# Patient Record
Sex: FEMALE | Race: WHITE | Employment: UNEMPLOYED | URBAN - NONMETROPOLITAN AREA
[De-identification: names, ages, dates, MRNs, and addresses within clinical notes are randomized per-mention and may not be internally consistent; named-entity substitution may affect disease eponyms.]

---

## 2017-11-22 ENCOUNTER — HOSPITAL ENCOUNTER (OUTPATIENT)
Dept: MRI IMAGING | Age: 47
End: 2017-11-22
Payer: COMMERCIAL

## 2017-11-22 ENCOUNTER — HOSPITAL ENCOUNTER (OUTPATIENT)
Dept: MRI IMAGING | Age: 47
Discharge: HOME | End: 2017-11-22
Payer: COMMERCIAL

## 2017-11-22 DIAGNOSIS — G35 MULTIPLE SCLEROSIS (HCC): ICD-10-CM

## 2017-11-22 PROCEDURE — 72156 MRI NECK SPINE W/O & W/DYE: CPT

## 2017-11-22 PROCEDURE — 6360000004 HC RX CONTRAST MEDICATION: Performed by: PSYCHIATRY & NEUROLOGY

## 2017-11-22 PROCEDURE — 70553 MRI BRAIN STEM W/O & W/DYE: CPT

## 2017-11-22 PROCEDURE — A9579 GAD-BASE MR CONTRAST NOS,1ML: HCPCS | Performed by: PSYCHIATRY & NEUROLOGY

## 2017-11-22 RX ADMIN — GADOTERIDOL 10 ML: 279.3 INJECTION, SOLUTION INTRAVENOUS at 19:20

## 2017-12-01 ENCOUNTER — HOSPITAL ENCOUNTER (EMERGENCY)
Age: 47
Discharge: HOME | End: 2017-12-01
Payer: COMMERCIAL

## 2017-12-01 VITALS
DIASTOLIC BLOOD PRESSURE: 92 MMHG | BODY MASS INDEX: 18.37 KG/M2 | TEMPERATURE: 98.3 F | RESPIRATION RATE: 16 BRPM | WEIGHT: 107 LBS | OXYGEN SATURATION: 98 % | SYSTOLIC BLOOD PRESSURE: 125 MMHG | HEART RATE: 79 BPM

## 2017-12-01 DIAGNOSIS — F10.920 ACUTE ALCOHOLIC INTOXICATION WITHOUT COMPLICATION (HCC): Primary | ICD-10-CM

## 2017-12-01 LAB
ACETAMINOPHEN LEVEL: < 5 UG/ML (ref 0–20)
ALBUMIN SERPL-MCNC: 4.6 G/DL (ref 3.5–5.1)
ALP BLD-CCNC: 64 U/L (ref 38–126)
ALT SERPL-CCNC: 46 U/L (ref 11–66)
AMPHETAMINE+METHAMPHETAMINE URINE SCREEN: NEGATIVE
ANION GAP SERPL CALCULATED.3IONS-SCNC: 18 MEQ/L (ref 8–16)
ANISOCYTOSIS: ABNORMAL
AST SERPL-CCNC: 74 U/L (ref 5–40)
BARBITURATE QUANTITATIVE URINE: NEGATIVE
BASOPHILS # BLD: 1 %
BASOPHILS ABSOLUTE: 0.1 THOU/MM3 (ref 0–0.1)
BENZODIAZEPINE QUANTITATIVE URINE: NEGATIVE
BILIRUB SERPL-MCNC: 0.2 MG/DL (ref 0.3–1.2)
BILIRUBIN DIRECT: < 0.2 MG/DL (ref 0–0.3)
BUN BLDV-MCNC: 8 MG/DL (ref 7–22)
CALCIUM SERPL-MCNC: 8.5 MG/DL (ref 8.5–10.5)
CANNABINOID QUANTITATIVE URINE: POSITIVE
CHLORIDE BLD-SCNC: 93 MEQ/L (ref 98–111)
CO2: 23 MEQ/L (ref 23–33)
COCAINE METABOLITE QUANTITATIVE URINE: NEGATIVE
CREAT SERPL-MCNC: 0.3 MG/DL (ref 0.4–1.2)
EKG ATRIAL RATE: 84 BPM
EKG P AXIS: 71 DEGREES
EKG P-R INTERVAL: 144 MS
EKG Q-T INTERVAL: 384 MS
EKG QRS DURATION: 78 MS
EKG QTC CALCULATION (BAZETT): 453 MS
EKG R AXIS: 79 DEGREES
EKG T AXIS: 74 DEGREES
EKG VENTRICULAR RATE: 84 BPM
EOSINOPHIL # BLD: 0.4 %
EOSINOPHILS ABSOLUTE: 0 THOU/MM3 (ref 0–0.4)
ETHYL ALCOHOL, SERUM: 0.47 %
GFR SERPL CREATININE-BSD FRML MDRD: > 90 ML/MIN/1.73M2
GLUCOSE BLD-MCNC: 63 MG/DL (ref 70–108)
HCT VFR BLD CALC: 38.1 % (ref 37–47)
HEMOGLOBIN: 12.8 GM/DL (ref 12–16)
LIPASE: 57.1 U/L (ref 5.6–51.3)
LYMPHOCYTES # BLD: 32.6 %
LYMPHOCYTES ABSOLUTE: 2.2 THOU/MM3 (ref 1–4.8)
MACROCYTES: ABNORMAL
MAGNESIUM: 2 MG/DL (ref 1.6–2.4)
MCH RBC QN AUTO: 34.3 PG (ref 27–31)
MCHC RBC AUTO-ENTMCNC: 33.7 GM/DL (ref 33–37)
MCV RBC AUTO: 101.8 FL (ref 81–99)
MONOCYTES # BLD: 7.4 %
MONOCYTES ABSOLUTE: 0.5 THOU/MM3 (ref 0.4–1.3)
NUCLEATED RED BLOOD CELLS: 0 /100 WBC
OPIATES, URINE: NEGATIVE
OSMOLALITY CALCULATION: 264.6 MOSMOL/KG (ref 275–300)
OXYCODONE: NEGATIVE
PDW BLD-RTO: 15.3 % (ref 11.5–14.5)
PHENCYCLIDINE QUANTITATIVE URINE: NEGATIVE
PLATELET # BLD: 205 THOU/MM3 (ref 130–400)
PMV BLD AUTO: 7.1 MCM (ref 7.4–10.4)
POTASSIUM SERPL-SCNC: 4.2 MEQ/L (ref 3.5–5.2)
RBC # BLD: 3.74 MILL/MM3 (ref 4.2–5.4)
SALICYLATE, SERUM: < 0.3 MG/DL (ref 2–10)
SEG NEUTROPHILS: 58.6 %
SEGMENTED NEUTROPHILS ABSOLUTE COUNT: 4 THOU/MM3 (ref 1.8–7.7)
SODIUM BLD-SCNC: 134 MEQ/L (ref 135–145)
TOTAL PROTEIN: 7.4 G/DL (ref 6.1–8)
WBC # BLD: 6.9 THOU/MM3 (ref 4.8–10.8)

## 2017-12-01 PROCEDURE — 96366 THER/PROPH/DIAG IV INF ADDON: CPT

## 2017-12-01 PROCEDURE — G0480 DRUG TEST DEF 1-7 CLASSES: HCPCS

## 2017-12-01 PROCEDURE — 99284 EMERGENCY DEPT VISIT MOD MDM: CPT

## 2017-12-01 PROCEDURE — 80307 DRUG TEST PRSMV CHEM ANLYZR: CPT

## 2017-12-01 PROCEDURE — 85025 COMPLETE CBC W/AUTO DIFF WBC: CPT

## 2017-12-01 PROCEDURE — 2500000003 HC RX 250 WO HCPCS: Performed by: EMERGENCY MEDICINE

## 2017-12-01 PROCEDURE — 80053 COMPREHEN METABOLIC PANEL: CPT

## 2017-12-01 PROCEDURE — 96365 THER/PROPH/DIAG IV INF INIT: CPT

## 2017-12-01 PROCEDURE — 82248 BILIRUBIN DIRECT: CPT

## 2017-12-01 PROCEDURE — 93005 ELECTROCARDIOGRAM TRACING: CPT

## 2017-12-01 PROCEDURE — 6370000000 HC RX 637 (ALT 250 FOR IP): Performed by: EMERGENCY MEDICINE

## 2017-12-01 PROCEDURE — 83690 ASSAY OF LIPASE: CPT

## 2017-12-01 PROCEDURE — 36415 COLL VENOUS BLD VENIPUNCTURE: CPT

## 2017-12-01 PROCEDURE — 2580000003 HC RX 258: Performed by: EMERGENCY MEDICINE

## 2017-12-01 PROCEDURE — 83735 ASSAY OF MAGNESIUM: CPT

## 2017-12-01 RX ORDER — NICOTINE 21 MG/24HR
1 PATCH, TRANSDERMAL 24 HOURS TRANSDERMAL ONCE
Status: DISCONTINUED | OUTPATIENT
Start: 2017-12-01 | End: 2017-12-01 | Stop reason: HOSPADM

## 2017-12-01 RX ORDER — GABAPENTIN 600 MG/1
600 TABLET ORAL 3 TIMES DAILY
COMMUNITY
End: 2019-09-25

## 2017-12-01 RX ADMIN — ASCORBIC ACID, VITAMIN A PALMITATE, CHOLECALCIFEROL, THIAMINE HYDROCHLORIDE, RIBOFLAVIN-5 PHOSPHATE SODIUM, PYRIDOXINE HYDROCHLORIDE, NIACINAMIDE, DEXPANTHENOL, ALPHA-TOCOPHEROL ACETATE, VITAMIN K1, FOLIC ACID, BIOTIN, CYANOCOBALAMIN: 200; 3300; 200; 6; 3.6; 6; 40; 15; 10; 150; 600; 60; 5 INJECTION, SOLUTION INTRAVENOUS at 14:30

## 2017-12-01 NOTE — ED NOTES
Pt ambulated to [facility] w/slightly unsteady gait. Updated on POC- nicotine patch placed. Door closed to promote rest- curtain open [facility] to visualize pt. SR up x 2.  Will monitor     Sweta Reese RN  12/01/17 2861

## 2017-12-01 NOTE — ED NOTES
Pt discharged to her parents. Pt given resources for OP detox. Pt ambulatory out.       Shama Ortiz, RN  12/01/17 5703

## 2017-12-01 NOTE — ED PROVIDER NOTES
day. She has never used smokeless tobacco. She reports that she drinks alcohol. She reports that she does not use drugs. PHYSICAL EXAM     INITIAL VITALS:  weight is 107 lb (48.5 kg). Her oral temperature is 98.3 °F (36.8 °C). Her blood pressure is 125/92 (abnormal) and her pulse is 79. Her respiration is 16 and oxygen saturation is 98%. Physical Exam   Constitutional: She is oriented to person, place, and time. She appears well-developed and well-nourished. She is cooperative. Non-toxic appearance. She does not have a sickly appearance. She does not appear ill. No distress. Patient cries on examination when requesting a detox. HENT:   Head: Normocephalic and atraumatic. Right Ear: External ear normal.   Left Ear: External ear normal.   Eyes: Conjunctivae are normal. Right eye exhibits no discharge. Left eye exhibits no discharge. No scleral icterus. Neck: Normal range of motion. Neck supple. No JVD present. Cardiovascular: Normal rate, regular rhythm and normal heart sounds. Exam reveals no gallop and no friction rub. No murmur heard. Pulmonary/Chest: Effort normal and breath sounds normal. No respiratory distress. She has no decreased breath sounds. She has no wheezes. She has no rhonchi. She has no rales. Abdominal: Soft. She exhibits no distension. There is no tenderness. There is no rebound and no guarding. Musculoskeletal: Normal range of motion. She exhibits no edema. Neurological: She is alert and oriented to person, place, and time. She exhibits normal muscle tone. She displays no seizure activity. GCS eye subscore is 4. GCS verbal subscore is 5. GCS motor subscore is 6. Skin: Skin is warm and dry. No rash noted. She is not diaphoretic. Psychiatric: She has a normal mood and affect. Her behavior is normal. Thought content normal.   Nursing note and vitals reviewed.       DIFFERENTIAL DIAGNOSIS:   Including but not limited to: alcohol intoxication, Substance abuse,    DIAGNOSTIC RESULTS     EKG: All EKG's are interpreted by the Emergency Department Physician who either signs or Co-signs this chart in the absence of a cardiologist.  Normal sinus rhythm rate of 84. IA interval 144, QRS duration 78, . There is no ectopy. There is no ST elevation or depression significant for ischemia or infarction. RADIOLOGY: non-plain film images(s) such as CT, Ultrasound and MRI are read by the radiologist.  No orders to display     No orders to display         LABS:     Labs Reviewed   CBC WITH AUTO DIFFERENTIAL - Abnormal; Notable for the following:        Result Value    RBC 3.74 (*)     .8 (*)     MCH 34.3 (*)     RDW 15.3 (*)     MPV 7.1 (*)     All other components within normal limits   BASIC METABOLIC PANEL - Abnormal; Notable for the following:     Sodium 134 (*)     Chloride 93 (*)     Glucose 63 (*)     CREATININE 0.3 (*)     All other components within normal limits   HEPATIC FUNCTION PANEL - Abnormal; Notable for the following: Total Bilirubin 0.2 (*)     AST 74 (*)     All other components within normal limits   LIPASE - Abnormal; Notable for the following:     Lipase 57.1 (*)     All other components within normal limits   SALICYLATE LEVEL - Abnormal; Notable for the following:     Salicylate, Serum < 0.3 (*)     All other components within normal limits   OSMOLALITY - Abnormal; Notable for the following:     Osmolality Calc 264.6 (*)     All other components within normal limits   ANION GAP - Abnormal; Notable for the following: Anion Gap 18.0 (*)     All other components within normal limits   MAGNESIUM   ETHANOL   URINE DRUG SCREEN   ACETAMINOPHEN LEVEL   GLOMERULAR FILTRATION RATE, ESTIMATED       EMERGENCY DEPARTMENT COURSE:   Vitals:    Vitals:    12/01/17 1331   BP: (!) 125/92   Pulse: 79   Resp: 16   Temp: 98.3 °F (36.8 °C)   TempSrc: Oral   SpO2: 98%   Weight: 107 lb (48.5 kg)       1:31 PM: The patient was seen and evaluated.     1:45 encouraged to follow-up with [facility] or the emergency department for any new problems    CRITICAL CARE:   None. CONSULTS:  Dr. Zuleima Mccauley said she was sent her for safety and well-being given the patient's high alcohol level. PROCEDURES:  None. FINAL IMPRESSION      1. Acute alcoholic intoxication without complication [facility])          DISPOSITION/PLAN   Discharge    PATIENT REFERRED TO:  Hamida Plascencia MD  [facility], [address] [address] [facility]    Schedule an appointment as soon as possible for a visit       [facility]  [address] [place] [id number] [phone]  Schedule an appointment as soon as possible for a visit   Call or go to [facility] for evaluation of alcohol detoxification. DISCHARGE MEDICATIONS:  New Prescriptions    No medications on file       (Please note that portions of this note were completed with a voice recognition program.  Efforts were made to edit the dictations but occasionally words are mis-transcribed.)    The patient was given an opportunity to see the Emergency Attending. The patient voiced understanding that I was a Mid-Level Provider and was in agreement with being seen independently by myself. Scribe:  Tracy Mann 12/1/17 1:31 PM Scribing for and in the presence of CHERELLE Raymond. Signed by: Parris Garcia, 12/01/17 5:21 PM    Provider:  I personally performed the services described in the documentation, reviewed and edited the documentation which was dictated to the scribe in my presence, and it accurately records my words and actions.     CHERELLE Raymond 12/1/17 5:21 PM        MK Raymond  12/01/17 3249

## 2017-12-01 NOTE — ED NOTES
Pt got herself out of bed- pulled out her IV and was walking the halls. States that she didn't pull the IV out- it just fell out all on its own. Pt directed back to room- then to bathroom. Pt to call friend to come pick her up. NELDA to see pt and give her resources regarding detox options.       Dejon Toscano RN  12/01/17 8962

## 2017-12-01 NOTE — ED NOTES
Pt to rm 22 from intake- states she was sent in per her neurologists office but she wasn't sure why. Pt states she drinks 'a lot', and is currently intoxicated, admits drinking a bottle of rum today. Pt says she wants to get help with her drinking and wants detox. Denies other needs or concerns at this time- states she just wants us all to get out so she can go to sleep. IV access obtained- assessment complete.       Liliana Ribeiro RN  12/01/17 4001

## 2017-12-07 ENCOUNTER — HOSPITAL ENCOUNTER (OUTPATIENT)
Dept: MRI IMAGING | Age: 47
Discharge: HOME | End: 2017-12-07
Payer: COMMERCIAL

## 2017-12-07 DIAGNOSIS — R93.89 ABNORMAL MRI: ICD-10-CM

## 2017-12-07 DIAGNOSIS — M54.50 ACUTE MIDLINE LOW BACK PAIN WITHOUT SCIATICA: ICD-10-CM

## 2017-12-07 PROCEDURE — 72148 MRI LUMBAR SPINE W/O DYE: CPT

## 2017-12-11 ENCOUNTER — OFFICE VISIT (OUTPATIENT)
Dept: NEUROSURGERY | Age: 47
End: 2017-12-11
Payer: COMMERCIAL

## 2017-12-11 VITALS
HEART RATE: 84 BPM | SYSTOLIC BLOOD PRESSURE: 120 MMHG | BODY MASS INDEX: 20.52 KG/M2 | WEIGHT: 120.2 LBS | HEIGHT: 64 IN | DIASTOLIC BLOOD PRESSURE: 85 MMHG

## 2017-12-11 DIAGNOSIS — M54.2 NECK PAIN: Primary | ICD-10-CM

## 2017-12-11 PROCEDURE — G8484 FLU IMMUNIZE NO ADMIN: HCPCS | Performed by: NEUROLOGICAL SURGERY

## 2017-12-11 PROCEDURE — 4004F PT TOBACCO SCREEN RCVD TLK: CPT | Performed by: NEUROLOGICAL SURGERY

## 2017-12-11 PROCEDURE — 99201 PR OFFICE OUTPATIENT NEW 10 MINUTES: CPT | Performed by: NEUROLOGICAL SURGERY

## 2017-12-11 PROCEDURE — G8427 DOCREV CUR MEDS BY ELIG CLIN: HCPCS | Performed by: NEUROLOGICAL SURGERY

## 2017-12-11 PROCEDURE — G8420 CALC BMI NORM PARAMETERS: HCPCS | Performed by: NEUROLOGICAL SURGERY

## 2017-12-11 RX ORDER — CHOLECALCIFEROL (VITAMIN D3) 1250 MCG
CAPSULE ORAL WEEKLY
COMMUNITY
End: 2019-09-25

## 2017-12-11 NOTE — LETTER
[place]  [address] [facility] [address] [facility]  Phone: [phone]  Fax: [phone]    Elise Dalton MD        December 11, 2017    Patient: Palma Chacon   MR Number: [id number]   YOB: 1970   Date of Visit: 12/11/2017   Dear Dr. Elidia Lang ,  Thank you for the request for consultation for Palma Chacon to me for the evaluation of abnormal Cervical spine MRI. As you know Chad Shaver is a 55year old female referred to me because of abnormal MRI Cervical spine done on 11-  There are mild multilevel degenerative changes within the cervical spine which are discussed by level in the findings. These are most significant at C5-C6 with a disc bulge and tiny annular fissure. Mild neural foraminal narrowing is present on the    right without significant spinal canal or left-sided neural foraminal narrowing. There is also a small disc osteophyte complex at C6-C7 with a right paracentral disc protrusion indenting the ventral thecal sac. No significant spinal canal narrowing is    present at this level. There is mild neural foraminal narrowing on the right without significant neural foraminal narrowing on the left. She has a known history of MS. NE is negative for any cervical radiculopathy, sign or symptoms. Although the MRI of the C spine is mildly abnormal she does not appear to have any symptom/sign related to it. No surgical indication at this time. Discussed with patient that I would be glad to see her again should she start to develop arm pain/weakness. Thank you for referring this nice patient to me. If you have questions, please do not hesitate to call. I look forward to following Chad Shaver along with you.   Sincerely,      Elise Dalton MD, [facility], Department of Neurosurgery  Surgical Director, [address] [facility]

## 2017-12-11 NOTE — PROGRESS NOTES
55year old female referred to me because of abnormal MRI Cervical spine done on 11-  There are mild multilevel degenerative changes within the cervical spine which are discussed by level in the findings. These are most significant at C5-C6 with a disc bulge and tiny annular fissure. Mild neural foraminal narrowing is present on the    right without significant spinal canal or left-sided neural foraminal narrowing. There is also a small disc osteophyte complex at C6-C7 with a right paracentral disc protrusion indenting the ventral thecal sac. No significant spinal canal narrowing is    present at this level. There is mild neural foraminal narrowing on the right without significant neural foraminal narrowing on the left. She has a known history of MS. NE is negative for any cervical radiculopathy, sign or symptoms. Although the MRI of the C spine is mildly abnormal she does not appear to have any symptom/sign related to it. No surgical indication at this time. Discussed with patient that I would be glad to see her again should she start to develop arm pain/weakness.

## 2017-12-14 ENCOUNTER — HOSPITAL ENCOUNTER (OUTPATIENT)
Age: 47
Discharge: HOME | End: 2017-12-14
Payer: COMMERCIAL

## 2017-12-14 LAB
ALBUMIN SERPL-MCNC: 4.4 G/DL (ref 3.5–5.1)
ALP BLD-CCNC: 64 U/L (ref 38–126)
ALT SERPL-CCNC: 20 U/L (ref 11–66)
ANION GAP SERPL CALCULATED.3IONS-SCNC: 11 MEQ/L (ref 8–16)
AST SERPL-CCNC: 23 U/L (ref 5–40)
BASOPHILS # BLD: 3.5 %
BASOPHILS ABSOLUTE: 0.3 THOU/MM3 (ref 0–0.1)
BILIRUB SERPL-MCNC: < 0.2 MG/DL (ref 0.3–1.2)
BUN BLDV-MCNC: 9 MG/DL (ref 7–22)
CALCIUM SERPL-MCNC: 9.8 MG/DL (ref 8.5–10.5)
CHLORIDE BLD-SCNC: 101 MEQ/L (ref 98–111)
CO2: 27 MEQ/L (ref 23–33)
CREAT SERPL-MCNC: 0.5 MG/DL (ref 0.4–1.2)
EOSINOPHIL # BLD: 2.2 %
EOSINOPHILS ABSOLUTE: 0.2 THOU/MM3 (ref 0–0.4)
GFR SERPL CREATININE-BSD FRML MDRD: > 90 ML/MIN/1.73M2
GLUCOSE BLD-MCNC: 77 MG/DL (ref 70–108)
HCT VFR BLD CALC: 34.5 % (ref 37–47)
HEMOGLOBIN: 11.7 GM/DL (ref 12–16)
LYMPHOCYTES # BLD: 35 %
LYMPHOCYTES ABSOLUTE: 3 THOU/MM3 (ref 1–4.8)
MACROCYTES: ABNORMAL
MCH RBC QN AUTO: 35.4 PG (ref 27–31)
MCHC RBC AUTO-ENTMCNC: 33.9 GM/DL (ref 33–37)
MCV RBC AUTO: 104.5 FL (ref 81–99)
MONOCYTES # BLD: 10 %
MONOCYTES ABSOLUTE: 0.9 THOU/MM3 (ref 0.4–1.3)
NUCLEATED RED BLOOD CELLS: 0 /100 WBC
PDW BLD-RTO: 13.7 % (ref 11.5–14.5)
PLATELET # BLD: 451 THOU/MM3 (ref 130–400)
PMV BLD AUTO: 7.5 MCM (ref 7.4–10.4)
POTASSIUM SERPL-SCNC: 3.6 MEQ/L (ref 3.5–5.2)
RBC # BLD: 3.3 MILL/MM3 (ref 4.2–5.4)
SEG NEUTROPHILS: 49.3 %
SEGMENTED NEUTROPHILS ABSOLUTE COUNT: 4.2 THOU/MM3 (ref 1.8–7.7)
SODIUM BLD-SCNC: 139 MEQ/L (ref 135–145)
TOTAL PROTEIN: 7.2 G/DL (ref 6.1–8)
WBC # BLD: 8.5 THOU/MM3 (ref 4.8–10.8)

## 2017-12-14 PROCEDURE — 85245 CLOT FACTOR VIII VW RISTOCTN: CPT

## 2017-12-14 PROCEDURE — 85240 CLOT FACTOR VIII AHG 1 STAGE: CPT

## 2017-12-14 PROCEDURE — 85025 COMPLETE CBC W/AUTO DIFF WBC: CPT

## 2017-12-14 PROCEDURE — 80053 COMPREHEN METABOLIC PANEL: CPT

## 2017-12-14 PROCEDURE — 85246 CLOT FACTOR VIII VW ANTIGEN: CPT

## 2017-12-14 PROCEDURE — 36415 COLL VENOUS BLD VENIPUNCTURE: CPT

## 2017-12-14 PROCEDURE — 85260 CLOT FACTOR X STUART-POWER: CPT

## 2017-12-14 PROCEDURE — 85250 CLOT FACTOR IX PTC/CHRSTMAS: CPT

## 2017-12-14 PROCEDURE — 85270 CLOT FACTOR XI PTA: CPT

## 2017-12-18 LAB
FACTOR IX ASSAY: NORMAL
FACTOR X ASSAY: NORMAL
FACTOR XI ASSAY: NORMAL
VON WILLEBRAND PANEL: NORMAL

## 2018-01-25 ENCOUNTER — HOSPITAL ENCOUNTER (OUTPATIENT)
Age: 48
Discharge: HOME | End: 2018-01-25
Payer: COMMERCIAL

## 2018-01-25 LAB
APTT: 27.4 SECONDS (ref 22–38)
INR BLD: 0.85 (ref 0.85–1.13)

## 2018-01-25 PROCEDURE — 85610 PROTHROMBIN TIME: CPT

## 2018-01-25 PROCEDURE — 85730 THROMBOPLASTIN TIME PARTIAL: CPT

## 2018-01-25 PROCEDURE — 36415 COLL VENOUS BLD VENIPUNCTURE: CPT

## 2018-05-07 ENCOUNTER — TELEPHONE (OUTPATIENT)
Dept: NEUROSURGERY | Age: 48
End: 2018-05-07

## 2018-06-26 ENCOUNTER — HOSPITAL ENCOUNTER (OUTPATIENT)
Dept: MRI IMAGING | Age: 48
Discharge: HOME | End: 2018-06-26
Payer: COMMERCIAL

## 2018-06-26 DIAGNOSIS — M50.20 PROTRUDED CERVICAL DISC: ICD-10-CM

## 2018-06-26 PROCEDURE — 72141 MRI NECK SPINE W/O DYE: CPT

## 2018-12-31 ENCOUNTER — HOSPITAL ENCOUNTER (OUTPATIENT)
Dept: MRI IMAGING | Age: 48
Discharge: HOME | End: 2018-12-31
Payer: COMMERCIAL

## 2018-12-31 DIAGNOSIS — M50.20 CERVICAL DISC DISPLACEMENT: ICD-10-CM

## 2018-12-31 PROCEDURE — 70553 MRI BRAIN STEM W/O & W/DYE: CPT

## 2018-12-31 PROCEDURE — A9579 GAD-BASE MR CONTRAST NOS,1ML: HCPCS | Performed by: PSYCHIATRY & NEUROLOGY

## 2018-12-31 PROCEDURE — 6360000004 HC RX CONTRAST MEDICATION: Performed by: PSYCHIATRY & NEUROLOGY

## 2018-12-31 RX ADMIN — GADOTERIDOL 10 ML: 279.3 INJECTION, SOLUTION INTRAVENOUS at 07:59

## 2019-09-25 ENCOUNTER — APPOINTMENT (OUTPATIENT)
Dept: CT IMAGING | Age: 49
End: 2019-09-25
Payer: COMMERCIAL

## 2019-09-25 ENCOUNTER — HOSPITAL ENCOUNTER (EMERGENCY)
Age: 49
Discharge: HOME | End: 2019-09-25
Attending: EMERGENCY MEDICINE
Payer: COMMERCIAL

## 2019-09-25 VITALS
TEMPERATURE: 98.1 F | WEIGHT: 118 LBS | DIASTOLIC BLOOD PRESSURE: 78 MMHG | HEIGHT: 64 IN | BODY MASS INDEX: 20.14 KG/M2 | SYSTOLIC BLOOD PRESSURE: 132 MMHG | HEART RATE: 95 BPM | OXYGEN SATURATION: 98 % | RESPIRATION RATE: 20 BRPM

## 2019-09-25 DIAGNOSIS — E87.1 HYPONATREMIA WITH DECREASED SERUM OSMOLALITY: ICD-10-CM

## 2019-09-25 DIAGNOSIS — S01.01XA LACERATION OF SCALP, INITIAL ENCOUNTER: ICD-10-CM

## 2019-09-25 DIAGNOSIS — S09.90XA INJURY OF HEAD, INITIAL ENCOUNTER: Primary | ICD-10-CM

## 2019-09-25 LAB
AMPHETAMINE+METHAMPHETAMINE URINE SCREEN: NEGATIVE
ANION GAP SERPL CALCULATED.3IONS-SCNC: 16 MEQ/L (ref 8–16)
APTT: 32.4 SECONDS (ref 22–38)
BARBITURATE QUANTITATIVE URINE: NEGATIVE
BASOPHILS # BLD: 1.2 %
BASOPHILS ABSOLUTE: 0.1 THOU/MM3 (ref 0–0.1)
BENZODIAZEPINE QUANTITATIVE URINE: NEGATIVE
BILIRUBIN URINE: NEGATIVE
BLOOD, URINE: NEGATIVE
BUN BLDV-MCNC: 5 MG/DL (ref 7–22)
CALCIUM SERPL-MCNC: 9.1 MG/DL (ref 8.5–10.5)
CANNABINOID QUANTITATIVE URINE: POSITIVE
CHARACTER, URINE: CLEAR
CHLORIDE BLD-SCNC: 89 MEQ/L (ref 98–111)
CO2: 23 MEQ/L (ref 23–33)
COCAINE METABOLITE QUANTITATIVE URINE: NEGATIVE
COLOR: YELLOW
CREAT SERPL-MCNC: 0.4 MG/DL (ref 0.4–1.2)
EKG ATRIAL RATE: 86 BPM
EKG P AXIS: 54 DEGREES
EKG P-R INTERVAL: 144 MS
EKG Q-T INTERVAL: 362 MS
EKG QRS DURATION: 68 MS
EKG QTC CALCULATION (BAZETT): 433 MS
EKG R AXIS: 80 DEGREES
EKG T AXIS: 70 DEGREES
EKG VENTRICULAR RATE: 86 BPM
EOSINOPHIL # BLD: 1.3 %
EOSINOPHILS ABSOLUTE: 0.1 THOU/MM3 (ref 0–0.4)
ERYTHROCYTE [DISTWIDTH] IN BLOOD BY AUTOMATED COUNT: 12.7 % (ref 11.5–14.5)
ERYTHROCYTE [DISTWIDTH] IN BLOOD BY AUTOMATED COUNT: 45.1 FL (ref 35–45)
ETHYL ALCOHOL, SERUM: 0.38 %
GFR SERPL CREATININE-BSD FRML MDRD: > 90 ML/MIN/1.73M2
GLUCOSE BLD-MCNC: 115 MG/DL (ref 70–108)
GLUCOSE URINE: NEGATIVE MG/DL
HCT VFR BLD CALC: 34.3 % (ref 37–47)
HEMOGLOBIN: 11.6 GM/DL (ref 12–16)
IMMATURE GRANS (ABS): 0.04 THOU/MM3 (ref 0–0.07)
IMMATURE GRANULOCYTES: 0.4 %
INR BLD: 0.89 (ref 0.85–1.13)
KETONES, URINE: NEGATIVE
LEUKOCYTE ESTERASE, URINE: NEGATIVE
LYMPHOCYTES # BLD: 49.6 %
LYMPHOCYTES ABSOLUTE: 5.5 THOU/MM3 (ref 1–4.8)
MAGNESIUM: 2.1 MG/DL (ref 1.6–2.4)
MCH RBC QN AUTO: 33 PG (ref 26–33)
MCHC RBC AUTO-ENTMCNC: 33.8 GM/DL (ref 32.2–35.5)
MCV RBC AUTO: 97.4 FL (ref 81–99)
MONOCYTES # BLD: 9.3 %
MONOCYTES ABSOLUTE: 1 THOU/MM3 (ref 0.4–1.3)
NITRITE, URINE: NEGATIVE
NUCLEATED RED BLOOD CELLS: 0 /100 WBC
OPIATES, URINE: NEGATIVE
OSMOLALITY CALCULATION: 255.3 MOSMOL/KG (ref 275–300)
OXYCODONE: NEGATIVE
PH UA: 6 (ref 5–9)
PHENCYCLIDINE QUANTITATIVE URINE: NEGATIVE
PLATELET # BLD: 296 THOU/MM3 (ref 130–400)
PLATELET ESTIMATE: ADEQUATE
PMV BLD AUTO: 8.8 FL (ref 9.4–12.4)
POTASSIUM REFLEX MAGNESIUM: 3.5 MEQ/L (ref 3.5–5.2)
PROTEIN UA: NEGATIVE
RBC # BLD: 3.52 MILL/MM3 (ref 4.2–5.4)
SCAN OF BLOOD SMEAR: NORMAL
SEG NEUTROPHILS: 38.2 %
SEGMENTED NEUTROPHILS ABSOLUTE COUNT: 4.2 THOU/MM3 (ref 1.8–7.7)
SODIUM BLD-SCNC: 128 MEQ/L (ref 135–145)
SPECIFIC GRAVITY, URINE: 1.01 (ref 1–1.03)
UROBILINOGEN, URINE: 0.2 EU/DL (ref 0–1)
WBC # BLD: 11.1 THOU/MM3 (ref 4.8–10.8)

## 2019-09-25 PROCEDURE — 2580000003 HC RX 258: Performed by: EMERGENCY MEDICINE

## 2019-09-25 PROCEDURE — 72125 CT NECK SPINE W/O DYE: CPT

## 2019-09-25 PROCEDURE — 36415 COLL VENOUS BLD VENIPUNCTURE: CPT

## 2019-09-25 PROCEDURE — 80048 BASIC METABOLIC PNL TOTAL CA: CPT

## 2019-09-25 PROCEDURE — 80307 DRUG TEST PRSMV CHEM ANLYZR: CPT

## 2019-09-25 PROCEDURE — G0480 DRUG TEST DEF 1-7 CLASSES: HCPCS

## 2019-09-25 PROCEDURE — 85610 PROTHROMBIN TIME: CPT

## 2019-09-25 PROCEDURE — 2500000003 HC RX 250 WO HCPCS

## 2019-09-25 PROCEDURE — 85730 THROMBOPLASTIN TIME PARTIAL: CPT

## 2019-09-25 PROCEDURE — 85025 COMPLETE CBC W/AUTO DIFF WBC: CPT

## 2019-09-25 PROCEDURE — 93005 ELECTROCARDIOGRAM TRACING: CPT | Performed by: EMERGENCY MEDICINE

## 2019-09-25 PROCEDURE — 12002 RPR S/N/AX/GEN/TRNK2.6-7.5CM: CPT

## 2019-09-25 PROCEDURE — 83735 ASSAY OF MAGNESIUM: CPT

## 2019-09-25 PROCEDURE — 99283 EMERGENCY DEPT VISIT LOW MDM: CPT

## 2019-09-25 PROCEDURE — 70450 CT HEAD/BRAIN W/O DYE: CPT

## 2019-09-25 PROCEDURE — 81003 URINALYSIS AUTO W/O SCOPE: CPT

## 2019-09-25 RX ORDER — 0.9 % SODIUM CHLORIDE 0.9 %
1000 INTRAVENOUS SOLUTION INTRAVENOUS ONCE
Status: COMPLETED | OUTPATIENT
Start: 2019-09-25 | End: 2019-09-25

## 2019-09-25 RX ORDER — ACETAMINOPHEN 500 MG
500 TABLET ORAL EVERY 6 HOURS PRN
Qty: 28 TABLET | Refills: 0 | Status: SHIPPED | OUTPATIENT
Start: 2019-09-25 | End: 2019-10-03 | Stop reason: ALTCHOICE

## 2019-09-25 RX ORDER — LIDOCAINE HYDROCHLORIDE AND EPINEPHRINE 10; 10 MG/ML; UG/ML
INJECTION, SOLUTION INFILTRATION; PERINEURAL
Status: COMPLETED
Start: 2019-09-25 | End: 2019-09-25

## 2019-09-25 RX ADMIN — LIDOCAINE HYDROCHLORIDE,EPINEPHRINE BITARTRATE 10 ML: 10; .01 INJECTION, SOLUTION INFILTRATION; PERINEURAL at 17:42

## 2019-09-25 RX ADMIN — SODIUM CHLORIDE 1000 ML: 9 INJECTION, SOLUTION INTRAVENOUS at 19:11

## 2019-09-25 ASSESSMENT — ENCOUNTER SYMPTOMS
NAUSEA: 0
SHORTNESS OF BREATH: 0
DIARRHEA: 0
COUGH: 0
WHEEZING: 0
ABDOMINAL PAIN: 0
VOMITING: 0
EYE DISCHARGE: 0
RHINORRHEA: 0
EYE PAIN: 0
BACK PAIN: 0
SORE THROAT: 0

## 2019-09-25 NOTE — ED NOTES
Pt resting on cot. Assisted pt to clean blood off of her head. Will continue to monitor.       Elvin Santoro RN  09/25/19 4242

## 2019-09-25 NOTE — ED PROVIDER NOTES
MEDICATIONS:  New Prescriptions    ACETAMINOPHEN (TYLENOL) 500 MG TABLET    Take 1 tablet by mouth every 6 hours as needed for Pain       (Please note that portions of this note were completed with a voice recognition program.  Efforts were made to edit the dictations but occasionally words are mis-transcribed.)    Scribe:  Aris Nicholas 9/25/19 5:41 PM Scribing for and in the presence of Sherryle Pinta, DO. Signed by: Parris Davis, 09/25/19 9:00 PM    Provider:  I personally performed the services described in the documentation, reviewed and edited the documentation which was dictated to the scribe in my presence, and it accurately records my words and actions.     Sherryle Pinta, DO 9/25/19 9:00 PM      Sherryle Pinta, DO  09/26/19 7008

## 2019-09-26 ENCOUNTER — TELEPHONE (OUTPATIENT)
Dept: FAMILY MEDICINE CLINIC | Age: 49
End: 2019-09-26

## 2019-09-26 PROCEDURE — 93010 ELECTROCARDIOGRAM REPORT: CPT | Performed by: INTERNAL MEDICINE

## 2019-09-26 NOTE — ED NOTES
Pt pulled out her IV line, Pt stable A&O x 3 given discharge and follow up info. Pt voiced no concerns and discharged from ER to self to home. Pt ambulated out of ER with no complications .        Rosa Maria Boateng RN  09/25/19 8148

## 2019-09-26 NOTE — LETTER
[facility], [address]  Phone: [phone]  Fax: [phone]    September 26, 2019    [address]  [address] [address] 83      Dear José,    This letter is regarding your Emergency Department (ED) visit at [facility] on 9/25/19. Dr. Mello Abraham wanted to make sure that you understand your discharge instructions and that you were able to fill any prescriptions that may have been ordered for you. Please contact the office at the above phone number if the ED advised you to follow up with Dr. Mello Abraham, or if you have any further questions or needs. Also did you know -   *Visiting the ED for a non-emergency could result in higher co-pays than you would normally be subject to paying? [facility]) practices can often offer you an appointment on the same day that you call. *We have some [facility] offices that offer Walk-in appointments; check our website for availability in your community, [email or web address].      *Evisits are now available for patients for $36 through [facility] for certain conditions:  * Sinus, cold and or cough       * Diarrhea            * Headache  * Heartburn                                * Poison Helen          * Back pain     * Urinary problems                         If you do not have [facility] and are interested, please contact the office and a staff member may assist you or go to [email or web address].     Sincerely,     Mello Abraham MD and your [facility]

## 2019-10-03 ENCOUNTER — OFFICE VISIT (OUTPATIENT)
Dept: FAMILY MEDICINE CLINIC | Age: 49
End: 2019-10-03
Payer: COMMERCIAL

## 2019-10-03 VITALS
OXYGEN SATURATION: 98 % | BODY MASS INDEX: 17.4 KG/M2 | WEIGHT: 104.4 LBS | SYSTOLIC BLOOD PRESSURE: 120 MMHG | HEART RATE: 104 BPM | TEMPERATURE: 98.1 F | DIASTOLIC BLOOD PRESSURE: 64 MMHG | HEIGHT: 65 IN

## 2019-10-03 DIAGNOSIS — D64.9 ANEMIA, UNSPECIFIED TYPE: ICD-10-CM

## 2019-10-03 DIAGNOSIS — Z11.4 SCREENING FOR HIV (HUMAN IMMUNODEFICIENCY VIRUS): ICD-10-CM

## 2019-10-03 DIAGNOSIS — H46.9 OPTIC NEURITIS: ICD-10-CM

## 2019-10-03 DIAGNOSIS — S01.01XD LACERATION OF SCALP, SUBSEQUENT ENCOUNTER: Primary | ICD-10-CM

## 2019-10-03 DIAGNOSIS — R73.9 HYPERGLYCEMIA: ICD-10-CM

## 2019-10-03 DIAGNOSIS — E87.1 HYPONATREMIA: ICD-10-CM

## 2019-10-03 LAB — HBA1C MFR BLD: 4.8 % (ref 4.3–5.7)

## 2019-10-03 PROCEDURE — G8484 FLU IMMUNIZE NO ADMIN: HCPCS | Performed by: FAMILY MEDICINE

## 2019-10-03 PROCEDURE — 99203 OFFICE O/P NEW LOW 30 MIN: CPT | Performed by: FAMILY MEDICINE

## 2019-10-03 PROCEDURE — G8427 DOCREV CUR MEDS BY ELIG CLIN: HCPCS | Performed by: FAMILY MEDICINE

## 2019-10-03 PROCEDURE — 90715 TDAP VACCINE 7 YRS/> IM: CPT | Performed by: FAMILY MEDICINE

## 2019-10-03 PROCEDURE — 4004F PT TOBACCO SCREEN RCVD TLK: CPT | Performed by: FAMILY MEDICINE

## 2019-10-03 PROCEDURE — G8419 CALC BMI OUT NRM PARAM NOF/U: HCPCS | Performed by: FAMILY MEDICINE

## 2019-10-03 PROCEDURE — 90471 IMMUNIZATION ADMIN: CPT | Performed by: FAMILY MEDICINE

## 2019-10-03 ASSESSMENT — ENCOUNTER SYMPTOMS
SHORTNESS OF BREATH: 0
NAUSEA: 0
VOMITING: 0
DIARRHEA: 0
COUGH: 0
RHINORRHEA: 0
BACK PAIN: 0

## 2019-10-03 ASSESSMENT — PATIENT HEALTH QUESTIONNAIRE - PHQ9
1. LITTLE INTEREST OR PLEASURE IN DOING THINGS: 0
SUM OF ALL RESPONSES TO PHQ QUESTIONS 1-9: 0
SUM OF ALL RESPONSES TO PHQ QUESTIONS 1-9: 0
2. FEELING DOWN, DEPRESSED OR HOPELESS: 0
SUM OF ALL RESPONSES TO PHQ9 QUESTIONS 1 & 2: 0

## 2019-10-09 ENCOUNTER — NURSE ONLY (OUTPATIENT)
Dept: LAB | Age: 49
End: 2019-10-09

## 2019-10-09 DIAGNOSIS — D64.9 ANEMIA, UNSPECIFIED TYPE: ICD-10-CM

## 2019-10-09 DIAGNOSIS — Z11.4 SCREENING FOR HIV (HUMAN IMMUNODEFICIENCY VIRUS): ICD-10-CM

## 2019-10-09 DIAGNOSIS — E87.1 HYPONATREMIA: ICD-10-CM

## 2019-10-09 DIAGNOSIS — R73.9 HYPERGLYCEMIA: ICD-10-CM

## 2019-10-09 LAB
ALBUMIN SERPL-MCNC: 4.3 G/DL (ref 3.5–5.1)
ALP BLD-CCNC: 87 U/L (ref 38–126)
ALT SERPL-CCNC: 17 U/L (ref 11–66)
ANION GAP SERPL CALCULATED.3IONS-SCNC: 14 MEQ/L (ref 8–16)
AST SERPL-CCNC: 38 U/L (ref 5–40)
BASOPHILS # BLD: 1.5 %
BASOPHILS ABSOLUTE: 0.1 THOU/MM3 (ref 0–0.1)
BILIRUB SERPL-MCNC: 0.3 MG/DL (ref 0.3–1.2)
BILIRUBIN DIRECT: < 0.2 MG/DL (ref 0–0.3)
BUN BLDV-MCNC: 6 MG/DL (ref 7–22)
CALCIUM SERPL-MCNC: 9.6 MG/DL (ref 8.5–10.5)
CHLORIDE BLD-SCNC: 96 MEQ/L (ref 98–111)
CHOLESTEROL, TOTAL: 162 MG/DL (ref 100–199)
CO2: 25 MEQ/L (ref 23–33)
CREAT SERPL-MCNC: 0.5 MG/DL (ref 0.4–1.2)
EOSINOPHIL # BLD: 3.8 %
EOSINOPHILS ABSOLUTE: 0.2 THOU/MM3 (ref 0–0.4)
ERYTHROCYTE [DISTWIDTH] IN BLOOD BY AUTOMATED COUNT: 13.2 % (ref 11.5–14.5)
ERYTHROCYTE [DISTWIDTH] IN BLOOD BY AUTOMATED COUNT: 48.2 FL (ref 35–45)
GFR SERPL CREATININE-BSD FRML MDRD: > 90 ML/MIN/1.73M2
GLUCOSE BLD-MCNC: 88 MG/DL (ref 70–108)
HCT VFR BLD CALC: 33.1 % (ref 37–47)
HDLC SERPL-MCNC: 56 MG/DL
HEMOGLOBIN: 10.9 GM/DL (ref 12–16)
IMMATURE GRANS (ABS): 0.03 THOU/MM3 (ref 0–0.07)
IMMATURE GRANULOCYTES: 0.5 %
LDL CHOLESTEROL CALCULATED: 67 MG/DL
LYMPHOCYTES # BLD: 45.8 %
LYMPHOCYTES ABSOLUTE: 2.8 THOU/MM3 (ref 1–4.8)
MAGNESIUM: 2 MG/DL (ref 1.6–2.4)
MCH RBC QN AUTO: 32.8 PG (ref 26–33)
MCHC RBC AUTO-ENTMCNC: 32.9 GM/DL (ref 32.2–35.5)
MCV RBC AUTO: 99.7 FL (ref 81–99)
MONOCYTES # BLD: 13 %
MONOCYTES ABSOLUTE: 0.8 THOU/MM3 (ref 0.4–1.3)
NUCLEATED RED BLOOD CELLS: 0 /100 WBC
PLATELET # BLD: 316 THOU/MM3 (ref 130–400)
PMV BLD AUTO: 9.1 FL (ref 9.4–12.4)
POTASSIUM SERPL-SCNC: 3.9 MEQ/L (ref 3.5–5.2)
RBC # BLD: 3.32 MILL/MM3 (ref 4.2–5.4)
SEG NEUTROPHILS: 35.4 %
SEGMENTED NEUTROPHILS ABSOLUTE COUNT: 2.2 THOU/MM3 (ref 1.8–7.7)
SODIUM BLD-SCNC: 135 MEQ/L (ref 135–145)
TOTAL PROTEIN: 7.3 G/DL (ref 6.1–8)
TRIGL SERPL-MCNC: 196 MG/DL (ref 0–199)
TSH SERPL DL<=0.05 MIU/L-ACNC: 2.44 UIU/ML (ref 0.4–4.2)
VITAMIN D 25-HYDROXY: 21 NG/ML (ref 30–100)
WBC # BLD: 6.1 THOU/MM3 (ref 4.8–10.8)

## 2019-10-11 LAB — HIV-2 AB: NEGATIVE

## 2019-10-21 ENCOUNTER — OFFICE VISIT (OUTPATIENT)
Dept: FAMILY MEDICINE CLINIC | Age: 49
End: 2019-10-21
Payer: COMMERCIAL

## 2019-10-21 VITALS
HEIGHT: 65 IN | BODY MASS INDEX: 18.16 KG/M2 | HEART RATE: 85 BPM | WEIGHT: 109 LBS | DIASTOLIC BLOOD PRESSURE: 100 MMHG | TEMPERATURE: 98.8 F | SYSTOLIC BLOOD PRESSURE: 138 MMHG | RESPIRATION RATE: 12 BRPM | OXYGEN SATURATION: 100 %

## 2019-10-21 DIAGNOSIS — R03.0 ELEVATED BP WITHOUT DIAGNOSIS OF HYPERTENSION: ICD-10-CM

## 2019-10-21 DIAGNOSIS — Z00.00 ANNUAL PHYSICAL EXAM: Primary | ICD-10-CM

## 2019-10-21 PROCEDURE — 99396 PREV VISIT EST AGE 40-64: CPT | Performed by: FAMILY MEDICINE

## 2019-10-21 PROCEDURE — G8484 FLU IMMUNIZE NO ADMIN: HCPCS | Performed by: FAMILY MEDICINE

## 2019-11-18 ENCOUNTER — PROCEDURE VISIT (OUTPATIENT)
Dept: FAMILY MEDICINE CLINIC | Age: 49
End: 2019-11-18
Payer: COMMERCIAL

## 2019-11-18 VITALS
SYSTOLIC BLOOD PRESSURE: 124 MMHG | OXYGEN SATURATION: 98 % | DIASTOLIC BLOOD PRESSURE: 72 MMHG | WEIGHT: 112 LBS | HEIGHT: 65 IN | TEMPERATURE: 98.1 F | HEART RATE: 98 BPM | BODY MASS INDEX: 18.66 KG/M2

## 2019-11-18 DIAGNOSIS — Z01.419 ENCOUNTER FOR GYNECOLOGICAL EXAMINATION WITHOUT ABNORMAL FINDING: Primary | ICD-10-CM

## 2019-11-18 PROCEDURE — G8420 CALC BMI NORM PARAMETERS: HCPCS | Performed by: FAMILY MEDICINE

## 2019-11-18 PROCEDURE — 4004F PT TOBACCO SCREEN RCVD TLK: CPT | Performed by: FAMILY MEDICINE

## 2019-11-18 PROCEDURE — G8427 DOCREV CUR MEDS BY ELIG CLIN: HCPCS | Performed by: FAMILY MEDICINE

## 2019-11-18 PROCEDURE — 99396 PREV VISIT EST AGE 40-64: CPT | Performed by: FAMILY MEDICINE

## 2019-11-18 PROCEDURE — G8484 FLU IMMUNIZE NO ADMIN: HCPCS | Performed by: FAMILY MEDICINE

## 2019-11-20 LAB — CYTOLOGY THIN PREP PAP: NORMAL

## 2020-07-16 ENCOUNTER — OFFICE VISIT (OUTPATIENT)
Dept: FAMILY MEDICINE CLINIC | Age: 50
End: 2020-07-16
Payer: COMMERCIAL

## 2020-07-16 VITALS
RESPIRATION RATE: 14 BRPM | HEART RATE: 88 BPM | OXYGEN SATURATION: 97 % | DIASTOLIC BLOOD PRESSURE: 82 MMHG | BODY MASS INDEX: 17.34 KG/M2 | SYSTOLIC BLOOD PRESSURE: 114 MMHG | WEIGHT: 102.8 LBS | TEMPERATURE: 98.1 F

## 2020-07-16 PROCEDURE — G8419 CALC BMI OUT NRM PARAM NOF/U: HCPCS | Performed by: FAMILY MEDICINE

## 2020-07-16 PROCEDURE — 4004F PT TOBACCO SCREEN RCVD TLK: CPT | Performed by: FAMILY MEDICINE

## 2020-07-16 PROCEDURE — 99212 OFFICE O/P EST SF 10 MIN: CPT | Performed by: FAMILY MEDICINE

## 2020-07-16 PROCEDURE — G8427 DOCREV CUR MEDS BY ELIG CLIN: HCPCS | Performed by: FAMILY MEDICINE

## 2020-07-16 SDOH — ECONOMIC STABILITY: FOOD INSECURITY: WITHIN THE PAST 12 MONTHS, YOU WORRIED THAT YOUR FOOD WOULD RUN OUT BEFORE YOU GOT MONEY TO BUY MORE.: NEVER TRUE

## 2020-07-16 SDOH — ECONOMIC STABILITY: TRANSPORTATION INSECURITY
IN THE PAST 12 MONTHS, HAS LACK OF TRANSPORTATION KEPT YOU FROM MEETINGS, WORK, OR FROM GETTING THINGS NEEDED FOR DAILY LIVING?: NO

## 2020-07-16 SDOH — ECONOMIC STABILITY: TRANSPORTATION INSECURITY
IN THE PAST 12 MONTHS, HAS THE LACK OF TRANSPORTATION KEPT YOU FROM MEDICAL APPOINTMENTS OR FROM GETTING MEDICATIONS?: NO

## 2020-07-16 SDOH — ECONOMIC STABILITY: FOOD INSECURITY: WITHIN THE PAST 12 MONTHS, THE FOOD YOU BOUGHT JUST DIDN'T LAST AND YOU DIDN'T HAVE MONEY TO GET MORE.: NEVER TRUE

## 2020-07-16 SDOH — ECONOMIC STABILITY: INCOME INSECURITY: HOW HARD IS IT FOR YOU TO PAY FOR THE VERY BASICS LIKE FOOD, HOUSING, MEDICAL CARE, AND HEATING?: NOT HARD AT ALL

## 2020-07-16 ASSESSMENT — PATIENT HEALTH QUESTIONNAIRE - PHQ9
2. FEELING DOWN, DEPRESSED OR HOPELESS: 0
1. LITTLE INTEREST OR PLEASURE IN DOING THINGS: 0
SUM OF ALL RESPONSES TO PHQ QUESTIONS 1-9: 0
SUM OF ALL RESPONSES TO PHQ QUESTIONS 1-9: 0
SUM OF ALL RESPONSES TO PHQ9 QUESTIONS 1 & 2: 0

## 2020-07-16 NOTE — PROGRESS NOTES
Family Medicine Clinic Visit    Lisseth De La Torre presents for   Chief Complaint   Patient presents with    Follow-up    Other     needs note to go back to work        HPI: Patient reports that she injured her right knee in a fall a couple weeks ago. No significant residual issues including clicking popping or locking or instability. ROS:  denies CP, SOB    Past Medical History:   Diagnosis Date    Multiple sclerosis ([facility] 2017    Personality disorder ([facility] unknown     No Known Allergies    No current outpatient medications on file. No current facility-administered medications for this visit. Physical exam:  Vitals:    07/16/20 0936   BP: 114/82   Pulse: 88   Resp: 14   Temp: 98.1 °F (36.7 °C)   SpO2: 97%     Right knee exam reveals no significant effusion. All ligaments are intact. No tenderness to palpation. Rajesh Patterson was seen today for follow-up and other. Diagnoses and all orders for this visit:    Injury of right knee, initial encounter    Improving. Note to return to work provided. There are no preventive care reminders to display for this patient. There are no Patient Instructions on file for this visit.   Return in about 1 year (around 7/16/2021) for yearly wellness exam.      Jae Sharpe

## 2020-07-16 NOTE — LETTER
[address] [facility]. [address] [place] [address]  Phone: [phone]  Fax: [phone]    Theron Dubin, DO        July 16, 2020     Patient: Sheron Kern   YOB: 1970   Date of Visit: 7/16/2020       To Whom it May Concern:    Apryl Carranza was seen in my clinic on 7/16/2020. She may return to work on 7/17/2020. If you have any questions or concerns, please don't hesitate to call.     Sincerely,         Theron Dubin, DO

## 2020-11-09 ENCOUNTER — TELEPHONE (OUTPATIENT)
Dept: FAMILY MEDICINE CLINIC | Age: 50
End: 2020-11-09

## 2020-11-09 NOTE — TELEPHONE ENCOUNTER
Patient calling in requesting a COVID test order. States her coworker tested positive Saturday and her work is requiring a test be done in order for her to return. Patient does have a cough.

## 2020-11-10 ENCOUNTER — HOSPITAL ENCOUNTER (OUTPATIENT)
Age: 50
Setting detail: SPECIMEN
Discharge: HOME | End: 2020-11-10
Payer: COMMERCIAL

## 2020-11-10 PROCEDURE — U0003 INFECTIOUS AGENT DETECTION BY NUCLEIC ACID (DNA OR RNA); SEVERE ACUTE RESPIRATORY SYNDROME CORONAVIRUS 2 (SARS-COV-2) (CORONAVIRUS DISEASE [COVID-19]), AMPLIFIED PROBE TECHNIQUE, MAKING USE OF HIGH THROUGHPUT TECHNOLOGIES AS DESCRIBED BY CMS-2020-01-R: HCPCS

## 2020-11-11 LAB — SARS-COV-2: NOT DETECTED

## 2020-11-16 ENCOUNTER — TELEPHONE (OUTPATIENT)
Dept: INTERNAL MEDICINE CLINIC | Age: 50
End: 2020-11-16

## 2021-06-15 ENCOUNTER — OFFICE VISIT (OUTPATIENT)
Dept: FAMILY MEDICINE CLINIC | Age: 51
End: 2021-06-15
Payer: COMMERCIAL

## 2021-06-15 VITALS
WEIGHT: 105.6 LBS | HEIGHT: 65 IN | HEART RATE: 86 BPM | DIASTOLIC BLOOD PRESSURE: 78 MMHG | OXYGEN SATURATION: 100 % | SYSTOLIC BLOOD PRESSURE: 138 MMHG | TEMPERATURE: 98.2 F | BODY MASS INDEX: 17.59 KG/M2 | RESPIRATION RATE: 16 BRPM

## 2021-06-15 DIAGNOSIS — D64.9 ANEMIA, UNSPECIFIED TYPE: ICD-10-CM

## 2021-06-15 DIAGNOSIS — Z12.11 COLON CANCER SCREENING: ICD-10-CM

## 2021-06-15 DIAGNOSIS — M67.40 GANGLION CYST: Primary | ICD-10-CM

## 2021-06-15 DIAGNOSIS — N32.81 OVERACTIVE BLADDER: ICD-10-CM

## 2021-06-15 DIAGNOSIS — B35.1 ONYCHOMYCOSIS: ICD-10-CM

## 2021-06-15 DIAGNOSIS — Z12.31 ENCOUNTER FOR SCREENING MAMMOGRAM FOR MALIGNANT NEOPLASM OF BREAST: ICD-10-CM

## 2021-06-15 DIAGNOSIS — D64.9 NORMOCYTIC ANEMIA: ICD-10-CM

## 2021-06-15 DIAGNOSIS — F60.9 PERSONALITY DISORDER (HCC): ICD-10-CM

## 2021-06-15 PROCEDURE — 4004F PT TOBACCO SCREEN RCVD TLK: CPT | Performed by: STUDENT IN AN ORGANIZED HEALTH CARE EDUCATION/TRAINING PROGRAM

## 2021-06-15 PROCEDURE — 3017F COLORECTAL CA SCREEN DOC REV: CPT | Performed by: STUDENT IN AN ORGANIZED HEALTH CARE EDUCATION/TRAINING PROGRAM

## 2021-06-15 PROCEDURE — G8427 DOCREV CUR MEDS BY ELIG CLIN: HCPCS | Performed by: STUDENT IN AN ORGANIZED HEALTH CARE EDUCATION/TRAINING PROGRAM

## 2021-06-15 PROCEDURE — G8419 CALC BMI OUT NRM PARAM NOF/U: HCPCS | Performed by: STUDENT IN AN ORGANIZED HEALTH CARE EDUCATION/TRAINING PROGRAM

## 2021-06-15 PROCEDURE — 99213 OFFICE O/P EST LOW 20 MIN: CPT | Performed by: STUDENT IN AN ORGANIZED HEALTH CARE EDUCATION/TRAINING PROGRAM

## 2021-06-15 RX ORDER — ACETAMINOPHEN, ASPIRIN AND CAFFEINE 250; 250; 65 MG/1; MG/1; MG/1
1 TABLET, FILM COATED ORAL EVERY 6 HOURS PRN
COMMUNITY
End: 2022-04-11 | Stop reason: ALTCHOICE

## 2021-06-15 ASSESSMENT — ENCOUNTER SYMPTOMS
DIARRHEA: 0
TROUBLE SWALLOWING: 0
CONSTIPATION: 0
NAUSEA: 0
BLOOD IN STOOL: 0
VOMITING: 0
EYE PAIN: 0
COUGH: 0
ABDOMINAL PAIN: 0
SHORTNESS OF BREATH: 0

## 2021-06-15 ASSESSMENT — PATIENT HEALTH QUESTIONNAIRE - PHQ9
SUM OF ALL RESPONSES TO PHQ QUESTIONS 1-9: 0
SUM OF ALL RESPONSES TO PHQ QUESTIONS 1-9: 0
1. LITTLE INTEREST OR PLEASURE IN DOING THINGS: 0
SUM OF ALL RESPONSES TO PHQ9 QUESTIONS 1 & 2: 0
SUM OF ALL RESPONSES TO PHQ QUESTIONS 1-9: 0
2. FEELING DOWN, DEPRESSED OR HOPELESS: 0

## 2021-06-15 NOTE — PROGRESS NOTES
Health Maintenance Due   Topic Date Due    Hepatitis C screen  Never done    COVID-19 Vaccine (1) Never done Declined    Breast cancer screen  Never done Pended    Shingles Vaccine (1 of 2) Never done    Colon cancer screen colonoscopy  Never done

## 2021-06-15 NOTE — PROGRESS NOTES
Problems Mother     No Known Problems Father     No Known Problems Sister      Social History     Tobacco Use    Smoking status: Current Every Day Smoker     Packs/day: 0.50     Years: 30.00     Pack years: 15.00     Start date: 10/3/1989    Smokeless tobacco: Never Used   Substance Use Topics    Alcohol use: Yes     Alcohol/week: 3.0 standard drinks     Types: 3 Cans of beer per week     Comment: daily- \"lots\"      Current Outpatient Medications   Medication Sig Dispense Refill    aspirin-acetaminophen-caffeine (EXCEDRIN MIGRAINE) 250-250-65 MG per tablet Take 1 tablet by mouth every 6 hours as needed for Headaches      diphenhydrAMINE HCl, Sleep, (SLEEP AID, DIPHENHYDRAMINE, PO) Take by mouth nightly as needed       No current facility-administered medications for this visit. Allergies   Allergen Reactions    Mushroom Extract Complex      Swelling       Health Maintenance   Topic Date Due    Hepatitis C screen  Never done    COVID-19 Vaccine (1) Never done    Breast cancer screen  Never done    Shingles Vaccine (1 of 2) Never done    Colon cancer screen colonoscopy  Never done    Pneumococcal 0-64 years Vaccine (1 of 2 - PPSV23) 07/16/2021 (Originally 12/16/1976)    Flu vaccine (Season Ended) 09/01/2021    Cervical cancer screen  11/18/2022    Lipid screen  10/09/2024    DTaP/Tdap/Td vaccine (2 - Td or Tdap) 10/03/2029    HIV screen  Completed    Hepatitis A vaccine  Aged Out    Hepatitis B vaccine  Aged Out    Hib vaccine  Aged Out    Meningococcal (ACWY) vaccine  Aged Out       Subjective:      Review of Systems   Constitutional: Negative for chills, fatigue and fever. HENT: Negative for ear pain, postnasal drip and trouble swallowing. Eyes: Negative for pain and visual disturbance. Respiratory: Negative for cough and shortness of breath. Cardiovascular: Negative for chest pain and palpitations.    Gastrointestinal: Negative for abdominal pain, blood in stool, constipation, diarrhea, nausea and vomiting. Genitourinary: Negative for dysuria and urgency. Skin: Negative for rash and wound. Neurological: Negative for dizziness and headaches. Psychiatric/Behavioral: Negative for dysphoric mood. The patient is not nervous/anxious. Objective:     Vitals:    06/15/21 1023 06/15/21 1055 06/15/21 1122   BP: (!) 142/90 (!) 152/96 138/78   Site: Right Upper Arm Right Upper Arm    Position: Sitting Sitting    Cuff Size: Medium Adult Medium Adult    Pulse: 86     Resp: 16     Temp: 98.2 °F (36.8 °C)     TempSrc: Oral     SpO2: 100%     Weight: 105 lb 9.6 oz (47.9 kg)     Height: 5' 4.57\" (1.64 m)         Body mass index is 17.81 kg/m². Wt Readings from Last 3 Encounters:   06/15/21 105 lb 9.6 oz (47.9 kg)   07/16/20 102 lb 12.8 oz (46.6 kg)   11/18/19 112 lb (50.8 kg)     BP Readings from Last 3 Encounters:   06/15/21 138/78   07/16/20 114/82   11/18/19 124/72       Physical Exam  Vitals and nursing note reviewed. Constitutional:       General: She is not in acute distress. Appearance: She is well-developed. She is not diaphoretic. HENT:      Head: Normocephalic and atraumatic. Right Ear: External ear normal.      Left Ear: External ear normal.      Nose: Nose normal.   Eyes:      General: No scleral icterus. Right eye: No discharge. Left eye: No discharge. Conjunctiva/sclera: Conjunctivae normal.   Cardiovascular:      Rate and Rhythm: Normal rate and regular rhythm. Heart sounds: Normal heart sounds. No murmur heard. Pulmonary:      Effort: Pulmonary effort is normal.      Breath sounds: Normal breath sounds. Musculoskeletal:      Cervical back: Normal range of motion. Comments: Small 1 to 2 mm ganglion cyst along the base of the left thumb. Cyst moving with flexion and extension of the left thumb. Skin:     General: Skin is warm and dry. Findings: No erythema or rash.    Neurological:      Mental Status: She is alert and oriented to person, place, and time. Cranial Nerves: No cranial nerve deficit. Psychiatric:         Behavior: Behavior normal.         Thought Content: Thought content normal.         Judgment: Judgment normal.         Lab Results   Component Value Date    WBC 6.1 10/09/2019    HGB 10.9 (L) 10/09/2019    HCT 33.1 (L) 10/09/2019     10/09/2019    CHOL 162 10/09/2019    TRIG 196 10/09/2019    HDL 56 10/09/2019    LDLCALC 67 10/09/2019    AST 38 10/09/2019     10/09/2019    K 3.9 10/09/2019    CL 96 (L) 10/09/2019    CREATININE 0.5 10/09/2019    BUN 6 (L) 10/09/2019    CO2 25 10/09/2019    TSH 2.440 10/09/2019    INR 0.89 09/25/2019    LABA1C 4.8 10/03/2019    LABGLOM >90 10/09/2019    MG 2.0 10/09/2019    CALCIUM 9.6 10/09/2019    VITD25 21 (L) 10/09/2019       Imaging Results:    No results found. Assessment/Plan:     Yoli Schulz was seen today for mass and nail problem. Diagnoses and all orders for this visit:    Ganglion cyst  -     AFL - Bk Valerio DO, Orthopedic Surgery, [facility] [employer] [place]    Encounter for screening mammogram for malignant neoplasm of breast  -     CONSTANTIN DIGITAL SCREEN W OR WO CAD BILATERAL; Future    Personality disorder ([facility]    Colon cancer screening  -     Fecal Blood Immunochemical Test; Future    Onychomycosis  -     AFL - Andres Galvez DPM, Podiatry, [facility] [employer] [place]    Anemia, unspecified type  -     CBC With Auto Differential; Future  -     Basic Metabolic Panel; Future  -     TSH; Future  -     T4, Free; Future  -     Lipid, Fasting; Future    Overactive bladder  -     Urinalysis; Future    Referral orthopedics for ganglion cyst with possible removal.  Mammogram order placed, FIT testing ordered today. Reorder CBC, BMP, TSH, T4, lipid panel  Urinalysis for evaluation of overactive bladder consider oxybutynin in the future if negative. Referred to podiatry for further evaluation of onychomycosis. Return in about 3 months (around 9/15/2021) for follow up lab work and OAB. Medications Prescribed:  No orders of the defined types were placed in this encounter. Future Appointments   Date Time Provider Marilynn Samson   9/15/2021  1:00 PM Bia Muro  [address]       Patient given educational materials - see patient instructions. Discussed use, benefit, and sideeffects of prescribed medications. All patient questions answered. Pt voiced understanding. Reviewed health maintenance. Instructed to continue current medications, diet and exercise. Patient agreed with treatment plan. Follow up as directed.      Electronically signed by Bia Muro DO on 6/15/2021 at 12:20 PM

## 2021-06-15 NOTE — PATIENT INSTRUCTIONS
Thank you   1. Thank you for trusting us with your healthcare needs. You may receive a survey regarding today's visit. It would help us out if you would take a few moments to provide your feedback. We value your input. 2. Please bring in ALL medications BOTTLES, including inhalers, herbal supplements, over the counter, prescribed & non-prescribed medicine. The office would like actual medication bottles and a list.   3. Please note our OFFICE POLICIES:   a. Prior to getting your labs drawn, please check with your insurance company for benefits and eligibility of lab services. Often, insurance companies cover certain tests for preventative visits only. It is patient's responsibility to see what is covered. b. We are unable to change a diagnosis after the test has been performed. c. Lab orders will not be re-printed. Please hold onto your original lab orders and take them to your lab to be completed. d. If you no show your scheduled appointment three times, you will be dismissed from this practice. e. Angela Newberryins must be completed 24 hours prior to your schedule appointment. 4. If the list below has been completed, PLEASE FAX RECORDS TO OUR OFFICE @ [phone]. Once the records have been received we will update your records at our office:  Health Maintenance Due   Topic Date Due    Hepatitis C screen  Never done    COVID-19 Vaccine (1) Never done    Breast cancer screen  Never done    Shingles Vaccine (1 of 2) Never done    Colon cancer screen colonoscopy  Never done           Patient Education        Ganglions: Care Instructions  Your Care Instructions     A ganglion is a small sac, or cyst, filled with a clear fluid that is like jelly. A ganglion may look like a bump on the hand or wrist. It also can appear on your feet, ankles, knees, or shoulders. It is not cancer. A ganglion can grow out of the protective area, or capsule, around a joint.  It also can grow on a tendon sheath, which covers the ropelike tendons that connect muscle to bone. A ganglion may hurt or cause numbness if it presses on a nerve. Many ganglions do not need treatment, and they often go away on their own. But if a ganglion hurts, becomes larger, causes numbness, or limits your activity, your doctor may want to drain it with a needle and syringe or remove it with minor surgery. Follow-up care is a key part of your treatment and safety. Be sure to make and go to all appointments, and call your doctor if you are having problems. It's also a good idea to know your test results and keep a list of the medicines you take. How can you care for yourself at home? · Wear a wrist or finger splint as directed by your doctor. It will keep your wrist or hand from moving and help reduce the fluid in the cyst. This may be all you need for the ganglion to shrink and go away. · Do not smash a ganglion with a book or other heavy object. You may break a bone or otherwise injure your wrist by trying this folk remedy, and the ganglion may return anyway. · Do not try to drain the fluid by poking the ganglion with a pin or any other sharp object. You could cause an infection. When should you call for help? Call your doctor now or seek immediate medical care if:    · You have signs of infection, such as:  ? Increased pain, swelling, warmth, or redness. ? Red streaks leading from the cyst.  ? Pus draining from the cyst.  ? A fever. Watch closely for changes in your health, and be sure to contact your doctor if:    · You have increasing pain.     · Your ganglion is getting larger.     · You still have pain or numbness from a ganglion. Where can you learn more? Go to https://[employer]. org and sign in to your "[employer] account. Enter O473 in the Spindle box to learn more about \"Ganglions: Care Instructions. \"     If you do not have an account, please click on the \"Sign Up Now\" link.   Current as of: November 16,

## 2021-06-15 NOTE — PROGRESS NOTES
Nette Gill is a 48 y. o.female    Chief Complaint   Patient presents with    Mass     increasing in size on Right wrist first noticed 6 weeks ago    Nail Problem     discolored brittle toenails first noticed years ago       Chief complaint, [place], and all pertinent details of the case reviewed with the resident. Please see resident's note for specific details discussed at today's visit. Patient Active Problem List   Diagnosis    Alcohol withdrawal ([address]    Personality disorder, NOS    Optic neuritis       Current Outpatient Medications   Medication Sig Dispense Refill    aspirin-acetaminophen-caffeine (EXCEDRIN MIGRAINE) 250-250-65 MG per tablet Take 1 tablet by mouth every 6 hours as needed for Headaches      diphenhydrAMINE HCl, Sleep, (SLEEP AID, DIPHENHYDRAMINE, PO) Take by mouth nightly as needed       No current facility-administered medications for this visit. Review of Systems per Dr. Adwoa Tomlin    OBJECTIVE     /78   Pulse 86   Temp 98.2 °F (36.8 °C) (Oral)   Resp 16   Ht 5' 4.57\" (1.64 m)   Wt 105 lb 9.6 oz (47.9 kg)   LMP 08/14/2012   SpO2 100%   BMI 17.81 kg/m²   BP Readings from Last 3 Encounters:   06/15/21 138/78   07/16/20 114/82   11/18/19 124/72       Wt Readings from Last 3 Encounters:   06/15/21 105 lb 9.6 oz (47.9 kg)   07/16/20 102 lb 12.8 oz (46.6 kg)   11/18/19 112 lb (50.8 kg)     Body mass index is 17.81 kg/m². Physical Exam per Dr. Adwoa Tomlin    No results found for this visit on 06/15/21.     Lab Results   Component Value Date    LABA1C 4.8 10/03/2019       Lab Results   Component Value Date    CHOL 162 10/09/2019    TRIG 196 10/09/2019    HDL 56 10/09/2019    LDLCALC 67 10/09/2019       The 10-year ASCVD risk score (Roxann Tillman et al., 2013) is: 3%    Values used to calculate the score:      Age: 48 years      Sex: Female      Is Non- : No      Diabetic: No      Tobacco smoker: Yes      Systolic Blood Pressure: 138 mmHg      Is BP treated: No      HDL Cholesterol: 56 mg/dL      Total Cholesterol: 162 mg/dL    Lab Results   Component Value Date     10/09/2019    K 3.9 10/09/2019    CL 96 (L) 10/09/2019    CO2 25 10/09/2019    BUN 6 (L) 10/09/2019    CREATININE 0.5 10/09/2019    GLUCOSE 88 10/09/2019    CALCIUM 9.6 10/09/2019    PROT 7.3 10/09/2019    LABALBU 4.3 10/09/2019    BILITOT 0.3 10/09/2019    ALKPHOS 87 10/09/2019    AST 38 10/09/2019    ALT 17 10/09/2019    LABGLOM >90 10/09/2019       Lab Results   Component Value Date    TSH 2.440 10/09/2019       Lab Results   Component Value Date    WBC 6.1 10/09/2019    HGB 10.9 (L) 10/09/2019    HCT 33.1 (L) 10/09/2019    MCV 99.7 (H) 10/09/2019     10/09/2019         Immunization History   Administered Date(s) Administered    Tdap (Boostrix, Adacel) 10/03/2019       Health Maintenance   Topic Date Due    Hepatitis C screen  Never done    COVID-19 Vaccine (1) Never done    Breast cancer screen  Never done    Shingles Vaccine (1 of 2) Never done    Colon cancer screen colonoscopy  Never done    Pneumococcal 0-64 years Vaccine (1 of 2 - PPSV23) 07/16/2021 (Originally 12/16/1976)    Flu vaccine (Season Ended) 09/01/2021    Cervical cancer screen  11/18/2022    Lipid screen  10/09/2024    DTaP/Tdap/Td vaccine (2 - Td or Tdap) 10/03/2029    HIV screen  Completed    Hepatitis A vaccine  Aged Out    Hepatitis B vaccine  Aged Out    Hib vaccine  Aged Out    Meningococcal (ACWY) vaccine  Aged Out          AAA ultrasound (Male, 65-75, smoked ever) indicated at this time? N/A  CT Lung Screen (50-80, 20 pk-yrs, smoking or quit <15 years) indicated at this time? No, 15-pack-year history only        Future Appointments   Date Time Provider Marilynn Samson   9/15/2021  1:00 PM [address], DO [facility] [place]       ASSESSMENT       Diagnosis Orders   1. Ganglion cyst  [facility] - Chino Bell DO, [facility] [place]   2.

## 2021-06-16 ENCOUNTER — NURSE ONLY (OUTPATIENT)
Dept: LAB | Age: 51
End: 2021-06-16

## 2021-06-16 DIAGNOSIS — Z12.31 ENCOUNTER FOR SCREENING MAMMOGRAM FOR MALIGNANT NEOPLASM OF BREAST: ICD-10-CM

## 2021-06-16 DIAGNOSIS — D64.9 ANEMIA, UNSPECIFIED TYPE: ICD-10-CM

## 2021-06-16 DIAGNOSIS — N32.81 OVERACTIVE BLADDER: ICD-10-CM

## 2021-06-16 LAB
ANION GAP SERPL CALCULATED.3IONS-SCNC: 11 MEQ/L (ref 8–16)
ANISOCYTOSIS: PRESENT
BACTERIA: ABNORMAL
BASOPHILS # BLD: 2.3 %
BASOPHILS ABSOLUTE: 0.1 THOU/MM3 (ref 0–0.1)
BILIRUBIN URINE: NEGATIVE
BLOOD, URINE: NEGATIVE
BUN BLDV-MCNC: 8 MG/DL (ref 7–22)
CALCIUM SERPL-MCNC: 9.8 MG/DL (ref 8.5–10.5)
CASTS: ABNORMAL /LPF
CASTS: ABNORMAL /LPF
CHARACTER, URINE: CLEAR
CHLORIDE BLD-SCNC: 97 MEQ/L (ref 98–111)
CHOLESTEROL, FASTING: 212 MG/DL (ref 100–199)
CO2: 24 MEQ/L (ref 23–33)
COLOR: YELLOW
CREAT SERPL-MCNC: 0.5 MG/DL (ref 0.4–1.2)
CRYSTALS: ABNORMAL
EOSINOPHIL # BLD: 2.1 %
EOSINOPHILS ABSOLUTE: 0.1 THOU/MM3 (ref 0–0.4)
EPITHELIAL CELLS, UA: ABNORMAL /HPF
ERYTHROCYTE [DISTWIDTH] IN BLOOD BY AUTOMATED COUNT: 19 % (ref 11.5–14.5)
ERYTHROCYTE [DISTWIDTH] IN BLOOD BY AUTOMATED COUNT: 67.6 FL (ref 35–45)
GFR SERPL CREATININE-BSD FRML MDRD: > 90 ML/MIN/1.73M2
GLUCOSE BLD-MCNC: 93 MG/DL (ref 70–108)
GLUCOSE, URINE: NEGATIVE MG/DL
HCT VFR BLD CALC: 28.8 % (ref 37–47)
HDLC SERPL-MCNC: 106 MG/DL
HEMOGLOBIN: 8.5 GM/DL (ref 12–16)
IMMATURE GRANS (ABS): 0.03 THOU/MM3 (ref 0–0.07)
IMMATURE GRANULOCYTES: 0.5 %
KETONES, URINE: NEGATIVE
LDL CHOLESTEROL CALCULATED: 97 MG/DL
LEUKOCYTE EST, POC: ABNORMAL
LYMPHOCYTES # BLD: 36.9 %
LYMPHOCYTES ABSOLUTE: 2.4 THOU/MM3 (ref 1–4.8)
MCH RBC QN AUTO: 28.9 PG (ref 26–33)
MCHC RBC AUTO-ENTMCNC: 29.5 GM/DL (ref 32.2–35.5)
MCV RBC AUTO: 98 FL (ref 81–99)
MISCELLANEOUS LAB TEST RESULT: ABNORMAL
MONOCYTES # BLD: 9.2 %
MONOCYTES ABSOLUTE: 0.6 THOU/MM3 (ref 0.4–1.3)
NITRITE, URINE: NEGATIVE
NUCLEATED RED BLOOD CELLS: 0 /100 WBC
PH UA: 6.5 (ref 5–9)
PLATELET # BLD: 408 THOU/MM3 (ref 130–400)
PMV BLD AUTO: 9.1 FL (ref 9.4–12.4)
POTASSIUM SERPL-SCNC: 4.5 MEQ/L (ref 3.5–5.2)
PROTEIN UA: NEGATIVE MG/DL
RBC # BLD: 2.94 MILL/MM3 (ref 4.2–5.4)
RBC URINE: ABNORMAL /HPF
RENAL EPITHELIAL, UA: ABNORMAL
SEG NEUTROPHILS: 49 %
SEGMENTED NEUTROPHILS ABSOLUTE COUNT: 3.2 THOU/MM3 (ref 1.8–7.7)
SODIUM BLD-SCNC: 132 MEQ/L (ref 135–145)
SPECIFIC GRAVITY UA: 1.02 (ref 1–1.03)
T4 FREE: 0.74 NG/DL (ref 0.93–1.76)
TRIGLYCERIDE, FASTING: 44 MG/DL (ref 0–199)
TSH SERPL DL<=0.05 MIU/L-ACNC: 2.64 UIU/ML (ref 0.4–4.2)
UROBILINOGEN, URINE: 0.2 EU/DL (ref 0–1)
WBC # BLD: 6.5 THOU/MM3 (ref 4.8–10.8)
WBC UA: ABNORMAL /HPF
YEAST: ABNORMAL

## 2021-06-28 ENCOUNTER — NURSE ONLY (OUTPATIENT)
Dept: LAB | Age: 51
End: 2021-06-28

## 2021-06-28 DIAGNOSIS — D64.9 NORMOCYTIC ANEMIA: ICD-10-CM

## 2021-06-28 LAB
FERRITIN: 11 NG/ML (ref 10–291)
FOLATE: 7.8 NG/ML (ref 4.8–24.2)
IRON SATURATION: 6 % (ref 20–50)
IRON: 27 UG/DL (ref 50–170)
TOTAL IRON BINDING CAPACITY: 489 UG/DL (ref 171–450)
VITAMIN B-12: 780 PG/ML (ref 211–911)

## 2021-07-20 ENCOUNTER — OFFICE VISIT (OUTPATIENT)
Dept: FAMILY MEDICINE CLINIC | Age: 51
End: 2021-07-20
Payer: COMMERCIAL

## 2021-07-20 VITALS
DIASTOLIC BLOOD PRESSURE: 86 MMHG | OXYGEN SATURATION: 98 % | BODY MASS INDEX: 18.2 KG/M2 | TEMPERATURE: 98.7 F | HEART RATE: 86 BPM | RESPIRATION RATE: 12 BRPM | HEIGHT: 64 IN | SYSTOLIC BLOOD PRESSURE: 130 MMHG | WEIGHT: 106.6 LBS

## 2021-07-20 DIAGNOSIS — D64.9 ANEMIA, UNSPECIFIED TYPE: ICD-10-CM

## 2021-07-20 DIAGNOSIS — Z12.11 COLON CANCER SCREENING: ICD-10-CM

## 2021-07-20 DIAGNOSIS — R19.5 LOOSE STOOLS: ICD-10-CM

## 2021-07-20 DIAGNOSIS — M67.40 GANGLION CYST: Primary | ICD-10-CM

## 2021-07-20 DIAGNOSIS — Z01.818 VISIT FOR PRE-OPERATIVE EXAMINATION: ICD-10-CM

## 2021-07-20 PROCEDURE — 4004F PT TOBACCO SCREEN RCVD TLK: CPT | Performed by: NURSE PRACTITIONER

## 2021-07-20 PROCEDURE — G8419 CALC BMI OUT NRM PARAM NOF/U: HCPCS | Performed by: NURSE PRACTITIONER

## 2021-07-20 PROCEDURE — 99213 OFFICE O/P EST LOW 20 MIN: CPT | Performed by: NURSE PRACTITIONER

## 2021-07-20 PROCEDURE — 3017F COLORECTAL CA SCREEN DOC REV: CPT | Performed by: NURSE PRACTITIONER

## 2021-07-20 PROCEDURE — G8427 DOCREV CUR MEDS BY ELIG CLIN: HCPCS | Performed by: NURSE PRACTITIONER

## 2021-07-20 SDOH — ECONOMIC STABILITY: FOOD INSECURITY: WITHIN THE PAST 12 MONTHS, THE FOOD YOU BOUGHT JUST DIDN'T LAST AND YOU DIDN'T HAVE MONEY TO GET MORE.: NEVER TRUE

## 2021-07-20 SDOH — ECONOMIC STABILITY: FOOD INSECURITY: WITHIN THE PAST 12 MONTHS, YOU WORRIED THAT YOUR FOOD WOULD RUN OUT BEFORE YOU GOT MONEY TO BUY MORE.: NEVER TRUE

## 2021-07-20 ASSESSMENT — ENCOUNTER SYMPTOMS
ABDOMINAL DISTENTION: 0
CONSTIPATION: 0
ANAL BLEEDING: 0
RHINORRHEA: 0
SHORTNESS OF BREATH: 0
COLOR CHANGE: 0
EYE REDNESS: 0
BLOOD IN STOOL: 0
DIARRHEA: 0
SORE THROAT: 0
EYE DISCHARGE: 0
ABDOMINAL PAIN: 0
NAUSEA: 0
COUGH: 0

## 2021-07-20 ASSESSMENT — SOCIAL DETERMINANTS OF HEALTH (SDOH): HOW HARD IS IT FOR YOU TO PAY FOR THE VERY BASICS LIKE FOOD, HOUSING, MEDICAL CARE, AND HEATING?: NOT HARD AT ALL

## 2021-07-20 NOTE — PROGRESS NOTES
[facility]  [phone] (phone)  [phone] (fax)    Visit Date: 7/20/2021    Uriel Bustamante is a 48 y.o. female who presents today for:  Chief Complaint   Patient presents with    Pre-op Exam     Right wrist surgery for Ganglion cyst     HPI:     Ms. Ray Yun was referred to me by  Dr. Kerry Oppenheim. AlondraMoriah for pre-op evaluation prior to Excision Right volar wrist ganglion cyst which is scheduled for 07/23/2021 (patient thinks this is the date) at [facility]. Reactions to anesthesia: None    History of excessive bleeding: None    History of blood clots: None    History of blood transfusions: None      Reactions to blood transfusion: N/A    HPI  Health Maintenance   Topic Date Due    Hepatitis C screen  Never done    Pneumococcal 0-64 years Vaccine (1 of 2 - PPSV23) Never done    COVID-19 Vaccine (1) Never done    Colon cancer screen colonoscopy  Never done    Breast cancer screen  Never done    Shingles Vaccine (1 of 2) Never done    Flu vaccine (1) 09/01/2021    Cervical cancer screen  11/18/2022    Lipid screen  06/16/2026    DTaP/Tdap/Td vaccine (2 - Td or Tdap) 10/03/2029    HIV screen  Completed    Hepatitis A vaccine  Aged Out    Hepatitis B vaccine  Aged Out    Hib vaccine  Aged Out    Meningococcal (ACWY) vaccine  Aged Out     Past Medical History:   Diagnosis Date    Multiple sclerosis ([facility] [address] 2017    Personality disorder ([facility] unknown      Past Surgical History:   Procedure Laterality Date    CYST REMOVAL  2004    right thigh     TONSILLECTOMY  1977     Family History   Problem Relation Age of Onset    No Known Problems Mother     No Known Problems Father     No Known Problems Sister      Social History     Tobacco Use    Smoking status: Current Every Day Smoker     Packs/day: 0.50     Years: 30.00     Pack years: 15.00     Start date: 10/3/1989    Smokeless tobacco: Never Used   Substance Use Topics    Alcohol use:  Yes     Alcohol/week: 3.0 standard drinks not ill-appearing or diaphoretic. HENT:      Head: Normocephalic and atraumatic. Right Ear: Hearing and external ear normal. No decreased hearing noted. Left Ear: Hearing and external ear normal. No decreased hearing noted. Nose: Nose normal. No nasal deformity. Eyes:      General:         Right eye: No discharge. Left eye: No discharge. Conjunctiva/sclera: Conjunctivae normal.   Pulmonary:      Effort: Pulmonary effort is normal. No respiratory distress. Abdominal:      General: There is no distension. Tenderness: There is no guarding. Musculoskeletal:         General: No deformity. Normal range of motion. Right wrist: Tenderness present. Arms:       Cervical back: Normal range of motion and neck supple. Skin:     Coloration: Skin is not pale. Findings: No erythema or rash (On exposed areas). Neurological:      Mental Status: She is alert. Gait: Gait normal.   Psychiatric:         Speech: Speech normal.         Behavior: Behavior normal.         Thought Content: Thought content normal.         Judgment: Judgment normal.         Lab Results   Component Value Date    WBC 6.5 06/16/2021    HGB 8.5 (L) 06/16/2021    HCT 28.8 (L) 06/16/2021     (H) 06/16/2021    CHOL 162 10/09/2019    TRIG 196 10/09/2019     06/16/2021    LDLCALC 97 06/16/2021    AST 38 10/09/2019     (L) 06/16/2021    K 4.5 06/16/2021    CL 97 (L) 06/16/2021    CREATININE 0.5 06/16/2021    BUN 8 06/16/2021    CO2 24 06/16/2021    TSH 2.640 06/16/2021    INR 0.89 09/25/2019    LABA1C 4.8 10/03/2019    LABGLOM >90 06/16/2021    MG 2.0 10/09/2019    CALCIUM 9.8 06/16/2021    VITD25 21 (L) 10/09/2019     Assessment:       Diagnosis Orders   1. Ganglion cyst     2. Loose stools     3. Colon cancer screening     4. Anemia, unspecified type     5. Visit for pre-operative examination         Plan:        There are no contraindications to proceed with surgery.  Patient is at an  risk from a cardiopulmonary/medical standpoint. Will notify referring surgeon. Recommend routine DVT/PE prophylaxis as per surgery. Ms. Violeta Peng has been advised to stop all over the counter medication/vitamins/supplements/herbs 10 dayWill refer for for colonoscopy  Will refer to hematologist    Repeat labs in 4-6 weeks    Back in 9/15/2021s before the procedure. No follow-ups on file. Orders Placed:  No orders of the defined types were placed in this encounter. Medications Prescribed:  No orders of the defined types were placed in this encounter. Future Appointments   Date Time Provider Marilynn Samson   9/15/2021  1:00 PM Aure Kyle  [address]      Patient given educational materials - see patient instructions. Discussed use, benefit, and side effects of prescribedmedications. All patient questions answered. Pt voiced understanding. Reviewed health maintenance. Instructed to continue current medications, diet and exercise. Patient agreed with treatment plan. Follow up as directed.     Electronically signed by MK Espinoza CNP on 7/20/2021 at 12:53 PM

## 2021-07-20 NOTE — PATIENT INSTRUCTIONS
Thank you   1. Thank you for trusting us with your healthcare needs. You may receive a survey regarding today's visit. It would help us out if you would take a few moments to provide your feedback. We value your input. 2. Please bring in ALL medications BOTTLES, including inhalers, herbal supplements, over the counter, prescribed & non-prescribed medicine. The office would like actual medication bottles and a list.   3. Please note our OFFICE POLICIES:   a. Prior to getting your labs drawn, please check with your insurance company for benefits and eligibility of lab services. Often, insurance companies cover certain tests for preventative visits only. It is patient's responsibility to see what is covered. b. We are unable to change a diagnosis after the test has been performed. c. Lab orders will not be re-printed. Please hold onto your original lab orders and take them to your lab to be completed. d. If you no show your scheduled appointment three times, you will be dismissed from this practice. e. Zeferino Ends must be completed 24 hours prior to your schedule appointment. 4. If the list below has been completed, PLEASE FAX RECORDS TO OUR OFFICE @ [phone]. Once the records have been received we will update your records at our office:  Health Maintenance Due   Topic Date Due    Hepatitis C screen  Never done    Pneumococcal 0-64 years Vaccine (1 of 2 - PPSV23) Never done    COVID-19 Vaccine (1) Never done    Colon cancer screen colonoscopy  Never done    Breast cancer screen  Never done    Shingles Vaccine (1 of 2) Never done       Will refer for for colonoscopy  Will refer to hematologist    Repeat labs in 4-6 weeks    Back in 9/15/2021            Patient Education        Anemia: Care Instructions  Your Care Instructions     Anemia is a low level of red blood cells, which carry oxygen throughout your body. Many things can cause anemia. Lack of iron is one of the most common causes.  Your body needs iron to make hemoglobin, a substance in red blood cells that carries oxygen from the lungs to your body's cells. Without enough iron, the body produces fewer and smaller red blood cells. As a result, your body's cells do not get enough oxygen, and you feel tired and weak. And you may have trouble concentrating. Bleeding is the most common cause of a lack of iron. You may have heavy menstrual bleeding or bleeding caused by conditions such as ulcers, hemorrhoids, or cancer. Regular use of aspirin or other anti-inflammatory medicines (such as ibuprofen) also can cause bleeding in some people. A lack of iron in your diet also can cause anemia, especially at times when the body needs more iron, such as during pregnancy, infancy, and the teen years. Your doctor may have prescribed iron pills. It may take several months of treatment for your iron levels to return to normal. Your doctor also may suggest that you eat foods that are rich in iron, such as meat and beans. There are many other causes of anemia. It is not always due to a lack of iron. Finding the specific cause of your anemia will help your doctor find the right treatment for you. Follow-up care is a key part of your treatment and safety. Be sure to make and go to all appointments, and call your doctor if you are having problems. It's also a good idea to know your test results and keep a list of the medicines you take. How can you care for yourself at home? · Take your medicines exactly as prescribed. Call your doctor if you think you are having a problem with your medicine. · If your doctor recommends iron pills, take them as directed:  ? Try to take the pills on an empty stomach about 1 hour before or 2 hours after meals. But you may need to take iron with food to avoid an upset stomach. ? Do not take antacids or drink milk or caffeine drinks (such as coffee, tea, or cola) at the same time or within 2 hours of the time that you take your iron. They can make it hard for your body to absorb the iron. ? Vitamin C (from food or supplements) helps your body absorb iron. Try taking iron pills with a glass of orange juice or some other food that is high in vitamin C, such as citrus fruits. ? Iron pills may cause stomach problems, such as heartburn, nausea, diarrhea, constipation, and cramps. Be sure to drink plenty of fluids, and include fruits, vegetables, and fiber in your diet each day. Iron pills often make your bowel movements dark or green. ? If you forget to take an iron pill, do not take a double dose of iron the next time you take a pill. ? Keep iron pills out of the reach of small children. An overdose of iron can be very dangerous. · Follow your doctor's advice about eating iron-rich foods. These include red meat, shellfish, poultry, eggs, beans, raisins, whole-grain bread, and leafy green vegetables. · Steam vegetables to help them keep their iron content. When should you call for help? Call 911 anytime you think you may need emergency care. For example, call if:    · You have symptoms of a heart attack. These may include:  ? Chest pain or pressure, or a strange feeling in the chest.  ? Sweating. ? Shortness of breath. ? Nausea or vomiting. ? Pain, pressure, or a strange feeling in the back, neck, jaw, or upper belly or in one or both shoulders or arms. ? Lightheadedness or sudden weakness. ? A fast or irregular heartbeat. After you call 911, the  may tell you to chew 1 adult-strength or 2 to 4 low-dose aspirin. Wait for an ambulance. Do not try to drive yourself.     · You passed out (lost consciousness). Call your doctor now or seek immediate medical care if:    · You have new or increased shortness of breath.     · You are dizzy or lightheaded, or you feel like you may faint.     · Your fatigue and weakness continue or get worse.     · You have any abnormal bleeding, such as:  ? Nosebleeds.   ? Vaginal bleeding that is different (heavier, more frequent, at a different time of the month) than what you are used to.  ? Bloody or black stools, or rectal bleeding. ? Bloody or pink urine. Watch closely for changes in your health, and be sure to contact your doctor if:    · You do not get better as expected. Where can you learn more? Go to https://chpepicewmichelle.PayEase. org and sign in to your Hop Skip Connect account. Enter R301 in the [employer] box to learn more about \"Anemia: Care Instructions. \"     If you do not have an account, please click on the \"Sign Up Now\" link. Current as of: September 23, 2020               Content Version: 12.9  © 2006-2021 [employer]. Care instructions adapted under license by [facility] ([facility]). If you have questions about a medical condition or this instruction, always ask your healthcare professional. [place] [address] any warranty or liability for your use of this information. Patient Education        Colon Cancer Screening: Care Instructions  Overview     Colorectal cancer occurs in the colon or rectum. That's the lower part of your digestive system. It often starts in small growths called polyps in the colon or rectum. Polyps are usually found with screening tests. Depending on the type of test, any polyps found may be removed during the tests. Colorectal cancer usually does not cause symptoms at first. But regular tests can help find it early, before it spreads and becomes harder to treat. Your risk for colorectal cancer gets higher as you get older. Some experts say that adults should start regular screening at age 48 and stop at age 76. Others say to start before age 48 or continue after age 76. Talk with your doctor about your risk and when to start and stop screening. You may have one of several tests. Follow-up care is a key part of your treatment and safety.  Be sure to make and go to all appointments, and call your doctor if you are having problems. It's also a good idea to know your test results and keep a list of the medicines you take. What are the main screening tests for colon cancer? The screening tests are:  Stool tests. These include the guaiac fecal occult blood test (gFOBT), the fecal immunochemical test (FIT), and the combined fecal immunochemical test and stool DNA test (FIT-DNA). These tests check stool samples for signs of cancer. If your test is positive, you will need to have a colonoscopy. Sigmoidoscopy. This test lets your doctor look at the lining of your rectum and the lowest part of your colon. Your doctor uses a lighted tube called a sigmoidoscope. This test can't find cancers or polyps in the upper part of your colon. In some cases, polyps that are found can be removed. But if your doctor finds polyps, you will need to have a colonoscopy to check the upper part of your colon. Colonoscopy. This test lets your doctor look at the lining of your rectum and your entire colon. The doctor uses a thin, flexible tool called a colonoscope. It can also be used to remove polyps or get a tissue sample (biopsy). A less common test is CT colonography (CTC). It's also called virtual colonoscopy. Who should be screened for colorectal cancer? Your risk for colorectal cancer gets higher as you get older. Some experts say that adults should start regular screening at age 48 and stop at age 76. Others say to start before age 48 or continue after age 76. Talk with your doctor about your risk and when to start and stop screening. How often you need screening depends on the type of test you get:  Stool tests. Every 1 or 2 years for FIT or gFOBT. Every 3 years for sDNA, also called FIT-DNA. Tests that look inside the colon. Every 5 or 10 years for sigmoidoscopy. Every 5 years for CT colonography (virtual colonoscopy). Every 10 years for colonoscopy.   Experts agree that people at higher risk may need to be tested sooner and more often. This includes people who have a strong family history of colon cancer. Talk to your doctor about which test is best for you and when to be tested. When should you call for help? Watch closely for changes in your health, and be sure to contact your doctor if:    · You have any changes in your bowel habits.     · You have any problems. Where can you learn more? Go to https://eTruckBiz.compepicewCreative Market.[employer]. org and sign in to your Rue89 account. Enter 547 08 025 in the [facility] box to learn more about \"Colon Cancer Screening: Care Instructions. \"     If you do not have an account, please click on the \"Sign Up Now\" link. Current as of: December 17, 2020               Content Version: 12.9  © 2006-2021 [employer]. Care instructions adapted under license by [facility] ([facility]). If you have questions about a medical condition or this instruction, always ask your healthcare professional. [place] [address] any warranty or liability for your use of this information. Patient Education        Ganglion Cyst Removal: What to Expect at Home  Your Recovery     Ganglion cyst removal is surgery to remove a ganglion that has caused pain or numbness or made it hard to do your activities. A ganglion is a small sac, or cyst, filled with a clear fluid that is thick like jelly. The cyst may look like a bump on your hand or wrist. Less often, a ganglion can appear on the feet, ankles, knees, or shoulders. The doctor made a cut (incision) in the skin over the ganglion. He or she removed the ganglion and the connecting tissue that allowed fluid to collect there. Then the incision was closed with stitches. You may have a splint over the area to limit movement until the area heals. Your doctor will tell you when it's okay to move the area. He or she may give you instructions on when you can do your normal activities again. Ganglions sometimes come back.  New ganglions also may form in the area. This care sheet gives you a general idea about how long it will take for you to recover. But each person recovers at a different pace. Follow the steps below to get better as quickly as possible. How can you care for yourself at home? Activity    · For 1 to 2 weeks after surgery on your hand or wrist, avoid activities that involve repeated arm or hand movements. These may include typing, using a computer mouse, vacuuming, or carrying things in the affected hand. Do not use power tools. And avoid other activities that make your hand vibrate.     · If the procedure involved your foot or ankle, ask your doctor if you need to do less walking or driving for a while.     · You may be able to go back to work 1 or 2 days after surgery. It depends on the type of work you do and how you feel.     · You may shower, but do not get the area wet until your doctor says it's okay. Keep the bandage dry by covering it with plastic. Do not take a bath, swim, use a hot tub, or soak your hand until your doctor says it's okay. Diet    · You can eat your normal diet when you feel well. If your stomach is upset, try bland, low-fat foods like plain rice, broiled chicken, toast, and yogurt. Medicines    · Your doctor will tell you if and when you can restart your medicines. He or she will also give you instructions about taking any new medicines.     · If you take aspirin or some other blood thinner, be sure to talk to your doctor. He or she will tell you if and when to start taking this medicine again. Make sure that you understand exactly what your doctor wants you to do.     · Be safe with medicines. Read and follow all instructions on the label. ? If the doctor gave you a prescription medicine for pain, take it as prescribed. ? If you are not taking a prescription pain medicine, ask your doctor if you can take an over-the-counter medicine.    Incision care    · Leave the bandage on your hand until the doctor says it is okay to remove it. This is usually 2 or 3 days after surgery.     · After your doctor says you can take off your bandage, wash the area daily with clean water, and pat it dry. Don't use hydrogen peroxide or alcohol. They can slow healing. You may cover the area with a gauze bandage if it oozes fluid or rubs against clothing. Change the bandage every day.     · Keep the area clean and dry.     · If you have a splint, do not take it off unless your doctor tells you to. Follow the splint care instructions your doctor gives you. Be careful not to put the splint on too tight. Exercise    · Follow your doctor's directions on when and how to move the area to keep it flexible and help reduce swelling. Your doctor may have you wear a splint or brace for a short time after the surgery.     · If the ganglion is on your wrist or hand, you may need therapy after you heal. This can help you regain movement, strength, and  in your wrist and hand. To get the best results, you need to do the exercises correctly and as often and as long as your doctor or your physical or occupational therapist tells you to. Ice and elevation    · If you have swelling, put ice or a cold pack on the area for 10 to 20 minutes at a time. Try to do this every 1 to 2 hours for the next 3 days (when you are awake) or until the swelling goes down. Put a thin cloth between the ice and your skin or splint.     · Prop up the area on a pillow anytime you sit or lie down for the first 2 or 3 days. Try to keep it above the level of your heart. This will help reduce swelling. Follow-up care is a key part of your treatment and safety. Be sure to make and go to all appointments, and call your doctor if you are having problems. It's also a good idea to know your test results and keep a list of the medicines you take. When should you call for help? Call 911  anytime you think you may need emergency care.  For example, call if:    · You passed out (lost consciousness).     · You have chest pain, are short of breath, or cough up blood. Call your doctor now or seek immediate medical care if:    · You have pain that does not get better after you take pain medicine.     · You have loose stitches, or your incision comes open.     · Bright red blood has soaked through the bandage over your incision.     · You have symptoms of infection, such as:  ? Increased pain, swelling, warmth, or redness. ? Red streaks leading from the area. ? Pus draining from the area. ? A fever.     · The area is cool or pale or changes color.     · The area is tingly, weak, or numb.     · You can't move the area.     · Your splint feels too tight. Watch closely for any changes in your health, and be sure to contact your doctor if:    · You do not get better as expected. Where can you learn more? Go to https://Munch a Bunch.TellWise. org and sign in to your SportyBird account. Enter G125 in the [employer] box to learn more about \"Ganglion Cyst Removal: What to Expect at Home. \"     If you do not have an account, please click on the \"Sign Up Now\" link. Current as of: November 16, 2020               Content Version: 12.9  © 2006-2021 Healthwise, [employer]. Care instructions adapted under license by [facility] ([facility]). If you have questions about a medical condition or this instruction, always ask your healthcare professional. [place] [address] any warranty or liability for your use of this information. Patient Education        Ganglions: Care Instructions  Your Care Instructions     A ganglion is a small sac, or cyst, filled with a clear fluid that is like jelly. A ganglion may look like a bump on the hand or wrist. It also can appear on your feet, ankles, knees, or shoulders. It is not cancer. A ganglion can grow out of the protective area, or capsule, around a joint.  It also can grow on a tendon sheath, which covers the ropelike tendons that connect muscle to bone. A ganglion may hurt or cause numbness if it presses on a nerve. Many ganglions do not need treatment, and they often go away on their own. But if a ganglion hurts, becomes larger, causes numbness, or limits your activity, your doctor may want to drain it with a needle and syringe or remove it with minor surgery. Follow-up care is a key part of your treatment and safety. Be sure to make and go to all appointments, and call your doctor if you are having problems. It's also a good idea to know your test results and keep a list of the medicines you take. How can you care for yourself at home? · Wear a wrist or finger splint as directed by your doctor. It will keep your wrist or hand from moving and help reduce the fluid in the cyst. This may be all you need for the ganglion to shrink and go away. · Do not smash a ganglion with a book or other heavy object. You may break a bone or otherwise injure your wrist by trying this folk remedy, and the ganglion may return anyway. · Do not try to drain the fluid by poking the ganglion with a pin or any other sharp object. You could cause an infection. When should you call for help? Call your doctor now or seek immediate medical care if:    · You have signs of infection, such as:  ? Increased pain, swelling, warmth, or redness. ? Red streaks leading from the cyst.  ? Pus draining from the cyst.  ? A fever. Watch closely for changes in your health, and be sure to contact your doctor if:    · You have increasing pain.     · Your ganglion is getting larger.     · You still have pain or numbness from a ganglion. Where can you learn more? Go to https://MEDSEEKsumanthMailgun.MaxPoint Interactive. org and sign in to your Mobile Fuel account. Enter G025 in the Popcuts box to learn more about \"Ganglions: Care Instructions. \"     If you do not have an account, please click on the \"Sign Up Now\" link.   Current as of: November 16, 2020               Content Version: 12.9  © 5391-5555 Healthwise, Incorporated. Care instructions adapted under license by [facility] ([facility]). If you have questions about a medical condition or this instruction, always ask your healthcare professional. [address] any warranty or liability for your use of this information.

## 2021-07-20 NOTE — PROGRESS NOTES
Health Maintenance Due   Topic Date Due    Hepatitis C screen  Never done    Pneumococcal 0-64 years Vaccine (1 of 2 - PPSV23) Never done    COVID-19 Vaccine (1) Never done Declined    Colon cancer screen colonoscopy  Never done    Breast cancer screen  Never done    Shingles Vaccine (1 of 2) Never done

## 2021-07-22 ENCOUNTER — NURSE ONLY (OUTPATIENT)
Dept: LAB | Age: 51
End: 2021-07-22

## 2021-07-22 LAB
ALT SERPL-CCNC: 30 U/L (ref 11–66)
AST SERPL-CCNC: 60 U/L (ref 5–40)

## 2021-09-15 ENCOUNTER — OFFICE VISIT (OUTPATIENT)
Dept: FAMILY MEDICINE CLINIC | Age: 51
End: 2021-09-15
Payer: COMMERCIAL

## 2021-09-15 VITALS
WEIGHT: 104 LBS | BODY MASS INDEX: 17.75 KG/M2 | HEIGHT: 64 IN | TEMPERATURE: 97.1 F | OXYGEN SATURATION: 98 % | SYSTOLIC BLOOD PRESSURE: 134 MMHG | DIASTOLIC BLOOD PRESSURE: 82 MMHG | HEART RATE: 88 BPM | RESPIRATION RATE: 12 BRPM

## 2021-09-15 DIAGNOSIS — R79.89 ELEVATED LFTS: Primary | ICD-10-CM

## 2021-09-15 DIAGNOSIS — F10.10 ETOH ABUSE: ICD-10-CM

## 2021-09-15 DIAGNOSIS — Z12.11 COLON CANCER SCREENING: ICD-10-CM

## 2021-09-15 PROCEDURE — G8419 CALC BMI OUT NRM PARAM NOF/U: HCPCS | Performed by: STUDENT IN AN ORGANIZED HEALTH CARE EDUCATION/TRAINING PROGRAM

## 2021-09-15 PROCEDURE — 3017F COLORECTAL CA SCREEN DOC REV: CPT | Performed by: STUDENT IN AN ORGANIZED HEALTH CARE EDUCATION/TRAINING PROGRAM

## 2021-09-15 PROCEDURE — 99213 OFFICE O/P EST LOW 20 MIN: CPT | Performed by: STUDENT IN AN ORGANIZED HEALTH CARE EDUCATION/TRAINING PROGRAM

## 2021-09-15 PROCEDURE — G8427 DOCREV CUR MEDS BY ELIG CLIN: HCPCS | Performed by: STUDENT IN AN ORGANIZED HEALTH CARE EDUCATION/TRAINING PROGRAM

## 2021-09-15 PROCEDURE — 4004F PT TOBACCO SCREEN RCVD TLK: CPT | Performed by: STUDENT IN AN ORGANIZED HEALTH CARE EDUCATION/TRAINING PROGRAM

## 2021-09-15 ASSESSMENT — ENCOUNTER SYMPTOMS
ABDOMINAL PAIN: 0
SHORTNESS OF BREATH: 0
BLOOD IN STOOL: 0
DIARRHEA: 0
TROUBLE SWALLOWING: 0
CONSTIPATION: 0
NAUSEA: 0
EYE PAIN: 0
VOMITING: 0
COUGH: 0

## 2021-09-15 NOTE — PROGRESS NOTES
Health Maintenance Due   Topic Date Due    Hepatitis C screen  Never done    Pneumococcal 0-64 years Vaccine (1 of 2 - PPSV23) Never done    COVID-19 Vaccine (1) Never done    Colon cancer screen colonoscopy  Never done    Cervical cancer screen  11/19/2019    Breast cancer screen  Never done    Shingles Vaccine (1 of 2) Never done    Flu vaccine (1) Never done

## 2021-09-15 NOTE — PROGRESS NOTES
S: 48 y.o. female with   Chief Complaint   Patient presents with    3 Month Follow-Up     review labs completed 07/23/2021       HPI: please see resident note for HPI and ROS. BP Readings from Last 3 Encounters:   09/15/21 134/82   07/20/21 130/86   06/15/21 138/78     Wt Readings from Last 3 Encounters:   09/15/21 104 lb (47.2 kg)   07/20/21 106 lb 9.6 oz (48.4 kg)   06/15/21 105 lb 9.6 oz (47.9 kg)       O: VS:  height is 5' 4\" (1.626 m) and weight is 104 lb (47.2 kg). Her temporal temperature is 97.1 °F (36.2 °C). Her blood pressure is 134/82 and her pulse is 88. Her respiration is 12 and oxygen saturation is 98%. Diagnosis Orders   1. Elevated LFTs     2. ETOH abuse         Plan:  Repeat lfts. Encouraged to stop drinking. Encouraged to get mamogram.  Fit test ordered and provided. Health Maintenance Due   Topic Date Due    Hepatitis C screen  Never done    Pneumococcal 0-64 years Vaccine (1 of 2 - PPSV23) Never done    COVID-19 Vaccine (1) Never done    Colon cancer screen colonoscopy  Never done    Cervical cancer screen  11/19/2019    Breast cancer screen  Never done    Shingles Vaccine (1 of 2) Never done    Flu vaccine (1) Never done       Attending Physician Statement  I have discussed the case, including pertinent history and exam findings with the resident. I agree with the documented assessment and plan as documented by the resident.         Brianna Wesley DO 9/15/2021 1:38 PM

## 2021-09-15 NOTE — PROGRESS NOTES
[id number] [facility] Katie Leo [facility] [address]  Dept: [phone]  Dept Fax: [phone]  Loc: [phone]      Karely Grubbs is a 48 y.o. female who presents today for:  Chief Complaint   Patient presents with    3 Month Follow-Up     review labs completed 07/23/2021       Goals      Exercise 3x per week (30 min per time)           HPI:     HPI  Follow up post ganglion cyst removal, last month. Doing well post procedure. Denies any concerns today. Elevated AST. Does drink alcohol 3 times a week. About 6 beers and 2 shots 3x a week. Was seen by podiatry and labs were ordered to start lamisil. States that she is no longer interested in starting lamisil. Overactive bladder. Improved with decreased caffeine intake. Denies intervention today. Has not completed FIT test or Mammogram. Never received FIT test.     Past Medical History:   Diagnosis Date    Multiple sclerosis ([facility] 2017    Personality disorder ([facility] [address] unknown      Past Surgical History:   Procedure Laterality Date    CYST REMOVAL  2004    right thigh     TONSILLECTOMY  1977    WRIST GANGLION EXCISION Right 08/2021     Family History   Problem Relation Age of Onset    No Known Problems Mother     No Known Problems Father     No Known Problems Sister      Social History     Tobacco Use    Smoking status: Current Every Day Smoker     Packs/day: 0.50     Years: 30.00     Pack years: 15.00     Start date: 10/3/1989    Smokeless tobacco: Never Used   Substance Use Topics    Alcohol use:  Yes     Alcohol/week: 3.0 standard drinks     Types: 3 Cans of beer per week     Comment: daily- \"lots\"      Current Outpatient Medications   Medication Sig Dispense Refill    Doxylamine Succinate, Sleep, (EQ SLEEP AID PO) Take by mouth nightly as needed      aspirin-acetaminophen-caffeine (EXCEDRIN MIGRAINE) 250-250-65 MG per tablet Take 1 tablet by mouth every 6 hours as needed for Headaches       No current facility-administered medications for this visit. Allergies   Allergen Reactions    Mushroom Extract Complex      Swelling       Health Maintenance   Topic Date Due    Hepatitis C screen  Never done    Pneumococcal 0-64 years Vaccine (1 of 2 - PPSV23) Never done    COVID-19 Vaccine (1) Never done    Colon cancer screen colonoscopy  Never done    Cervical cancer screen  11/19/2019    Breast cancer screen  Never done    Shingles Vaccine (1 of 2) Never done    Flu vaccine (1) Never done    Lipid screen  06/16/2026    DTaP/Tdap/Td vaccine (2 - Td or Tdap) 10/03/2029    HIV screen  Completed    Hepatitis A vaccine  Aged Out    Hepatitis B vaccine  Aged Out    Hib vaccine  Aged Out    Meningococcal (ACWY) vaccine  Aged Out       Subjective:      Review of Systems   Constitutional: Negative for chills, fatigue and fever. HENT: Negative for ear pain, postnasal drip and trouble swallowing. Eyes: Negative for pain and visual disturbance. Respiratory: Negative for cough and shortness of breath. Cardiovascular: Negative for chest pain and palpitations. Gastrointestinal: Negative for abdominal pain, blood in stool, constipation, diarrhea, nausea and vomiting. Genitourinary: Negative for dysuria and urgency. Skin: Negative for rash and wound. Neurological: Negative for dizziness and headaches. Psychiatric/Behavioral: Negative for dysphoric mood. The patient is not nervous/anxious. Objective:     Vitals:    09/15/21 1310   BP: 134/82   Site: Left Upper Arm   Position: Sitting   Cuff Size: Medium Adult   Pulse: 88   Resp: 12   Temp: 97.1 °F (36.2 °C)   TempSrc: Temporal   SpO2: 98%   Weight: 104 lb (47.2 kg)   Height: 5' 4\" (1.626 m)       Body mass index is 17.85 kg/m².     Wt Readings from Last 3 Encounters:   09/15/21 104 lb (47.2 kg)   07/20/21 106 lb 9.6 oz (48.4 kg)   06/15/21 105 lb 9.6 oz (47.9 kg)     BP Readings from Last 3 Encounters: 09/15/21 134/82   07/20/21 130/86   06/15/21 138/78       Physical Exam  Vitals and nursing note reviewed. Constitutional:       General: She is not in acute distress. Appearance: She is well-developed. She is not diaphoretic. HENT:      Head: Normocephalic and atraumatic. Right Ear: External ear normal.      Left Ear: External ear normal.      Nose: Nose normal.   Eyes:      General: No scleral icterus. Right eye: No discharge. Left eye: No discharge. Conjunctiva/sclera: Conjunctivae normal.   Cardiovascular:      Rate and Rhythm: Normal rate and regular rhythm. Heart sounds: Normal heart sounds. No murmur heard. Pulmonary:      Effort: Pulmonary effort is normal.      Breath sounds: Normal breath sounds. Musculoskeletal:      Cervical back: Normal range of motion. Skin:     General: Skin is warm and dry. Findings: No erythema or rash. Neurological:      Mental Status: She is alert and oriented to person, place, and time. Cranial Nerves: No cranial nerve deficit. Psychiatric:         Behavior: Behavior normal.         Thought Content: Thought content normal.         Judgment: Judgment normal.         Lab Results   Component Value Date    WBC 8.7 07/23/2021    HGB 11.2 (L) 07/23/2021    HCT 33.8 (L) 07/23/2021     (H) 07/23/2021    CHOL 162 10/09/2019    TRIG 196 10/09/2019     06/16/2021    LDLCALC 97 06/16/2021    AST 60 (H) 07/22/2021     (L) 07/23/2021    K 4.6 07/23/2021    CL 97 (L) 07/23/2021    CREATININE 0.59 (L) 07/23/2021    BUN 5 (L) 07/23/2021    CO2 28 07/23/2021    TSH 2.640 06/16/2021    INR 0.89 09/25/2019    GLUF 101 07/23/2021    LABA1C 4.8 10/03/2019    LABGLOM >90 06/16/2021    MG 2.0 10/09/2019    CALCIUM 9.60 07/23/2021    VITD25 21 (L) 10/09/2019       Imaging Results:    No results found. Assessment/Plan:     Isabella Churchill was seen today for 3 month follow-up.     Diagnoses and all orders for this visit:    Elevated LFTs  -     Hepatic Function Panel; Future    ETOH abuse  -     Hepatic Function Panel; Future    Colon cancer screening  -     Fecal Blood Immunochemical Test; Future      Rpt LFTs in 1 month. Likely secondary to ETOH abuse. Encouraged alcohol cessation. No other complaints today. Refusing vaccines  FIT test reordered. Encouraged completion of mammogram.   Follow up in 6 months. No follow-ups on file. Medications Prescribed:  No orders of the defined types were placed in this encounter. No future appointments. Patient given educational materials - see patient instructions. Discussed use, benefit, and sideeffects of prescribed medications. All patient questions answered. Pt voiced understanding. Reviewed health maintenance. Instructed to continue current medications, diet and exercise. Patient agreed with treatment plan. Follow up as directed.      Electronically signed by Maria Luisa Reese DO on 9/15/2021 at 1:46 PM

## 2021-09-15 NOTE — PATIENT INSTRUCTIONS
Thank you   1. Thank you for trusting us with your healthcare needs. You may receive a survey regarding today's visit. It would help us out if you would take a few moments to provide your feedback. We value your input. 2. Please bring in ALL medications BOTTLES, including inhalers, herbal supplements, over the counter, prescribed & non-prescribed medicine. The office would like actual medication bottles and a list.   3. Please note our OFFICE POLICIES:   a. Prior to getting your labs drawn, please check with your insurance company for benefits and eligibility of lab services. Often, insurance companies cover certain tests for preventative visits only. It is patient's responsibility to see what is covered. b. We are unable to change a diagnosis after the test has been performed. c. Lab orders will not be re-printed. Please hold onto your original lab orders and take them to your lab to be completed. d. If you no show your scheduled appointment three times, you will be dismissed from this practice. e. Laura Freeze must be completed 24 hours prior to your schedule appointment. 4. If the list below has been completed, PLEASE FAX RECORDS TO OUR OFFICE @ [phone].  Once the records have been received we will update your records at our office:  Health Maintenance Due   Topic Date Due    Hepatitis C screen  Never done    Pneumococcal 0-64 years Vaccine (1 of 2 - PPSV23) Never done    COVID-19 Vaccine (1) Never done    Colon cancer screen colonoscopy  Never done    Cervical cancer screen  11/19/2019    Breast cancer screen  Never done    Shingles Vaccine (1 of 2) Never done    Flu vaccine (1) Never done

## 2021-12-12 ENCOUNTER — HOSPITAL ENCOUNTER (EMERGENCY)
Age: 51
Discharge: HOME | End: 2021-12-12
Payer: COMMERCIAL

## 2021-12-12 ENCOUNTER — APPOINTMENT (OUTPATIENT)
Dept: GENERAL RADIOLOGY | Age: 51
End: 2021-12-12
Payer: COMMERCIAL

## 2021-12-12 VITALS
SYSTOLIC BLOOD PRESSURE: 139 MMHG | OXYGEN SATURATION: 100 % | RESPIRATION RATE: 18 BRPM | TEMPERATURE: 98 F | HEART RATE: 91 BPM | DIASTOLIC BLOOD PRESSURE: 92 MMHG | WEIGHT: 105 LBS | BODY MASS INDEX: 17.93 KG/M2 | HEIGHT: 64 IN

## 2021-12-12 DIAGNOSIS — S83.91XA SPRAIN OF RIGHT KNEE, UNSPECIFIED LIGAMENT, INITIAL ENCOUNTER: Primary | ICD-10-CM

## 2021-12-12 PROCEDURE — 73564 X-RAY EXAM KNEE 4 OR MORE: CPT

## 2021-12-12 PROCEDURE — 99283 EMERGENCY DEPT VISIT LOW MDM: CPT

## 2021-12-12 PROCEDURE — 6370000000 HC RX 637 (ALT 250 FOR IP): Performed by: NURSE PRACTITIONER

## 2021-12-12 RX ORDER — LIDOCAINE 4 G/G
PATCH TOPICAL
Status: DISCONTINUED
Start: 2021-12-12 | End: 2021-12-12 | Stop reason: HOSPADM

## 2021-12-12 RX ORDER — NAPROXEN 250 MG/1
250 TABLET ORAL ONCE
Status: COMPLETED | OUTPATIENT
Start: 2021-12-12 | End: 2021-12-12

## 2021-12-12 RX ORDER — NAPROXEN 500 MG/1
500 TABLET ORAL 2 TIMES DAILY WITH MEALS
Qty: 30 TABLET | Refills: 0 | Status: SHIPPED | OUTPATIENT
Start: 2021-12-12 | End: 2022-04-11 | Stop reason: ALTCHOICE

## 2021-12-12 RX ORDER — LIDOCAINE 4 G/G
1 PATCH TOPICAL DAILY
Status: DISCONTINUED | OUTPATIENT
Start: 2021-12-12 | End: 2021-12-12 | Stop reason: HOSPADM

## 2021-12-12 RX ADMIN — NAPROXEN 250 MG: 250 TABLET ORAL at 04:30

## 2021-12-12 ASSESSMENT — ENCOUNTER SYMPTOMS: COLOR CHANGE: 0

## 2021-12-12 ASSESSMENT — PAIN SCALES - GENERAL: PAINLEVEL_OUTOF10: 10

## 2021-12-12 ASSESSMENT — PAIN DESCRIPTION - PAIN TYPE: TYPE: ACUTE PAIN

## 2021-12-12 ASSESSMENT — PAIN DESCRIPTION - ORIENTATION: ORIENTATION: RIGHT

## 2021-12-12 ASSESSMENT — PAIN DESCRIPTION - LOCATION: LOCATION: KNEE

## 2021-12-12 NOTE — ED PROVIDER NOTES
[facility] Emergency Department    CHIEF COMPLAINT       Chief Complaint   Patient presents with    Knee Pain     right       Nurses Notes reviewed and I agree except as noted in the HPI. HISTORY OF PRESENT ILLNESS    Lindsay Carlos is a 48 y.o. female who presents to the ED for evaluation of right knee pain. Patient notes she sleepwalks, so she is unsure if she injured her knee in that way. She notes she got up to go to the bathroom and her right knee gave out on her. She is noted knee pain ever since. She notes some edema to the knee. She denies any history of any knee injury in the past.  She has a history of multiple sclerosis. She is not tried taking medicine for the knee pain at this time. HPI was provided by the patient. REVIEW OF SYSTEMS     Review of Systems   Constitutional: Negative for activity change, chills, fatigue and fever. Cardiovascular: Negative for leg swelling. Musculoskeletal: Positive for arthralgias and joint swelling. Negative for myalgias, neck pain and neck stiffness. Skin: Negative for color change, rash and wound. Allergic/Immunologic: Negative for immunocompromised state. Neurological: Negative for dizziness, weakness and headaches. Hematological: Does not bruise/bleed easily. Psychiatric/Behavioral: Negative for agitation, behavioral problems and confusion. PAST MEDICAL HISTORY     Past Medical History:   Diagnosis Date    Multiple sclerosis ([facility] [address] 2017    Personality disorder ([facility] [address] unknown       SURGICALHISTORY      has a past surgical history that includes cyst removal (2004); Tonsillectomy (1977); and Wrist ganglion excision (Right, 08/2021).     CURRENT MEDICATIONS       Discharge Medication List as of 12/12/2021  4:53 AM      CONTINUE these medications which have NOT CHANGED    Details   Doxylamine Succinate, Sleep, (EQ SLEEP AID PO) Take by mouth nightly as neededHistorical Med      aspirin-acetaminophen-caffeine (EXCEDRIN MIGRAINE) 250-250-65 MG per tablet Take 1 tablet by mouth every 6 hours as needed for HeadachesHistorical Med             ALLERGIES     is allergic to mushroom extract complex. FAMILY HISTORY     She indicated that her mother is alive. She indicated that her father is alive. She indicated that her sister is alive. family history includes No Known Problems in her father, mother, and sister. SOCIAL HISTORY       Social History     Socioeconomic History    Marital status: Legally      Spouse name: Not on file    Number of children: 0    Years of education: Not on file    Highest education level: Associate degree: occupational, technical, or vocational program   Occupational History    Not on file   Tobacco Use    Smoking status: Current Every Day Smoker     Packs/day: 0.50     Years: 30.00     Pack years: 15.00     Start date: 10/3/1989    Smokeless tobacco: Never Used   Substance and Sexual Activity    Alcohol use: Yes     Alcohol/week: 3.0 standard drinks     Types: 3 Cans of beer per week     Comment: daily- \"lots\"    Drug use: Yes     Types: Marijuana Syed Hiram)     Comment: occasional    Sexual activity: Not on file   Other Topics Concern    Not on file   Social History Narrative    Not on file     Social Determinants of Health     Financial Resource Strain: Low Risk     Difficulty of Paying Living Expenses: Not hard at all   Food Insecurity: No Food Insecurity    Worried About Running Out of Food in the Last Year: Never true    [address] in the Last Year: Never true   Transportation Needs:     Lack of Transportation (Medical): Not on file    Lack of Transportation (Non-Medical):  Not on file   Physical Activity:     Days of Exercise per Week: Not on file    Minutes of Exercise per Session: Not on file   Stress:     Feeling of Stress : Not on file   Social Connections:     Frequency of Communication with Friends and Family: Not on file    Frequency of Social Gatherings with Friends and Family: Not on file    Attends [employer] Services: Not on file    Active Member of Clubs or Organizations: Not on file    Attends Club or Organization Meetings: Not on file    Marital Status: Not on file   Intimate Partner Violence:     Fear of Current or Ex-Partner: Not on file    Emotionally Abused: Not on file    Physically Abused: Not on file    Sexually Abused: Not on file   Housing Stability:     Unable to Pay for Housing in the Last Year: Not on file    Number of [place] in the Last Year: Not on file    Unstable Housing in the Last Year: Not on file       PHYSICAL EXAM     INITIAL VITALS:  height is 5' 4\" (1.626 m) and weight is 105 lb (47.6 kg). Her oral temperature is 98 °F (36.7 °C). Her blood pressure is 139/92 (abnormal) and her pulse is 91. Her respiration is 18 and oxygen saturation is 100%. Physical Exam  Vitals and nursing note reviewed. Constitutional:       Appearance: Normal appearance. She is well-developed. HENT:      Head: Normocephalic. Mouth/Throat:      Pharynx: Uvula midline. Eyes:      Conjunctiva/sclera: Conjunctivae normal.   Cardiovascular:      Rate and Rhythm: Normal rate and regular rhythm. Heart sounds: Normal heart sounds, S1 normal and S2 normal.   Pulmonary:      Effort: Pulmonary effort is normal. No respiratory distress. Breath sounds: Normal breath sounds. Chest:      Chest wall: No tenderness. Abdominal:      General: Bowel sounds are normal. There is no distension. Palpations: Abdomen is soft. Tenderness: There is no abdominal tenderness. Musculoskeletal:         General: Normal range of motion. Cervical back: Normal range of motion and neck supple. Right knee: Swelling present. No effusion or bony tenderness. Normal range of motion. Tenderness present. No medial joint line or lateral joint line tenderness. No LCL laxity, MCL laxity, ACL laxity or PCL laxity.       Instability Tests: Anterior drawer test negative. Posterior drawer test negative. Anterior Lachman test negative. Lymphadenopathy:      Cervical: No cervical adenopathy. Skin:     General: Skin is warm and dry. Neurological:      Mental Status: She is alert and oriented to person, place, and time. Psychiatric:         Speech: Speech normal.         Behavior: Behavior normal.         Thought Content: Thought content normal.         DIFFERENTIAL DIAGNOSIS:   Knee sprain strain fracture    DIAGNOSTIC RESULTS     RADIOLOGY: non-plainfilm images(s) such as CT, Ultrasound and MRI are read by the radiologist.  Plain radiographic images are visualized and preliminarily interpreted by the emergency physician unless otherwise stated below. XR KNEE RIGHT (MIN 4 VIEWS)   Final Result   Impression:      No acute processes. This document has been electronically signed by: Paulino Hernandez MD on    12/12/2021 04:47 AM            LABS:   Labs Reviewed - No data to display    EMERGENCY DEPARTMENT COURSE:   Vitals:    Vitals:    12/12/21 0326 12/12/21 0329   BP: (!) 139/92    Pulse: 91    Resp: 18    Temp:  98 °F (36.7 °C)   TempSrc:  Oral   SpO2: 100%    Weight: 105 lb (47.6 kg)    Height: 5' 4\" (1.626 m)        MDM    Patient was seen and evaluated in the emergency department, patient appears to be in no acute distress, vital signs reviewed, no significant findings noted. Physical exam was completed no significant decrease in motion noted, negative anterior posterior drawer, negative valgus varus stress test, mild edema. X-rays were obtained and negative for acute fracture or edema. Patient is placed in a knee immobilizer, she is given that Naprosyn and lidocaine patch. She is [facility]. She verbalized understanding plan of care.   Medications   lidocaine 4 % external patch 1 patch (has no administration in time range)   naproxen (NAPROSYN) tablet 250 mg (250 mg Oral Given 12/12/21 0430)       Patient was seenindependently by myself. The patient's final impression and disposition and plan was determined by myself. CRITICAL CARE:   None    CONSULTS:  None    PROCEDURES:  None    FINAL IMPRESSION     1. Sprain of right knee, unspecified ligament, initial encounter          DISPOSITION/PLAN   Patient discharged in stable condition    PATIENT REFERREDTO:  Zane Lau [address]  [address] [facility] [phone]  Call   For follow up and evaluation      DISCHARGE MEDICATIONS:  Discharge Medication List as of 12/12/2021  4:53 AM      START taking these medications    Details   naproxen (NAPROSYN) 500 MG tablet Take 1 tablet by mouth 2 times daily (with meals) for 30 doses, Disp-30 tablet, R-0Normal             (Please note that portions of this note were completed with a voice recognition program.  Efforts were made to edit the dictations but occasionally words are mis-transcribed.)      Provider:  I personally performed the services described in the documentation,reviewed and edited the documentation which was dictated to the scribe in my presence, and it accurately records my words and actions.     Rohith Norris CNP 12/12/21 6:34 AM    Jonel Norris, APRN - CNP        Branding [employer], APRLARRY - CNP  12/12/21 9219

## 2021-12-12 NOTE — ED NOTES
RN assisted pt to restroom. Pt unable to walk but was able to stand and pivot.       David Fermin, BRITTANY  12/12/21 9709

## 2021-12-12 NOTE — Clinical Note
Era Kelley was seen and treated in our emergency department on 12/12/2021. She may return to work on 12/15/2021. If you have any questions or concerns, please don't hesitate to call.       Rohith Norris, APRN - CNP

## 2021-12-12 NOTE — ED NOTES
Pt resting in bed. Pt medicated per MAR. RN assisted pt to restroom via wheelchair.       Phillip Juarez RN  12/12/21 9944

## 2022-04-11 ENCOUNTER — OFFICE VISIT (OUTPATIENT)
Dept: FAMILY MEDICINE CLINIC | Age: 52
End: 2022-04-11
Payer: COMMERCIAL

## 2022-04-11 ENCOUNTER — HOSPITAL ENCOUNTER (OUTPATIENT)
Age: 52
Discharge: HOME | End: 2022-04-11
Payer: COMMERCIAL

## 2022-04-11 ENCOUNTER — HOSPITAL ENCOUNTER (EMERGENCY)
Age: 52
Discharge: HOME | End: 2022-04-11
Attending: STUDENT IN AN ORGANIZED HEALTH CARE EDUCATION/TRAINING PROGRAM
Payer: COMMERCIAL

## 2022-04-11 ENCOUNTER — TELEPHONE (OUTPATIENT)
Dept: FAMILY MEDICINE CLINIC | Age: 52
End: 2022-04-11

## 2022-04-11 VITALS
HEART RATE: 117 BPM | HEIGHT: 64 IN | BODY MASS INDEX: 18.23 KG/M2 | RESPIRATION RATE: 16 BRPM | TEMPERATURE: 98.4 F | SYSTOLIC BLOOD PRESSURE: 130 MMHG | OXYGEN SATURATION: 98 % | DIASTOLIC BLOOD PRESSURE: 76 MMHG | WEIGHT: 106.8 LBS

## 2022-04-11 VITALS
RESPIRATION RATE: 18 BRPM | HEART RATE: 68 BPM | BODY MASS INDEX: 18.19 KG/M2 | SYSTOLIC BLOOD PRESSURE: 166 MMHG | DIASTOLIC BLOOD PRESSURE: 92 MMHG | WEIGHT: 106 LBS | OXYGEN SATURATION: 97 % | TEMPERATURE: 98.2 F

## 2022-04-11 DIAGNOSIS — Z78.9 ALCOHOL USE: ICD-10-CM

## 2022-04-11 DIAGNOSIS — L29.9 PRURITUS: ICD-10-CM

## 2022-04-11 DIAGNOSIS — D64.9 LOW HEMOGLOBIN: Primary | ICD-10-CM

## 2022-04-11 DIAGNOSIS — L29.9 PRURITUS: Primary | ICD-10-CM

## 2022-04-11 PROBLEM — F10.90 ALCOHOL USE: Status: ACTIVE | Noted: 2022-04-11

## 2022-04-11 LAB
ALBUMIN SERPL-MCNC: 4.3 G/DL (ref 3.5–5.1)
ALBUMIN SERPL-MCNC: 4.3 G/DL (ref 3.5–5.1)
ALP BLD-CCNC: 82 U/L (ref 38–126)
ALP BLD-CCNC: 89 U/L (ref 38–126)
ALT SERPL-CCNC: 32 U/L (ref 11–66)
ALT SERPL-CCNC: 34 U/L (ref 11–66)
ANION GAP SERPL CALCULATED.3IONS-SCNC: 12 MEQ/L (ref 8–16)
ANION GAP SERPL CALCULATED.3IONS-SCNC: 16 MEQ/L (ref 8–16)
ANISOCYTOSIS: PRESENT
AST SERPL-CCNC: 91 U/L (ref 5–40)
AST SERPL-CCNC: 98 U/L (ref 5–40)
ATYPICAL LYMPHOCYTES: ABNORMAL %
BASOPHILIA: ABNORMAL
BASOPHILS # BLD: 1.4 %
BASOPHILS ABSOLUTE: 0.1 THOU/MM3 (ref 0–0.1)
BILIRUB SERPL-MCNC: 0.3 MG/DL (ref 0.3–1.2)
BILIRUB SERPL-MCNC: 0.3 MG/DL (ref 0.3–1.2)
BUN BLDV-MCNC: 4 MG/DL (ref 7–22)
BUN BLDV-MCNC: 4 MG/DL (ref 7–22)
CALCIUM SERPL-MCNC: 9 MG/DL (ref 8.5–10.5)
CALCIUM SERPL-MCNC: 9.4 MG/DL (ref 8.5–10.5)
CHLORIDE BLD-SCNC: 92 MEQ/L (ref 98–111)
CHLORIDE BLD-SCNC: 93 MEQ/L (ref 98–111)
CO2: 22 MEQ/L (ref 23–33)
CO2: 26 MEQ/L (ref 23–33)
CREAT SERPL-MCNC: 0.4 MG/DL (ref 0.4–1.2)
CREAT SERPL-MCNC: 0.4 MG/DL (ref 0.4–1.2)
DIFFERENTIAL TYPE: ABNORMAL
EOSINOPHIL # BLD: 1.8 %
EOSINOPHILS ABSOLUTE: 0.1 THOU/MM3 (ref 0–0.4)
ERYTHROCYTE [DISTWIDTH] IN BLOOD BY AUTOMATED COUNT: 21.4 % (ref 11.5–14.5)
ERYTHROCYTE [DISTWIDTH] IN BLOOD BY AUTOMATED COUNT: 57.6 FL (ref 35–45)
GFR SERPL CREATININE-BSD FRML MDRD: > 90 ML/MIN/1.73M2
GFR SERPL CREATININE-BSD FRML MDRD: > 90 ML/MIN/1.73M2
GLUCOSE BLD-MCNC: 74 MG/DL (ref 70–108)
GLUCOSE BLD-MCNC: 86 MG/DL (ref 70–108)
HCT VFR BLD CALC: 23.4 % (ref 37–47)
HCT VFR BLD CALC: 24.6 % (ref 37–47)
HEMOGLOBIN: 6.5 GM/DL (ref 12–16)
HEMOGLOBIN: 6.7 GM/DL (ref 12–16)
HYPOCHROMIA: PRESENT
IMMATURE GRANS (ABS): 0.03 THOU/MM3 (ref 0–0.07)
IMMATURE GRANULOCYTES: 0.4 %
LYMPHOCYTES # BLD: 33.9 %
LYMPHOCYTES ABSOLUTE: 2.4 THOU/MM3 (ref 1–4.8)
MAGNESIUM: 2 MG/DL (ref 1.6–2.4)
MCH RBC QN AUTO: 20.7 PG (ref 26–33)
MCHC RBC AUTO-ENTMCNC: 27.2 GM/DL (ref 32.2–35.5)
MCV RBC AUTO: 76.2 FL (ref 81–99)
MONOCYTES # BLD: 13.4 %
MONOCYTES ABSOLUTE: 0.9 THOU/MM3 (ref 0.4–1.3)
NUCLEATED RED BLOOD CELLS: 0 /100 WBC
OSMOLALITY CALCULATION: 258.2 MOSMOL/KG (ref 275–300)
PATHOLOGIST REVIEW: ABNORMAL
PLATELET # BLD: 199 THOU/MM3 (ref 130–400)
PMV BLD AUTO: 8.6 FL (ref 9.4–12.4)
POTASSIUM REFLEX MAGNESIUM: 3.4 MEQ/L (ref 3.5–5.2)
POTASSIUM SERPL-SCNC: 3.6 MEQ/L (ref 3.5–5.2)
RBC # BLD: 3.23 MILL/MM3 (ref 4.2–5.4)
ROULEAUX: SLIGHT
SCAN OF BLOOD SMEAR: NORMAL
SEG NEUTROPHILS: 49.1 %
SEGMENTED NEUTROPHILS ABSOLUTE COUNT: 3.4 THOU/MM3 (ref 1.8–7.7)
SODIUM BLD-SCNC: 130 MEQ/L (ref 135–145)
SODIUM BLD-SCNC: 131 MEQ/L (ref 135–145)
T4 FREE: 0.96 NG/DL (ref 0.93–1.76)
TOTAL PROTEIN: 7.5 G/DL (ref 6.1–8)
TOTAL PROTEIN: 7.8 G/DL (ref 6.1–8)
TROPONIN T: < 0.01 NG/ML
TSH SERPL DL<=0.05 MIU/L-ACNC: 1.61 UIU/ML (ref 0.4–4.2)
WBC # BLD: 7 THOU/MM3 (ref 4.8–10.8)

## 2022-04-11 PROCEDURE — 99213 OFFICE O/P EST LOW 20 MIN: CPT | Performed by: STUDENT IN AN ORGANIZED HEALTH CARE EDUCATION/TRAINING PROGRAM

## 2022-04-11 PROCEDURE — 80053 COMPREHEN METABOLIC PANEL: CPT

## 2022-04-11 PROCEDURE — 85025 COMPLETE CBC W/AUTO DIFF WBC: CPT

## 2022-04-11 PROCEDURE — 36415 COLL VENOUS BLD VENIPUNCTURE: CPT

## 2022-04-11 PROCEDURE — G8427 DOCREV CUR MEDS BY ELIG CLIN: HCPCS | Performed by: STUDENT IN AN ORGANIZED HEALTH CARE EDUCATION/TRAINING PROGRAM

## 2022-04-11 PROCEDURE — 84443 ASSAY THYROID STIM HORMONE: CPT

## 2022-04-11 PROCEDURE — 83735 ASSAY OF MAGNESIUM: CPT

## 2022-04-11 PROCEDURE — 99283 EMERGENCY DEPT VISIT LOW MDM: CPT

## 2022-04-11 PROCEDURE — 4004F PT TOBACCO SCREEN RCVD TLK: CPT | Performed by: STUDENT IN AN ORGANIZED HEALTH CARE EDUCATION/TRAINING PROGRAM

## 2022-04-11 PROCEDURE — 84439 ASSAY OF FREE THYROXINE: CPT

## 2022-04-11 PROCEDURE — 85014 HEMATOCRIT: CPT

## 2022-04-11 PROCEDURE — 84484 ASSAY OF TROPONIN QUANT: CPT

## 2022-04-11 PROCEDURE — G8419 CALC BMI OUT NRM PARAM NOF/U: HCPCS | Performed by: STUDENT IN AN ORGANIZED HEALTH CARE EDUCATION/TRAINING PROGRAM

## 2022-04-11 PROCEDURE — 85018 HEMOGLOBIN: CPT

## 2022-04-11 PROCEDURE — 3017F COLORECTAL CA SCREEN DOC REV: CPT | Performed by: STUDENT IN AN ORGANIZED HEALTH CARE EDUCATION/TRAINING PROGRAM

## 2022-04-11 RX ORDER — SODIUM CHLORIDE 9 MG/ML
INJECTION, SOLUTION INTRAVENOUS PRN
Status: DISCONTINUED | OUTPATIENT
Start: 2022-04-11 | End: 2022-04-11

## 2022-04-11 RX ORDER — HYDROXYZINE HYDROCHLORIDE 10 MG/1
10 TABLET, FILM COATED ORAL EVERY 6 HOURS PRN
Qty: 60 TABLET | Refills: 1 | Status: SHIPPED | OUTPATIENT
Start: 2022-04-11 | End: 2022-05-11

## 2022-04-11 RX ORDER — GABAPENTIN 300 MG/1
300 CAPSULE ORAL NIGHTLY
COMMUNITY
End: 2022-04-11 | Stop reason: SDUPTHER

## 2022-04-11 RX ORDER — GABAPENTIN 300 MG/1
300 CAPSULE ORAL 3 TIMES DAILY
Qty: 90 CAPSULE | Refills: 0 | Status: SHIPPED | OUTPATIENT
Start: 2022-04-11 | End: 2022-10-25 | Stop reason: SDUPTHER

## 2022-04-11 ASSESSMENT — ENCOUNTER SYMPTOMS
BLOOD IN STOOL: 0
SORE THROAT: 0
NAUSEA: 0
TROUBLE SWALLOWING: 0
ABDOMINAL PAIN: 0
VOMITING: 0
BACK PAIN: 0
COUGH: 0
SHORTNESS OF BREATH: 0
DIARRHEA: 0

## 2022-04-11 NOTE — ED PROVIDER NOTES
[address] [facility]          Pt Name: Mark Anthony Mistry  MRN: [id number]  [place] 1970  Date of evaluation: 4/11/2022  Treating Resident Physician: Asya Carranza MD  Supervising Physician: Grabiel Jaime MD    CHIEF COMPLAINT       Chief Complaint   Patient presents with    Abnormal Lab     low Hgb     History obtained from the patient. HISTORY OF PRESENT ILLNESS    HPI  Mark Anthony Mistry is a 46 y.o. female who presents to the emergency department for evaluation of a low hemoglobin. Patient had labs drawn by her PCP and was found to have a hemoglobin of 6.8. Patient was sent to the emergency department for further evaluation. Patient denies any chest pain, syncope, fatigue, weakness, shortness of breath. Denies any guerda bleeding. Patient states she has never had a colonoscopy  The patient has no other acute complaints at this time. REVIEW OF SYSTEMS   Review of Systems   Constitutional: Negative for chills, diaphoresis, fatigue and fever. HENT: Negative for sore throat and trouble swallowing. Eyes: Negative for visual disturbance. Respiratory: Negative for cough and shortness of breath. Cardiovascular: Negative for chest pain, palpitations and leg swelling. Gastrointestinal: Negative for abdominal pain, blood in stool, diarrhea, nausea and vomiting. Genitourinary: Negative for dysuria, hematuria and urgency. Musculoskeletal: Negative for arthralgias, back pain, myalgias and neck pain. Skin: Negative for rash. Neurological: Negative for seizures, syncope, weakness, numbness and headaches.          PAST MEDICAL AND SURGICAL HISTORY     Past Medical History:   Diagnosis Date    Multiple sclerosis ([facility] [address] 2017    Personality disorder [facility]) unknown     Past Surgical History:   Procedure Laterality Date    CYST REMOVAL  2004    right thigh     TONSILLECTOMY  1977    WRIST GANGLION EXCISION Right 08/2021         MEDICATIONS No current facility-administered medications for this encounter. Current Outpatient Medications:     gabapentin (NEURONTIN) 300 MG capsule, Take 1 capsule by mouth 3 times daily for 30 days. , Disp: 90 capsule, Rfl: 0    hydrOXYzine (ATARAX) 10 MG tablet, Take 1 tablet by mouth every 6 hours as needed for Itching, Disp: 60 tablet, Rfl: 1      SOCIAL HISTORY     Social History     Social History Narrative    Not on file     Social History     Tobacco Use    Smoking status: Current Every Day Smoker     Packs/day: 0.50     Years: 30.00     Pack years: 15.00     Start date: 10/3/1989    Smokeless tobacco: Never Used   Substance Use Topics    Alcohol use: Yes     Alcohol/week: 3.0 standard drinks     Types: 3 Cans of beer per week     Comment: daily- \"lots\"    Drug use: Yes     Types: Marijuana [facility])     Comment: occasional         ALLERGIES     Allergies   Allergen Reactions    Mushroom Extract Complex      Swelling         FAMILY HISTORY     Family History   Problem Relation Age of Onset    No Known Problems Mother     No Known Problems Father     No Known Problems Sister          PREVIOUS RECORDS   Previous records reviewed: I reviewed the patient's past medical records including relevant labs, imaging and procedures. PHYSICAL EXAM     ED Triage Vitals [04/11/22 1141]   BP Temp Temp Source Pulse Resp SpO2 Height Weight   (!) 167/100 98.2 °F (36.8 °C) Oral 97 12 99 % -- 106 lb (48.1 kg)     Initial vital signs and nursing assessment reviewed and normal. Body mass index is 18.19 kg/m². Pulsoximetry is normal per my interpretation. Additional Vital Signs:  Vitals:    04/11/22 1244   BP: (!) 166/92   Pulse: 68   Resp: 18   Temp:    SpO2: 97%       Physical Exam  Vitals and nursing note reviewed. Constitutional:       Appearance: Normal appearance. HENT:      Head: Normocephalic and atraumatic.       Right Ear: Tympanic membrane normal.      Left Ear: Tympanic membrane normal.      Nose: Nose normal.      Mouth/Throat:      Mouth: Mucous membranes are moist.      Pharynx: Oropharynx is clear. No oropharyngeal exudate. Eyes:      General: No scleral icterus. Extraocular Movements: Extraocular movements intact. Pupils: Pupils are equal, round, and reactive to light. Comments: Conjunctival pallor   Cardiovascular:      Rate and Rhythm: Normal rate and regular rhythm. Pulses: Normal pulses. Heart sounds: Normal heart sounds. No murmur heard. No friction rub. No gallop. Pulmonary:      Effort: Pulmonary effort is normal. No respiratory distress. Breath sounds: Normal breath sounds. Abdominal:      Palpations: Abdomen is soft. Tenderness: There is no abdominal tenderness. There is no right CVA tenderness, left CVA tenderness, guarding or rebound. Musculoskeletal:         General: No swelling or tenderness. Normal range of motion. Cervical back: Normal range of motion and neck supple. Right lower leg: No edema. Left lower leg: No edema. Skin:     General: Skin is warm and dry. Capillary Refill: Capillary refill takes less than 2 seconds. Neurological:      General: No focal deficit present. Mental Status: She is alert and oriented to person, place, and time. Cranial Nerves: No cranial nerve deficit. Motor: No weakness. MEDICAL DECISION MAKING   Initial Assessment:   17-year-old female presenting with a low hemoglobin. Differential diagnosis includes but is not limited to:  Iron deficiency anemia, malignancy, anemia of chronic disease    Although some of these diagnoses are unlikely they were considered in my medical decision making. Plan:    Repeat H&H      MDM:   The patient's repeat hemoglobin was found to be 6.5 down from 11.2 eight months prior. The need for blood transfusion was discussed with the patient. At this time the patient is refusing. She states that she will follow up with her PCP.   Risks and benefits were discussed and through shared decision-making, the patient has decided to follow-up with her PCP. The patient has decision-making capacity. I instructed the patient to look out for signs of anemia and to return the emergency department she develops any weakness, fatigue, syncope, chest pain, shortness of breath. ED RESULTS   Laboratory results:  Labs Reviewed   HEMOGLOBIN AND HEMATOCRIT - Abnormal; Notable for the following components:       Result Value    Hemoglobin 6.5 (*)     Hematocrit 23.4 (*)     All other components within normal limits   COMPREHENSIVE METABOLIC PANEL W/ REFLEX TO MG FOR LOW K   TROPONIN       Radiologic studies results:  No orders to display       ED Medications administered this visit: Medications - No data to display      ED COURSE     ED Course as of 04/11/22 1335   Mon Apr 11, 2022   1253 Hemoglobin Quant(!!): 6.5 [TM]   5446 Patient [TM]   2329 Patient is refusing blood transfusion. States that she will follow up with her PCP [TM]    Patient is refusing further testing at this time [TM]   [id number] I called patient's PCP Telma Hassan and left a message informing him that patient's Hgb is low and that she is leaving AMA [TM]      ED Course User Index  [TM] Cachorro Johnson MD           Strict return precautions and follow up instructions were discussed with the patient prior to discharge, with which the patient agrees. MEDICATION CHANGES     New Prescriptions    No medications on file         FINAL DISPOSITION     Final diagnoses:   Low hemoglobin     Condition: condition: serious  Dispo: Other Disposition: Left AMA      This transcription was electronically signed. Parts of this transcriptions may have been dictated by use of voice recognition software and electronically transcribed, and parts may have been transcribed with the assistance of an ED scribe. The transcription may contain errors not detected in proofreading.   Please refer to my supervising physician's documentation if my documentation differs.     Electronically Signed: Bethel Castleman, MD, 04/11/22, 1:35 PM         Murphy Chopra MD  Resident  04/11/22 5581

## 2022-04-11 NOTE — ED NOTES
Pt up in bed watching TV. Pt denies any additional needs at this time. VSS and call light in reach.      Shellia Smart  04/11/22 0502

## 2022-04-11 NOTE — PROGRESS NOTES
Dorota Seymour is a 46 y.o. female who presents today for:  Chief Complaint   Patient presents with    Pruritis     bilateral arms causing bleeding started 2 months ago       HPI:   Itchy  Started about 2 months ago, never had prior   Started at night time when trying to go to bed  Scratching and drawing blood  Forearms bilaterally, back  Denies any rashes   Now the itchiness is all day long   Has tried OTC anti itch which made it worse  Has also tired hydrocortisone and Vaseline     Thinks she had an illness about 2 months ago when this started    Last 5 nights, taking some past gabapentin Rx which has been helpful at night    Denies hx of allergies       Food Insecurity: No Food Insecurity    Worried About Running Out of Food in the Last Year: Never true    Ran Out of Food in the Last Year: Never true     Health Maintenance reviewed -   Health Maintenance   Topic Date Due    Hepatitis C screen  Never done    COVID-19 Vaccine (1) Never done    Pneumococcal 0-64 years Vaccine (1 of 2 - PPSV23) Never done    Colorectal Cancer Screen  Never done    Breast cancer screen  Never done    Shingles Vaccine (1 of 2) Never done    Depression Monitoring  06/15/2022    Flu vaccine (Season Ended) 09/01/2022    Cervical cancer screen  11/18/2022    Lipid screen  06/16/2026    DTaP/Tdap/Td vaccine (2 - Td or Tdap) 10/03/2029    HIV screen  Completed    Hepatitis A vaccine  Aged Out    Hepatitis B vaccine  Aged Out    Hib vaccine  Aged Out    Meningococcal (ACWY) vaccine  Aged Out     Current Outpatient Medications   Medication Sig Dispense Refill    gabapentin (NEURONTIN) 300 MG capsule Take 1 capsule by mouth 3 times daily for 30 days. 90 capsule 0    hydrOXYzine (ATARAX) 10 MG tablet Take 1 tablet by mouth every 6 hours as needed for Itching 60 tablet 1     No current facility-administered medications for this visit.      Social History     Tobacco Use    Smoking status: Current Every Day Smoker Packs/day: 0.50     Years: 30.00     Pack years: 15.00     Start date: 10/3/1989    Smokeless tobacco: Never Used   Substance Use Topics    Alcohol use: Yes     Alcohol/week: 3.0 standard drinks     Types: 3 Cans of beer per week     Comment: daily- \"lots\"      Subjective:   Review of Systems   Psychiatric/Behavioral: Positive for sleep disturbance. + for pruritis     Objective:     Vitals:    04/11/22 0810   BP: 130/76   Site: Right Upper Arm   Position: Sitting   Cuff Size: Medium Adult   Pulse: 117   Resp: 16   Temp: 98.4 °F (36.9 °C)   TempSrc: Temporal   SpO2: 98%   Weight: 106 lb 12.8 oz (48.4 kg)   Height: 5' 4\" (1.626 m)     Body mass index is 18.33 kg/m². Wt Readings from Last 3 Encounters:   04/11/22 106 lb 12.8 oz (48.4 kg)   12/12/21 105 lb (47.6 kg)   09/15/21 104 lb (47.2 kg)     BP Readings from Last 3 Encounters:   04/11/22 130/76   12/12/21 (!) 139/92   09/15/21 134/82     Physical Exam  Vitals and nursing note reviewed. Constitutional:       General: She is not in acute distress. Appearance: She is well-developed. She is not diaphoretic. Cardiovascular:      Rate and Rhythm: Normal rate and regular rhythm. Heart sounds: Normal heart sounds. No murmur heard. Pulmonary:      Effort: Pulmonary effort is normal.      Breath sounds: Normal breath sounds. No wheezing. Skin:     Comments: Bilateral forearms show some faint excoriations, several small areas where she has broken the skin, no erythema or drainage. No plaques or scales. Neurological:      Mental Status: She is alert. Psychiatric:         Thought Content: Thought content normal.         Judgment: Judgment normal.         Assessment / Plan:   Tameka Jeong was seen today for pruritis. Diagnoses and all orders for this visit:    Pruritus  -     gabapentin (NEURONTIN) 300 MG capsule; Take 1 capsule by mouth 3 times daily for 30 days. -     hydrOXYzine (ATARAX) 10 MG tablet;  Take 1 tablet by mouth every 6 hours as needed for Itching  -     Comprehensive Metabolic Panel; Future  -     CBC with Auto Differential; Future  -     TSH; Future  -     T4, Free; Future    Alcohol use  -     gabapentin (NEURONTIN) 300 MG capsule; Take 1 capsule by mouth 3 times daily for 30 days. -     hydrOXYzine (ATARAX) 10 MG tablet; Take 1 tablet by mouth every 6 hours as needed for Itching  -     Comprehensive Metabolic Panel; Future  -     CBC with Auto Differential; Future  -     TSH; Future  -     T4, Free; Future      Patient presents to discuss her pruritus   Pruritus-new, uncontrolled. Patient presents with a 2-month history of pruritus of her forearms bilaterally. No clear inciting factor. Patient endorsed good relief with gabapentin 300, will prescribe 3 times daily. Patient states it does make her drowsy and she is hesitant about taking it during the day, encouraged her to try it. We will also prescribe hydroxyzine 10 mg as needed. Discussed this may also make her drowsy. Encourage patient to trial the medications. We will also check some basic labs as above to assess for any organic cause. If no improvement and no abnormal labs, can consider dermatology referral   Alcohol use-chronic. Patient states this is not significantly changed. We will check some basic labs as above    Patient to follow-up in 4 weeks, earlier as needed     Return in about 4 weeks (around 5/9/2022). Medications Prescribed:  Orders Placed This Encounter   Medications    gabapentin (NEURONTIN) 300 MG capsule     Sig: Take 1 capsule by mouth 3 times daily for 30 days. Dispense:  90 capsule     Refill:  0    hydrOXYzine (ATARAX) 10 MG tablet     Sig: Take 1 tablet by mouth every 6 hours as needed for Itching     Dispense:  60 tablet     Refill:  1       No future appointments. Patient given educational materials - see patient instructions. Discussed use, benefit, and side effects of prescribed medications. All patient questions answered.   Pt voiced understanding. Instructed to continue current medications, diet and exercise. Patient agreed with treatment plan. Follow up as directed. Part of this visit was documented via [id number] technology, please excuse any errors.      Electronically signed by Kristine Chavira MD on 4/11/2022 at 8:44 AM

## 2022-04-11 NOTE — TELEPHONE ENCOUNTER
----- Message from Julia Bailey MD sent at 4/11/2022 11:16 AM EDT -----  Critically low hemoglobin, patient was previously called to go to the ED  Low sodium and chloride, maybe related to her low hemoglobin  High AST (liver enzyme), likely from alcohol use

## 2022-04-11 NOTE — PROGRESS NOTES
Health Maintenance Due   Topic Date Due    Hepatitis C screen  Never done    COVID-19 Vaccine (1) Never done    Pneumococcal 0-64 years Vaccine (1 of 2 - PPSV23) Never done    Colorectal Cancer Screen  Never done    Breast cancer screen  Never done    Shingles Vaccine (1 of 2) Never done

## 2022-04-11 NOTE — PROGRESS NOTES
S: 46 y.o. female with   Chief Complaint   Patient presents with    Pruritis     bilateral arms causing bleeding started 2 months ago     Itching -- 2 months. Never had before. First at night, then daytime. Slight on back, but mostly arms and hands. No actual rashes. Tried OTC -- hydrocortisone 1%, vaseline. May have been sick when it started. No changes in products used. No changes in diet. No allergy history. Had been taking neurontin at night -- this helps and allows her to rest. But has nothing to try during day. H/o personality disorder. etoh heavily 3 days of the week. Smoker. BP Readings from Last 3 Encounters:   04/11/22 130/76   12/12/21 (!) 139/92   09/15/21 134/82     Wt Readings from Last 3 Encounters:   04/11/22 106 lb 12.8 oz (48.4 kg)   12/12/21 105 lb (47.6 kg)   09/15/21 104 lb (47.2 kg)       O: VS:   Vitals:    04/11/22 0810   BP: 130/76   Site: Right Upper Arm   Position: Sitting   Cuff Size: Medium Adult   Pulse: 117   Resp: 16   Temp: 98.4 °F (36.9 °C)   TempSrc: Temporal   SpO2: 98%   Weight: 106 lb 12.8 oz (48.4 kg)   Height: 5' 4\" (1.626 m)     Body mass index is 18.33 kg/m². Per resident, no rashes noted. No signs of dermatographism. Lab Results   Component Value Date    WBC 8.7 07/23/2021    HGB 11.2 (L) 07/23/2021    HCT 33.8 (L) 07/23/2021     (H) 07/23/2021    CHOL 162 10/09/2019    TRIG 196 10/09/2019     06/16/2021    LDLCALC 97 06/16/2021    AST 60 (H) 07/22/2021     (L) 07/23/2021    K 4.6 07/23/2021    CL 97 (L) 07/23/2021    CREATININE 0.59 (L) 07/23/2021    BUN 5 (L) 07/23/2021    CO2 28 07/23/2021    TSH 2.640 06/16/2021    INR 0.89 09/25/2019    GLUF 101 07/23/2021    LABA1C 4.8 10/03/2019    LABGLOM >90 06/16/2021    MG 2.0 10/09/2019    CALCIUM 9.60 07/23/2021    VITD25 21 (L) 10/09/2019       No results found. Diagnosis Orders   1.  Pruritus  gabapentin (NEURONTIN) 300 MG capsule    hydrOXYzine (ATARAX) 10 MG tablet Comprehensive Metabolic Panel    CBC with Auto Differential    TSH    T4, Free   2. Alcohol use  gabapentin (NEURONTIN) 300 MG capsule    hydrOXYzine (ATARAX) 10 MG tablet    Comprehensive Metabolic Panel    CBC with Auto Differential    TSH    T4, Free       Plan    Etiology not clear, but interesting timing with viral illness and in presence of substance abuse. Consider anxiety. Repeat labs as above. Patient has h/o elevated liver enzymes and anemia. Okay to try neurontin as has been helpful. Such may help mood disorder. Atarax as needed as well. Gentle skin treatment -- patting or gentle rubbing and minimize scratching. Consider gentler skin products and soft clothing during this time to reduce itching sensation. Encouraged 50% reduction of alcohol intake. Smoking cessation as well is recommended. Close follow up. Return in about 4 weeks (around 5/9/2022). Orders Placed:  Orders Placed This Encounter   Procedures    Comprehensive Metabolic Panel    CBC with Auto Differential    TSH    T4, Free     Medications Prescribed:  Orders Placed This Encounter   Medications    gabapentin (NEURONTIN) 300 MG capsule     Sig: Take 1 capsule by mouth 3 times daily for 30 days. Dispense:  90 capsule     Refill:  0    hydrOXYzine (ATARAX) 10 MG tablet     Sig: Take 1 tablet by mouth every 6 hours as needed for Itching     Dispense:  60 tablet     Refill:  1       No future appointments. Health Maintenance Due   Topic Date Due    Hepatitis C screen  Never done    COVID-19 Vaccine (1) Never done    Pneumococcal 0-64 years Vaccine (1 of 2 - PPSV23) Never done    Colorectal Cancer Screen  Never done    Breast cancer screen  Never done    Shingles Vaccine (1 of 2) Never done         Attending Physician Statement  I have discussed the case, including pertinent history and exam findings with the resident. I agree with the documented assessment and plan as documented by the resident.   GE modifier added to this encounter      Tuan Beaulieu MD 4/11/2022 8:45 AM

## 2022-04-11 NOTE — ED NOTES
In for EKG of patient. Dr. Savannah Lopez at bedside. Pt is refusing a blood transfusion at this time. Pt fully informed by this RN and Dr. Savannah Lopez about risks. Pt states she understands and is still refusing the blood transfusion. The patient is agreeable to sign an AMA form.       Cynthia Benitez RN  04/11/22 1423

## 2022-04-11 NOTE — ED NOTES
Patient to ED for evaluation of abnormal lab. Patient states that she had routine labs completed today which revealed a hemoglobin value of \"6\". Patient states that she has history of anemia for many years but has never required a blood transfusion or iron supplements. She denies any obvious bleeding, black stools, hematuria or any recent trauma. Patient is resting in bed with easy and unlabored respirations. Call light in reach. Side rails up x2. Patient denies further complaints or concerns. Will monitor.         Nan Avilez RN  04/11/22 0395

## 2022-04-11 NOTE — PATIENT INSTRUCTIONS
Thank you   1. Thank you for trusting us with your healthcare needs. You may receive a survey regarding today's visit. It would help us out if you would take a few moments to provide your feedback. We value your input. 2. Please bring in ALL medications BOTTLES, including inhalers, herbal supplements, over the counter, prescribed & non-prescribed medicine. The office would like actual medication bottles and a list.   3. Please note our OFFICE POLICIES:   a. Prior to getting your labs drawn, please check with your insurance company for benefits and eligibility of lab services. Often, insurance companies cover certain tests for preventative visits only. It is patient's responsibility to see what is covered. b. We are unable to change a diagnosis after the test has been performed. c. Lab orders will not be re-printed. Please hold onto your original lab orders and take them to your lab to be completed. d. If you no show your scheduled appointment three times, you will be dismissed from this practice. e. Lesli Mike must be completed 24 hours prior to your schedule appointment. 4. If the list below has been completed, PLEASE FAX RECORDS TO OUR OFFICE @ [phone].  Once the records have been received we will update your records at our office:  Health Maintenance Due   Topic Date Due    Hepatitis C screen  Never done    COVID-19 Vaccine (1) Never done    Pneumococcal 0-64 years Vaccine (1 of 2 - PPSV23) Never done    Colorectal Cancer Screen  Never done    Breast cancer screen  Never done    Shingles Vaccine (1 of 2) Never done           Tobacco Cessation Programs     Telephonic behavior modification   1-800-QUIT-NOW (914-1975)   Counseling service for those who are ready to quit using tobacco.     Available for uninsured [place] residents, [place] recipients, pregnant women, or patients whose health plans or employers are members of the [address] behavior

## 2022-04-12 ENCOUNTER — TELEPHONE (OUTPATIENT)
Dept: FAMILY MEDICINE CLINIC | Age: 52
End: 2022-04-12

## 2022-04-12 NOTE — TELEPHONE ENCOUNTER
Patient has severe anemia  Went to ED and declined transfusion  Left AMA    Can we get her an appointment to discuss this please?     We need to reemphasize the need for transfusion and need to work this up  We have to try and convince her to get a colonoscopy at least   Thanks

## 2022-04-12 NOTE — TELEPHONE ENCOUNTER
Spoke to pt and she said 3 doctors were arguing about when she has blood work last and things were confusing and made her skeptical.She said she would get bloodwork done next month as originally planned, and if the levels were still low she would get the transfusion. I asked if she could come in this week to discuss, but the earliest she would agree to coming in was on Monday 4/18. I informed her to let us know or go to the ED if anything changes and I told her that this is not ideal to wait until Monday d/t the severity of the situation. Pt verbalized understanding.

## 2022-04-18 ENCOUNTER — TELEPHONE (OUTPATIENT)
Dept: FAMILY MEDICINE CLINIC | Age: 52
End: 2022-04-18

## 2022-04-18 ENCOUNTER — OFFICE VISIT (OUTPATIENT)
Dept: FAMILY MEDICINE CLINIC | Age: 52
End: 2022-04-18
Payer: COMMERCIAL

## 2022-04-18 VITALS
WEIGHT: 106.8 LBS | DIASTOLIC BLOOD PRESSURE: 60 MMHG | BODY MASS INDEX: 18.23 KG/M2 | TEMPERATURE: 98.1 F | HEART RATE: 79 BPM | RESPIRATION RATE: 24 BRPM | HEIGHT: 64 IN | OXYGEN SATURATION: 99 % | SYSTOLIC BLOOD PRESSURE: 120 MMHG

## 2022-04-18 DIAGNOSIS — D64.9 ANEMIA, UNSPECIFIED TYPE: Primary | ICD-10-CM

## 2022-04-18 DIAGNOSIS — F10.10 ALCOHOL ABUSE: ICD-10-CM

## 2022-04-18 LAB — HGB, POC: 5.7

## 2022-04-18 PROCEDURE — G8419 CALC BMI OUT NRM PARAM NOF/U: HCPCS | Performed by: STUDENT IN AN ORGANIZED HEALTH CARE EDUCATION/TRAINING PROGRAM

## 2022-04-18 PROCEDURE — 3017F COLORECTAL CA SCREEN DOC REV: CPT | Performed by: STUDENT IN AN ORGANIZED HEALTH CARE EDUCATION/TRAINING PROGRAM

## 2022-04-18 PROCEDURE — 4004F PT TOBACCO SCREEN RCVD TLK: CPT | Performed by: STUDENT IN AN ORGANIZED HEALTH CARE EDUCATION/TRAINING PROGRAM

## 2022-04-18 PROCEDURE — 99213 OFFICE O/P EST LOW 20 MIN: CPT | Performed by: STUDENT IN AN ORGANIZED HEALTH CARE EDUCATION/TRAINING PROGRAM

## 2022-04-18 PROCEDURE — G8427 DOCREV CUR MEDS BY ELIG CLIN: HCPCS | Performed by: STUDENT IN AN ORGANIZED HEALTH CARE EDUCATION/TRAINING PROGRAM

## 2022-04-18 PROCEDURE — 85018 HEMOGLOBIN: CPT | Performed by: STUDENT IN AN ORGANIZED HEALTH CARE EDUCATION/TRAINING PROGRAM

## 2022-04-18 NOTE — TELEPHONE ENCOUNTER
Please call patient and strongly encourage her to the present to the ED for evaluation of her anemia or to follow back up with us in the office for further discussion  Thanks

## 2022-04-18 NOTE — PROGRESS NOTES
Christine Sorensen is a 46 y.o. female who presents today for:  Chief Complaint   Patient presents with    Anemia       HPI:   Patient here to follow up her anemia  6.2 hemoglobin in the office today   Repeat was 5.7 hemoglobin   Patient states she still feels like she has energy    Patient admits to drinking alcohol last night       Food Insecurity: No Food Insecurity    Worried About Running Out of Food in the Last Year: Never true    Ran Out of Food in the Last Year: Never true     Health Maintenance reviewed -   Health Maintenance   Topic Date Due    Hepatitis C screen  Never done    COVID-19 Vaccine (1) Never done    Pneumococcal 0-64 years Vaccine (1 - PCV) Never done    Colorectal Cancer Screen  Never done    Breast cancer screen  Never done    Shingles Vaccine (1 of 2) Never done    Depression Monitoring  06/15/2022    Flu vaccine (Season Ended) 09/01/2022    Cervical cancer screen  11/18/2022    Lipid screen  06/16/2026    DTaP/Tdap/Td vaccine (2 - Td or Tdap) 10/03/2029    HIV screen  Completed    Hepatitis A vaccine  Aged Out    Hepatitis B vaccine  Aged Out    Hib vaccine  Aged Out    Meningococcal (ACWY) vaccine  Aged Out     Current Outpatient Medications   Medication Sig Dispense Refill    gabapentin (NEURONTIN) 300 MG capsule Take 1 capsule by mouth 3 times daily for 30 days. 90 capsule 0    hydrOXYzine (ATARAX) 10 MG tablet Take 1 tablet by mouth every 6 hours as needed for Itching 60 tablet 1     No current facility-administered medications for this visit. Social History     Tobacco Use    Smoking status: Current Every Day Smoker     Packs/day: 0.50     Years: 30.00     Pack years: 15.00     Start date: 10/3/1989    Smokeless tobacco: Never Used   Substance Use Topics    Alcohol use:  Yes     Alcohol/week: 3.0 standard drinks     Types: 3 Cans of beer per week     Comment: daily- \"lots\"      Subjective:   Review of Systems    Objective:     Vitals:    04/18/22 1032   BP: 120/60   Site: Right Upper Arm   Position: Sitting   Cuff Size: Medium Adult   Pulse: 79   Resp: 24   Temp: 98.1 °F (36.7 °C)   TempSrc: Oral   SpO2: 99%   Weight: 106 lb 12.8 oz (48.4 kg)   Height: 5' 4\" (1.626 m)     Body mass index is 18.33 kg/m². Wt Readings from Last 3 Encounters:   04/18/22 106 lb 12.8 oz (48.4 kg)   04/11/22 106 lb (48.1 kg)   04/11/22 106 lb 12.8 oz (48.4 kg)     BP Readings from Last 3 Encounters:   04/18/22 120/60   04/11/22 (!) 166/92   04/11/22 130/76     Physical Exam  Constitutional:       General: She is not in acute distress. Appearance: She is not ill-appearing. Pulmonary:      Effort: Pulmonary effort is normal. No respiratory distress. Neurological:      Mental Status: She is alert. Psychiatric:      Comments: Disorganized thoughts and slight slurring of her words         Assessment / Plan:   Addy Fraser was seen today for anemia. Diagnoses and all orders for this visit:    Anemia, unspecified type  -     POCT hemoglobin    Alcohol abuse      Patient presents to follow up her anemia. Prior to seeing the patient, my office staff member who roomed her felt very strongly that the patient was intoxicated. Discussed with patient that based on her lab work from last week her hemoglobin was very low and that she would be best served by getting a blood transfusion. Discussed why she left the ED AMA, and she states it is because they were arguing with her about her previous hemoglobin levels. Patient told me that she did not feel symptomatic at all and that she still had good energy. I advised patient that she still should receive a blood transfusion due to how low her hemoglobin and based on her point-of-care tests today in the office, which read a hemoglobin of 6.2 followed by hemoglobin of 5.7.   Patient argued with me that even our test here did not show the same number both times, to which I tried to discuss with her that there will be some variability but overall use, benefit, and side effects of prescribed medications. All patient questions answered. Pt voiced understanding. Instructed to continue current medications, diet and exercise. Patient agreed with treatment plan. Follow up as directed. Part of this visit was documented via [id number] technology, please excuse any errors.      Electronically signed by Millicent Mayers MD on 4/18/2022 at 11:26 AM

## 2022-04-18 NOTE — PROGRESS NOTES
S: 46 y.o. female with   Chief Complaint   Patient presents with    Anemia       HPI: please see resident note for HPI and ROS. BP Readings from Last 3 Encounters:   04/18/22 120/60   04/11/22 (!) 166/92   04/11/22 130/76     Wt Readings from Last 3 Encounters:   04/18/22 106 lb 12.8 oz (48.4 kg)   04/11/22 106 lb (48.1 kg)   04/11/22 106 lb 12.8 oz (48.4 kg)       O: VS:  height is 5' 4\" (1.626 m) and weight is 106 lb 12.8 oz (48.4 kg). Her oral temperature is 98.1 °F (36.7 °C). Her blood pressure is 120/60 and her pulse is 79. Her respiration is 24 and oxygen saturation is 99%. Diagnosis Orders   1. Anemia, unspecified type         Plan:  Hemoglobin very low 5.7,  Advised to go the ED for transfusion. May be inebriated. Left AMA despite best efforts of staff.  Will attempt to contact    Health Maintenance Due   Topic Date Due    Hepatitis C screen  Never done    COVID-19 Vaccine (1) Never done    Pneumococcal 0-64 years Vaccine (1 - PCV) Never done    Colorectal Cancer Screen  Never done    Breast cancer screen  Never done    Shingles Vaccine (1 of 2) Never done         Nga Latham MD 4/18/2022 11:05 AM

## 2022-04-18 NOTE — TELEPHONE ENCOUNTER
Spoke to pt. Slurring her speech, very difficult to understand at times, and would laugh in a drunken manner. I started the conversation by asking how she was doing . She responded with \" I am doing just peachy since I am no longer there. \" I told her that I was calling to see if she planned on going to the ED or if she wanted to come back in for further discussion here in the office. She told me that \"nobody can gets the numbers correct, and they can't even read her chart correctly and figure out when she actually had labs done. \" She said she is \"skeptical about getting a transfusion\". I informed her that we were concerned for her well-being and severity of the situation. She responded with \" I know it is very serious and blah blah blah\". She said she \"knows her body and she will get it done on her on terms\". I told her that she is obviously allowed to make her own decisions regarding this, but we are concerned and want to do what we can to make this process easier and genuinely care about her well-being. She said that she was appalled by what Dr. Cheryl Soto said in the room to her. She stated that he said  \"do you want to die today\" which she found very rude especially since it was done in front of an intern (a med student was with Dr. Cheryl Soto, but she referred to him as an intern). I told her I was obviously not present in the room and could not comment on what was said since I wasn't there. She mumbled a few things that was hard to make out. She essentially said that she will do things on her own terms, she thinks it is unnecessary to get admitted for this and she doesn't see a reason it shouldn't be done outpatient, and is skeptical about the transfusion.  I again explained that it is a serious time sensitive mater and that being admitted to the hospital and getting to the bottom of things is what would be in her best interest. She verbalized understanding and I told her to have a good day, she responded with the same, and the call ended.

## 2022-04-18 NOTE — PROGRESS NOTES
S: 46 y.o. female with   Chief Complaint   Patient presents with    Anemia       HPI: please see resident note for HPI and ROS. BP Readings from Last 3 Encounters:   04/18/22 120/60   04/11/22 (!) 166/92   04/11/22 130/76     Wt Readings from Last 3 Encounters:   04/18/22 106 lb 12.8 oz (48.4 kg)   04/11/22 106 lb (48.1 kg)   04/11/22 106 lb 12.8 oz (48.4 kg)       O: VS:  height is 5' 4\" (1.626 m) and weight is 106 lb 12.8 oz (48.4 kg). Her oral temperature is 98.1 °F (36.7 °C). Her blood pressure is 120/60 and her pulse is 79. Her respiration is 24 and oxygen saturation is 99%. Diagnosis Orders   1. Anemia, unspecified type         Plan:  Pt left AMA from the office before I could see her. Pt was reportedly inebriated. Refusing to go to the ER or be direct admitted. As resident was precepting with me, pt walked out of the office despite best efforts of the staff to keep her in the office. Pt was told that she could die from her current condition. Health Maintenance Due   Topic Date Due    Hepatitis C screen  Never done    COVID-19 Vaccine (1) Never done    Pneumococcal 0-64 years Vaccine (1 - PCV) Never done    Colorectal Cancer Screen  Never done    Breast cancer screen  Never done    Shingles Vaccine (1 of 2) Never done       Attending Physician Statement  I have discussed the case, including pertinent history and exam findings with the resident. I agree with the documented assessment and plan as documented by the resident.         Herman Vega DO 4/18/2022 10:53 AM

## 2022-04-19 ENCOUNTER — APPOINTMENT (OUTPATIENT)
Dept: CT IMAGING | Age: 52
End: 2022-04-19
Payer: COMMERCIAL

## 2022-04-19 ENCOUNTER — HOSPITAL ENCOUNTER (EMERGENCY)
Age: 52
Discharge: LEFT BEFORE DISCHARGE | End: 2022-04-19
Payer: COMMERCIAL

## 2022-04-19 VITALS
RESPIRATION RATE: 16 BRPM | DIASTOLIC BLOOD PRESSURE: 84 MMHG | TEMPERATURE: 98.1 F | OXYGEN SATURATION: 100 % | HEART RATE: 100 BPM | BODY MASS INDEX: 17.75 KG/M2 | SYSTOLIC BLOOD PRESSURE: 126 MMHG | HEIGHT: 64 IN | WEIGHT: 104 LBS

## 2022-04-19 DIAGNOSIS — R10.84 GENERALIZED ABDOMINAL PAIN: ICD-10-CM

## 2022-04-19 DIAGNOSIS — D64.9 ANEMIA, UNSPECIFIED TYPE: Primary | ICD-10-CM

## 2022-04-19 LAB
ABO: NORMAL
ALBUMIN SERPL-MCNC: 4.1 G/DL (ref 3.5–5.1)
ALP BLD-CCNC: 89 U/L (ref 38–126)
ALT SERPL-CCNC: 29 U/L (ref 11–66)
ANION GAP SERPL CALCULATED.3IONS-SCNC: 15 MEQ/L (ref 8–16)
ANTIBODY SCREEN: NORMAL
AST SERPL-CCNC: 77 U/L (ref 5–40)
BASOPHILS # BLD: 2.4 %
BASOPHILS ABSOLUTE: 0.2 THOU/MM3 (ref 0–0.1)
BILIRUB SERPL-MCNC: 0.2 MG/DL (ref 0.3–1.2)
BILIRUBIN DIRECT: < 0.2 MG/DL (ref 0–0.3)
BUN BLDV-MCNC: 6 MG/DL (ref 7–22)
CALCIUM SERPL-MCNC: 8.6 MG/DL (ref 8.5–10.5)
CHLORIDE BLD-SCNC: 103 MEQ/L (ref 98–111)
CO2: 24 MEQ/L (ref 23–33)
CREAT SERPL-MCNC: 0.6 MG/DL (ref 0.4–1.2)
EOSINOPHIL # BLD: 3.1 %
EOSINOPHILS ABSOLUTE: 0.2 THOU/MM3 (ref 0–0.4)
ERYTHROCYTE [DISTWIDTH] IN BLOOD BY AUTOMATED COUNT: 21.6 % (ref 11.5–14.5)
ERYTHROCYTE [DISTWIDTH] IN BLOOD BY AUTOMATED COUNT: 58 FL (ref 35–45)
ETHYL ALCOHOL, SERUM: 0.36 %
GFR SERPL CREATININE-BSD FRML MDRD: > 90 ML/MIN/1.73M2
GLUCOSE BLD-MCNC: 90 MG/DL (ref 70–108)
HCT VFR BLD CALC: 26.1 % (ref 37–47)
HEMOCCULT STL QL: NEGATIVE
HEMOGLOBIN: 7.2 GM/DL (ref 12–16)
HYPOCHROMIA: PRESENT
IMMATURE GRANS (ABS): 0.04 THOU/MM3 (ref 0–0.07)
IMMATURE GRANULOCYTES: 0.5 %
LIPASE: 61.7 U/L (ref 5.6–51.3)
LYMPHOCYTES # BLD: 48.6 %
LYMPHOCYTES ABSOLUTE: 3.6 THOU/MM3 (ref 1–4.8)
MCH RBC QN AUTO: 20.7 PG (ref 26–33)
MCHC RBC AUTO-ENTMCNC: 27.6 GM/DL (ref 32.2–35.5)
MCV RBC AUTO: 75 FL (ref 81–99)
MONOCYTES # BLD: 13.6 %
MONOCYTES ABSOLUTE: 1 THOU/MM3 (ref 0.4–1.3)
NUCLEATED RED BLOOD CELLS: 0 /100 WBC
OSMOLALITY CALCULATION: 280.3 MOSMOL/KG (ref 275–300)
PLATELET # BLD: 391 THOU/MM3 (ref 130–400)
PMV BLD AUTO: 8.6 FL (ref 9.4–12.4)
POTASSIUM SERPL-SCNC: 3.8 MEQ/L (ref 3.5–5.2)
RBC # BLD: 3.48 MILL/MM3 (ref 4.2–5.4)
RH FACTOR: NORMAL
SEG NEUTROPHILS: 31.8 %
SEGMENTED NEUTROPHILS ABSOLUTE COUNT: 2.4 THOU/MM3 (ref 1.8–7.7)
SODIUM BLD-SCNC: 142 MEQ/L (ref 135–145)
TOTAL PROTEIN: 7.1 G/DL (ref 6.1–8)
WBC # BLD: 7.5 THOU/MM3 (ref 4.8–10.8)

## 2022-04-19 PROCEDURE — 74177 CT ABD & PELVIS W/CONTRAST: CPT

## 2022-04-19 PROCEDURE — 82272 OCCULT BLD FECES 1-3 TESTS: CPT

## 2022-04-19 PROCEDURE — 85025 COMPLETE CBC W/AUTO DIFF WBC: CPT

## 2022-04-19 PROCEDURE — 93005 ELECTROCARDIOGRAM TRACING: CPT | Performed by: PHYSICIAN ASSISTANT

## 2022-04-19 PROCEDURE — 80053 COMPREHEN METABOLIC PANEL: CPT

## 2022-04-19 PROCEDURE — 36415 COLL VENOUS BLD VENIPUNCTURE: CPT

## 2022-04-19 PROCEDURE — 86850 RBC ANTIBODY SCREEN: CPT

## 2022-04-19 PROCEDURE — 86900 BLOOD TYPING SEROLOGIC ABO: CPT

## 2022-04-19 PROCEDURE — 83690 ASSAY OF LIPASE: CPT

## 2022-04-19 PROCEDURE — 86901 BLOOD TYPING SEROLOGIC RH(D): CPT

## 2022-04-19 PROCEDURE — 82077 ASSAY SPEC XCP UR&BREATH IA: CPT

## 2022-04-19 PROCEDURE — 6360000004 HC RX CONTRAST MEDICATION: Performed by: PHYSICIAN ASSISTANT

## 2022-04-19 PROCEDURE — 99285 EMERGENCY DEPT VISIT HI MDM: CPT

## 2022-04-19 PROCEDURE — 82248 BILIRUBIN DIRECT: CPT

## 2022-04-19 RX ADMIN — IOPAMIDOL 80 ML: 755 INJECTION, SOLUTION INTRAVENOUS at 14:05

## 2022-04-19 ASSESSMENT — PAIN DESCRIPTION - LOCATION: LOCATION: ABDOMEN

## 2022-04-19 ASSESSMENT — PAIN DESCRIPTION - ORIENTATION: ORIENTATION: LEFT

## 2022-04-19 ASSESSMENT — PAIN SCALES - GENERAL: PAINLEVEL_OUTOF10: 3

## 2022-04-19 ASSESSMENT — PAIN DESCRIPTION - PAIN TYPE: TYPE: ACUTE PAIN

## 2022-04-19 NOTE — ED NOTES
Patient states she wants to leave and sign out AMA. Miesha Joyner notified and patient signed AMA form.       Noel Mcclain RN  04/19/22 6522

## 2022-04-19 NOTE — TELEPHONE ENCOUNTER
Called pt. She again sounded intoxicated and was slurring words. I asked her how she was doing and told her I was calling to check in on her. She said she was doing good and didn't feel any type of thing and feels even better today. I told her that we are just concerned about her and wanted to see how she was doing. She said that she knew she needed to get it done and that she was going to go sometime this week. She said she has always had a history of anemia, so this is why she isn't that concerned. I asked if it had ever been this low and if she has ever had to have a transfusion before and she denied it. I explained to her that it is common for some people (especially women) to sometimes be a little under the reference range for this and it is treated appropriately, but when it starts to trend down and goes down this low there is a concern for internal bleeding. Pt sounded shocked at this and said oh I didn't know that. She told me she didn't want to get admitted overnight because her parents are in their [de-identified] and she may need to help them. I told her that she needs to think about herself and realize that if she doesn't get help then there is a high possibility she won't be able to help her parents. She told me that she knows but it is hard for her to think like that. I told her that She needs to remember that and get the help she needs and to get whatever is going on figured out. She told me she knows and again discussed her complaint about the ED mixing up her stuff. I told her that she needs to tell them to just worry about getting her a transfusion and having a specialist see her so she can get the issue figured out. She verbalized understanding and again told me she doesn't really know how it is an issue when she feels so good. I told her if she feels this good then imagine how much better she will feel when her iron is back up.   She verbalized understanding and I again reiterated that she needs to get help for herself and remember that she can't help her parents if she isn't around to do it. She said that she would be going to get the transfusion tomorrow.

## 2022-04-19 NOTE — ED PROVIDER NOTES
[address] [facility] [address] [address] EMERGENCY DEPT  EMERGENCY DEPARTMENT ENCOUNTER      Pt Name: Homar Gonzalez  MRN: [id number]  [place] 1970  Date of evaluation: 4/19/2022  Provider: Destiny Canela PA-C    CHIEF COMPLAINT     Chief Complaint   Patient presents with    Abnormal Lab       HISTORY OF PRESENT ILLNESS    Homar Gonzalez is a 46 y.o. female who presents to the emergency department with complaints of low hemoglobin. Patient states that she went to her PCP last week and her hemoglobin was 6.8. She states that her doctor told her she needs a blood transfusion. Patient states she left the doctor's office because she did not have time to do it. She said that she went back today because he is feeling very tired and fatigued. They did recheck her hemoglobin and patient states was 5.2. Patient states that she was sent here to get a blood transfusion. Patient also complains of left-sided abdominal pain. Pain is intermittent. Started yesterday. Patient denies nausea vomiting. Denies black or bloody stools. Denies any vomiting blood. Denies hematuria or bleeding from any other site. Patient denies any fevers or chills. Triage notes and Nursing notes were reviewed by myself. Any discrepancies are addressed above. PAST MEDICAL HISTORY     Past Medical History:   Diagnosis Date    Multiple sclerosis ([facility] [address] 2017    Personality disorder [facility]) unknown       SURGICAL HISTORY       Past Surgical History:   Procedure Laterality Date    CYST REMOVAL  2004    right thigh     TONSILLECTOMY  1977    WRIST GANGLION EXCISION Right 08/2021       CURRENT MEDICATIONS       Discharge Medication List as of 4/19/2022  2:54 PM      CONTINUE these medications which have NOT CHANGED    Details   gabapentin (NEURONTIN) 300 MG capsule Take 1 capsule by mouth 3 times daily for 30 days. , Disp-90 capsule, R-0Normal      hydrOXYzine (ATARAX) 10 MG tablet Take 1 tablet by mouth every 6 hours as needed for Itching, Disp-60 tablet, R-1Normal             ALLERGIES     Mushroom extract complex    FAMILY HISTORY       Family History   Problem Relation Age of Onset    No Known Problems Mother     No Known Problems Father     No Known Problems Sister         SOCIAL HISTORY     Social History     Socioeconomic History    Marital status: Legally      Spouse name: None    Number of children: 0    Years of education: None    Highest education level: Associate degree: occupational, technical, or vocational program   Occupational History    None   Tobacco Use    Smoking status: Current Every Day Smoker     Packs/day: 0.50     Years: 30.00     Pack years: 15.00     Start date: 10/3/1989    Smokeless tobacco: Never Used   Substance and Sexual Activity    Alcohol use: Yes     Alcohol/week: 3.0 standard drinks     Types: 3 Cans of beer per week     Comment: daily- \"lots\"    Drug use: Yes     Types: Marijuana Russ Relic)     Comment: occasional    Sexual activity: None   Other Topics Concern    None   Social History Narrative    None     Social Determinants of Health     Financial Resource Strain: Low Risk     Difficulty of Paying Living Expenses: Not hard at all   Food Insecurity: No Food Insecurity    Worried About Running Out of Food in the Last Year: Never true    Jesus of Food in the Last Year: Never true   Transportation Needs:     Lack of Transportation (Medical): Not on file    Lack of Transportation (Non-Medical):  Not on file   Physical Activity:     Days of Exercise per Week: Not on file    Minutes of Exercise per Session: Not on file   Stress:     Feeling of Stress : Not on file   Social Connections:     Frequency of Communication with Friends and Family: Not on file    Frequency of Social Gatherings with Friends and Family: Not on file    Attends Denominational Services: Not on file    Active Member of Clubs or Organizations: Not on file    Attends Club or Organization Meetings: Not on file    Marital Status: Not on file   Intimate Partner Violence:     Fear of Current or Ex-Partner: Not on file    Emotionally Abused: Not on file    Physically Abused: Not on file    Sexually Abused: Not on file   Housing Stability:     Unable to Pay for Housing in the Last Year: Not on file    Number of [place] in the Last Year: Not on file    Unstable Housing in the Last Year: Not on file       REVIEW OF SYSTEMS       A 10 point review of systems discussed the patient and the pertinent positives and names are listed in the HPI    Except as noted above the remainder of the review of systems was reviewed and is negative. PHYSICAL EXAM    (up to 7 for level 4, 8 or more for level 5)     ED Triage Vitals [04/19/22 1322]   BP Temp Temp Source Pulse Resp SpO2 Height Weight   126/84 98.1 °F (36.7 °C) Oral 100 16 100 % 5' 4\" (1.626 m) 104 lb (47.2 kg)       General: nontoxic appearing. HEENT: Normocephalic/atraumatic. Extraocular muscles are intact. Neck: Full range of motion. No meningeal signs noted. Lungs: Clear to auscultation in all lung fields. No retractions. No respiratory distress. Heart: Regular rate and rhythm. Abdomen: Soft, mild diffuse tenderness to the left abdomen. However, no localized or focal tenderness. .  No guarding or rebound tenderness. Bowel sounds are noted. Extremities: Range of motion is full. Radial pulses 2 out of 4 bilaterally. Back: Normal range of motion  Neurologic: Alert and oriented. Normal motor and sensory function. Skin: Warm, dry, free of rashes  Rectal: Rectal exam was performed with the nurse Sally Patino and the room the entire time examination. No masses palpated. Stool light tan. Hemoccult pending.   DIAGNOSTIC RESULTS     EKG: (none if blank)  All EKG's areinterpreted by the Emergency Department Physician who either signs or Co-signs this chart in the absence of a cardiologist.        RADIOLOGY: (none if blank)   Interpretationper the Radiologist below, if available at the time of this note:    CT ABDOMEN PELVIS W IV CONTRAST Additional Contrast? None   Final Result   1. No acute bowel obstruction or acute inflammatory bowel process      2. Cholelithiasis. 3. A 1.6 cm indeterminate hypodensity in the right hepatic lobe can represent an area of focal fatty sparing. MRI of the abdomen without and with contrast is recommended for further evaluation. 4. Fatty infiltration of the liver. **This report has been created using voice recognition software. It may contain minor errors which are inherent in voice recognition technology. **      Final report electronically signed by Dr Ashwini Gayle on 4/19/2022 2:33 PM          LABS:  Labs Reviewed   CBC WITH AUTO DIFFERENTIAL - Abnormal; Notable for the following components:       Result Value    RBC 3.48 (*)     Hemoglobin 7.2 (*)     Hematocrit 26.1 (*)     MCV 75.0 (*)     MCH 20.7 (*)     MCHC 27.6 (*)     RDW-CV 21.6 (*)     RDW-SD 58.0 (*)     MPV 8.6 (*)     Basophils Absolute 0.2 (*)     All other components within normal limits   BASIC METABOLIC PANEL - Abnormal; Notable for the following components:    BUN 6 (*)     All other components within normal limits   HEPATIC FUNCTION PANEL - Abnormal; Notable for the following components: Total Bilirubin 0.2 (*)     AST 77 (*)     All other components within normal limits   LIPASE - Abnormal; Notable for the following components:    Lipase 61.7 (*)     All other components within normal limits   BLOOD OCCULT STOOL SCREEN #1   ETHANOL   ANION GAP   OSMOLALITY   GLOMERULAR FILTRATION RATE, ESTIMATED   URINALYSIS WITH MICROSCOPIC   URINE DRUG SCREEN   TYPE AND SCREEN       All other labs were within normal range or not returned as of this dictation.     EMERGENCY DEPARTMENT COURSE and Medical Decision Making:     MDM/  ED Course as of 04/19/22 1513   Tue Apr 19, 2022   1434 ETHYL ALCOHOL, SERUM: 0.36 [JM]      ED Course User Index  [JM] Petros Stringer PA-C     The nurse informed that the patient wanted to leave immediately. According to the nurse patient states that she wants to IV out when she wants to leave. This time I do not have her CAT scan report or her urinalysis resulted. This time the patient does not meet criteria for blood transfusion as her hemoglobin is 7.2 and there is no signs of active bleeding with a negative Hemoccult. Her hemoglobin is actually increased from her previous visit here which was 6.8. However again, I do not have the CT results or the urinalysis the patient was given the risks of signing out [address] as well as the benefit of being receiving treatment. Patient's alcohol level was 0.36 but this is not considered intoxicated. Also, patient was clinically appropriate and able to answer all questions. Therefore she is signed out [address] Strict return precautions and follow up instructions were discussed with the patient with which the patient agrees    CONSULTS: (None if blank)  None    Procedures: (None if blank)       CLINICAL IMPRESSION      1. Anemia, unspecified type    2. Generalized abdominal pain          DISPOSITION/PLAN   DISPOSITION        PATIENT REFERRED TO:  No follow-up provider specified.     DISCHARGE MEDICATIONS:  Discharge Medication List as of 4/19/2022  2:54 PM                 (Please note that portions of this note were completed with a voice recognition program.  Efforts weremade to edit the dictations but occasionally words are mis-transcribed.)      Cherylene Downs May, PA-C(electronically signed)              Cherylene Downs May, PA-C  04/19/22 7031 [facility] MORGAN Stringer  04/19/22 1338

## 2022-04-19 NOTE — ED TRIAGE NOTES
Presents to ED with c/o low hemoglobin. States she was suppose to get a blood transfusion last week and left AMA. Patient had blood work done yesterday and was called to come back today. Patient asymptomatic. Alert and oriented. Respirations easy and unlabored.

## 2022-04-20 LAB
EKG ATRIAL RATE: 91 BPM
EKG P AXIS: 70 DEGREES
EKG P-R INTERVAL: 146 MS
EKG Q-T INTERVAL: 354 MS
EKG QRS DURATION: 74 MS
EKG QTC CALCULATION (BAZETT): 435 MS
EKG R AXIS: 75 DEGREES
EKG T AXIS: 83 DEGREES
EKG VENTRICULAR RATE: 91 BPM

## 2022-06-02 ENCOUNTER — HOSPITAL ENCOUNTER (INPATIENT)
Age: 52
LOS: 5 days | Discharge: HOME HEALTH | DRG: 426 | End: 2022-06-08
Attending: EMERGENCY MEDICINE | Admitting: INTERNAL MEDICINE
Payer: COMMERCIAL

## 2022-06-02 DIAGNOSIS — E44.0 MODERATE MALNUTRITION (HCC): ICD-10-CM

## 2022-06-02 DIAGNOSIS — E87.1 HYPONATREMIA: ICD-10-CM

## 2022-06-02 DIAGNOSIS — E87.20 LACTIC ACIDOSIS: ICD-10-CM

## 2022-06-02 DIAGNOSIS — G35 HISTORY OF MULTIPLE SCLEROSIS (HCC): ICD-10-CM

## 2022-06-02 DIAGNOSIS — R53.81 PHYSICAL DECONDITIONING: ICD-10-CM

## 2022-06-02 DIAGNOSIS — R55 SYNCOPE AND COLLAPSE: Primary | ICD-10-CM

## 2022-06-02 DIAGNOSIS — F10.930 ALCOHOL WITHDRAWAL SYNDROME WITHOUT COMPLICATION (HCC): ICD-10-CM

## 2022-06-02 PROCEDURE — 83690 ASSAY OF LIPASE: CPT

## 2022-06-02 PROCEDURE — 83605 ASSAY OF LACTIC ACID: CPT

## 2022-06-02 PROCEDURE — 87086 URINE CULTURE/COLONY COUNT: CPT

## 2022-06-02 PROCEDURE — 82077 ASSAY SPEC XCP UR&BREATH IA: CPT

## 2022-06-02 PROCEDURE — 85025 COMPLETE CBC W/AUTO DIFF WBC: CPT

## 2022-06-02 PROCEDURE — 80076 HEPATIC FUNCTION PANEL: CPT

## 2022-06-02 PROCEDURE — 99285 EMERGENCY DEPT VISIT HI MDM: CPT

## 2022-06-02 PROCEDURE — 80048 BASIC METABOLIC PNL TOTAL CA: CPT

## 2022-06-02 PROCEDURE — 87186 SC STD MICRODIL/AGAR DIL: CPT

## 2022-06-02 PROCEDURE — 87077 CULTURE AEROBIC IDENTIFY: CPT

## 2022-06-02 PROCEDURE — 84484 ASSAY OF TROPONIN QUANT: CPT

## 2022-06-02 PROCEDURE — 80307 DRUG TEST PRSMV CHEM ANLYZR: CPT

## 2022-06-02 PROCEDURE — 82010 KETONE BODYS QUAN: CPT

## 2022-06-02 PROCEDURE — 83880 ASSAY OF NATRIURETIC PEPTIDE: CPT

## 2022-06-02 PROCEDURE — 82140 ASSAY OF AMMONIA: CPT

## 2022-06-02 PROCEDURE — 81001 URINALYSIS AUTO W/SCOPE: CPT

## 2022-06-02 PROCEDURE — 93005 ELECTROCARDIOGRAM TRACING: CPT | Performed by: STUDENT IN AN ORGANIZED HEALTH CARE EDUCATION/TRAINING PROGRAM

## 2022-06-02 ASSESSMENT — PAIN - FUNCTIONAL ASSESSMENT: PAIN_FUNCTIONAL_ASSESSMENT: NONE - DENIES PAIN

## 2022-06-03 ENCOUNTER — APPOINTMENT (OUTPATIENT)
Dept: CT IMAGING | Age: 52
DRG: 426 | End: 2022-06-03
Payer: COMMERCIAL

## 2022-06-03 ENCOUNTER — APPOINTMENT (OUTPATIENT)
Dept: GENERAL RADIOLOGY | Age: 52
DRG: 426 | End: 2022-06-03
Payer: COMMERCIAL

## 2022-06-03 PROBLEM — E44.0 MODERATE MALNUTRITION (HCC): Status: ACTIVE | Noted: 2022-06-03

## 2022-06-03 PROBLEM — E87.1 HYPONATREMIA: Status: ACTIVE | Noted: 2022-06-03

## 2022-06-03 LAB
ABSOLUTE RETIC #: 67 THOU/MM3 (ref 20–115)
ACETAMINOPHEN LEVEL: < 5 UG/ML (ref 0–20)
ALBUMIN SERPL-MCNC: 3.8 G/DL (ref 3.5–5.1)
ALBUMIN SERPL-MCNC: 4 G/DL (ref 3.5–5.1)
ALP BLD-CCNC: 129 U/L (ref 38–126)
ALP BLD-CCNC: 136 U/L (ref 38–126)
ALT SERPL-CCNC: 84 U/L (ref 11–66)
ALT SERPL-CCNC: 85 U/L (ref 11–66)
AMMONIA: 30 UMOL/L (ref 11–60)
AMPHETAMINE+METHAMPHETAMINE URINE SCREEN: NEGATIVE
ANION GAP SERPL CALCULATED.3IONS-SCNC: 16 MEQ/L (ref 8–16)
ANION GAP SERPL CALCULATED.3IONS-SCNC: 24 MEQ/L (ref 8–16)
ANION GAP SERPL CALCULATED.3IONS-SCNC: 30 MEQ/L (ref 8–16)
ANISOCYTOSIS: PRESENT
APTT: 26.6 SECONDS (ref 22–38)
AST SERPL-CCNC: 205 U/L (ref 5–40)
AST SERPL-CCNC: 226 U/L (ref 5–40)
BACTERIA: ABNORMAL /HPF
BARBITURATE QUANTITATIVE URINE: NEGATIVE
BASE EXCESS MIXED: ABNORMAL MMOL/L (ref -2–3)
BASOPHILS # BLD: 0.8 %
BASOPHILS ABSOLUTE: 0.1 THOU/MM3 (ref 0–0.1)
BENZODIAZEPINE QUANTITATIVE URINE: NEGATIVE
BETA-HYDROXYBUTYRATE: 8.27 MG/DL (ref 0.2–2.81)
BILIRUB SERPL-MCNC: 1.4 MG/DL (ref 0.3–1.2)
BILIRUB SERPL-MCNC: 2 MG/DL (ref 0.3–1.2)
BILIRUBIN DIRECT: 0.7 MG/DL (ref 0–0.3)
BILIRUBIN DIRECT: 1.1 MG/DL (ref 0–0.3)
BILIRUBIN URINE: NEGATIVE
BLOOD, URINE: ABNORMAL
BUN BLDV-MCNC: 3 MG/DL (ref 7–22)
CALCIUM SERPL-MCNC: 8.4 MG/DL (ref 8.5–10.5)
CALCIUM SERPL-MCNC: 8.7 MG/DL (ref 8.5–10.5)
CALCIUM SERPL-MCNC: 8.7 MG/DL (ref 8.5–10.5)
CANNABINOID QUANTITATIVE URINE: POSITIVE
CASTS 2: ABNORMAL /LPF
CASTS UA: ABNORMAL /LPF
CHARACTER, URINE: ABNORMAL
CHLORIDE BLD-SCNC: 71 MEQ/L (ref 98–111)
CHLORIDE BLD-SCNC: 74 MEQ/L (ref 98–111)
CHLORIDE BLD-SCNC: 82 MEQ/L (ref 98–111)
CO2: 14 MEQ/L (ref 23–33)
CO2: 20 MEQ/L (ref 23–33)
CO2: 24 MEQ/L (ref 23–33)
COCAINE METABOLITE QUANTITATIVE URINE: NEGATIVE
COLLECTED BY:: ABNORMAL
COLOR: YELLOW
CREAT SERPL-MCNC: 0.4 MG/DL (ref 0.4–1.2)
CREAT SERPL-MCNC: 0.5 MG/DL (ref 0.4–1.2)
CREAT SERPL-MCNC: 0.7 MG/DL (ref 0.4–1.2)
CRYSTALS, UA: ABNORMAL
DEVICE: ABNORMAL
EOSINOPHIL # BLD: 0.9 %
EOSINOPHILS ABSOLUTE: 0.1 THOU/MM3 (ref 0–0.4)
EPITHELIAL CELLS, UA: ABNORMAL /HPF
ERYTHROCYTE [DISTWIDTH] IN BLOOD BY AUTOMATED COUNT: 24.3 % (ref 11.5–14.5)
ERYTHROCYTE [DISTWIDTH] IN BLOOD BY AUTOMATED COUNT: 24.9 % (ref 11.5–14.5)
ERYTHROCYTE [DISTWIDTH] IN BLOOD BY AUTOMATED COUNT: 67.1 FL (ref 35–45)
ERYTHROCYTE [DISTWIDTH] IN BLOOD BY AUTOMATED COUNT: 67.7 FL (ref 35–45)
ETHYL ALCOHOL, SERUM: < 0.01 %
FERRITIN: 70 NG/ML (ref 10–291)
FOLATE: 5.3 NG/ML (ref 4.8–24.2)
GFR SERPL CREATININE-BSD FRML MDRD: 88 ML/MIN/1.73M2
GFR SERPL CREATININE-BSD FRML MDRD: > 90 ML/MIN/1.73M2
GFR SERPL CREATININE-BSD FRML MDRD: > 90 ML/MIN/1.73M2
GLUCOSE BLD-MCNC: 138 MG/DL (ref 70–108)
GLUCOSE BLD-MCNC: 75 MG/DL (ref 70–108)
GLUCOSE BLD-MCNC: 91 MG/DL (ref 70–108)
GLUCOSE URINE: NEGATIVE MG/DL
HCO3, MIXED: ABNORMAL MMOL/L (ref 23–28)
HCT VFR BLD CALC: 26.5 % (ref 37–47)
HCT VFR BLD CALC: 27.1 % (ref 37–47)
HEMOGLOBIN: 7.8 GM/DL (ref 12–16)
HEMOGLOBIN: 8 GM/DL (ref 12–16)
HYPOCHROMIA: PRESENT
IMMATURE GRANS (ABS): 0.1 THOU/MM3 (ref 0–0.07)
IMMATURE GRANULOCYTES: 0.7 %
IMMATURE RETIC FRACT: 32.7 % (ref 3–15.9)
INR BLD: 1.07 (ref 0.85–1.13)
IRON SATURATION: 34 % (ref 20–50)
IRON: 91 UG/DL (ref 50–170)
KETONES, URINE: ABNORMAL
LACTIC ACID, SEPSIS: 10.5 MMOL/L (ref 0.5–1.9)
LACTIC ACID, SEPSIS: 15.1 MMOL/L (ref 0.5–1.9)
LACTIC ACID, SEPSIS: 2.7 MMOL/L (ref 0.5–1.9)
LEUKOCYTE ESTERASE, URINE: ABNORMAL
LIPASE: 20.7 U/L (ref 5.6–51.3)
LYMPHOCYTES # BLD: 22.8 %
LYMPHOCYTES ABSOLUTE: 3.1 THOU/MM3 (ref 1–4.8)
MAGNESIUM: 1.4 MG/DL (ref 1.6–2.4)
MCH RBC QN AUTO: 22.7 PG (ref 26–33)
MCH RBC QN AUTO: 22.9 PG (ref 26–33)
MCHC RBC AUTO-ENTMCNC: 29.4 GM/DL (ref 32.2–35.5)
MCHC RBC AUTO-ENTMCNC: 29.5 GM/DL (ref 32.2–35.5)
MCV RBC AUTO: 77.3 FL (ref 81–99)
MCV RBC AUTO: 77.4 FL (ref 81–99)
MISCELLANEOUS 2: ABNORMAL
MONOCYTES # BLD: 9.2 %
MONOCYTES ABSOLUTE: 1.3 THOU/MM3 (ref 0.4–1.3)
MRSA SCREEN RT-PCR: NEGATIVE
NITRITE, URINE: NEGATIVE
NUCLEATED RED BLOOD CELLS: 0 /100 WBC
O2 SAT, MIXED: ABNORMAL %
OPIATES, URINE: NEGATIVE
OSMOLALITY CALCULATION: 231.6 MOSMOL/KG (ref 275–300)
OSMOLALITY URINE: 256 MOSMOL/KG (ref 250–750)
OSMOLALITY: 252 MOSMOL/KG (ref 275–295)
OSMOLALITY: 255 MOSMOL/KG (ref 275–295)
OXYCODONE: NEGATIVE
PCO2, MIXED VENOUS: < 11 MMHG (ref 41–51)
PH UA: 6.5 (ref 5–9)
PH, MIXED: 7.56 (ref 7.31–7.41)
PHENCYCLIDINE QUANTITATIVE URINE: NEGATIVE
PHOSPHORUS: 2.7 MG/DL (ref 2.4–4.7)
PLATELET # BLD: 222 THOU/MM3 (ref 130–400)
PLATELET # BLD: 278 THOU/MM3 (ref 130–400)
PMV BLD AUTO: 9.7 FL (ref 9.4–12.4)
PMV BLD AUTO: 9.9 FL (ref 9.4–12.4)
PO2 MIXED: 187 MMHG (ref 25–40)
POIKILOCYTES: ABNORMAL
POTASSIUM REFLEX MAGNESIUM: 3.7 MEQ/L (ref 3.5–5.2)
POTASSIUM SERPL-SCNC: 2.8 MEQ/L (ref 3.5–5.2)
POTASSIUM SERPL-SCNC: 3.3 MEQ/L (ref 3.5–5.2)
POTASSIUM SERPL-SCNC: 4.5 MEQ/L (ref 3.5–5.2)
PRO-BNP: 798.3 PG/ML (ref 0–900)
PROCALCITONIN: 0.24 NG/ML (ref 0.01–0.09)
PROTEIN UA: ABNORMAL
RBC # BLD: 3.43 MILL/MM3 (ref 4.2–5.4)
RBC # BLD: 3.5 MILL/MM3 (ref 4.2–5.4)
RBC URINE: ABNORMAL /HPF
RENAL EPITHELIAL, UA: ABNORMAL
RETIC HEMOGLOBIN: 30.3 PG (ref 28.2–35.7)
RETICULOCYTE ABSOLUTE COUNT: 2.1 % (ref 0.5–2)
SALICYLATE, SERUM: < 0.3 MG/DL (ref 2–10)
SCAN OF BLOOD SMEAR: NORMAL
SEG NEUTROPHILS: 65.6 %
SEGMENTED NEUTROPHILS ABSOLUTE COUNT: 9 THOU/MM3 (ref 1.8–7.7)
SODIUM BLD-SCNC: 115 MEQ/L (ref 135–145)
SODIUM BLD-SCNC: 118 MEQ/L (ref 135–145)
SODIUM BLD-SCNC: 122 MEQ/L (ref 135–145)
SODIUM BLD-SCNC: 123 MEQ/L (ref 135–145)
SODIUM BLD-SCNC: 124 MEQ/L (ref 135–145)
SODIUM BLD-SCNC: 124 MEQ/L (ref 135–145)
SODIUM URINE: 20 MEQ/L
SPECIFIC GRAVITY, URINE: 1.01 (ref 1–1.03)
TOTAL CK: 226 U/L (ref 30–135)
TOTAL CK: 437 U/L (ref 30–135)
TOTAL IRON BINDING CAPACITY: 271 UG/DL (ref 171–450)
TOTAL PROTEIN: 6.7 G/DL (ref 6.1–8)
TOTAL PROTEIN: 6.8 G/DL (ref 6.1–8)
TROPONIN T: < 0.01 NG/ML
TSH SERPL DL<=0.05 MIU/L-ACNC: 3.92 UIU/ML (ref 0.4–4.2)
UROBILINOGEN, URINE: 1 EU/DL (ref 0–1)
VANCOMYCIN RESISTANT ENTEROCOCCUS: POSITIVE
VITAMIN B-12: 1724 PG/ML (ref 211–911)
WBC # BLD: 11.9 THOU/MM3 (ref 4.8–10.8)
WBC # BLD: 13.7 THOU/MM3 (ref 4.8–10.8)
WBC UA: ABNORMAL /HPF
YEAST: ABNORMAL

## 2022-06-03 PROCEDURE — 6360000002 HC RX W HCPCS: Performed by: STUDENT IN AN ORGANIZED HEALTH CARE EDUCATION/TRAINING PROGRAM

## 2022-06-03 PROCEDURE — 70450 CT HEAD/BRAIN W/O DYE: CPT

## 2022-06-03 PROCEDURE — 99291 CRITICAL CARE FIRST HOUR: CPT | Performed by: INTERNAL MEDICINE

## 2022-06-03 PROCEDURE — 82803 BLOOD GASES ANY COMBINATION: CPT

## 2022-06-03 PROCEDURE — 2580000003 HC RX 258: Performed by: STUDENT IN AN ORGANIZED HEALTH CARE EDUCATION/TRAINING PROGRAM

## 2022-06-03 PROCEDURE — C9113 INJ PANTOPRAZOLE SODIUM, VIA: HCPCS | Performed by: NURSE PRACTITIONER

## 2022-06-03 PROCEDURE — 83550 IRON BINDING TEST: CPT

## 2022-06-03 PROCEDURE — 87040 BLOOD CULTURE FOR BACTERIA: CPT

## 2022-06-03 PROCEDURE — 93005 ELECTROCARDIOGRAM TRACING: CPT | Performed by: NURSE PRACTITIONER

## 2022-06-03 PROCEDURE — 84145 PROCALCITONIN (PCT): CPT

## 2022-06-03 PROCEDURE — 2000000000 HC ICU R&B

## 2022-06-03 PROCEDURE — 85610 PROTHROMBIN TIME: CPT

## 2022-06-03 PROCEDURE — 80143 DRUG ASSAY ACETAMINOPHEN: CPT

## 2022-06-03 PROCEDURE — 84295 ASSAY OF SERUM SODIUM: CPT

## 2022-06-03 PROCEDURE — 84300 ASSAY OF URINE SODIUM: CPT

## 2022-06-03 PROCEDURE — 82728 ASSAY OF FERRITIN: CPT

## 2022-06-03 PROCEDURE — 74177 CT ABD & PELVIS W/CONTRAST: CPT

## 2022-06-03 PROCEDURE — 72125 CT NECK SPINE W/O DYE: CPT

## 2022-06-03 PROCEDURE — 85730 THROMBOPLASTIN TIME PARTIAL: CPT

## 2022-06-03 PROCEDURE — 6360000002 HC RX W HCPCS: Performed by: EMERGENCY MEDICINE

## 2022-06-03 PROCEDURE — 82746 ASSAY OF FOLIC ACID SERUM: CPT

## 2022-06-03 PROCEDURE — 96374 THER/PROPH/DIAG INJ IV PUSH: CPT

## 2022-06-03 PROCEDURE — 84443 ASSAY THYROID STIM HORMONE: CPT

## 2022-06-03 PROCEDURE — 2500000003 HC RX 250 WO HCPCS: Performed by: INTERNAL MEDICINE

## 2022-06-03 PROCEDURE — 2580000003 HC RX 258: Performed by: INTERNAL MEDICINE

## 2022-06-03 PROCEDURE — 84100 ASSAY OF PHOSPHORUS: CPT

## 2022-06-03 PROCEDURE — 6360000002 HC RX W HCPCS: Performed by: INTERNAL MEDICINE

## 2022-06-03 PROCEDURE — 85046 RETICYTE/HGB CONCENTRATE: CPT

## 2022-06-03 PROCEDURE — 83605 ASSAY OF LACTIC ACID: CPT

## 2022-06-03 PROCEDURE — 83735 ASSAY OF MAGNESIUM: CPT

## 2022-06-03 PROCEDURE — 2500000003 HC RX 250 WO HCPCS: Performed by: NURSE PRACTITIONER

## 2022-06-03 PROCEDURE — 82550 ASSAY OF CK (CPK): CPT

## 2022-06-03 PROCEDURE — 85027 COMPLETE CBC AUTOMATED: CPT

## 2022-06-03 PROCEDURE — 6360000004 HC RX CONTRAST MEDICATION: Performed by: STUDENT IN AN ORGANIZED HEALTH CARE EDUCATION/TRAINING PROGRAM

## 2022-06-03 PROCEDURE — 82607 VITAMIN B-12: CPT

## 2022-06-03 PROCEDURE — 87500 VANOMYCIN DNA AMP PROBE: CPT

## 2022-06-03 PROCEDURE — 83935 ASSAY OF URINE OSMOLALITY: CPT

## 2022-06-03 PROCEDURE — 83930 ASSAY OF BLOOD OSMOLALITY: CPT

## 2022-06-03 PROCEDURE — 6360000002 HC RX W HCPCS: Performed by: NURSE PRACTITIONER

## 2022-06-03 PROCEDURE — 2580000003 HC RX 258: Performed by: NURSE PRACTITIONER

## 2022-06-03 PROCEDURE — 82248 BILIRUBIN DIRECT: CPT

## 2022-06-03 PROCEDURE — 71045 X-RAY EXAM CHEST 1 VIEW: CPT

## 2022-06-03 PROCEDURE — 80179 DRUG ASSAY SALICYLATE: CPT

## 2022-06-03 PROCEDURE — 87641 MR-STAPH DNA AMP PROBE: CPT

## 2022-06-03 PROCEDURE — 51702 INSERT TEMP BLADDER CATH: CPT

## 2022-06-03 PROCEDURE — 80053 COMPREHEN METABOLIC PANEL: CPT

## 2022-06-03 PROCEDURE — 83540 ASSAY OF IRON: CPT

## 2022-06-03 PROCEDURE — 36415 COLL VENOUS BLD VENIPUNCTURE: CPT

## 2022-06-03 PROCEDURE — 84132 ASSAY OF SERUM POTASSIUM: CPT

## 2022-06-03 RX ORDER — MULTIVITAMIN WITH IRON
1 TABLET ORAL DAILY
Status: DISCONTINUED | OUTPATIENT
Start: 2022-06-03 | End: 2022-06-08 | Stop reason: HOSPADM

## 2022-06-03 RX ORDER — PHENOBARBITAL SODIUM 65 MG/ML
130 INJECTION INTRAMUSCULAR
Status: DISCONTINUED | OUTPATIENT
Start: 2022-06-03 | End: 2022-06-08 | Stop reason: HOSPADM

## 2022-06-03 RX ORDER — FOLIC ACID 5 MG/ML
1 INJECTION, SOLUTION INTRAMUSCULAR; INTRAVENOUS; SUBCUTANEOUS DAILY
Status: DISCONTINUED | OUTPATIENT
Start: 2022-06-03 | End: 2022-06-03

## 2022-06-03 RX ORDER — SODIUM CHLORIDE, SODIUM LACTATE, POTASSIUM CHLORIDE, CALCIUM CHLORIDE 600; 310; 30; 20 MG/100ML; MG/100ML; MG/100ML; MG/100ML
INJECTION, SOLUTION INTRAVENOUS CONTINUOUS
Status: DISCONTINUED | OUTPATIENT
Start: 2022-06-03 | End: 2022-06-03

## 2022-06-03 RX ORDER — DESMOPRESSIN ACETATE 4 UG/ML
1 INJECTION, SOLUTION INTRAVENOUS; SUBCUTANEOUS 2 TIMES DAILY
Status: DISPENSED | OUTPATIENT
Start: 2022-06-03 | End: 2022-06-05

## 2022-06-03 RX ORDER — DEXTROSE MONOHYDRATE 50 MG/ML
100 INJECTION, SOLUTION INTRAVENOUS PRN
Status: DISCONTINUED | OUTPATIENT
Start: 2022-06-03 | End: 2022-06-08 | Stop reason: HOSPADM

## 2022-06-03 RX ORDER — POTASSIUM CHLORIDE 29.8 MG/ML
20 INJECTION INTRAVENOUS PRN
Status: DISCONTINUED | OUTPATIENT
Start: 2022-06-03 | End: 2022-06-08 | Stop reason: HOSPADM

## 2022-06-03 RX ORDER — SODIUM CHLORIDE 9 MG/ML
1000 INJECTION, SOLUTION INTRAVENOUS CONTINUOUS
Status: DISCONTINUED | OUTPATIENT
Start: 2022-06-03 | End: 2022-06-03

## 2022-06-03 RX ORDER — PANTOPRAZOLE SODIUM 40 MG/10ML
40 INJECTION, POWDER, LYOPHILIZED, FOR SOLUTION INTRAVENOUS DAILY
Status: DISCONTINUED | OUTPATIENT
Start: 2022-06-03 | End: 2022-06-05

## 2022-06-03 RX ORDER — THIAMINE HYDROCHLORIDE 100 MG/ML
100 INJECTION, SOLUTION INTRAMUSCULAR; INTRAVENOUS ONCE
Status: COMPLETED | OUTPATIENT
Start: 2022-06-03 | End: 2022-06-03

## 2022-06-03 RX ORDER — PHENOBARBITAL SODIUM 65 MG/ML
260 INJECTION INTRAMUSCULAR
Status: DISCONTINUED | OUTPATIENT
Start: 2022-06-03 | End: 2022-06-08 | Stop reason: HOSPADM

## 2022-06-03 RX ORDER — 0.9 % SODIUM CHLORIDE 0.9 %
30 INTRAVENOUS SOLUTION INTRAVENOUS ONCE
Status: DISCONTINUED | OUTPATIENT
Start: 2022-06-03 | End: 2022-06-03

## 2022-06-03 RX ORDER — LORAZEPAM 2 MG/ML
2 INJECTION INTRAMUSCULAR ONCE
Status: COMPLETED | OUTPATIENT
Start: 2022-06-03 | End: 2022-06-03

## 2022-06-03 RX ORDER — MAGNESIUM SULFATE IN WATER 40 MG/ML
2000 INJECTION, SOLUTION INTRAVENOUS PRN
Status: DISCONTINUED | OUTPATIENT
Start: 2022-06-03 | End: 2022-06-08 | Stop reason: HOSPADM

## 2022-06-03 RX ORDER — FAMOTIDINE 10 MG/ML
20 INJECTION, SOLUTION INTRAVENOUS 2 TIMES DAILY
Status: DISCONTINUED | OUTPATIENT
Start: 2022-06-03 | End: 2022-06-03

## 2022-06-03 RX ORDER — PHENOBARBITAL 32.4 MG/1
32.4 TABLET ORAL 2 TIMES DAILY
Status: COMPLETED | OUTPATIENT
Start: 2022-06-05 | End: 2022-06-06

## 2022-06-03 RX ORDER — POTASSIUM CHLORIDE 7.45 MG/ML
10 INJECTION INTRAVENOUS PRN
Status: DISCONTINUED | OUTPATIENT
Start: 2022-06-03 | End: 2022-06-08 | Stop reason: HOSPADM

## 2022-06-03 RX ORDER — PHENOBARBITAL 32.4 MG/1
64.8 TABLET ORAL 2 TIMES DAILY
Status: COMPLETED | OUTPATIENT
Start: 2022-06-04 | End: 2022-06-04

## 2022-06-03 RX ORDER — SODIUM CHLORIDE, SODIUM LACTATE, POTASSIUM CHLORIDE, AND CALCIUM CHLORIDE .6; .31; .03; .02 G/100ML; G/100ML; G/100ML; G/100ML
30 INJECTION, SOLUTION INTRAVENOUS ONCE
Status: DISCONTINUED | OUTPATIENT
Start: 2022-06-03 | End: 2022-06-03

## 2022-06-03 RX ORDER — THIAMINE HYDROCHLORIDE 100 MG/ML
100 INJECTION, SOLUTION INTRAMUSCULAR; INTRAVENOUS DAILY
Status: DISCONTINUED | OUTPATIENT
Start: 2022-06-04 | End: 2022-06-05

## 2022-06-03 RX ORDER — SODIUM CHLORIDE 9 MG/ML
INJECTION, SOLUTION INTRAVENOUS CONTINUOUS
Status: DISCONTINUED | OUTPATIENT
Start: 2022-06-03 | End: 2022-06-08 | Stop reason: HOSPADM

## 2022-06-03 RX ADMIN — MAGNESIUM SULFATE HEPTAHYDRATE 2000 MG: 40 INJECTION, SOLUTION INTRAVENOUS at 04:18

## 2022-06-03 RX ADMIN — POTASSIUM CHLORIDE 10 MEQ: 7.46 INJECTION, SOLUTION INTRAVENOUS at 05:05

## 2022-06-03 RX ADMIN — DESMOPRESSIN ACETATE 1 MCG: 4 SOLUTION INTRAVENOUS at 06:25

## 2022-06-03 RX ADMIN — LORAZEPAM 2 MG: 2 INJECTION INTRAMUSCULAR; INTRAVENOUS at 01:10

## 2022-06-03 RX ADMIN — POTASSIUM CHLORIDE 10 MEQ: 7.46 INJECTION, SOLUTION INTRAVENOUS at 07:20

## 2022-06-03 RX ADMIN — SODIUM CHLORIDE: 9 INJECTION, SOLUTION INTRAVENOUS at 14:00

## 2022-06-03 RX ADMIN — IOPAMIDOL 80 ML: 755 INJECTION, SOLUTION INTRAVENOUS at 02:17

## 2022-06-03 RX ADMIN — POTASSIUM CHLORIDE 10 MEQ: 7.46 INJECTION, SOLUTION INTRAVENOUS at 06:11

## 2022-06-03 RX ADMIN — SODIUM CHLORIDE: 9 INJECTION, SOLUTION INTRAVENOUS at 06:19

## 2022-06-03 RX ADMIN — SODIUM CHLORIDE, POTASSIUM CHLORIDE, SODIUM LACTATE AND CALCIUM CHLORIDE: 600; 310; 30; 20 INJECTION, SOLUTION INTRAVENOUS at 04:08

## 2022-06-03 RX ADMIN — PANTOPRAZOLE SODIUM 40 MG: 40 INJECTION, POWDER, FOR SOLUTION INTRAVENOUS at 09:46

## 2022-06-03 RX ADMIN — POTASSIUM CHLORIDE 10 MEQ: 7.46 INJECTION, SOLUTION INTRAVENOUS at 08:43

## 2022-06-03 RX ADMIN — DESMOPRESSIN ACETATE 1 MCG: 4 SOLUTION INTRAVENOUS at 21:00

## 2022-06-03 RX ADMIN — POTASSIUM CHLORIDE 10 MEQ: 7.46 INJECTION, SOLUTION INTRAVENOUS at 09:44

## 2022-06-03 RX ADMIN — PHENOBARBITAL SODIUM 219.05 MG: 65 INJECTION INTRAMUSCULAR; INTRAVENOUS at 19:48

## 2022-06-03 RX ADMIN — THIAMINE HYDROCHLORIDE 100 MG: 100 INJECTION, SOLUTION INTRAMUSCULAR; INTRAVENOUS at 00:56

## 2022-06-03 RX ADMIN — LORAZEPAM 2 MG: 2 INJECTION INTRAMUSCULAR; INTRAVENOUS at 01:45

## 2022-06-03 RX ADMIN — LORAZEPAM 2 MG: 2 INJECTION INTRAMUSCULAR; INTRAVENOUS at 00:26

## 2022-06-03 RX ADMIN — SODIUM PHOSPHATE, MONOBASIC, MONOHYDRATE AND SODIUM PHOSPHATE, DIBASIC, ANHYDROUS 10 MMOL: 276; 142 INJECTION, SOLUTION INTRAVENOUS at 05:03

## 2022-06-03 RX ADMIN — POTASSIUM CHLORIDE 10 MEQ: 7.46 INJECTION, SOLUTION INTRAVENOUS at 04:12

## 2022-06-03 RX ADMIN — THIAMINE HYDROCHLORIDE: 100 INJECTION, SOLUTION INTRAMUSCULAR; INTRAVENOUS at 07:42

## 2022-06-03 RX ADMIN — PHENOBARBITAL SODIUM 219.05 MG: 65 INJECTION INTRAMUSCULAR; INTRAVENOUS at 23:00

## 2022-06-03 RX ADMIN — SODIUM CHLORIDE 1000 ML: 9 INJECTION, SOLUTION INTRAVENOUS at 00:57

## 2022-06-03 RX ADMIN — PHENOBARBITAL SODIUM 219.05 MG: 65 INJECTION INTRAMUSCULAR; INTRAVENOUS at 14:26

## 2022-06-03 ASSESSMENT — ENCOUNTER SYMPTOMS
SHORTNESS OF BREATH: 0
ABDOMINAL PAIN: 0
TROUBLE SWALLOWING: 0
NAUSEA: 0
DIARRHEA: 0
SORE THROAT: 0
VOMITING: 0
BLOOD IN STOOL: 0
COUGH: 0
BACK PAIN: 0

## 2022-06-03 NOTE — ED PROVIDER NOTES
[address] [address]          Pt Name: Abebe Pineda  MRN: [id number]  [place] 1970  Date of evaluation: 6/2/2022  Treating Resident Physician: Keny Kaminski MD  Supervising Physician: Angie Grewal MD    [address] [place] [address]       Chief Complaint   Patient presents with    Loss of Consciousness     History obtained from the patient. HISTORY OF PRESENT ILLNESS    HPI  Abebe Pineda is a 46 y.o. female with PMHx of alcohol abuse disorder, multiple sclerosis and anemia who presents to the emergency department for evaluation of syncope. Today, patient reports that she was sitting down watching television when she experienced a syncopal episode. Denies fevers, chest pain. Patient has been tremulous all day. States that she has been experiencing vomiting and diarrhea for the past few days. Denies abdominal pain. I saw this patient in the emergency department 2 months ago when she presented for fatigue. At the time she had a low hemoglobin of 6.8. Patient refused blood transfusion and work-up and left AMA. The patient has no other acute complaints at this time. REVIEW OF SYSTEMS   Review of Systems   Constitutional: Negative for chills, diaphoresis, fatigue and fever. HENT: Negative for sore throat and trouble swallowing. Eyes: Negative for visual disturbance. Respiratory: Negative for cough and shortness of breath. Cardiovascular: Negative for chest pain, palpitations and leg swelling. Gastrointestinal: Negative for abdominal pain, blood in stool, diarrhea, nausea and vomiting. Genitourinary: Negative for dysuria, hematuria and urgency. Musculoskeletal: Negative for arthralgias, back pain, myalgias and neck pain. Skin: Negative for rash. Neurological: Negative for seizures, syncope, weakness, numbness and headaches.          PAST MEDICAL AND SURGICAL HISTORY     Past Medical History:   Diagnosis Date    Multiple sclerosis ([facility] 2017    Personality disorder ([facility] unknown     Past Surgical History:   Procedure Laterality Date    CYST REMOVAL  2004    right thigh     TONSILLECTOMY  1977    WRIST GANGLION EXCISION Right 08/2021         MEDICATIONS     Current Facility-Administered Medications:     0.9 % sodium chloride infusion, 1,000 mL, IntraVENous, Continuous, Andrés Agrawal MD, Last Rate: 50 mL/hr at 06/03/22 0057, 1,000 mL at 06/03/22 0057    Current Outpatient Medications:     gabapentin (NEURONTIN) 300 MG capsule, Take 1 capsule by mouth 3 times daily for 30 days. , Disp: 90 capsule, Rfl: 0      SOCIAL HISTORY     Social History     Social History Narrative    Not on file     Social History     Tobacco Use    Smoking status: Current Every Day Smoker     Packs/day: 0.50     Years: 30.00     Pack years: 15.00     Start date: 10/3/1989    Smokeless tobacco: Never Used   Substance Use Topics    Alcohol use: Yes     Alcohol/week: 3.0 standard drinks     Types: 3 Cans of beer per week     Comment: daily- \"lots\"    Drug use: Yes     Types: Marijuana David George)     Comment: occasional         ALLERGIES     Allergies   Allergen Reactions    Mushroom Extract Complex      Swelling         FAMILY HISTORY     Family History   Problem Relation Age of Onset    No Known Problems Mother     No Known Problems Father     No Known Problems Sister          PREVIOUS RECORDS   Previous records reviewed: I reviewed the patient's past medical records including relevant labs, imaging and procedures. PHYSICAL EXAM     ED Triage Vitals   BP Temp Temp Source Heart Rate Resp SpO2 Height Weight   06/02/22 2331 06/02/22 2324 06/02/22 2324 06/02/22 2324 06/02/22 2324 06/02/22 2324 06/02/22 2324 06/02/22 2324   (!) 143/124 98.2 °F (36.8 °C) Oral (!) 143 22 99 % 5' 4\" (1.626 m) 104 lb (47.2 kg)     Initial vital signs and nursing assessment reviewed and normal. Body mass index is 17.85 kg/m².  Pulsoximetry is normal per my interpretation. Additional Vital Signs:  Vitals:    06/03/22 0059   BP: (!) 153/78   Pulse: (!) 166   Resp: 28   Temp:    SpO2: 99%       Physical Exam  Vitals and nursing note reviewed. Constitutional:       General: She is in acute distress. Appearance: Normal appearance. She is ill-appearing and toxic-appearing. HENT:      Head: Normocephalic and atraumatic. Right Ear: Tympanic membrane normal.      Left Ear: Tympanic membrane normal.      Nose: Nose normal.      Mouth/Throat:      Mouth: Mucous membranes are moist.      Pharynx: Oropharynx is clear. No oropharyngeal exudate. Eyes:      General: No scleral icterus. Extraocular Movements: Extraocular movements intact. Conjunctiva/sclera: Conjunctivae normal.      Pupils: Pupils are equal, round, and reactive to light. Cardiovascular:      Rate and Rhythm: Normal rate and regular rhythm. Pulses: Normal pulses. Heart sounds: Normal heart sounds. No murmur heard. No friction rub. No gallop. Pulmonary:      Effort: Pulmonary effort is normal. No respiratory distress. Breath sounds: Normal breath sounds. Abdominal:      Palpations: Abdomen is soft. Tenderness: There is no abdominal tenderness. There is no right CVA tenderness, left CVA tenderness, guarding or rebound. Musculoskeletal:         General: No swelling or tenderness. Normal range of motion. Cervical back: Normal range of motion and neck supple. Right lower leg: No edema. Left lower leg: No edema. Skin:     General: Skin is warm and dry. Capillary Refill: Capillary refill takes less than 2 seconds. Neurological:      General: No focal deficit present. Mental Status: She is alert and oriented to person, place, and time. Cranial Nerves: No cranial nerve deficit. Motor: No weakness.       Comments: Tremulous             MEDICAL DECISION MAKING   Initial Assessment:   19-year-old tremulous female presenting with tachycardia in the setting of alcohol use disorder      Differential diagnosis includes but is not limited to:  Alcohol withdrawal, hyperammonemia, anemia, MS flare, electrolyte abnormality, ACS, arrhythmia, sepsis, alcohlic hepatitits    Although some of these diagnoses are unlikely they were considered in my medical decision making. Plan:    Sepsis work-up  AMS work-up        ED RESULTS   Laboratory results:  Labs Reviewed   CBC WITH AUTO DIFFERENTIAL - Abnormal; Notable for the following components:       Result Value    WBC 13.7 (*)     RBC 3.50 (*)     Hemoglobin 8.0 (*)     Hematocrit 27.1 (*)     MCV 77.4 (*)     MCH 22.9 (*)     MCHC 29.5 (*)     RDW-CV 24.9 (*)     RDW-SD 67.7 (*)     All other components within normal limits   BASIC METABOLIC PANEL W/ REFLEX TO MG FOR LOW K - Abnormal; Notable for the following components:    Sodium 115 (*)     Chloride 71 (*)     CO2 14 (*)     Glucose 138 (*)     BUN 3 (*)     All other components within normal limits   HEPATIC FUNCTION PANEL - Abnormal; Notable for the following components:     Total Bilirubin 1.4 (*)     Bilirubin, Direct 0.7 (*)     Alkaline Phosphatase 136 (*)      (*)     ALT 85 (*)     All other components within normal limits   LACTATE, SEPSIS - Abnormal; Notable for the following components:    Lactic Acid, Sepsis 15.1 (*)     All other components within normal limits   URINE WITH REFLEXED MICRO - Abnormal; Notable for the following components:    Ketones, Urine TRACE (*)     Blood, Urine TRACE (*)     Protein, UA TRACE (*)     Leukocyte Esterase, Urine TRACE (*)     All other components within normal limits   ANION GAP - Abnormal; Notable for the following components:    Anion Gap 30.0 (*)     All other components within normal limits   OSMOLALITY - Abnormal; Notable for the following components:    Osmolality Calc 231.6 (*)     All other components within normal limits   CULTURE, BLOOD 1   CULTURE, BLOOD 2   LIPASE   TROPONIN   ETHANOL URINE DRUG SCREEN   AMMONIA   BRAIN NATRIURETIC PEPTIDE   GLOMERULAR FILTRATION RATE, ESTIMATED   LACTATE, SEPSIS   CK   BLOOD GAS, VENOUS   BETA-HYDROXYBUTYRATE   SODIUM   SODIUM   SODIUM   SODIUM   SODIUM       Radiologic studies results:  XR CHEST PORTABLE   Final Result   Impression:      No acute processes. This document has been electronically signed by: Juanita Garcia MD on    06/03/2022 12:56 AM      CT Cervical Spine WO Contrast    (Results Pending)   CT Head WO Contrast    (Results Pending)   CT ABDOMEN PELVIS W IV CONTRAST Additional Contrast? None    (Results Pending)       ED Medications administered this visit:   Medications   0.9 % sodium chloride infusion (1,000 mLs IntraVENous New Bag 6/3/22 0057)   LORazepam (ATIVAN) injection 2 mg (2 mg IntraVENous Given 6/3/22 0026)   thiamine (B-1) injection 100 mg (100 mg IntraVENous Given 6/3/22 0056)         ED COURSE     ED Course as of 06/03/22 0100   Fri Jun 03, 2022   0021 Hemoglobin Quant(!): 8.0 [TM]   0021 WBC(!): 13.7 [TM]   0023 CIWA 21 [TM]   0026 Lactic Acid, Sepsis(!): 15.1 [TM]   0038 AMMONIA, PLASMA, 713871: 30 [TM]   0040 Sodium(!!): 115 [TM]   0040 ETHYL ALCOHOL, SERUM: < 0.01 [TM]   0052 Bilirubin(!): 1.4 [TM]   0052 AST(!): 226 [TM]   0058 Discussed the case with Dr. Reji Gomez from ICU. They will take admission [TM]      ED Course User Index  [TM] Chris Biswas MD         MDM:   Patient presents with signs and symptoms of alcohol withdrawal.  Ativan started in the ED and IV thiamine given. I discussed the case with ICU who accepted admission. Plan was to start gentle fluid rehydration as patient was also found to be hyponatremic. We did not want to overcorrect for the sodium. Suspect the lactic acidosis is secondary to liver disease as well as ketoacidosis.         MEDICATION CHANGES     New Prescriptions    No medications on file         FINAL DISPOSITION     Final diagnoses:   Syncope and collapse   Alcohol withdrawal syndrome without complication (HCC)   Hyponatremia   Lactic acidosis     Condition: condition: serious  Dispo: Admit to CCU/ICU      This transcription was electronically signed. Parts of this transcriptions may have been dictated by use of voice recognition software and electronically transcribed, and parts may have been transcribed with the assistance of an ED scribe. The transcription may contain errors not detected in proofreading. Please refer to my supervising physician's documentation if my documentation differs. Electronically Signed: Byron Barroso MD, 06/03/22, 1:00 AM         Renetta Patrick MD  Resident  06/03/22 0100    Attending Supervising Physician's Attestation Statement  I performed a history and physical examination on the patient and discussed the management with the resident physician. I reviewed and agree with the findings and plan as documented in his note unless described otherwise below. Pt presents to the ER with vomiting, passing out and feeling very shaky. Admits to only drinking 2 beers this am but normally drinks signficantly more. Pt hypertensive, tachycardic, with severe tremors diffusely, but normal mental status, no delirium. Treating etoh withdrawal with ativan. Pt does have +SIRS with severe lactic acidosis, no clear source of infection, however we did plan for bs abx, however admitting service requests no abx and will monitor. Severe hypoNa, normal mental status, no seizure, ICU team requests to manage fluids, admit to ICU. Critical Care  There was a high probability of life-threatening clinical deterioration in the patient's condition requiring my urgent intervention. Total critical care time with the patient was 35 minutes excluding separately reportable procedures.   Critical care required due to patients hyponatremia, lactic acidosis, alcohol withdrawal, necessitating a comprehensive workup and frequent reassessments and monitoring    Electronically signed by [facility] DAVON BEE MD on 6/3/22 at 5:52 AM EDT       Magaly Serna MD  06/03/22 4658

## 2022-06-03 NOTE — ED TRIAGE NOTES
Patient presents to the ED via EMS with complaints of syncopal episode. Patient reports she was sitting down watching television when she had loss consciousness. Patient reports she is unsure if she fell or hit her head but she denies pain anywhere. Patient has a history of MS and tremors but patient reports her tremors have worsened. Patient states she lives at home with her parents. Patient reports some nausea, vomiting, and diarrhea the last few days.

## 2022-06-03 NOTE — CARE COORDINATION
6/3/22, 9:27 AM EDT  DISCHARGE PLANNING EVALUATION:    Johnson Motley       Admitted: 6/2/2022/ 2321   Hospital day: 0   Location: -16/016-A Reason for admit: Syncope and collapse [R55]  Lactic acidosis [E87.2]  Hyponatremia [E87.1]  Alcohol withdrawal syndrome without complication (HCC) [Y77.012]   PMH:  has a past medical history of Multiple sclerosis ([facility] and Personality disorder ([facility] Procedure:   6/3 CXR: No acute findings  6/3 CT Head: No acute process  6/3 CT Cervical Spine: No acute findings  6/3 CT Abd/pelvis:   New esophageal wall thickening and edema, question esophagitis.       New duodenal and proximal jejunal wall thickening and dilation, question    duodenitis and/or enteritis. Barriers to Discharge: Presented to ED after loss of consciousness. She was at home alone watching TV, had loss of consciousness. In ED was lethargic but answering questions appropriately. Reports recent n/v/d for a few days and tremors that started on day of presentation. Reports significant chronic ETOH use. Reports having 4 beers that morning, but states none since then. On arrival was tachycardic 160's, Na+ 115, LA 15.1. Urine tox +cannabinoids. Given ativan and thiamine for ETOH withdrawal. Admitted to ICU. DDAVP started for high UO. Tmax 100. NSR-'s. On room air. Ox3, not situation. Follows commands. SCDs. +VRE rectum. Received banana bag x1. SQ DDAVP bid, multivitamin, IV protonix, prn phenobarbital per ETOH withdrawal scale, IM thiamine daily, Electrolyte replacement protocols. Na+ 115 - now 123, K+ 3.3, CO2 14 -now 24, AG 30 - now 16, Mg 1.4, LA 15.1 - now 2.7, procal 0.24,  - now 437, trop neg, alk phos 136 - then 129, alt 85 - then 84, ast 226 - then 205, bili 1.4 - then 2, direct bili 0.7 - then 1.1, beta-hydroxybutyrate 8.27, wbc 13.7 - then 11.9, hgb 8 - then 7.8, vit B12 1724. ETOH was negative. Urine w/trace leukocytes - sent for culture. Blood cultures sent.      PCP: Herman Vega, DO  Readmission Risk Score: 12.8 ( )%  Patient's Healthcare Decision Maker: Patient Declined (Legal Next of Kin Remains as Decision Maker)    Patient Goals/Plan/Treatment Preferences: Spoke with Ha Hoffman; states she lives at home with her parents. She did not use any DME or have any HH services PTA. She drives, cares for herself independently, and has a PCP. Ha Hoffman states she plans to return home at discharge, denies needs, and declines HH. Transportation/Food Security/Housekeeping Addressed:  No issues identified.

## 2022-06-03 NOTE — PLAN OF CARE
Problem: Discharge Planning  Goal: Discharge to home or other facility with appropriate resources  Outcome: Progressing  Flowsheets (Taken 6/3/2022 0912)  Discharge to home or other facility with appropriate resources: Identify barriers to discharge with patient and caregiver  Note: Communication with treatment team and patient about discharge plan and appropriate resources. Problem: Skin/Tissue Integrity  Goal: Absence of new skin breakdown  Description: 1. Monitor for areas of redness and/or skin breakdown  2. Assess vascular access sites hourly  3. Every 4-6 hours minimum:  Change oxygen saturation probe site  4. Every 4-6 hours:  If on nasal continuous positive airway pressure, respiratory therapy assess nares and determine need for appliance change or resting period. Outcome: Progressing  Note: No new signs of injury or skin breakdown noted. Hourly rounding performed. Skin integrity, elimination, circulation assessed assessed hourly. Will continue to monitor. Problem: Safety - Adult  Goal: Free from fall injury  Outcome: Progressing  Flowsheets (Taken 6/3/2022 0912)  Free From Fall Injury: Instruct family/caregiver on patient safety  Note: The patient has been assessed for falls and the room room has been assessed for hazards to safety. Hand hygeine has been performed upon additional hazards to their safety. Will continue to monitor and assess throughout my hourly rounding.

## 2022-06-03 NOTE — FLOWSHEET NOTE
Patient arrived to unit from ED via bed. Patient transferred to ICU bed and placed on continuous ICU bedside monitor. Patient admitted for Syncope and collapse [R55]  Lactic acidosis [E87.2]  Hyponatremia [E87.1]  Alcohol withdrawal syndrome without complication ([facility] [T35.915]. Vitals obtained. See flowsheets. Patient's IV access includes 20 g right AC. Current infusions and rates of infusion include NS @ 50 ml/hr. Assessment completed by NINI Kenney RN. Two nurse skin assessment completed by NINI Kenney RN and GRAY Davis RN. See flowsheets for assessment details. Policies and procedures of ICU unable to be explained to patient at this time. Family member(s)/representative(s) present at time of admission include n/a. Patient rights explained to family member(s)/representatives and patient, as able. Patient/patient's family member(s)/representative(s) N/A to have physician notified of their admission. All questions posed by patient's family member(s)/representative(s) and patient answered at this time.

## 2022-06-03 NOTE — FLOWSHEET NOTE
06/03/22 1454   Encounter Summary   Service Provided For: Patient   Referral/Consult From: Darinel   Last Encounter  06/03/22   Complexity of Encounter Low   Begin Time 1400   End Time  1410   Total Time Calculated 10 min   Encounter    Type Initial Screen/Assessment   Spiritual/Emotional needs   Type Spiritual Support   pt is a 46year old female who is in bed and did not want prayer. She may be confused. No family is present at this time. Care Plan:  Continue spiritual and emotional care for patient and family.

## 2022-06-03 NOTE — PROGRESS NOTES
Comprehensive Nutrition Assessment    Type and Reason for Visit:  Initial (verbal request to see from RN - alcoholic)    Nutrition Recommendations/Plan:   1. Recommend diet when medically appropriate. 2. Will send Magic Cups TID. 3. Recommend continue MVI, Thiamine. Suggest Folvite. 4. Will monitor need for additional nutrition interventions. Malnutrition Assessment:  Malnutrition Status: Moderate malnutrition (06/03/22 1355)    Context:  Acute Illness     Findings of the 6 clinical characteristics of malnutrition:  Energy Intake:  50% or less of estimated energy requirements for 5 or more days  Weight Loss:   (-5% in 1.5 months)     Body Fat Loss:  Mild body fat loss Orbital   Muscle Mass Loss:  No significant muscle mass loss    Fluid Accumulation:  Unable to assess     Strength:  Not Performed    Nutrition Assessment:      Pt. moderately malnourished AEB criteria as listed above. At risk for further nutrition compromise r/t admit w/ severe hyponatremia, lethargy, N/V/D pta and underlying medical condition (hx chronic alcohol abuse, MS, personality disorder). Nutrition Related Findings:      Wound Type: None     Pt. Report/Treatments/Miscellaneous: pt. Seen - awakes to name; reports u/a to keep anything down for 4 days pta; states typically consumes 1-2 meals/day pta; last drink reported 6/2 am  GI Status: no BM noted  Pertinent Labs: 6/2: NH3 30; 6/3: Glucose 91, BUN 3, Cr 0.4, Sodium 122 (MD notes goal to increase by 4-6 mEq/24 hours)  Pertinent Meds: PPI, MVI, Thiamine - pharmacy to start [facility]      Current Nutrition Intake & Therapies:    Average Meal Intake: NPO     Diet NPO    Anthropometric Measures:  Height: 5' 4\" (162.6 cm)  Ideal Body Weight (IBW): 120 lbs (55 kg)    Admission Body Weight: 101 lb 6.4 oz (46 kg) (6/3 no edema)  Current Body Weight: 101 lb 6.6 oz (46 kg) (6/3 no edema),   IBW.     Current BMI (kg/m2): 17.4  Usual Body Weight:  (105# per pt; per EMR: 10/3/19: 104# 6 oz,

## 2022-06-03 NOTE — H&P
7. Other electrolyte disturbances: replacements ordered for potassium and magnesium levels. 8. Chronic microcytic anemia: Hgb 8.0, consistent with her baseline. No active bleeding appreciated. Send iron studies. Will also send vit B12 and folate given alcohol abuse. 9. Esophagitis: secondary to alcohol abuse, likely chronic reflux. Initiate protonix IV. Consider carafate once OK to take meds PO.   10. Tobacco abuse:  Educate on smoking cessation when appropriate. 11. Other chronic medical problems: MS, unspecified personality disorder. Stable in nature at this time. Complete home medication reconciliation in the AM. Patient unable to participate at present. CC: Loss of consciousness  HPI: Ashely Lombardi is a 45 y/o female active-smoker with PMH of alcohol abuse, multiple sclerosis, unspecified personality disorder, and chronic anemia. Patient presented to [facility] ED on 6/2/2022 for evaluation of loss of consciousness. Patient reported she was at home alone, sitting down watching TV on the couch, when she experienced a episode of loss of consciousness. In the ED, she was lethargic but answering questions appropriately. She denied fever or chest pain. She did endorse tremors, that started today. She did report recent nausea and vomiting and diarrhea for the past few days. She denied abdominal pain. She endorsed significant alcohol use chronically, but could not accurately quantify typical intake. She did endorse drinking 4 beers this morning, but none since then. Patient was significantly tachycardic on arrival, with heart rate as high as 160s. This was confirmed sinus tachycardia on EKG. She was afebrile. She was hypertensive. Admission labs were significant for sodium 115, chloride 71, CO2 14, anion gap 30.0, lactic acid 15.1, troponin <0.010, serum EtOH <0.01, urine drug screen positive for cannabinoids, WBC 13.7, hemoglobin 8.0.   CT head was done in the ED, showing no acute intracranial process. CT cervical spine was also negative. CT abdomen/pelvis showed new esophageal wall thickening and edema, concerning for esophagitis. Patient was administered ativan and thiamine for alcohol withdrawal. She was started on 0.9% NS at 50 mL/h. She was admitted to ICU for further evaluation and care. For further details, please see assessment and plan. ROS: Unable to provide history at this time due to mental status. PMH:  Per HPI  SHX:  Active-smoker. 15 pack-year history. Endorses significant daily alcohol intake but unable to quantify. Endorses marijuana use. FHX: No known. Allergies: mushroom extract complex. Medications:     lactated ringers 20 mL/hr at 06/03/22 0408    dextrose        famotidine (PEPCID) injection  20 mg IntraVENous BID    [START ON 6/4/2022] thiamine  100 mg IntraMUSCular Daily    folic acid  1 mg IntraMUSCular Daily    multivitamin  1 tablet Oral Daily       Vital Signs:   T: 99.5: P: 113 RR: 18 B/P: 101/69: FiO2: RA: O2 Sat:96%: I/O: no data GCS: 11  Body mass index is 17.41 kg/m². [facility]:   Acute on chronically ill adult female. Appears stated age. HEENT:  normocephalic and atraumatic. No scleral icterus. PERR  Neck: supple. No Thyromegaly. Lungs: clear to auscultation. No retractions  Cardiac: RRR. No JVD. Abdomen: soft. Nontender. Extremities:  No clubbing, cyanosis, or edema x 4. Vasculature: capillary refill < 3 seconds. Palpable dorsalis pedis pulses. Skin:  warm and dry. Psych:  Alert and oriented x3. Affect appropriate  Lymph:  No supraclavicular adenopathy. Neurologic:  No focal deficit. No seizures. Data: (All radiographs, tracings, PFTs, and imaging are personally viewed and interpreted unless otherwise noted).  Telemetry: sinus tachycardia.  12-lead ECG 6/2/22: Sinus tachycardia. Prolonged QTc (510).     Na 115 K 3.7 Cl 71 CO2 14 BUN 3 Cr 0.5 Anion gap 30.0 Gluc 138 Ca 8.7    Lactic acid 15.1    Total    Pro-   Troponin <0.010   Albumin 4.0 Alk phos 136 ALT 85 Ammonia 30  Bili 1.4   Beta-hydroxybutyrate 8.27    ETOH <0.01   Urine drug screen positive for cannabis.  WBC 13.7 Hgb 8.0 Hct 27.1 Plt 278   CT head wo contrast 6/3/22 report: No acute intracranial process   CT cervical spine wo contrast 6/3/22 report: No acute processes on this limited study with motion.  CT abd/pelvis 6/3/22 report: New esophageal wall thickening and edema, question esophagitis. New duodenal and proximal jejunal wall thickening and dilation, question duodenitis and/or enteritis. Case discussed with Dr. Nimco Rios. Electronically signed by YONATAN Nichols                                     Patient seen by me including key components of medical care. Case discussed with NP. Added DDAVP to decrease the rise in serum sodium. Reassess sodium replacement needs after potassium levels normalized. Severe protein calorie malnutrion due to alcoholism. PAWS 4. Add Phenobarbital.  Italicized font, if present,  represents changes to the note made by me. CC time 35 minutes. Time was discontiguous. Time does not include procedure. Time does include my direct assessment of the patient and coordination of care. Time represents more than 50% of the time involved with patient care by the [address] [place] [id number] team.  Electronically signed by Pete Zheng.  Nimco Rios MD.

## 2022-06-04 LAB
ALBUMIN SERPL-MCNC: 3.1 G/DL (ref 3.5–5.1)
ALP BLD-CCNC: 103 U/L (ref 38–126)
ALT SERPL-CCNC: 60 U/L (ref 11–66)
ANION GAP SERPL CALCULATED.3IONS-SCNC: 9 MEQ/L (ref 8–16)
AST SERPL-CCNC: 147 U/L (ref 5–40)
BILIRUB SERPL-MCNC: 0.8 MG/DL (ref 0.3–1.2)
BILIRUBIN DIRECT: 0.5 MG/DL (ref 0–0.3)
BUN BLDV-MCNC: < 2 MG/DL (ref 7–22)
CALCIUM SERPL-MCNC: 7.7 MG/DL (ref 8.5–10.5)
CHLORIDE BLD-SCNC: 91 MEQ/L (ref 98–111)
CO2: 24 MEQ/L (ref 23–33)
CREAT SERPL-MCNC: 0.2 MG/DL (ref 0.4–1.2)
EKG ATRIAL RATE: 155 BPM
EKG ATRIAL RATE: 97 BPM
EKG P AXIS: 74 DEGREES
EKG P AXIS: 83 DEGREES
EKG P-R INTERVAL: 100 MS
EKG P-R INTERVAL: 140 MS
EKG Q-T INTERVAL: 318 MS
EKG Q-T INTERVAL: 372 MS
EKG QRS DURATION: 68 MS
EKG QRS DURATION: 76 MS
EKG QTC CALCULATION (BAZETT): 472 MS
EKG QTC CALCULATION (BAZETT): 510 MS
EKG R AXIS: 67 DEGREES
EKG R AXIS: 81 DEGREES
EKG T AXIS: 73 DEGREES
EKG T AXIS: 78 DEGREES
EKG VENTRICULAR RATE: 155 BPM
EKG VENTRICULAR RATE: 97 BPM
ERYTHROCYTE [DISTWIDTH] IN BLOOD BY AUTOMATED COUNT: 25.2 % (ref 11.5–14.5)
ERYTHROCYTE [DISTWIDTH] IN BLOOD BY AUTOMATED COUNT: 73.3 FL (ref 35–45)
GFR SERPL CREATININE-BSD FRML MDRD: > 90 ML/MIN/1.73M2
GLUCOSE BLD-MCNC: 71 MG/DL (ref 70–108)
HCT VFR BLD CALC: 23.1 % (ref 37–47)
HCT VFR BLD CALC: 25.4 % (ref 37–47)
HEMOGLOBIN: 6.5 GM/DL (ref 12–16)
HEMOGLOBIN: 7 GM/DL (ref 12–16)
MAGNESIUM: 1.8 MG/DL (ref 1.6–2.4)
MCH RBC QN AUTO: 23 PG (ref 26–33)
MCHC RBC AUTO-ENTMCNC: 28.1 GM/DL (ref 32.2–35.5)
MCV RBC AUTO: 81.9 FL (ref 81–99)
PATHOLOGIST REVIEW: ABNORMAL
PLATELET # BLD: 205 THOU/MM3 (ref 130–400)
PMV BLD AUTO: 9.7 FL (ref 9.4–12.4)
POTASSIUM SERPL-SCNC: 3.8 MEQ/L (ref 3.5–5.2)
RBC # BLD: 2.82 MILL/MM3 (ref 4.2–5.4)
SCAN OF BLOOD SMEAR: NORMAL
SODIUM BLD-SCNC: 121 MEQ/L (ref 135–145)
SODIUM BLD-SCNC: 121 MEQ/L (ref 135–145)
SODIUM BLD-SCNC: 122 MEQ/L (ref 135–145)
SODIUM BLD-SCNC: 122 MEQ/L (ref 135–145)
SODIUM BLD-SCNC: 124 MEQ/L (ref 135–145)
SODIUM BLD-SCNC: 124 MEQ/L (ref 135–145)
TOTAL PROTEIN: 5.2 G/DL (ref 6.1–8)
WBC # BLD: 7 THOU/MM3 (ref 4.8–10.8)

## 2022-06-04 PROCEDURE — 36415 COLL VENOUS BLD VENIPUNCTURE: CPT

## 2022-06-04 PROCEDURE — 6370000000 HC RX 637 (ALT 250 FOR IP): Performed by: NURSE PRACTITIONER

## 2022-06-04 PROCEDURE — 2580000003 HC RX 258: Performed by: STUDENT IN AN ORGANIZED HEALTH CARE EDUCATION/TRAINING PROGRAM

## 2022-06-04 PROCEDURE — 2500000003 HC RX 250 WO HCPCS: Performed by: STUDENT IN AN ORGANIZED HEALTH CARE EDUCATION/TRAINING PROGRAM

## 2022-06-04 PROCEDURE — 2580000003 HC RX 258: Performed by: INTERNAL MEDICINE

## 2022-06-04 PROCEDURE — 93010 ELECTROCARDIOGRAM REPORT: CPT | Performed by: INTERNAL MEDICINE

## 2022-06-04 PROCEDURE — 6360000002 HC RX W HCPCS: Performed by: NURSE PRACTITIONER

## 2022-06-04 PROCEDURE — 2060000000 HC ICU INTERMEDIATE R&B

## 2022-06-04 PROCEDURE — 6360000002 HC RX W HCPCS: Performed by: HOSPITALIST

## 2022-06-04 PROCEDURE — 85027 COMPLETE CBC AUTOMATED: CPT

## 2022-06-04 PROCEDURE — 85018 HEMOGLOBIN: CPT

## 2022-06-04 PROCEDURE — C9113 INJ PANTOPRAZOLE SODIUM, VIA: HCPCS | Performed by: NURSE PRACTITIONER

## 2022-06-04 PROCEDURE — 83735 ASSAY OF MAGNESIUM: CPT

## 2022-06-04 PROCEDURE — 99233 SBSQ HOSP IP/OBS HIGH 50: CPT | Performed by: INTERNAL MEDICINE

## 2022-06-04 PROCEDURE — 6370000000 HC RX 637 (ALT 250 FOR IP): Performed by: STUDENT IN AN ORGANIZED HEALTH CARE EDUCATION/TRAINING PROGRAM

## 2022-06-04 PROCEDURE — 2580000003 HC RX 258: Performed by: NURSE PRACTITIONER

## 2022-06-04 PROCEDURE — 85014 HEMATOCRIT: CPT

## 2022-06-04 PROCEDURE — 80053 COMPREHEN METABOLIC PANEL: CPT

## 2022-06-04 PROCEDURE — 84295 ASSAY OF SERUM SODIUM: CPT

## 2022-06-04 PROCEDURE — 82248 BILIRUBIN DIRECT: CPT

## 2022-06-04 PROCEDURE — 2580000003 HC RX 258: Performed by: HOSPITALIST

## 2022-06-04 RX ADMIN — THIAMINE HYDROCHLORIDE 100 MG: 100 INJECTION, SOLUTION INTRAMUSCULAR; INTRAVENOUS at 08:10

## 2022-06-04 RX ADMIN — PHENOBARBITAL SODIUM 260 MG: 65 INJECTION INTRAMUSCULAR; INTRAVENOUS at 17:11

## 2022-06-04 RX ADMIN — SODIUM CHLORIDE: 9 INJECTION, SOLUTION INTRAVENOUS at 09:47

## 2022-06-04 RX ADMIN — Medication 1 TABLET: at 08:10

## 2022-06-04 RX ADMIN — PHENOBARBITAL SODIUM 130 MG: 65 INJECTION INTRAMUSCULAR; INTRAVENOUS at 03:41

## 2022-06-04 RX ADMIN — FOLIC ACID 1 MG: 5 INJECTION, SOLUTION INTRAMUSCULAR; INTRAVENOUS; SUBCUTANEOUS at 07:50

## 2022-06-04 RX ADMIN — IRON SUCROSE 200 MG: 20 INJECTION, SOLUTION INTRAVENOUS at 15:41

## 2022-06-04 RX ADMIN — PANTOPRAZOLE SODIUM 40 MG: 40 INJECTION, POWDER, FOR SOLUTION INTRAVENOUS at 08:11

## 2022-06-04 RX ADMIN — CEFTRIAXONE SODIUM 1000 MG: 1 INJECTION, POWDER, FOR SOLUTION INTRAMUSCULAR; INTRAVENOUS at 15:45

## 2022-06-04 RX ADMIN — PHENOBARBITAL SODIUM 460.2 MG: 65 INJECTION INTRAMUSCULAR; INTRAVENOUS at 12:42

## 2022-06-04 RX ADMIN — PHENOBARBITAL 64.8 MG: 32.4 TABLET ORAL at 21:18

## 2022-06-04 RX ADMIN — PHENOBARBITAL 64.8 MG: 32.4 TABLET ORAL at 08:10

## 2022-06-04 NOTE — PROGRESS NOTES
Plan of care note:    Patient is being transferred out of the ICU, hospitalist to assume care. 20-year-old female here with alcohol abuse, altered mental status and hyponatremia. Patient with admitting sodium of 115 and lethargy upon arrival.,  Sodium slowly improving up to 124. Patient is more awake today but very weak requiring assistance to use bedside commode. She did try to leave AMA today however family will not pick her up and patient is too weak to ambulate on her own. Continue phenobarbital for alcohol withdrawal  Gram-negative UTI, will start patient on IV Rocephin  Acute on chronic anemia: No signs of active bleed, patient refusing blood transfusion for hemoglobin 6.5. Will give 1 dose of IV iron although her iron profile was not terrible. We will continue to monitor hemoglobin.     Liz Boyce MD

## 2022-06-04 NOTE — PROGRESS NOTES
6/3/22 @2330 pt found on knees at bedside. Pt states she was trying to use the commode. Able to stand with one person under the arm assist. Unsteady gait noted. Denies any injury. A&Ox4. Denies hitting head or pain/distress. VSS. Notified Damaris KIRKPATRICK & dr Ada Durán. telesitter at bedside. No new orders. Call light was reachable, bed alarm was on & alarming, frequently used items within reach, lighting appropriate & room free of clutter. Electronically signed by Urban Jameson RN on 6/4/2022 at 12:31 AM      Post-Fall Assessment   Date of Fall:   6/3/22  Time of Fall:   2330   Yes No N/A Comment   Was Patient on [facility]? [] [x] []    Was the Fall Witnessed? [] [x] []    Were Clothes a Factor? [] [x] []    Was Patient Wearing Corrective Footwear? [] [x] []    Other Environmental Factors Involved? [] [x] []      Description of Fall  Who found the patient: HYACINTH Heatno RN  Where was the patient at the time of the fall:  Room    Brief description of fall: see above  Patient comments regarding fall:   See above  Medications potentially contributing to fall risk (such as Sedatives, Hypnotics, Antihypertensives, Narcotics, Psychotropics, Anticonvulsants):   Phenobarbital    Patient Assessment of Injury    (Please document Vital Signs in Doc Flowsheet)     Yes No   Patient hit his/her head [] [x]   Patient is taking an anticoagulant [] [x]   CT of Head requested [] [x]     Neurological Assessment Protocol: If the patient has hit his/her head during this fall,   a Neurological Assessment must be completed every 2 hours for 12 hours;   every 3 hours for 24 hours;   then every 4 hours for 24 hours. Document in Doc Flowsheets. Neurological Assessment Protocol initiated   NO    If the patient did not hit his/her head during this fall, monitor vital signs every 8 hours. Notify the physician within 24 hours and document.   YES      Physician Notification  Please document under Provider Notification group within the Assessment (Complex Assessment) template of Doc Flowsheets.      Physician notified yes     Pharmacy notified yes  Time Notified: 6/4/22 0038 Luis M [id number] Supervisor/Clinical Manager Notified:   Sekou Yeh RN  Date:   6/3/22 Time:   2330  Family Member Notified:   PT REFUSED  Date:   6/4/22 Time:   0030

## 2022-06-04 NOTE — FLOWSHEET NOTE
06/04/22 1626   Safe Environment   Safety Measures Bed/Chair alarm on;Bed in low position;Bed/Chair-Wheels locked;Call light within reach; Fall prevention (comment);Gripper socks; Side rails X 3;Standard Safety Measures; Other (comment)  (tele sitter)   Video monitor in use Yes   Fall Risk Interventions   Toilet Every 2 Hours-In Advance of Need Yes   Hourly Visual Checks In bed;Quiet; Awake   Fall Visual Posted Fall sign posted;Socks; Armband   Room Door Open Yes   Alarm On Bed   Patient very high fall risk and attempting to get out of bed without calling. Education completed and call light within reach. Tele sitter in use and helping, bed alarm on, patient re-educated on calling out for any needs ahead of time. Door to room remains open and patient close to nursing station. Will continue to monitor closely.

## 2022-06-04 NOTE — PROGRESS NOTES
CRITICAL CARE  PROGRESS NOTE       Patient:  Uriel Bustamante    Unit/Bed:4D-16/016-A  YOB: 1970  MRN: [id number]   PCP: Yeni Arnold DO  Date of Admission: 6/2/2022  Chief Complaint:- AMS    Assessment and Plan:    1. Moderate to severe hyponatremia-improving: Lethargic on arrival to ED, Na was 115 on arrival and improving after initial interventions currently 124. Continue IV fluids normal saline 75 mill per hour. Goal of correcting sodium level is 8/day. Considering transfer to stepdown unit later today  2. AMS- resolving: Secondary to metabolic due to dehydration/hyponatremia/alcohol abuse  3. Alcohol abuse: Significant history of alcohol abuse, admits to drink night before coming to ED. seen tremor/hypertension/tachycardia on admission day. We will continue prophylactic phenobarbital  4. Moderate to severe microcytic anemia: Most likely iron deficiency/alcohol abuse . Hypoproliferative. history of chronic anemia. Hemoglobin on admission 7.8 and currently 6.5. Hemodilution is contributory since patient is on IV fluids. No signs of external bleeding no black stools no hematuria noted. Iron studies unremarkable however reticulocyte count/absolute reticulocyte index shows hypoproliferative pattern. patient is aware, explicitly denying blood products. Check H&H. We will continue folic acid and thiamine supplementation and considering iron supplementation. 5. Noncompliance. History of multiple AMA, leaving clinic early, she admits being noncompliant with medications. 6. Dehydration: Resolved  7. HAG MA/lactic acidosis: Resolved  8. Sinus tachycardia: Resolved  9. History of esophagitis: Resolved  10. Polysubstance abuse . alcohol abuse/cannabis: Patient is educated and informed about quitting from drink and cannabis use. UDS is positive for cannabis. Patient will benefit from addiction service consult/follow-up/outpatient referrals  11.  Tobacco abuse, active smoker: sHe is educated and informed about smoking cessation. 12. Bacteruria. UA positive for bacteria. Patient denies symptoms. Afebrile with a stable vital signs. Monitoring for now    INITIAL H AND P AND ICU COURSE:  Patient presented to [facility] ED on 6/2/2022 for evaluation of loss of consciousness. Patient reported she was at home alone, sitting down watching TV on the couch, when she experienced a episode of loss of consciousness. In the ED, she was lethargic but answering questions appropriately. She denied fever or chest pain. She did endorse tremors, that started today. She did report recent nausea and vomiting and diarrhea for the past few days. She denied abdominal pain. She endorsed significant alcohol use chronically, but could not accurately quantify typical intake. She did endorse drinking 4 beers this morning, but none since then. Patient was significantly tachycardic on arrival, with heart rate as high as 160s. This was confirmed sinus tachycardia on EKG. She was afebrile. She was hypertensive. Admission labs were significant for sodium 115, chloride 71, CO2 14, anion gap 30.0, lactic acid 15.1, troponin <0.010, serum EtOH <0.01, urine drug screen positive for cannabinoids, WBC 13.7, hemoglobin 8.0. CT head was done in the ED, showing no acute intracranial process. CT cervical spine was also negative. CT abdomen/pelvis showed new esophageal wall thickening and edema, concerning for esophagitis. Patient was administered ativan and thiamine for alcohol withdrawal. She was started on 0.9% NS at 50 mL/h. She was admitted to ICU for further evaluation and care. Past 24 Hr Progress  Patient seen at bedside and evaluated this morning. sHe is alert and oriented X4. No complaints. Lab updates show sodium of 124 which is improved significantly since admission. Also hemoglobin dropped to 6.5.   Per EMR, patient was found recently moderate to severely anemic, but refused to get a help.  We will recheck H&H, if hemoglobin remains low, considering transfusion of RBC but patient explicitly refused. Will evaluate sodium and consider transfer to stepdown unit. Past Medical History: Active tobacco smoking,. Family History: Denies. Social History: Active tobacco smoker half a pack a day for now and 25+ years of tobacco smoking. ROS   Neuro-denies headache dizziness, recent vision or hearing changes  Cardio-denies chest pain heart palpitations dizziness  Respiratory-denies cough, sputum production, hemoptysis. GI- denies nausea vomiting black stools diarrhea constipation    Scheduled Meds:   thiamine  100 mg IntraMUSCular Daily    multivitamin  1 tablet Oral Daily    desmopressin  1 mcg SubCUTAneous BID    pantoprazole  40 mg IntraVENous Daily    PHENobarbital  64.8 mg Oral BID    Followed by   Roseann Diaz ON 6/5/2022] PHENobarbital  32.4 mg Oral BID    folic acid IVPB  1 mg IntraVENous Daily     Continuous Infusions:   dextrose      sodium chloride 75 mL/hr at 06/03/22 1400       PHYSICAL EXAMINATION:  T: 99 1. P: 89. RR: 16. B/P: 111/76. FiO2: Room air. O2 Sat: 98%. I/O: 1100/-1050  Body mass index is 17.41 kg/m². Room air  General:   Chronically ill looking, pale, thin-appearance. HEENT:  normocephalic and atraumatic. No scleral icterus. PERR. Pale mucous membranes. No teeth  Neck: supple. No Thyromegaly. Lungs: clear to auscultation. No retractions  Cardiac: RRR. No JVD. Abdomen: soft. Nontender. Extremities:  No clubbing, cyanosis, or edema x 4. Vasculature: capillary refill < 3 seconds. Palpable dorsalis pedis pulses. Skin:  warm and dry. Tattoos  Psych:  Alert and oriented x3. Affect appropriate. Noncooperative  Lymph:  No supraclavicular adenopathy. Neurologic:  No focal deficit. No seizures. Data: (All radiographs, tracings, PFTs, and imaging are personally viewed and interpreted unless otherwise noted).     Sodium-124, potassium 3.8, chloride 91, CO2 24, BUN 2, creatinine 0.2, anion gap 94/90 magnesium 1.8,   WBC 7.0, RBC-2.8, hemoglobin 6.5, hematocrit 23, MCV 82, platelets 467   Telemetry shows -normal sinus rhythm        Seen with multidisciplinary ICU team   Meets Continued ICU Level Care Criteria:    [] Yes   [x] No - Transfer Planned to listed location: Medical floor.     [x] HOSPITALIST CONTACTED- Mansi Gomez  Case and plan discussed with Dr. Kevin Weiner        Electronically signed by Renetta Serrano DO, PGY-1

## 2022-06-04 NOTE — PROGRESS NOTES
Pt wants to leave AMA. States mother will pick her up. No number listed for mother, number given by patient is incorrect, rings busy signal. Physician notified. Pt very unsteady on her feet. Even with assist to pivot to bedside commode pts legs give out and RN supports patients entire weight.

## 2022-06-04 NOTE — PROGRESS NOTES
Spoke with patient regarding the possibility of needing blood pt refused and stated that she didn't want someone else's blood and was uncomfortable with receiving a transfusion.

## 2022-06-04 NOTE — PLAN OF CARE
Problem: Discharge Planning  Goal: Discharge to home or other facility with appropriate resources  Outcome: Progressing     Problem: Skin/Tissue Integrity  Goal: Absence of new skin breakdown  Description: 1. Monitor for areas of redness and/or skin breakdown  2. Assess vascular access sites hourly  3. Every 4-6 hours minimum:  Change oxygen saturation probe site  4. Every 4-6 hours:  If on nasal continuous positive airway pressure, respiratory therapy assess nares and determine need for appliance change or resting period.   Outcome: Progressing     Problem: Safety - Adult  Goal: Free from fall injury  Outcome: Progressing     Problem: Nutrition Deficit:  Goal: Optimize nutritional status  Outcome: Progressing       Continue POC Electronically signed by Kody Rodas RN on 6/4/2022 at 6:38 AM

## 2022-06-05 LAB
ALBUMIN SERPL-MCNC: 3 G/DL (ref 3.5–5.1)
ALP BLD-CCNC: 94 U/L (ref 38–126)
ALT SERPL-CCNC: 55 U/L (ref 11–66)
ANION GAP SERPL CALCULATED.3IONS-SCNC: 15 MEQ/L (ref 8–16)
AST SERPL-CCNC: 112 U/L (ref 5–40)
BILIRUB SERPL-MCNC: 0.5 MG/DL (ref 0.3–1.2)
BILIRUBIN DIRECT: 0.3 MG/DL (ref 0–0.3)
BUN BLDV-MCNC: < 2 MG/DL (ref 7–22)
CALCIUM SERPL-MCNC: 7.5 MG/DL (ref 8.5–10.5)
CHLORIDE BLD-SCNC: 89 MEQ/L (ref 98–111)
CO2: 19 MEQ/L (ref 23–33)
CREAT SERPL-MCNC: 0.3 MG/DL (ref 0.4–1.2)
ERYTHROCYTE [DISTWIDTH] IN BLOOD BY AUTOMATED COUNT: 24.8 % (ref 11.5–14.5)
ERYTHROCYTE [DISTWIDTH] IN BLOOD BY AUTOMATED COUNT: 74.4 FL (ref 35–45)
GFR SERPL CREATININE-BSD FRML MDRD: > 90 ML/MIN/1.73M2
GLUCOSE BLD-MCNC: 70 MG/DL (ref 70–108)
HCT VFR BLD CALC: 29.2 % (ref 37–47)
HEMOGLOBIN: 8.2 GM/DL (ref 12–16)
MCH RBC QN AUTO: 23.4 PG (ref 26–33)
MCHC RBC AUTO-ENTMCNC: 28.1 GM/DL (ref 32.2–35.5)
MCV RBC AUTO: 83.4 FL (ref 81–99)
ORGANISM: ABNORMAL
ORGANISM: ABNORMAL
PLATELET # BLD: 244 THOU/MM3 (ref 130–400)
PMV BLD AUTO: 10.1 FL (ref 9.4–12.4)
POTASSIUM SERPL-SCNC: 3.3 MEQ/L (ref 3.5–5.2)
POTASSIUM SERPL-SCNC: 4 MEQ/L (ref 3.5–5.2)
RBC # BLD: 3.5 MILL/MM3 (ref 4.2–5.4)
SODIUM BLD-SCNC: 123 MEQ/L (ref 135–145)
SODIUM BLD-SCNC: 124 MEQ/L (ref 135–145)
SODIUM BLD-SCNC: 124 MEQ/L (ref 135–145)
SODIUM BLD-SCNC: 125 MEQ/L (ref 135–145)
SODIUM BLD-SCNC: 125 MEQ/L (ref 135–145)
SODIUM BLD-SCNC: 129 MEQ/L (ref 135–145)
TOTAL PROTEIN: 5.1 G/DL (ref 6.1–8)
URINE CULTURE, ROUTINE: ABNORMAL
WBC # BLD: 7.6 THOU/MM3 (ref 4.8–10.8)

## 2022-06-05 PROCEDURE — 99232 SBSQ HOSP IP/OBS MODERATE 35: CPT | Performed by: HOSPITALIST

## 2022-06-05 PROCEDURE — 6360000002 HC RX W HCPCS: Performed by: HOSPITALIST

## 2022-06-05 PROCEDURE — 2580000003 HC RX 258: Performed by: HOSPITALIST

## 2022-06-05 PROCEDURE — 6370000000 HC RX 637 (ALT 250 FOR IP): Performed by: NURSE PRACTITIONER

## 2022-06-05 PROCEDURE — 6360000002 HC RX W HCPCS: Performed by: NURSE PRACTITIONER

## 2022-06-05 PROCEDURE — 84295 ASSAY OF SERUM SODIUM: CPT

## 2022-06-05 PROCEDURE — 2580000003 HC RX 258: Performed by: STUDENT IN AN ORGANIZED HEALTH CARE EDUCATION/TRAINING PROGRAM

## 2022-06-05 PROCEDURE — 2060000000 HC ICU INTERMEDIATE R&B

## 2022-06-05 PROCEDURE — 85027 COMPLETE CBC AUTOMATED: CPT

## 2022-06-05 PROCEDURE — 84132 ASSAY OF SERUM POTASSIUM: CPT

## 2022-06-05 PROCEDURE — C9113 INJ PANTOPRAZOLE SODIUM, VIA: HCPCS | Performed by: NURSE PRACTITIONER

## 2022-06-05 PROCEDURE — 80053 COMPREHEN METABOLIC PANEL: CPT

## 2022-06-05 PROCEDURE — 36415 COLL VENOUS BLD VENIPUNCTURE: CPT

## 2022-06-05 PROCEDURE — 2500000003 HC RX 250 WO HCPCS: Performed by: STUDENT IN AN ORGANIZED HEALTH CARE EDUCATION/TRAINING PROGRAM

## 2022-06-05 PROCEDURE — 6370000000 HC RX 637 (ALT 250 FOR IP): Performed by: STUDENT IN AN ORGANIZED HEALTH CARE EDUCATION/TRAINING PROGRAM

## 2022-06-05 PROCEDURE — 82248 BILIRUBIN DIRECT: CPT

## 2022-06-05 PROCEDURE — 94760 N-INVAS EAR/PLS OXIMETRY 1: CPT

## 2022-06-05 RX ORDER — FOLIC ACID 1 MG/1
1 TABLET ORAL DAILY
Status: DISCONTINUED | OUTPATIENT
Start: 2022-06-06 | End: 2022-06-08 | Stop reason: HOSPADM

## 2022-06-05 RX ORDER — PANTOPRAZOLE SODIUM 40 MG/1
40 TABLET, DELAYED RELEASE ORAL
Status: DISCONTINUED | OUTPATIENT
Start: 2022-06-06 | End: 2022-06-08 | Stop reason: HOSPADM

## 2022-06-05 RX ORDER — LANOLIN ALCOHOL/MO/W.PET/CERES
100 CREAM (GRAM) TOPICAL DAILY
Status: DISCONTINUED | OUTPATIENT
Start: 2022-06-06 | End: 2022-06-08 | Stop reason: HOSPADM

## 2022-06-05 RX ADMIN — PANTOPRAZOLE SODIUM 40 MG: 40 INJECTION, POWDER, FOR SOLUTION INTRAVENOUS at 08:36

## 2022-06-05 RX ADMIN — POTASSIUM CHLORIDE 10 MEQ: 7.46 INJECTION, SOLUTION INTRAVENOUS at 06:03

## 2022-06-05 RX ADMIN — THIAMINE HYDROCHLORIDE 100 MG: 100 INJECTION, SOLUTION INTRAMUSCULAR; INTRAVENOUS at 08:38

## 2022-06-05 RX ADMIN — POTASSIUM CHLORIDE 10 MEQ: 7.46 INJECTION, SOLUTION INTRAVENOUS at 07:13

## 2022-06-05 RX ADMIN — FOLIC ACID 1 MG: 5 INJECTION, SOLUTION INTRAMUSCULAR; INTRAVENOUS; SUBCUTANEOUS at 08:47

## 2022-06-05 RX ADMIN — PHENOBARBITAL 32.4 MG: 32.4 TABLET ORAL at 20:51

## 2022-06-05 RX ADMIN — PHENOBARBITAL SODIUM 260 MG: 65 INJECTION INTRAMUSCULAR; INTRAVENOUS at 14:16

## 2022-06-05 RX ADMIN — POTASSIUM CHLORIDE 10 MEQ: 7.46 INJECTION, SOLUTION INTRAVENOUS at 09:15

## 2022-06-05 RX ADMIN — PHENOBARBITAL 32.4 MG: 32.4 TABLET ORAL at 08:36

## 2022-06-05 RX ADMIN — Medication 1 TABLET: at 08:37

## 2022-06-05 RX ADMIN — POTASSIUM CHLORIDE 10 MEQ: 7.46 INJECTION, SOLUTION INTRAVENOUS at 08:15

## 2022-06-05 RX ADMIN — CEFTRIAXONE SODIUM 1000 MG: 1 INJECTION, POWDER, FOR SOLUTION INTRAMUSCULAR; INTRAVENOUS at 17:10

## 2022-06-05 ASSESSMENT — PAIN SCALES - GENERAL
PAINLEVEL_OUTOF10: 0
PAINLEVEL_OUTOF10: 0

## 2022-06-05 NOTE — PLAN OF CARE
Problem: Discharge Planning  Goal: Discharge to home or other facility with appropriate resources  Outcome: Progressing  Flowsheets (Taken 6/4/2022 1937)  Discharge to home or other facility with appropriate resources:   Identify barriers to discharge with patient and caregiver   Arrange for needed discharge resources and transportation as appropriate   Identify discharge learning needs (meds, wound care, etc)   Arrange for interpreters to assist at discharge as needed   Refer to discharge planning if patient needs post-hospital services based on physician order or complex needs related to functional status, cognitive ability or social support system     Problem: Skin/Tissue Integrity  Goal: Absence of new skin breakdown  Description: 1. Monitor for areas of redness and/or skin breakdown  2. Assess vascular access sites hourly  3. Every 4-6 hours minimum:  Change oxygen saturation probe site  4. Every 4-6 hours:  If on nasal continuous positive airway pressure, respiratory therapy assess nares and determine need for appliance change or resting period.   Outcome: Progressing     Problem: Safety - Adult  Goal: Free from fall injury  Outcome: Progressing  Flowsheets (Taken 6/5/2022 0043)  Free From Fall Injury:   Instruct family/caregiver on patient safety   Based on caregiver fall risk screen, instruct family/caregiver to ask for assistance with transferring infant if caregiver noted to have fall risk factors     Problem: Nutrition Deficit:  Goal: Optimize nutritional status  Outcome: Progressing     Problem: ABCDS Injury Assessment  Goal: Absence of physical injury  Outcome: Progressing

## 2022-06-05 NOTE — PLAN OF CARE
Problem: Discharge Planning  Goal: Discharge to home or other facility with appropriate resources  6/5/2022 1032 by Charlie Rodriguez RN  Outcome: Progressing  Flowsheets (Taken 6/5/2022 1032)  Discharge to home or other facility with appropriate resources: Identify discharge learning needs (meds, wound care, etc)  Note: Communication with treatment team and patient about discharge plan and appropriate resources. Problem: Skin/Tissue Integrity  Goal: Absence of new skin breakdown  Description: 1. Monitor for areas of redness and/or skin breakdown  2. Assess vascular access sites hourly  3. Every 4-6 hours minimum:  Change oxygen saturation probe site  4. Every 4-6 hours:  If on nasal continuous positive airway pressure, respiratory therapy assess nares and determine need for appliance change or resting period. 6/5/2022 1032 by Charlie Rodriguez RN  Outcome: Progressing  Note: No new signs of injury or skin breakdown noted. Hourly rounding performed. Skin integrity, elimination, circulation assessed assessed hourly. Will continue to monitor. 6/5/2022 0044 by Aida Vincent RN  Outcome: Progressing     Problem: Safety - Adult  Goal: Free from fall injury  6/5/2022 1032 by Charlie Rodriguez RN  Outcome: Progressing  Note: The patient has been assessed for falls and the room room has been assessed for hazards to safety. Hand hygeine has been performed upon additional hazards to their safety. Will continue to monitor and assess throughout my hourly rounding.

## 2022-06-05 NOTE — PROGRESS NOTES
Hospitalist Progress Note    Patient:  Pamela Carmichael      Unit/Bed:4K-03/003-A    YOB: 1970    MRN: [id number]       Acct: [de-identified]     PCP: Alfreda Connolly DO    Date of Admission: 6/2/2022    Assessment/Plan:    1. Severe Hyponatremia: Due to chronic alcohol abuse and poor PO intake. Na 115 on admit. Correction Na goal 6-8 mEq/24hrs. Continue NS @ 75cc/hr. Start diet today with 2g Na and 1.5L fluid restrictions. Fall and seizure precautions. PT/OT. 2. Metabolic Encephalopathy, improving: Due to dehydration/hyponatremia/alcohol abuse. Plan as above. 3. Acute on Chronic Anemia: Iron, B12, and Folate are all normal on 6/3/22. Hgb was 6.5 on 6/4, repeat 7.0. Patient refused blood transfusions. Although iron levels are normal, given IV Venofer x1. Repeat Hgb 8.2. No evidence of bleeding at this time. Monitor CBC. 4. Bacteruria: Noted on U/A on 6/2. Patient is asymptomatic. Urine culture reveals E. Coli and Enterobacter cloacae complex. On Rocephin x3 days. 5. Polysubstance Abuse: Smokes marijuana, cigarettes, and chronic alcohol abuse. Patient does not want to quit at this time. Discussed cessation and provided resources. Continue Phenobarbital, Folate, Thiamine, and Multivitamin. 6. Malnutrition: BMI 17.40. Appears thin and malnourished with poor  7. Esophagitis: Noted on CT. Continue Protonix.    8. HAGMA, resolved    Expected discharge date:  TBD    Disposition:    [] Home       [] TCU       [] Rehab       [] Psych       [] SNF       [] [facility]       [] Other-    Chief Complaint: [address] Course:   Per prior HPI, \"Patient presented to [facility] ED on 6/2/2022 for evaluation of loss of consciousness. Rita Andre reported she was at home alone, sitting down watching TV on the couch, when she experienced a episode of loss of consciousness.  In the ED, she was lethargic but answering questions appropriately.  She denied fever or chest pain.  She did endorse tremors, that started today.  She did report recent nausea and vomiting and diarrhea for the past few days. Pete Tsai denied abdominal pain.  She endorsed significant alcohol use chronically, but could not accurately quantify typical intake.  She did endorse drinking 4 beers this morning, but none since then.  Patient was significantly tachycardic on arrival, with heart rate as high as 160s.  This was confirmed sinus tachycardia on EKG.  She was afebrile. Pete Tsai was hypertensive.  Admission labs were significant for sodium 115, chloride 71, CO2 14, anion gap 30.0, lactic acid 15.1, troponin <0.010, serum EtOH <0.01, urine drug screen positive for cannabinoids, WBC 13.7, hemoglobin 8.0.  CT head was done in the ED, showing no acute intracranial process.  CT cervical spine was also negative.  CT abdomen/pelvis showed new esophageal wall thickening and edema, concerning for esophagitis. Patient was administered ativan and thiamine for alcohol withdrawal. She was started on 0.9% NS at 50 mL/h. She was admitted to ICU for further evaluation and care. \"     She was transferred to stepdown on 6/4/22. She did try to leave AMA, however, she was too weak to ambulate on her own and family will not pick her up. Her Hgb was noted to be 6.5 on 6/4, however, patient refused blood transfusions. She was given IV Venofer x1. Hgb has improved to 8.2. Subjective (past 24 hours): No significant events overnight per RN. Patient has sitter at bedside. Patient is awake, alert. She has been NPO due to lethargy and somnolence on prior days. She states she is hungry and is ready to eat. Patient has been eating gelatin and water without difficulty per RN. She has no complaints at this time. ROS (12 point review of systems completed. Pertinent positives noted. Otherwise ROS is negative).     Medications:  Reviewed    Infusion Medications    dextrose      sodium chloride 75 mL/hr at 06/04/22 1757     Scheduled Medications    cefTRIAXone (ROCEPHIN) IV  1,000 mg IntraVENous Q24H    thiamine  100 mg IntraMUSCular Daily    multivitamin  1 tablet Oral Daily    pantoprazole  40 mg IntraVENous Daily    PHENobarbital  32.4 mg Oral BID    folic acid IVPB  1 mg IntraVENous Daily     PRN Meds: PHENobarbital **OR** PHENobarbital **OR** PHENobarbital IVPB, potassium chloride **OR** potassium chloride, magnesium sulfate, sodium phosphate IVPB **OR** sodium phosphate IVPB **OR** sodium phosphate IVPB, glucose, dextrose bolus **OR** dextrose bolus, glucagon (rDNA), dextrose      Intake/Output Summary (Last 24 hours) at 6/5/2022 1505  Last data filed at 6/5/2022 1339  Gross per 24 hour   Intake 2040.8 ml   Output 0 ml   Net 2040.8 ml     Diet:  ADULT DIET; Regular; Low Sodium (2 gm); 1500 ml    Exam:  /68   Pulse 90   Temp 98.3 °F (36.8 °C) (Oral)   Resp 16   Ht 5' 4\" (1.626 m)   Wt 101 lb 6.1 oz (46 kg)   LMP 08/14/2012   SpO2 100%   BMI 17.40 kg/m²     General appearance: Thin, malnourished appearing female. HEENT: Pupils equal, round, and reactive to light. Conjunctivae/corneas clear. Neck: Supple, with full range of motion. No jugular venous distention. Trachea midline. Respiratory:  Normal respiratory effort. Clear to auscultation, bilaterally without Rales/Wheezes/Rhonchi. Cardiovascular: Regular rate and rhythm with normal S1/S2 without murmurs, rubs or gallops. Abdomen: Soft, non-tender, non-distended with normal bowel sounds. Musculoskeletal: passive and active ROM x 4 extremities. Skin: Skin color, texture, turgor normal.  No rashes or lesions. Neurologic:  Neurovascularly intact without any focal sensory/motor deficits.  Cranial nerves: II-XII intact, grossly non-focal.  Psychiatric: Alert and oriented, thought content appropriate, normal insight  Capillary Refill: Brisk,< 3 seconds   Peripheral Pulses: +2 palpable, equal bilaterally     Labs:   Recent Labs     06/03/22  0250 06/03/22  0250 06/04/22  0734 06/04/22  0849 06/05/22  0149   WBC 11.9*  --  7.0  --  7.6   HGB 7.8*   < > 6.5* 7.0* 8.2*   HCT 26.5*   < > 23.1* 25.4* 29.2*     --  205  --  244    < > = values in this interval not displayed. Recent Labs     06/03/22  0250 06/03/22  0250 06/03/22  0450 06/03/22  0727 06/04/22  0734 06/04/22  1204 06/04/22  2117 06/05/22  0149 06/05/22  1121   *   < > 122*   < > 124*   < > 122* 123*  124* 124*   K 2.8*   < > 3.3*   < > 3.8  --   --  3.3* 4.0   CL 74*   < > 82*  --  91*  --   --  89*  --    CO2 20*   < > 24  --  24  --   --  19*  --    BUN 3*   < > 3*  --  <2*  --   --  <2*  --    CREATININE 0.7   < > 0.4  --  0.2*  --   --  0.3*  --    CALCIUM 8.7   < > 8.4*  --  7.7*  --   --  7.5*  --    PHOS 2.7  --   --   --   --   --   --   --   --     < > = values in this interval not displayed. Recent Labs     06/03/22  0250 06/04/22  0734 06/05/22 0149   * 147* 112*   ALT 84* 60 55   BILIDIR 1.1* 0.5* 0.3   BILITOT 2.0* 0.8 0.5   ALKPHOS 129* 103 94     Recent Labs     06/03/22  0315   INR 1.07     Recent Labs     06/03/22  0047 06/03/22  0835   CKTOTAL 226* 437*     Microbiology:      Urinalysis:      Lab Results   Component Value Date    NITRU NEGATIVE 06/02/2022    WBCUA 0-2 06/02/2022    WBCUA None 05/14/2022    BACTERIA MANY 06/02/2022    RBCUA 3-5 06/02/2022    BLOODU TRACE 06/02/2022    SPECGRAV 1.017 06/16/2021    GLUCOSEU NEGATIVE 06/02/2022     Radiology:  CT Cervical Spine WO Contrast   Final Result   Impression:      No acute processes on this limited study with motion. Consider repeat study if there is persistent concern. This document has been electronically signed by: Zeke Finch MD on    06/03/2022 02:55 AM      All CTs at this facility use dose modulation techniques and iterative    reconstructions, and/or weight-based dosing   when appropriate to reduce radiation to a low as reasonably achievable.       CT Head WO Contrast   Final Result   Impression:      No acute intracranial process      This document has been electronically signed by: Krysta Abad MD on    06/03/2022 03:02 AM      All CTs at this facility use dose modulation techniques and iterative    reconstructions, and/or weight-based dosing   when appropriate to reduce radiation to a low as reasonably achievable. CT ABDOMEN PELVIS W IV CONTRAST Additional Contrast? None   Final Result   Impression:      New esophageal wall thickening and edema, question esophagitis. New duodenal and proximal jejunal wall thickening and dilation, question    duodenitis and/or enteritis. Additional findings discussed above      This document has been electronically signed by: Krysta Abad MD on    06/03/2022 03:16 AM      All CTs at this facility use dose modulation techniques and iterative    reconstructions, and/or weight-based dosing   when appropriate to reduce radiation to a low as reasonably achievable. XR CHEST PORTABLE   Final Result   Impression:      No acute processes.       This document has been electronically signed by: Krysta Abad MD on    06/03/2022 12:56 AM        DVT prophylaxis: [] Lovenox                                 [x] SCDs                                 [] SQ Heparin                                 [] Encourage ambulation           [] Already on Anticoagulation     Code Status: Full Code    PT/OT Eval Status: Yes    Tele:   [x] yes             [] no    Electronically signed by Patrica Herbert DO on 6/5/2022 at 3:05 PM

## 2022-06-06 ENCOUNTER — APPOINTMENT (OUTPATIENT)
Dept: CT IMAGING | Age: 52
DRG: 426 | End: 2022-06-06
Payer: COMMERCIAL

## 2022-06-06 LAB
ALBUMIN SERPL-MCNC: 2.9 G/DL (ref 3.5–5.1)
ALP BLD-CCNC: 95 U/L (ref 38–126)
ALT SERPL-CCNC: 48 U/L (ref 11–66)
ANION GAP SERPL CALCULATED.3IONS-SCNC: 8 MEQ/L (ref 8–16)
AST SERPL-CCNC: 76 U/L (ref 5–40)
BILIRUB SERPL-MCNC: 0.3 MG/DL (ref 0.3–1.2)
BILIRUBIN DIRECT: < 0.2 MG/DL (ref 0–0.3)
BUN BLDV-MCNC: < 2 MG/DL (ref 7–22)
CALCIUM SERPL-MCNC: 7.7 MG/DL (ref 8.5–10.5)
CHLORIDE BLD-SCNC: 99 MEQ/L (ref 98–111)
CO2: 23 MEQ/L (ref 23–33)
CREAT SERPL-MCNC: 0.3 MG/DL (ref 0.4–1.2)
ERYTHROCYTE [DISTWIDTH] IN BLOOD BY AUTOMATED COUNT: 25.7 % (ref 11.5–14.5)
ERYTHROCYTE [DISTWIDTH] IN BLOOD BY AUTOMATED COUNT: 77.5 FL (ref 35–45)
GFR SERPL CREATININE-BSD FRML MDRD: > 90 ML/MIN/1.73M2
GLUCOSE BLD-MCNC: 82 MG/DL (ref 70–108)
HCT VFR BLD CALC: 25.1 % (ref 37–47)
HCT VFR BLD CALC: 25.9 % (ref 37–47)
HEMOGLOBIN: 7 GM/DL (ref 12–16)
HEMOGLOBIN: 7.9 GM/DL (ref 12–16)
MAGNESIUM: 1.5 MG/DL (ref 1.6–2.4)
MAGNESIUM: 1.7 MG/DL (ref 1.6–2.4)
MCH RBC QN AUTO: 23.5 PG (ref 26–33)
MCHC RBC AUTO-ENTMCNC: 27.9 GM/DL (ref 32.2–35.5)
MCV RBC AUTO: 84.2 FL (ref 81–99)
PLATELET # BLD: 245 THOU/MM3 (ref 130–400)
PMV BLD AUTO: 9.9 FL (ref 9.4–12.4)
POTASSIUM SERPL-SCNC: 3.1 MEQ/L (ref 3.5–5.2)
POTASSIUM SERPL-SCNC: 4.1 MEQ/L (ref 3.5–5.2)
RBC # BLD: 2.98 MILL/MM3 (ref 4.2–5.4)
SODIUM BLD-SCNC: 127 MEQ/L (ref 135–145)
SODIUM BLD-SCNC: 130 MEQ/L (ref 135–145)
SODIUM BLD-SCNC: 130 MEQ/L (ref 135–145)
TOTAL PROTEIN: 5 G/DL (ref 6.1–8)
WBC # BLD: 6 THOU/MM3 (ref 4.8–10.8)

## 2022-06-06 PROCEDURE — 84132 ASSAY OF SERUM POTASSIUM: CPT

## 2022-06-06 PROCEDURE — 83735 ASSAY OF MAGNESIUM: CPT

## 2022-06-06 PROCEDURE — 85014 HEMATOCRIT: CPT

## 2022-06-06 PROCEDURE — 70450 CT HEAD/BRAIN W/O DYE: CPT

## 2022-06-06 PROCEDURE — 6360000002 HC RX W HCPCS: Performed by: NURSE PRACTITIONER

## 2022-06-06 PROCEDURE — 36415 COLL VENOUS BLD VENIPUNCTURE: CPT

## 2022-06-06 PROCEDURE — 82248 BILIRUBIN DIRECT: CPT

## 2022-06-06 PROCEDURE — 97162 PT EVAL MOD COMPLEX 30 MIN: CPT

## 2022-06-06 PROCEDURE — 6370000000 HC RX 637 (ALT 250 FOR IP): Performed by: STUDENT IN AN ORGANIZED HEALTH CARE EDUCATION/TRAINING PROGRAM

## 2022-06-06 PROCEDURE — 6370000000 HC RX 637 (ALT 250 FOR IP): Performed by: NURSE PRACTITIONER

## 2022-06-06 PROCEDURE — 97535 SELF CARE MNGMENT TRAINING: CPT

## 2022-06-06 PROCEDURE — 97166 OT EVAL MOD COMPLEX 45 MIN: CPT

## 2022-06-06 PROCEDURE — 85018 HEMOGLOBIN: CPT

## 2022-06-06 PROCEDURE — 97116 GAIT TRAINING THERAPY: CPT

## 2022-06-06 PROCEDURE — 99232 SBSQ HOSP IP/OBS MODERATE 35: CPT | Performed by: HOSPITALIST

## 2022-06-06 PROCEDURE — 2580000003 HC RX 258: Performed by: HOSPITALIST

## 2022-06-06 PROCEDURE — 6360000002 HC RX W HCPCS: Performed by: HOSPITALIST

## 2022-06-06 PROCEDURE — 85027 COMPLETE CBC AUTOMATED: CPT

## 2022-06-06 PROCEDURE — 1200000003 HC TELEMETRY R&B

## 2022-06-06 PROCEDURE — 80053 COMPREHEN METABOLIC PANEL: CPT

## 2022-06-06 PROCEDURE — 84295 ASSAY OF SERUM SODIUM: CPT

## 2022-06-06 RX ADMIN — POTASSIUM CHLORIDE 10 MEQ: 7.46 INJECTION, SOLUTION INTRAVENOUS at 10:49

## 2022-06-06 RX ADMIN — Medication 1 TABLET: at 08:11

## 2022-06-06 RX ADMIN — POTASSIUM CHLORIDE 10 MEQ: 7.46 INJECTION, SOLUTION INTRAVENOUS at 08:17

## 2022-06-06 RX ADMIN — POTASSIUM CHLORIDE 10 MEQ: 7.46 INJECTION, SOLUTION INTRAVENOUS at 07:08

## 2022-06-06 RX ADMIN — POTASSIUM CHLORIDE 10 MEQ: 7.46 INJECTION, SOLUTION INTRAVENOUS at 09:26

## 2022-06-06 RX ADMIN — CEFTRIAXONE SODIUM 1000 MG: 1 INJECTION, POWDER, FOR SOLUTION INTRAMUSCULAR; INTRAVENOUS at 16:09

## 2022-06-06 RX ADMIN — FOLIC ACID 1 MG: 1 TABLET ORAL at 08:11

## 2022-06-06 RX ADMIN — Medication 100 MG: at 08:11

## 2022-06-06 RX ADMIN — PHENOBARBITAL 32.4 MG: 32.4 TABLET ORAL at 08:11

## 2022-06-06 RX ADMIN — MAGNESIUM SULFATE HEPTAHYDRATE 2000 MG: 40 INJECTION, SOLUTION INTRAVENOUS at 13:15

## 2022-06-06 RX ADMIN — POTASSIUM BICARBONATE 40 MEQ: 782 TABLET, EFFERVESCENT ORAL at 08:10

## 2022-06-06 ASSESSMENT — LIFESTYLE VARIABLES
HOW OFTEN DO YOU HAVE A DRINK CONTAINING ALCOHOL: 2-3 TIMES A WEEK
HOW MANY STANDARD DRINKS CONTAINING ALCOHOL DO YOU HAVE ON A TYPICAL DAY: 3 OR 4

## 2022-06-06 ASSESSMENT — PAIN - FUNCTIONAL ASSESSMENT: PAIN_FUNCTIONAL_ASSESSMENT: ACTIVITIES ARE NOT PREVENTED

## 2022-06-06 ASSESSMENT — PAIN DESCRIPTION - ONSET: ONSET: ON-GOING

## 2022-06-06 ASSESSMENT — PAIN DESCRIPTION - ORIENTATION: ORIENTATION: LEFT;LOWER

## 2022-06-06 ASSESSMENT — PAIN SCALES - GENERAL
PAINLEVEL_OUTOF10: 8
PAINLEVEL_OUTOF10: 0

## 2022-06-06 ASSESSMENT — PAIN DESCRIPTION - FREQUENCY: FREQUENCY: INTERMITTENT

## 2022-06-06 ASSESSMENT — PAIN DESCRIPTION - PAIN TYPE: TYPE: ACUTE PAIN

## 2022-06-06 ASSESSMENT — PAIN DESCRIPTION - DESCRIPTORS: DESCRIPTORS: ACHING

## 2022-06-06 ASSESSMENT — PAIN DESCRIPTION - LOCATION: LOCATION: ARM

## 2022-06-06 NOTE — PROGRESS NOTES
Physician Progress Note      Kari Ibarra  CSN #:                  [id number]  :                       1970  ADMIT DATE:       2022 11:21 PM  DISCH DATE:  RESPONDING  PROVIDER #:        RONAL BARAHONA          QUERY TEXT:    Pt admitted with Hyponatremia,. Noted documentation of Malnutrition Status: Moderate malnutrition (22 0122)  by dietician consultant. If possible,   please document in progress notes and discharge summary:    The medical record reflects the following:  Risk Factors: Per dietitian, pt meets ASPEN criteria for  Moderate   malnutrition. Clinical Indicators: Pt. moderately malnourished AEB criteria as listed above. At risk for further nutrition compromise r/t admit w/ severe hyponatremia,   lethargy, N/V/D pta and underlying medical condition (hx chronic alcohol   abuse, MS, personality disorder). Treatment: Food and/or Nutrient Delivery: Start Oral Nutrition Supplement   (Will send [employer] TID once [place] diet or greater)    Thank you. Please call if you have any questions. P) [phone]. Signed   by Melinda Ortiz RN Clinical , CRCR  Options provided:  -- Moderate Protein Calorie Malnutrition confirmed POA  -- Moderate Protein Calorie Malnutrition confirmed not POA  -- Moderate Protein Calorie Malnutrition ruled out  -- Other - I will add my own diagnosis  -- Disagree - Not applicable / Not valid  -- Disagree - Clinically unable to determine / Unknown  -- Refer to Clinical Documentation Reviewer    PROVIDER RESPONSE TEXT:    The diagnosis of Moderate Protein Calorie Malnutrition was confirmed as POA.     Query created by: Gay Fu on 2022 8:02 AM      Electronically signed by:  RONAL BARAHONA 2022 8:55 AM

## 2022-06-06 NOTE — PROGRESS NOTES
RN called report to Rob Alan. RN gave report with no further questions at this time. Patient transported with all belongings, medications and chart in stable condition.

## 2022-06-06 NOTE — PROGRESS NOTES
Hospitalist Progress Note    Patient:  Harvey Saucedo      Unit/Bed:4K-03/003-A    YOB: 1970    MRN: [id number]       Acct: [de-identified]     PCP: Monet Mesa DO    Date of Admission: 6/2/2022    Assessment/Plan:    1. Severe Hyponatremia: Due to chronic alcohol abuse and poor PO intake. Na 115 on admit. Correction Na goal 6-8 mEq/24hrs. Tolerating diet and good PO intake. Stop IVF today. Continue to monitor Na q4hrs, diet today with 2g Na and 1.5L fluid restrictions. Fall and seizure precautions. PT/OT. 2. Metabolic Encephalopathy, improving: Due to dehydration/hyponatremia/alcohol abuse. Plan as above. 3. Acute on Chronic Anemia: Iron, B12, and Folate are all normal on 6/3/22. Hgb was 6.5 on 6/4, repeat 7.0. Patient refused blood transfusions. Although iron levels are normal, given IV Venofer x1. Repeat Hgb 8.2. No evidence of bleeding at this time. Hgb 7 on 6/6 morning, will repeat H/H at 8am. Monitor CBC. 4. Bacteruria: Noted on U/A on 6/2. Patient is asymptomatic. Urine culture reveals E. Coli and Enterobacter cloacae complex. On Rocephin x3 days. 5. Polysubstance Abuse: Smokes marijuana, cigarettes, and chronic alcohol abuse. Patient does not want to quit at this time. Discussed cessation and provided resources. Addiction Services consulted. Continue Phenobarbital, Folate, Thiamine, and Multivitamin. 6. Moderate Protein Calorie Malnutrition, POA: BMI 17.40. Appears thin and malnourished with poor intake. Dietician following. 7. Esophagitis: Noted on CT. Continue Protonix.    8. HAGMA, resolved    Expected discharge date:  TBD    Disposition:    [] Home       [] TCU       [] Rehab       [] Psych       [] SNF       [] [facility]       [] Other-    Chief Complaint: [address] Course:   Per prior HPI, \"Patient presented to [facility] ED on 6/2/2022 for evaluation of loss of consciousness. Margarita Campos reported she was at home alone, sitting down watching TV on the couch, when she experienced a episode of loss of consciousness.  In the ED, she was lethargic but answering questions appropriately.  She denied fever or chest pain.  She did endorse tremors, that started today. Erica Bourgeois did report recent nausea and vomiting and diarrhea for the past few days. Erica Bourgeois denied abdominal pain.  She endorsed significant alcohol use chronically, but could not accurately quantify typical intake.  She did endorse drinking 4 beers this morning, but none since then.  Patient was significantly tachycardic on arrival, with heart rate as high as 160s.  This was confirmed sinus tachycardia on EKG.  She was afebrile. Erica Bourgeois was hypertensive.  Admission labs were significant for sodium 115, chloride 71, CO2 14, anion gap 30.0, lactic acid 15.1, troponin <0.010, serum EtOH <0.01, urine drug screen positive for cannabinoids, WBC 13.7, hemoglobin 8.0.  CT head was done in the ED, showing no acute intracranial process.  CT cervical spine was also negative.  CT abdomen/pelvis showed new esophageal wall thickening and edema, concerning for esophagitis. Patient was administered ativan and thiamine for alcohol withdrawal. She was started on 0.9% NS at 50 mL/h. She was admitted to ICU for further evaluation and care. \"     She was transferred to stepdown on 6/4/22. She did try to leave AMA, however, she was too weak to ambulate on her own and family will not pick her up. Her Hgb was noted to be 6.5 on 6/4, however, patient refused blood transfusions. She was given IV Venofer x1. Hgb has improved to 8.2. Subjective (past 24 hours): No significant events overnight. Hgb this AM 7.0, no evidence of bleeding. She has had good PO intake her RN. Patient states she has no questions or concerns at this time, wants to go back to sleep. Encouraged PT/OT. ROS (12 point review of systems completed. Pertinent positives noted. Otherwise ROS is negative).     Medications:  Reviewed    Infusion Medications    dextrose      [Held by provider] sodium chloride 75 mL/hr at 06/05/22 1925     Scheduled Medications    potassium bicarb-citric acid  40 mEq Oral Once    pantoprazole  40 mg Oral QAM AC    folic acid  1 mg Oral Daily    thiamine  100 mg Oral Daily    cefTRIAXone (ROCEPHIN) IV  1,000 mg IntraVENous Q24H    multivitamin  1 tablet Oral Daily    PHENobarbital  32.4 mg Oral BID     PRN Meds: PHENobarbital **OR** PHENobarbital **OR** PHENobarbital IVPB, potassium chloride **OR** potassium chloride, magnesium sulfate, sodium phosphate IVPB **OR** sodium phosphate IVPB **OR** sodium phosphate IVPB, glucose, dextrose bolus **OR** dextrose bolus, glucagon (rDNA), dextrose      Intake/Output Summary (Last 24 hours) at 6/6/2022 0723  Last data filed at 6/6/2022 0448  Gross per 24 hour   Intake 2827.31 ml   Output 875 ml   Net 1952.31 ml     Diet:  ADULT DIET; Regular; Low Sodium (2 gm); 1500 ml    Exam:  /67   Pulse 89   Temp 98.1 °F (36.7 °C) (Oral)   Resp 18   Ht 5' 4\" (1.626 m)   Wt 104 lb 3 oz (47.3 kg)   LMP 08/14/2012   SpO2 100%   BMI 17.88 kg/m²     General appearance: Thin, malnourished appearing female. HEENT: Pupils equal, round, and reactive to light. Conjunctivae/corneas clear. Neck: Supple, with full range of motion. No jugular venous distention. Trachea midline. Respiratory:  Normal respiratory effort. Clear to auscultation, bilaterally without Rales/Wheezes/Rhonchi. Cardiovascular: Regular rate and rhythm with normal S1/S2 without murmurs, rubs or gallops. Abdomen: Soft, non-tender, non-distended with normal bowel sounds. Musculoskeletal: passive and active ROM x 4 extremities, decreased strength in BLE. Skin: Skin color, texture, turgor normal.  No rashes or lesions. Neurologic:  Neurovascularly intact without any focal sensory/motor deficits.  Cranial nerves: II-XII intact, grossly non-focal.  Psychiatric: Alert and oriented, thought content appropriate, poor insight  Capillary Refill: Brisk,< 3 seconds   Peripheral Pulses: +2 palpable, equal bilaterally     Labs:   Recent Labs     06/04/22  0734 06/04/22  0734 06/04/22  0849 06/05/22  0149 06/06/22  0434   WBC 7.0  --   --  7.6 6.0   HGB 6.5*   < > 7.0* 8.2* 7.0*   HCT 23.1*   < > 25.4* 29.2* 25.1*     --   --  244 245    < > = values in this interval not displayed. Recent Labs     06/04/22  0734 06/04/22  1204 06/05/22  0149 06/05/22  0149 06/05/22  1121 06/05/22  1636 06/05/22  1917 06/05/22  2328 06/06/22 0434   *   < > 123*  124*   < > 124*   < > 125* 129* 130*   K 3.8  --  3.3*  --  4.0  --   --   --  3.1*   CL 91*  --  89*  --   --   --   --   --  99   CO2 24  --  19*  --   --   --   --   --  23   BUN <2*  --  <2*  --   --   --   --   --  <2*   CREATININE 0.2*  --  0.3*  --   --   --   --   --  0.3*   CALCIUM 7.7*  --  7.5*  --   --   --   --   --  7.7*    < > = values in this interval not displayed. Recent Labs     06/04/22  0734 06/05/22 0149 06/06/22 0434   * 112* 76*   ALT 60 55 48   BILIDIR 0.5* 0.3 <0.2   BILITOT 0.8 0.5 0.3   ALKPHOS 103 94 95     No results for input(s): INR in the last 72 hours. Recent Labs     06/03/22  0835   OCVJLOD 212*     Microbiology:      Urinalysis:      Lab Results   Component Value Date    NITRU NEGATIVE 06/02/2022    WBCUA 0-2 06/02/2022    WBCUA None 05/14/2022    BACTERIA MANY 06/02/2022    RBCUA 3-5 06/02/2022    BLOODU TRACE 06/02/2022    SPECGRAV 1.017 06/16/2021    GLUCOSEU NEGATIVE 06/02/2022     Radiology:  CT Cervical Spine WO Contrast   Final Result   Impression:      No acute processes on this limited study with motion. Consider repeat study if there is persistent concern.       This document has been electronically signed by: Zeke Finch MD on    06/03/2022 02:55 AM      All CTs at this facility use dose modulation techniques and iterative    reconstructions, and/or weight-based dosing   when appropriate to reduce radiation to a low as reasonably achievable. CT Head WO Contrast   Final Result   Impression:      No acute intracranial process      This document has been electronically signed by: Jewel Robles MD on    06/03/2022 03:02 AM      All CTs at this facility use dose modulation techniques and iterative    reconstructions, and/or weight-based dosing   when appropriate to reduce radiation to a low as reasonably achievable. CT ABDOMEN PELVIS W IV CONTRAST Additional Contrast? None   Final Result   Impression:      New esophageal wall thickening and edema, question esophagitis. New duodenal and proximal jejunal wall thickening and dilation, question    duodenitis and/or enteritis. Additional findings discussed above      This document has been electronically signed by: Jewel Robles MD on    06/03/2022 03:16 AM      All CTs at this facility use dose modulation techniques and iterative    reconstructions, and/or weight-based dosing   when appropriate to reduce radiation to a low as reasonably achievable. XR CHEST PORTABLE   Final Result   Impression:      No acute processes.       This document has been electronically signed by: Jewel Robles MD on    06/03/2022 12:56 AM        DVT prophylaxis: [] Lovenox                                 [x] SCDs                                 [] SQ Heparin                                 [] Encourage ambulation           [] Already on Anticoagulation     Code Status: Full Code    PT/OT Eval Status: Yes    Tele:   [x] yes             [] no    Electronically signed by Desiree Hadley DO on 6/6/2022 at 7:23 AM

## 2022-06-06 NOTE — PROGRESS NOTES
Patient's father, Vic Harris, notified nursing staff that patient lives at home with him and his wife but they do not want her to be discharged to their home. He is requesting to talk to a  from [employer], preferably by phone at [phone]. SW consult placed.

## 2022-06-06 NOTE — PROGRESS NOTES
[address] [facility] PHYSICAL THERAPY  EVALUATION  [facility] ICU STEPDOWN TELEMETRY 4K - 3W-12/664-Y    Time In: 8554  Time Out: 1001  Timed Code Treatment Minutes: 12 Minutes  Minutes: 22          Date: 2022  Patient Name: Christine Sorensen,  Gender:  female        MRN: [id number]  : 1970  (46 y.o.)      Referring Practitioner: Cristina Turcios DO  Diagnosis: syncope and collapse  Additional Pertinent Hx: Patient presented to [facility] ED on 2022 for evaluation of loss of consciousness. Patient reported she was at home alone, sitting down watching TV on the couch, when she experienced a episode of loss of consciousness. In the ED, she was lethargic but answering questions appropriately. She denied fever or chest pain. She did endorse tremors, that started today. She did report recent nausea and vomiting and diarrhea for the past few days. She denied abdominal pain. She endorsed significant alcohol use chronically, but could not accurately quantify typical intake. She did endorse drinking 4 beers this morning, but none since then. Patient was significantly tachycardic on arrival, with heart rate as high as 160s. This was confirmed sinus tachycardia on EKG. She was afebrile. She was hypertensive. Admission labs were significant for sodium 115, chloride 71, CO2 14, anion gap 30.0, lactic acid 15.1, troponin <0.010, serum EtOH <0.01, urine drug screen positive for cannabinoids, WBC 13.7, hemoglobin 8.0. CT head was done in the ED, showing no acute intracranial process. CT cervical spine was also negative. CT abdomen/pelvis showed new esophageal wall thickening and edema, concerning for esophagitis. Patient was administered ativan and thiamine for alcohol withdrawal. She was transferred to [facility] on 22. She did try to leave AMA, however, she was too weak to ambulate on her own and family will not pick her up.  Her Hgb was noted to be 6.5 on , however, patient refused blood transfusions. She was given IV Venofer x1. Hgb has improved to 8.2. Restrictions/Precautions:  Restrictions/Precautions: General Precautions,Fall Risk  Position Activity Restriction  Other position/activity restrictions: h/o MS    Subjective:  Chart Reviewed: Yes  Patient assessed for rehabilitation services?: Yes  Subjective: RN approved session. Pt in bed pleasant and agreeable to therapy. Pt reported discomfort in her wrist from the IV, however denied pain elsewhere. Pt asked during walk \"did my seizure cause my walking to do this. \" Unsure of baseline. Reported mild dizziness towards the end of ambulation. General:  Overall Orientation Status: Within Functional Limits  Vision: Impaired  Vision Exceptions: Wears glasses at all times  Hearing: Within functional limits       Pain: 0/10: denied pain    Vitals: Vitals not assessed per clinical judgement, see nursing flowsheet    Social/Functional History:    Lives With: Parent  Type of Home: House  Home Layout: One level  Home Access: Level entry  Home Equipment: Cane     Bathroom Shower/Tub: Tub/Shower unit  Bathroom Toilet: Standard       ADL Assistance: Independent  Homemaking Assistance: Independent  Ambulation Assistance: Independent  Transfer Assistance: Independent    Active : No     Additional Comments: 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     OBJECTIVE:  Range of Motion:  Bilateral Lower Extremity: WFL    Strength:  Bilateral Lower Extremity: Impaired - grossly deconditioned    Balance:  Static Sitting Balance:  Independent  Static Standing Balance: Contact Guard Assistance   *sitting balance - EOB  *standing balance - RW    Bed Mobility:  Supine to Sit: Independent, with head of bed flat, with rail    Transfers:  Sit to Stand: [employer], very quick  Stand to [employer], cues for hand placement   *sit to stand - from .    Patient Education  Education Given To: Patient  Education Provided: Role of Therapy,Plan of Care,Transfer Training  Education Method: Verbal  Barriers to Learning: None  Education Outcome: Verbalized understanding,Demonstrated understanding       Equipment Recommendations:  Equipment Needed: Yes  Mobility Devices: Elena Bowieh: Rolling  Other: continue to assess, possible RW at this time. Plan:  Current Treatment Recommendations: Strengthening,Balance training,Functional mobility training,Transfer training,Gait training  Plan:  (5x GM)    Goals:  Patient goals : walk better  Short Term Goals  Time Frame for Short term goals: by discharge  Short term goal 1: pt will demonstrate independent bed mobility w/o use of rails to show improved functional mobility. Short term goal 2: pt will demonstrate SBA sit to stand/stand to sit w/o cues to show increased safety when transferring. Short term goal 3: pt will ambulate 150' w/ SBA and LRD and limited path deviations to show improved safety when ambulating  Long Term Goals  Time Frame for Long term goals : N/A due to short ELOS    Following session, patient left in safe position with all fall risk precautions in place.

## 2022-06-06 NOTE — CONSULTS
Brief Intervention and Referral to Treatment Summary    Patient was provided PHQ-9, AUDIT-C and DAST Screening:      PHQ-9 Score:   AUDIT-C Score:    DAST Score:      Patients substance use is considered       Harmful    Patients depression is considered:     Minimal      Brief Education Was Provided    Patient was receptive        Brief Intervention Is Provided (Only for AUDIT-C or DAST)     Patient reports readiness to decrease and/or stop use and a plan was discussed         Recommendations/Referrals for Brief and/or Specialized Treatment Provided to Patient   Pt given outpatient recourses, reviewed [address].

## 2022-06-06 NOTE — PROGRESS NOTES
1204: RN notified Dr. Anabell Shine in regards that patient veers off to the left when ambulating with therapy.

## 2022-06-06 NOTE — PLAN OF CARE
Problem: Discharge Planning  Goal: Discharge to home or other facility with appropriate resources  6/6/2022 1447 by ZACK Polanco  Outcome: Progressing  SW consult received and completed. See SW note.

## 2022-06-06 NOTE — CARE COORDINATION
DISCHARGE/PLANNING EVALUATION  6/6/22, 2:44 PM EDT    Reason for Referral: Discharge planning  Mental Status: Alert and Oriented  Decision Making: Self  Family/Social/Home Environment: Patient lives with her parents. She reported that she cares for her parents. Current Services including food security, transportation and housekeeping: Patient reported that she does not drive but is independent prior to admission. She reported that she care for her parents. Denies any current services in the home. Current Equipment: None  Payment Source: Joint venture [facility]  Concerns or Barriers to Discharge: None  Post acute provider list with quality measures, geographic area and applicable managed care information provided. Questions regarding selection process answered: Offered    Teach Back Method used with patient regarding care plan and needs. Patient verbalize understanding of the plan of care and contribute to goal setting. Patient goals, treatment preferences and discharge plan: Patient plans to discharge home at discharge. SW reviewed PT and OT recommendations of HH or ECF. Patient reported that she is open to [facility] but will not ever go to an ECF. SW called and made referral to [facility].       6/6/22, 3:25 PM EDT  DISCHARGE PLANNING EVALUATIO    DELLA spoke with patient's father, Triny Navarro. He reported that patient does not care her him or his wife. He reported that patient has been living with them for 5-6 years and she cannot come back home. He reported that they will not \"do this anymore\". SW explained that patient will be discharged when medically stable. SW explained that patient is refusing ECF. DELLA explained that SW can assist with providing shelter resources and outpatient/inpatient treatment programs but cannot force patient to agree. Triny Navarro reported that he was going to go tell her now that she cannot come home and he does not think patient will be agreeable to going to any treatment program or ECF.  Analisa Rowe re-iterated that patient will be discharged when medically stable.      Electronically signed by ZACK Luna on 6/6/2022 at 2:44 PM

## 2022-06-06 NOTE — PROGRESS NOTES
Utilize [facility] alcohol withdrawal scale (Based on [place] Modified Alcohol Withdrawal Scale). Tabulate score based on classifications including Tremor, Sweating, Hallucination, Orientation, and Agitation. Tremor: 0  Sweatin  Hallucinations: 0  Orientation: 0  Agitation: 0  Total Score: 0    Action perform as described below     Tremor:  No tremor is 0 points. Tremor on movement is 1 point. Tremor at rest is 2 points. Sweating: No Sweat 0 points. Moist is 1 point. Drenching sweats is 2 points. Hallucinations: No present 0 points. Dissuadable is 1 point. Not dissuadable is 2 points. Orientation: Oriented 0 points. Vague/detached 1 point. Disoriented/no contact 2 points. Agitation: Calm 0 points. Anxious 1 point. Panicky 2 points. Check scale every 2 hours. Discontinue scoring with 4 consecutive scorings of 0. Scale 0: No phenobarbital given. Re-assess every 60 minutes as needed. Scale 1-3: Phenobarbital 130 mg IV over 3 minutes. Re-assess every 60 minutes as needed. May administer every 60 minutes to a maximum dose of phenobarbital 1040 mg in 24 hours! Scale 4-8: Phenobarbital 260 mg IV over 5 minutes. Re-assess every 60 minutes as needed. May administer every 60 minutes to a maximum dose of phenobarbital 1040mg in 24 hours! Scale 9-10: Transfer to ICU (if not already in ICU). Administer 10mg/kg phenobarbital IV over 60 minutes. Maximum dose phenobarbital is 1040mg in 24 hours!

## 2022-06-06 NOTE — CARE COORDINATION
Collaborative Discharge Planning    Lavina Halsted  :  1970  MRN:  [id number]    ADMIT DATE:  2022      Discharge Planning Discharge Planning  Meds-to-Beds: Does the patient want to have any new prescriptions delivered to bedside prior to discharge?: Yes  Patient expects to be discharged to[de-identified] [facility] Board Notes /Social Work Whiteboard Notes  /Social Work Whiteboard: 6/3 CM: Home w/parents. Denies needs, declines HH. Discharge Plan SNF  Lives w parents, has SO Jerl Peers; Addiction Services following (history alcohol, cannabis use); therapy recommends SNF (veers to the left w ambulation); sitter in room; client refused SNF, prefers [facility] ([facility], therapy) and  [facility] after choices offered for tub transfer bench and 3:1 BSC (over toilet seat w arms); left message for Anell Peaks, [address] Liaison; messaged Attending for orders  Discharge Milestones and Delays: Clinical status    From ICU (DDAVP given there)    AMS/Hyponatremia    Na+ 130, Mg+ 1.5, H 7, E.COLI/E. [place] UTI; monitor    IV AB, SR Alcohol Withdrawal Scale continued    Client assigned to 6K; hand-off given to [facility] [address] Jerald Bradford 20    SIGNED:  Chris Garcia RN   2022, 11:41 AM      Lavina Halsted requires a bedside commode due to being confined to one level of the home, and is physically incapable of utilizing regular toilet facilities. Current body weight is Weight: 104 lb 3 oz (47.3 kg).

## 2022-06-06 NOTE — PROGRESS NOTES
Utilize [facility] alcohol withdrawal scale (Based on [place] Modified Alcohol Withdrawal Scale). Tabulate score based on classifications including Tremor, Sweating, Hallucination, Orientation, and Agitation. Tremor: 0  Sweatin  Hallucinations: 0  Orientation: 0  Agitation: 0  Total Score: 0    Action perform as described below     Tremor:  No tremor is 0 points. Tremor on movement is 1 point. Tremor at rest is 2 points. Sweating: No Sweat 0 points. Moist is 1 point. Drenching sweats is 2 points. Hallucinations: No present 0 points. Dissuadable is 1 point. Not dissuadable is 2 points. Orientation: Oriented 0 points. Vague/detached 1 point. Disoriented/no contact 2 points. Agitation: Calm 0 points. Anxious 1 point. Panicky 2 points. Check scale every 2 hours. Discontinue scoring with 4 consecutive scorings of 0. Scale 0: No phenobarbital given. Re-assess every 60 minutes as needed. Scale 1-3: Phenobarbital 130 mg IV over 3 minutes. Re-assess every 60 minutes as needed. May administer every 60 minutes to a maximum dose of phenobarbital 1040 mg in 24 hours! Scale 4-8: Phenobarbital 260 mg IV over 5 minutes. Re-assess every 60 minutes as needed. May administer every 60 minutes to a maximum dose of phenobarbital 1040mg in 24 hours! Scale 9-10: Transfer to ICU (if not already in ICU). Administer 10mg/kg phenobarbital IV over 60 minutes. Maximum dose phenobarbital is 1040mg in 24 hours!

## 2022-06-06 NOTE — PROGRESS NOTES
Utilize [facility] alcohol withdrawal scale (Based on [place] Modified Alcohol Withdrawal Scale). Tabulate score based on classifications including Tremor, Sweating, Hallucination, Orientation, and Agitation. Tremor: 0  Sweatin  Hallucinations: 0  Orientation: 0  Agitation: 0  Total Score: 0  Action perform as described below     Tremor:  No tremor is 0 points. Tremor on movement is 1 point. Tremor at rest is 2 points. Sweating: No Sweat 0 points. Moist is 1 point. Drenching sweats is 2 points. Hallucinations: No present 0 points. Dissuadable is 1 point. Not dissuadable is 2 points. Orientation: Oriented 0 points. Vague/detached 1 point. Disoriented/no contact 2 points. Agitation: Calm 0 points. Anxious 1 point. Panicky 2 points. Check scale every 2 hours. Discontinue scoring with 4 consecutive scorings of 0. Scale 0: No phenobarbital given. Re-assess every 60 minutes as needed. Scale 1-3: Phenobarbital 130 mg IV over 3 minutes. Re-assess every 60 minutes as needed. May administer every 60 minutes to a maximum dose of phenobarbital 1040 mg in 24 hours! Scale 4-8: Phenobarbital 260 mg IV over 5 minutes. Re-assess every 60 minutes as needed. May administer every 60 minutes to a maximum dose of phenobarbital 1040mg in 24 hours! Scale 9-10: Transfer to ICU (if not already in ICU). Administer 10mg/kg phenobarbital IV over 60 minutes. Maximum dose phenobarbital is 1040mg in 24 hours!

## 2022-06-06 NOTE — PROGRESS NOTES
[facility] ICU STEPDOWN TELEMETRY 4K  EVALUATION    Time:   Time In: 9076  Time Out: 1210  Timed Code Treatment Minutes: 25 Minutes  Minutes: 25          Date: 2022  Patient Name: Homar Gonzalez,   Gender: female      MRN: [id number]  : 1970  (46 y.o.)  Referring Practitioner: Blayne Bains DO  Diagnosis: Syncope and collapse  Additional Pertinent Hx: Jaylene Harman is a 45 y/o female active-smoker with PMH of alcohol abuse, multiple sclerosis, unspecified personality disorder, and chronic anemia. Patient presented to [facility] ED on 2022 for evaluation of loss of consciousness. Patient reported she was at home alone, sitting down watching TV on the couch, when she experienced a episode of loss of consciousness. In the ED, she was lethargic but answering questions appropriately. She denied fever or chest pain. She did endorse tremors, that started today. She did report recent nausea and vomiting and diarrhea for the past few days. She denied abdominal pain. She endorsed significant alcohol use chronically, but could not accurately quantify typical intake. She did endorse drinking 4 beers this morning, but none since then. Patient was significantly tachycardic on arrival, with heart rate as high as 160s. This was confirmed sinus tachycardia on EKG. She was afebrile. She was hypertensive. Admission labs were significant for sodium 115, chloride 71, CO2 14, anion gap 30.0, lactic acid 15.1, troponin <0.010, serum EtOH <0.01, urine drug screen positive for cannabinoids, WBC 13.7, hemoglobin 8.0. CT head was done in the ED, showing no acute intracranial process. CT cervical spine was also negative. CT abdomen/pelvis showed new esophageal wall thickening and edema, concerning for esophagitis. Patient was administered ativan and thiamine for alcohol withdrawal. She was started on 0.9% NS at 50 mL/h.  She was admitted to ICU for further evaluation and care. Restrictions/Precautions:  Restrictions/Precautions: General Precautions,Fall Risk  Position Activity Restriction  Other position/activity restrictions: h/o MS    Subjective  Chart Reviewed: Yes,Orders,Progress Notes  Patient assessed for rehabilitation services?: Yes  Response to previous treatment: Patient with no complaints from previous session  Family / Caregiver Present: No    Subjective: RN approved of OT session. pt sitting in recliner on OT arrival and agreeable to therapy. Pain: pain at IV insertion     Vitals: Vitals not assessed per clinical judgement, see nursing flowsheet    Social/Functional History:  Lives With: Parent  Type of Home: House  Home Layout: One level  Home Access: Level entry  Home Equipment: Cane   Bathroom Shower/Tub: Tub/Shower unit  Bathroom Toilet: Standard       ADL Assistance: Independent  Homemaking Assistance: Independent  Ambulation Assistance: Independent  Transfer Assistance: Independent    Active : No  Patient's  Info: pt's mother provides most transportation, friends provide some as well. 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 VISION:reports poor eye sight in right eye     HEARING:  WFL    COGNITION: Slow Processing, Decreased Insight, Decreased Problem Solving, Decreased Safety Awareness, Impaired Attention and Impulsive    RANGE OF MOTION:  Bilateral Upper Extremity:  WFL    STRENGTH:  Bilateral Upper Extremity:  WFL    SENSATION:   WFL    ADL:   Grooming: Stand By Assistance.  hand hygiene at [employer]: stand by assistance pericare while seated  Tub Transfer: Contact Guard Assistance. To/from standard toilet . BALANCE:  Sitting Balance:  Stand By Assistance. Standing Balance: Contact Guard Assistance, Minimal Assistance. 2 LOB while standing at sink requiring min A for correction.  pt stood at sink fo x3 minutes     BED MOBILITY:  Not Tested    TRANSFERS:  Sit to Stand:  Contact Guard Assistance. impulsive   Stand to Sit: Contact Guard Assistance. impulsive     FUNCTIONAL MOBILITY:  Assistive Device: Rolling Walker  Assist Level:  Minimal Assistance. Distance: To and from bathroom and short distance in hallway   x3 LOB noted, difficulty navigating with running into surrounding objects with RW multiple times requiring max cuing for safety. Very unsteady. Activity Tolerance:  Patient tolerance of  treatment: fair. Assessment:  Assessment: Pt admitted to the hospital d/t syncope and collapse. Pt demo'ed performance deficits with requiring A for ADL tasks  and requires min A for functional mobility and transfers which effect ability to perform ADLs and IADLs. Pt displayed  decreased endurance, balance, safety awareness and ability to complete  ADL tasks and mobility at [facility]. Pt requires further skilled OT Services to improve performance in functional tasks and educate on safety awareness for more indep with ADL tasks and mobility to return  home. Performance deficits / Impairments: Decreased functional mobility ,Decreased ADL status,Decreased strength,Decreased safe awareness,Decreased cognition,Decreased coordination,Decreased endurance,Decreased balance,Decreased high-level IADLs,Decreased posture  REQUIRES OT FOLLOW-UP: Yes  Decision Making: Medium Complexity    Treatment Initiated: Treatment and education initiated within context of evaluation. Evaluation time included review of current medical information, gathering information related to past medical, social and functional history, completion of standardized testing, formal and informal observation of tasks, assessment of data and development of plan of care and goals.   Treatment time included skilled education and facilitation of tasks to increase safety and independence with ADL's for improved functional independence and quality of life.    Discharge Recommendations:  Subacute/Skilled Nursing Facility     Patient Education:     Patient Education  Education Given To: Patient  Education Provided: Role of Therapy,Plan of Care,ADL Adaptive Strategies,Transfer Training    Equipment Recommendations:  Equipment Needed:  (RW, BSC & tub transfer bench)    Plan:  Times per Week: 5x  Times per Day: Daily  Current Treatment Recommendations: Strengthening,Balance training,Functional mobility training,Endurance training,Equipment evaluation, education, & procurement,Patient/Caregiver education & training,Safety education & training,Self-Care / ADL,Home management training. See long-term goal time frame for expected duration of plan of care. If no long-term goals established, a short length of stay is anticipated. Goals:  Patient goals : return home  Short Term Goals  Time Frame for Short term goals: by discharge  Short Term Goal 1: pt will complete functional mobility with SBA and no more than 2 vc's for navigating around objects to bathroom to  increase access to ADLs  Short Term Goal 2: pt will complete BADL routine and no more than 1 vc for safety awareness with SBA to increase indep with ADLs  Short Term Goal 3: pt will complete dynamic standing x4 minutes with no LOB and unilateral release to increase ease with grooming tasks  Long Term Goals  Time Frame for Long term goals : no LTG est d/t short ELOS         Following session, patient left in safe position with all fall risk precautions in place.

## 2022-06-06 NOTE — PLAN OF CARE
Problem: Discharge Planning  Goal: Discharge to home or other facility with appropriate resources  Outcome: Progressing  Flowsheets (Taken 6/5/2022 1944)  Discharge to home or other facility with appropriate resources:   Identify barriers to discharge with patient and caregiver   Arrange for needed discharge resources and transportation as appropriate   Identify discharge learning needs (meds, wound care, etc)   Arrange for interpreters to assist at discharge as needed   Refer to discharge planning if patient needs post-hospital services based on physician order or complex needs related to functional status, cognitive ability or social support system     Problem: Skin/Tissue Integrity  Goal: Absence of new skin breakdown  Description: 1. Monitor for areas of redness and/or skin breakdown  2. Assess vascular access sites hourly  3. Every 4-6 hours minimum:  Change oxygen saturation probe site  4. Every 4-6 hours:  If on nasal continuous positive airway pressure, respiratory therapy assess nares and determine need for appliance change or resting period.   Outcome: Progressing     Problem: Safety - Adult  Goal: Free from fall injury  Outcome: Progressing  Flowsheets (Taken 6/6/2022 0244)  Free From Fall Injury:   Instruct family/caregiver on patient safety   Based on caregiver fall risk screen, instruct family/caregiver to ask for assistance with transferring infant if caregiver noted to have fall risk factors     Problem: Nutrition Deficit:  Goal: Optimize nutritional status  Outcome: Progressing     Problem: ABCDS Injury Assessment  Goal: Absence of physical injury  Outcome: Progressing  Flowsheets (Taken 6/6/2022 0244)  Absence of Physical Injury: Implement safety measures based on patient assessment

## 2022-06-07 LAB
ALBUMIN SERPL-MCNC: 3.3 G/DL (ref 3.5–5.1)
ALP BLD-CCNC: 110 U/L (ref 38–126)
ALT SERPL-CCNC: 50 U/L (ref 11–66)
ANION GAP SERPL CALCULATED.3IONS-SCNC: 10 MEQ/L (ref 8–16)
AST SERPL-CCNC: 83 U/L (ref 5–40)
BILIRUB SERPL-MCNC: 0.3 MG/DL (ref 0.3–1.2)
BILIRUBIN DIRECT: < 0.2 MG/DL (ref 0–0.3)
BUN BLDV-MCNC: < 2 MG/DL (ref 7–22)
CALCIUM SERPL-MCNC: 8.4 MG/DL (ref 8.5–10.5)
CHLORIDE BLD-SCNC: 97 MEQ/L (ref 98–111)
CO2: 25 MEQ/L (ref 23–33)
CREAT SERPL-MCNC: 0.3 MG/DL (ref 0.4–1.2)
ERYTHROCYTE [DISTWIDTH] IN BLOOD BY AUTOMATED COUNT: 26.9 % (ref 11.5–14.5)
ERYTHROCYTE [DISTWIDTH] IN BLOOD BY AUTOMATED COUNT: 82.3 FL (ref 35–45)
GFR SERPL CREATININE-BSD FRML MDRD: > 90 ML/MIN/1.73M2
GLUCOSE BLD-MCNC: 99 MG/DL (ref 70–108)
HCT VFR BLD CALC: 26.1 % (ref 37–47)
HEMOGLOBIN: 7.3 GM/DL (ref 12–16)
MCH RBC QN AUTO: 23.7 PG (ref 26–33)
MCHC RBC AUTO-ENTMCNC: 28 GM/DL (ref 32.2–35.5)
MCV RBC AUTO: 84.7 FL (ref 81–99)
PLATELET # BLD: 277 THOU/MM3 (ref 130–400)
PMV BLD AUTO: 9.7 FL (ref 9.4–12.4)
POTASSIUM SERPL-SCNC: 3.9 MEQ/L (ref 3.5–5.2)
RBC # BLD: 3.08 MILL/MM3 (ref 4.2–5.4)
SODIUM BLD-SCNC: 132 MEQ/L (ref 135–145)
TOTAL PROTEIN: 5.5 G/DL (ref 6.1–8)
WBC # BLD: 4.5 THOU/MM3 (ref 4.8–10.8)

## 2022-06-07 PROCEDURE — 6370000000 HC RX 637 (ALT 250 FOR IP)

## 2022-06-07 PROCEDURE — 82248 BILIRUBIN DIRECT: CPT

## 2022-06-07 PROCEDURE — 85027 COMPLETE CBC AUTOMATED: CPT

## 2022-06-07 PROCEDURE — 97535 SELF CARE MNGMENT TRAINING: CPT

## 2022-06-07 PROCEDURE — 6370000000 HC RX 637 (ALT 250 FOR IP): Performed by: NURSE PRACTITIONER

## 2022-06-07 PROCEDURE — 80053 COMPREHEN METABOLIC PANEL: CPT

## 2022-06-07 PROCEDURE — 6370000000 HC RX 637 (ALT 250 FOR IP): Performed by: STUDENT IN AN ORGANIZED HEALTH CARE EDUCATION/TRAINING PROGRAM

## 2022-06-07 PROCEDURE — 99232 SBSQ HOSP IP/OBS MODERATE 35: CPT | Performed by: HOSPITALIST

## 2022-06-07 PROCEDURE — 1200000003 HC TELEMETRY R&B

## 2022-06-07 PROCEDURE — 97530 THERAPEUTIC ACTIVITIES: CPT

## 2022-06-07 PROCEDURE — 36415 COLL VENOUS BLD VENIPUNCTURE: CPT

## 2022-06-07 RX ORDER — HYDROXYZINE HYDROCHLORIDE 10 MG/1
10 TABLET, FILM COATED ORAL 3 TIMES DAILY PRN
Status: DISCONTINUED | OUTPATIENT
Start: 2022-06-07 | End: 2022-06-08 | Stop reason: HOSPADM

## 2022-06-07 RX ORDER — HYDROXYZINE HYDROCHLORIDE 25 MG/1
25 TABLET, FILM COATED ORAL ONCE
Status: COMPLETED | OUTPATIENT
Start: 2022-06-07 | End: 2022-06-07

## 2022-06-07 RX ADMIN — Medication 1 TABLET: at 09:58

## 2022-06-07 RX ADMIN — Medication 100 MG: at 09:58

## 2022-06-07 RX ADMIN — FOLIC ACID 1 MG: 1 TABLET ORAL at 09:58

## 2022-06-07 RX ADMIN — HYDROXYZINE HYDROCHLORIDE 10 MG: 10 TABLET ORAL at 23:12

## 2022-06-07 RX ADMIN — PANTOPRAZOLE SODIUM 40 MG: 40 TABLET, DELAYED RELEASE ORAL at 07:27

## 2022-06-07 RX ADMIN — HYDROXYZINE HYDROCHLORIDE 25 MG: 25 TABLET, FILM COATED ORAL at 00:43

## 2022-06-07 NOTE — PROGRESS NOTES
Comprehensive Nutrition Assessment    Type and Reason for Visit:  Reassess (po, supplement start)    Nutrition Recommendations/Plan:   1. Continue MVI, folic acid, and thiamin supplementation. 2. ONS initiated: Magic Cup TID. 3. Continue current diet. Malnutrition Assessment:  Malnutrition Status: Moderate malnutrition (06/03/22 1355)    Context:  Acute Illness     Findings of the 6 clinical characteristics of malnutrition:  Energy Intake:  50% or less of estimated energy requirements for 5 or more days  Weight Loss:   (-5% in 1.5 months)     Body Fat Loss:  Mild body fat loss Orbital   Muscle Mass Loss:  No significant muscle mass loss    Fluid Accumulation:  Unable to assess     Strength:  Not Performed    Nutrition Assessment:      Pt improving from a nutritional standpoint AEB reports of tolerating current diet and improved appetite/intake. Remains at risk for further nutritional compromise r/t moderate malnutrition, admit with syncope and collapse, lactic acidosis, severe hyponatremia, lethargy, nausea/vomiting/diarrhea prior to admit, and underlying medical condition (hx chronic alcohol abuse, MS, personality disorder). Nutrition Related Findings:    Pt. Report/Treatments/Miscellaneous: Patient seen, live sitter present. Patient reports improved appetite/intake. Agreeable to ONS. GI Status: denied issues. BM 6/5/22  Pertinent Labs: 6/7/22: Sodium 132, Potassium 3.9, BUN less than 2, Creatinine 0.3, Glucose 99. Pertinent Meds: folic acid, MVI, protonix, thiamin, phenobarbital    Wound Type: None       Current Nutrition Intake & Therapies:    Average Meal Intake: 26-50%,51-75%,%     ADULT DIET; Regular;  Low Sodium (2 gm); 1500 ml  ADULT ORAL NUTRITION SUPPLEMENT; Breakfast, Lunch, Dinner; Frozen Oral Supplement    Anthropometric Measures:  Height: 5' 4\" (162.6 cm)  Ideal Body Weight (IBW): 120 lbs (55 kg)    Admission Body Weight: 101 lb 6.4 oz (46 kg) (6/3 no edema)  Current Body Weight: 104 lb 3 oz (47.3 kg) (6/6/22 no edema),       Current BMI (kg/m2): 17.9  Usual Body Weight:  (105# per pt; per EMR: 10/3/19: 104# 6 oz, 4/18/22: 106# 13 oz)                       BMI Categories: Underweight (BMI less than 18.5)    Estimated Daily Nutrient Needs:  Energy Requirements Based On: Kcal/kg  Weight Used for Energy Requirements: Other (Comment) (46 kgm)  Energy (kcal/day): 2154-8425 kcals (30-35)  Weight Used for Protein Requirements: Current (46 kgm)  Protein (g/day): 69+ grams (1.5+)     Fluid (ml/day):  1500 ml fluid restriction per MD    Nutrition Diagnosis:   · Moderate malnutrition related to altered GI function as evidenced by Criteria as identified in malnutrition assessment      Nutrition Interventions:   Food and/or Nutrient Delivery: Continue Current Diet,Start Oral Nutrition Supplement  Nutrition Education/Counseling: Education initiated (Encouraged oral intake and ONS use.)  Coordination of Nutrition Care: Continue to monitor while inpatient       Goals:  Previous Goal Met: Progressing toward Goal(s)  Goals: PO intake 75% or greater,by next RD assessment       Nutrition Monitoring and Evaluation:      Food/Nutrient Intake Outcomes: Food and Nutrient Intake,Supplement Intake  Physical Signs/Symptoms Outcomes: Biochemical Data,Chewing or Swallowing,GI Status,Fluid Status or Edema,Nutrition Focused Physical Findings,Skin,Weight    Discharge Planning:     Too soon to determine     Mary Alice Martinez RD, LD  Contact: [phone]

## 2022-06-07 NOTE — PROGRESS NOTES
[address] RENAL TELEMETRY 6K  Occupational Therapy  Daily Note  Time:   Time In: 1034  Time Out: 7025  Timed Code Treatment Minutes: 24 Minutes  Minutes: 24          Date: 2022  Patient Name: Radha Griggs,   Gender: female      Room: [facility]27/027-A  MRN: [id number]  : 1970  (46 y.o.)  Referring Practitioner: David Davis DO  Diagnosis: Syncope and collapse  Additional Pertinent Hx: Betina Beauchamp is a 47 y/o female active-smoker with PMH of alcohol abuse, multiple sclerosis, unspecified personality disorder, and chronic anemia. Patient presented to [facility] ED on 2022 for evaluation of loss of consciousness. Patient reported she was at home alone, sitting down watching TV on the couch, when she experienced a episode of loss of consciousness. In the ED, she was lethargic but answering questions appropriately. She denied fever or chest pain. She did endorse tremors, that started today. She did report recent nausea and vomiting and diarrhea for the past few days. She denied abdominal pain. She endorsed significant alcohol use chronically, but could not accurately quantify typical intake. She did endorse drinking 4 beers this morning, but none since then. Patient was significantly tachycardic on arrival, with heart rate as high as 160s. This was confirmed sinus tachycardia on EKG. She was afebrile. She was hypertensive. Admission labs were significant for sodium 115, chloride 71, CO2 14, anion gap 30.0, lactic acid 15.1, troponin <0.010, serum EtOH <0.01, urine drug screen positive for cannabinoids, WBC 13.7, hemoglobin 8.0. CT head was done in the ED, showing no acute intracranial process. CT cervical spine was also negative. CT abdomen/pelvis showed new esophageal wall thickening and edema, concerning for esophagitis. Patient was administered ativan and thiamine for alcohol withdrawal. She was started on 0.9% NS at 50 mL/h.  She was admitted to ICU for further evaluation and care. Restrictions/Precautions:  Restrictions/Precautions: General Precautions,Fall Risk  Position Activity Restriction  Other position/activity restrictions: h/o MS     SUBJECTIVE: RN approved of OT session. Pt supine in bed and agreeable to therapy. PAIN: no c/o pain    Vitals: Vitals not assessed per clinical judgement, see nursing flowsheet    COGNITION: Slow Processing, Decreased Insight, Decreased Problem Solving and Decreased Safety Awareness    ADL:   Grooming: Stand By Assistance.  hand hygiene at sink  Lower Extremity Dressing: [employer]. Adjust bilateral sock sitting EOB - CGA for safety d/t extreme flexion at trunk while seated EOB  Toileting: Stand By Assistance. For pericare and CGA for clothing management  Toilet Transfer: [employer]. To/from standard toilet - impulsive. BALANCE:  Sitting Balance:  Supervision. Standing Balance: Contact Guard Assistance. unsteady. Standing at sink x1 minute - no LOB noted     BED MOBILITY:  Supine to Sit: Stand By Assistance      TRANSFERS:  Sit to Stand:  Contact Guard Assistance. Stand to Sit: Contact Guard Assistance. *completed impulsively     FUNCTIONAL MOBILITY:  Assistive Device: Rolling Walker  Assist Level:  Contact Guard Assistance. Distance: To and from bathroom and around unit  2 LOB noted, pt frequently lifted walker off ground and carried walker with pt stating \"I want to use it as least as possible\" . OT provided education on safety and importance of using the walker d/t being unsteady and having LOB. ADDITIONAL ACTIVITIES:  OT discussed pt receiving therapy once discharge from hospital, with recommending that the pt goes to a SNF d/t unsteadiness. Pt stated that she was agreeable to the SNF  To receive therapy. Pt also stated that if a SNF doesn't occur, she is open to home health. ASSESSMENT:     Activity Tolerance:  Patient tolerance of  treatment: good. Discharge Recommendations: Subacute/skilled nursing facility  Equipment Recommendations: Equipment Needed:  (RW, BSC & tub transfer bench)  Plan: Times per Week: 5x  Times per Day: Daily  Current Treatment Recommendations: Strengthening,Balance training,Functional mobility training,Endurance training,Equipment evaluation, education, & procurement,Patient/Caregiver education & training,Safety education & training,Self-Care / ADL,Home management training    Patient Education  Patient Education: Role of OT, Plan of Care, ADL's, Assistive Device Safety and safety     Goals  Short Term Goals  Time Frame for Short term goals: by discharge  Short Term Goal 1: pt will complete functional mobility with SBA and no more than 2 vc's for navigating around objects to bathroom to  increase access to ADLs  Short Term Goal 2: pt will complete BADL routine and no more than 1 vc for safety awareness with SBA to increase indep with ADLs  Short Term Goal 3: pt will complete dynamic standing x4 minutes with no LOB and unilateral release to increase ease with grooming tasks  Long Term Goals  Time Frame for Long term goals : no LTG est d/t short ELOS    Following session, patient left in safe position with all fall risk precautions in place.

## 2022-06-07 NOTE — CARE COORDINATION
6/7/22, 9:13 AM EDT    DISCHARGE ON GOING EVALUATION    Melissa Celaya day: 4  Location: -27/027-A Reason for admit: Syncope and collapse [R55]  Lactic acidosis [E87.2]  Hyponatremia [E87.1]  Alcohol withdrawal syndrome without complication ([facility] [address] [C33.544]   Procedure:   6/3 CXR: No acute findings  6/3 CT Head: No acute process  6/3 CT Cervical Spine: No acute findings  6/3 CT Abd/pelvis:   New esophageal wall thickening and edema, question esophagitis.     New duodenal and proximal jejunal wall thickening and dilation, question   duodenitis and/or enteritis  6/6 CT Head WO Contrast 1. Mild global volume loss. 2. No acute findings. Barriers to Discharge: PT/OT, Protonix, Phenobarbital protocol for ETOH withdrawal, electrolyte replacement protocols. PCP: Chadd Lopes DO  Readmission Risk Score: 13 ( )%  Patient Goals/Plan/Treatment Preferences: Plans home with new [address] [facility].

## 2022-06-07 NOTE — PROGRESS NOTES
Utilize [facility] alcohol withdrawal scale (Based on [place] Modified Alcohol Withdrawal Scale). Tabulate score based on classifications including Tremor, Sweating, Hallucination, Orientation, and Agitation. Tremor: 0  Sweatin  Hallucinations: 0  Orientation: 0  Agitation: 0  Total Score: 0  Action perform as described below     Tremor:  No tremor is 0 points. Tremor on movement is 1 point. Tremor at rest is 2 points. Sweating: No Sweat 0 points. Moist is 1 point. Drenching sweats is 2 points. Hallucinations: No present 0 points. Dissuadable is 1 point. Not dissuadable is 2 points. Orientation: Oriented 0 points. Vague/detached 1 point. Disoriented/no contact 2 points. Agitation: Calm 0 points. Anxious 1 point. Panicky 2 points. Check scale every 2 hours. Discontinue scoring with 4 consecutive scorings of 0. Scale 0: No phenobarbital given. Re-assess every 60 minutes as needed. Scale 1-3: Phenobarbital 130 mg IV over 3 minutes. Re-assess every 60 minutes as needed. May administer every 60 minutes to a maximum dose of phenobarbital 1040 mg in 24 hours! Scale 4-8: Phenobarbital 260 mg IV over 5 minutes. Re-assess every 60 minutes as needed. May administer every 60 minutes to a maximum dose of phenobarbital 1040mg in 24 hours! Scale 9-10: Transfer to ICU (if not already in ICU). Administer 10mg/kg phenobarbital IV over 60 minutes. Maximum dose phenobarbital is 1040mg in 24 hours!

## 2022-06-07 NOTE — PLAN OF CARE
Problem: Discharge Planning  Goal: Discharge to home or other facility with appropriate resources  6/6/2022 2216 by Stephie HOUGH  Outcome: Progressing  Flowsheets (Taken 6/6/2022 2216)  Discharge to home or other facility with appropriate resources:   Identify barriers to discharge with patient and caregiver   Arrange for needed discharge resources and transportation as appropriate   Identify discharge learning needs (meds, wound care, etc)   Refer to discharge planning if patient needs post-hospital services based on physician order or complex needs related to functional status, cognitive ability or social support system  Note: Patient may have to arrange for new living arrangements upon discharge. 6/6/2022 1447 by ZACK Rodriguez  Outcome: Progressing  Flowsheets (Taken 6/6/2022 0804 by Haleigh Wray RN)  Discharge to home or other facility with appropriate resources:   Identify barriers to discharge with patient and caregiver   Arrange for needed discharge resources and transportation as appropriate   Arrange for interpreters to assist at discharge as needed     Problem: Skin/Tissue Integrity  Goal: Absence of new skin breakdown  Description: 1. Monitor for areas of redness and/or skin breakdown  2. Assess vascular access sites hourly  3. Every 4-6 hours minimum:  Change oxygen saturation probe site  4. Every 4-6 hours:  If on nasal continuous positive airway pressure, respiratory therapy assess nares and determine need for appliance change or resting period.   Outcome: Progressing  Note: No new signs or symptoms of skin breakdown noted this shift, encouraging patient to turn and reposition self in bed q2h       Problem: Safety - Adult  Goal: Free from fall injury  Outcome: Progressing  Flowsheets (Taken 6/6/2022 2216)  Free From Fall Injury:   Instruct family/caregiver on patient safety   Based on caregiver fall risk screen, instruct family/caregiver to ask for assistance with transferring infant if caregiver noted to have fall risk factors     Problem: Nutrition Deficit:  Goal: Optimize nutritional status  Outcome: Progressing  Flowsheets (Taken 6/6/2022 2216)  Nutrient intake appropriate for improving, restoring, or maintaining nutritional needs:   Assess nutritional status and recommend course of action   Monitor oral intake, labs, and treatment plans   Recommend appropriate diets, oral nutritional supplements, and vitamin/mineral supplements     Problem: ABCDS Injury Assessment  Goal: Absence of physical injury  Outcome: Progressing  Flowsheets (Taken 6/6/2022 2216)  Absence of Physical Injury: Implement safety measures based on patient assessment     Problem: Pain  Goal: Verbalizes/displays adequate comfort level or baseline comfort level  Outcome: Progressing  Flowsheets (Taken 6/6/2022 2216)  Verbalizes/displays adequate comfort level or baseline comfort level:   Encourage patient to monitor pain and request assistance   Assess pain using appropriate pain scale   Care plan reviewed with patient. Patient verbalize understanding of the plan of care and contribute to goal setting.

## 2022-06-07 NOTE — CARE COORDINATION
6/7/22, 1:41 PM EDT    DISCHARGE PLANNING EVALUATION    SW spoke with Patient regarding recommendation for ECF for rehab. Patient refused ECF and stated that she would go to outpatient therapy or have therapy at home but would not stay somewhere and do therapy.

## 2022-06-08 VITALS
DIASTOLIC BLOOD PRESSURE: 71 MMHG | HEIGHT: 64 IN | OXYGEN SATURATION: 100 % | TEMPERATURE: 98.8 F | SYSTOLIC BLOOD PRESSURE: 118 MMHG | RESPIRATION RATE: 16 BRPM | HEART RATE: 79 BPM | BODY MASS INDEX: 17.54 KG/M2 | WEIGHT: 102.73 LBS

## 2022-06-08 LAB
ALBUMIN SERPL-MCNC: 3.1 G/DL (ref 3.5–5.1)
ALP BLD-CCNC: 108 U/L (ref 38–126)
ALT SERPL-CCNC: 47 U/L (ref 11–66)
ANION GAP SERPL CALCULATED.3IONS-SCNC: 8 MEQ/L (ref 8–16)
AST SERPL-CCNC: 73 U/L (ref 5–40)
BILIRUB SERPL-MCNC: 0.2 MG/DL (ref 0.3–1.2)
BILIRUBIN DIRECT: < 0.2 MG/DL (ref 0–0.3)
BLOOD CULTURE, ROUTINE: NORMAL
BLOOD CULTURE, ROUTINE: NORMAL
BUN BLDV-MCNC: < 2 MG/DL (ref 7–22)
CALCIUM SERPL-MCNC: 8.1 MG/DL (ref 8.5–10.5)
CHLORIDE BLD-SCNC: 100 MEQ/L (ref 98–111)
CO2: 24 MEQ/L (ref 23–33)
CREAT SERPL-MCNC: 0.3 MG/DL (ref 0.4–1.2)
ERYTHROCYTE [DISTWIDTH] IN BLOOD BY AUTOMATED COUNT: 27 % (ref 11.5–14.5)
ERYTHROCYTE [DISTWIDTH] IN BLOOD BY AUTOMATED COUNT: 83.3 FL (ref 35–45)
GFR SERPL CREATININE-BSD FRML MDRD: > 90 ML/MIN/1.73M2
GLUCOSE BLD-MCNC: 86 MG/DL (ref 70–108)
HCT VFR BLD CALC: 25.1 % (ref 37–47)
HCT VFR BLD CALC: 29.6 % (ref 37–47)
HEMOGLOBIN: 7 GM/DL (ref 12–16)
HEMOGLOBIN: 8.4 GM/DL (ref 12–16)
MCH RBC QN AUTO: 24 PG (ref 26–33)
MCHC RBC AUTO-ENTMCNC: 27.9 GM/DL (ref 32.2–35.5)
MCV RBC AUTO: 86 FL (ref 81–99)
PLATELET # BLD: 265 THOU/MM3 (ref 130–400)
PMV BLD AUTO: 10 FL (ref 9.4–12.4)
POTASSIUM SERPL-SCNC: 3.7 MEQ/L (ref 3.5–5.2)
RBC # BLD: 2.92 MILL/MM3 (ref 4.2–5.4)
SODIUM BLD-SCNC: 132 MEQ/L (ref 135–145)
TOTAL PROTEIN: 5.2 G/DL (ref 6.1–8)
WBC # BLD: 5.5 THOU/MM3 (ref 4.8–10.8)

## 2022-06-08 PROCEDURE — 80053 COMPREHEN METABOLIC PANEL: CPT

## 2022-06-08 PROCEDURE — 6370000000 HC RX 637 (ALT 250 FOR IP): Performed by: NURSE PRACTITIONER

## 2022-06-08 PROCEDURE — 36415 COLL VENOUS BLD VENIPUNCTURE: CPT

## 2022-06-08 PROCEDURE — 82248 BILIRUBIN DIRECT: CPT

## 2022-06-08 PROCEDURE — 99239 HOSP IP/OBS DSCHRG MGMT >30: CPT | Performed by: HOSPITALIST

## 2022-06-08 PROCEDURE — 85014 HEMATOCRIT: CPT

## 2022-06-08 PROCEDURE — 85027 COMPLETE CBC AUTOMATED: CPT

## 2022-06-08 PROCEDURE — 85018 HEMOGLOBIN: CPT

## 2022-06-08 PROCEDURE — 6370000000 HC RX 637 (ALT 250 FOR IP): Performed by: STUDENT IN AN ORGANIZED HEALTH CARE EDUCATION/TRAINING PROGRAM

## 2022-06-08 RX ORDER — LANOLIN ALCOHOL/MO/W.PET/CERES
100 CREAM (GRAM) TOPICAL DAILY
Qty: 30 TABLET | Refills: 3 | Status: SHIPPED | OUTPATIENT
Start: 2022-06-08

## 2022-06-08 RX ORDER — FOLIC ACID 1 MG/1
1 TABLET ORAL DAILY
Qty: 30 TABLET | Refills: 3 | Status: SHIPPED | OUTPATIENT
Start: 2022-06-08

## 2022-06-08 RX ORDER — PANTOPRAZOLE SODIUM 40 MG/1
40 TABLET, DELAYED RELEASE ORAL
Qty: 30 TABLET | Refills: 3 | Status: SHIPPED | OUTPATIENT
Start: 2022-06-08

## 2022-06-08 RX ORDER — MULTIVITAMIN WITH IRON
1 TABLET ORAL DAILY
Qty: 30 TABLET | Refills: 0 | Status: SHIPPED | OUTPATIENT
Start: 2022-06-08 | End: 2022-07-13 | Stop reason: SDUPTHER

## 2022-06-08 RX ADMIN — PANTOPRAZOLE SODIUM 40 MG: 40 TABLET, DELAYED RELEASE ORAL at 06:49

## 2022-06-08 RX ADMIN — FOLIC ACID 1 MG: 1 TABLET ORAL at 07:42

## 2022-06-08 RX ADMIN — Medication 1 TABLET: at 07:42

## 2022-06-08 RX ADMIN — Medication 100 MG: at 07:42

## 2022-06-08 ASSESSMENT — PAIN SCALES - GENERAL: PAINLEVEL_OUTOF10: 0

## 2022-06-08 NOTE — PLAN OF CARE
Problem: Discharge Planning  Goal: Discharge to home or other facility with appropriate resources  Outcome: Progressing  Flowsheets (Taken 6/7/2022 2309)  Discharge to home or other facility with appropriate resources: Identify barriers to discharge with patient and caregiver  Note: Patient medically stable to discharge today to home with parents but decided today to go to rehab. Awaiting placement. Problem: Skin/Tissue Integrity  Goal: Absence of new skin breakdown  Description: 1. Monitor for areas of redness and/or skin breakdown  2. Assess vascular access sites hourly  3. Every 4-6 hours minimum:  Change oxygen saturation probe site  4. Every 4-6 hours:  If on nasal continuous positive airway pressure, respiratory therapy assess nares and determine need for appliance change or resting period. Outcome: Progressing  Note: No new signs or symptoms of skin breakdown noted this shift, encouraging patient to turn and reposition self in bed q2h       Problem: Safety - Adult  Goal: Free from fall injury  Outcome: Progressing  Flowsheets (Taken 6/7/2022 2311)  Free From Fall Injury:   Instruct family/caregiver on patient safety   Based on caregiver fall risk screen, instruct family/caregiver to ask for assistance with transferring infant if caregiver noted to have fall risk factors  Note: No falls noted this shift. Continue falling star program. Bed alarm on, bed in low position. Call light and personal belongings in reach. Patient uses call light appropriately. Human sitter at bedside.         Problem: Nutrition Deficit:  Goal: Optimize nutritional status  Outcome: Progressing  Flowsheets (Taken 6/7/2022 2311)  Nutrient intake appropriate for improving, restoring, or maintaining nutritional needs:   Assess nutritional status and recommend course of action   Monitor oral intake, labs, and treatment plans   Recommend appropriate diets, oral nutritional supplements, and vitamin/mineral supplements     Problem: ABCDS Injury Assessment  Goal: Absence of physical injury  Outcome: Progressing  Flowsheets (Taken 6/7/2022 2311)  Absence of Physical Injury: Implement safety measures based on patient assessment  Note: Patient is absent of physical injury at this time. Will continue to monitor with human sitter at bedside. Problem: Pain  Goal: Verbalizes/displays adequate comfort level or baseline comfort level  Outcome: Progressing  Flowsheets (Taken 6/7/2022 2311)  Verbalizes/displays adequate comfort level or baseline comfort level:   Encourage patient to monitor pain and request assistance   Assess pain using appropriate pain scale  Note: Patient free from pain this shift. Pain rated on 0-10 pain rating scale. Will continue to reassess. Care plan reviewed with patient. Patient verbalize understanding of the plan of care and contribute to goal setting.

## 2022-06-08 NOTE — CARE COORDINATION
6/8/22, 8:21 AM EDT    DISCHARGE ON GOING EVALUATION    Rigoberto Gomez day: 5  Location: -27/027-A Reason for admit: Syncope and collapse [R55]  Lactic acidosis [E87.2]  Hyponatremia [E87.1]  Alcohol withdrawal syndrome without complication ([facility] [address] [M14.698]   Procedure:   6/3 CXR: No acute findings  6/3 CT Head: No acute process  6/3 CT Cervical Spine: No acute findings  6/3 CT Abd/pelvis:   New esophageal wall thickening and edema, question esophagitis.     New duodenal and proximal jejunal wall thickening and dilation, question   duodenitis and/or enteritis  6/6 CT Head WO Contrast 1. Mild global volume loss. 2. No acute findings  Barriers to Discharge: PT/OT, Dietician consult for nutrition supplementation. Hgb 8.4, Na 132. Protonix, Phenobarbital protocol for ETOH withdrawal, electrolyte replacement protocols. PCP: Keiry Matters, DO  Readmission Risk Score: 13.8 ( )%  Patient Goals/Plan/Treatment Preferences:  Plans home with [facility]. SW following.

## 2022-06-08 NOTE — PROGRESS NOTES
Discharge teaching and instructions for diagnosis/procedure of Hyponatremia completed with patient using teachback method. AVS reviewed. Prescriptions given to patient. Patient voiced understanding regarding prescriptions, follow up appointments, and care of self at home.  Discharged in a wheelchair to  home with support per self

## 2022-06-08 NOTE — CARE COORDINATION
6/8/22, 11:27 AM EDT    Patient goals/plan/ treatment preferences discussed by  and . Patient goals/plan/ treatment preferences reviewed with patient/ family. Patient/ family verbalize understanding of discharge plan and are in agreement with goal/plan/treatment preferences. Understanding was demonstrated using the teach back method. AVS provided by RN at time of discharge, which includes all necessary medical information pertaining to the patients current course of illness, treatment, post-discharge goals of care, and treatment preferences. Services At/After Discharge: [facility] discharged home today with parents. Pt had refused ECF, see SW note dated 6/7/22. Pt requested home with [facility]. Referral completed with [place] (RN/PT/OT/Aide).

## 2022-06-08 NOTE — DISCHARGE SUMMARY
Hospital Medicine Discharge Summary    Patient Identification:   Dorota Seymour   : 1970  MRN: [id number]   Account: [de-identified]      Patient's PCP: Allegra Serrano DO    Admit Date: 2022     Discharge Date: 22    Admitting Physician: Celio Rojo MD     Discharge Physician: Rosey Arndt DO     Discharge Diagnoses:    1. Severe Hyponatremia: Due to chronic alcohol abuse and poor PO intake. Na 115 on admit. Improved with oral intake and IVF hydration. Follow-up with PCP in one week. BMP, CBC in one week. 2. Acute on Chronic Anemia: Iron, B12, and Folate are all normal on 6/3/22. Hgb was 6.5 on , repeat 7.0. Patient refused blood transfusions. Although iron levels are normal, given IV Venofer x1. Repeat Hgb 8.2. No evidence of bleeding at this time. Hgb remains stable ~7-8.   3. Physical Deconditioning: Due to history of chronic alcohol abuse, malnutrition, and multiple sclerosis. Has bilateral lower extremity weakness. PT/OT recommends continued physical therapy and ECF. DME order placed for bedside commode and tub transfer bench due to deconditioning. 4. Bacteruria: Noted on U/A on . Patient is asymptomatic. Urine culture reveals E. Coli and Enterobacter cloacae complex. On Rocephin x3 days. 5. Polysubstance Abuse: Smokes marijuana, cigarettes, and chronic alcohol abuse. Patient does not want to quit at this time. Discussed cessation and provided resources. Addiction Services consulted. Continue Phenobarbital, Folate, Thiamine, and Multivitamin. 6. Moderate Protein Calorie Malnutrition, POA: BMI 17.40. Appears thin and malnourished with poor intake. Dietician following. 7. Esophagitis: Noted on CT. Continue Protonix. 8. HAGMA, resolved  9. Metabolic Encephalopathy, resolved    The patient was seen and examined on day of discharge and this discharge summary is in conjunction with any daily progress note from day of discharge.     Hospital Course:   Per prior HPI, \"Patient presented to [facility] ED on 6/2/2022 for evaluation of loss of consciousness. Suzi Soliz reported she was at home alone, sitting down watching TV on the couch, when she experienced a episode of loss of consciousness.  In the ED, she was lethargic but answering questions appropriately.  She denied fever or chest pain.  She did endorse tremors, that started today. Angela Berrios did report recent nausea and vomiting and diarrhea for the past few days. Angela Berrios denied abdominal pain.  She endorsed significant alcohol use chronically, but could not accurately quantify typical intake.  She did endorse drinking 4 beers this morning, but none since then.  Patient was significantly tachycardic on arrival, with heart rate as high as 160s.  This was confirmed sinus tachycardia on EKG.  She was afebrile.  She was hypertensive.  Admission labs were significant for sodium 115, chloride 71, CO2 14, anion gap 30.0, lactic acid 15.1, troponin <0.010, serum EtOH <0.01, urine drug screen positive for cannabinoids, WBC 13.7, hemoglobin 8.0.  CT head was done in the ED, showing no acute intracranial process.  CT cervical spine was also negative.  CT abdomen/pelvis showed new esophageal wall thickening and edema, concerning for esophagitis. Patient was administered ativan and thiamine for alcohol withdrawal. She was started on 0.9% NS at 50 mL/h. She was admitted to ICU for further evaluation and care. \"      She was transferred to stepdown on 6/4/22. She did try to leave AMA, however, she was too weak to ambulate on her own and family will not pick her up. Her Hgb was noted to be 6.5 on 6/4, however, patient refused blood transfusions. She was given IV Venofer x1. Hgb has improved to 8.2. Her Hgb has been stable around ~7-8. Her sodium improved after improving her oral intake.  She received PT/OT who recommended continued physical therapy, thus home health was set up for her with DME orders placed for bedside commode and tub transfer bench due to her physical deconditioning. Advised her to continue abstaining from alcohol use and improve her nutrition. Follow-up with her PCP in a week. She is clinically stable for discharge. All questions and concerns addressed. Exam:     Vitals:  Vitals:    06/07/22 2101 06/07/22 2258 06/08/22 0330 06/08/22 0730   BP: 122/68 (!) 141/76 139/76 118/71   Pulse: 92 91 89 79   Resp: 16 16 16 16   Temp: 98.3 °F (36.8 °C) 98.4 °F (36.9 °C) 98.2 °F (36.8 °C) 98.8 °F (37.1 °C)   TempSrc: Oral Oral Oral Oral   SpO2: 100% 100% 98% 100%   Weight:   102 lb 11.8 oz (46.6 kg)    Height:         Weight: Weight: 102 lb 11.8 oz (46.6 kg)     24 hour intake/output:    Intake/Output Summary (Last 24 hours) at 6/8/2022 1523  Last data filed at 6/8/2022 3024  Gross per 24 hour   Intake 480 ml   Output 0 ml   Net 480 ml     General appearance:  No apparent distress, appears stated age and cooperative. HEENT:  Normal cephalic, atraumatic without obvious deformity. Pupils equal, round, and reactive to light. Extra ocular muscles intact. Conjunctivae/corneas clear. Neck: Supple, with full range of motion. No jugular venous distention. Trachea midline. Respiratory:  Normal respiratory effort. Clear to auscultation, bilaterally without Rales/Wheezes/Rhonchi. Cardiovascular:  Regular rate and rhythm with normal S1/S2 without murmurs, rubs or gallops. Abdomen: Soft, non-tender, non-distended with normal bowel sounds. Musculoskeletal:  No clubbing, cyanosis or edema bilaterally. Full range of motion without deformity. Skin: Skin color, texture, turgor normal.  No rashes or lesions. Neurologic:  Neurovascularly intact without any focal sensory/motor deficits. Cranial nerves: II-XII intact, grossly non-focal.  Psychiatric:  Alert and oriented, thought content appropriate, normal insight  Capillary Refill: Brisk,< 3 seconds   Peripheral Pulses: +2 palpable, equal bilaterally     Labs:  For convenience and continuity at follow-up the following most recent labs are provided:    CBC:    Lab Results   Component Value Date    WBC 5.5 06/08/2022    HGB 8.4 06/08/2022    HCT 29.6 06/08/2022     06/08/2022     Renal:    Lab Results   Component Value Date     06/08/2022    K 3.7 06/08/2022    K 3.7 06/02/2022     06/08/2022    CO2 24 06/08/2022    BUN <2 06/08/2022    CREATININE 0.3 06/08/2022    CALCIUM 8.1 06/08/2022    PHOS 2.7 06/03/2022     Significant Diagnostic Studies    Radiology:   CT HEAD WO CONTRAST   Final Result       1. Mild global volume loss. 2. No acute findings. **This report has been created using voice recognition software. It may contain minor errors which are inherent in voice recognition technology. **      Final report electronically signed by Dr. Radha Thrasher on 6/6/2022 2:20 PM      CT Cervical Spine WO Contrast   Final Result   Impression:      No acute processes on this limited study with motion. Consider repeat study if there is persistent concern. This document has been electronically signed by: Liudmila Ashraf MD on    06/03/2022 02:55 AM      All CTs at this facility use dose modulation techniques and iterative    reconstructions, and/or weight-based dosing   when appropriate to reduce radiation to a low as reasonably achievable. CT Head WO Contrast   Final Result   Impression:      No acute intracranial process      This document has been electronically signed by: Liudmila Ashraf MD on    06/03/2022 03:02 AM      All CTs at this facility use dose modulation techniques and iterative    reconstructions, and/or weight-based dosing   when appropriate to reduce radiation to a low as reasonably achievable. CT ABDOMEN PELVIS W IV CONTRAST Additional Contrast? None   Final Result   Impression:      New esophageal wall thickening and edema, question esophagitis. New duodenal and proximal jejunal wall thickening and dilation, question    duodenitis and/or enteritis. Additional findings discussed above      This document has been electronically signed by: Ni Hawkins MD on    06/03/2022 03:16 AM      All CTs at this facility use dose modulation techniques and iterative    reconstructions, and/or weight-based dosing   when appropriate to reduce radiation to a low as reasonably achievable. XR CHEST PORTABLE   Final Result   Impression:      No acute processes. This document has been electronically signed by: Ni Hawkins MD on    06/03/2022 12:56 AM        Consults:     [address]  IP CONSULT TO SOCIAL WORK  IP CONSULT TO HOME CARE NEEDS  IP CONSULT TO SOCIAL WORK    Disposition: Home  Condition at Discharge: Stable    Code Status:  Prior     Patient Instructions:    Discharge lab work: CBC, BMP x1 week  Activity: activity as tolerated  Diet: No diet orders on file      Follow-up visits:    [facility]  [address] [address]  [phone]    On 6/13/2022  Your Appt is at 1:20 PM, Please bring insurance card, Please arrive 15 minutes before     Discharge Medications:        Medication List      START taking these medications    folic acid 1 MG tablet  Commonly known as: FOLVITE  Take 1 tablet by mouth daily     multivitamin Tabs tablet  Take 1 tablet by mouth daily     pantoprazole 40 MG tablet  Commonly known as: PROTONIX  Take 1 tablet by mouth every morning (before breakfast)     thiamine 100 mg tablet  Take 1 tablet by mouth daily        CONTINUE taking these medications    gabapentin 300 MG capsule  Commonly known as: NEURONTIN  Take 1 capsule by mouth 3 times daily for 30 days.            Where to Get Your Medications      These medications were sent to [address], [address] [facility]  [address], [address] [facility] [address]    Phone: [phone]   · folic acid 1 MG tablet  · multivitamin Tabs tablet  · pantoprazole 40 MG tablet  · thiamine 100 mg tablet       Time Spent on discharge is more than 35 minutes in the examination, evaluation, counseling and review of medications and discharge plan. Signed: Thank you Ame Hopper DO for the opportunity to be involved in this patient's care.     Electronically signed by Keira Leach DO on 6/8/2022 at 3:23 PM

## 2022-06-09 ENCOUNTER — TELEPHONE (OUTPATIENT)
Dept: FAMILY MEDICINE CLINIC | Age: 52
End: 2022-06-09

## 2022-06-09 NOTE — TELEPHONE ENCOUNTER
[facility] Transitions Initial Follow Up Call    Outreach made within 2 business days of discharge: Yes    Patient: Agustín Ramirez Patient : 1970   MRN: [id number]  Reason for Admission: There are no discharge diagnoses documented for the most recent discharge.   Discharge Date: 22       Spoke with: JOHN HIPAA COMPLIANT    Discharge department/facility: [facility]        Scheduled appointment with PCP within 7-14 days    Follow Up  Future Appointments   Date Time Provider Marilynn Samson   2022  1:20 PM Merry Lopez MD [facility]   2022 10:00 AM DO BREANNA Diehl [facility] [place], [address]

## 2022-06-10 NOTE — TELEPHONE ENCOUNTER
[facility] Transitions Initial Follow Up Call    Outreach made within 2 business days of discharge: Yes    Patient: Sushila Dorado Patient : 1970   MRN: [id number]  Reason for Admission: There are no discharge diagnoses documented for the most recent discharge.   Discharge Date: 22       Spoke with: JOHN HIPAA COMPLIANT x2    Discharge department/facility: [facility]        Scheduled appointment with PCP within 7-14 days    Follow Up  Future Appointments   Date Time Provider Marilynn Samson   2022  1:20 PM Millicent Mayers MD [facility]   2022 10:00 AM DO BREANNA Mills [facility] [place], [address] [place] [address]

## 2022-06-13 ENCOUNTER — HOSPITAL ENCOUNTER (OUTPATIENT)
Dept: GENERAL RADIOLOGY | Age: 52
Discharge: HOME | End: 2022-06-13
Payer: COMMERCIAL

## 2022-06-13 ENCOUNTER — NURSE ONLY (OUTPATIENT)
Dept: LAB | Age: 52
End: 2022-06-13

## 2022-06-13 ENCOUNTER — OFFICE VISIT (OUTPATIENT)
Dept: FAMILY MEDICINE CLINIC | Age: 52
End: 2022-06-13
Payer: COMMERCIAL

## 2022-06-13 ENCOUNTER — HOSPITAL ENCOUNTER (OUTPATIENT)
Age: 52
Discharge: HOME | End: 2022-06-13
Payer: COMMERCIAL

## 2022-06-13 VITALS
BODY MASS INDEX: 17.75 KG/M2 | SYSTOLIC BLOOD PRESSURE: 122 MMHG | DIASTOLIC BLOOD PRESSURE: 76 MMHG | HEART RATE: 87 BPM | TEMPERATURE: 97.9 F | HEIGHT: 64 IN | WEIGHT: 104 LBS

## 2022-06-13 DIAGNOSIS — E87.1 HYPONATREMIA: ICD-10-CM

## 2022-06-13 DIAGNOSIS — Z09 HOSPITAL DISCHARGE FOLLOW-UP: ICD-10-CM

## 2022-06-13 DIAGNOSIS — M79.672 LEFT FOOT PAIN: ICD-10-CM

## 2022-06-13 DIAGNOSIS — D64.9 ANEMIA, UNSPECIFIED TYPE: ICD-10-CM

## 2022-06-13 DIAGNOSIS — F19.10 POLYSUBSTANCE ABUSE (HCC): ICD-10-CM

## 2022-06-13 DIAGNOSIS — E87.1 HYPONATREMIA: Primary | ICD-10-CM

## 2022-06-13 LAB
ALBUMIN SERPL-MCNC: 4 G/DL (ref 3.5–5.1)
ALP BLD-CCNC: 100 U/L (ref 38–126)
ALT SERPL-CCNC: 32 U/L (ref 11–66)
ANION GAP SERPL CALCULATED.3IONS-SCNC: 10 MEQ/L (ref 8–16)
ANISOCYTOSIS: PRESENT
AST SERPL-CCNC: 50 U/L (ref 5–40)
BASOPHILS # BLD: 2.4 %
BASOPHILS ABSOLUTE: 0.2 THOU/MM3 (ref 0–0.1)
BILIRUB SERPL-MCNC: 0.2 MG/DL (ref 0.3–1.2)
BUN BLDV-MCNC: 9 MG/DL (ref 7–22)
CALCIUM SERPL-MCNC: 9.3 MG/DL (ref 8.5–10.5)
CHLORIDE BLD-SCNC: 101 MEQ/L (ref 98–111)
CO2: 26 MEQ/L (ref 23–33)
CREAT SERPL-MCNC: 0.6 MG/DL (ref 0.4–1.2)
EOSINOPHIL # BLD: 2.4 %
EOSINOPHILS ABSOLUTE: 0.2 THOU/MM3 (ref 0–0.4)
ERYTHROCYTE [DISTWIDTH] IN BLOOD BY AUTOMATED COUNT: 27.7 % (ref 11.5–14.5)
ERYTHROCYTE [DISTWIDTH] IN BLOOD BY AUTOMATED COUNT: 89 FL (ref 35–45)
GFR SERPL CREATININE-BSD FRML MDRD: > 90 ML/MIN/1.73M2
GLUCOSE BLD-MCNC: 93 MG/DL (ref 70–108)
HCT VFR BLD CALC: 28.8 % (ref 37–47)
HEMOGLOBIN: 7.8 GM/DL (ref 12–16)
HYPOCHROMIA: PRESENT
IMMATURE GRANS (ABS): 0.03 THOU/MM3 (ref 0–0.07)
IMMATURE GRANULOCYTES: 0.4 %
LYMPHOCYTES # BLD: 29.8 %
LYMPHOCYTES ABSOLUTE: 2.4 THOU/MM3 (ref 1–4.8)
MCH RBC QN AUTO: 24.8 PG (ref 26–33)
MCHC RBC AUTO-ENTMCNC: 27.1 GM/DL (ref 32.2–35.5)
MCV RBC AUTO: 91.4 FL (ref 81–99)
MONOCYTES # BLD: 15.1 %
MONOCYTES ABSOLUTE: 1.2 THOU/MM3 (ref 0.4–1.3)
NUCLEATED RED BLOOD CELLS: 0 /100 WBC
PLATELET # BLD: 457 THOU/MM3 (ref 130–400)
PLATELET ESTIMATE: ABNORMAL
PMV BLD AUTO: 9.9 FL (ref 9.4–12.4)
POIKILOCYTES: ABNORMAL
POTASSIUM SERPL-SCNC: 4.5 MEQ/L (ref 3.5–5.2)
RBC # BLD: 3.15 MILL/MM3 (ref 4.2–5.4)
SCAN OF BLOOD SMEAR: NORMAL
SEG NEUTROPHILS: 49.9 %
SEGMENTED NEUTROPHILS ABSOLUTE COUNT: 4 THOU/MM3 (ref 1.8–7.7)
SODIUM BLD-SCNC: 137 MEQ/L (ref 135–145)
TOTAL PROTEIN: 6.8 G/DL (ref 6.1–8)
WBC # BLD: 8.1 THOU/MM3 (ref 4.8–10.8)

## 2022-06-13 PROCEDURE — 1111F DSCHRG MED/CURRENT MED MERGE: CPT | Performed by: STUDENT IN AN ORGANIZED HEALTH CARE EDUCATION/TRAINING PROGRAM

## 2022-06-13 PROCEDURE — 73630 X-RAY EXAM OF FOOT: CPT

## 2022-06-13 PROCEDURE — 99214 OFFICE O/P EST MOD 30 MIN: CPT | Performed by: STUDENT IN AN ORGANIZED HEALTH CARE EDUCATION/TRAINING PROGRAM

## 2022-06-13 ASSESSMENT — PATIENT HEALTH QUESTIONNAIRE - PHQ9
3. TROUBLE FALLING OR STAYING ASLEEP: 0
4. FEELING TIRED OR HAVING LITTLE ENERGY: 0
8. MOVING OR SPEAKING SO SLOWLY THAT OTHER PEOPLE COULD HAVE NOTICED. OR THE OPPOSITE, BEING SO FIGETY OR RESTLESS THAT YOU HAVE BEEN MOVING AROUND A LOT MORE THAN USUAL: 0
SUM OF ALL RESPONSES TO PHQ QUESTIONS 1-9: 0
2. FEELING DOWN, DEPRESSED OR HOPELESS: 0
7. TROUBLE CONCENTRATING ON THINGS, SUCH AS READING THE NEWSPAPER OR WATCHING TELEVISION: 0
SUM OF ALL RESPONSES TO PHQ QUESTIONS 1-9: 0
10. IF YOU CHECKED OFF ANY PROBLEMS, HOW DIFFICULT HAVE THESE PROBLEMS MADE IT FOR YOU TO DO YOUR WORK, TAKE CARE OF THINGS AT HOME, OR GET ALONG WITH OTHER PEOPLE: 0
SUM OF ALL RESPONSES TO PHQ QUESTIONS 1-9: 0
SUM OF ALL RESPONSES TO PHQ QUESTIONS 1-9: 0
1. LITTLE INTEREST OR PLEASURE IN DOING THINGS: 0
9. THOUGHTS THAT YOU WOULD BE BETTER OFF DEAD, OR OF HURTING YOURSELF: 0
SUM OF ALL RESPONSES TO PHQ9 QUESTIONS 1 & 2: 0
6. FEELING BAD ABOUT YOURSELF - OR THAT YOU ARE A FAILURE OR HAVE LET YOURSELF OR YOUR FAMILY DOWN: 0
5. POOR APPETITE OR OVEREATING: 0

## 2022-06-13 NOTE — PROGRESS NOTES
S: 46 y.o. female with   Chief Complaint   Patient presents with   [address] from Hospital     Patient was discharged from [address] [place] [address] on 6/8/2022 for low sodium.  Edema     Patient has been experiencing left foot/ankle swelling since 6/12/2022. HPI: please see resident note for HPI and ROS. BP Readings from Last 3 Encounters:   06/13/22 122/76   06/08/22 118/71   04/19/22 126/84     Wt Readings from Last 3 Encounters:   06/13/22 104 lb (47.2 kg)   06/08/22 102 lb 11.8 oz (46.6 kg)   04/19/22 104 lb (47.2 kg)       O: VS:  height is 5' 4\" (1.626 m) and weight is 104 lb (47.2 kg). Her temporal temperature is 97.9 °F (36.6 °C). Her blood pressure is 122/76 and her pulse is 87. AAO/NAD, appropriate affect for mood  CV:  RRR, no murmur  Resp: CTAB  Left great toe with ecchymosis over interphalangeal joint and MTP. Pain with manipulation. No osseous deformity noted     Diagnosis Orders   1. Hyponatremia  Comprehensive Metabolic Panel    CO DISCHARGE MEDS RECONCILED W/ CURRENT OUTPATIENT MED LIST   2. Anemia, unspecified type  CBC with Auto Differential    CO DISCHARGE MEDS RECONCILED W/ CURRENT OUTPATIENT MED LIST   3. Polysubstance abuse ([address]  CO DISCHARGE MEDS RECONCILED W/ CURRENT OUTPATIENT MED LIST   4. Left foot pain  XR FOOT LEFT (MIN 3 VIEWS)    CO DISCHARGE MEDS RECONCILED W/ CURRENT OUTPATIENT MED LIST   5. Hospital discharge follow-up  CO DISCHARGE MEDS RECONCILED W/ CURRENT OUTPATIENT MED LIST       Plan:  Please refer to resident note for full plan. 59-year-old female presents to the office for hospital follow-up. Patient was hospitalized from 6/2/2022 through 6/8/2022 for hyponatremia secondary to alcohol and polysubstance abuse with anemia. Patient was found to have severe dehydration secondary to not eating or drinking anything for days before hospital admission. Had a sodium of 117 initially. This was thought to be due to excess alcohol intake.   Patient states since being in the

## 2022-06-13 NOTE — PROGRESS NOTES
Post-Discharge Transitional Care  Follow Up      Evelyn Ferreira   YOB: 1970    Date of Office Visit:  6/13/2022  Date of Hospital Admission: 6/2/22  Date of Hospital Discharge: 6/8/22  Risk of hospital readmission (high >=14%. Medium >=10%) :Readmission Risk Score: 13.2 ( )      Care management risk score Rising risk (score 2-5) and Complex Care (Scores >=6): 5     Non face to face  following discharge, date last encounter closed (first attempt may have been earlier): 6/10/2022 10:17 AM    Call initiated 2 business days of discharge: Yes    ASSESSMENT/PLAN:   Hyponatremia  -     Comprehensive Metabolic Panel; Future  -     WI DISCHARGE MEDS RECONCILED W/ CURRENT OUTPATIENT MED LIST  Anemia, unspecified type  -     CBC with Auto Differential; Future  -     WI DISCHARGE MEDS RECONCILED W/ CURRENT OUTPATIENT MED LIST  Polysubstance abuse (HCC)  -     WI DISCHARGE MEDS RECONCILED W/ CURRENT OUTPATIENT MED LIST  Left foot pain  -     XR FOOT LEFT (MIN 3 VIEWS); Future  -     [address] [place] [address] discharge follow-up  -     WI DISCHARGE MEDS RECONCILED W/ CURRENT OUTPATIENT MED LIST    · Hyponatremia- improved during hospital stay, likely 2/2 alcohol use and poor po intake. Eating well currently. Will recheck labs  · Anemia- chronic. Unclear etiology. Will recheck. · Polysubstance abuse- patient states she has stopped drinking alcohol. States she told all her friends to help hold her accountable. Still smoking. · L foot pain- new problem. States she accidentally kicked concrete. Will check xray. Concerns for toe fractures vs bruise. Encouraged ice, elevation, OTC pain control. Medical Decision Making: moderate complexity  Return in about 4 weeks (around 7/11/2022) for Follow-up chronic diseases.     On this date 6/13/2022 I have spent 25 minutes reviewing previous notes, test results and face to face with the patient discussing the diagnosis and importance of compliance with the treatment plan as well as documenting on the day of the visit. Subjective:   HPI:  Follow up of Hospital problems/diagnosis(es):   1. Severe Hyponatremia: Due to chronic alcohol abuse and poor PO intake. Na 115 on admit. Improved with oral intake and IVF hydration. Follow-up with PCP in one week. BMP, CBC in one week. 2. Acute on Chronic Anemia: Iron, B12, and Folate are all normal on 6/3/22. Hgb was 6.5 on 6/4, repeat 7.0. Patient refused blood transfusions. Although iron levels are normal, given IV Venofer x1. Repeat Hgb 8.2. No evidence of bleeding at this time. Hgb remains stable ~7-8.   3. Physical Deconditioning: Due to history of chronic alcohol abuse, malnutrition, and multiple sclerosis. Has bilateral lower extremity weakness. PT/OT recommends continued physical therapy and ECF. DME order placed for bedside commode and tub transfer bench due to deconditioning. 4. Bacteruria: Noted on U/A on 6/2. Patient is asymptomatic. Urine culture reveals E. Coli and Enterobacter cloacae complex. On Rocephin x3 days. 5. Polysubstance Abuse: Smokes marijuana, cigarettes, and chronic alcohol abuse. Patient does not want to quit at this time. Discussed cessation and provided resources. Addiction Services consulted. Continue Phenobarbital, Folate, Thiamine, and Multivitamin. 6. Moderate Protein Calorie Malnutrition, POA: BMI 17.40. Appears thin and malnourished with poor intake. Dietician following. 7. Esophagitis: Noted on CT. Continue Protonix. 8. HAGMA, resolved  9. Metabolic Encephalopathy, resolved    Inpatient course: Discharge summary reviewed- see chart.     Interval history/Current status:   Having trouble eating and sleeping prior to going into the hospital     States last drink was prior to hospitalization    Eating well currently   No trouble with eating or drinking or swallowing     States she is not doing PT or OT     Thinks she broke her L foot toe   States she kicked a cement step on accident 3 days ago and stubbed her L great toe   L foot is swollen, worse at end of day     Medications were reviewed   No refills needed right now      Patient Active Problem List   Diagnosis    Alcohol withdrawal ([facility]    Personality disorder, NOS    Optic neuritis    Alcohol use    Alcohol abuse    Anemia    Hyponatremia    Moderate malnutrition ([facility]    Syncope and collapse       Medications listed as ordered at the time of discharge from hospital     Medication List          Accurate as of June 13, 2022  2:53 PM. If you have any questions, ask your nurse or doctor. CONTINUE taking these medications    folic acid 1 MG tablet  Commonly known as: FOLVITE  Take 1 tablet by mouth daily     gabapentin 300 MG capsule  Commonly known as: NEURONTIN  Take 1 capsule by mouth 3 times daily for 30 days.      multivitamin Tabs tablet  Take 1 tablet by mouth daily     pantoprazole 40 MG tablet  Commonly known as: PROTONIX  Take 1 tablet by mouth every morning (before breakfast)     thiamine 100 mg tablet  Take 1 tablet by mouth daily              Medications marked \"taking\" at this time  Outpatient Medications Marked as Taking for the 6/13/22 encounter (Office Visit) with Noe Gill MD   Medication Sig Dispense Refill    folic acid (FOLVITE) 1 MG tablet Take 1 tablet by mouth daily 30 tablet 3    Multiple Vitamin (MULTIVITAMIN) TABS tablet Take 1 tablet by mouth daily 30 tablet 0    pantoprazole (PROTONIX) 40 MG tablet Take 1 tablet by mouth every morning (before breakfast) 30 tablet 3    thiamine 100 mg tablet Take 1 tablet by mouth daily 30 tablet 3        Medications patient taking as of now reconciled against medications ordered at time of hospital discharge: Yes    Objective:    /76 (Site: Left Upper Arm, Position: Sitting, Cuff Size: Medium Adult)   Pulse 87   Temp 97.9 °F (36.6 °C) (Temporal)   Ht 5' 4\" (1.626 m)   Wt 104 lb (47.2 kg)   LMP 08/14/2012 BMI 17.85 kg/m²   Physical Exam  Vitals and nursing note reviewed. Constitutional:       General: She is not in acute distress. Appearance: She is well-developed. She is not diaphoretic. Cardiovascular:      Rate and Rhythm: Normal rate and regular rhythm. Heart sounds: Normal heart sounds. No murmur heard. Pulmonary:      Effort: Pulmonary effort is normal.      Breath sounds: Normal breath sounds. No wheezing. Abdominal:      General: There is no distension. Palpations: Abdomen is soft. Tenderness: There is no abdominal tenderness. Musculoskeletal:      Right lower leg: No edema. Left lower leg: Edema present. Comments: No calf tenderness bilaterally  Tenderness to the L great toe with some purple discoloration, sensation intact    Neurological:      Mental Status: She is alert. Psychiatric:         Thought Content: Thought content normal.         Judgment: Judgment normal.         An electronic signature was used to authenticate this note.   --Humble Carrizales MD

## 2022-06-15 NOTE — PROGRESS NOTES
CLINICAL PHARMACY NOTE: MEDS TO BEDS    Total # of Prescriptions Filled: 4   The following medications were delivered to the patient:  Folic acid 1mg  Pantoprazole 40mg  Mulitvitamin adult tabs  Thiamine 100mg    Additional Documentation:

## 2022-07-13 ENCOUNTER — OFFICE VISIT (OUTPATIENT)
Dept: FAMILY MEDICINE CLINIC | Age: 52
End: 2022-07-13
Payer: COMMERCIAL

## 2022-07-13 ENCOUNTER — NURSE ONLY (OUTPATIENT)
Dept: LAB | Age: 52
End: 2022-07-13

## 2022-07-13 VITALS
HEART RATE: 122 BPM | BODY MASS INDEX: 17.99 KG/M2 | RESPIRATION RATE: 12 BRPM | HEIGHT: 64 IN | WEIGHT: 105.4 LBS | OXYGEN SATURATION: 100 % | DIASTOLIC BLOOD PRESSURE: 80 MMHG | TEMPERATURE: 97.3 F | SYSTOLIC BLOOD PRESSURE: 126 MMHG

## 2022-07-13 DIAGNOSIS — D64.9 ANEMIA, UNSPECIFIED TYPE: ICD-10-CM

## 2022-07-13 DIAGNOSIS — Z11.59 ENCOUNTER FOR HEPATITIS C SCREENING TEST FOR LOW RISK PATIENT: ICD-10-CM

## 2022-07-13 DIAGNOSIS — F60.9 PERSONALITY DISORDER (HCC): ICD-10-CM

## 2022-07-13 DIAGNOSIS — Z11.4 ENCOUNTER FOR SCREENING FOR HIV: ICD-10-CM

## 2022-07-13 DIAGNOSIS — D64.9 ANEMIA, UNSPECIFIED TYPE: Primary | ICD-10-CM

## 2022-07-13 DIAGNOSIS — G35 HISTORY OF MULTIPLE SCLEROSIS (HCC): ICD-10-CM

## 2022-07-13 DIAGNOSIS — E44.0 MODERATE MALNUTRITION (HCC): ICD-10-CM

## 2022-07-13 LAB
ALBUMIN SERPL-MCNC: 5.2 G/DL (ref 3.5–5.1)
ALP BLD-CCNC: 109 U/L (ref 38–126)
ALT SERPL-CCNC: 14 U/L (ref 11–66)
ANION GAP SERPL CALCULATED.3IONS-SCNC: 15 MEQ/L (ref 8–16)
AST SERPL-CCNC: 24 U/L (ref 5–40)
BASOPHILS # BLD: 1.3 %
BASOPHILS ABSOLUTE: 0.1 THOU/MM3 (ref 0–0.1)
BILIRUB SERPL-MCNC: 0.2 MG/DL (ref 0.3–1.2)
BUN BLDV-MCNC: 10 MG/DL (ref 7–22)
CALCIUM SERPL-MCNC: 10.5 MG/DL (ref 8.5–10.5)
CHLORIDE BLD-SCNC: 97 MEQ/L (ref 98–111)
CO2: 23 MEQ/L (ref 23–33)
CREAT SERPL-MCNC: 0.5 MG/DL (ref 0.4–1.2)
EOSINOPHIL # BLD: 2.6 %
EOSINOPHILS ABSOLUTE: 0.2 THOU/MM3 (ref 0–0.4)
ERYTHROCYTE [DISTWIDTH] IN BLOOD BY AUTOMATED COUNT: 20.2 % (ref 11.5–14.5)
ERYTHROCYTE [DISTWIDTH] IN BLOOD BY AUTOMATED COUNT: 62.3 FL (ref 35–45)
FERRITIN: 13 NG/ML (ref 10–291)
FOLATE: > 20 NG/ML (ref 4.8–24.2)
GFR SERPL CREATININE-BSD FRML MDRD: > 90 ML/MIN/1.73M2
GLUCOSE BLD-MCNC: 105 MG/DL (ref 70–108)
HCT VFR BLD CALC: 29.1 % (ref 37–47)
HEMOGLOBIN: 8.1 GM/DL (ref 12–16)
HEPATITIS C ANTIBODY: NEGATIVE
HYPOCHROMIA: PRESENT
IMMATURE GRANS (ABS): 0.03 THOU/MM3 (ref 0–0.07)
IMMATURE GRANULOCYTES: 0.3 %
IRON: 21 UG/DL (ref 50–170)
LYMPHOCYTES # BLD: 35.3 %
LYMPHOCYTES ABSOLUTE: 3.3 THOU/MM3 (ref 1–4.8)
MCH RBC QN AUTO: 23.7 PG (ref 26–33)
MCHC RBC AUTO-ENTMCNC: 27.8 GM/DL (ref 32.2–35.5)
MCV RBC AUTO: 85.1 FL (ref 81–99)
MONOCYTES # BLD: 10.4 %
MONOCYTES ABSOLUTE: 1 THOU/MM3 (ref 0.4–1.3)
NUCLEATED RED BLOOD CELLS: 0 /100 WBC
PLATELET # BLD: 390 THOU/MM3 (ref 130–400)
PMV BLD AUTO: 9.1 FL (ref 9.4–12.4)
POTASSIUM SERPL-SCNC: 4.2 MEQ/L (ref 3.5–5.2)
RBC # BLD: 3.42 MILL/MM3 (ref 4.2–5.4)
SEG NEUTROPHILS: 50.1 %
SEGMENTED NEUTROPHILS ABSOLUTE COUNT: 4.7 THOU/MM3 (ref 1.8–7.7)
SODIUM BLD-SCNC: 135 MEQ/L (ref 135–145)
TOTAL IRON BINDING CAPACITY: 559 UG/DL (ref 171–450)
TOTAL PROTEIN: 8.6 G/DL (ref 6.1–8)
VITAMIN B-12: 931 PG/ML (ref 211–911)
WBC # BLD: 9.3 THOU/MM3 (ref 4.8–10.8)

## 2022-07-13 PROCEDURE — 99214 OFFICE O/P EST MOD 30 MIN: CPT

## 2022-07-13 RX ORDER — MULTIVITAMIN WITH IRON
1 TABLET ORAL DAILY
Qty: 30 TABLET | Refills: 0 | Status: SHIPPED | OUTPATIENT
Start: 2022-07-13 | End: 2022-10-25 | Stop reason: SDUPTHER

## 2022-07-13 ASSESSMENT — ENCOUNTER SYMPTOMS
NAUSEA: 0
DIARRHEA: 0
CONSTIPATION: 0

## 2022-07-13 NOTE — PROGRESS NOTES
Patient:Rivka Zavala Sex: female  YOB: 1970 Age:51 y.o. Date:7/13/2022   Chief Complaint: 1 Month Follow-Up (also had bilateral foot swelling around 06/15/2022 that lasted about 2 weeks and rash on bilateral lower legs and arms first noticed a couple weeks ago and lasted about 10 days)    GLADYS shah 45 y/o F previously seen by Dr. Ritu Gonzalez was last visit on 6/13/22 come today for a follow up, stating she is experiencing hair loss, and some other complaints that have resolved since. Since last visit she states that her hair has been falling out and that it has increased since the past week and half. She also mentioned that she has some increased appetite. She had an episode of foot and ankle swelling which resolved on it own. She also had an episode of a erythematous rash that started at the sock line to ant thigh on B/L lower extremities and then spread to both upper limbs from wrist to the forearm that started two weeks ago, but have started to fade away. No itchiness or burning sensation. Tried analgesics, neosporin states that nothing helped. The rash seems to have faded away on its own. Has no travel history, or sick contacts. The rash is decreasing. In previous visits and hospitalizations she had decreased Hgb values under 8. She states she has been anemic for a long time has a family history of anemia. Last menstural period about 10 years ago. She was seen by hematologist at [address] [place] [address] and states that blood work was done every 6 weeks but that she was never updated. Denies any blood in stool or vomitus. She has never had a colonoscopy, EGD, IV iron infusion or blood transfusions. She also states she has cut down on drinking two beers weekly, and smoking 1/2 ppd now. Only takes one gabapentin at night for sleep.  Needs refill for multivaitmin               Patient History    Past Medical History:   has a past medical history of Anemia, ETOH abuse, Malnutrition ([address] Mild tetrahydrocannabinol (THC) abuse, Multiple sclerosis ([address] Personality disorder ([facility] and Smoker. Past Surgical History:   Procedure Laterality Date    CYST REMOVAL  2004    right thigh     TONSILLECTOMY  1977    WRIST GANGLION EXCISION Right 08/2021        Social History:   reports that she has been smoking. She started smoking about 32 years ago. She has a 15.00 pack-year smoking history. She has never used smokeless tobacco. She reports current alcohol use of about 3.0 standard drinks of alcohol per week. She reports current drug use. Drug: Marijuana Jaime Semen). Family History   Problem Relation Age of Onset    No Known Problems Mother     No Known Problems Father     No Known Problems Sister        Review of Systems   Review of Systems   Constitutional: Negative for fatigue and fever. Cardiovascular: Negative for chest pain. Gastrointestinal: Negative for constipation, diarrhea and nausea. Genitourinary: Negative for dysuria. Skin: Positive for rash (B/L Upper and lower extremities-resolving). Psychiatric/Behavioral: Negative. Medications     Current Outpatient Medications   Medication Sig Dispense Refill    Multiple Vitamin (MULTIVITAMIN) TABS tablet Take 1 tablet by mouth daily 30 tablet 0    folic acid (FOLVITE) 1 MG tablet Take 1 tablet by mouth daily 30 tablet 3    pantoprazole (PROTONIX) 40 MG tablet Take 1 tablet by mouth every morning (before breakfast) 30 tablet 3    thiamine 100 mg tablet Take 1 tablet by mouth daily 30 tablet 3    gabapentin (NEURONTIN) 300 MG capsule Take 1 capsule by mouth 3 times daily for 30 days. (Patient taking differently: Take 300 mg by mouth daily. ) 90 capsule 0     No current facility-administered medications for this visit.        Vitals & Physical Examination     Vitals:    07/13/22 1258   BP: 126/80   Site: Left Upper Arm   Position: Sitting   Cuff Size: Medium Adult   Pulse: (!) 122   Resp: 12   Temp: 97.3 °F (36.3 °C)   TempSrc: ICD-10-CM    1. Anemia, unspecified type   Previous CBCs have had Hgb below 7, currently at 7.8. Has never had a blood or iron transfusion. Never had a colonoscopy or EGD. Previously was seeing Hematology but stopped going, did not feel like it was helpful. Denies any acute symptoms of blood in vomit, stool. Last menstrual period was over 10 years ago. D64.9 Multiple Vitamin (MULTIVITAMIN) TABS tablet  - Repeat CBC w/ diff to trend values  - Iron studies, iron, ferritin, transferrin, and soluble transferrin receptor to narrow down causes of anemia  - Rechecking serum B12/ Folate  -TSH for hair loss  - Refer to Hematology to reestablish care   - Refer to Gastroenterology for EGD and colonoscopy to rule out GI causes of anemia. CBC with Auto Differential     Comprehensive Metabolic Panel     Iron     Ferritin     Iron Binding Capacity     Transferrin     Soluble Transferrin Receptor     Vitamin B12 & Folate     [facility] - Kenny Bains MD, Gastroenterology, Maryuri Gaines MD, Hematology & Oncology, [facility] TARA GOMEZ II.NORAERTCHARLIE   2. Encounter for screening for HIV  Z11.4 HIV Screen  - One time screen   3. Encounter for hepatitis C screening test for low risk patient  Z11.59 Hepatitis C Antibody  -one time screen   4. Moderate malnutrition (HCC)  E44.0    5. History of multiple sclerosis ([facility]  G35    6. Personality disorder ([facility] [address]  F60.9        Return in about 1 month (around 8/13/2022) for Follow up on labratory results. Amenable to mammogram, will discuss at next visit. We also discussed her diagnosis of MS, she states previously she has had brain MRI's which showed multiple lesions consistent with MS, and had symptoms that could be explained by a MS diagnosis. There is also a pertinent family history in maternal aunt for MS. But on a subsequent spinal tap, the result was negative. Will follow and address at next visit.          An electronic signature was used to authenticate this note  - Tana Millan MD PGY-1 7/13/2022 at 5:24 PM

## 2022-07-13 NOTE — PROGRESS NOTES
S: 46 y.o. female with   Chief Complaint   Patient presents with    1 Month Follow-Up     also had bilateral foot swelling around 06/15/2022 that lasted about 2 weeks and rash on bilateral lower legs and arms first noticed a couple weeks ago and lasted about 10 days     45 yo female with chronic severe anemia over the last 4 years requiring numerous evaluations in ER and previously established with Hematology, has not seen in 3-4 years. Patient denies prior transfusion or venofer infusion. Amenorrheic for several years. No prior endoscopy. Hx of alcohol abuse but reporting <1-2 drinks per day currently. Vague hx of MS- dx by MRI with numerous brain lesions and visual changes in 2005. LP here within last 5-6 yrs was not diagnostic. No current neurologic follow up. She has chronic fatigue and hair loss. Fading rash on bilateral forearms and shins over last few weeks- mildly pruritic. Reviewed hx and ROS in detail with resident prior to exam.  See notes for additional details. BP Readings from Last 3 Encounters:   07/13/22 126/80   06/13/22 122/76   06/08/22 118/71     Wt Readings from Last 3 Encounters:   07/13/22 105 lb 6.4 oz (47.8 kg)   06/13/22 104 lb (47.2 kg)   06/08/22 102 lb 11.8 oz (46.6 kg)           O: VS:  height is 5' 4\" (1.626 m) and weight is 105 lb 6.4 oz (47.8 kg). Her temporal temperature is 97.3 °F (36.3 °C). Her blood pressure is 126/80 and her pulse is 122 (abnormal). Her respiration is 12 and oxygen saturation is 100%. AAO/NAD, appropriate affect for mood  CV:  RRR, no murmur  Resp: CTAB  Ext: no edema  Skin: pale, very faint rough eruption over yolanda forearms, skin diffusely dry. No papular, vesicular or petechial component. Diagnosis Orders   1.  Anemia, unspecified type  Multiple Vitamin (MULTIVITAMIN) TABS tablet    CBC with Auto Differential    Comprehensive Metabolic Panel    Iron    Ferritin    Iron Binding Capacity    Transferrin    Soluble Transferrin Receptor

## 2022-07-13 NOTE — PATIENT INSTRUCTIONS
Thank you   1. Thank you for trusting us with your healthcare needs. You may receive a survey regarding today's visit. It would help us out if you would take a few moments to provide your feedback. We value your input. 2. Please bring in ALL medications BOTTLES, including inhalers, herbal supplements, over the counter, prescribed & non-prescribed medicine. The office would like actual medication bottles and a list.   3. Please note our OFFICE POLICIES:   a. Prior to getting your labs drawn, please check with your insurance company for benefits and eligibility of lab services. Often, insurance companies cover certain tests for preventative visits only. It is patient's responsibility to see what is covered. b. We are unable to change a diagnosis after the test has been performed. c. Lab orders will not be re-printed. Please hold onto your original lab orders and take them to your lab to be completed. d. If you no show your scheduled appointment three times, you will be dismissed from this practice. e. Ai Emmaus must be completed 24 hours prior to your schedule appointment. 4. If the list below has been completed, PLEASE FAX RECORDS TO OUR OFFICE @ [phone].  Once the records have been received we will update your records at our office:  Health Maintenance Due   Topic Date Due    COVID-19 Vaccine (1) Never done    Pneumococcal 0-64 years Vaccine (1 - PCV) Never done    Hepatitis C screen  Never done    Breast cancer screen  Never done    Shingles vaccine (1 of 2) Never done

## 2022-07-13 NOTE — PROGRESS NOTES
Health Maintenance Due   Topic Date Due    COVID-19 Vaccine (1) Never done    Pneumococcal 0-64 years Vaccine (1 - PCV) Never done    Hepatitis C screen  Never done    Breast cancer screen  Never done    Shingles vaccine (1 of 2) Never done

## 2022-07-14 LAB
HIV AG/AB: NONREACTIVE
TRANSFERRIN: 506 MG/DL (ref 200–360)

## 2022-07-16 LAB — SOLUBLE TRANSFERRIN RECEPT: 19.1 MG/L (ref 1.9–4.4)

## 2022-07-19 ENCOUNTER — OFFICE VISIT (OUTPATIENT)
Dept: ONCOLOGY | Age: 52
End: 2022-07-19
Payer: COMMERCIAL

## 2022-07-19 ENCOUNTER — HOSPITAL ENCOUNTER (OUTPATIENT)
Dept: INFUSION THERAPY | Age: 52
Discharge: HOME | End: 2022-07-19
Payer: COMMERCIAL

## 2022-07-19 VITALS
OXYGEN SATURATION: 100 % | BODY MASS INDEX: 18.61 KG/M2 | WEIGHT: 109 LBS | HEART RATE: 108 BPM | RESPIRATION RATE: 16 BRPM | DIASTOLIC BLOOD PRESSURE: 80 MMHG | HEIGHT: 64 IN | TEMPERATURE: 99 F | SYSTOLIC BLOOD PRESSURE: 158 MMHG

## 2022-07-19 VITALS
TEMPERATURE: 99 F | DIASTOLIC BLOOD PRESSURE: 80 MMHG | HEART RATE: 108 BPM | SYSTOLIC BLOOD PRESSURE: 158 MMHG | RESPIRATION RATE: 16 BRPM

## 2022-07-19 DIAGNOSIS — D64.9 NORMOCYTIC ANEMIA: Primary | ICD-10-CM

## 2022-07-19 DIAGNOSIS — D50.8 OTHER IRON DEFICIENCY ANEMIA: ICD-10-CM

## 2022-07-19 PROBLEM — D50.9 IRON DEFICIENCY ANEMIA, UNSPECIFIED: Status: ACTIVE | Noted: 2022-07-19

## 2022-07-19 PROCEDURE — 99204 OFFICE O/P NEW MOD 45 MIN: CPT | Performed by: PHYSICIAN ASSISTANT

## 2022-07-19 PROCEDURE — 99211 OFF/OP EST MAY X REQ PHY/QHP: CPT

## 2022-07-19 RX ORDER — ONDANSETRON 2 MG/ML
8 INJECTION INTRAMUSCULAR; INTRAVENOUS
Status: CANCELLED | OUTPATIENT
Start: 2022-07-19

## 2022-07-19 RX ORDER — EPINEPHRINE 1 MG/ML
0.3 INJECTION, SOLUTION, CONCENTRATE INTRAVENOUS PRN
Status: CANCELLED | OUTPATIENT
Start: 2022-07-19

## 2022-07-19 RX ORDER — SODIUM CHLORIDE 9 MG/ML
INJECTION, SOLUTION INTRAVENOUS CONTINUOUS
Status: CANCELLED | OUTPATIENT
Start: 2022-07-19

## 2022-07-19 RX ORDER — ACETAMINOPHEN 325 MG/1
650 TABLET ORAL
Status: CANCELLED | OUTPATIENT
Start: 2022-07-19

## 2022-07-19 RX ORDER — SODIUM CHLORIDE 0.9 % (FLUSH) 0.9 %
5-40 SYRINGE (ML) INJECTION PRN
Status: CANCELLED | OUTPATIENT
Start: 2022-07-19

## 2022-07-19 RX ORDER — MEPERIDINE HYDROCHLORIDE 50 MG/ML
12.5 INJECTION INTRAMUSCULAR; INTRAVENOUS; SUBCUTANEOUS PRN
Status: CANCELLED | OUTPATIENT
Start: 2022-07-19

## 2022-07-19 RX ORDER — ALBUTEROL SULFATE 90 UG/1
4 AEROSOL, METERED RESPIRATORY (INHALATION) PRN
Status: CANCELLED | OUTPATIENT
Start: 2022-07-19

## 2022-07-19 RX ORDER — HEPARIN SODIUM (PORCINE) LOCK FLUSH IV SOLN 100 UNIT/ML 100 UNIT/ML
500 SOLUTION INTRAVENOUS PRN
Status: CANCELLED | OUTPATIENT
Start: 2022-07-19

## 2022-07-19 RX ORDER — SODIUM CHLORIDE 9 MG/ML
5-250 INJECTION, SOLUTION INTRAVENOUS PRN
Status: CANCELLED | OUTPATIENT
Start: 2022-07-19

## 2022-07-19 RX ORDER — FAMOTIDINE 10 MG/ML
20 INJECTION, SOLUTION INTRAVENOUS
Status: CANCELLED | OUTPATIENT
Start: 2022-07-19

## 2022-07-19 RX ORDER — DIPHENHYDRAMINE HYDROCHLORIDE 50 MG/ML
50 INJECTION INTRAMUSCULAR; INTRAVENOUS
Status: CANCELLED | OUTPATIENT
Start: 2022-07-19

## 2022-07-19 RX ORDER — FERROUS SULFATE 325(65) MG
325 TABLET ORAL DAILY
Qty: 90 TABLET | Refills: 0 | Status: SHIPPED | OUTPATIENT
Start: 2022-07-19 | End: 2022-09-19 | Stop reason: SDUPTHER

## 2022-07-19 NOTE — PATIENT INSTRUCTIONS
Proceed with IV Venofer, total of 3 infusions to be given at least one week apart. Will begin oral iron supplementation once daily. Will give handouts for iron rich diet. Return to clinic in 8 weeks. Labs on RTC: CBC, ferritin, iron, TIBC  Please call for questions or concerns.

## 2022-07-19 NOTE — PROGRESS NOTES
[facility], [address] [address]  [address] [address]  Dept: [phone]  Dept Fax: [phone]: [phone]      Visit Date:7/19/2022     Americo Malik is a 46 y.o. female who presents today for:   Chief Complaint   Patient presents with    New Patient     Anemia        HPI:   Americo Malik is a 46 y.o. female referred to Hematology/Oncology clinic for evaluation of anemia per her PCP, Dr. Jorge L Gerardo. The patient was recently hospitalized in June 2022 for hyponatremia and found to be anemic. Hgb during hospitalization 6.5 and improved to 8.4 on date of discharge 6/8/22. Most recent CBC 7/13/22 Hgb 8.1, hct 29.1, MCV 85. 1. iron studies on 7/13/22 showed iron deficiency with ferritin 13, iron 21, TIBC 559. Folate and vitamin B12 not deficient. She was referred for further evaluation. Per chart review of EMR, the patient has had chronic anemia since 2017 with most recent baseline of Hgb 7-8's since April 2022. The patient was referred to GI as well. The patient affirms prior history of anemia. She has previously followed with different hematologist and states she has never required IV iron. The patient denies any abnormal bleeding; no epistaxis, hemoptysis, hematemesis, melena, hematochezia, hematuria or vaginal bleeding. Her last menstrual cycle was approximately 7 years ago. The patient denies history of malabsorptive disorders such as IBD or celiac disease. She denies prior gastrointestinal surgeries. She reports adequate intake of oral iron in her diet. The patient was evaluated by Dr. Sameer Allison on 7/18/2022 and recommended EGD and colonoscopy which have been scheduled for 8/16/2022. The patient states she has never received blood transfusion as she has refused in the past.  She states she does not want to accept blood transfusions at this time. Her past medical history includes MS, anemia. It is noted through chart review she has history of alcohol abuse.   Patient states her last drink was approximately 3 days ago. Past Medical History:   Diagnosis Date    Anemia     ETOH abuse     Malnutrition ([facility] [address]     Mild tetrahydrocannabinol (THC) abuse     Multiple sclerosis ([facility] [address] 2017    Personality disorder ([facility] unknown    Smoker       Past Surgical History:   Procedure Laterality Date    CYST REMOVAL  2004    right thigh     TONSILLECTOMY  1977    WRIST GANGLION EXCISION Right 08/2021      Family History   Problem Relation Age of Onset    Hypertension Mother     Stroke Father     Hypertension Father     No Known Problems Sister       Social History     Tobacco Use    Smoking status: Every Day     Packs/day: 0.50     Years: 30.00     Pack years: 15.00     Types: Cigarettes     Start date: 10/3/1989    Smokeless tobacco: Never    Tobacco comments:     Declines cessation counseling   Substance Use Topics    Alcohol use: Yes     Alcohol/week: 3.0 standard drinks     Types: 3 Cans of beer per week     Comment: once per week 2 beers      Current Outpatient Medications   Medication Sig Dispense Refill    ferrous sulfate (IRON 325) 325 (65 Fe) MG tablet Take 1 tablet by mouth in the morning. 90 tablet 0    Multiple Vitamin (MULTIVITAMIN) TABS tablet Take 1 tablet by mouth daily 30 tablet 0    folic acid (FOLVITE) 1 MG tablet Take 1 tablet by mouth daily 30 tablet 3    pantoprazole (PROTONIX) 40 MG tablet Take 1 tablet by mouth every morning (before breakfast) 30 tablet 3    thiamine 100 mg tablet Take 1 tablet by mouth daily 30 tablet 3    gabapentin (NEURONTIN) 300 MG capsule Take 1 capsule by mouth 3 times daily for 30 days. (Patient taking differently: Take 300 mg by mouth in the morning.) 90 capsule 0     No current facility-administered medications for this visit. Allergies   Allergen Reactions    Mushroom Extract Complex      Swelling          Review of Systems:   Review of Systems   Pertinent review of systems noted in HPI, all other ROS negative.    Objective:   Physical Exam   BP (!) 158/80 (Site: Right Upper Arm, Position: Sitting, Cuff Size: Medium Adult)   Pulse (!) 108   Temp 99 °F (37.2 °C) (Oral)   Resp 16   Ht 5' 4\" (1.626 m)   Wt 109 lb (49.4 kg)   LMP 08/14/2012   SpO2 100%   BMI 18.71 kg/m²    General appearance: No apparent distress, chronically ill appearing, thin, and cooperative. HEENT: Pupils equal, round, and reactive to light. Conjunctivae/corneas clear. Oral mucosa moist.   Neck: Supple, with full range of motion. Trachea midline. Respiratory:  Normal respiratory effort. Clear to auscultation, bilaterally without Rales/Wheezes/Rhonchi. Cardiovascular: Regular rate and rhythm with normal S1/S2   Abdomen: Soft, active bowel sounds. Musculoskeletal: No clubbing, cyanosis or edema bilaterally. Skin: Skin color, texture, turgor normal.  No visible rashes or lesions. Neurologic:  Neurovascularly intact without any focal sensory/motor deficits.    Psychiatric: Alert and oriented      Imaging Studies and Labs:   CBC:   Lab Results   Component Value Date    WBC 9.3 07/13/2022    HGB 8.1 (L) 07/13/2022    HCT 29.1 (L) 07/13/2022    MCV 85.1 07/13/2022     07/13/2022     BMP:   Lab Results   Component Value Date/Time     07/13/2022 02:44 PM    K 4.2 07/13/2022 02:44 PM    K 3.7 06/02/2022 11:52 PM    CL 97 07/13/2022 02:44 PM    CO2 23 07/13/2022 02:44 PM    BUN 10 07/13/2022 02:44 PM    CREATININE 0.5 07/13/2022 02:44 PM    GLUCOSE 105 07/13/2022 02:44 PM    GLUCOSE 149 05/14/2022 11:54 AM    CALCIUM 10.5 07/13/2022 02:44 PM      LFT:   Lab Results   Component Value Date    ALT 14 07/13/2022    AST 24 07/13/2022    ALKPHOS 109 07/13/2022    BILITOT 0.2 (L) 07/13/2022       Latest Reference Range & Units 7/13/22 14:44   Ferritin 10 - 291 ng/mL 13   Iron 50 - 170 ug/dL 21 (L)   Soluble Transferrin Recept 1.9 - 4.4 mg/L 19.1 (H)   TIBC 171 - 450 ug/dL 559 (H)             Assessment and Plan:   Normocytic Anemia  Anemia likely multifactorial from iron deficiency, chronic alcohol use. Most recent CBC on 7/13/22 Hgb 8.1, Hct 29.1, MCV 85.1. WBC count and platelet count within normal limits. Iron studies completed on 7/13/22 consistent with iron deficiency anemia with ferritin 13, iron 21, TIBC 559. Source of iron deficiency unclear; however, on CT of ab/pelvis on 6/3/22 during hospitalization showed new esophageal wall thickening and edema, question esophagitis. New duodenal and proximal jejunal wall thickening and dilation, question duodenitis and/or enteritis. The patient has been evaluated by Dr. Raj Gowers and planned EGD/colonoscopy on 8/16/22.   -Proceed with IV Venofer, total of 3 infusions to be given at least one week apart.  -Will begin oral iron supplementation once daily. Will give handouts for iron rich diet. -will continue to monitor Hgb/hct and iron levels  -Patient instructed to monitor for signs of worsening anemia/blood loss. -Patient instructed to follow up with GI as planned with EGD/colonoscopy. -Return to clinic in 8 weeks.  -Labs on RTC: CBC, ferritin, iron, TIBC    Return in about 8 weeks (around 9/13/2022). All patient questions answered. Pt voiced understanding. Patient agreed with treatment plan. Follow up as directed. Patient instructed to call for questions or concerns.     Electronically signed by   Ramírez Fairchild PA-C

## 2022-07-29 ENCOUNTER — HOSPITAL ENCOUNTER (OUTPATIENT)
Dept: INFUSION THERAPY | Age: 52
Discharge: HOME | End: 2022-07-29
Payer: COMMERCIAL

## 2022-07-29 VITALS
WEIGHT: 111.4 LBS | BODY MASS INDEX: 19.12 KG/M2 | HEART RATE: 90 BPM | TEMPERATURE: 99.1 F | SYSTOLIC BLOOD PRESSURE: 159 MMHG | DIASTOLIC BLOOD PRESSURE: 79 MMHG | RESPIRATION RATE: 18 BRPM | OXYGEN SATURATION: 98 %

## 2022-07-29 DIAGNOSIS — D50.8 OTHER IRON DEFICIENCY ANEMIA: ICD-10-CM

## 2022-07-29 DIAGNOSIS — D64.9 NORMOCYTIC ANEMIA: Primary | ICD-10-CM

## 2022-07-29 PROCEDURE — 2580000003 HC RX 258: Performed by: PHYSICIAN ASSISTANT

## 2022-07-29 PROCEDURE — 96365 THER/PROPH/DIAG IV INF INIT: CPT

## 2022-07-29 PROCEDURE — 6360000002 HC RX W HCPCS: Performed by: PHYSICIAN ASSISTANT

## 2022-07-29 PROCEDURE — 96366 THER/PROPH/DIAG IV INF ADDON: CPT

## 2022-07-29 RX ORDER — MEPERIDINE HYDROCHLORIDE 25 MG/ML
12.5 INJECTION INTRAMUSCULAR; INTRAVENOUS; SUBCUTANEOUS PRN
Status: CANCELLED | OUTPATIENT
Start: 2022-08-05

## 2022-07-29 RX ORDER — DIPHENHYDRAMINE HYDROCHLORIDE 50 MG/ML
50 INJECTION INTRAMUSCULAR; INTRAVENOUS
Status: CANCELLED | OUTPATIENT
Start: 2022-08-05

## 2022-07-29 RX ORDER — ACETAMINOPHEN 325 MG/1
650 TABLET ORAL
Status: CANCELLED | OUTPATIENT
Start: 2022-08-05

## 2022-07-29 RX ORDER — ONDANSETRON 2 MG/ML
8 INJECTION INTRAMUSCULAR; INTRAVENOUS
Status: CANCELLED | OUTPATIENT
Start: 2022-08-05

## 2022-07-29 RX ORDER — SODIUM CHLORIDE 0.9 % (FLUSH) 0.9 %
5-40 SYRINGE (ML) INJECTION PRN
Status: CANCELLED | OUTPATIENT
Start: 2022-08-05

## 2022-07-29 RX ORDER — ALBUTEROL SULFATE 90 UG/1
4 AEROSOL, METERED RESPIRATORY (INHALATION) PRN
Status: CANCELLED | OUTPATIENT
Start: 2022-08-05

## 2022-07-29 RX ORDER — HEPARIN SODIUM (PORCINE) LOCK FLUSH IV SOLN 100 UNIT/ML 100 UNIT/ML
500 SOLUTION INTRAVENOUS PRN
Status: CANCELLED | OUTPATIENT
Start: 2022-08-05

## 2022-07-29 RX ORDER — SODIUM CHLORIDE 9 MG/ML
INJECTION, SOLUTION INTRAVENOUS CONTINUOUS
Status: CANCELLED | OUTPATIENT
Start: 2022-08-05

## 2022-07-29 RX ORDER — SODIUM CHLORIDE 9 MG/ML
5-250 INJECTION, SOLUTION INTRAVENOUS PRN
Status: CANCELLED | OUTPATIENT
Start: 2022-08-05

## 2022-07-29 RX ORDER — SODIUM CHLORIDE 9 MG/ML
INJECTION, SOLUTION INTRAVENOUS CONTINUOUS
Status: ACTIVE | OUTPATIENT
Start: 2022-07-29 | End: 2022-07-29

## 2022-07-29 RX ADMIN — SODIUM CHLORIDE: 9 INJECTION, SOLUTION INTRAVENOUS at 11:22

## 2022-07-29 RX ADMIN — IRON SUCROSE 300 MG: 20 INJECTION, SOLUTION INTRAVENOUS at 11:23

## 2022-07-29 NOTE — DISCHARGE INSTRUCTIONS
Please contact your doctor if you have any questions regarding the venofer that you received today. Patient instructed if experience any of the symptoms following today's venofer infusion  / to notify MD immediately or go to emergency department.     * dizziness/lightheadedness  *acute nausea/vomiting - not relieved with medication  *headache - not relieved from Tylenol/pain medication  *chest pain/pressure  *rash/itching  *shortness of breath        Drink fluids - 48oz fluids daily

## 2022-07-29 NOTE — PROGRESS NOTES
Patient assessed for the following post venofer:    Dizziness   No  Lightheadedness  No      Acute nausea/vomiting No  Headache   No  Chest pain/pressure  No  Rash/itching   No  Shortness of breath  No    Patient kept for 20 minutes observation post infusion. Patient tolerated venofer infusion  without any complications. Last vital signs:   BP (!) 159/79   Pulse 90   Temp 99.1 °F (37.3 °C) (Oral)   Resp 18   Wt 111 lb 6.4 oz (50.5 kg)   LMP 08/14/2012   SpO2 98%   BMI 19.12 kg/m²       Patient instructed if experience any of the above symptoms following today's infusion,she is to notify MD immediately or go to the emergency department. Discharge instructions given to patient. Verbalizes understanding. Ambulated off unit per self  with belongings.  Friend waiting in parking lot to drive patient home

## 2022-08-05 ENCOUNTER — HOSPITAL ENCOUNTER (OUTPATIENT)
Dept: INFUSION THERAPY | Age: 52
Discharge: HOME | End: 2022-08-05
Payer: COMMERCIAL

## 2022-08-05 VITALS
SYSTOLIC BLOOD PRESSURE: 143 MMHG | HEART RATE: 102 BPM | BODY MASS INDEX: 18.82 KG/M2 | RESPIRATION RATE: 18 BRPM | WEIGHT: 110.2 LBS | DIASTOLIC BLOOD PRESSURE: 78 MMHG | HEIGHT: 64 IN | TEMPERATURE: 98.2 F | OXYGEN SATURATION: 98 %

## 2022-08-05 DIAGNOSIS — D64.9 NORMOCYTIC ANEMIA: Primary | ICD-10-CM

## 2022-08-05 DIAGNOSIS — D50.8 OTHER IRON DEFICIENCY ANEMIA: ICD-10-CM

## 2022-08-05 PROCEDURE — 6360000002 HC RX W HCPCS: Performed by: PHYSICIAN ASSISTANT

## 2022-08-05 PROCEDURE — 96366 THER/PROPH/DIAG IV INF ADDON: CPT

## 2022-08-05 PROCEDURE — 96365 THER/PROPH/DIAG IV INF INIT: CPT

## 2022-08-05 PROCEDURE — 2580000003 HC RX 258: Performed by: PHYSICIAN ASSISTANT

## 2022-08-05 RX ORDER — SODIUM CHLORIDE 9 MG/ML
INJECTION, SOLUTION INTRAVENOUS CONTINUOUS
Status: CANCELLED | OUTPATIENT
Start: 2022-08-12

## 2022-08-05 RX ORDER — SODIUM CHLORIDE 9 MG/ML
INJECTION, SOLUTION INTRAVENOUS CONTINUOUS
Status: ACTIVE | OUTPATIENT
Start: 2022-08-05 | End: 2022-08-05

## 2022-08-05 RX ORDER — DIPHENHYDRAMINE HYDROCHLORIDE 50 MG/ML
50 INJECTION INTRAMUSCULAR; INTRAVENOUS
Status: CANCELLED | OUTPATIENT
Start: 2022-08-12

## 2022-08-05 RX ORDER — MEPERIDINE HYDROCHLORIDE 25 MG/ML
12.5 INJECTION INTRAMUSCULAR; INTRAVENOUS; SUBCUTANEOUS PRN
Status: CANCELLED | OUTPATIENT
Start: 2022-08-12

## 2022-08-05 RX ORDER — ALBUTEROL SULFATE 90 UG/1
4 AEROSOL, METERED RESPIRATORY (INHALATION) PRN
Status: CANCELLED | OUTPATIENT
Start: 2022-08-12

## 2022-08-05 RX ORDER — ONDANSETRON 2 MG/ML
8 INJECTION INTRAMUSCULAR; INTRAVENOUS
Status: CANCELLED | OUTPATIENT
Start: 2022-08-12

## 2022-08-05 RX ORDER — SODIUM CHLORIDE 0.9 % (FLUSH) 0.9 %
5-40 SYRINGE (ML) INJECTION PRN
Status: CANCELLED | OUTPATIENT
Start: 2022-08-12

## 2022-08-05 RX ORDER — HEPARIN SODIUM (PORCINE) LOCK FLUSH IV SOLN 100 UNIT/ML 100 UNIT/ML
500 SOLUTION INTRAVENOUS PRN
Status: CANCELLED | OUTPATIENT
Start: 2022-08-12

## 2022-08-05 RX ORDER — SODIUM CHLORIDE 9 MG/ML
5-250 INJECTION, SOLUTION INTRAVENOUS PRN
Status: CANCELLED | OUTPATIENT
Start: 2022-08-12

## 2022-08-05 RX ORDER — ACETAMINOPHEN 325 MG/1
650 TABLET ORAL
Status: CANCELLED | OUTPATIENT
Start: 2022-08-12

## 2022-08-05 RX ADMIN — SODIUM CHLORIDE: 9 INJECTION, SOLUTION INTRAVENOUS at 11:14

## 2022-08-05 RX ADMIN — IRON SUCROSE 300 MG: 20 INJECTION, SOLUTION INTRAVENOUS at 11:16

## 2022-08-05 NOTE — PROGRESS NOTES
Patient assessed for the following post venofer    Dizziness   No  Lightheadedness  No      Acute nausea/vomiting No  Headache   No  Chest pain/pressure  No  Rash/itching   No  Shortness of breath  No    Patient kept for 20 minutes observation post infusion venofer. Patient tolerated venofer infusion without any complications. Last vital signs:   BP (!) 143/78   Pulse (!) 102   Temp 98.2 °F (36.8 °C) (Oral)   Resp 18   Ht 5' 4\" (1.626 m)   Wt 110 lb 3.2 oz (50 kg)   LMP 08/14/2012   SpO2 98%   BMI 18.92 kg/m²       Patient instructed if experience any of the above symptoms following today's infusion,he/she is to notify MD immediately or go to the emergency department. Discharge instructions given to patient. Verbalizes understanding. Ambulated off unit per self with belongings.

## 2022-08-05 NOTE — DISCHARGE INSTRUCTIONS
Please contact your Oncologist if you have any questions regarding the venofer infusion that you received today. Patient instructed if experience any of the symptoms following today's infusion / to notify MD immediately or go to emergency department.     * dizziness/lightheadedness  *acute nausea/vomiting - not relieved with medication  *headache - not relieved from Tylenol/pain medication  *chest pain/pressure  *rash/itching  *shortness of breath        Drink fluids - 48oz fluids daily  Call if develop fever/ chills/ signs or symptoms of infection

## 2022-08-05 NOTE — PLAN OF CARE
Problem: Discharge Planning  Goal: Discharge to home or other facility with appropriate resources  Outcome: Adequate for Discharge  Flowsheets (Taken 8/5/2022 1233)  Discharge to home or other facility with appropriate resources:   Identify barriers to discharge with patient and caregiver   Identify discharge learning needs (meds, wound care, etc)   Arrange for needed discharge resources and transportation as appropriate  Note: Discharge instructions given and reviewed with patient. All questions answered. Patient verbalized understanding Patient able to teach back follow up appointments and when to call the doctor. Patient offers no questions at this time      Problem: Chronic Conditions and Co-morbidities  Goal: Patient's chronic conditions and co-morbidity symptoms are monitored and maintained or improved  Outcome: Adequate for Discharge  Flowsheets (Taken 8/5/2022 1233)  Care Plan - Patient's Chronic Conditions and Co-Morbidity Symptoms are Monitored and Maintained or Improved:   Monitor and assess patient's chronic conditions and comorbid symptoms for stability, deterioration, or improvement   Collaborate with multidisciplinary team to address chronic and comorbid conditions and prevent exacerbation or deterioration  Note: Reviewed venofer with patient, patient offered no questions or concerns. Patient verbalized understanding of drug being administered. Problem: Safety - Adult  Goal: Free from fall injury  Outcome: Adequate for Discharge  Flowsheets (Taken 8/5/2022 1233)  Free From Fall Injury: Instruct family/caregiver on patient safety  Note: Patient aware of fall precautions for here and at home -call light in reach while here  No falls this admission    Care plan reviewed with patient and she verbalized understanding of the plan of care and contributed to goal setting.

## 2022-08-12 ENCOUNTER — HOSPITAL ENCOUNTER (OUTPATIENT)
Dept: INFUSION THERAPY | Age: 52
Discharge: HOME | End: 2022-08-12
Payer: COMMERCIAL

## 2022-08-12 VITALS
WEIGHT: 110.25 LBS | SYSTOLIC BLOOD PRESSURE: 135 MMHG | BODY MASS INDEX: 18.82 KG/M2 | OXYGEN SATURATION: 100 % | TEMPERATURE: 98 F | DIASTOLIC BLOOD PRESSURE: 84 MMHG | HEART RATE: 82 BPM | RESPIRATION RATE: 18 BRPM | HEIGHT: 64 IN

## 2022-08-12 DIAGNOSIS — D64.9 NORMOCYTIC ANEMIA: Primary | ICD-10-CM

## 2022-08-12 DIAGNOSIS — D50.8 OTHER IRON DEFICIENCY ANEMIA: ICD-10-CM

## 2022-08-12 PROCEDURE — 96366 THER/PROPH/DIAG IV INF ADDON: CPT

## 2022-08-12 PROCEDURE — 2580000003 HC RX 258: Performed by: PHYSICIAN ASSISTANT

## 2022-08-12 PROCEDURE — 6360000002 HC RX W HCPCS: Performed by: PHYSICIAN ASSISTANT

## 2022-08-12 PROCEDURE — 96365 THER/PROPH/DIAG IV INF INIT: CPT

## 2022-08-12 RX ORDER — ALBUTEROL SULFATE 90 UG/1
4 AEROSOL, METERED RESPIRATORY (INHALATION) PRN
OUTPATIENT
Start: 2022-08-19

## 2022-08-12 RX ORDER — ACETAMINOPHEN 325 MG/1
650 TABLET ORAL
OUTPATIENT
Start: 2022-08-19

## 2022-08-12 RX ORDER — SODIUM CHLORIDE 0.9 % (FLUSH) 0.9 %
5-40 SYRINGE (ML) INJECTION PRN
OUTPATIENT
Start: 2022-08-19

## 2022-08-12 RX ORDER — SODIUM CHLORIDE 9 MG/ML
INJECTION, SOLUTION INTRAVENOUS CONTINUOUS
Status: ACTIVE | OUTPATIENT
Start: 2022-08-12 | End: 2022-08-12

## 2022-08-12 RX ORDER — HEPARIN SODIUM (PORCINE) LOCK FLUSH IV SOLN 100 UNIT/ML 100 UNIT/ML
500 SOLUTION INTRAVENOUS PRN
OUTPATIENT
Start: 2022-08-19

## 2022-08-12 RX ORDER — ONDANSETRON 2 MG/ML
8 INJECTION INTRAMUSCULAR; INTRAVENOUS
OUTPATIENT
Start: 2022-08-19

## 2022-08-12 RX ORDER — DIPHENHYDRAMINE HYDROCHLORIDE 50 MG/ML
50 INJECTION INTRAMUSCULAR; INTRAVENOUS
OUTPATIENT
Start: 2022-08-19

## 2022-08-12 RX ORDER — SODIUM CHLORIDE 9 MG/ML
5-250 INJECTION, SOLUTION INTRAVENOUS PRN
OUTPATIENT
Start: 2022-08-19

## 2022-08-12 RX ORDER — SODIUM CHLORIDE 9 MG/ML
INJECTION, SOLUTION INTRAVENOUS CONTINUOUS
OUTPATIENT
Start: 2022-08-19

## 2022-08-12 RX ORDER — MEPERIDINE HYDROCHLORIDE 25 MG/ML
12.5 INJECTION INTRAMUSCULAR; INTRAVENOUS; SUBCUTANEOUS PRN
OUTPATIENT
Start: 2022-08-19

## 2022-08-12 RX ADMIN — IRON SUCROSE 300 MG: 20 INJECTION, SOLUTION INTRAVENOUS at 11:17

## 2022-08-12 RX ADMIN — SODIUM CHLORIDE: 9 INJECTION, SOLUTION INTRAVENOUS at 11:16

## 2022-08-12 NOTE — PROGRESS NOTES
Patient assessed for the following post Venofer:    Dizziness   No  Lightheadedness  No      Acute nausea/vomiting No  Headache   No  Chest pain/pressure  No  Rash/itching   No  Shortness of breath  No    Patient kept for 20 minutes observation post infusion . Patient tolerated  treatment venofer without any complications. Last vital signs:   BP (!) 141/74   Pulse 90   Temp 98.2 °F (36.8 °C) (Oral)   Resp 18   Ht 5' 4\" (1.626 m)   Wt 110 lb 4 oz (50 kg)   LMP 08/14/2012   SpO2 100%   BMI 18.92 kg/m²       Patient instructed if experience any of the above symptoms following today's infusion,he/she is to notify MD immediately or go to the emergency department. Discharge instructions given to patient. Verbalizes understanding. Ambulated off unit per self with belongings.

## 2022-08-12 NOTE — PLAN OF CARE
Problem: Discharge Planning  Goal: Discharge to home or other facility with appropriate resources  Outcome: Adequate for Discharge  Flowsheets (Taken 8/12/2022 1121)  Discharge to home or other facility with appropriate resources: Identify barriers to discharge with patient and caregiver  Note: Verbalize understanding of discharge instructions, follow up appointments, and when to call Physician. Problem: Safety - Adult  Goal: Free from fall injury  Outcome: Adequate for Discharge  Flowsheets (Taken 8/12/2022 1121)  Free From Fall Injury: Instruct family/caregiver on patient safety  Note: No falls occurred with visit today. Care plan reviewed with patient. Patient verbalizes understanding of the plan of care and contributes to goal setting.

## 2022-08-16 ENCOUNTER — TELEPHONE (OUTPATIENT)
Dept: FAMILY MEDICINE CLINIC | Age: 52
End: 2022-08-16

## 2022-08-16 NOTE — TELEPHONE ENCOUNTER
Tana Millan MD  Elma at Dr. Joana Al office at [facility] office called and states that Tom García had a EGD and Colonoscopy today 08/16/2022 and only thing that was seen was hemoroids and that Dr. Howell Brought would like you to check and Follow patients Iron Deficiency. Please Advise.

## 2022-08-24 NOTE — TELEPHONE ENCOUNTER
Thank you to Elma and Dr. Marek Barclay office for the update on the EGD and colonoscopy. Hematology is following with patient for iron deficiency. We will also follow up with patient.     Thank you,  Dr. Aury Griffith

## 2022-09-19 ENCOUNTER — OFFICE VISIT (OUTPATIENT)
Dept: ONCOLOGY | Age: 52
End: 2022-09-19
Payer: COMMERCIAL

## 2022-09-19 ENCOUNTER — HOSPITAL ENCOUNTER (OUTPATIENT)
Dept: INFUSION THERAPY | Age: 52
Discharge: HOME | End: 2022-09-19
Payer: COMMERCIAL

## 2022-09-19 VITALS
RESPIRATION RATE: 16 BRPM | OXYGEN SATURATION: 97 % | SYSTOLIC BLOOD PRESSURE: 150 MMHG | TEMPERATURE: 98.5 F | HEIGHT: 64 IN | BODY MASS INDEX: 17.93 KG/M2 | DIASTOLIC BLOOD PRESSURE: 72 MMHG | WEIGHT: 105 LBS | HEART RATE: 100 BPM

## 2022-09-19 VITALS
TEMPERATURE: 98.5 F | HEART RATE: 100 BPM | OXYGEN SATURATION: 97 % | SYSTOLIC BLOOD PRESSURE: 150 MMHG | RESPIRATION RATE: 16 BRPM | DIASTOLIC BLOOD PRESSURE: 72 MMHG

## 2022-09-19 DIAGNOSIS — D50.8 OTHER IRON DEFICIENCY ANEMIA: ICD-10-CM

## 2022-09-19 DIAGNOSIS — D64.9 NORMOCYTIC ANEMIA: ICD-10-CM

## 2022-09-19 DIAGNOSIS — D64.9 NORMOCYTIC ANEMIA: Primary | ICD-10-CM

## 2022-09-19 LAB
ABSOLUTE IMMATURE GRANULOCYTE: 0.01 THOU/MM3 (ref 0–0.07)
BASINOPHIL, AUTOMATED: 1 % (ref 0–3)
BASOPHILS ABSOLUTE: 0.1 THOU/MM3 (ref 0–0.1)
EOSINOPHILS ABSOLUTE: 0.1 THOU/MM3 (ref 0–0.4)
EOSINOPHILS RELATIVE PERCENT: 2 % (ref 0–4)
FERRITIN: 419 NG/ML (ref 10–291)
HCT VFR BLD CALC: 37.6 % (ref 37–47)
HEMOGLOBIN: 12.4 GM/DL (ref 12–16)
IMMATURE GRANULOCYTES: 0 %
IRON: 121 UG/DL (ref 50–170)
LYMPHOCYTES # BLD: 38 % (ref 15–47)
LYMPHOCYTES ABSOLUTE: 2.1 THOU/MM3 (ref 1–4.8)
MCH RBC QN AUTO: 29.9 PG (ref 26–33)
MCHC RBC AUTO-ENTMCNC: 33 GM/DL (ref 32.2–35.5)
MCV RBC AUTO: 91 FL (ref 81–99)
MONOCYTES ABSOLUTE: 0.6 THOU/MM3 (ref 0.4–1.3)
MONOCYTES: 11 % (ref 0–12)
PDW BLD-RTO: 20.2 % (ref 11.5–14.5)
PLATELET # BLD: 176 THOU/MM3 (ref 130–400)
PMV BLD AUTO: 9 FL (ref 9.4–12.4)
RBC # BLD: 4.15 MILL/MM3 (ref 4.2–5.4)
SEG NEUTROPHILS: 48 % (ref 43–75)
SEGMENTED NEUTROPHILS ABSOLUTE COUNT: 2.6 THOU/MM3 (ref 1.8–7.7)
TOTAL IRON BINDING CAPACITY: 254 UG/DL (ref 171–450)
WBC # BLD: 5.4 THOU/MM3 (ref 4.8–10.8)

## 2022-09-19 PROCEDURE — 82728 ASSAY OF FERRITIN: CPT

## 2022-09-19 PROCEDURE — 36415 COLL VENOUS BLD VENIPUNCTURE: CPT

## 2022-09-19 PROCEDURE — 99211 OFF/OP EST MAY X REQ PHY/QHP: CPT

## 2022-09-19 PROCEDURE — 83540 ASSAY OF IRON: CPT

## 2022-09-19 PROCEDURE — 83550 IRON BINDING TEST: CPT

## 2022-09-19 PROCEDURE — 4004F PT TOBACCO SCREEN RCVD TLK: CPT | Performed by: PHYSICIAN ASSISTANT

## 2022-09-19 PROCEDURE — 99213 OFFICE O/P EST LOW 20 MIN: CPT | Performed by: PHYSICIAN ASSISTANT

## 2022-09-19 PROCEDURE — G8419 CALC BMI OUT NRM PARAM NOF/U: HCPCS | Performed by: PHYSICIAN ASSISTANT

## 2022-09-19 PROCEDURE — 85025 COMPLETE CBC W/AUTO DIFF WBC: CPT

## 2022-09-19 PROCEDURE — 3017F COLORECTAL CA SCREEN DOC REV: CPT | Performed by: PHYSICIAN ASSISTANT

## 2022-09-19 PROCEDURE — G8427 DOCREV CUR MEDS BY ELIG CLIN: HCPCS | Performed by: PHYSICIAN ASSISTANT

## 2022-09-19 RX ORDER — FERROUS SULFATE 325(65) MG
325 TABLET ORAL DAILY
Qty: 90 TABLET | Refills: 1 | Status: SHIPPED | OUTPATIENT
Start: 2022-09-19

## 2022-09-19 NOTE — PROGRESS NOTES
[facility] [address]  [address] [address]  Dept: [phone]  Dept Fax: [phone]: [phone]      Visit Date:9/19/2022     Tom García is a 46 y.o. female who presents today for:   Chief Complaint   Patient presents with    Follow-up     Normocytic anemia        HPI:   Tom García is a 46 y.o. female referred to Hematology/Oncology clinic for evaluation of anemia per her PCP, Dr. Dinora Robles. She was seen as a new patient on 7/19/22. The patient was recently hospitalized in June 2022 for hyponatremia and found to be anemic. Hgb during hospitalization 6.5 and improved to 8.4 on date of discharge 6/8/22. Most recent CBC 7/13/22 Hgb 8.1, hct 29.1, MCV 85. 1. iron studies on 7/13/22 showed iron deficiency with ferritin 13, iron 21, TIBC 559. Folate and vitamin B12 not deficient. She was referred for further evaluation. Per chart review of EMR, the patient has had chronic anemia since 2017 with most recent baseline of Hgb 7-8's since April 2022. The patient was referred to GI as well. The patient affirms prior history of anemia. She has previously followed with different hematologist and states she has never required IV iron. The patient denies any abnormal bleeding; no epistaxis, hemoptysis, hematemesis, melena, hematochezia, hematuria or vaginal bleeding. Her last menstrual cycle was approximately 7 years ago. The patient denies history of malabsorptive disorders such as IBD or celiac disease. She denies prior gastrointestinal surgeries. She reports adequate intake of oral iron in her diet. The patient was evaluated by Dr. Dante Mcmahon on 7/18/2022 and recommended EGD and colonoscopy which have been scheduled for 8/16/2022. The patient states she has never received blood transfusion as she has refused in the past.  She states she does not want to accept blood transfusions at this time. Her past medical history includes MS, anemia.   It is noted through chart review she has history of alcohol abuse. Patient states her last drink was approximately 3 days ago. Interval History 9/19/2022:   The patient is here for follow up evaluation. She was seen as a new patient on 7/19/22 and recommended IV iron. She received three infusions of IV Venofer with third infusion on 8/12/22. Since receiving IV iron she reports she has been feeling better. She reports improvement in fatigue and energy levels. Denies lightheadedness, dizziness, chest pain, shortness of breath, dyspnea on exertion, palpitations or pica symptoms. She denies any abnormal bleeding. Denies epistaxis, hemoptysis, hematemesis, melena, hematochezia, hematuria or vaginal bleeding. PMH, SH, and FH:  I reviewed the patients medication list and allergy list as noted on the electronic medical record. The PMH, SH and FH were also reviewed as noted on the EMR. Review of Systems:   Review of Systems   Pertinent review of systems noted in HPI, all other ROS negative. Objective:   Physical Exam   BP (!) 150/72 (Site: Right Upper Arm)   Pulse 100   Temp 98.5 °F (36.9 °C) (Oral)   Resp 16   Ht 5' 4\" (1.626 m)   Wt 105 lb (47.6 kg)   LMP 08/14/2012   SpO2 97%   BMI 18.02 kg/m²    General appearance: No apparent distress, chronically ill appearing, and cooperative. HEENT: Pupils equal, round, and reactive to light. Conjunctivae/corneas clear. Neck: Supple, with full range of motion. Trachea midline. Respiratory:  Normal respiratory effort. Clear to auscultation, bilaterally without Rales/Wheezes/Rhonchi. Cardiovascular: Regular rate and rhythm with normal S1/S2   Abdomen: Soft, active bowel sounds. Musculoskeletal: No clubbing, cyanosis or edema bilaterally. Ambulates in office  Skin: Skin color, texture, turgor normal.  No visible rashes or lesions. Neurologic:  Neurovascularly intact without any focal sensory/motor deficits.    Psychiatric: Alert and oriented      Imaging Studies and Labs: CBC:   Lab Results   Component Value Date    WBC 5.4 09/19/2022    HGB 12.4 09/19/2022    HCT 37.6 09/19/2022    MCV 91 09/19/2022     09/19/2022     BMP:   Lab Results   Component Value Date/Time     07/13/2022 02:44 PM    K 4.2 07/13/2022 02:44 PM    K 3.7 06/02/2022 11:52 PM    CL 97 07/13/2022 02:44 PM    CO2 23 07/13/2022 02:44 PM    BUN 10 07/13/2022 02:44 PM    CREATININE 0.5 07/13/2022 02:44 PM    GLUCOSE 105 07/13/2022 02:44 PM    GLUCOSE 149 05/14/2022 11:54 AM    CALCIUM 10.5 07/13/2022 02:44 PM      LFT:   Lab Results   Component Value Date    ALT 14 07/13/2022    AST 24 07/13/2022    ALKPHOS 109 07/13/2022    BILITOT 0.2 (L) 07/13/2022         Assessment and Plan:   Normocytic Anemia  Anemia likely multifactorial from iron deficiency, chronic alcohol use. Most recent CBC on 7/13/22 Hgb 8.1, Hct 29.1, MCV 85.1. WBC count and platelet count within normal limits. Iron studies completed on 7/13/22 consistent with iron deficiency anemia with ferritin 13, iron 21, TIBC 559. Source of iron deficiency unclear; however, on CT of ab/pelvis on 6/3/22 during hospitalization showed new esophageal wall thickening and edema, question esophagitis. New duodenal and proximal jejunal wall thickening and dilation, question duodenitis and/or enteritis. The patient has been evaluated by Dr. Cindy Pugh and had EGD/colonoscopy on 8/16/22. No records available, normal per patient. Today on 9/19/2022: Hgb improved to 12.4, Hct 37.6, MCV 91. WBC count and platelet count within normal limits.  -Will hold further IV iron at this time. Continue oral iron supplementation once daily and iron rich diet. -will continue to monitor Hgb/hct and iron levels  -Patient instructed to monitor for signs of worsening anemia/blood loss. -Will request records from EGD/colonoscopy. -Return to clinic in 12 weeks.  -Labs on RTC: CBC, ferritin, iron, TIBC        All patient questions answered. Pt voiced understanding.  Patient agreed with treatment plan. Follow up as directed. Patient instructed to call for questions or concerns.           Electronically signed by   Yana Rendon PA-C

## 2022-09-19 NOTE — PATIENT INSTRUCTIONS
Noted LV dysfunction on echocardiogram.  Per Dr. Galo Carrion planned for today at 1:15 PM with Dr. Ricardo Daniel, full progress note to follow Return to clinic in 3 months  Labs on RTC: CBC, ferritin, iron, TIBC   Please call for questions or concerns.       **please request records from EGD/colonoscopy per Dr. Mayur Richey

## 2022-10-05 DIAGNOSIS — D64.9 ANEMIA, UNSPECIFIED TYPE: ICD-10-CM

## 2022-10-05 DIAGNOSIS — Z78.9 ALCOHOL USE: ICD-10-CM

## 2022-10-05 DIAGNOSIS — L29.9 PRURITUS: ICD-10-CM

## 2022-10-05 NOTE — TELEPHONE ENCOUNTER
Pt. Called req. Refill on Gabapentin & Multivitamin.     Patient's last appointment was : 7/13/2022  Patient's next appointment is :   Future Appointments   Date Time Provider Marilynn Samson   12/19/2022 10:00 AM Uzair Deleon [facility]     Last refilled:    Lab Results   Component Value Date    LABA1C 4.8 10/03/2019     Lab Results   Component Value Date    CHOL 162 10/09/2019    TRIG 196 10/09/2019     06/16/2021    LDLCALC 97 06/16/2021     Lab Results   Component Value Date     07/13/2022    K 4.2 07/13/2022    CL 97 (L) 07/13/2022    CO2 23 07/13/2022    BUN 10 07/13/2022    CREATININE 0.5 07/13/2022    GLUCOSE 105 07/13/2022    CALCIUM 10.5 07/13/2022    PROT 8.6 (H) 07/13/2022    LABALBU 5.2 (H) 07/13/2022    BILITOT 0.2 (L) 07/13/2022    ALKPHOS 109 07/13/2022    AST 24 07/13/2022    ALT 14 07/13/2022    LABGLOM >90 07/13/2022    AGRATIO 1.2 (L) 05/14/2022     Lab Results   Component Value Date    TSH 3.920 06/03/2022    T4FREE 0.96 04/11/2022     Lab Results   Component Value Date    WBC 5.4 09/19/2022    HGB 12.4 09/19/2022    HCT 37.6 09/19/2022    MCV 91 09/19/2022     09/19/2022

## 2022-10-13 ENCOUNTER — APPOINTMENT (OUTPATIENT)
Dept: GENERAL RADIOLOGY | Age: 52
End: 2022-10-13
Payer: COMMERCIAL

## 2022-10-13 ENCOUNTER — APPOINTMENT (OUTPATIENT)
Dept: CT IMAGING | Age: 52
End: 2022-10-13
Payer: COMMERCIAL

## 2022-10-13 ENCOUNTER — HOSPITAL ENCOUNTER (EMERGENCY)
Age: 52
Discharge: HOME | End: 2022-10-13
Attending: STUDENT IN AN ORGANIZED HEALTH CARE EDUCATION/TRAINING PROGRAM
Payer: COMMERCIAL

## 2022-10-13 VITALS
BODY MASS INDEX: 18.88 KG/M2 | DIASTOLIC BLOOD PRESSURE: 72 MMHG | RESPIRATION RATE: 16 BRPM | HEART RATE: 87 BPM | TEMPERATURE: 98.5 F | SYSTOLIC BLOOD PRESSURE: 126 MMHG | WEIGHT: 110 LBS | OXYGEN SATURATION: 97 %

## 2022-10-13 DIAGNOSIS — S42.001A CLOSED DISPLACED FRACTURE OF RIGHT CLAVICLE, UNSPECIFIED PART OF CLAVICLE, INITIAL ENCOUNTER: Primary | ICD-10-CM

## 2022-10-13 LAB
ALBUMIN SERPL-MCNC: 4.2 G/DL (ref 3.5–5.1)
ALP BLD-CCNC: 86 U/L (ref 38–126)
ALT SERPL-CCNC: 24 U/L (ref 11–66)
AMPHETAMINE+METHAMPHETAMINE URINE SCREEN: NEGATIVE
ANION GAP SERPL CALCULATED.3IONS-SCNC: 19 MEQ/L (ref 8–16)
AST SERPL-CCNC: 72 U/L (ref 5–40)
BARBITURATE QUANTITATIVE URINE: NEGATIVE
BASOPHILS # BLD: 0.9 %
BASOPHILS ABSOLUTE: 0.1 THOU/MM3 (ref 0–0.1)
BENZODIAZEPINE QUANTITATIVE URINE: NEGATIVE
BILIRUB SERPL-MCNC: 0.2 MG/DL (ref 0.3–1.2)
BILIRUBIN DIRECT: < 0.2 MG/DL (ref 0–0.3)
BILIRUBIN URINE: NEGATIVE
BLOOD, URINE: NEGATIVE
BUN BLDV-MCNC: 5 MG/DL (ref 7–22)
CALCIUM SERPL-MCNC: 8.9 MG/DL (ref 8.5–10.5)
CANNABINOID QUANTITATIVE URINE: POSITIVE
CHARACTER, URINE: CLEAR
CHLORIDE BLD-SCNC: 91 MEQ/L (ref 98–111)
CO2: 19 MEQ/L (ref 23–33)
COCAINE METABOLITE QUANTITATIVE URINE: NEGATIVE
COLOR: YELLOW
CREAT SERPL-MCNC: 0.4 MG/DL (ref 0.4–1.2)
EOSINOPHIL # BLD: 0.4 %
EOSINOPHILS ABSOLUTE: 0 THOU/MM3 (ref 0–0.4)
ERYTHROCYTE [DISTWIDTH] IN BLOOD BY AUTOMATED COUNT: 15.1 % (ref 11.5–14.5)
ERYTHROCYTE [DISTWIDTH] IN BLOOD BY AUTOMATED COUNT: 49.1 FL (ref 35–45)
ETHYL ALCOHOL, SERUM: 0.29 %
FENTANYL: NEGATIVE
GFR SERPL CREATININE-BSD FRML MDRD: > 90 ML/MIN/1.73M2
GLUCOSE BLD-MCNC: 84 MG/DL (ref 70–108)
GLUCOSE URINE: NEGATIVE MG/DL
HCT VFR BLD CALC: 36 % (ref 37–47)
HEMOGLOBIN: 12.2 GM/DL (ref 12–16)
IMMATURE GRANS (ABS): 0.05 THOU/MM3 (ref 0–0.07)
IMMATURE GRANULOCYTES: 0.5 %
KETONES, URINE: NEGATIVE
LEUKOCYTE ESTERASE, URINE: NEGATIVE
LYMPHOCYTES # BLD: 25.4 %
LYMPHOCYTES ABSOLUTE: 2.5 THOU/MM3 (ref 1–4.8)
MCH RBC QN AUTO: 32.7 PG (ref 26–33)
MCHC RBC AUTO-ENTMCNC: 33.9 GM/DL (ref 32.2–35.5)
MCV RBC AUTO: 96.5 FL (ref 81–99)
MONOCYTES # BLD: 8.2 %
MONOCYTES ABSOLUTE: 0.8 THOU/MM3 (ref 0.4–1.3)
NITRITE, URINE: NEGATIVE
NUCLEATED RED BLOOD CELLS: 0 /100 WBC
OPIATES, URINE: POSITIVE
OSMOLALITY CALCULATION: 255.4 MOSMOL/KG (ref 275–300)
OXYCODONE: NEGATIVE
PH UA: 6.5 (ref 5–9)
PHENCYCLIDINE QUANTITATIVE URINE: NEGATIVE
PLATELET # BLD: 208 THOU/MM3 (ref 130–400)
PMV BLD AUTO: 9 FL (ref 9.4–12.4)
POTASSIUM REFLEX MAGNESIUM: 4.1 MEQ/L (ref 3.5–5.2)
PROTEIN UA: NEGATIVE
RBC # BLD: 3.73 MILL/MM3 (ref 4.2–5.4)
SEG NEUTROPHILS: 64.6 %
SEGMENTED NEUTROPHILS ABSOLUTE COUNT: 6.4 THOU/MM3 (ref 1.8–7.7)
SODIUM BLD-SCNC: 129 MEQ/L (ref 135–145)
SPECIFIC GRAVITY, URINE: 1 (ref 1–1.03)
TOTAL PROTEIN: 7.1 G/DL (ref 6.1–8)
UROBILINOGEN, URINE: 0.2 EU/DL (ref 0–1)
WBC # BLD: 9.9 THOU/MM3 (ref 4.8–10.8)

## 2022-10-13 PROCEDURE — 72125 CT NECK SPINE W/O DYE: CPT

## 2022-10-13 PROCEDURE — 99285 EMERGENCY DEPT VISIT HI MDM: CPT

## 2022-10-13 PROCEDURE — 80307 DRUG TEST PRSMV CHEM ANLYZR: CPT

## 2022-10-13 PROCEDURE — 76376 3D RENDER W/INTRP POSTPROCES: CPT

## 2022-10-13 PROCEDURE — 71045 X-RAY EXAM CHEST 1 VIEW: CPT

## 2022-10-13 PROCEDURE — 70450 CT HEAD/BRAIN W/O DYE: CPT

## 2022-10-13 PROCEDURE — 36415 COLL VENOUS BLD VENIPUNCTURE: CPT

## 2022-10-13 PROCEDURE — 73030 X-RAY EXAM OF SHOULDER: CPT

## 2022-10-13 PROCEDURE — 80076 HEPATIC FUNCTION PANEL: CPT

## 2022-10-13 PROCEDURE — 93005 ELECTROCARDIOGRAM TRACING: CPT | Performed by: STUDENT IN AN ORGANIZED HEALTH CARE EDUCATION/TRAINING PROGRAM

## 2022-10-13 PROCEDURE — 82077 ASSAY SPEC XCP UR&BREATH IA: CPT

## 2022-10-13 PROCEDURE — 96374 THER/PROPH/DIAG INJ IV PUSH: CPT

## 2022-10-13 PROCEDURE — 81003 URINALYSIS AUTO W/O SCOPE: CPT

## 2022-10-13 PROCEDURE — 6360000004 HC RX CONTRAST MEDICATION: Performed by: STUDENT IN AN ORGANIZED HEALTH CARE EDUCATION/TRAINING PROGRAM

## 2022-10-13 PROCEDURE — 80048 BASIC METABOLIC PNL TOTAL CA: CPT

## 2022-10-13 PROCEDURE — 6360000002 HC RX W HCPCS: Performed by: STUDENT IN AN ORGANIZED HEALTH CARE EDUCATION/TRAINING PROGRAM

## 2022-10-13 PROCEDURE — 74177 CT ABD & PELVIS W/CONTRAST: CPT

## 2022-10-13 PROCEDURE — 71260 CT THORAX DX C+: CPT

## 2022-10-13 PROCEDURE — 85025 COMPLETE CBC W/AUTO DIFF WBC: CPT

## 2022-10-13 RX ORDER — MORPHINE SULFATE 4 MG/ML
4 INJECTION, SOLUTION INTRAMUSCULAR; INTRAVENOUS ONCE
Status: COMPLETED | OUTPATIENT
Start: 2022-10-13 | End: 2022-10-13

## 2022-10-13 RX ADMIN — MORPHINE SULFATE 4 MG: 4 INJECTION, SOLUTION INTRAMUSCULAR; INTRAVENOUS at 19:39

## 2022-10-13 RX ADMIN — IOPAMIDOL 80 ML: 755 INJECTION, SOLUTION INTRAVENOUS at 21:33

## 2022-10-13 ASSESSMENT — PAIN DESCRIPTION - ORIENTATION: ORIENTATION: RIGHT

## 2022-10-13 ASSESSMENT — ENCOUNTER SYMPTOMS
EYE PAIN: 0
APNEA: 0
DIARRHEA: 0
EYE ITCHING: 0
ABDOMINAL DISTENTION: 0
ANAL BLEEDING: 0
SINUS PRESSURE: 0
NAUSEA: 0
CHEST TIGHTNESS: 0
SHORTNESS OF BREATH: 1
COLOR CHANGE: 0
BACK PAIN: 0
FACIAL SWELLING: 0
COUGH: 0
CHOKING: 0
TROUBLE SWALLOWING: 0
SORE THROAT: 0
EYE DISCHARGE: 0
ABDOMINAL PAIN: 0
SINUS PAIN: 0
EYE REDNESS: 0
RECTAL PAIN: 0

## 2022-10-13 ASSESSMENT — PAIN - FUNCTIONAL ASSESSMENT
PAIN_FUNCTIONAL_ASSESSMENT: 0-10
PAIN_FUNCTIONAL_ASSESSMENT: 0-10

## 2022-10-13 ASSESSMENT — PAIN DESCRIPTION - PAIN TYPE: TYPE: ACUTE PAIN

## 2022-10-13 ASSESSMENT — PAIN DESCRIPTION - DESCRIPTORS: DESCRIPTORS: ACHING

## 2022-10-13 ASSESSMENT — PAIN SCALES - GENERAL
PAINLEVEL_OUTOF10: 10
PAINLEVEL_OUTOF10: 7
PAINLEVEL_OUTOF10: 10

## 2022-10-13 ASSESSMENT — PAIN DESCRIPTION - LOCATION: LOCATION: SHOULDER

## 2022-10-13 NOTE — ED PROVIDER NOTES
[place] ENCOUNTER          Pt Name: Luis Fernando Walden  MRN: [id number]  [place] 1970  Date of evaluation: 10/13/2022  Treating Resident Physician: Regina Gilmore MD  Supervising Physician: Olga [address]       Chief Complaint   Patient presents with    Fall    Shortness of Breath    Clavicle Injury     right     History obtained from the patient. HISTORY OF PRESENT ILLNESS    HPI  Luis Fernando Walden is a 46 y.o. female with PMHx of personality disorder, syncope, alcohol withdrawal, multiple sclerosis who presents to the emergency department for evaluation of fall, shortness of breath, clavicle injury. Patient states that she was at a friend's house when she fell down 13 steps but rolled most of the way down. She states that today she only had 3 beers and 1 shot of alcohol at her friend's house before the fall. After she stood up from the fall, she experienced some shortness of breath. Patient states that she has 10 out of 10 pain into her right shoulder. No alleviating factors, pain is sharp and stabbing. There is no numbness or tingling to the extremity. Patient also complains of lower rib pain on the right side. States that the pain is 10 out of 10 sharp and stabbing in nature as well. Very tender to palpation. Patient denies hitting her head and denies any LOC. Patient is not on any blood thinners. The patient has no other acute complaints at this time. REVIEW OF SYSTEMS   Review of Systems   Constitutional:  Negative for activity change, appetite change, diaphoresis and fatigue. HENT:  Negative for ear pain, facial swelling, sinus pressure, sinus pain, sore throat and trouble swallowing. Eyes:  Negative for pain, discharge, redness and itching. Respiratory:  Positive for shortness of breath. Negative for apnea, cough, choking and chest tightness.     Cardiovascular:  Negative for chest pain, palpitations and leg swelling. Gastrointestinal:  Negative for abdominal distention, abdominal pain, anal bleeding, diarrhea, nausea and rectal pain. Endocrine: Negative for polydipsia, polyphagia and polyuria. Genitourinary:  Negative for dysuria, flank pain, genital sores and hematuria. Musculoskeletal:  Positive for arthralgias. Negative for back pain, joint swelling, myalgias, neck pain and neck stiffness. Skin:  Negative for color change, rash and wound. Neurological:  Negative for syncope, facial asymmetry, speech difficulty and headaches. Hematological:  Negative for adenopathy. Psychiatric/Behavioral:  Negative for agitation, behavioral problems, hallucinations, self-injury and suicidal ideas. PAST MEDICAL AND SURGICAL HISTORY     Past Medical History:   Diagnosis Date    Anemia     ETOH abuse     Malnutrition ([facility]     Mild tetrahydrocannabinol (THC) abuse     Multiple sclerosis ([facility] 2017    Personality disorder ([facility] unknown    Smoker      Past Surgical History:   Procedure Laterality Date    COLONOSCOPY  08/16/2022    CYST REMOVAL  2004    right thigh     TONSILLECTOMY  1977    WRIST GANGLION EXCISION Right 08/2021         MEDICATIONS   No current facility-administered medications for this encounter. Current Outpatient Medications:     ferrous sulfate (IRON 325) 325 (65 Fe) MG tablet, Take 1 tablet by mouth daily, Disp: 90 tablet, Rfl: 1    Multiple Vitamin (MULTIVITAMIN) TABS tablet, Take 1 tablet by mouth daily, Disp: 30 tablet, Rfl: 0    folic acid (FOLVITE) 1 MG tablet, Take 1 tablet by mouth daily, Disp: 30 tablet, Rfl: 3    pantoprazole (PROTONIX) 40 MG tablet, Take 1 tablet by mouth every morning (before breakfast), Disp: 30 tablet, Rfl: 3    thiamine 100 mg tablet, Take 1 tablet by mouth daily, Disp: 30 tablet, Rfl: 3    gabapentin (NEURONTIN) 300 MG capsule, Take 1 capsule by mouth 3 times daily for 30 days.  (Patient taking differently: Take 300 mg by mouth daily.), Disp: 90 capsule, Rfl: 0      SOCIAL HISTORY     Social History     Social History Narrative    Not on file     Social History     Tobacco Use    Smoking status: Every Day     Packs/day: 0.50     Years: 30.00     Pack years: 15.00     Types: Cigarettes     Start date: 10/3/1989    Smokeless tobacco: Never    Tobacco comments:     Declines cessation counseling- 8/5   Substance Use Topics    Alcohol use: Yes     Alcohol/week: 3.0 standard drinks     Types: 3 Cans of beer per week     Comment: once per week 2 beers    Drug use: Yes     Types: Marijuana Jena Lazaro)     Comment: occasional         ALLERGIES     Allergies   Allergen Reactions    Mushroom Extract Complex      Swelling         FAMILY HISTORY     Family History   Problem Relation Age of Onset    Hypertension Mother     Stroke Father     Hypertension Father     No Known Problems Sister          PREVIOUS RECORDS   Previous records reviewed: I reviewed the patient's past medical records including relevant labs, imaging and procedures. Patient was last hospitalized on 6/2/2022 for syncope and collapse    PHYSICAL EXAM     ED Triage Vitals [10/13/22 1802]   BP Temp Temp Source Heart Rate Resp SpO2 Height Weight   139/80 98.5 °F (36.9 °C) Oral 82 18 98 % -- 110 lb (49.9 kg)     Initial vital signs and nursing assessment reviewed and normal. Body mass index is 18.88 kg/m². Pulsoximetry is normal per my interpretation. Additional Vital Signs:  Vitals:    10/13/22 1947   BP: 126/72   Pulse: 87   Resp: 16   Temp:    SpO2: 97%       Physical Exam  Constitutional:       General: She is not in acute distress. Appearance: Normal appearance. She is not ill-appearing or diaphoretic. HENT:      Head: Normocephalic and atraumatic. Right Ear: External ear normal.      Left Ear: External ear normal.      Nose: Nose normal. No congestion or rhinorrhea.       Mouth/Throat:      Mouth: Mucous membranes are moist.      Pharynx: No oropharyngeal exudate or posterior oropharyngeal erythema. Eyes:      General: No scleral icterus. Extraocular Movements: Extraocular movements intact. Conjunctiva/sclera: Conjunctivae normal.      Pupils: Pupils are equal, round, and reactive to light. Cardiovascular:      Rate and Rhythm: Normal rate and regular rhythm. Pulses: Normal pulses. Heart sounds: Normal heart sounds. Pulmonary:      Effort: Pulmonary effort is normal. No respiratory distress. Breath sounds: Normal breath sounds. No decreased breath sounds. Chest:      Chest wall: Tenderness present. Abdominal:      General: Bowel sounds are normal. There is no distension. Palpations: Abdomen is soft. Tenderness: There is no abdominal tenderness. There is no guarding or rebound. Musculoskeletal:         General: No swelling. Right shoulder: Swelling, deformity, tenderness and bony tenderness present. Decreased range of motion. Cervical back: Normal range of motion and neck supple. No rigidity or tenderness. Comments: Obvious deformity to the right clavicle/shoulder region. tenderness to palpation. Skin:     General: Skin is warm and dry. Capillary Refill: Capillary refill takes less than 2 seconds. Coloration: Skin is not jaundiced or pale. Findings: No bruising, erythema, lesion or rash. Neurological:      General: No focal deficit present. Mental Status: She is alert and oriented to person, place, and time. Psychiatric:         Mood and Affect: Mood is anxious. Behavior: Behavior is agitated. Thought Content:  Thought content normal.       ED RESULTS   Laboratory results:  Labs Reviewed   CBC WITH AUTO DIFFERENTIAL - Abnormal; Notable for the following components:       Result Value    RBC 3.73 (*)     Hematocrit 36.0 (*)     RDW-CV 15.1 (*)     RDW-SD 49.1 (*)     MPV 9.0 (*)     All other components within normal limits   BASIC METABOLIC PANEL W/ REFLEX TO MG FOR LOW K - Abnormal; Notable for the following components:    Sodium 129 (*)     Chloride 91 (*)     CO2 19 (*)     BUN 5 (*)     All other components within normal limits   HEPATIC FUNCTION PANEL - Abnormal; Notable for the following components: Total Bilirubin 0.2 (*)     AST 72 (*)     All other components within normal limits   ANION GAP - Abnormal; Notable for the following components:    Anion Gap 19.0 (*)     All other components within normal limits   OSMOLALITY - Abnormal; Notable for the following components:    Osmolality Calc 255.4 (*)     All other components within normal limits   ETHANOL   URINALYSIS WITH REFLEX TO CULTURE   URINE DRUG SCREEN   GLOMERULAR FILTRATION RATE, ESTIMATED       Radiologic studies results:  XR SHOULDER RIGHT (MIN 2 VIEWS)   Final Result   1. Displaced angulated fracture of the distal aspect of the right    clavicle. Acromioclavicular joint is maintained. This document has been electronically signed by: Anel Marcial MD on    10/13/2022 07:19 PM      XR CHEST PORTABLE   Final Result   1. Displaced oblique fracture of the distal aspect of the right clavicle. 2. No acute cardiac pulmonary mildly. This document has been electronically signed by: Anel Marcial MD on    10/13/2022 07:15 PM      CT Head WO Contrast    (Results Pending)   CT Cervical Spine WO Contrast    (Results Pending)   CT ABDOMEN PELVIS W IV CONTRAST Additional Contrast? None    (Results Pending)   CT CHEST W CONTRAST    (Results Pending)   CT LUMBAR RECONSTRUCTION WO POST PROCESS    (Results Pending)   CT THORACIC RECONSTRUCTION WO POST PROCESS    (Results Pending)       ED Medications administered this visit:   Medications   morphine injection 4 mg (4 mg IntraVENous Given 10/13/22 1939)   iopamidol (ISOVUE-370) 76 % injection 80 mL (80 mLs IntraVENous Given 10/13/22 2133)         ED COURSE     ED Course as of 10/13/22 2218   Thu Oct 13, 2022   2005 XR SHOULDER RIGHT (MIN 2 VIEWS)  IMPRESSION:  1.  Displaced angulated fracture of the distal aspect of the right   clavicle. Acromioclavicular joint is maintained. [EL]   2045 UA neg [EL]   2056 Cannabinoid Quant, Ur: POSITIVE [EL]   2056 Opiates, Urine: POSITIVE [EL]   2056 ETHYL ALCOHOL, SERUM: 0.29 [EL]      ED Course User Index  [EL] Ivory Basurto MD       MEDICAL DECISION MAKING   Given the patient's above chief complaint and findings on history and physical examination, I thought it was appropriate to consider the following emergency medical conditions:  Clavicle fracture, dislocation, shoulder fracture, dislocation, alcohol use/abuse, rib fractures, dislocation, trauma    Although some of these diagnoses are unlikely they were considered in my medical decision making. 71-year-old female chief complaint fall down 13 steps, after drinking at her friend's house. EtOH on ED arrival 0.29, urinalysis positive for opioids, cannabinoids. X-ray of shoulder showing displaced angulated fracture of distal aspect of the right clavicle. She was given morphine for pain. Patient also complaining of right-sided lower rib pain that is reproducible. Due to the mechanism of injury and EtOH use, patient was pan scanned. At this time, pending scan results. Patient was signed out to resident physician Dr. Jero Jane Prescriptions    No medications on file         FINAL DISPOSITION     Final diagnoses:   None     Condition: condition: stable  Dispo: signed out to Dr. Elmer Salazar       This transcription was electronically signed. Parts of this transcriptions may have been dictated by use of voice recognition software and electronically transcribed, and parts may have been transcribed with the assistance of an ED scribe. The transcription may contain errors not detected in proofreading. Please refer to my supervising physician's documentation if my documentation differs.     Electronically Signed: Maribell Morel MD, 10/13/22, 10:18 PM         Ivory Basurto, MD  Resident  10/13/22 [id number]

## 2022-10-13 NOTE — ED NOTES
Pt moving right arm around. Pt instructed to not moved the arm. Pt respirations even and unlabored.  VSS     Gianfranco Freeman RN  10/13/22 1950

## 2022-10-13 NOTE — ED TRIAGE NOTES
Patient presents to ED with chief complaint of Fall, shortness of breath, and clavicle injury. Patient states she fell down 13 steps, but rolled most of the way. Denies hitting head and denies blood thinners. Patient states that she has had 3 beers, and 1 shot of alcohol today at her friends house. Patient also states that she started experiencing shortness of breath when she stood up after falling. Patient has obvious deformity to right shoulder. Aox4. Patient resting in bed. Respirations easy and unlabored. No distress noted. Call light within reach.

## 2022-10-14 LAB
EKG ATRIAL RATE: 83 BPM
EKG P AXIS: 53 DEGREES
EKG P-R INTERVAL: 148 MS
EKG Q-T INTERVAL: 380 MS
EKG QRS DURATION: 72 MS
EKG QTC CALCULATION (BAZETT): 446 MS
EKG R AXIS: 74 DEGREES
EKG T AXIS: 68 DEGREES
EKG VENTRICULAR RATE: 83 BPM

## 2022-10-14 PROCEDURE — 93010 ELECTROCARDIOGRAM REPORT: CPT | Performed by: INTERNAL MEDICINE

## 2022-10-14 NOTE — ED PROVIDER NOTES
Received patient in signout from Dr. Paul Sherman. Patient presented after fall downstairs. Patient intoxicated and found to have right-sided clavicle fracture. Patient to be placed in sling with plan to follow-up with orthopedics. Additional imaging studies pending. Plan: Follow-up imaging results, reassess    Assessment:  All imaging back at this time except CT head without contrast.  Patient with acute clavicle fracture however no other traumatic injury. Patient requesting to leave the emergency department and stating that she does not want to be here any longer as she feels fine. Sling applied to patient. Updated patient that we are waiting on the results of her CT head. Patient has friend with her that is sober and is comfortable with plan to discharge her with her sober friend if CT head is negative. Discussed this plan with patient and her friend at bedside with plan to discharge to sober friend if CT head is negative. They verbalized agreement with this plan. CT head negative for acute process. Went to room to update patient on plan for discharge and patient had eloped from the emergency department.     Impression: Acute fracture of the right clavicle  Disposition: Eloped; however, had plan to discharge     Gabriela Wright MD  10/14/22 8045

## 2022-10-14 NOTE — ED NOTES
Pt wanting to leave at this time. Dr. Gomez Grams to bedside. Pt agreeable to stay until head ct back as long as it's soon. Pt does have sober ride home. Pt refusing vital reassessment.        Lauren Perez RN  10/13/22 5116

## 2022-10-25 ENCOUNTER — OFFICE VISIT (OUTPATIENT)
Dept: FAMILY MEDICINE CLINIC | Age: 52
End: 2022-10-25
Payer: COMMERCIAL

## 2022-10-25 VITALS
BODY MASS INDEX: 18.1 KG/M2 | SYSTOLIC BLOOD PRESSURE: 122 MMHG | HEIGHT: 64 IN | HEART RATE: 105 BPM | OXYGEN SATURATION: 97 % | DIASTOLIC BLOOD PRESSURE: 64 MMHG | RESPIRATION RATE: 16 BRPM | WEIGHT: 106 LBS | TEMPERATURE: 98.5 F

## 2022-10-25 DIAGNOSIS — M51.36 LUMBAR DEGENERATIVE DISC DISEASE: ICD-10-CM

## 2022-10-25 DIAGNOSIS — Z78.9 ALCOHOL USE: ICD-10-CM

## 2022-10-25 DIAGNOSIS — G35 MS (MULTIPLE SCLEROSIS) (HCC): ICD-10-CM

## 2022-10-25 DIAGNOSIS — D64.9 ANEMIA, UNSPECIFIED TYPE: Primary | ICD-10-CM

## 2022-10-25 PROCEDURE — G8427 DOCREV CUR MEDS BY ELIG CLIN: HCPCS

## 2022-10-25 PROCEDURE — G8484 FLU IMMUNIZE NO ADMIN: HCPCS

## 2022-10-25 PROCEDURE — 4004F PT TOBACCO SCREEN RCVD TLK: CPT

## 2022-10-25 PROCEDURE — 99214 OFFICE O/P EST MOD 30 MIN: CPT

## 2022-10-25 PROCEDURE — G8419 CALC BMI OUT NRM PARAM NOF/U: HCPCS

## 2022-10-25 PROCEDURE — 3017F COLORECTAL CA SCREEN DOC REV: CPT

## 2022-10-25 RX ORDER — GABAPENTIN 300 MG/1
300 CAPSULE ORAL DAILY
Qty: 30 CAPSULE | Refills: 0 | Status: SHIPPED | OUTPATIENT
Start: 2022-10-25 | End: 2022-11-30 | Stop reason: SDUPTHER

## 2022-10-25 RX ORDER — MULTIVITAMIN WITH IRON
1 TABLET ORAL DAILY
Qty: 30 TABLET | Refills: 0 | Status: SHIPPED | OUTPATIENT
Start: 2022-10-25

## 2022-10-25 RX ORDER — GABAPENTIN 300 MG/1
300 CAPSULE ORAL DAILY
Qty: 30 CAPSULE | Refills: 0 | Status: SHIPPED | OUTPATIENT
Start: 2022-10-25 | End: 2022-10-25 | Stop reason: SDUPTHER

## 2022-10-25 RX ORDER — MULTIVITAMIN WITH IRON
1 TABLET ORAL DAILY
Qty: 30 TABLET | Refills: 0 | Status: SHIPPED | OUTPATIENT
Start: 2022-10-25 | End: 2022-10-25 | Stop reason: SDUPTHER

## 2022-10-25 SDOH — ECONOMIC STABILITY: FOOD INSECURITY: WITHIN THE PAST 12 MONTHS, THE FOOD YOU BOUGHT JUST DIDN'T LAST AND YOU DIDN'T HAVE MONEY TO GET MORE.: OFTEN TRUE

## 2022-10-25 SDOH — ECONOMIC STABILITY: FOOD INSECURITY: WITHIN THE PAST 12 MONTHS, YOU WORRIED THAT YOUR FOOD WOULD RUN OUT BEFORE YOU GOT MONEY TO BUY MORE.: OFTEN TRUE

## 2022-10-25 ASSESSMENT — SOCIAL DETERMINANTS OF HEALTH (SDOH): HOW HARD IS IT FOR YOU TO PAY FOR THE VERY BASICS LIKE FOOD, HOUSING, MEDICAL CARE, AND HEATING?: VERY HARD

## 2022-10-25 NOTE — PROGRESS NOTES
Real Mccarthy is a 46 y. o.female    Chief Complaint   Patient presents with    Follow-up     Medication Refill also having frequent nose bleeds since falling down stairs 10/13/2022       Chief complaint, [place], and all pertinent details of the case reviewed with the resident. Please see resident's note for specific details discussed at today's visit. Pt presents for follow up of neuropathy related to possible MS or DDD. Has hx of ETOH abuse as well. Describes shooting pains from left foot up to hip. Neurontin helps. Still drinking about 12 beers per week, working on cutting down      Patient Active Problem List   Diagnosis    Alcohol withdrawal ([facility] [address]    Personality disorder, NOS    Optic neuritis    Alcohol use    Alcohol abuse    Anemia    Hyponatremia    Moderate malnutrition (HCC)    Syncope and collapse    Normocytic anemia    Iron deficiency anemia, unspecified       Current Outpatient Medications   Medication Sig Dispense Refill    Multiple Vitamin (MULTIVITAMIN) TABS tablet Take 1 tablet by mouth daily 30 tablet 0    ferrous sulfate (IRON 325) 325 (65 Fe) MG tablet Take 1 tablet by mouth daily 90 tablet 1    folic acid (FOLVITE) 1 MG tablet Take 1 tablet by mouth daily 30 tablet 3    pantoprazole (PROTONIX) 40 MG tablet Take 1 tablet by mouth every morning (before breakfast) 30 tablet 3    thiamine 100 mg tablet Take 1 tablet by mouth daily 30 tablet 3    gabapentin (NEURONTIN) 300 MG capsule Take 1 capsule by mouth daily for 30 days. (Patient not taking: Reported on 10/25/2022) 30 capsule 0     No current facility-administered medications for this visit.        Review of Systems per Dr. Aldair Guallpa    OBJECTIVE     /64 (Site: Left Upper Arm, Position: Sitting, Cuff Size: Medium Adult)   Pulse (!) 105   Temp 98.5 °F (36.9 °C) (Oral)   Resp 16   Ht 5' 4\" (1.626 m)   Wt 106 lb (48.1 kg)   LMP 08/14/2012   SpO2 97%   BMI 18.19 kg/m²   BP Readings from Last 3 Encounters: 10/25/22 122/64   10/13/22 126/72   09/19/22 (!) 150/72       Wt Readings from Last 3 Encounters:   10/25/22 106 lb (48.1 kg)   10/13/22 110 lb (49.9 kg)   09/19/22 105 lb (47.6 kg)     Body mass index is 18.19 kg/m². Physical Exam per Dr. Patricia Shearer    Lab Results   Component Value Date    LABA1C 4.8 10/03/2019       Lab Results   Component Value Date    CHOL 162 10/09/2019    TRIG 196 10/09/2019     06/16/2021    LDLCALC 97 06/16/2021       The ASCVD Risk score (Selma DK, et al., 2019) failed to calculate for the following reasons: The valid HDL cholesterol range is 20 to 100 mg/dL    Lab Results   Component Value Date     (L) 10/13/2022    K 4.1 10/13/2022    CL 91 (L) 10/13/2022    CO2 19 (L) 10/13/2022    BUN 5 (L) 10/13/2022    CREATININE 0.4 10/13/2022    GLUCOSE 84 10/13/2022    CALCIUM 8.9 10/13/2022    PROT 7.1 10/13/2022    LABALBU 4.2 10/13/2022    BILITOT 0.2 (L) 10/13/2022    ALKPHOS 86 10/13/2022    AST 72 (H) 10/13/2022    ALT 24 10/13/2022    LABGLOM >90 10/13/2022    AGRATIO 1.2 (L) 05/14/2022       Lab Results   Component Value Date    TSH 3.920 06/03/2022    T4FREE 0.96 04/11/2022       Lab Results   Component Value Date    WBC 9.9 10/13/2022    HGB 12.2 10/13/2022    HCT 36.0 (L) 10/13/2022    MCV 96.5 10/13/2022     10/13/2022         Future Appointments   Date Time Provider Marilynn Mali   12/19/2022 10:00  [address], PA-C [facility]       ASSESSMENT       Diagnosis Orders   1. Anemia, unspecified type        2. Lumbar degenerative disc disease        3. MS (multiple sclerosis) ([facility] [address]        4. Alcohol use            PLAN      After discussion with pt and Dr. aPtricia Shearer, we agreed on plan as follows:     Cont meds. Refills given          Attending Physician Statement  I have discussed the case, including pertinent history and exam findings with the resident. I also have seen the patient and performed key portions of the examination.   I agree with the documented assessment and plan as documented by the resident.   GC modifier added  to this encounter        Electronically signed by Kay Black MD on 10/25/2022 at 2:11 PM

## 2022-10-25 NOTE — PROGRESS NOTES
Wellington Sánchez (:  1970) is a 46 y.o. female,Established patient, here for evaluation of the following chief complaint(s):  Follow-up (Medication Refill also having frequent nose bleeds since falling down stairs 10/13/2022)         ASSESSMENT/PLAN:  1. Anemia, unspecified type  -     Multiple Vitamin (MULTIVITAMIN) TABS tablet; Take 1 tablet by mouth daily, Disp-30 tablet, R-0Normal  Patient follows with hematology oncology for chronic anemia and most recently followed up 6 weeks ago. She will be getting IV infusions up until that time and her most recent hemoglobin was within normal limits. At this time no longer needs IV infusions and is still following with heme-onc. 2. Lumbar degenerative disc disease   Patient has an extensive history of lumbar degenerative disc disease and neuropathies affecting the bilateral lower extremities. At present she has been controlled with gabapentin 300 mg daily and is tolerating it well. We will continue at this time. 3. MS (multiple sclerosis) ([facility] [address]   Patient has a history of possible MS and is currently asymptomatic. She states that she is unsure if she ever about definitive diagnosis of MS but is aware that her scans indicate that it is likely multiple sclerosis. Right now she is stable and does not need any additional treatment. 4. Alcohol use  Patient is drinking about 12 beers per week and notes that she is cut down significantly from her previous total.  At present is on thiamine and folic acid is tolerating those well. Spoke to her about the importance of cutting back on alcohol use and patient is aware and is attempting to cut back more. Return in about 6 months (around 2023). Subjective   SUBJECTIVE/OBJECTIVE:  Maybell Seip is a 55-year-old female with past medical history of multiple sclerosis, chronic anemia who presents for medication refill today.   Patient noticed to have an extensive history of nerve pain and lumbar disc degeneration. She is taking gabapentin for this and requires a refill today. Otherwise she follows with hematology oncology for chronic anemia and was previously getting IV infusions. She has since had her hemoglobin stabilized and is no longer requiring the infusions. She last saw hematology oncology 6 weeks ago and they were happy with where her hemoglobin hematocrit levels were at said to continue following with them. In terms of alcohol, patient drinks about 3-4 beers every couple days and states that she is not at the point that where she is dependent on it. She drinks about a total of 12 beers per week and is cutting back right now. Review of Systems   Constitutional:  Negative for fatigue. Musculoskeletal:         Right arm in a sling due to clavicle fracture        Objective   Vitals:    10/25/22 1342   BP: 122/64   Pulse: (!) 105   Resp: 16   Temp: 98.5 °F (36.9 °C)   SpO2: 97%       Physical Exam  Constitutional:       Appearance: Normal appearance. She is normal weight. HENT:      Head: Normocephalic and atraumatic. Cardiovascular:      Rate and Rhythm: Normal rate and regular rhythm. Pulses: Normal pulses. Heart sounds: Normal heart sounds. Pulmonary:      Effort: Pulmonary effort is normal.      Breath sounds: Normal breath sounds. Abdominal:      General: Abdomen is flat. Bowel sounds are normal.      Palpations: Abdomen is soft. Musculoskeletal:         General: Tenderness (right shoulder and clavicle) and signs of injury (right clavicle) present. Comments: Right arm is in sling due to clavicle fracture   Skin:     General: Skin is warm and dry. Neurological:      General: No focal deficit present. Mental Status: She is alert and oriented to person, place, and time. Mental status is at baseline.           On this date 10/25/2022 I have spent 30 minutes reviewing previous notes, test results and face to face with the patient discussing the diagnosis and importance of compliance with the treatment plan as well as documenting on the day of the visit. An electronic signature was used to authenticate this note.     --Noemi Eric, DO

## 2022-10-25 NOTE — PROGRESS NOTES
[id number] [facility] [address] [address]  Dept: [phone]  Loc: [phone]      Please see Resident note for complete HPI. 57-year-old here for medication refill. History of neuropathy as well as lumbar degenerative disk on Gabapentin 300 mg daily. Iron deficiency on replacement. ETOH use - 12 beers/week - trying to cut back. She is on thiamine and folic acid. MRI head suggests MS.     ROS per Resident    Lab Results   Component Value Date    WBC 9.9 10/13/2022    HGB 12.2 10/13/2022    HCT 36.0 (L) 10/13/2022    MCV 96.5 10/13/2022     10/13/2022     Lab Results   Component Value Date     (L) 10/13/2022    K 4.1 10/13/2022    CL 91 (L) 10/13/2022    CO2 19 (L) 10/13/2022    BUN 5 (L) 10/13/2022    CREATININE 0.4 10/13/2022    GLUCOSE 84 10/13/2022    CALCIUM 8.9 10/13/2022    PROT 7.1 10/13/2022    LABALBU 4.2 10/13/2022    BILITOT 0.2 (L) 10/13/2022    ALKPHOS 86 10/13/2022    AST 72 (H) 10/13/2022    ALT 24 10/13/2022    LABGLOM >90 10/13/2022    AGRATIO 1.2 (L) 05/14/2022     Lab Results   Component Value Date    LABA1C 4.8 10/03/2019     No results found for: EAG  No results found for: Dennis Aparuna  Lab Results   Component Value Date    TSH 3.920 06/03/2022    T4FREE 0.96 04/11/2022     Lab Results   Component Value Date    CHOL 162 10/09/2019     Lab Results   Component Value Date    TRIG 196 10/09/2019     Lab Results   Component Value Date     06/16/2021    HDL 56 10/09/2019     Lab Results   Component Value Date    LDLCALC 97 06/16/2021    LDLCALC 67 10/09/2019     No results found for: LABVLDL, VLDL  No results found for: CHOLHDLRATIO  No results found for: PSA, PSADIA    Health Maintenance Due   Topic Date Due    COVID-19 Vaccine (1) Never done    Pneumococcal 0-64 years Vaccine (1 - PCV) Never done    Breast cancer screen  Never done    Shingles vaccine (1 of 2) Never done Flu vaccine (1) Never done    Cervical cancer screen  11/18/2022         Physical Exam per Resident       ICD-10-CM    1. Nerve pain  M79.2       2. Anemia, unspecified type  D64.9       3. Alcohol use  Z78.9               Plan  I participated in the discussion and care of this patient     Neuropathy - refill Gabapentin 300 mg PO daily  Anemia - continue follow up with hematology for iron infusions  Alcohol use - continue to work on alcohol cessation.

## 2022-11-29 DIAGNOSIS — Z78.9 ALCOHOL USE: ICD-10-CM

## 2022-11-29 NOTE — TELEPHONE ENCOUNTER
Patient's last appointment was : 10/25/2022  Patient's next appointment is :   Future Appointments   Date Time Provider Marilynn Samson   12/19/2022 10:00 AM Diana Santos  Oncology [facility] [place]   5/1/2023  1:00 PM Subha Barkley MD [facility] [address] [facility]     Last refilled:    Lab Results   Component Value Date    LABA1C 4.8 10/03/2019     Lab Results   Component Value Date    CHOL 162 10/09/2019    TRIG 196 10/09/2019     06/16/2021    LDLCALC 97 06/16/2021     Lab Results   Component Value Date     (L) 10/13/2022    K 4.1 10/13/2022    CL 91 (L) 10/13/2022    CO2 19 (L) 10/13/2022    BUN 5 (L) 10/13/2022    CREATININE 0.4 10/13/2022    GLUCOSE 84 10/13/2022    CALCIUM 8.9 10/13/2022    PROT 7.1 10/13/2022    LABALBU 4.2 10/13/2022    BILITOT 0.2 (L) 10/13/2022    ALKPHOS 86 10/13/2022    AST 72 (H) 10/13/2022    ALT 24 10/13/2022    LABGLOM >90 10/13/2022    AGRATIO 1.2 (L) 05/14/2022     Lab Results   Component Value Date    TSH 3.920 06/03/2022    T4FREE 0.96 04/11/2022     Lab Results   Component Value Date    WBC 9.9 10/13/2022    HGB 12.2 10/13/2022    HCT 36.0 (L) 10/13/2022    MCV 96.5 10/13/2022     10/13/2022

## 2022-11-30 RX ORDER — GABAPENTIN 300 MG/1
300 CAPSULE ORAL DAILY
Qty: 30 CAPSULE | Refills: 0 | Status: SHIPPED | OUTPATIENT
Start: 2022-11-30 | End: 2022-12-30

## 2022-12-19 ENCOUNTER — HOSPITAL ENCOUNTER (OUTPATIENT)
Dept: INFUSION THERAPY | Age: 52
Discharge: HOME | End: 2022-12-19
Payer: COMMERCIAL

## 2022-12-19 ENCOUNTER — OFFICE VISIT (OUTPATIENT)
Dept: ONCOLOGY | Age: 52
End: 2022-12-19
Payer: COMMERCIAL

## 2022-12-19 VITALS
TEMPERATURE: 98.4 F | SYSTOLIC BLOOD PRESSURE: 124 MMHG | DIASTOLIC BLOOD PRESSURE: 70 MMHG | HEART RATE: 106 BPM | RESPIRATION RATE: 16 BRPM

## 2022-12-19 VITALS
WEIGHT: 103 LBS | TEMPERATURE: 98.4 F | SYSTOLIC BLOOD PRESSURE: 124 MMHG | OXYGEN SATURATION: 97 % | RESPIRATION RATE: 16 BRPM | HEART RATE: 106 BPM | DIASTOLIC BLOOD PRESSURE: 70 MMHG | BODY MASS INDEX: 17.58 KG/M2 | HEIGHT: 64 IN

## 2022-12-19 DIAGNOSIS — D50.8 OTHER IRON DEFICIENCY ANEMIA: Primary | ICD-10-CM

## 2022-12-19 DIAGNOSIS — D64.9 NORMOCYTIC ANEMIA: ICD-10-CM

## 2022-12-19 DIAGNOSIS — D50.8 OTHER IRON DEFICIENCY ANEMIA: ICD-10-CM

## 2022-12-19 LAB
ABSOLUTE IMMATURE GRANULOCYTE: 0.02 THOU/MM3 (ref 0–0.07)
BASINOPHIL, AUTOMATED: 1 % (ref 0–3)
BASOPHILS ABSOLUTE: 0.1 THOU/MM3 (ref 0–0.1)
EOSINOPHILS ABSOLUTE: 0.3 THOU/MM3 (ref 0–0.4)
EOSINOPHILS RELATIVE PERCENT: 3 % (ref 0–4)
FERRITIN: 177 NG/ML (ref 10–291)
HCT VFR BLD CALC: 34.7 % (ref 37–47)
HEMOGLOBIN: 11.4 GM/DL (ref 12–16)
IMMATURE GRANULOCYTES: 0 %
IRON: 360 UG/DL (ref 50–170)
LYMPHOCYTES # BLD: 33 % (ref 15–47)
LYMPHOCYTES ABSOLUTE: 2.5 THOU/MM3 (ref 1–4.8)
MCH RBC QN AUTO: 33.8 PG (ref 26–33)
MCHC RBC AUTO-ENTMCNC: 32.9 GM/DL (ref 32.2–35.5)
MCV RBC AUTO: 103 FL (ref 81–99)
MONOCYTES ABSOLUTE: 1 THOU/MM3 (ref 0.4–1.3)
MONOCYTES: 13 % (ref 0–12)
PDW BLD-RTO: 13.1 % (ref 11.5–14.5)
PLATELET # BLD: 322 THOU/MM3 (ref 130–400)
PMV BLD AUTO: 8.7 FL (ref 9.4–12.4)
RBC # BLD: 3.37 MILL/MM3 (ref 4.2–5.4)
SEG NEUTROPHILS: 50 % (ref 43–75)
SEGMENTED NEUTROPHILS ABSOLUTE COUNT: 3.8 THOU/MM3 (ref 1.8–7.7)
TOTAL IRON BINDING CAPACITY: < 377 UG/DL (ref 171–450)
WBC # BLD: 7.6 THOU/MM3 (ref 4.8–10.8)

## 2022-12-19 PROCEDURE — 85025 COMPLETE CBC W/AUTO DIFF WBC: CPT

## 2022-12-19 PROCEDURE — 82728 ASSAY OF FERRITIN: CPT

## 2022-12-19 PROCEDURE — G8419 CALC BMI OUT NRM PARAM NOF/U: HCPCS | Performed by: PHYSICIAN ASSISTANT

## 2022-12-19 PROCEDURE — 83550 IRON BINDING TEST: CPT

## 2022-12-19 PROCEDURE — 83540 ASSAY OF IRON: CPT

## 2022-12-19 PROCEDURE — 36415 COLL VENOUS BLD VENIPUNCTURE: CPT

## 2022-12-19 PROCEDURE — 4004F PT TOBACCO SCREEN RCVD TLK: CPT | Performed by: PHYSICIAN ASSISTANT

## 2022-12-19 PROCEDURE — 99213 OFFICE O/P EST LOW 20 MIN: CPT | Performed by: PHYSICIAN ASSISTANT

## 2022-12-19 PROCEDURE — 99211 OFF/OP EST MAY X REQ PHY/QHP: CPT

## 2022-12-19 PROCEDURE — 3017F COLORECTAL CA SCREEN DOC REV: CPT | Performed by: PHYSICIAN ASSISTANT

## 2022-12-19 PROCEDURE — G8484 FLU IMMUNIZE NO ADMIN: HCPCS | Performed by: PHYSICIAN ASSISTANT

## 2022-12-19 PROCEDURE — G8427 DOCREV CUR MEDS BY ELIG CLIN: HCPCS | Performed by: PHYSICIAN ASSISTANT

## 2022-12-19 RX ORDER — MULTIVITAMIN WITH IRON
1 TABLET ORAL DAILY
Qty: 30 TABLET | Refills: 0 | Status: SHIPPED | OUTPATIENT
Start: 2022-12-19

## 2022-12-19 RX ORDER — FERROUS SULFATE 325(65) MG
325 TABLET ORAL DAILY
Qty: 90 TABLET | Refills: 1 | Status: SHIPPED | OUTPATIENT
Start: 2022-12-19

## 2022-12-19 RX ORDER — FOLIC ACID 1 MG/1
1 TABLET ORAL DAILY
Qty: 30 TABLET | Refills: 3 | Status: SHIPPED | OUTPATIENT
Start: 2022-12-19

## 2022-12-19 RX ORDER — LANOLIN ALCOHOL/MO/W.PET/CERES
100 CREAM (GRAM) TOPICAL DAILY
Qty: 30 TABLET | Refills: 3 | Status: SHIPPED | OUTPATIENT
Start: 2022-12-19

## 2022-12-19 NOTE — PROGRESS NOTES
[facility], [address] [address],[address]  [address] [address]  Dept: [phone]  Dept Fax: [phone]: [phone]      Visit Date:12/19/2022     Sharyle Ricker is a 46 y.o. female who presents today for:   Chief Complaint   Patient presents with    Follow-up     Normocytic anemia        HPI:   Sharyle Ricker is a 46 y.o. female referred to Hematology/Oncology clinic for evaluation of anemia per her PCP, Dr. Kayla Strong. She was seen as a new patient on 7/19/22. The patient was recently hospitalized in June 2022 for hyponatremia and found to be anemic. Hgb during hospitalization 6.5 and improved to 8.4 on date of discharge 6/8/22. Most recent CBC 7/13/22 Hgb 8.1, hct 29.1, MCV 85. 1. iron studies on 7/13/22 showed iron deficiency with ferritin 13, iron 21, TIBC 559. Folate and vitamin B12 not deficient. She was referred for further evaluation. Per chart review of EMR, the patient has had chronic anemia since 2017 with most recent baseline of Hgb 7-8's since April 2022. The patient was referred to GI as well. The patient affirms prior history of anemia. She has previously followed with different hematologist and states she has never required IV iron. The patient denies any abnormal bleeding; no epistaxis, hemoptysis, hematemesis, melena, hematochezia, hematuria or vaginal bleeding. Her last menstrual cycle was approximately 7 years ago. The patient denies history of malabsorptive disorders such as IBD or celiac disease. She denies prior gastrointestinal surgeries. She reports adequate intake of oral iron in her diet. The patient was evaluated by Dr. Mayur Richey on 7/18/2022 and recommended EGD and colonoscopy which have been scheduled for 8/16/2022. The patient states she has never received blood transfusion as she has refused in the past.  She states she does not want to accept blood transfusions at this time. Her past medical history includes MS, anemia.   It is noted through chart review she has history of alcohol abuse. Patient received IV Venofer in July-August 2022. She had an EGD 8/16/22 per Dr. Maik Guerrero showing small Schatzki's ring which is non-obstructive, otherwise normal exam to the second portion of the duodenum. No evidence of varices. Colonoscopy was completed the same date showing single sessile polyp located in sigmoid colon measuring 8 mm in diameter which was removed, grade 3 internal hemorrhoids, large internal hemorrhoids diverticula, otherwise normal colonoscopy to the cecum. Interval History 12/19/2022:   The patient is here for follow up evaluation. The patient is here with her friend today. She offers no new complaints since her visit in September 2022. She denies worsening fatigue. Denies new chest pain, shortness of breath, SHERMAN, palpitations or pica symptoms. Denies any abnormal bleeding. Patient recently had right clavicle surgery at [facility]; fell and fractured right clavicle. Patient admits she is intoxicated during visit today. PMH, SH, and FH:  I reviewed the patients medication list and allergy list as noted on the electronic medical record. The PMH, SH and FH were also reviewed as noted on the EMR. Review of Systems:   Review of Systems   Pertinent review of systems noted in HPI, all other ROS negative. Objective:   Physical Exam   /70 (Site: Left Upper Arm, Position: Sitting, Cuff Size: Medium Adult)   Pulse (!) 106   Temp 98.4 °F (36.9 °C) (Oral)   Resp 16   Ht 5' 4\" (1.626 m)   Wt 103 lb (46.7 kg)   LMP 08/14/2012   SpO2 97%   BMI 17.68 kg/m²    General appearance: No apparent distress, chronically ill appearing, intoxicated. and cooperative. HEENT: Pupils equal, round, and reactive to light. Conjunctivae/corneas clear. Neck: Supple, with full range of motion. Trachea midline. Respiratory:  Normal respiratory effort. Clear to auscultation, bilaterally without Rales/Wheezes/Rhonchi.   Cardiovascular: Regular rate and rhythm with normal S1/S2   Abdomen: Soft, active bowel sounds. Musculoskeletal: No clubbing, cyanosis or edema bilaterally. Right upper extremity in sling. Skin: Skin color, texture, turgor normal.  No visible rashes or lesions. Neurologic:  Neurovascularly intact without any focal sensory/motor deficits. Psychiatric: Alert and oriented      Imaging Studies and Labs:   CBC:   Lab Results   Component Value Date    WBC 9.9 10/13/2022    HGB 12.2 10/13/2022    HCT 36.0 (L) 10/13/2022    MCV 96.5 10/13/2022     10/13/2022     BMP:   Lab Results   Component Value Date/Time     10/13/2022 07:50 PM    K 4.1 10/13/2022 07:50 PM    CL 91 10/13/2022 07:50 PM    CO2 19 10/13/2022 07:50 PM    BUN 5 10/13/2022 07:50 PM    CREATININE 0.4 10/13/2022 07:50 PM    GLUCOSE 84 10/13/2022 07:50 PM    GLUCOSE 149 05/14/2022 11:54 AM    CALCIUM 8.9 10/13/2022 07:50 PM      LFT:   Lab Results   Component Value Date    ALT 24 10/13/2022    AST 72 (H) 10/13/2022    ALKPHOS 86 10/13/2022    BILITOT 0.2 (L) 10/13/2022         Assessment and Plan:   Normocytic Anemia  Anemia likely multifactorial from iron deficiency, chronic alcohol use. Most recent CBC on 7/13/22 Hgb 8.1, Hct 29.1, MCV 85.1. WBC count and platelet count within normal limits. Iron studies completed on 7/13/22 consistent with iron deficiency anemia with ferritin 13, iron 21, TIBC 559. Source of iron deficiency unclear; however, on CT of ab/pelvis on 6/3/22 during hospitalization showed new esophageal wall thickening and edema, question esophagitis. New duodenal and proximal jejunal wall thickening and dilation, question duodenitis and/or enteritis. S/P EGD per Dr. Pham Maurer on 8/16/22 showing small Schatzki's ring non obstructive, otherwise normal exam to second portion of duodenum. No evidence of varices. Colonoscopy same date single sessile polyp in sigmoid colon, grade 3 internal hemorrhoids, some diverticula.  Duodenal biopsy without significant pathologic changes. Today on 12/19/2022: Hgb 11.4, Hct 34.7, . Recent surgery at [address] [place] [address]. Iron studies ferritin 177, iron 360, TIBC <377 consistent with inflammation. No IV iron needed. MCV up to 103 likely related to recent surgery/alcohol use. -Will hold further IV iron at this time. Continue oral iron supplementation once daily. Pt reports non compliance with thiamine, MVI, folate ordered per PCP. Discussed importance. Refills given today.   -will continue to monitor Hgb/hct and iron levels  -Patient instructed to monitor for signs of worsening anemia/blood loss. -Return to clinic in 12 weeks.  -Labs on RTC: CBC, ferritin, iron, TIBC, folate and B12      All patient questions answered. Pt voiced understanding. Patient agreed with treatment plan. Follow up as directed. Patient instructed to call for questions or concerns.           Electronically signed by   Royal Kitchen PA-C

## 2022-12-30 ENCOUNTER — HOSPITAL ENCOUNTER (EMERGENCY)
Age: 52
Discharge: HOME | End: 2022-12-30
Payer: COMMERCIAL

## 2022-12-30 ENCOUNTER — APPOINTMENT (OUTPATIENT)
Dept: GENERAL RADIOLOGY | Age: 52
End: 2022-12-30
Payer: COMMERCIAL

## 2022-12-30 VITALS
OXYGEN SATURATION: 99 % | HEART RATE: 101 BPM | BODY MASS INDEX: 17.93 KG/M2 | TEMPERATURE: 98.3 F | HEIGHT: 64 IN | DIASTOLIC BLOOD PRESSURE: 62 MMHG | RESPIRATION RATE: 20 BRPM | WEIGHT: 105 LBS | SYSTOLIC BLOOD PRESSURE: 115 MMHG

## 2022-12-30 DIAGNOSIS — S93.491A SPRAIN OF ANTERIOR TALOFIBULAR LIGAMENT OF RIGHT ANKLE, INITIAL ENCOUNTER: Primary | ICD-10-CM

## 2022-12-30 PROCEDURE — 99283 EMERGENCY DEPT VISIT LOW MDM: CPT

## 2022-12-30 PROCEDURE — 73630 X-RAY EXAM OF FOOT: CPT

## 2022-12-30 ASSESSMENT — ENCOUNTER SYMPTOMS: COLOR CHANGE: 1

## 2022-12-30 NOTE — ED PROVIDER NOTES
[facility] Emergency Department    CHIEF COMPLAINT       Chief Complaint   Patient presents with    Foot Injury     right       Nurses Notes reviewed and I agree except as noted in the HPI. HISTORY OF PRESENT ILLNESS    Rita Vela is a 46 y.o. female who presents to the ED for evaluation of right foot injury. Patient notes 2 nights ago she fell, injuring her jaw. Last night she notes that she got up to the bathroom, and her right ankle gave out, then as she was coming back from the bathroom she fell again. She notes pain to the outside of the right foot. She notes pain with flexion of the foot. She notes pain with weightbearing. She denies any numbness or tingling. She notes a history of multiple sclerosis and frequent falls due to this. She has a history of fracture in this foot in the past due to dropping a freezer on it. HPI was provided by the patient. REVIEW OF SYSTEMS     Review of Systems   Musculoskeletal:  Positive for arthralgias, gait problem and joint swelling. Skin:  Positive for color change. Negative for wound. Neurological:  Negative for weakness and numbness. Hematological:  Does not bruise/bleed easily. PAST MEDICAL HISTORY     Past Medical History:   Diagnosis Date    Anemia     Clavicle fracture 10/13/2022    ETOH abuse     Malnutrition ([address]     Mild tetrahydrocannabinol (THC) abuse     Multiple sclerosis ([facility] 2017    Personality disorder ([facility] unknown    Rib fracture 10/13/2022    Smoker        SURGICALHISTORY      has a past surgical history that includes cyst removal (2004); Tonsillectomy (1977); Wrist ganglion excision (Right, 08/2021); and Colonoscopy (08/16/2022).     CURRENT MEDICATIONS       Discharge Medication List as of 12/30/2022 11:15 AM        CONTINUE these medications which have NOT CHANGED    Details   thiamine 100 MG tablet Take 1 tablet by mouth daily, Disp-30 tablet, [id number]      folic acid (FOLVITE) 1 MG tablet Take 1 tablet by mouth daily, Disp-30 tablet, R-3Normal      ferrous sulfate (IRON 325) 325 (65 Fe) MG tablet Take 1 tablet by mouth daily, Disp-90 tablet, R-1Normal      Multiple Vitamin (MULTIVITAMIN) TABS tablet Take 1 tablet by mouth daily, Disp-30 tablet, R-0Normal      gabapentin (NEURONTIN) 300 MG capsule Take 1 capsule by mouth daily for 30 days. , Disp-30 capsule, R-0Normal      pantoprazole (PROTONIX) 40 MG tablet Take 1 tablet by mouth every morning (before breakfast), Disp-30 tablet, R-3Normal             ALLERGIES     is allergic to mushroom extract complex. FAMILY HISTORY     She indicated that her mother is alive. She indicated that her father is alive. She indicated that her sister is alive. family history includes Hypertension in her father and mother; No Known Problems in her sister; Stroke in her father. SOCIAL HISTORY       Social History     Socioeconomic History    Marital status: Legally      Spouse name: Not on file    Number of children: 0    Years of education: Not on file    Highest education level: Associate degree: occupational, technical, or vocational program   Occupational History    Not on file   Tobacco Use    Smoking status: Every Day     Packs/day: 0.50     Years: 30.00     Pack years: 15.00     Types: Cigarettes     Start date: 10/3/1989    Smokeless tobacco: Never    Tobacco comments:     Declines cessation counseling- 12/19/22   Substance and Sexual Activity    Alcohol use:  Yes     Alcohol/week: 3.0 standard drinks     Types: 3 Cans of beer per week     Comment: once per week 2 beers    Drug use: Yes     Types: Marijuana Ismael Shoemaker)     Comment: occasional    Sexual activity: Not on file   Other Topics Concern    Not on file   Social History Narrative    Not on file     Social Determinants of Health     Financial Resource Strain: High Risk    Difficulty of Paying Living Expenses: Very hard   Food Insecurity: Food Insecurity Present    Worried About [address] in the Last Year: Often true    Ran Out of Food in the Last Year: Often true   Transportation Needs: Not on file   Physical Activity: Not on file   Stress: Not on file   Social Connections: Not on file   Intimate Partner Violence: Not on file   Housing Stability: Not on file       PHYSICAL EXAM     INITIAL VITALS:  height is 5' 4\" (1.626 m) and weight is 105 lb (47.6 kg). Her oral temperature is 98.3 °F (36.8 °C). Her blood pressure is 115/62 and her pulse is 101 (abnormal). Her respiration is 20 and oxygen saturation is 99%. Physical Exam  Vitals and nursing note reviewed. Constitutional:       General: She is not in acute distress. Appearance: Normal appearance. She is normal weight. She is not ill-appearing. Cardiovascular:      Rate and Rhythm: Normal rate. Pulses: Normal pulses. Pulmonary:      Effort: Pulmonary effort is normal.   Musculoskeletal:      Right ankle: Swelling and ecchymosis present. Tenderness present over the ATF ligament and base of 5th metatarsal. No lateral malleolus, medial malleolus, AITF ligament or proximal fibula tenderness. Decreased range of motion. Anterior drawer test negative. Normal pulse. Right Achilles Tendon: Normal.      Right foot: Normal range of motion and normal capillary refill. No swelling, tenderness or bony tenderness. Normal pulse. Neurological:      Mental Status: She is alert. DIFFERENTIAL DIAGNOSIS:   Ankle sprain strain fracture, fifth metatarsal fracture    DIAGNOSTIC RESULTS       RADIOLOGY: non-plainfilm images(s) such as CT, Ultrasound and MRI are read by the radiologist.  Plain radiographic images are visualized and preliminarily interpreted by the emergency physician unless otherwise stated below. XR FOOT RIGHT (MIN 3 VIEWS)   Final Result   No acute finding            **This report has been created using voice recognition software. It may contain minor errors which are inherent in voice recognition technology. **      Final report electronically signed by Dr. Anthony Alas on 12/30/2022 11:09 AM            LABS:   Labs Reviewed - No data to display    EMERGENCY DEPARTMENT COURSE:   Vitals:    Vitals:    12/30/22 1025   BP: 115/62   Pulse: (!) 101   Resp: 20   Temp: 98.3 °F (36.8 °C)   TempSrc: Oral   SpO2: 99%   Weight: 105 lb (47.6 kg)   Height: 5' 4\" (1.626 m)       MDM      Patient was seen and evaluated in the emergency department, patient appears to be in no acute distress, vital signs reviewed, pulse of 101 noted. Physical exam completed, there is swelling and ecchymosis over the ATFL, there is some tenderness over the proximal fifth metatarsal.  There is no significant decreased range of motion of the ankle. No neurologic deficits noted. X-rays obtained. No significant abnormalities were noted. I discussed my findings and plan of care with the patient she is amenable with discharge. Will place in a walking boot for stability, she is given crutches as she is a significant fall risk due to MS. She is advised to take over-the-counter pain medicine such as ibuprofen or Tylenol. She is advised to follow-up with [facility] in 1 week if symptoms fail to improve. She verbalized understanding of plan of care. Medications - No data to display    Patient was seenindependently by myself. The patient's final impression and disposition and plan was determined by myself. CRITICAL CARE:   None    CONSULTS:  None    PROCEDURES:  None    FINAL IMPRESSION     1.  Sprain of anterior talofibular ligament of right ankle, initial encounter          DISPOSITION/PLAN   Patient discharged    PATIENT REFERREDTO:  Zane Lau [address]  [address] [facility] [phone]  Call in 1 week  For follow up and evaluation, If symptoms worsen    DISCHARGE MEDICATIONS:  Discharge Medication List as of 12/30/2022 11:15 AM          (Please note that portions of this note were completed with a voice recognition program.  Efforts were made to edit the dictations but occasionally words are mis-transcribed.)      Provider:  I personally performed the services described in the documentation,reviewed and edited the documentation which was dictated to the scribe in my presence, and it accurately records my words and actions.     Rohith Norris CNP 12/30/22 4:48 PM    Maylin Norris, APRN - CNP         [id number], APRN - CNP  12/30/22 [id number]

## 2022-12-30 NOTE — ED NOTES
Patient to ED for right foot injury after falling. Patient states she has frequent falls due to her MS and unsteady gait.  Patient denies hitting head or loss of consciousness      Abiodun Ibarra RN  12/30/22 7727

## 2023-01-10 DIAGNOSIS — Z78.9 ALCOHOL USE: ICD-10-CM

## 2023-01-10 RX ORDER — GABAPENTIN 300 MG/1
300 CAPSULE ORAL DAILY
Qty: 30 CAPSULE | Refills: 0 | Status: SHIPPED | OUTPATIENT
Start: 2023-01-10 | End: 2023-02-09 | Stop reason: SDUPTHER

## 2023-01-10 RX ORDER — GABAPENTIN 300 MG/1
300 CAPSULE ORAL DAILY
Qty: 30 CAPSULE | Refills: 0 | Status: CANCELLED | OUTPATIENT
Start: 2023-01-10 | End: 2023-02-09

## 2023-01-10 NOTE — TELEPHONE ENCOUNTER
Patient's last appointment was : 10/25/2022  Patient's next appointment is :   Future Appointments   Date Time Provider Marilynn Samson   3/20/2023  9:40 AM Nilson Schmitz [facility]   5/1/2023  1:00 PM Coy Silva MD SRPX  [facility]     Last refilled:11/30/22    Lab Results   Component Value Date    LABA1C 4.8 10/03/2019     Lab Results   Component Value Date    CHOL 162 10/09/2019    TRIG 196 10/09/2019     06/16/2021    LDLCALC 97 06/16/2021     Lab Results   Component Value Date     (L) 10/13/2022    K 4.1 10/13/2022    CL 91 (L) 10/13/2022    CO2 19 (L) 10/13/2022    BUN 5 (L) 10/13/2022    CREATININE 0.4 10/13/2022    GLUCOSE 84 10/13/2022    CALCIUM 8.9 10/13/2022    PROT 7.1 10/13/2022    LABALBU 4.2 10/13/2022    BILITOT 0.2 (L) 10/13/2022    ALKPHOS 86 10/13/2022    AST 72 (H) 10/13/2022    ALT 24 10/13/2022    LABGLOM >90 10/13/2022    AGRATIO 1.2 (L) 05/14/2022     Lab Results   Component Value Date    TSH 3.920 06/03/2022    T4FREE 0.96 04/11/2022     Lab Results   Component Value Date    WBC 7.6 12/19/2022    HGB 11.4 (L) 12/19/2022    HCT 34.7 (L) 12/19/2022     (H) 12/19/2022     12/19/2022

## 2023-02-09 DIAGNOSIS — Z78.9 ALCOHOL USE: ICD-10-CM

## 2023-02-09 RX ORDER — GABAPENTIN 300 MG/1
300 CAPSULE ORAL DAILY
Qty: 30 CAPSULE | Refills: 0 | Status: SHIPPED | OUTPATIENT
Start: 2023-02-09 | End: 2023-03-11

## 2023-02-09 NOTE — TELEPHONE ENCOUNTER
Patient wanting to see about getting a 90 day supply on her medication so she didn't  have to go to the pharmacy every month to pick it up.   Patient's last appointment was : 5/1/23  Patient's next appointment is :  10/25/22  Last refilled: 2/2  [facility]  Pharmacy Verified    Lab Results   Component Value Date    LABA1C 4.8 10/03/2019     Lab Results   Component Value Date    CHOL 162 10/09/2019    TRIG 196 10/09/2019     06/16/2021    LDLCALC 97 06/16/2021     Lab Results   Component Value Date     (L) 10/13/2022    K 4.1 10/13/2022    CL 91 (L) 10/13/2022    CO2 19 (L) 10/13/2022    BUN 5 (L) 10/13/2022    CREATININE 0.4 10/13/2022    GLUCOSE 84 10/13/2022    CALCIUM 8.9 10/13/2022    PROT 7.1 10/13/2022    LABALBU 4.2 10/13/2022    BILITOT 0.2 (L) 10/13/2022    ALKPHOS 86 10/13/2022    AST 72 (H) 10/13/2022    ALT 24 10/13/2022    LABGLOM >90 10/13/2022    AGRATIO 1.2 (L) 05/14/2022     Lab Results   Component Value Date    TSH 3.920 06/03/2022    T4FREE 0.96 04/11/2022     Lab Results   Component Value Date    WBC 7.6 12/19/2022    HGB 11.4 (L) 12/19/2022    HCT 34.7 (L) 12/19/2022     (H) 12/19/2022     12/19/2022

## 2023-03-10 DIAGNOSIS — Z78.9 ALCOHOL USE: ICD-10-CM

## 2023-03-10 RX ORDER — GABAPENTIN 300 MG/1
300 CAPSULE ORAL DAILY
Qty: 30 CAPSULE | Refills: 0 | Status: SHIPPED | OUTPATIENT
Start: 2023-03-10 | End: 2023-04-09

## 2023-03-20 ENCOUNTER — OFFICE VISIT (OUTPATIENT)
Dept: ONCOLOGY | Age: 53
End: 2023-03-20
Payer: COMMERCIAL

## 2023-03-20 ENCOUNTER — HOSPITAL ENCOUNTER (OUTPATIENT)
Dept: INFUSION THERAPY | Age: 53
Discharge: HOME | End: 2023-03-20
Payer: COMMERCIAL

## 2023-03-20 VITALS
TEMPERATURE: 98.5 F | RESPIRATION RATE: 16 BRPM | HEART RATE: 105 BPM | SYSTOLIC BLOOD PRESSURE: 138 MMHG | DIASTOLIC BLOOD PRESSURE: 80 MMHG

## 2023-03-20 VITALS
RESPIRATION RATE: 16 BRPM | SYSTOLIC BLOOD PRESSURE: 138 MMHG | TEMPERATURE: 98.5 F | BODY MASS INDEX: 18.27 KG/M2 | DIASTOLIC BLOOD PRESSURE: 80 MMHG | OXYGEN SATURATION: 98 % | WEIGHT: 107 LBS | HEART RATE: 105 BPM | HEIGHT: 64 IN

## 2023-03-20 DIAGNOSIS — D64.9 NORMOCYTIC ANEMIA: Primary | ICD-10-CM

## 2023-03-20 DIAGNOSIS — D50.8 OTHER IRON DEFICIENCY ANEMIA: ICD-10-CM

## 2023-03-20 DIAGNOSIS — D64.9 NORMOCYTIC ANEMIA: ICD-10-CM

## 2023-03-20 LAB
ABSOLUTE IMMATURE GRANULOCYTE: 0.02 THOU/MM3 (ref 0–0.07)
BASOPHILS ABSOLUTE: 0.1 THOU/MM3 (ref 0–0.1)
BASOPHILS NFR BLD AUTO: 1 % (ref 0–3)
EOSINOPHIL NFR BLD AUTO: 1 % (ref 0–4)
EOSINOPHILS ABSOLUTE: 0.1 THOU/MM3 (ref 0–0.4)
ERYTHROCYTE [DISTWIDTH] IN BLOOD BY AUTOMATED COUNT: 14.9 % (ref 11.5–14.5)
FERRITIN SERPL IA-MCNC: 136 NG/ML (ref 10–291)
FOLATE SERPL-MCNC: 19.8 NG/ML (ref 4.8–24.2)
HCT VFR BLD AUTO: 37.9 % (ref 37–47)
HGB BLD-MCNC: 12.6 GM/DL (ref 12–16)
IMMATURE GRANULOCYTES: 0 %
IRON SERPL-MCNC: 184 UG/DL (ref 50–170)
LYMPHOCYTES ABSOLUTE: 2.4 THOU/MM3 (ref 1–4.8)
LYMPHOCYTES NFR BLD AUTO: 23 % (ref 15–47)
MCH RBC QN AUTO: 31.3 PG (ref 26–33)
MCHC RBC AUTO-ENTMCNC: 33.2 GM/DL (ref 32.2–35.5)
MCV RBC AUTO: 94 FL (ref 81–99)
MONOCYTES ABSOLUTE: 1.1 THOU/MM3 (ref 0.4–1.3)
MONOCYTES NFR BLD AUTO: 11 % (ref 0–12)
NEUTROPHILS NFR BLD AUTO: 64 % (ref 43–75)
PLATELET # BLD AUTO: 312 THOU/MM3 (ref 130–400)
PMV BLD AUTO: 8.6 FL (ref 9.4–12.4)
RBC # BLD AUTO: 4.03 MILL/MM3 (ref 4.2–5.4)
SEGMENTED NEUTROPHILS ABSOLUTE COUNT: 6.5 THOU/MM3 (ref 1.8–7.7)
TIBC SERPL-MCNC: 389 UG/DL (ref 171–450)
VIT B12 SERPL-MCNC: 795 PG/ML (ref 211–911)
WBC # BLD AUTO: 10.2 THOU/MM3 (ref 4.8–10.8)

## 2023-03-20 PROCEDURE — 99211 OFF/OP EST MAY X REQ PHY/QHP: CPT

## 2023-03-20 PROCEDURE — 83540 ASSAY OF IRON: CPT

## 2023-03-20 PROCEDURE — 4004F PT TOBACCO SCREEN RCVD TLK: CPT | Performed by: PHYSICIAN ASSISTANT

## 2023-03-20 PROCEDURE — 82607 VITAMIN B-12: CPT

## 2023-03-20 PROCEDURE — G8419 CALC BMI OUT NRM PARAM NOF/U: HCPCS | Performed by: PHYSICIAN ASSISTANT

## 2023-03-20 PROCEDURE — 85025 COMPLETE CBC W/AUTO DIFF WBC: CPT

## 2023-03-20 PROCEDURE — 82728 ASSAY OF FERRITIN: CPT

## 2023-03-20 PROCEDURE — 82746 ASSAY OF FOLIC ACID SERUM: CPT

## 2023-03-20 PROCEDURE — G8427 DOCREV CUR MEDS BY ELIG CLIN: HCPCS | Performed by: PHYSICIAN ASSISTANT

## 2023-03-20 PROCEDURE — 83550 IRON BINDING TEST: CPT

## 2023-03-20 PROCEDURE — 99213 OFFICE O/P EST LOW 20 MIN: CPT | Performed by: PHYSICIAN ASSISTANT

## 2023-03-20 PROCEDURE — G8484 FLU IMMUNIZE NO ADMIN: HCPCS | Performed by: PHYSICIAN ASSISTANT

## 2023-03-20 PROCEDURE — 3017F COLORECTAL CA SCREEN DOC REV: CPT | Performed by: PHYSICIAN ASSISTANT

## 2023-03-20 PROCEDURE — 36415 COLL VENOUS BLD VENIPUNCTURE: CPT

## 2023-03-20 NOTE — PROGRESS NOTES
questions answered. Pt voiced understanding. Patient agreed with treatment plan. Follow up as directed. Patient instructed to call for questions or concerns.           Electronically signed by   Bear Peralta PA-C

## 2023-03-20 NOTE — PATIENT INSTRUCTIONS
Return to clinic in 4 months  Labs on RTC: CBC, ferritin, iron, TIBC, Vitamin K44, folic acid    Please call for questions or concerns.

## 2023-05-01 ENCOUNTER — OFFICE VISIT (OUTPATIENT)
Dept: FAMILY MEDICINE CLINIC | Age: 53
End: 2023-05-01
Payer: COMMERCIAL

## 2023-05-01 VITALS
BODY MASS INDEX: 18.68 KG/M2 | TEMPERATURE: 98 F | RESPIRATION RATE: 22 BRPM | SYSTOLIC BLOOD PRESSURE: 118 MMHG | HEIGHT: 64 IN | DIASTOLIC BLOOD PRESSURE: 76 MMHG | OXYGEN SATURATION: 100 % | WEIGHT: 109.4 LBS | HEART RATE: 119 BPM

## 2023-05-01 DIAGNOSIS — Z59.41 FOOD INSECURITY: ICD-10-CM

## 2023-05-01 DIAGNOSIS — F60.9 PERSONALITY DISORDER (HCC): ICD-10-CM

## 2023-05-01 DIAGNOSIS — R01.1 MURMUR: ICD-10-CM

## 2023-05-01 DIAGNOSIS — F41.9 ANXIETY: ICD-10-CM

## 2023-05-01 DIAGNOSIS — R11.10 VOMITING, UNSPECIFIED VOMITING TYPE, UNSPECIFIED WHETHER NAUSEA PRESENT: ICD-10-CM

## 2023-05-01 DIAGNOSIS — G35 MS (MULTIPLE SCLEROSIS) (HCC): ICD-10-CM

## 2023-05-01 DIAGNOSIS — Z78.9 ALCOHOL USE: ICD-10-CM

## 2023-05-01 DIAGNOSIS — R29.6 REPEATED FALLS: Primary | ICD-10-CM

## 2023-05-01 PROCEDURE — 3017F COLORECTAL CA SCREEN DOC REV: CPT

## 2023-05-01 PROCEDURE — 99214 OFFICE O/P EST MOD 30 MIN: CPT

## 2023-05-01 PROCEDURE — 4004F PT TOBACCO SCREEN RCVD TLK: CPT

## 2023-05-01 PROCEDURE — G8427 DOCREV CUR MEDS BY ELIG CLIN: HCPCS

## 2023-05-01 PROCEDURE — G8420 CALC BMI NORM PARAMETERS: HCPCS

## 2023-05-01 RX ORDER — SERTRALINE HYDROCHLORIDE 25 MG/1
25 TABLET, FILM COATED ORAL DAILY
Qty: 30 TABLET | Refills: 1 | Status: SHIPPED | OUTPATIENT
Start: 2023-05-01

## 2023-05-01 RX ORDER — ONDANSETRON 4 MG/1
4 TABLET, FILM COATED ORAL 3 TIMES DAILY PRN
Qty: 30 TABLET | Refills: 0 | Status: SHIPPED | OUTPATIENT
Start: 2023-05-01

## 2023-05-01 RX ORDER — GABAPENTIN 300 MG/1
300 CAPSULE ORAL DAILY
Qty: 90 CAPSULE | Refills: 0 | Status: SHIPPED | OUTPATIENT
Start: 2023-05-12 | End: 2023-08-10

## 2023-05-01 SDOH — ECONOMIC STABILITY: FOOD INSECURITY: WITHIN THE PAST 12 MONTHS, THE FOOD YOU BOUGHT JUST DIDN'T LAST AND YOU DIDN'T HAVE MONEY TO GET MORE.: OFTEN TRUE

## 2023-05-01 SDOH — ECONOMIC STABILITY: INCOME INSECURITY: HOW HARD IS IT FOR YOU TO PAY FOR THE VERY BASICS LIKE FOOD, HOUSING, MEDICAL CARE, AND HEATING?: VERY HARD

## 2023-05-01 SDOH — ECONOMIC STABILITY - FOOD INSECURITY: FOOD INSECURITY: Z59.41

## 2023-05-01 SDOH — ECONOMIC STABILITY: HOUSING INSECURITY
IN THE LAST 12 MONTHS, WAS THERE A TIME WHEN YOU DID NOT HAVE A STEADY PLACE TO SLEEP OR SLEPT IN A SHELTER (INCLUDING NOW)?: YES

## 2023-05-01 SDOH — ECONOMIC STABILITY: FOOD INSECURITY: WITHIN THE PAST 12 MONTHS, YOU WORRIED THAT YOUR FOOD WOULD RUN OUT BEFORE YOU GOT MONEY TO BUY MORE.: OFTEN TRUE

## 2023-05-01 ASSESSMENT — PATIENT HEALTH QUESTIONNAIRE - PHQ9
6. FEELING BAD ABOUT YOURSELF - OR THAT YOU ARE A FAILURE OR HAVE LET YOURSELF OR YOUR FAMILY DOWN: 1
5. POOR APPETITE OR OVEREATING: 0
SUM OF ALL RESPONSES TO PHQ QUESTIONS 1-9: 5
7. TROUBLE CONCENTRATING ON THINGS, SUCH AS READING THE NEWSPAPER OR WATCHING TELEVISION: 0
10. IF YOU CHECKED OFF ANY PROBLEMS, HOW DIFFICULT HAVE THESE PROBLEMS MADE IT FOR YOU TO DO YOUR WORK, TAKE CARE OF THINGS AT HOME, OR GET ALONG WITH OTHER PEOPLE: 0
3. TROUBLE FALLING OR STAYING ASLEEP: 1
1. LITTLE INTEREST OR PLEASURE IN DOING THINGS: 1
9. THOUGHTS THAT YOU WOULD BE BETTER OFF DEAD, OR OF HURTING YOURSELF: 0
SUM OF ALL RESPONSES TO PHQ QUESTIONS 1-9: 5
2. FEELING DOWN, DEPRESSED OR HOPELESS: 1
SUM OF ALL RESPONSES TO PHQ9 QUESTIONS 1 & 2: 2
SUM OF ALL RESPONSES TO PHQ QUESTIONS 1-9: 5
4. FEELING TIRED OR HAVING LITTLE ENERGY: 1
8. MOVING OR SPEAKING SO SLOWLY THAT OTHER PEOPLE COULD HAVE NOTICED. OR THE OPPOSITE, BEING SO FIGETY OR RESTLESS THAT YOU HAVE BEEN MOVING AROUND A LOT MORE THAN USUAL: 0
SUM OF ALL RESPONSES TO PHQ QUESTIONS 1-9: 5

## 2023-05-01 ASSESSMENT — ENCOUNTER SYMPTOMS
EYE PAIN: 0
DIARRHEA: 0
WHEEZING: 0
ABDOMINAL PAIN: 0
SHORTNESS OF BREATH: 0
CHEST TIGHTNESS: 0
VOMITING: 1
NAUSEA: 0
SORE THROAT: 0
CONSTIPATION: 0
RHINORRHEA: 0

## 2023-05-02 NOTE — PATIENT INSTRUCTIONS
Other Therapy services:    [address] directly at [phone]. [facility] also offers open access walk-in hours Monday-Friday.      Minervas Wendi Corona [phone]),   Mary Santiago ([facility] [phone])   Elizabet Mejia ([facility] [phone])

## 2023-05-04 ENCOUNTER — CARE COORDINATION (OUTPATIENT)
Dept: CARE COORDINATION | Age: 53
End: 2023-05-04

## 2023-05-04 NOTE — PROGRESS NOTES
Attending Physician Note    I saw and evaluated the patient, performing the key elements of the service. I discussed the findings, assessment and plan with the resident and agree with the resident's findings and plan as documented in the resident's note. GC modifier added. Brief summary:    Falls, hx of syncope/blackouts - check echo. Consider HM. Hx optic neuritis, pt reports dx of MS - reviewed chart. Imaging without demyelination and pt reports LP did not confirm dx of MS. I am unable to find results from LP. New heart murmur - check echo. Vomiting - ?associated with anxiety. Address as below. Zofran prn. Call/return if sx worsen or persist.    Anxiety with situational stressors - refer for therapy. Address social determinants of health - see resident's note. Sertraline 25mg daily. Neuropathic pain of legs - using gabapentin at hs. Refilled.     Follow up 4-6 wks, sooner if needed
Social work referral for food insecurity and increased caregiver role.
not taking: Reported on 5/1/2023) 30 tablet 3    Multiple Vitamin (MULTIVITAMIN) TABS tablet Take 1 tablet by mouth daily (Patient not taking: Reported on 5/1/2023) 30 tablet 0    pantoprazole (PROTONIX) 40 MG tablet Take 1 tablet by mouth every morning (before breakfast) (Patient not taking: Reported on 3/20/2023) 30 tablet 3     No current facility-administered medications for this visit. Vitals & Physical Examination     Vitals:    05/01/23 1316   BP: 118/76   Pulse: (!) 119   Resp: 22   Temp: 98 °F (36.7 °C)   SpO2: 100%   Weight: 109 lb 6.4 oz (49.6 kg)   Height: 5' 4\" (1.626 m)    Body mass index is 18.78 kg/m². Physical Exam  Vitals reviewed. Constitutional:       General: She is not in acute distress. Appearance: Normal appearance. She is not ill-appearing. HENT:      Head: Normocephalic and atraumatic. Right Ear: External ear normal.      Left Ear: External ear normal.      Nose: Nose normal.   Eyes:      General:         Right eye: No discharge. Left eye: No discharge. Conjunctiva/sclera: Conjunctivae normal.   Cardiovascular:      Rate and Rhythm: Normal rate and regular rhythm. Pulses: Normal pulses. Heart sounds: Murmur heard. Pulmonary:      Effort: Pulmonary effort is normal. No respiratory distress. Breath sounds: Normal breath sounds. Abdominal:      General: Abdomen is flat. Bowel sounds are normal.      Palpations: Abdomen is soft. Musculoskeletal:         General: Normal range of motion. Cervical back: Normal range of motion and neck supple. Skin:     General: Skin is warm and dry. Capillary Refill: Capillary refill takes less than 2 seconds. Findings: No rash. Neurological:      General: No focal deficit present. Mental Status: She is alert. Psychiatric:         Mood and Affect: Mood normal.         Thought Content:  Thought content normal.     Results & Diagnostics   I have reviewed: active problem list, medication

## 2023-05-09 ENCOUNTER — CARE COORDINATION (OUTPATIENT)
Dept: CARE COORDINATION | Age: 53
End: 2023-05-09

## 2023-05-10 ENCOUNTER — CARE COORDINATION (OUTPATIENT)
Dept: CARE COORDINATION | Age: 53
End: 2023-05-10

## 2023-05-10 NOTE — CARE COORDINATION
PCP referral for food insecurity and caregiver support. Social work sending resources. Attempted to reach. Left message to return phone call to ambulatory care manager at [phone]. Letter sent via my chart.

## 2023-05-17 ENCOUNTER — CARE COORDINATION (OUTPATIENT)
Dept: CARE COORDINATION | Age: 53
End: 2023-05-17

## 2023-05-17 NOTE — CARE COORDINATION
Ambulatory Care Coordination Note  2023    Patient Current Location: [facility]    ACM contacted the patient by telephone. Verified name and  with patient as identifiers. Provided introduction to self, and explanation of the ACM role. ACM: Nayla Oliva RN    Challenges to be reviewed by the provider   Additional needs identified to be addressed with provider: Yes  Plan of care               Method of communication with provider: chart routing. PCP referral for food insecurity and caregiver support. Social work sending resources. Spoke with patient. OIO surgery next week. Hardware removal shoulder. Caregiver for parents. They are self sufficient for most things. She does all cooking and cleaning  She is trying to file for disability  She doesn't qualify for food stamps because she lives with parents and their income is included  Hasn't worked since 2022    Referrals to [facility] for home delivered meals for parents, Elena Mcnair and  Radha Saenz    MS - No meds or treatment. Would rather have possible relapse than take meds with side effects. Frequent falls. Has walker, w/c, and cane but doesn't use them. Blacks out often with dizziness initially. Advised to sit or lay down immediately if dizzy. Had multiple injuries from falls. Prescribed zoloft. N/V with 2 doses and stopped taking it. Has zofran. Doesn't like to take medications. Anemia - taking ferrous sulfate. Est with hematology.     Plan -   Will get [facility]  involved for additional resources emailed  My [address] [facility] [phone]  AAA6 referral for parents  Fall precautions  Social work involved   PCP   hematology      Offered patient enrollment in the Remote Patient Monitoring (RPM) program for in-home monitoring: Patient is not eligible for RPM program.    Ambulatory Care Coordination Assessment    Care Coordination Protocol  Referral from Primary Care Provider: No  Week 1 - Initial Assessment

## 2023-05-18 ENCOUNTER — CARE COORDINATION (OUTPATIENT)
Dept: CARE COORDINATION | Age: 53
End: 2023-05-18

## 2023-05-18 NOTE — CARE COORDINATION
DELLA called pt to follow up with needed resources and to see if she received mailings. Pt unavailable at the time of my call and vm was left asking pt to return my call to [phone].

## 2023-05-23 NOTE — PROGRESS NOTES
Nursing staff received call from patient regarding abnormal lab work completed at [facility]. CBC faxed to office revealin/22/23  CBC 5.7, hct 19.4, MCV 85    I called patient with results. Discussed need for ED evaluation. Patient states she is hesitant to receive blood products. Discussed need for further work-up/evaluation with drop in Hgb/Hct. Last CBC completed on 3/20/2023 Hgb 12.6, Hct 37.9, MCV 94. Patient expressed understanding.

## 2023-05-24 ENCOUNTER — CARE COORDINATION (OUTPATIENT)
Dept: CARE COORDINATION | Age: 53
End: 2023-05-24

## 2023-05-24 ENCOUNTER — TELEPHONE (OUTPATIENT)
Dept: ONCOLOGY | Age: 53
End: 2023-05-24

## 2023-05-24 ENCOUNTER — HOSPITAL ENCOUNTER (INPATIENT)
Age: 53
LOS: 1 days | Discharge: LEFT BEFORE DISCHARGE | DRG: 663 | End: 2023-05-24
Attending: STUDENT IN AN ORGANIZED HEALTH CARE EDUCATION/TRAINING PROGRAM | Admitting: STUDENT IN AN ORGANIZED HEALTH CARE EDUCATION/TRAINING PROGRAM
Payer: COMMERCIAL

## 2023-05-24 VITALS
HEART RATE: 91 BPM | BODY MASS INDEX: 17.93 KG/M2 | HEIGHT: 64 IN | WEIGHT: 105 LBS | DIASTOLIC BLOOD PRESSURE: 68 MMHG | TEMPERATURE: 98.3 F | OXYGEN SATURATION: 100 % | SYSTOLIC BLOOD PRESSURE: 133 MMHG | RESPIRATION RATE: 17 BRPM

## 2023-05-24 DIAGNOSIS — D64.9 ANEMIA, UNSPECIFIED TYPE: Primary | ICD-10-CM

## 2023-05-24 LAB
ABO: NORMAL
ALBUMIN SERPL BCG-MCNC: 4.4 G/DL (ref 3.5–5.1)
ALP SERPL-CCNC: 73 U/L (ref 38–126)
ALT SERPL W/O P-5'-P-CCNC: 18 U/L (ref 11–66)
ANION GAP SERPL CALC-SCNC: 14 MEQ/L (ref 8–16)
ANISOCYTOSIS BLD QL SMEAR: PRESENT
ANTIBODY SCREEN: NORMAL
APTT PPP: 25.8 SECONDS (ref 22–38)
AST SERPL-CCNC: 59 U/L (ref 5–40)
AUTO DIFF PNL BLD: ABNORMAL
BASOPHILS ABSOLUTE: 0.1 THOU/MM3 (ref 0–0.1)
BASOPHILS NFR BLD AUTO: 0.9 %
BILIRUB SERPL-MCNC: 0.2 MG/DL (ref 0.3–1.2)
BUN SERPL-MCNC: 5 MG/DL (ref 7–22)
CALCIUM SERPL-MCNC: 9.3 MG/DL (ref 8.5–10.5)
CHLORIDE SERPL-SCNC: 95 MEQ/L (ref 98–111)
CO2 SERPL-SCNC: 26 MEQ/L (ref 23–33)
CREAT SERPL-MCNC: 0.5 MG/DL (ref 0.4–1.2)
DEPRECATED RDW RBC AUTO: 78.3 FL (ref 35–45)
DEPRECATED RDW RBC AUTO: 78.6 FL (ref 35–45)
EOSINOPHIL NFR BLD AUTO: 0.1 %
EOSINOPHILS ABSOLUTE: 0 THOU/MM3 (ref 0–0.4)
ERYTHROCYTE [DISTWIDTH] IN BLOOD BY AUTOMATED COUNT: 25.9 % (ref 11.5–14.5)
ERYTHROCYTE [DISTWIDTH] IN BLOOD BY AUTOMATED COUNT: 26.1 % (ref 11.5–14.5)
FERRITIN SERPL IA-MCNC: 9 NG/ML (ref 10–291)
GFR SERPL CREATININE-BSD FRML MDRD: > 60 ML/MIN/1.73M2
GLUCOSE SERPL-MCNC: 113 MG/DL (ref 70–108)
HCT VFR BLD AUTO: 19.4 % (ref 37–47)
HCT VFR BLD AUTO: 20.9 % (ref 37–47)
HEMOCCULT STL QL: NEGATIVE
HGB BLD-MCNC: 5.5 GM/DL (ref 12–16)
HGB BLD-MCNC: 6 GM/DL (ref 12–16)
HGB RETIC QN AUTO: 16.1 PG (ref 28.2–35.7)
HYPOCHROMIA BLD QL SMEAR: PRESENT
IMM GRANULOCYTES # BLD AUTO: 0.03 THOU/MM3 (ref 0–0.07)
IMM GRANULOCYTES NFR BLD AUTO: 0.4 %
IMM RETICS NFR: 7.8 % (ref 3–15.9)
INR PPP: 1.01 (ref 0.85–1.13)
IRON SATN MFR SERPL: 3 % (ref 20–50)
IRON SERPL-MCNC: 14 UG/DL (ref 50–170)
LDH SERPL L TO P-CCNC: 368 U/L (ref 100–190)
LYMPHOCYTES ABSOLUTE: 1.6 THOU/MM3 (ref 1–4.8)
LYMPHOCYTES NFR BLD AUTO: 22.6 %
MCH RBC QN AUTO: 24.6 PG (ref 26–33)
MCH RBC QN AUTO: 25.1 PG (ref 26–33)
MCHC RBC AUTO-ENTMCNC: 28.4 GM/DL (ref 32.2–35.5)
MCHC RBC AUTO-ENTMCNC: 28.7 GM/DL (ref 32.2–35.5)
MCV RBC AUTO: 86.6 FL (ref 81–99)
MCV RBC AUTO: 87.4 FL (ref 81–99)
MONOCYTES ABSOLUTE: 0.7 THOU/MM3 (ref 0.4–1.3)
MONOCYTES NFR BLD AUTO: 9.4 %
NEUTROPHILS NFR BLD AUTO: 66.6 %
NRBC BLD AUTO-RTO: 0 /100 WBC
NT-PROBNP SERPL IA-MCNC: 368.4 PG/ML (ref 0–124)
OSMOLALITY SERPL CALC.SUM OF ELEC: 268.2 MOSMOL/KG (ref 275–300)
PLATELET # BLD AUTO: 387 THOU/MM3 (ref 130–400)
PLATELET # BLD AUTO: 396 THOU/MM3 (ref 130–400)
PLATELET BLD QL SMEAR: ADEQUATE
PMV BLD AUTO: 8.6 FL (ref 9.4–12.4)
POTASSIUM SERPL-SCNC: 4.1 MEQ/L (ref 3.5–5.2)
PROT SERPL-MCNC: 6.8 G/DL (ref 6.1–8)
RBC # BLD AUTO: 2.24 MILL/MM3 (ref 4.2–5.4)
RBC # BLD AUTO: 2.39 MILL/MM3 (ref 4.2–5.4)
RETICS # AUTO: 90 THOU/MM3 (ref 20–115)
RETICS/RBC NFR AUTO: 4 % (ref 0.5–2)
RH FACTOR: NORMAL
SCAN OF BLOOD SMEAR: NORMAL
SEGMENTED NEUTROPHILS ABSOLUTE COUNT: 4.7 THOU/MM3 (ref 1.8–7.7)
SODIUM SERPL-SCNC: 135 MEQ/L (ref 135–145)
TIBC SERPL-MCNC: 523 UG/DL (ref 171–450)
TROPONIN T: < 0.01 NG/ML
WBC # BLD AUTO: 6.2 THOU/MM3 (ref 4.8–10.8)
WBC # BLD AUTO: 7 THOU/MM3 (ref 4.8–10.8)

## 2023-05-24 PROCEDURE — 86850 RBC ANTIBODY SCREEN: CPT

## 2023-05-24 PROCEDURE — 1200000000 HC SEMI PRIVATE

## 2023-05-24 PROCEDURE — 83540 ASSAY OF IRON: CPT

## 2023-05-24 PROCEDURE — 85025 COMPLETE CBC W/AUTO DIFF WBC: CPT

## 2023-05-24 PROCEDURE — 85610 PROTHROMBIN TIME: CPT

## 2023-05-24 PROCEDURE — 82728 ASSAY OF FERRITIN: CPT

## 2023-05-24 PROCEDURE — 82272 OCCULT BLD FECES 1-3 TESTS: CPT

## 2023-05-24 PROCEDURE — 86923 COMPATIBILITY TEST ELECTRIC: CPT

## 2023-05-24 PROCEDURE — 86901 BLOOD TYPING SEROLOGIC RH(D): CPT

## 2023-05-24 PROCEDURE — 85046 RETICYTE/HGB CONCENTRATE: CPT

## 2023-05-24 PROCEDURE — 85730 THROMBOPLASTIN TIME PARTIAL: CPT

## 2023-05-24 PROCEDURE — 83880 ASSAY OF NATRIURETIC PEPTIDE: CPT

## 2023-05-24 PROCEDURE — 84484 ASSAY OF TROPONIN QUANT: CPT

## 2023-05-24 PROCEDURE — 36415 COLL VENOUS BLD VENIPUNCTURE: CPT

## 2023-05-24 PROCEDURE — 99285 EMERGENCY DEPT VISIT HI MDM: CPT

## 2023-05-24 PROCEDURE — 86900 BLOOD TYPING SEROLOGIC ABO: CPT

## 2023-05-24 PROCEDURE — 80053 COMPREHEN METABOLIC PANEL: CPT

## 2023-05-24 PROCEDURE — 83550 IRON BINDING TEST: CPT

## 2023-05-24 PROCEDURE — 83615 LACTATE (LD) (LDH) ENZYME: CPT

## 2023-05-24 RX ORDER — GABAPENTIN 300 MG/1
300 CAPSULE ORAL DAILY
Status: DISCONTINUED | OUTPATIENT
Start: 2023-05-24 | End: 2023-05-24 | Stop reason: HOSPADM

## 2023-05-24 RX ORDER — FERROUS SULFATE 325(65) MG
325 TABLET ORAL DAILY
Status: DISCONTINUED | OUTPATIENT
Start: 2023-05-24 | End: 2023-05-24

## 2023-05-24 RX ORDER — SODIUM CHLORIDE 9 MG/ML
INJECTION, SOLUTION INTRAVENOUS PRN
Status: DISCONTINUED | OUTPATIENT
Start: 2023-05-24 | End: 2023-05-24 | Stop reason: HOSPADM

## 2023-05-24 ASSESSMENT — PAIN - FUNCTIONAL ASSESSMENT: PAIN_FUNCTIONAL_ASSESSMENT: NONE - DENIES PAIN

## 2023-05-24 NOTE — ED NOTES
Patient resting in bed with family at bedside, denies any further needs or concerns at this time. Call light in reach. Will continue to monitor.       Bridger Rios RN  05/24/23 4917

## 2023-05-24 NOTE — ED NOTES
ED to inpatient nurses report    Chief Complaint   Patient presents with    Abnormal Lab     Hemoglobin      Present to ED from home  LOC: alert and orientated to name, place, date  Vital signs   Vitals:    05/24/23 0821 05/24/23 0829 05/24/23 0928 05/24/23 1043   BP: 131/74  122/68 133/68   Pulse: (!) 117 98 92 91   Resp: 17  18 17   Temp: 98.3 °F (36.8 °C)      TempSrc: Oral      SpO2: 100%  99% 100%   Weight: 105 lb (47.6 kg)      Height: 5' 4\" (1.626 m)         Oxygen Baseline room air    Current needs required none Bipap/Cpap No  LDAs:   Peripheral IV 05/24/23 Left Antecubital (Active)   Site Assessment Clean, dry & intact 05/24/23 6164     Mobility: Independent  Pending ED orders: none  Present condition: stable      C-SSRS Risk of Suicide: No Risk  Swallow Screening    Preferred Language: [place]     Electronically signed by Coy Rios RN on 5/24/2023 at 10:44 AM      Britney Rios RN  05/24/23 1047

## 2023-05-24 NOTE — CARE COORDINATION
Attempted care management follow up  Currently in ED and admitting for anemia  Will follow up upon discharge home  Plan -   Will get RUFUS DUR[facility]  involved for additional resources emailed  My [address] [phone] referral for parents  Fall precautions  Social work involved  6/12 PCP  7/21 hematology

## 2023-05-24 NOTE — ED NOTES
Admitting hospitalist states to not give blood at this time.  Awaiting to find the cause of low hemoglobin before transfusing per hospitalist.       Elisa Noble [facility]) HILARIA Rios RN  05/24/23 5466

## 2023-05-24 NOTE — DISCHARGE INSTRUCTIONS
Rest, stay well-hydrated. Continue home medications as previously prescribed. Change positions slowly. Follow-up with hematology within the next 1 to 2 days without fail.

## 2023-05-24 NOTE — TELEPHONE ENCOUNTER
Patient called stating she had lab work done for a surgery she had scheduled this week, and the cancelled it because her HGB was low. Patient stated she believes she needs IV iron her HGB was 6.8. advised patient that I would call [facility] and get her lab work, and send it to [facility]ARDWA[facility] and one of us would call her back. Received lab work patient HGB was 5.7 brought labs back to [facility] and she called patient at that time and advised her to go to ED to be evaluated.

## 2023-05-24 NOTE — ED NOTES
Spoke with Dr. Daniel Mansfield at this time about patient hgb level and patient requesting to leave. Doctor states she will come down to speak with patient.      Rafy Navas, BRITTANY  05/24/23 5638

## 2023-05-24 NOTE — ED NOTES
Spoke with Resident Tin Loera at this time. Chaya Linda reports that I would need to speak with her attending, Dr. Bro Heredia, to get the approval to administer PRBC since she was the one to request holding off on the infusion. Notified Resident that I attempted to call and perfect serve attending prior to her, but am unable to find her in our database. Resident requests for me to speak with Dr. Nimisha Talavera about administering the PRBC due to the patient not currently being under their care and still in the ED. Will speak with ER charge nurse at this time.      Moisés Sanchez RN  05/24/23 1882

## 2023-05-24 NOTE — ED NOTES
Presents to ED with complaints of a reported low hemoglobin. Pt states she was scheduled to have shoulder surgery and had routine blood work. States her hemoglobin resulted as \"5 something. \" Pt reports her last hemoglobin 3 months ago was 12. Pt states she has a history of this but has never had a blood transfusion due to refusing. Pt denies pain. Will continue to monitor.      Ivan Coca [employer] Ltd[employer]ina Rios RN  05/24/23 6779

## 2023-05-24 NOTE — H&P
Hospitalist - History & Physical      Patient: Violette Salazar    Unit/Bed:39/039A  YOB: 1970  MRN: [id number]   Acct: [de-identified]   PCP: Dirk Rivera MD    Date of Service: Pt seen/examined on 05/24/23  and Admitted to Inpatient with expected LOS greater than two midnights due to medical therapy. Chief Complaint:  symptomatic anemia     Assessment and Plan:-    Acute on chronic normocytic anemia: Multifactorial due to iron deficiency, prior history of etoh abuse. Patient has received IV iron in past and is now on PO iron. Patient follows with outpatient hematology for chronic anemia. She was last seen by hematology 3/21/2023. She had EGD 8/16/2022 by Dr. Silvano Hicks showing small Schatzki's ring which is non-obstructive, otherwise normal exam to the second portion of the duodenum. Colonoscopy was completed 8/16/22 with reported  single sessile polyp located in sigmoid colon measuring 8 mm in diameter which was removed, grade 3 internal hemorrhoids, and large internal hemorrhoids diverticula. Of note patient had iron studies completed 3/20/2023 ordered by hematology. Currently, unclear source of blood loss. No signs of active bleed  -On arrival, hgb was 6.0 and repeat was 5.5.   - Iron study repeated 5/24/23: Ferritin 9, iron 14, iron sat 3 and TIBC 523. Patient's INR 1.01 and aPTT 25.8.   -Transfuse patient 1 unit PRBC (consent was obtained from ED provider)   -Monitor with H/H q 6 hours   -Transfuse to maintain Hgb > 7     History of etoh abuse: Continue home gabapentin for neuropathic secondary to etoh use. Smoker: Cessation advised     History of MS: noted    History Of Present Illness:      60-year-old female presented to [address] [place] [address] emergency department due to abnormal hemoglobin level. Patient has a significant past medical history chronic anemia (follows with hematology as OP and gets IV iron), multiple sclerosis, and personality disorder.      Patient also has a

## 2023-05-24 NOTE — ED PROVIDER NOTES
reticulocyte count, LDH and hepatoglobin. And hold blood. .4. Iron saturation 3, iron 14, iron binding capacity 523. Ferritin 9. Patient had still not received admission orders by 1300 and requesting to leave. Dr. Chantelle Horn to speak to patient due to pending admission. Patient continues to want to leave AMA. Discussed AMA with patient who is in agreement. AMA signed with ER provider due to no admission orders placed.         Amount and/or Complexity of Data Reviewed  Clinical lab tests: ordered and reviewed  Decide to obtain previous medical records or to obtain history from someone other than the patient: no  Obtain history from someone other than the patient: no  Review and summarize past medical records: yes  Discuss the patient with other providers: yes  Independent visualization of images, tracings, or specimens: yes    Risk of Complications, Morbidity, and/or Mortality  Presenting problems: moderate  Diagnostic procedures: moderate  Management options: high    /  ED Course as of 05/24/23 1725   Wed May 24, 2023   0937 CBC with Auto Differential(!!):    WBC 6.2   RBC 2.39(!)   Hemoglobin Quant 6.0(!!)   Hematocrit 20.9(!!)   MCV 87.4   MCH 25.1(!)   MCHC 28.7(!)   RDW-CV 25.9(!)   RDW-SD 78.6(!)   Platelet Count 818  RBCs 2.39, hemoglobin of 6.0, hematocrit of 20.9 [SC]   0938 Troponin:    Troponin T < 0.010  <0.010 [SC]   0938 Anion Gap:    Anion Gap 14.0  14.0 [SC]   0938 Osmolality(!):    Osmolality Calc 268.2(!)  268.2 [SC]   0938 Comprehensive Metabolic Panel(!):    Glucose, Random 113(!)   Creatinine 0.5   BUN,BUNPL 5(!)   Sodium 135   Potassium 4.1   Chloride 95(!)   CO2 26   CALCIUM, SERUM, 177988 9.3   AST 59(!)   Alk Phos 73   Total Protein 6.8   Albumin 4.4   BILIRUBIN TOTAL 0.2(!)   ALT 18  glucose 113, BUN of 5, chloride 95, AST of 59, otherwise unremarkable. [SC]   0938 aPTT: 25.8  25.8 [SC]   0938 Protime-INR:    INR 1.01  1.01 [SC]   0956 TYPE AND SCREEN:    ABO O   Rh Type POS

## 2023-05-25 ENCOUNTER — TELEPHONE (OUTPATIENT)
Dept: ONCOLOGY | Age: 53
End: 2023-05-25

## 2023-05-25 ENCOUNTER — CARE COORDINATION (OUTPATIENT)
Dept: CARE COORDINATION | Age: 53
End: 2023-05-25

## 2023-05-25 DIAGNOSIS — D64.9 NORMOCYTIC ANEMIA: ICD-10-CM

## 2023-05-25 DIAGNOSIS — D50.8 IRON DEFICIENCY ANEMIA SECONDARY TO INADEQUATE DIETARY IRON INTAKE: Primary | ICD-10-CM

## 2023-05-25 RX ORDER — SODIUM CHLORIDE 0.9 % (FLUSH) 0.9 %
5-40 SYRINGE (ML) INJECTION PRN
Status: CANCELLED | OUTPATIENT
Start: 2023-05-25

## 2023-05-25 RX ORDER — SODIUM CHLORIDE 9 MG/ML
INJECTION, SOLUTION INTRAVENOUS CONTINUOUS
OUTPATIENT
Start: 2023-05-25

## 2023-05-25 RX ORDER — ONDANSETRON 2 MG/ML
8 INJECTION INTRAMUSCULAR; INTRAVENOUS
OUTPATIENT
Start: 2023-05-25

## 2023-05-25 RX ORDER — SODIUM CHLORIDE 9 MG/ML
5-250 INJECTION, SOLUTION INTRAVENOUS PRN
Status: CANCELLED | OUTPATIENT
Start: 2023-05-25

## 2023-05-25 RX ORDER — SODIUM CHLORIDE 9 MG/ML
25 INJECTION, SOLUTION INTRAVENOUS PRN
OUTPATIENT
Start: 2023-05-25

## 2023-05-25 RX ORDER — FAMOTIDINE 10 MG/ML
20 INJECTION, SOLUTION INTRAVENOUS
Status: CANCELLED | OUTPATIENT
Start: 2023-05-25

## 2023-05-25 RX ORDER — ONDANSETRON 2 MG/ML
8 INJECTION INTRAMUSCULAR; INTRAVENOUS
Status: CANCELLED | OUTPATIENT
Start: 2023-05-25

## 2023-05-25 RX ORDER — FAMOTIDINE 10 MG/ML
20 INJECTION, SOLUTION INTRAVENOUS
OUTPATIENT
Start: 2023-05-25

## 2023-05-25 RX ORDER — SODIUM CHLORIDE 9 MG/ML
20 INJECTION, SOLUTION INTRAVENOUS CONTINUOUS
Status: CANCELLED | OUTPATIENT
Start: 2023-05-25

## 2023-05-25 RX ORDER — EPINEPHRINE 1 MG/ML
0.3 INJECTION, SOLUTION, CONCENTRATE INTRAVENOUS PRN
OUTPATIENT
Start: 2023-05-25

## 2023-05-25 RX ORDER — DIPHENHYDRAMINE HYDROCHLORIDE 50 MG/ML
50 INJECTION INTRAMUSCULAR; INTRAVENOUS
Status: CANCELLED | OUTPATIENT
Start: 2023-05-25

## 2023-05-25 RX ORDER — ACETAMINOPHEN 325 MG/1
650 TABLET ORAL
Status: CANCELLED | OUTPATIENT
Start: 2023-05-25

## 2023-05-25 RX ORDER — ALBUTEROL SULFATE 90 UG/1
4 AEROSOL, METERED RESPIRATORY (INHALATION) PRN
Status: CANCELLED | OUTPATIENT
Start: 2023-05-25

## 2023-05-25 RX ORDER — ACETAMINOPHEN 325 MG/1
650 TABLET ORAL
OUTPATIENT
Start: 2023-05-25

## 2023-05-25 RX ORDER — HEPARIN SODIUM (PORCINE) LOCK FLUSH IV SOLN 100 UNIT/ML 100 UNIT/ML
500 SOLUTION INTRAVENOUS PRN
Status: CANCELLED | OUTPATIENT
Start: 2023-05-25

## 2023-05-25 RX ORDER — ALBUTEROL SULFATE 90 UG/1
4 AEROSOL, METERED RESPIRATORY (INHALATION) PRN
OUTPATIENT
Start: 2023-05-25

## 2023-05-25 RX ORDER — DIPHENHYDRAMINE HYDROCHLORIDE 50 MG/ML
50 INJECTION INTRAMUSCULAR; INTRAVENOUS
OUTPATIENT
Start: 2023-05-25

## 2023-05-25 RX ORDER — EPINEPHRINE 1 MG/ML
0.3 INJECTION, SOLUTION, CONCENTRATE INTRAVENOUS PRN
Status: CANCELLED | OUTPATIENT
Start: 2023-05-25

## 2023-05-25 RX ORDER — SODIUM CHLORIDE 9 MG/ML
INJECTION, SOLUTION INTRAVENOUS CONTINUOUS
Status: CANCELLED | OUTPATIENT
Start: 2023-05-25

## 2023-05-25 NOTE — CARE COORDINATION
ED for anemia. Had labs done for upcoming surgery. HGB was 6.8 and was advised to go to ED. Wanted to admit and patient left AMA. Patient called hematology office. Was advised to go back to ED for severe anemia. Patient refuses. Per Merry Juárez PA:  -Discussed appointment time with patient. Discussed need for blood transfusion and she is agreeable. Orders are placed.   -Will place order for IV Venofer, total of 3 infusions to be given one week apart.  -Will arrange office follow-up.  -Will notify GI, patient established with Dr. Tammy Solis.   -Discussed with patient to go to ED for any change in symptoms. She remains hesitant to ED evaluation, but expressed understanding. Attempted care management follow up. Left message to return phone call to ambulatory care manager at [phone].     Plan -   5/26 transfusion  IV Venofer weekly for total 3 infusions  Hematology follow up  GI Dr. Tammy Solis follow up  Go to ED with any change in symptoms  My Emma [address] referral for parents  Fall precautions  Social work involved  6/12 PCP  7/21 hematology

## 2023-05-25 NOTE — TELEPHONE ENCOUNTER
Spoke with patient an appt was made with [facility] on 6/2/23. Relayed to patient that a voice message was left with Dr Rasheeda Morton office to schedule an appt with her.

## 2023-05-25 NOTE — SIGNIFICANT EVENT
This is a 26-year-old lady with a history of LISA of unknown etiology, MS, alcohol use disorder? Who presents after abnormal lab value of hemoglobin that was found yesterday. Patient reports she went for routine labs at [facility] and her hemoglobin there she thinks may have been around 5. Reports some general fatigue for 2 weeks but reports it got better 1 week ago. Denies dizziness/lightheadedness, chest pain, palpitations, shortness of breath, change in skin color. Also denies any signs/symptoms of active bleeding including hematemesis, hemoptysis, hematochezia, and melena. She takes iron supplementation daily. Denies any personal or family history of autoimmune disease. No new medications. Denies any previous blood transfusions. Did receive iron infusions for LISA with Hb of 6.5 back in 7/2020. She was last seen in heme-onc clinic in 3/2023 and it was thought that her anemia is likely multifactorial from LISA and chronic alcohol use. She had EGD done in 8/2022 which showed small Schatzki's ring nonobstructive but otherwise normal and no evidence of varices. Colonoscopy showed grade 3 internal hemorrhoids some diverticula and some polyps. No pathologic changes on bladder biopsy. Initial vitals in ED: 36.8, 131/74, 117, 17, 100% on RA  BMP essentially unremarkable. Calcium 9.3. Liver panel shows total bili of 0.2 and slightly elevated AST at 59. Albumin and total protein normal at 4.4 and 6.8 respectively. CBC showed normal WBC of 6.2 and normal platelets of 413. Hemoglobin 6.0 with MCV of 87. Iron panel in 3/2023 showed ferritin of 136, iron 94, TIBC 389, normal folate at ~20 and normal B12 at ~800. Acute normocytic anemia  History of LISA-unclear etiology of her anemia. Previously 1 year ago had LISA for which she was given iron transfusions however her iron studies 2 months ago back in March were normal.  Hemoglobin on presentation to ED 6.0 and repeat 5.5.   Patient is asymptomatic and

## 2023-05-26 ENCOUNTER — HOSPITAL ENCOUNTER (OUTPATIENT)
Dept: INFUSION THERAPY | Age: 53
Discharge: HOME | End: 2023-05-26
Payer: COMMERCIAL

## 2023-05-26 VITALS
SYSTOLIC BLOOD PRESSURE: 152 MMHG | HEART RATE: 92 BPM | TEMPERATURE: 98.6 F | DIASTOLIC BLOOD PRESSURE: 81 MMHG | RESPIRATION RATE: 18 BRPM | OXYGEN SATURATION: 98 %

## 2023-05-26 DIAGNOSIS — D64.9 NORMOCYTIC ANEMIA: ICD-10-CM

## 2023-05-26 DIAGNOSIS — D64.9 NORMOCYTIC ANEMIA: Primary | ICD-10-CM

## 2023-05-26 DIAGNOSIS — D50.8 IRON DEFICIENCY ANEMIA SECONDARY TO INADEQUATE DIETARY IRON INTAKE: Primary | ICD-10-CM

## 2023-05-26 DIAGNOSIS — D50.8 IRON DEFICIENCY ANEMIA SECONDARY TO INADEQUATE DIETARY IRON INTAKE: ICD-10-CM

## 2023-05-26 LAB
ABO: NORMAL
ANTIBODY SCREEN: NORMAL
RH FACTOR: NORMAL

## 2023-05-26 PROCEDURE — 86923 COMPATIBILITY TEST ELECTRIC: CPT

## 2023-05-26 PROCEDURE — 86850 RBC ANTIBODY SCREEN: CPT

## 2023-05-26 PROCEDURE — P9016 RBC LEUKOCYTES REDUCED: HCPCS

## 2023-05-26 PROCEDURE — 86901 BLOOD TYPING SEROLOGIC RH(D): CPT

## 2023-05-26 PROCEDURE — 86900 BLOOD TYPING SEROLOGIC ABO: CPT

## 2023-05-26 PROCEDURE — 36430 TRANSFUSION BLD/BLD COMPNT: CPT

## 2023-05-26 PROCEDURE — 36415 COLL VENOUS BLD VENIPUNCTURE: CPT

## 2023-05-26 PROCEDURE — 2580000003 HC RX 258: Performed by: PHYSICIAN ASSISTANT

## 2023-05-26 RX ORDER — SODIUM CHLORIDE 0.9 % (FLUSH) 0.9 %
5-40 SYRINGE (ML) INJECTION PRN
Status: CANCELLED | OUTPATIENT
Start: 2023-05-26

## 2023-05-26 RX ORDER — SODIUM CHLORIDE 0.9 % (FLUSH) 0.9 %
5-40 SYRINGE (ML) INJECTION PRN
Status: DISCONTINUED | OUTPATIENT
Start: 2023-05-26 | End: 2023-05-27 | Stop reason: HOSPADM

## 2023-05-26 RX ORDER — SODIUM CHLORIDE 9 MG/ML
20 INJECTION, SOLUTION INTRAVENOUS CONTINUOUS
Status: CANCELLED | OUTPATIENT
Start: 2023-05-26

## 2023-05-26 RX ORDER — ACETAMINOPHEN 325 MG/1
650 TABLET ORAL
OUTPATIENT
Start: 2023-05-26

## 2023-05-26 RX ORDER — DIPHENHYDRAMINE HYDROCHLORIDE 50 MG/ML
50 INJECTION INTRAMUSCULAR; INTRAVENOUS
OUTPATIENT
Start: 2023-05-26

## 2023-05-26 RX ORDER — SODIUM CHLORIDE 9 MG/ML
INJECTION, SOLUTION INTRAVENOUS CONTINUOUS
OUTPATIENT
Start: 2023-05-26

## 2023-05-26 RX ORDER — SODIUM CHLORIDE 9 MG/ML
20 INJECTION, SOLUTION INTRAVENOUS CONTINUOUS
Status: DISCONTINUED | OUTPATIENT
Start: 2023-05-26 | End: 2023-05-27 | Stop reason: HOSPADM

## 2023-05-26 RX ORDER — EPINEPHRINE 1 MG/ML
0.3 INJECTION, SOLUTION INTRAMUSCULAR; SUBCUTANEOUS PRN
OUTPATIENT
Start: 2023-05-26

## 2023-05-26 RX ORDER — ONDANSETRON 2 MG/ML
8 INJECTION INTRAMUSCULAR; INTRAVENOUS
OUTPATIENT
Start: 2023-05-26

## 2023-05-26 RX ORDER — SODIUM CHLORIDE 9 MG/ML
25 INJECTION, SOLUTION INTRAVENOUS PRN
OUTPATIENT
Start: 2023-05-26

## 2023-05-26 RX ORDER — ALBUTEROL SULFATE 90 UG/1
4 AEROSOL, METERED RESPIRATORY (INHALATION) PRN
OUTPATIENT
Start: 2023-05-26

## 2023-05-26 RX ADMIN — SODIUM CHLORIDE 20 ML/HR: 9 INJECTION, SOLUTION INTRAVENOUS at 11:00

## 2023-05-26 NOTE — PLAN OF CARE
Problem: Discharge Planning  Goal: Discharge to home or other facility with appropriate resources  Outcome: Completed  Flowsheets (Taken 5/26/2023 1528)  Discharge to home or other facility with appropriate resources:   Identify barriers to discharge with patient and caregiver   Arrange for needed discharge resources and transportation as appropriate  Note: Patient verbalizes understanding of discharge instructions, follow up appointment, and when to call physician if needed      Problem: Safety - Adult  Goal: Free from fall injury  Outcome: Completed  Flowsheets (Taken 5/26/2023 1528)  Free From Fall Injury: Instruct family/caregiver on patient safety  Note: Patient free of falls this visit. Fall risks assessed. Precautions discussed. Call light within reach. Problem: Chronic Conditions and Co-morbidities  Goal: Patient's chronic conditions and co-morbidity symptoms are monitored and maintained or improved  Outcome: Completed  Flowsheets (Taken 5/26/2023 1528)  Care Plan - Patient's Chronic Conditions and Co-Morbidity Symptoms are Monitored and Maintained or Improved:   Monitor and assess patient's chronic conditions and comorbid symptoms for stability, deterioration, or improvement   Collaborate with multidisciplinary team to address chronic and comorbid conditions and prevent exacerbation or deterioration  Note: Patient verbalize understanding to verbal and written information given on PRBC transfusion transfusion, procedure and possible reaction. Instructed to call MD if develop any complications once discharged.      Patient educated blood product transfusion protocol:    Patient receiving 1 unit PRBCs:      - Blood product transfusion information sheet given: questions answered and consent signed  - Take vital signs/ monitor lungs sound prior to transfusion  - Monitor patient for 15 minutes after transfusion started  - Take vital signs / monitor lungs sound in 15 minutes and post transfusion  - Assess IV

## 2023-05-26 NOTE — PROGRESS NOTES
Patient tolerated transfusion of 1 unit PRBCs without any complications. Patient kept for 20 minutes observation post transfusion. Denies dizziness, lightheadedness, acute nausea or vomiting, headache, chest pain/pressure, rash/itching, or an increase in SOB. Last vital signs:   BP (!) 152/81   Pulse 92   Temp 98.6 °F (37 °C) (Oral)   Resp 18   LMP 08/14/2012   SpO2 98%     Patient instructed if they experience any of the above symptoms following today's transfusion, she is to notify the physician immediately or go to the emergency department. Discharge instructions given to patient. Verbalizes understanding. Ambulated off unit per self with belongings.

## 2023-05-26 NOTE — CONSENT
Informed Consent for Blood Component Transfusion Note    I have discussed with the patient the rationale for blood component transfusion; its benefits in treating or preventing fatigue, organ damage, or death; and its risk which includes mild transfusion reactions, rare risk of blood borne infection, or more serious but rare reactions. I have discussed the alternatives to transfusion, including the risk and consequences of not receiving transfusion. The patient had an opportunity to ask questions and had agreed to proceed with transfusion of blood components.     Electronically signed by Claudine Perdomo PA-C on 5/26/23 at 11:09 AM EDT

## 2023-05-30 ENCOUNTER — HOSPITAL ENCOUNTER (OUTPATIENT)
Dept: INFUSION THERAPY | Age: 53
Discharge: HOME | End: 2023-05-30
Payer: COMMERCIAL

## 2023-05-30 VITALS
SYSTOLIC BLOOD PRESSURE: 166 MMHG | OXYGEN SATURATION: 98 % | TEMPERATURE: 98 F | HEART RATE: 100 BPM | DIASTOLIC BLOOD PRESSURE: 88 MMHG | RESPIRATION RATE: 18 BRPM

## 2023-05-30 DIAGNOSIS — D64.9 NORMOCYTIC ANEMIA: Primary | ICD-10-CM

## 2023-05-30 DIAGNOSIS — D50.8 OTHER IRON DEFICIENCY ANEMIA: ICD-10-CM

## 2023-05-30 DIAGNOSIS — D50.8 IRON DEFICIENCY ANEMIA SECONDARY TO INADEQUATE DIETARY IRON INTAKE: ICD-10-CM

## 2023-05-30 LAB
ABSOLUTE IMMATURE GRANULOCYTE: 0.02 THOU/MM3 (ref 0–0.07)
BASOPHILS ABSOLUTE: 0.1 THOU/MM3 (ref 0–0.1)
BASOPHILS NFR BLD AUTO: 1 % (ref 0–3)
EOSINOPHIL NFR BLD AUTO: 1 % (ref 0–4)
EOSINOPHILS ABSOLUTE: 0 THOU/MM3 (ref 0–0.4)
ERYTHROCYTE [DISTWIDTH] IN BLOOD BY AUTOMATED COUNT: 25.2 % (ref 11.5–14.5)
HCT VFR BLD AUTO: 28 % (ref 37–47)
HGB BLD-MCNC: 8.2 GM/DL (ref 12–16)
IMMATURE GRANULOCYTES: 0 %
LYMPHOCYTES ABSOLUTE: 2.2 THOU/MM3 (ref 1–4.8)
LYMPHOCYTES NFR BLD AUTO: 30 % (ref 15–47)
MCH RBC QN AUTO: 26.1 PG (ref 26–33)
MCHC RBC AUTO-ENTMCNC: 29.3 GM/DL (ref 32.2–35.5)
MCV RBC AUTO: 89 FL (ref 81–99)
MONOCYTES ABSOLUTE: 0.8 THOU/MM3 (ref 0.4–1.3)
MONOCYTES NFR BLD AUTO: 11 % (ref 0–12)
NEUTROPHILS NFR BLD AUTO: 58 % (ref 43–75)
PLATELET # BLD AUTO: 319 THOU/MM3 (ref 130–400)
PMV BLD AUTO: 7.8 FL (ref 9.4–12.4)
RBC # BLD AUTO: 3.14 MILL/MM3 (ref 4.2–5.4)
SEGMENTED NEUTROPHILS ABSOLUTE COUNT: 4.3 THOU/MM3 (ref 1.8–7.7)
WBC # BLD AUTO: 7.4 THOU/MM3 (ref 4.8–10.8)

## 2023-05-30 PROCEDURE — 85025 COMPLETE CBC W/AUTO DIFF WBC: CPT

## 2023-05-30 PROCEDURE — 36415 COLL VENOUS BLD VENIPUNCTURE: CPT

## 2023-05-30 PROCEDURE — 2580000003 HC RX 258: Performed by: PHYSICIAN ASSISTANT

## 2023-05-30 PROCEDURE — 6360000002 HC RX W HCPCS: Performed by: PHYSICIAN ASSISTANT

## 2023-05-30 PROCEDURE — 96365 THER/PROPH/DIAG IV INF INIT: CPT

## 2023-05-30 PROCEDURE — 96366 THER/PROPH/DIAG IV INF ADDON: CPT

## 2023-05-30 RX ORDER — SODIUM CHLORIDE 9 MG/ML
INJECTION, SOLUTION INTRAVENOUS CONTINUOUS
Status: CANCELLED | OUTPATIENT
Start: 2023-06-06

## 2023-05-30 RX ORDER — SODIUM CHLORIDE 0.9 % (FLUSH) 0.9 %
5-40 SYRINGE (ML) INJECTION PRN
Status: CANCELLED | OUTPATIENT
Start: 2023-06-06

## 2023-05-30 RX ORDER — SODIUM CHLORIDE 9 MG/ML
5-250 INJECTION, SOLUTION INTRAVENOUS PRN
Status: DISCONTINUED | OUTPATIENT
Start: 2023-05-30 | End: 2023-05-31 | Stop reason: HOSPADM

## 2023-05-30 RX ORDER — HEPARIN SODIUM (PORCINE) LOCK FLUSH IV SOLN 100 UNIT/ML 100 UNIT/ML
500 SOLUTION INTRAVENOUS PRN
Status: CANCELLED | OUTPATIENT
Start: 2023-06-06

## 2023-05-30 RX ORDER — ACETAMINOPHEN 325 MG/1
650 TABLET ORAL
Status: CANCELLED | OUTPATIENT
Start: 2023-06-06

## 2023-05-30 RX ORDER — SODIUM CHLORIDE 9 MG/ML
5-250 INJECTION, SOLUTION INTRAVENOUS PRN
Status: CANCELLED | OUTPATIENT
Start: 2023-06-06

## 2023-05-30 RX ORDER — DIPHENHYDRAMINE HYDROCHLORIDE 50 MG/ML
50 INJECTION INTRAMUSCULAR; INTRAVENOUS
Status: CANCELLED | OUTPATIENT
Start: 2023-06-06

## 2023-05-30 RX ORDER — EPINEPHRINE 1 MG/ML
0.3 INJECTION, SOLUTION INTRAMUSCULAR; SUBCUTANEOUS PRN
Status: CANCELLED | OUTPATIENT
Start: 2023-06-06

## 2023-05-30 RX ORDER — ONDANSETRON 2 MG/ML
8 INJECTION INTRAMUSCULAR; INTRAVENOUS
Status: CANCELLED | OUTPATIENT
Start: 2023-06-06

## 2023-05-30 RX ORDER — ALBUTEROL SULFATE 90 UG/1
4 AEROSOL, METERED RESPIRATORY (INHALATION) PRN
Status: CANCELLED | OUTPATIENT
Start: 2023-06-06

## 2023-05-30 RX ADMIN — SODIUM CHLORIDE 20 ML/HR: 9 INJECTION, SOLUTION INTRAVENOUS at 12:41

## 2023-05-30 RX ADMIN — IRON SUCROSE 300 MG: 20 INJECTION, SOLUTION INTRAVENOUS at 12:43

## 2023-05-30 NOTE — PLAN OF CARE
Problem: Discharge Planning  Goal: Discharge to home or other facility with appropriate resources  Outcome: Progressing  Flowsheets (Taken 5/30/2023 1704)  Discharge to home or other facility with appropriate resources: Identify barriers to discharge with patient and caregiver  Note: Verbalized understanding of discharge instructions, follow-up appointments, and when to call the physician. Problem: Chronic Conditions and Co-morbidities  Goal: Patient's chronic conditions and co-morbidity symptoms are monitored and maintained or improved  Outcome: Progressing  Flowsheets (Taken 5/30/2023 1703)  Care Plan - Patient's Chronic Conditions and Co-Morbidity Symptoms are Monitored and Maintained or Improved: Monitor and assess patient's chronic conditions and comorbid symptoms for stability, deterioration, or improvement  Note: Patient verbalizes understanding to verbal information given on Venofer action and possible side effects. Aware to call MD if develop complications. Problem: Safety - Adult  Goal: Free from fall injury  Outcome: Progressing  Flowsheets (Taken 5/30/2023 1703)  Free From Fall Injury: Instruct family/caregiver on patient safety  Note: No falls occurred with visit today. Care plan reviewed with patient. Patient verbalizes understanding of the plan of care and contribute to goal setting.

## 2023-05-30 NOTE — DISCHARGE INSTRUCTIONS
You received IV Venofer while in outpatient oncology clinic. Call if any uncontrolled nausea/vomiting  Call if any fevers/chills/ problems or concerns  Call if any bleeding  Call if any chest pain/pressure  Call if any severe shortness of breath  Drink at least (6) 8oz glasses of fluids daily     If develop any of above condition, please call your MD or go to Emergency Department.

## 2023-05-30 NOTE — DISCHARGE SUMMARY
Patient assessed for the following post iron infusion:    Dizziness   No  Lightheadedness  No      Acute nausea/vomiting No  Headache   No  Chest pain/pressure  No  Rash/itching   No  Shortness of breath  No    Patient kept for 20 minutes observation post infusion. Patient tolerated treatment Venofer without any complications. Last vital signs:   BP (!) 166/88   Pulse 100   Temp 98 °F (36.7 °C) (Oral)   Resp 18   LMP 08/14/2012   SpO2 98%       Patient instructed if experience any of the above symptoms following today's infusion,he/she is to notify MD immediately or go to the emergency department. Discharge instructions given to patient. Verbalizes understanding. Ambulated off unit per self with belongings.

## 2023-05-31 ENCOUNTER — CARE COORDINATION (OUTPATIENT)
Dept: CARE COORDINATION | Age: 53
End: 2023-05-31

## 2023-06-02 ENCOUNTER — HOSPITAL ENCOUNTER (OUTPATIENT)
Dept: INFUSION THERAPY | Age: 53
Discharge: HOME | End: 2023-06-02
Payer: COMMERCIAL

## 2023-06-02 ENCOUNTER — OFFICE VISIT (OUTPATIENT)
Dept: ONCOLOGY | Age: 53
End: 2023-06-02
Payer: COMMERCIAL

## 2023-06-02 VITALS
SYSTOLIC BLOOD PRESSURE: 147 MMHG | BODY MASS INDEX: 17.86 KG/M2 | RESPIRATION RATE: 18 BRPM | DIASTOLIC BLOOD PRESSURE: 69 MMHG | WEIGHT: 104.6 LBS | TEMPERATURE: 98.9 F | OXYGEN SATURATION: 95 % | HEIGHT: 64 IN | HEART RATE: 80 BPM

## 2023-06-02 VITALS
RESPIRATION RATE: 18 BRPM | SYSTOLIC BLOOD PRESSURE: 147 MMHG | HEART RATE: 80 BPM | DIASTOLIC BLOOD PRESSURE: 69 MMHG | TEMPERATURE: 98.9 F | OXYGEN SATURATION: 95 %

## 2023-06-02 DIAGNOSIS — D64.9 NORMOCYTIC ANEMIA: ICD-10-CM

## 2023-06-02 DIAGNOSIS — D50.8 OTHER IRON DEFICIENCY ANEMIA: ICD-10-CM

## 2023-06-02 DIAGNOSIS — D64.9 NORMOCYTIC ANEMIA: Primary | ICD-10-CM

## 2023-06-02 LAB
ABSOLUTE IMMATURE GRANULOCYTE: 0.01 THOU/MM3 (ref 0–0.07)
BASOPHILS ABSOLUTE: 0.1 THOU/MM3 (ref 0–0.1)
BASOPHILS NFR BLD AUTO: 2 % (ref 0–3)
EOSINOPHIL NFR BLD AUTO: 3 % (ref 0–4)
EOSINOPHILS ABSOLUTE: 0.2 THOU/MM3 (ref 0–0.4)
ERYTHROCYTE [DISTWIDTH] IN BLOOD BY AUTOMATED COUNT: 24.6 % (ref 11.5–14.5)
FERRITIN SERPL IA-MCNC: 314 NG/ML (ref 10–291)
FOLATE SERPL-MCNC: 8 NG/ML (ref 4.8–24.2)
HCT VFR BLD AUTO: 29.2 % (ref 37–47)
HGB BLD-MCNC: 8.7 GM/DL (ref 12–16)
IMMATURE GRANULOCYTES: 0 %
IRON SERPL-MCNC: 35 UG/DL (ref 50–170)
LYMPHOCYTES ABSOLUTE: 2.5 THOU/MM3 (ref 1–4.8)
LYMPHOCYTES NFR BLD AUTO: 41 % (ref 15–47)
MCH RBC QN AUTO: 26.4 PG (ref 26–33)
MCHC RBC AUTO-ENTMCNC: 29.8 GM/DL (ref 32.2–35.5)
MCV RBC AUTO: 89 FL (ref 81–99)
MONOCYTES ABSOLUTE: 0.7 THOU/MM3 (ref 0.4–1.3)
MONOCYTES NFR BLD AUTO: 11 % (ref 0–12)
NEUTROPHILS NFR BLD AUTO: 43 % (ref 43–75)
PLATELET # BLD AUTO: 319 THOU/MM3 (ref 130–400)
PMV BLD AUTO: 7.8 FL (ref 9.4–12.4)
RBC # BLD AUTO: 3.29 MILL/MM3 (ref 4.2–5.4)
SEGMENTED NEUTROPHILS ABSOLUTE COUNT: 2.6 THOU/MM3 (ref 1.8–7.7)
TIBC SERPL-MCNC: 457 UG/DL (ref 171–450)
VIT B12 SERPL-MCNC: 985 PG/ML (ref 211–911)
WBC # BLD AUTO: 6 THOU/MM3 (ref 4.8–10.8)

## 2023-06-02 PROCEDURE — 4004F PT TOBACCO SCREEN RCVD TLK: CPT | Performed by: PHYSICIAN ASSISTANT

## 2023-06-02 PROCEDURE — 82607 VITAMIN B-12: CPT

## 2023-06-02 PROCEDURE — 1111F DSCHRG MED/CURRENT MED MERGE: CPT | Performed by: PHYSICIAN ASSISTANT

## 2023-06-02 PROCEDURE — 99211 OFF/OP EST MAY X REQ PHY/QHP: CPT

## 2023-06-02 PROCEDURE — 83550 IRON BINDING TEST: CPT

## 2023-06-02 PROCEDURE — 83540 ASSAY OF IRON: CPT

## 2023-06-02 PROCEDURE — 82728 ASSAY OF FERRITIN: CPT

## 2023-06-02 PROCEDURE — 85025 COMPLETE CBC W/AUTO DIFF WBC: CPT

## 2023-06-02 PROCEDURE — 36415 COLL VENOUS BLD VENIPUNCTURE: CPT

## 2023-06-02 PROCEDURE — 3017F COLORECTAL CA SCREEN DOC REV: CPT | Performed by: PHYSICIAN ASSISTANT

## 2023-06-02 PROCEDURE — G8419 CALC BMI OUT NRM PARAM NOF/U: HCPCS | Performed by: PHYSICIAN ASSISTANT

## 2023-06-02 PROCEDURE — G8427 DOCREV CUR MEDS BY ELIG CLIN: HCPCS | Performed by: PHYSICIAN ASSISTANT

## 2023-06-02 PROCEDURE — 82746 ASSAY OF FOLIC ACID SERUM: CPT

## 2023-06-02 PROCEDURE — 99214 OFFICE O/P EST MOD 30 MIN: CPT | Performed by: PHYSICIAN ASSISTANT

## 2023-06-02 RX ORDER — FOLIC ACID 1 MG/1
1 TABLET ORAL DAILY
Qty: 30 TABLET | Refills: 3 | Status: SHIPPED | OUTPATIENT
Start: 2023-06-02

## 2023-06-02 RX ORDER — FERROUS SULFATE 325(65) MG
325 TABLET ORAL DAILY
Qty: 90 TABLET | Refills: 1 | Status: SHIPPED | OUTPATIENT
Start: 2023-06-02

## 2023-06-06 ENCOUNTER — HOSPITAL ENCOUNTER (OUTPATIENT)
Dept: INFUSION THERAPY | Age: 53
Discharge: HOME | End: 2023-06-06

## 2023-06-06 VITALS
HEART RATE: 91 BPM | SYSTOLIC BLOOD PRESSURE: 156 MMHG | TEMPERATURE: 98.2 F | RESPIRATION RATE: 18 BRPM | OXYGEN SATURATION: 98 % | DIASTOLIC BLOOD PRESSURE: 77 MMHG

## 2023-06-06 DIAGNOSIS — D64.9 NORMOCYTIC ANEMIA: Primary | ICD-10-CM

## 2023-06-06 DIAGNOSIS — D50.8 OTHER IRON DEFICIENCY ANEMIA: ICD-10-CM

## 2023-06-06 RX ORDER — SODIUM CHLORIDE 9 MG/ML
5-250 INJECTION, SOLUTION INTRAVENOUS PRN
Status: DISCONTINUED | OUTPATIENT
Start: 2023-06-06 | End: 2023-06-07 | Stop reason: HOSPADM

## 2023-06-06 RX ORDER — DIPHENHYDRAMINE HYDROCHLORIDE 50 MG/ML
50 INJECTION INTRAMUSCULAR; INTRAVENOUS
OUTPATIENT
Start: 2023-06-13

## 2023-06-06 RX ORDER — ACETAMINOPHEN 325 MG/1
650 TABLET ORAL
OUTPATIENT
Start: 2023-06-13

## 2023-06-06 RX ORDER — SODIUM CHLORIDE 9 MG/ML
5-250 INJECTION, SOLUTION INTRAVENOUS PRN
OUTPATIENT
Start: 2023-06-13

## 2023-06-06 RX ORDER — ONDANSETRON 2 MG/ML
8 INJECTION INTRAMUSCULAR; INTRAVENOUS
OUTPATIENT
Start: 2023-06-13

## 2023-06-06 RX ORDER — SODIUM CHLORIDE 9 MG/ML
INJECTION, SOLUTION INTRAVENOUS CONTINUOUS
OUTPATIENT
Start: 2023-06-13

## 2023-06-06 RX ORDER — HEPARIN SODIUM (PORCINE) LOCK FLUSH IV SOLN 100 UNIT/ML 100 UNIT/ML
500 SOLUTION INTRAVENOUS PRN
Status: CANCELLED | OUTPATIENT
Start: 2023-06-13

## 2023-06-06 RX ORDER — ALBUTEROL SULFATE 90 UG/1
4 AEROSOL, METERED RESPIRATORY (INHALATION) PRN
OUTPATIENT
Start: 2023-06-13

## 2023-06-06 RX ORDER — EPINEPHRINE 1 MG/ML
0.3 INJECTION, SOLUTION INTRAMUSCULAR; SUBCUTANEOUS PRN
OUTPATIENT
Start: 2023-06-13

## 2023-06-06 RX ORDER — SODIUM CHLORIDE 0.9 % (FLUSH) 0.9 %
5-40 SYRINGE (ML) INJECTION PRN
OUTPATIENT
Start: 2023-06-13

## 2023-06-06 NOTE — DISCHARGE SUMMARY
Unable to obtain IV access after 4 failed attempts. Patient requested to reschedule Venofer infusion and try again another day . Appointment made for patient to return to clinic tomorrow to reattempt infusion per patient's request. Discharge papers given, patient ambulated off unit per self with belongings.

## 2023-06-06 NOTE — PLAN OF CARE
Problem: Discharge Planning  Goal: Discharge to home or other facility with appropriate resources  Outcome: Progressing  Flowsheets (Taken 6/6/2023 1652)  Discharge to home or other facility with appropriate resources: Identify barriers to discharge with patient and caregiver  Note: Verbalized understanding of discharge instructions, follow-up appointments, and when to call the physician. Problem: Chronic Conditions and Co-morbidities  Goal: Patient's chronic conditions and co-morbidity symptoms are monitored and maintained or improved  Outcome: Progressing  Flowsheets (Taken 6/6/2023 1652)  Care Plan - Patient's Chronic Conditions and Co-Morbidity Symptoms are Monitored and Maintained or Improved: Monitor and assess patient's chronic conditions and comorbid symptoms for stability, deterioration, or improvement  Note: Patient verbalizes understanding to verbal information given on Venofer action and possible side effects. Aware to call MD if develop complications. Problem: Safety - Adult  Goal: Free from fall injury  Outcome: Progressing  Flowsheets (Taken 6/6/2023 1652)  Free From Fall Injury: Instruct family/caregiver on patient safety  Note: No falls occurred with visit today. Care plan reviewed with patient. Patient verbalizes understanding of the plan of care and contribute to goal setting.

## 2023-06-07 ENCOUNTER — TELEPHONE (OUTPATIENT)
Dept: ONCOLOGY | Age: 53
End: 2023-06-07

## 2023-06-09 ENCOUNTER — CARE COORDINATION (OUTPATIENT)
Dept: CARE COORDINATION | Age: 53
End: 2023-06-09

## 2023-06-09 NOTE — CARE COORDINATION
Attempted care management follow up.  Left message to return phone call to ambulatory care manager at [phone].  6/2 H/H 8.7/29.2  Plan -   Outpt transfusions  IV Venofer weekly for total 3 infusions  Hematology follow up  GI Dr. Zazueta No follow up  Go to ED with any change in symptoms  My Emma [address] referral for parents  Fall precautions  Social work involved  6/13 outpt infusion  7/21 hematology Hpi Title: Evaluation of a Skin Lesion How Severe Are Your Spot(S)?: moderate Have Your Spot(S) Been Treated In The Past?: has not been treated Additional History: PCP Dr Guido Mandi

## 2023-06-13 ENCOUNTER — HOSPITAL ENCOUNTER (OUTPATIENT)
Dept: INFUSION THERAPY | Age: 53
Discharge: HOME | End: 2023-06-13
Payer: COMMERCIAL

## 2023-06-13 VITALS
RESPIRATION RATE: 16 BRPM | HEART RATE: 116 BPM | BODY MASS INDEX: 18.27 KG/M2 | DIASTOLIC BLOOD PRESSURE: 77 MMHG | SYSTOLIC BLOOD PRESSURE: 136 MMHG | OXYGEN SATURATION: 97 % | HEIGHT: 64 IN | TEMPERATURE: 99.6 F | WEIGHT: 107 LBS

## 2023-06-13 DIAGNOSIS — D50.8 OTHER IRON DEFICIENCY ANEMIA: ICD-10-CM

## 2023-06-13 DIAGNOSIS — D64.9 NORMOCYTIC ANEMIA: Primary | ICD-10-CM

## 2023-06-13 PROCEDURE — 6360000002 HC RX W HCPCS: Performed by: PHYSICIAN ASSISTANT

## 2023-06-13 PROCEDURE — 96365 THER/PROPH/DIAG IV INF INIT: CPT

## 2023-06-13 PROCEDURE — 2580000003 HC RX 258: Performed by: PHYSICIAN ASSISTANT

## 2023-06-13 PROCEDURE — 96366 THER/PROPH/DIAG IV INF ADDON: CPT

## 2023-06-13 RX ORDER — HEPARIN SODIUM 100 [USP'U]/ML
500 INJECTION, SOLUTION INTRAVENOUS PRN
Status: CANCELLED | OUTPATIENT
Start: 2023-06-13

## 2023-06-13 RX ORDER — SODIUM CHLORIDE 0.9 % (FLUSH) 0.9 %
5-40 SYRINGE (ML) INJECTION PRN
Status: CANCELLED | OUTPATIENT
Start: 2023-06-13

## 2023-06-13 RX ORDER — EPINEPHRINE 1 MG/ML
0.3 INJECTION, SOLUTION INTRAMUSCULAR; SUBCUTANEOUS PRN
Status: CANCELLED | OUTPATIENT
Start: 2023-06-13

## 2023-06-13 RX ORDER — ONDANSETRON 2 MG/ML
8 INJECTION INTRAMUSCULAR; INTRAVENOUS
Status: CANCELLED | OUTPATIENT
Start: 2023-06-13

## 2023-06-13 RX ORDER — SODIUM CHLORIDE 9 MG/ML
5-250 INJECTION, SOLUTION INTRAVENOUS PRN
Status: CANCELLED | OUTPATIENT
Start: 2023-06-13

## 2023-06-13 RX ORDER — ACETAMINOPHEN 325 MG/1
650 TABLET ORAL
Status: CANCELLED | OUTPATIENT
Start: 2023-06-13

## 2023-06-13 RX ORDER — ALBUTEROL SULFATE 90 UG/1
4 AEROSOL, METERED RESPIRATORY (INHALATION) PRN
Status: CANCELLED | OUTPATIENT
Start: 2023-06-13

## 2023-06-13 RX ORDER — SODIUM CHLORIDE 9 MG/ML
5-250 INJECTION, SOLUTION INTRAVENOUS PRN
Status: DISCONTINUED | OUTPATIENT
Start: 2023-06-13 | End: 2023-06-14 | Stop reason: HOSPADM

## 2023-06-13 RX ORDER — SODIUM CHLORIDE 9 MG/ML
INJECTION, SOLUTION INTRAVENOUS CONTINUOUS
Status: CANCELLED | OUTPATIENT
Start: 2023-06-13

## 2023-06-13 RX ORDER — DIPHENHYDRAMINE HYDROCHLORIDE 50 MG/ML
50 INJECTION INTRAMUSCULAR; INTRAVENOUS
Status: CANCELLED | OUTPATIENT
Start: 2023-06-13

## 2023-06-13 RX ADMIN — IRON SUCROSE 300 MG: 20 INJECTION, SOLUTION INTRAVENOUS at 11:25

## 2023-06-13 RX ADMIN — SODIUM CHLORIDE 25 ML/HR: 9 INJECTION, SOLUTION INTRAVENOUS at 11:15

## 2023-06-13 NOTE — DISCHARGE INSTRUCTIONS
Please contact your Oncologist if you have any questions regarding the iron Venofer that you received today. Patient instructed if experience any of the symptoms following today's iron treatment to notify MD immediately or go to emergency department.     * dizziness/lightheadedness  *acute nausea/vomiting - not relieved with medication  *headache - not relieved from Tylenol/pain medication  *chest pain/pressure  *rash/itching  *shortness of breath        Drink fluids - 48oz fluids daily  Call if develop fever/ chills/ signs or symptoms of infection

## 2023-06-13 NOTE — PROGRESS NOTES
Patient assessed for the following post iron:    Dizziness   No  Lightheadedness  No      Acute nausea/vomiting No  Headache   No  Chest pain/pressure  No  Rash/itching   No  Shortness of breath  No    Patient kept for 20 minutes observation post infusion iron. Patient tolerated iron treatment Venofer without any complications. Last vital signs:   /77   Pulse (!) 116   Temp 99.6 °F (37.6 °C) (Oral)   Resp 16   Ht 5' 4\" (1.626 m)   Wt 107 lb (48.5 kg)   LMP 08/14/2012   SpO2 97%   BMI 18.37 kg/m²     Patient instructed if experience any of the above symptoms following today's infusion, she is to notify MD immediately or go to the emergency department. Discharge instructions given to patient. Verbalizes understanding. Ambulated off unit per self, with belongings.

## 2023-06-13 NOTE — PLAN OF CARE
Problem: Discharge Planning  Goal: Discharge to home or other facility with appropriate resources  Outcome: Adequate for Discharge  Flowsheets (Taken 6/13/2023 1153)  Discharge to home or other facility with appropriate resources: Identify barriers to discharge with patient and caregiver     Problem: Chronic Conditions and Co-morbidities  Goal: Patient's chronic conditions and co-morbidity symptoms are monitored and maintained or improved  Outcome: Adequate for Discharge  Flowsheets (Taken 6/13/2023 1153)  Care Plan - Patient's Chronic Conditions and Co-Morbidity Symptoms are Monitored and Maintained or Improved: Monitor and assess patient's chronic conditions and comorbid symptoms for stability, deterioration, or improvement  Note: Venofer reviewed, patient verbalizes understanding of medication being administered and potential side effects. Problem: Safety - Adult  Goal: Free from fall injury  Outcome: Adequate for Discharge  Flowsheets (Taken 6/13/2023 1153)  Free From Fall Injury: Instruct family/caregiver on patient safety     Care plan reviewed with patient. Patient verbalize understanding of the plan of care and contribute to goal setting.

## 2023-06-20 ENCOUNTER — HOSPITAL ENCOUNTER (OUTPATIENT)
Dept: INFUSION THERAPY | Age: 53
Discharge: HOME | End: 2023-06-20
Payer: COMMERCIAL

## 2023-06-20 VITALS
HEIGHT: 64 IN | DIASTOLIC BLOOD PRESSURE: 64 MMHG | HEART RATE: 102 BPM | SYSTOLIC BLOOD PRESSURE: 158 MMHG | WEIGHT: 102.4 LBS | BODY MASS INDEX: 17.48 KG/M2 | OXYGEN SATURATION: 99 % | RESPIRATION RATE: 16 BRPM | TEMPERATURE: 98.5 F

## 2023-06-20 DIAGNOSIS — D50.8 OTHER IRON DEFICIENCY ANEMIA: ICD-10-CM

## 2023-06-20 DIAGNOSIS — D64.9 NORMOCYTIC ANEMIA: Primary | ICD-10-CM

## 2023-06-20 PROCEDURE — 2580000003 HC RX 258: Performed by: PHYSICIAN ASSISTANT

## 2023-06-20 PROCEDURE — 96366 THER/PROPH/DIAG IV INF ADDON: CPT

## 2023-06-20 PROCEDURE — 6360000002 HC RX W HCPCS: Performed by: PHYSICIAN ASSISTANT

## 2023-06-20 PROCEDURE — 96365 THER/PROPH/DIAG IV INF INIT: CPT

## 2023-06-20 RX ORDER — SODIUM CHLORIDE 9 MG/ML
INJECTION, SOLUTION INTRAVENOUS CONTINUOUS
OUTPATIENT
Start: 2023-06-20

## 2023-06-20 RX ORDER — SODIUM CHLORIDE 9 MG/ML
5-250 INJECTION, SOLUTION INTRAVENOUS PRN
OUTPATIENT
Start: 2023-06-20

## 2023-06-20 RX ORDER — ALBUTEROL SULFATE 90 UG/1
4 AEROSOL, METERED RESPIRATORY (INHALATION) PRN
OUTPATIENT
Start: 2023-06-20

## 2023-06-20 RX ORDER — EPINEPHRINE 1 MG/ML
0.3 INJECTION, SOLUTION INTRAMUSCULAR; SUBCUTANEOUS PRN
OUTPATIENT
Start: 2023-06-20

## 2023-06-20 RX ORDER — SODIUM CHLORIDE 9 MG/ML
5-250 INJECTION, SOLUTION INTRAVENOUS PRN
Status: DISCONTINUED | OUTPATIENT
Start: 2023-06-20 | End: 2023-06-21 | Stop reason: HOSPADM

## 2023-06-20 RX ORDER — ONDANSETRON 2 MG/ML
8 INJECTION INTRAMUSCULAR; INTRAVENOUS
OUTPATIENT
Start: 2023-06-20

## 2023-06-20 RX ORDER — SODIUM CHLORIDE 0.9 % (FLUSH) 0.9 %
5-40 SYRINGE (ML) INJECTION PRN
OUTPATIENT
Start: 2023-06-20

## 2023-06-20 RX ORDER — HEPARIN SODIUM 100 [USP'U]/ML
500 INJECTION, SOLUTION INTRAVENOUS PRN
OUTPATIENT
Start: 2023-06-20

## 2023-06-20 RX ORDER — ACETAMINOPHEN 325 MG/1
650 TABLET ORAL
OUTPATIENT
Start: 2023-06-20

## 2023-06-20 RX ORDER — DIPHENHYDRAMINE HYDROCHLORIDE 50 MG/ML
50 INJECTION INTRAMUSCULAR; INTRAVENOUS
OUTPATIENT
Start: 2023-06-20

## 2023-06-20 RX ADMIN — SODIUM CHLORIDE 55 ML/HR: 9 INJECTION, SOLUTION INTRAVENOUS at 11:32

## 2023-06-20 RX ADMIN — IRON SUCROSE 300 MG: 20 INJECTION, SOLUTION INTRAVENOUS at 11:33

## 2023-06-20 NOTE — PROGRESS NOTES
Patient assessed for the following post iron:    Dizziness   No  Lightheadedness  No      Acute nausea/vomiting No  Headache   No  Chest pain/pressure  No  Rash/itching   No  Shortness of breath  No    Patient kept for 20 minutes observation post infusion iron. Patient tolerated iron treatment venofer without any complications. Last vital signs:   BP (!) 158/64   Pulse (!) 102   Temp 98.5 °F (36.9 °C) (Oral)   Resp 16   Ht 5' 4\" (1.626 m)   Wt 102 lb 6.4 oz (46.4 kg)   LMP 08/14/2012   SpO2 99%   BMI 17.58 kg/m²     Patient instructed if experience any of the above symptoms following today's infusion, she is to notify MD immediately or go to the emergency department. Discharge instructions given to patient. Verbalizes understanding. Ambulated off unit per self, with belongings.

## 2023-06-20 NOTE — PLAN OF CARE
Problem: Discharge Planning  Goal: Discharge to home or other facility with appropriate resources  Outcome: Adequate for Discharge  Flowsheets (Taken 6/20/2023 1527)  Discharge to home or other facility with appropriate resources: Identify barriers to discharge with patient and caregiver     Problem: Chronic Conditions and Co-morbidities  Goal: Patient's chronic conditions and co-morbidity symptoms are monitored and maintained or improved  Outcome: Adequate for Discharge  Flowsheets (Taken 6/20/2023 1527)  Care Plan - Patient's Chronic Conditions and Co-Morbidity Symptoms are Monitored and Maintained or Improved: Collaborate with multidisciplinary team to address chronic and comorbid conditions and prevent exacerbation or deterioration  Note: Venofer reviewed, patient verbalizes understanding of medication being administered and potential side effects. Problem: Safety - Adult  Goal: Free from fall injury  Outcome: Adequate for Discharge  Flowsheets (Taken 6/20/2023 1527)  Free From Fall Injury: Instruct family/caregiver on patient safety     Care plan reviewed with patient. Patient verbalize understanding of the plan of care and contribute to goal setting.

## 2023-06-23 ENCOUNTER — CARE COORDINATION (OUTPATIENT)
Dept: CARE COORDINATION | Age: 53
End: 2023-06-23

## 2023-06-23 NOTE — CARE COORDINATION
Attempted care management follow up. Left message to return phone call to ambulatory care manager at [phone].   Plan -   Outpt transfusions  IV Venofer weekly for total 3 infusions  Hematology follow up  GI Dr. Ashely Neil follow up  Go to ED with any change in symptoms  My Emma 193 referral for parents  Fall precautions  Social work involved  6/20 outpt infusion  7/21 hematology

## 2023-06-30 ENCOUNTER — CARE COORDINATION (OUTPATIENT)
Dept: CARE COORDINATION | Age: 53
End: 2023-06-30

## 2023-07-03 ENCOUNTER — HOSPITAL ENCOUNTER (OUTPATIENT)
Age: 53
End: 2023-07-03

## 2023-07-05 ENCOUNTER — CARE COORDINATION (OUTPATIENT)
Dept: CARE COORDINATION | Age: 53
End: 2023-07-05

## 2023-07-05 NOTE — CARE COORDINATION
SW contacted pt after referral from [facility]. Pt needs to get in touch with [facility], needs resources for help with parents. Introduced self and my role. Pt stated, \"I just took my mom to the ED and I don't have time for this right now. \"  Pt apologized, Advised her to return my call at her earliest convenience as I would like to assist her with any concerns or needs. Pt voiced understanding and has SW contact information . Will wait for a call back.

## 2023-07-14 ENCOUNTER — CARE COORDINATION (OUTPATIENT)
Dept: CARE COORDINATION | Age: 53
End: 2023-07-14

## 2023-07-14 NOTE — CARE COORDINATION
Ambulatory Care Coordination Note  2023    Patient Current Location: [address] [place] [address]     ACM contacted the patient by telephone. Verified name and  with patient as identifiers. Provided introduction to self, and explanation of the ACM role. Challenges to be reviewed by the provider   Additional needs identified to be addressed with provider: No  none               Method of communication with provider: none. ACM: Chivo Holley, RN    Patient crying during call. Speech slurred. States she is drinking alcohol but also going through a MS flare up. Declines PCP appt. [facility] did call her but it was a [place] number so she didn't answer. Explained that is intake but [facility] [place] has a facility. Agreed to call them today. Would like contact with . She canceled shoulder surgery due to needing to care for parents and herself. Limited support system. She is only caregiver for parents. She has friends but states they don't know what to do for me. Note sent to [employer]. [facility]   [facility] [phone]   GI Dr. Karen Palomino follow up  My [address] [phone]  [employer] referral for parents  Fall precautions  Social work involved   shoulder surgery patient canceled   hematology    Offered patient enrollment in the Remote Patient Monitoring (RPM) program for in-home monitoring: Patient is not eligible for RPM program.    Lab Results       None            Care Coordination Interventions    Referral from Primary Care Provider: No  Suggested Interventions and Community Resources  Fall Risk Prevention:  In Process  Pharmacist: Completed (Comment: PCP office)  Social Work: Completed  Transportation Support: Completed (Comment: jw if needed)          Goals Addressed                   This Visit's Progress     Reduce Falls    On track     I will reduce my risk of falls by the following: Remove rugs or use non slip rugs  Install grab bars in bathroom  Use

## 2023-07-21 ENCOUNTER — OFFICE VISIT (OUTPATIENT)
Dept: ONCOLOGY | Age: 53
End: 2023-07-21
Payer: COMMERCIAL

## 2023-07-21 ENCOUNTER — SOCIAL WORK (OUTPATIENT)
Dept: INFUSION THERAPY | Age: 53
End: 2023-07-21

## 2023-07-21 ENCOUNTER — HOSPITAL ENCOUNTER (OUTPATIENT)
Dept: INFUSION THERAPY | Age: 53
Discharge: HOME | End: 2023-07-21
Payer: COMMERCIAL

## 2023-07-21 VITALS
TEMPERATURE: 97.6 F | HEART RATE: 91 BPM | HEIGHT: 64 IN | OXYGEN SATURATION: 96 % | RESPIRATION RATE: 20 BRPM | BODY MASS INDEX: 17.17 KG/M2 | WEIGHT: 100.6 LBS | SYSTOLIC BLOOD PRESSURE: 113 MMHG | DIASTOLIC BLOOD PRESSURE: 66 MMHG

## 2023-07-21 VITALS
HEIGHT: 64 IN | WEIGHT: 100.6 LBS | DIASTOLIC BLOOD PRESSURE: 66 MMHG | OXYGEN SATURATION: 96 % | RESPIRATION RATE: 20 BRPM | SYSTOLIC BLOOD PRESSURE: 113 MMHG | HEART RATE: 91 BPM | BODY MASS INDEX: 17.17 KG/M2 | TEMPERATURE: 97.6 F

## 2023-07-21 DIAGNOSIS — D50.8 OTHER IRON DEFICIENCY ANEMIA: ICD-10-CM

## 2023-07-21 DIAGNOSIS — D64.9 NORMOCYTIC ANEMIA: Primary | ICD-10-CM

## 2023-07-21 DIAGNOSIS — D64.9 NORMOCYTIC ANEMIA: ICD-10-CM

## 2023-07-21 LAB
ABSOLUTE IMMATURE GRANULOCYTE: 0.01 THOU/MM3 (ref 0–0.07)
BASOPHILS ABSOLUTE: 0 THOU/MM3 (ref 0–0.1)
BASOPHILS NFR BLD AUTO: 1 % (ref 0–3)
EOSINOPHIL NFR BLD AUTO: 2 % (ref 0–4)
EOSINOPHILS ABSOLUTE: 0.1 THOU/MM3 (ref 0–0.4)
ERYTHROCYTE [DISTWIDTH] IN BLOOD BY AUTOMATED COUNT: 19.5 % (ref 11.5–14.5)
FERRITIN SERPL IA-MCNC: 539 NG/ML (ref 10–291)
HCT VFR BLD AUTO: 38.4 % (ref 37–47)
HGB BLD-MCNC: 12.8 GM/DL (ref 12–16)
IMMATURE GRANULOCYTES: 0 %
IRON SERPL-MCNC: 107 UG/DL (ref 50–170)
LYMPHOCYTES ABSOLUTE: 2.8 THOU/MM3 (ref 1–4.8)
LYMPHOCYTES NFR BLD AUTO: 49 % (ref 15–47)
MCH RBC QN AUTO: 30.6 PG (ref 26–33)
MCHC RBC AUTO-ENTMCNC: 33.3 GM/DL (ref 32.2–35.5)
MCV RBC AUTO: 92 FL (ref 81–99)
MONOCYTES ABSOLUTE: 0.8 THOU/MM3 (ref 0.4–1.3)
MONOCYTES NFR BLD AUTO: 14 % (ref 0–12)
NEUTROPHILS NFR BLD AUTO: 34 % (ref 43–75)
PLATELET # BLD AUTO: 141 THOU/MM3 (ref 130–400)
PMV BLD AUTO: 8.5 FL (ref 9.4–12.4)
RBC # BLD AUTO: 4.18 MILL/MM3 (ref 4.2–5.4)
SEGMENTED NEUTROPHILS ABSOLUTE COUNT: 1.9 THOU/MM3 (ref 1.8–7.7)
TIBC SERPL-MCNC: 246 UG/DL (ref 171–450)
WBC # BLD AUTO: 5.6 THOU/MM3 (ref 4.8–10.8)

## 2023-07-21 PROCEDURE — 83540 ASSAY OF IRON: CPT

## 2023-07-21 PROCEDURE — 82728 ASSAY OF FERRITIN: CPT

## 2023-07-21 PROCEDURE — 4004F PT TOBACCO SCREEN RCVD TLK: CPT | Performed by: PHYSICIAN ASSISTANT

## 2023-07-21 PROCEDURE — 99211 OFF/OP EST MAY X REQ PHY/QHP: CPT

## 2023-07-21 PROCEDURE — 36415 COLL VENOUS BLD VENIPUNCTURE: CPT

## 2023-07-21 PROCEDURE — G8419 CALC BMI OUT NRM PARAM NOF/U: HCPCS | Performed by: PHYSICIAN ASSISTANT

## 2023-07-21 PROCEDURE — 85025 COMPLETE CBC W/AUTO DIFF WBC: CPT

## 2023-07-21 PROCEDURE — G8427 DOCREV CUR MEDS BY ELIG CLIN: HCPCS | Performed by: PHYSICIAN ASSISTANT

## 2023-07-21 PROCEDURE — 3017F COLORECTAL CA SCREEN DOC REV: CPT | Performed by: PHYSICIAN ASSISTANT

## 2023-07-21 PROCEDURE — 99213 OFFICE O/P EST LOW 20 MIN: CPT | Performed by: PHYSICIAN ASSISTANT

## 2023-07-21 PROCEDURE — 83550 IRON BINDING TEST: CPT

## 2023-07-21 NOTE — PROGRESS NOTES
[facility] [address] [place] [address] Gareth Epstein 101 200  [phone]  Dept: [phone]  Dept Fax: [phone]: [phone]      Visit Date:7/21/2023     Mary Brown is a 46 y.o. female who presents today for:   Chief Complaint   Patient presents with    Follow-up     Normocytic anemia        HPI:   Mary Brown is a 46 y.o. female referred to Hematology/Oncology clinic for evaluation of anemia per her PCP, Dr. Norma Card. She was seen as a new patient on 7/19/22. The patient was recently hospitalized in June 2022 for hyponatremia and found to be anemic. Hgb during hospitalization 6.5 and improved to 8.4 on date of discharge 6/8/22. Most recent CBC 7/13/22 Hgb 8.1, hct 29.1, MCV 85. 1. iron studies on 7/13/22 showed iron deficiency with ferritin 13, iron 21, TIBC 559. Folate and vitamin B12 not deficient. She was referred for further evaluation. Per chart review of EMR, the patient has had chronic anemia since 2017 with most recent baseline of Hgb 7-8's since April 2022. The patient was referred to GI as well. The patient affirms prior history of anemia. She has previously followed with different hematologist and states she has never required IV iron. The patient denies any abnormal bleeding; no epistaxis, hemoptysis, hematemesis, melena, hematochezia, hematuria or vaginal bleeding. Her last menstrual cycle was approximately 7 years ago. The patient denies history of malabsorptive disorders such as IBD or celiac disease. She denies prior gastrointestinal surgeries. She reports adequate intake of oral iron in her diet. The patient states she has never received blood transfusion as she has refused in the past.   Her past medical history includes MS, anemia. It is noted through chart review she has history of alcohol abuse. Patient received IV Venofer in July-August 2022.    She had an EGD 8/16/22 per Dr. Marcos Gutierres showing small Schatzki's ring which is non-obstructive, otherwise

## 2023-07-21 NOTE — PATIENT INSTRUCTIONS
Return to clinic in 4 weeks  Labs on RTC: CBC, ferritin, iron, TIBC   Please call for questions or concerns.

## 2023-07-21 NOTE — PROGRESS NOTES
Oncology Social Work    Date: 7/21/2023  Time: 12:44 PM  Name: Leandra Seo  MRN: [id number]     Contact Type: Follow-up    Note:   Situation: This staff was referred to Leandra Seo by her provider while she was in the Infusion treatment area. Background: Shoshana Zabala has a history of complications with drugs and alcohol. This staff was asked to address some of the additional grief related concerns she expressed to her provider at today's visit. Assessment: Shoshana Zabala admitted to have been drinking since roughly around 4:00a this morning. She explained her past drinking has fit this cycle since her mom is dying. We discussed her previous years of alcohol (and marijuana) abuse and this staff pointed out the pattern as a habitual process. Shoshana Zabala admitted to being an alcoholic but truly had never recognized her choices were related to the situation at hand. She boasted about being able to stop at any time, at which time I confronted the likelihood of this being unsuccessful otherwise she would not have reverted to this choice. - She has been able to recognize the problems it has caused in all her relationships. She asked if we could continue to discuss her situation in the future. I agreed provided she has not used substances for 24 hours prior to us meeting. She felt this was reasonable and will reach out.   - She expressed gratitude on the information I was able to provide to her about her care choices as she had never had things addressed in this manner. Recommendation: Shoshana Zabala will initiate further follow-up based on need.  provided her with my contact information and will continue to follow up as needed.       OSCAR Watkins, ZACK, SUNNY  Oncology Social Worker      Electronically signed by OSCAR Watkins LSW, ACHP-SW on 7/21/2023 at 12:44 PM

## 2023-07-25 ENCOUNTER — CARE COORDINATION (OUTPATIENT)
Dept: CARE COORDINATION | Age: 53
End: 2023-07-25

## 2023-07-25 NOTE — CARE COORDINATION
SW mailed out Counseling/Addiction Services to pt home address. Included SW contact information to address additional needs or concerns.

## 2023-07-27 ENCOUNTER — CARE COORDINATION (OUTPATIENT)
Dept: CARE COORDINATION | Age: 53
End: 2023-07-27

## 2023-07-27 NOTE — CARE COORDINATION
SW called pt to follow up with mailing of Recovery services and local counselors. Advised pt to return my call to [phone].

## 2023-07-28 ENCOUNTER — CARE COORDINATION (OUTPATIENT)
Dept: CARE COORDINATION | Age: 53
End: 2023-07-28

## 2023-07-28 NOTE — CARE COORDINATION
Ambulatory Care Coordination Note  2023    Patient Current Location:  Home: [address] [place] [address] Dr Kenzie Neumann [address]     ACM contacted the patient by telephone. Verified name and  with patient as identifiers. Provided introduction to self, and explanation of the ACM role. Challenges to be reviewed by the provider   Additional needs identified to be addressed with provider: No  none               Method of communication with provider: none. ACM: Al Jamil RN    Cancer center  met with patient during appt re: grief and addiction. Gina Rameyraina mailed resource info for recover services and local counselors. Call back from patient. Mother is dying. She is home. Father is also sick and requires a lot of care. They refuse home health and COA. Needs shoulder surgery but cannot at this time due to caring for parents. There is no support, family, or friends that will help. She wants her drivers license back. Will cost $1,000. Speech is slurred. Crying during call. States she needs counseling. Agreed she will call [facility] intake today. Plan -   [facility] Intake [phone]    Dr. Moshe Cooley follow up  My [address] [phone] aaa3 referral for parents  Fall precautions  Social work involved  R/S shoulder surgery patient canceled      Offered patient enrollment in the Remote Patient Monitoring (RPM) program for in-home monitoring: Patient is not eligible for RPM program.    Lab Results       None            Care Coordination Interventions    Referral from Primary Care Provider: No  Suggested Interventions and [address]:  In Process  Fall Risk Prevention: Declined  Occupational Therapy: Declined  Pharmacist: Completed (Comment: PCP office)  Physical Therapy: Declined  Social Work: Completed  Transportation Support: Completed (Comment: jw if needed)  Other Services or Interventions: hematology, ortho          Goals Addressed

## 2023-08-02 ENCOUNTER — CARE COORDINATION (OUTPATIENT)
Dept: CARE COORDINATION | Age: 53
End: 2023-08-02

## 2023-08-02 NOTE — CARE COORDINATION
DELLA called pt to follow up with mailing sent re:local addiction services. Pt unavailable at the time of my call and vm was left informing pt of who was calling and requesting a call back to [phone].

## 2023-08-10 ENCOUNTER — CARE COORDINATION (OUTPATIENT)
Dept: CARE COORDINATION | Age: 53
End: 2023-08-10

## 2023-08-10 NOTE — CARE COORDINATION
DELLA called pt to follow up with resources mailed to pt home address and to discuss needs/concerns. Pt unavailable at the time of my call and vm was left requesting a call back to [phone].

## 2023-08-15 ENCOUNTER — CARE COORDINATION (OUTPATIENT)
Dept: CARE COORDINATION | Age: 53
End: 2023-08-15

## 2023-08-15 NOTE — CARE COORDINATION
SW called pt to discuss resources sent. This is the 3rd call requesting a call back from pt to [phone]. SW will resolve if no call back.

## 2023-08-17 ENCOUNTER — CARE COORDINATION (OUTPATIENT)
Dept: CARE COORDINATION | Age: 53
End: 2023-08-17

## 2023-08-17 DIAGNOSIS — Z78.9 ALCOHOL USE: ICD-10-CM

## 2023-08-17 RX ORDER — GABAPENTIN 300 MG/1
300 CAPSULE ORAL DAILY
Qty: 90 CAPSULE | Refills: 0 | Status: SHIPPED | OUTPATIENT
Start: 2023-08-17 | End: 2023-11-15

## 2023-08-17 NOTE — TELEPHONE ENCOUNTER
Pt called requesting medication refill.      Patient's last appointment was : 7/21/23  Patient's next appointment is :  none  Last refilled: 5/1/23  [facility] Verified    Lab Results   Component Value Date    LABA1C 4.8 10/03/2019     Lab Results   Component Value Date    CHOL 162 10/09/2019    TRIG 196 10/09/2019     06/16/2021    LDLCALC 97 06/16/2021     Lab Results   Component Value Date     05/24/2023    K 4.1 05/24/2023    CL 95 (L) 05/24/2023    CO2 26 05/24/2023    BUN 5 (L) 05/24/2023    CREATININE 0.5 05/24/2023    GLUCOSE 113 (H) 05/24/2023    CALCIUM 9.3 05/24/2023    PROT 6.8 05/24/2023    LABALBU 4.4 05/24/2023    BILITOT 0.2 (L) 05/24/2023    ALKPHOS 73 05/24/2023    AST 59 (H) 05/24/2023    ALT 18 05/24/2023    LABGLOM >60 05/24/2023    AGRATIO 1.2 (L) 05/14/2022     Lab Results   Component Value Date    TSH 3.920 06/03/2022    T4FREE 0.96 04/11/2022     Lab Results   Component Value Date    WBC 5.6 07/21/2023    HGB 12.8 07/21/2023    HCT 38.4 07/21/2023    MCV 92 07/21/2023     07/21/2023

## 2023-08-18 ENCOUNTER — CARE COORDINATION (OUTPATIENT)
Dept: CARE COORDINATION | Age: 53
End: 2023-08-18

## 2023-08-18 NOTE — CARE COORDINATION
Attempted care management follow up. Left message to return phone call to ambulatory care manager at [phone].   Plan -   8/21 hematology  [facility] Intake [phone]   GI Dr. Leonardo Carson follow up  My [address] [phone]  AAA7 referral for parents  Fall precautions  Social work involved  R/S shoulder surgery patient canceled

## 2023-08-25 ENCOUNTER — CARE COORDINATION (OUTPATIENT)
Dept: CARE COORDINATION | Age: 53
End: 2023-08-25

## 2023-08-25 NOTE — CARE COORDINATION
Attempted care management follow up. Left message to return phone call to ambulatory care manager at [phone].   Plan -   8/29 [facility] Intake [phone]   GI Dr. Diane De La Cruz follow up  My [address] [phone] aaa3 referral for parents  Fall precautions  Social work involved  R/S shoulder surgery patient canceled

## 2023-08-29 ENCOUNTER — HOSPITAL ENCOUNTER (OUTPATIENT)
Dept: INFUSION THERAPY | Age: 53
Discharge: HOME | End: 2023-08-29
Payer: COMMERCIAL

## 2023-08-29 ENCOUNTER — OFFICE VISIT (OUTPATIENT)
Dept: ONCOLOGY | Age: 53
End: 2023-08-29
Payer: COMMERCIAL

## 2023-08-29 VITALS
WEIGHT: 97 LBS | RESPIRATION RATE: 16 BRPM | HEIGHT: 64 IN | BODY MASS INDEX: 16.56 KG/M2 | SYSTOLIC BLOOD PRESSURE: 157 MMHG | HEART RATE: 94 BPM | OXYGEN SATURATION: 100 % | DIASTOLIC BLOOD PRESSURE: 97 MMHG | TEMPERATURE: 98.5 F

## 2023-08-29 VITALS
RESPIRATION RATE: 16 BRPM | HEART RATE: 94 BPM | OXYGEN SATURATION: 100 % | DIASTOLIC BLOOD PRESSURE: 97 MMHG | SYSTOLIC BLOOD PRESSURE: 157 MMHG | TEMPERATURE: 98.5 F

## 2023-08-29 DIAGNOSIS — D64.9 NORMOCYTIC ANEMIA: ICD-10-CM

## 2023-08-29 DIAGNOSIS — D50.8 OTHER IRON DEFICIENCY ANEMIA: ICD-10-CM

## 2023-08-29 DIAGNOSIS — D64.9 NORMOCYTIC ANEMIA: Primary | ICD-10-CM

## 2023-08-29 LAB
ABSOLUTE IMMATURE GRANULOCYTE: 0.02 THOU/MM3 (ref 0–0.07)
BASOPHILS ABSOLUTE: 0.1 THOU/MM3 (ref 0–0.1)
BASOPHILS NFR BLD AUTO: 1 % (ref 0–3)
EOSINOPHIL NFR BLD AUTO: 1 % (ref 0–4)
EOSINOPHILS ABSOLUTE: 0 THOU/MM3 (ref 0–0.4)
ERYTHROCYTE [DISTWIDTH] IN BLOOD BY AUTOMATED COUNT: 14.4 % (ref 11.5–14.5)
FERRITIN SERPL IA-MCNC: 1208 NG/ML (ref 10–291)
HCT VFR BLD AUTO: 36 % (ref 37–47)
HGB BLD-MCNC: 12.3 GM/DL (ref 12–16)
IMMATURE GRANULOCYTES: 0 %
IRON SERPL-MCNC: 203 UG/DL (ref 50–170)
LYMPHOCYTES ABSOLUTE: 1.3 THOU/MM3 (ref 1–4.8)
LYMPHOCYTES NFR BLD AUTO: 23 % (ref 15–47)
MCH RBC QN AUTO: 34.4 PG (ref 26–33)
MCHC RBC AUTO-ENTMCNC: 34.2 GM/DL (ref 32.2–35.5)
MCV RBC AUTO: 101 FL (ref 81–99)
MONOCYTES ABSOLUTE: 0.6 THOU/MM3 (ref 0.4–1.3)
MONOCYTES NFR BLD AUTO: 10 % (ref 0–12)
NEUTROPHILS NFR BLD AUTO: 65 % (ref 43–75)
PLATELET # BLD AUTO: 105 THOU/MM3 (ref 130–400)
PMV BLD AUTO: 9.7 FL (ref 9.4–12.4)
RBC # BLD AUTO: 3.58 MILL/MM3 (ref 4.2–5.4)
SEGMENTED NEUTROPHILS ABSOLUTE COUNT: 3.7 THOU/MM3 (ref 1.8–7.7)
TIBC SERPL-MCNC: < 220 UG/DL (ref 171–450)
WBC # BLD AUTO: 5.7 THOU/MM3 (ref 4.8–10.8)

## 2023-08-29 PROCEDURE — G8419 CALC BMI OUT NRM PARAM NOF/U: HCPCS | Performed by: PHYSICIAN ASSISTANT

## 2023-08-29 PROCEDURE — 85025 COMPLETE CBC W/AUTO DIFF WBC: CPT

## 2023-08-29 PROCEDURE — 99213 OFFICE O/P EST LOW 20 MIN: CPT | Performed by: PHYSICIAN ASSISTANT

## 2023-08-29 PROCEDURE — 36415 COLL VENOUS BLD VENIPUNCTURE: CPT

## 2023-08-29 PROCEDURE — G8427 DOCREV CUR MEDS BY ELIG CLIN: HCPCS | Performed by: PHYSICIAN ASSISTANT

## 2023-08-29 PROCEDURE — 4004F PT TOBACCO SCREEN RCVD TLK: CPT | Performed by: PHYSICIAN ASSISTANT

## 2023-08-29 PROCEDURE — 83550 IRON BINDING TEST: CPT

## 2023-08-29 PROCEDURE — 3017F COLORECTAL CA SCREEN DOC REV: CPT | Performed by: PHYSICIAN ASSISTANT

## 2023-08-29 PROCEDURE — 83540 ASSAY OF IRON: CPT

## 2023-08-29 PROCEDURE — 82728 ASSAY OF FERRITIN: CPT

## 2023-08-29 PROCEDURE — 99211 OFF/OP EST MAY X REQ PHY/QHP: CPT

## 2023-08-29 NOTE — PATIENT INSTRUCTIONS
Return to clinic in 6 weeks  Labs on RTC  Recommend follow up with GI, Neurology, PCP    Please call for questions or concerns.

## 2023-09-01 ENCOUNTER — CARE COORDINATION (OUTPATIENT)
Dept: CARE COORDINATION | Age: 53
End: 2023-09-01

## 2023-09-01 SDOH — HEALTH STABILITY: PHYSICAL HEALTH: ON AVERAGE, HOW MANY MINUTES DO YOU ENGAGE IN EXERCISE AT THIS LEVEL?: 20 MIN

## 2023-09-01 SDOH — HEALTH STABILITY: PHYSICAL HEALTH: ON AVERAGE, HOW MANY DAYS PER WEEK DO YOU ENGAGE IN MODERATE TO STRENUOUS EXERCISE (LIKE A BRISK WALK)?: 4 DAYS

## 2023-09-01 ASSESSMENT — LIFESTYLE VARIABLES
HOW OFTEN DO YOU HAVE A DRINK CONTAINING ALCOHOL: 4 OR MORE TIMES A WEEK
HOW MANY STANDARD DRINKS CONTAINING ALCOHOL DO YOU HAVE ON A TYPICAL DAY: 7 TO 9

## 2023-09-01 ASSESSMENT — SOCIAL DETERMINANTS OF HEALTH (SDOH)
HOW OFTEN DO YOU ATTEND CHURCH OR RELIGIOUS SERVICES?: NEVER
HOW OFTEN DO YOU ATTENT MEETINGS OF THE CLUB OR ORGANIZATION YOU BELONG TO?: NEVER
HOW OFTEN DO YOU GET TOGETHER WITH FRIENDS OR RELATIVES?: TWICE A WEEK
DO YOU BELONG TO ANY CLUBS OR ORGANIZATIONS SUCH AS CHURCH GROUPS UNIONS, FRATERNAL OR ATHLETIC GROUPS, OR SCHOOL GROUPS?: NO
IN A TYPICAL WEEK, HOW MANY TIMES DO YOU TALK ON THE PHONE WITH FAMILY, FRIENDS, OR NEIGHBORS?: MORE THAN THREE TIMES A WEEK

## 2023-09-01 NOTE — CARE COORDINATION
Attempted care management follow up. Left message to return phone call to ambulatory care manager at [phone].   Plan -   10/9 [facility] Intake [phone]   GI Dr. Joiner [place] follow up  My [address] [phone] aaa3 referral for parents  Fall precautions  Social work involved  R/S shoulder surgery patient canceled

## 2023-09-08 ENCOUNTER — CARE COORDINATION (OUTPATIENT)
Dept: CARE COORDINATION | Age: 53
End: 2023-09-08

## 2023-09-08 NOTE — CARE COORDINATION
Attempted care management follow up. Left message to return phone call to ambulatory care manager at [phone]. Unable to reach for 30 days. Discharge from care management.    Plan -   D/c care mgmt  10/9 [facility] [phone]    Dr. Logan Rosales follow up  My [address] [facility] [phone]  AAA referral for parents  Fall precautions  Social work involved  R/S shoulder surgery patient canceled

## 2023-09-12 NOTE — CARE COORDINATION
Attempted to reach patient for continued Care Coordination follow up and education. Patient was unavailable at the time of my call, and a generic voicemail message was left asking patient to return my call at [phone].
none

## 2023-09-13 NOTE — PROGRESS NOTES
[facility] [address] [place] [address] Gareth Epstein 101 200  003 [facility] [address] [address]  Dept: [phone]  Dept Fax: [phone]: [phone]      Visit Date:8/29/2023     Alondra Hicks is a 46 y.o. female who presents today for:   Chief Complaint   Patient presents with    Follow-up     Normocytic anemia        HPI:   Alondra Hicks is a 46 y.o. female referred to Hematology/Oncology clinic for evaluation of anemia per her PCP, Dr. Micheline Howard. She was seen as a new patient on 7/19/22. The patient was recently hospitalized in June 2022 for hyponatremia and found to be anemic. Hgb during hospitalization 6.5 and improved to 8.4 on date of discharge 6/8/22. Most recent CBC 7/13/22 Hgb 8.1, hct 29.1, MCV 85. 1. iron studies on 7/13/22 showed iron deficiency with ferritin 13, iron 21, TIBC 559. Folate and vitamin B12 not deficient. She was referred for further evaluation. Per chart review of EMR, the patient has had chronic anemia since 2017 with most recent baseline of Hgb 7-8's since April 2022. The patient was referred to GI as well. The patient affirms prior history of anemia. She has previously followed with different hematologist and states she has never required IV iron. The patient denies any abnormal bleeding; no epistaxis, hemoptysis, hematemesis, melena, hematochezia, hematuria or vaginal bleeding. Her last menstrual cycle was approximately 7 years ago. The patient denies history of malabsorptive disorders such as IBD or celiac disease. She denies prior gastrointestinal surgeries. She reports adequate intake of oral iron in her diet. The patient states she has never received blood transfusion as she has refused in the past.   Her past medical history includes MS, anemia. It is noted through chart review she has history of alcohol abuse. Patient received IV Venofer in July-August 2022.    She had an EGD 8/16/22 per Dr. Bunny Orosco showing small Schatzki's ring which is non-obstructive, otherwise

## 2023-10-09 ENCOUNTER — HOSPITAL ENCOUNTER (OUTPATIENT)
Dept: INFUSION THERAPY | Age: 53
Discharge: HOME | End: 2023-10-09
Payer: COMMERCIAL

## 2023-10-09 ENCOUNTER — OFFICE VISIT (OUTPATIENT)
Dept: ONCOLOGY | Age: 53
End: 2023-10-09
Payer: COMMERCIAL

## 2023-10-09 VITALS
TEMPERATURE: 98.5 F | DIASTOLIC BLOOD PRESSURE: 88 MMHG | HEART RATE: 98 BPM | OXYGEN SATURATION: 100 % | SYSTOLIC BLOOD PRESSURE: 132 MMHG | RESPIRATION RATE: 16 BRPM

## 2023-10-09 VITALS
OXYGEN SATURATION: 100 % | WEIGHT: 101.2 LBS | HEART RATE: 98 BPM | BODY MASS INDEX: 17.28 KG/M2 | SYSTOLIC BLOOD PRESSURE: 132 MMHG | RESPIRATION RATE: 16 BRPM | DIASTOLIC BLOOD PRESSURE: 88 MMHG | TEMPERATURE: 98.5 F | HEIGHT: 64 IN

## 2023-10-09 DIAGNOSIS — D75.89 MACROCYTOSIS: Primary | ICD-10-CM

## 2023-10-09 DIAGNOSIS — D64.9 NORMOCYTIC ANEMIA: ICD-10-CM

## 2023-10-09 DIAGNOSIS — D50.8 OTHER IRON DEFICIENCY ANEMIA: ICD-10-CM

## 2023-10-09 DIAGNOSIS — D75.89 MACROCYTOSIS: ICD-10-CM

## 2023-10-09 LAB
ABSOLUTE IMMATURE GRANULOCYTE: 0.01 THOU/MM3 (ref 0–0.07)
ALBUMIN SERPL BCG-MCNC: 4.2 G/DL (ref 3.5–5.1)
ALP SERPL-CCNC: 130 U/L (ref 38–126)
ALT SERPL W/O P-5'-P-CCNC: 34 U/L (ref 11–66)
AST SERPL-CCNC: 47 U/L (ref 5–40)
BASOPHILS ABSOLUTE: 0.1 THOU/MM3 (ref 0–0.1)
BASOPHILS NFR BLD AUTO: 1 % (ref 0–3)
BILIRUB CONJ SERPL-MCNC: < 0.2 MG/DL (ref 0–0.3)
BILIRUB SERPL-MCNC: 0.5 MG/DL (ref 0.3–1.2)
EOSINOPHIL NFR BLD AUTO: 3 % (ref 0–4)
EOSINOPHILS ABSOLUTE: 0.1 THOU/MM3 (ref 0–0.4)
ERYTHROCYTE [DISTWIDTH] IN BLOOD BY AUTOMATED COUNT: 13.2 % (ref 11.5–14.5)
FERRITIN SERPL IA-MCNC: 260 NG/ML (ref 10–291)
HCT VFR BLD AUTO: 40.5 % (ref 37–47)
HGB BLD-MCNC: 13.3 GM/DL (ref 12–16)
IMMATURE GRANULOCYTES: 0 %
IRON SERPL-MCNC: 75 UG/DL (ref 50–170)
LYMPHOCYTES ABSOLUTE: 1.7 THOU/MM3 (ref 1–4.8)
LYMPHOCYTES NFR BLD AUTO: 31 % (ref 15–47)
MCH RBC QN AUTO: 36.9 PG (ref 26–33)
MCHC RBC AUTO-ENTMCNC: 32.8 GM/DL (ref 32.2–35.5)
MCV RBC AUTO: 113 FL (ref 81–99)
MONOCYTES ABSOLUTE: 0.6 THOU/MM3 (ref 0.4–1.3)
MONOCYTES NFR BLD AUTO: 10 % (ref 0–12)
NEUTROPHILS NFR BLD AUTO: 55 % (ref 43–75)
PATHOLOGIST REVIEW: ABNORMAL
PLATELET # BLD AUTO: 244 THOU/MM3 (ref 130–400)
PMV BLD AUTO: 9.3 FL (ref 9.4–12.4)
PROT SERPL-MCNC: 7 G/DL (ref 6.1–8)
RBC # BLD AUTO: 3.6 MILL/MM3 (ref 4.2–5.4)
SEGMENTED NEUTROPHILS ABSOLUTE COUNT: 3 THOU/MM3 (ref 1.8–7.7)
TIBC SERPL-MCNC: 286 UG/DL (ref 171–450)
TSH SERPL DL<=0.005 MIU/L-ACNC: 1.46 UIU/ML (ref 0.4–4.2)
WBC # BLD AUTO: 5.5 THOU/MM3 (ref 4.8–10.8)

## 2023-10-09 PROCEDURE — 4004F PT TOBACCO SCREEN RCVD TLK: CPT | Performed by: PHYSICIAN ASSISTANT

## 2023-10-09 PROCEDURE — 83540 ASSAY OF IRON: CPT

## 2023-10-09 PROCEDURE — G8484 FLU IMMUNIZE NO ADMIN: HCPCS | Performed by: PHYSICIAN ASSISTANT

## 2023-10-09 PROCEDURE — 36415 COLL VENOUS BLD VENIPUNCTURE: CPT

## 2023-10-09 PROCEDURE — 85025 COMPLETE CBC W/AUTO DIFF WBC: CPT

## 2023-10-09 PROCEDURE — G8427 DOCREV CUR MEDS BY ELIG CLIN: HCPCS | Performed by: PHYSICIAN ASSISTANT

## 2023-10-09 PROCEDURE — 82728 ASSAY OF FERRITIN: CPT

## 2023-10-09 PROCEDURE — 83550 IRON BINDING TEST: CPT

## 2023-10-09 PROCEDURE — G8419 CALC BMI OUT NRM PARAM NOF/U: HCPCS | Performed by: PHYSICIAN ASSISTANT

## 2023-10-09 PROCEDURE — 99211 OFF/OP EST MAY X REQ PHY/QHP: CPT

## 2023-10-09 PROCEDURE — 80076 HEPATIC FUNCTION PANEL: CPT

## 2023-10-09 PROCEDURE — 99213 OFFICE O/P EST LOW 20 MIN: CPT | Performed by: PHYSICIAN ASSISTANT

## 2023-10-09 PROCEDURE — 3017F COLORECTAL CA SCREEN DOC REV: CPT | Performed by: PHYSICIAN ASSISTANT

## 2023-10-09 PROCEDURE — 84443 ASSAY THYROID STIM HORMONE: CPT

## 2023-10-10 ENCOUNTER — TELEPHONE (OUTPATIENT)
Dept: ONCOLOGY | Age: 53
End: 2023-10-10

## 2023-10-10 NOTE — TELEPHONE ENCOUNTER
Called patient back. Reviewed TSH, LFT. Discussed recommendation to continue to monitor blood counts. Macrocytosis may be multifactorial in response to iron repletion. Iron studies within normal limits. Follow up as planned in November 2023.

## 2023-10-16 ENCOUNTER — HOSPITAL ENCOUNTER (OUTPATIENT)
Age: 53
Discharge: HOME | End: 2023-10-16
Payer: COMMERCIAL

## 2023-10-16 DIAGNOSIS — Z78.9 ALCOHOL USE: ICD-10-CM

## 2023-10-16 LAB
ANION GAP SERPL CALC-SCNC: 10 MEQ/L (ref 8–16)
BUN SERPL-MCNC: 5 MG/DL (ref 7–22)
CALCIUM SERPL-MCNC: 9.4 MG/DL (ref 8.5–10.5)
CHLORIDE SERPL-SCNC: 100 MEQ/L (ref 98–111)
CO2 SERPL-SCNC: 27 MEQ/L (ref 23–33)
CREAT SERPL-MCNC: 0.5 MG/DL (ref 0.4–1.2)
GFR SERPL CREATININE-BSD FRML MDRD: > 60 ML/MIN/1.73M2
GLUCOSE SERPL-MCNC: 106 MG/DL (ref 70–108)
POTASSIUM SERPL-SCNC: 3.8 MEQ/L (ref 3.5–5.2)
SODIUM SERPL-SCNC: 137 MEQ/L (ref 135–145)

## 2023-10-16 PROCEDURE — 80048 BASIC METABOLIC PNL TOTAL CA: CPT

## 2023-10-16 PROCEDURE — 36415 COLL VENOUS BLD VENIPUNCTURE: CPT

## 2023-10-16 RX ORDER — GABAPENTIN 300 MG/1
300 CAPSULE ORAL DAILY
Qty: 90 CAPSULE | Refills: 0 | Status: SHIPPED | OUTPATIENT
Start: 2023-10-16 | End: 2024-01-14

## 2023-10-16 NOTE — TELEPHONE ENCOUNTER
----- Message from Marvin Soler sent at 10/13/2023 10:09 AM EDT -----  Subject: Refill Request    QUESTIONS  Name of Medication? gabapentin (NEURONTIN) 300 MG capsule  Patient-reported dosage and instructions? Take 1 capsule by mouth daily   for 90 days. How many days do you have left? 5  Preferred Pharmacy? [facility] phone number (if available)? [phone]  ---------------------------------------------------------------------------  --------------  CALL BACK INFO  What is the best way for the office to contact you? OK to leave message on   voicemail  Preferred Call Back Phone Number? [phone]  ---------------------------------------------------------------------------  --------------  SCRIPT ANSWERS  Relationship to Patient?  Self

## 2023-10-16 NOTE — TELEPHONE ENCOUNTER
This medication refill is regarding a telephone request. Refill requested by patient. Requested Prescriptions     Pending Prescriptions Disp Refills    gabapentin (NEURONTIN) 300 MG capsule 90 capsule 0     Sig: Take 1 capsule by mouth daily for 90 days. Date of last visit: 5/1/2023   Date of next visit: 11/1/2023  Date of last refill: 8/17/2023  90/0    Last Lipid Panel:    Lab Results   Component Value Date/Time    CHOL 162 10/09/2019 10:29 AM    TRIG 196 10/09/2019 10:29 AM     06/16/2021 08:14 AM    LDLCALC 97 06/16/2021 08:14 AM     Last CMP:   Lab Results   Component Value Date     05/24/2023    K 4.1 05/24/2023    CL 95 (L) 05/24/2023    CO2 26 05/24/2023    BUN 5 (L) 05/24/2023    CREATININE 0.5 05/24/2023    GLUCOSE 113 (H) 05/24/2023    CALCIUM 9.3 05/24/2023    PROT 7.0 10/09/2023    LABALBU 4.2 10/09/2023    BILITOT 0.5 10/09/2023    ALKPHOS 130 (H) 10/09/2023    AST 47 (H) 10/09/2023    ALT 34 10/09/2023    LABGLOM >60 05/24/2023    AGRATIO 1.2 (L) 05/14/2022       Last Thyroid:    Lab Results   Component Value Date    TSH 1.460 10/09/2023    T4FREE 0.96 04/11/2022     Last Hemoglobin A1C:    Lab Results   Component Value Date/Time    LABA1C 4.8 10/03/2019 08:22 AM       Rx verified, ordered and set to EP.

## 2023-11-01 ENCOUNTER — OFFICE VISIT (OUTPATIENT)
Dept: FAMILY MEDICINE CLINIC | Age: 53
End: 2023-11-01

## 2023-11-01 VITALS
HEIGHT: 64 IN | RESPIRATION RATE: 14 BRPM | HEART RATE: 85 BPM | SYSTOLIC BLOOD PRESSURE: 124 MMHG | TEMPERATURE: 98.6 F | OXYGEN SATURATION: 99 % | DIASTOLIC BLOOD PRESSURE: 82 MMHG | WEIGHT: 104 LBS | BODY MASS INDEX: 17.75 KG/M2

## 2023-11-01 DIAGNOSIS — R11.10 VOMITING, UNSPECIFIED VOMITING TYPE, UNSPECIFIED WHETHER NAUSEA PRESENT: ICD-10-CM

## 2023-11-01 DIAGNOSIS — D64.9 ANEMIA, UNSPECIFIED TYPE: ICD-10-CM

## 2023-11-01 DIAGNOSIS — Z12.31 SCREENING MAMMOGRAM, ENCOUNTER FOR: Primary | ICD-10-CM

## 2023-11-01 RX ORDER — ONDANSETRON 4 MG/1
4 TABLET, FILM COATED ORAL 3 TIMES DAILY PRN
Qty: 30 TABLET | Refills: 0 | Status: SHIPPED | OUTPATIENT
Start: 2023-11-01

## 2023-11-01 NOTE — PROGRESS NOTES
Attending Physician Note    I reviewed the patient's medical history, the resident's findings on physical examination, the patient's diagnoses, and treatment plan as documented in the resident note. I concur with the treatment plan as documented. Any additional suggestions noted below. GE modifier    Brief summary:   Doing well - not needing sertraline. Feels much better since shoulder hardware removed. Hematology managing her iron deficiency. Rec mammogram, Pap, vaccines, CRC screening.

## 2023-11-01 NOTE — PROGRESS NOTES
Patient:Rivka Zavala Sex: female  YOB: 1970 Age:52 y.o. MRN: [id number]    HPI   Chief Complaint: Follow-up (Hasnt been taking either medication. Right shoulder surgery 10/26/2023, no pain or complaints. Follow up on Thursday /Says she is doing much better and feeling good)    HPI  Licha Nance is a 46 y.o. female with a pmhx of anemia, alcohol use, malnutrition, MS, and history of stroke came to the clinic for follow-up    Patient last seen on May 1, 2023 for falls, anemia, anxiety and emesis    Patient recently had hardware removed on Oct 26th and has a follow up on Nov 6th with [facility]. Anemia: Following with hematology. Last hgb 13.3    Anxiety: endorses decreased stressors, that she is feeling better and does not want to take sertraline at this time. Nausea: Ongoing, previously on Zofran    Discussed further healthcare maintenance topics including screenings and vaccinations  Patient History    Past Medical History:  Patient has a past medical history of Anemia, Clavicle fracture, ETOH abuse, Malnutrition ([address]), Mild tetrahydrocannabinol (THC) abuse, Multiple sclerosis ([address]), Personality disorder ([address]), Rib fracture, and Smoker. Social History:  Patient reports that she has been smoking cigarettes. She started smoking about 34 years ago. She has a 15.00 pack-year smoking history. She has never used smokeless tobacco. She reports current alcohol use of about 3.0 standard drinks of alcohol per week. She reports current drug use. Drug: Marijuana Ozbernabe Kohler).      Past Surgical History:   Procedure Laterality Date    COLONOSCOPY  08/16/2022    CYST REMOVAL  2004    right thigh     SHOULDER SURGERY Right 12/2022    Hook Plate Placement    SHOULDER SURGERY  10/26/2023    [facility]    TONSILLECTOMY  1977    WRIST GANGLION EXCISION Right 08/2021      Family History   Problem Relation Age of Onset    Hypertension Mother     Stroke Father     Hypertension Father     No Known Problems Sister

## 2023-11-17 DIAGNOSIS — D75.89 MACROCYTOSIS: Primary | ICD-10-CM

## 2023-11-17 DIAGNOSIS — D50.8 OTHER IRON DEFICIENCY ANEMIA: ICD-10-CM

## 2023-11-20 ENCOUNTER — OFFICE VISIT (OUTPATIENT)
Dept: ONCOLOGY | Age: 53
End: 2023-11-20
Payer: COMMERCIAL

## 2023-11-20 ENCOUNTER — HOSPITAL ENCOUNTER (OUTPATIENT)
Dept: INFUSION THERAPY | Age: 53
Discharge: HOME | End: 2023-11-20
Payer: COMMERCIAL

## 2023-11-20 VITALS
RESPIRATION RATE: 16 BRPM | DIASTOLIC BLOOD PRESSURE: 90 MMHG | SYSTOLIC BLOOD PRESSURE: 180 MMHG | TEMPERATURE: 98.4 F | WEIGHT: 102 LBS | BODY MASS INDEX: 17.42 KG/M2 | HEART RATE: 112 BPM | HEIGHT: 64 IN | OXYGEN SATURATION: 98 %

## 2023-11-20 VITALS
DIASTOLIC BLOOD PRESSURE: 90 MMHG | SYSTOLIC BLOOD PRESSURE: 180 MMHG | OXYGEN SATURATION: 98 % | HEART RATE: 112 BPM | TEMPERATURE: 98.4 F | RESPIRATION RATE: 16 BRPM

## 2023-11-20 DIAGNOSIS — D50.8 OTHER IRON DEFICIENCY ANEMIA: ICD-10-CM

## 2023-11-20 DIAGNOSIS — D75.89 MACROCYTOSIS: ICD-10-CM

## 2023-11-20 DIAGNOSIS — D75.89 MACROCYTOSIS: Primary | ICD-10-CM

## 2023-11-20 LAB
ABSOLUTE IMMATURE GRANULOCYTE: 0.01 THOU/MM3 (ref 0–0.07)
BASOPHILS ABSOLUTE: 0.1 THOU/MM3 (ref 0–0.1)
BASOPHILS NFR BLD AUTO: 1 % (ref 0–3)
EOSINOPHIL NFR BLD AUTO: 2 % (ref 0–4)
EOSINOPHILS ABSOLUTE: 0.2 THOU/MM3 (ref 0–0.4)
ERYTHROCYTE [DISTWIDTH] IN BLOOD BY AUTOMATED COUNT: 11.7 % (ref 11.5–14.5)
FERRITIN SERPL IA-MCNC: 153 NG/ML (ref 10–291)
FOLATE SERPL-MCNC: 10.2 NG/ML (ref 4.8–24.2)
HCT VFR BLD AUTO: 37.2 % (ref 37–47)
HGB BLD-MCNC: 12.7 GM/DL (ref 12–16)
IMMATURE GRANULOCYTES: 0 %
IRON SERPL-MCNC: 144 UG/DL (ref 50–170)
LYMPHOCYTES ABSOLUTE: 2.3 THOU/MM3 (ref 1–4.8)
LYMPHOCYTES NFR BLD AUTO: 30 % (ref 15–47)
MCH RBC QN AUTO: 35.2 PG (ref 26–33)
MCHC RBC AUTO-ENTMCNC: 34.1 GM/DL (ref 32.2–35.5)
MCV RBC AUTO: 103 FL (ref 81–99)
MONOCYTES ABSOLUTE: 1 THOU/MM3 (ref 0.4–1.3)
MONOCYTES NFR BLD AUTO: 13 % (ref 0–12)
NEUTROPHILS NFR BLD AUTO: 54 % (ref 43–75)
PLATELET # BLD AUTO: 274 THOU/MM3 (ref 130–400)
PMV BLD AUTO: 8.7 FL (ref 9.4–12.4)
RBC # BLD AUTO: 3.61 MILL/MM3 (ref 4.2–5.4)
SEGMENTED NEUTROPHILS ABSOLUTE COUNT: 4.2 THOU/MM3 (ref 1.8–7.7)
TIBC SERPL-MCNC: 358 UG/DL (ref 171–450)
VIT B12 SERPL-MCNC: 751 PG/ML (ref 211–911)
WBC # BLD AUTO: 7.6 THOU/MM3 (ref 4.8–10.8)

## 2023-11-20 PROCEDURE — G8427 DOCREV CUR MEDS BY ELIG CLIN: HCPCS | Performed by: PHYSICIAN ASSISTANT

## 2023-11-20 PROCEDURE — 99211 OFF/OP EST MAY X REQ PHY/QHP: CPT

## 2023-11-20 PROCEDURE — 82607 VITAMIN B-12: CPT

## 2023-11-20 PROCEDURE — 36415 COLL VENOUS BLD VENIPUNCTURE: CPT

## 2023-11-20 PROCEDURE — 99213 OFFICE O/P EST LOW 20 MIN: CPT | Performed by: PHYSICIAN ASSISTANT

## 2023-11-20 PROCEDURE — 83540 ASSAY OF IRON: CPT

## 2023-11-20 PROCEDURE — 3017F COLORECTAL CA SCREEN DOC REV: CPT | Performed by: PHYSICIAN ASSISTANT

## 2023-11-20 PROCEDURE — 85025 COMPLETE CBC W/AUTO DIFF WBC: CPT

## 2023-11-20 PROCEDURE — G8419 CALC BMI OUT NRM PARAM NOF/U: HCPCS | Performed by: PHYSICIAN ASSISTANT

## 2023-11-20 PROCEDURE — 82746 ASSAY OF FOLIC ACID SERUM: CPT

## 2023-11-20 PROCEDURE — G8484 FLU IMMUNIZE NO ADMIN: HCPCS | Performed by: PHYSICIAN ASSISTANT

## 2023-11-20 PROCEDURE — 4004F PT TOBACCO SCREEN RCVD TLK: CPT | Performed by: PHYSICIAN ASSISTANT

## 2023-11-20 PROCEDURE — 83550 IRON BINDING TEST: CPT

## 2023-11-20 PROCEDURE — 82728 ASSAY OF FERRITIN: CPT

## 2023-11-20 RX ORDER — FOLIC ACID 1 MG/1
1 TABLET ORAL DAILY
Qty: 90 TABLET | Refills: 3 | Status: SHIPPED | OUTPATIENT
Start: 2023-11-20

## 2023-11-20 RX ORDER — FERROUS SULFATE 325(65) MG
325 TABLET ORAL DAILY
Qty: 90 TABLET | Refills: 3 | Status: SHIPPED | OUTPATIENT
Start: 2023-11-20

## 2023-11-20 NOTE — PATIENT INSTRUCTIONS
Return to clinic in 8 weeks   Labs on RTC: CBC, ferritin, iron, TIBC   Please call for questions or concerns.

## 2023-11-20 NOTE — PROGRESS NOTES
[facility] of [address] [place] [address] Gareth Epstein 101 200  [address] [facility] [address] [address]  Dept: [phone]  Dept Fax: [phone]: [phone]      Visit Date:11/20/2023     Susy Bustos is a 46 y.o. female who presents today for:   Chief Complaint   Patient presents with    Follow-up     Macrocytosis        HPI:   Susy Bustos is a 46 y.o. female referred to Hematology/Oncology clinic for evaluation of anemia per her PCP, Dr. Kenia Thurston. She was seen as a new patient on 7/19/22. The patient was hospitalized in June 2022 for hyponatremia and found to be anemic. Hgb during hospitalization 6.5 and improved to 8.4 on date of discharge 6/8/22. Most recent CBC 7/13/22 Hgb 8.1, hct 29.1, MCV 85. 1. iron studies on 7/13/22 showed iron deficiency with ferritin 13, iron 21, TIBC 559. Folate and vitamin B12 not deficient. She was referred for further evaluation. Per chart review of EMR, the patient has had chronic anemia since 2017 with most recent baseline of Hgb 7-8's since April 2022. The patient was referred to GI as well. The patient affirms prior history of anemia. She has previously followed with different hematologist clinic and states she has never required IV iron. The patient denies any abnormal bleeding; no epistaxis, hemoptysis, hematemesis, melena, hematochezia, hematuria or vaginal bleeding. Her last menstrual cycle was approximately 7 years ago. The patient denies history of malabsorptive disorders such as IBD or celiac disease. She denies prior gastrointestinal surgeries. She reports adequate intake of oral iron in her diet. The patient states she has never received blood transfusion as she has refused in the past.   Her past medical history includes MS, anemia. It is noted through chart review she has history of alcohol abuse. Patient received IV Venofer in July-August 2022.    She had an EGD 8/16/22 per Dr. Leonardo Carson showing small Schatzki's ring which is non-obstructive, otherwise normal exam

## 2023-11-29 ENCOUNTER — OFFICE VISIT (OUTPATIENT)
Dept: FAMILY MEDICINE CLINIC | Age: 53
End: 2023-11-29
Payer: COMMERCIAL

## 2023-11-29 VITALS
SYSTOLIC BLOOD PRESSURE: 128 MMHG | OXYGEN SATURATION: 98 % | TEMPERATURE: 98.3 F | RESPIRATION RATE: 18 BRPM | DIASTOLIC BLOOD PRESSURE: 78 MMHG | BODY MASS INDEX: 17.55 KG/M2 | HEIGHT: 64 IN | HEART RATE: 87 BPM | WEIGHT: 102.8 LBS

## 2023-11-29 DIAGNOSIS — Z00.00 ENCOUNTER FOR WELL ADULT EXAM WITHOUT ABNORMAL FINDINGS: Primary | ICD-10-CM

## 2023-11-29 DIAGNOSIS — Z12.4 PAP SMEAR FOR CERVICAL CANCER SCREENING: ICD-10-CM

## 2023-11-29 DIAGNOSIS — G35 MS (MULTIPLE SCLEROSIS) (HCC): ICD-10-CM

## 2023-11-29 PROCEDURE — 99396 PREV VISIT EST AGE 40-64: CPT

## 2023-11-29 PROCEDURE — G8484 FLU IMMUNIZE NO ADMIN: HCPCS

## 2023-11-29 RX ORDER — GABAPENTIN 300 MG/1
300 CAPSULE ORAL NIGHTLY
Qty: 90 CAPSULE | Refills: 1 | Status: SHIPPED | OUTPATIENT
Start: 2023-11-29 | End: 2024-05-27

## 2023-12-05 LAB — CYTOLOGY THIN PREP PAP: NORMAL

## 2023-12-11 ENCOUNTER — TELEPHONE (OUTPATIENT)
Dept: FAMILY MEDICINE CLINIC | Age: 53
End: 2023-12-11

## 2024-01-15 ENCOUNTER — SOCIAL WORK (OUTPATIENT)
Dept: INFUSION THERAPY | Age: 54
End: 2024-01-15

## 2024-01-15 DIAGNOSIS — D75.89 MACROCYTOSIS: Primary | ICD-10-CM

## 2024-01-15 DIAGNOSIS — D50.8 OTHER IRON DEFICIENCY ANEMIA: ICD-10-CM

## 2024-01-15 NOTE — PROGRESS NOTES
- Transportation for Rivka has been set up for her to come to her appt on Friday at 8:20a. This appt  had to be rescheduled from today because she had no transportation. Typically her mom is her transportation arrangement. However the weather was too cold for her mom to go outside.   - Rivka explained that she is the caregiver for both parents, who are often very confused. She relies on them for transportation because her illness (MS) makes it to difficult to drive, because she randomly blacks out.   - We discussed her disability application, of which she has been denied 3 times. You could hear the frustration in her voice as we discussed alternative ways to possibly get her additional support - especially since she has been out of work for almost 3 years.   - This staff arrangement transport for her Friday 1/19 8:20a appt through [facility].   - This staff will remain available for support as needled.

## 2024-01-19 ENCOUNTER — HOSPITAL ENCOUNTER (OUTPATIENT)
Dept: INFUSION THERAPY | Age: 54
Discharge: HOME | End: 2024-01-19
Payer: COMMERCIAL

## 2024-01-19 ENCOUNTER — OFFICE VISIT (OUTPATIENT)
Dept: ONCOLOGY | Age: 54
End: 2024-01-19
Payer: COMMERCIAL

## 2024-01-19 VITALS
DIASTOLIC BLOOD PRESSURE: 58 MMHG | RESPIRATION RATE: 16 BRPM | SYSTOLIC BLOOD PRESSURE: 121 MMHG | TEMPERATURE: 99.1 F | HEART RATE: 106 BPM | OXYGEN SATURATION: 96 %

## 2024-01-19 VITALS
TEMPERATURE: 99.1 F | SYSTOLIC BLOOD PRESSURE: 121 MMHG | DIASTOLIC BLOOD PRESSURE: 58 MMHG | WEIGHT: 105.2 LBS | BODY MASS INDEX: 18.06 KG/M2 | OXYGEN SATURATION: 96 % | HEART RATE: 106 BPM

## 2024-01-19 DIAGNOSIS — D50.8 OTHER IRON DEFICIENCY ANEMIA: ICD-10-CM

## 2024-01-19 DIAGNOSIS — D75.89 MACROCYTOSIS: ICD-10-CM

## 2024-01-19 DIAGNOSIS — D75.89 MACROCYTOSIS: Primary | ICD-10-CM

## 2024-01-19 LAB
ABSOLUTE IMMATURE GRANULOCYTE: 0.01 THOU/MM3 (ref 0–0.07)
BASOPHILS ABSOLUTE: 0.1 THOU/MM3 (ref 0–0.1)
BASOPHILS NFR BLD AUTO: 1 % (ref 0–3)
EOSINOPHIL NFR BLD AUTO: 1 % (ref 0–4)
EOSINOPHILS ABSOLUTE: 0.1 THOU/MM3 (ref 0–0.4)
ERYTHROCYTE [DISTWIDTH] IN BLOOD BY AUTOMATED COUNT: 12 % (ref 11.5–14.5)
FERRITIN SERPL IA-MCNC: 93 NG/ML (ref 10–291)
HCT VFR BLD AUTO: 39 % (ref 37–47)
HGB BLD-MCNC: 13.2 GM/DL (ref 12–16)
IMMATURE GRANULOCYTES: 0 %
IRON SERPL-MCNC: 137 UG/DL (ref 50–170)
LYMPHOCYTES ABSOLUTE: 1.7 THOU/MM3 (ref 1–4.8)
LYMPHOCYTES NFR BLD AUTO: 21 % (ref 15–47)
MCH RBC QN AUTO: 33.9 PG (ref 26–33)
MCHC RBC AUTO-ENTMCNC: 33.8 GM/DL (ref 32.2–35.5)
MCV RBC AUTO: 100 FL (ref 81–99)
MONOCYTES ABSOLUTE: 0.6 THOU/MM3 (ref 0.4–1.3)
MONOCYTES NFR BLD AUTO: 8 % (ref 0–12)
NEUTROPHILS NFR BLD AUTO: 69 % (ref 43–75)
PLATELET # BLD AUTO: 278 THOU/MM3 (ref 130–400)
PMV BLD AUTO: 8.9 FL (ref 9.4–12.4)
RBC # BLD AUTO: 3.89 MILL/MM3 (ref 4.2–5.4)
SEGMENTED NEUTROPHILS ABSOLUTE COUNT: 5.6 THOU/MM3 (ref 1.8–7.7)
TIBC SERPL-MCNC: 387 UG/DL (ref 171–450)
WBC # BLD AUTO: 8 THOU/MM3 (ref 4.8–10.8)

## 2024-01-19 PROCEDURE — 99213 OFFICE O/P EST LOW 20 MIN: CPT | Performed by: PHYSICIAN ASSISTANT

## 2024-01-19 PROCEDURE — G8419 CALC BMI OUT NRM PARAM NOF/U: HCPCS | Performed by: PHYSICIAN ASSISTANT

## 2024-01-19 PROCEDURE — 99211 OFF/OP EST MAY X REQ PHY/QHP: CPT

## 2024-01-19 PROCEDURE — G8427 DOCREV CUR MEDS BY ELIG CLIN: HCPCS | Performed by: PHYSICIAN ASSISTANT

## 2024-01-19 PROCEDURE — 3017F COLORECTAL CA SCREEN DOC REV: CPT | Performed by: PHYSICIAN ASSISTANT

## 2024-01-19 PROCEDURE — 36415 COLL VENOUS BLD VENIPUNCTURE: CPT

## 2024-01-19 PROCEDURE — 82728 ASSAY OF FERRITIN: CPT

## 2024-01-19 PROCEDURE — 4004F PT TOBACCO SCREEN RCVD TLK: CPT | Performed by: PHYSICIAN ASSISTANT

## 2024-01-19 PROCEDURE — G8484 FLU IMMUNIZE NO ADMIN: HCPCS | Performed by: PHYSICIAN ASSISTANT

## 2024-01-19 PROCEDURE — 83550 IRON BINDING TEST: CPT

## 2024-01-19 PROCEDURE — 83540 ASSAY OF IRON: CPT

## 2024-01-19 PROCEDURE — 85025 COMPLETE CBC W/AUTO DIFF WBC: CPT

## 2024-01-19 NOTE — PATIENT INSTRUCTIONS
Return to clinic in 3 months  Labs on RTC: CBC, ferritin, iron, TIBC, folate and vitamin B12   Please call for questions or concerns.

## 2024-01-19 NOTE — PROGRESS NOTES
[facility] of [address], [address]  [facility] [address]  Dept: [phone]  Dept Fax: [phone] Loc: [phone]      Visit Date:1/19/2024     Rivka Zavala is a 53 y.o. female who presents today for:   Chief Complaint   Patient presents with    Follow-up     Normocytic anemia        HPI:   Rivka Zavala is a 53 y.o. female referred to Hematology/Oncology clinic for evaluation of anemia per her PCP, Dr. Montejo. She was seen as a new patient on 7/19/22. The patient was hospitalized in June 2022 for hyponatremia and found to be anemic. Hgb during hospitalization 6.5 and improved to 8.4 on date of discharge 6/8/22. Most recent CBC 7/13/22 Hgb 8.1, hct 29.1, MCV 85.1. iron studies on 7/13/22 showed iron deficiency with ferritin 13, iron 21, TIBC 559. Folate and vitamin B12 not deficient. She was referred for further evaluation. Per chart review of EMR, the patient has had chronic anemia since 2017 with most recent baseline of Hgb 7-8's since April 2022. The patient was referred to GI as well. The patient affirms prior history of anemia.  She has previously followed with different hematologist clinic and states she has never required IV iron.  The patient denies any abnormal bleeding; no epistaxis, hemoptysis, hematemesis, melena, hematochezia, hematuria or vaginal bleeding.  Her last menstrual cycle was approximately 7 years ago.  The patient denies history of malabsorptive disorders such as IBD or celiac disease.  She denies prior gastrointestinal surgeries.  She reports adequate intake of oral iron in her diet.  The patient states she has never received blood transfusion as she has refused in the past.   Her past medical history includes MS, anemia.  It is noted through chart review she has history of alcohol abuse.    Patient received IV Venofer in July-August 2022.   She had an EGD 8/16/22 per Dr. Turner showing small Schatzki's ring which is non-obstructive, otherwise normal

## 2024-02-13 DIAGNOSIS — G35 MS (MULTIPLE SCLEROSIS) (HCC): ICD-10-CM

## 2024-02-13 RX ORDER — GABAPENTIN 300 MG/1
300 CAPSULE ORAL NIGHTLY
Qty: 90 CAPSULE | Refills: 1 | Status: SHIPPED | OUTPATIENT
Start: 2024-02-13 | End: 2024-08-11

## 2024-02-13 NOTE — TELEPHONE ENCOUNTER
This medication refill is regarding a telephone request. Refill requested by patient.    Requested Prescriptions     Pending Prescriptions Disp Refills    gabapentin (NEURONTIN) 300 MG capsule 90 capsule 1     Sig: Take 1 capsule by mouth at bedtime for 180 days.       Date of last visit: 11/29/2023   Date of next visit: 4/4/2024  Date of last refill: 11/29/2023  90/1    Last Lipid Panel:    Lab Results   Component Value Date/Time    CHOL 162 10/09/2019 10:29 AM    TRIG 196 10/09/2019 10:29 AM     06/16/2021 08:14 AM    LDLCALC 97 06/16/2021 08:14 AM     Last CMP:   Lab Results   Component Value Date     10/16/2023    K 3.8 10/16/2023     10/16/2023    CO2 27 10/16/2023    BUN 5 (L) 10/16/2023    CREATININE 0.5 10/16/2023    GLUCOSE 106 10/16/2023    CALCIUM 9.4 10/16/2023    PROT 7.0 10/09/2023    LABALBU 4.2 10/09/2023    BILITOT 0.5 10/09/2023    ALKPHOS 130 (H) 10/09/2023    AST 47 (H) 10/09/2023    ALT 34 10/09/2023    LABGLOM >60 10/16/2023    AGRATIO 1.2 (L) 05/14/2022       Last Thyroid:    Lab Results   Component Value Date    TSH 1.460 10/09/2023    T4FREE 0.96 04/11/2022     Last Hemoglobin A1C:    Lab Results   Component Value Date/Time    LABA1C 4.8 10/03/2019 08:22 AM       Rx verified, ordered and set to EP.

## 2024-03-12 DIAGNOSIS — G35 MS (MULTIPLE SCLEROSIS) (HCC): ICD-10-CM

## 2024-03-12 RX ORDER — GABAPENTIN 300 MG/1
300 CAPSULE ORAL NIGHTLY
Qty: 90 CAPSULE | Refills: 1 | Status: SHIPPED | OUTPATIENT
Start: 2024-03-12 | End: 2024-09-08

## 2024-03-12 NOTE — TELEPHONE ENCOUNTER
Medication refilled for 90 days with one refill. Please let me know if anything else is needed. Thank you

## 2024-03-12 NOTE — TELEPHONE ENCOUNTER
Patient called in and stated the Pharmacy only gave her a 30 day supply with no refills of the Gabapentin 300MG.     Patient is asking for a refill for 90 days and to be sent to [facility].

## 2024-03-19 ENCOUNTER — OFFICE VISIT (OUTPATIENT)
Dept: FAMILY MEDICINE CLINIC | Age: 54
End: 2024-03-19
Payer: COMMERCIAL

## 2024-03-19 VITALS
WEIGHT: 105 LBS | SYSTOLIC BLOOD PRESSURE: 136 MMHG | OXYGEN SATURATION: 98 % | TEMPERATURE: 98.6 F | HEIGHT: 64 IN | RESPIRATION RATE: 12 BRPM | BODY MASS INDEX: 17.93 KG/M2 | HEART RATE: 98 BPM | DIASTOLIC BLOOD PRESSURE: 82 MMHG

## 2024-03-19 DIAGNOSIS — M21.619 BUNION: ICD-10-CM

## 2024-03-19 DIAGNOSIS — H61.23 BILATERAL IMPACTED CERUMEN: ICD-10-CM

## 2024-03-19 DIAGNOSIS — D50.9 IRON DEFICIENCY ANEMIA, UNSPECIFIED IRON DEFICIENCY ANEMIA TYPE: ICD-10-CM

## 2024-03-19 DIAGNOSIS — Z12.31 ENCOUNTER FOR SCREENING MAMMOGRAM FOR MALIGNANT NEOPLASM OF BREAST: ICD-10-CM

## 2024-03-19 DIAGNOSIS — G35 MS (MULTIPLE SCLEROSIS) (HCC): Primary | ICD-10-CM

## 2024-03-19 DIAGNOSIS — B35.1 TOENAIL FUNGUS: ICD-10-CM

## 2024-03-19 LAB — KOH PREP SPEC: NORMAL

## 2024-03-19 PROCEDURE — 3017F COLORECTAL CA SCREEN DOC REV: CPT

## 2024-03-19 PROCEDURE — 99214 OFFICE O/P EST MOD 30 MIN: CPT

## 2024-03-19 PROCEDURE — 4004F PT TOBACCO SCREEN RCVD TLK: CPT

## 2024-03-19 PROCEDURE — G8419 CALC BMI OUT NRM PARAM NOF/U: HCPCS

## 2024-03-19 PROCEDURE — G8427 DOCREV CUR MEDS BY ELIG CLIN: HCPCS

## 2024-03-19 PROCEDURE — G8484 FLU IMMUNIZE NO ADMIN: HCPCS

## 2024-03-19 RX ORDER — GABAPENTIN 400 MG/1
400 CAPSULE ORAL NIGHTLY
Qty: 90 CAPSULE | Refills: 1 | Status: SHIPPED | OUTPATIENT
Start: 2024-03-19 | End: 2024-09-15

## 2024-03-19 RX ORDER — GABAPENTIN 300 MG/1
300 CAPSULE ORAL NIGHTLY
Qty: 90 CAPSULE | Refills: 1 | Status: CANCELLED | OUTPATIENT
Start: 2024-03-19 | End: 2024-09-15

## 2024-03-19 ASSESSMENT — PATIENT HEALTH QUESTIONNAIRE - PHQ9
SUM OF ALL RESPONSES TO PHQ QUESTIONS 1-9: 7
3. TROUBLE FALLING OR STAYING ASLEEP: NOT AT ALL
6. FEELING BAD ABOUT YOURSELF - OR THAT YOU ARE A FAILURE OR HAVE LET YOURSELF OR YOUR FAMILY DOWN: NEARLY EVERY DAY
SUM OF ALL RESPONSES TO PHQ QUESTIONS 1-9: 7
SUM OF ALL RESPONSES TO PHQ QUESTIONS 1-9: 7
5. POOR APPETITE OR OVEREATING: NOT AT ALL
7. TROUBLE CONCENTRATING ON THINGS, SUCH AS READING THE NEWSPAPER OR WATCHING TELEVISION: NOT AT ALL
1. LITTLE INTEREST OR PLEASURE IN DOING THINGS: NOT AT ALL
SUM OF ALL RESPONSES TO PHQ9 QUESTIONS 1 & 2: 2
9. THOUGHTS THAT YOU WOULD BE BETTER OFF DEAD, OR OF HURTING YOURSELF: NOT AT ALL
4. FEELING TIRED OR HAVING LITTLE ENERGY: MORE THAN HALF THE DAYS
SUM OF ALL RESPONSES TO PHQ QUESTIONS 1-9: 7
10. IF YOU CHECKED OFF ANY PROBLEMS, HOW DIFFICULT HAVE THESE PROBLEMS MADE IT FOR YOU TO DO YOUR WORK, TAKE CARE OF THINGS AT HOME, OR GET ALONG WITH OTHER PEOPLE: NOT DIFFICULT AT ALL
8. MOVING OR SPEAKING SO SLOWLY THAT OTHER PEOPLE COULD HAVE NOTICED. OR THE OPPOSITE, BEING SO FIGETY OR RESTLESS THAT YOU HAVE BEEN MOVING AROUND A LOT MORE THAN USUAL: NOT AT ALL
2. FEELING DOWN, DEPRESSED OR HOPELESS: MORE THAN HALF THE DAYS

## 2024-03-19 NOTE — PROGRESS NOTES
S: 53 y.o. female with   Chief Complaint   Patient presents with    Follow-up     4 Month Follow-up Chronic Conditions       HPI: please see resident note for HPI and ROS.    BP Readings from Last 3 Encounters:   03/19/24 136/82   01/19/24 (!) 121/58   01/19/24 (!) 121/58     Wt Readings from Last 3 Encounters:   03/19/24 47.6 kg (105 lb)   01/19/24 47.7 kg (105 lb 3.2 oz)   11/29/23 46.6 kg (102 lb 12.8 oz)       O: VS:  height is 1.626 m (5' 4.02\") and weight is 47.6 kg (105 lb). Her oral temperature is 98.6 °F (37 °C). Her blood pressure is 136/82 and her pulse is 98. Her respiration is 12 and oxygen saturation is 98%.   AAO/NAD, appropriate affect for mood       Diagnosis Orders   1. MS (multiple sclerosis) (HCC)  External Referral To Neurology    gabapentin (NEURONTIN) 400 MG capsule      2. Bunion  Gabriele Park DPM, Podiatry, [place]      3. Toenail fungus  Gabriele Park DPM, Podiatry, [place]    Culture, Fungus      4. Bilateral impacted cerumen  carbamide peroxide (DEBROX) 6.5 % otic solution      5. Iron deficiency anemia, unspecified iron deficiency anemia type        6. Encounter for screening mammogram for malignant neoplasm of breast  CONSTANTIN DIGITAL SCREEN W OR WO CAD BILATERAL          Plan:  Please refer to resident note for full plan.    53-year-old female here for follow up. History of MS; no longer follows with neurology. Long history of falls and dizziness related to MS. Referral placed back to neurology for evaluation/management. History of RLS, on gabapentin 300 mg nightly. Having some breakthrough symptoms. OK to increase Gabapentin to 400 mg nightly. Following with hematology for LISA, which is improved. Referral placed to podiatry for bunion evaluation. OK to send toenail clipping to evaluate for onychomycosis. Mammogram ordered today. Follow up in 1 month for recheck or sooner if needed.       Health Maintenance Due   Topic Date Due    Hepatitis B vaccine (1 of 3 - 3-dose series)

## 2024-03-19 NOTE — PROGRESS NOTES
Jose (Screen of Drug Use):    1) How many days in the past 12 months have you used drugs other than alcohol? #: 365  (positive if 7 or more)    2) How many days in the past 12 months have you used drugs more than you meant to? #: 0  (positive if 2 or more)

## 2024-03-19 NOTE — PROGRESS NOTES
Patient:Rivka Zavala  YOB: 1970  MRN: [id number]    Subjective   53 y.o. female who presents for the following: Follow-up (4 Month Follow-up Chronic Conditions)    HPI  Dizziness:  About 1 month ago patient was walking down a handicap ramp, blanked out, kind of ran down the ramp, stopped when her name was called and fell backwards hitting her head. Did not seek care at that time.   Also endorses having headaches.     Falls:  Patient endorses they are still ongoing especially at night. Patient shares concerns that these might be connected to MS relapses.   Patient would like to obtain an MRI     Foot concern  Bunion at the MTP bilaterally  Foot fungus     Patient would also like her ears cleaned today.     Incontinence   Patient feels like there is urgency and leakage with  coughing and sneezing. Patient states that his feels different compared to a UTI. Might be connected to MS symptoms, but will do further work up at follow up visit     Anemia   Following with Heme/Onc for IV iron infusions     Mammogram  Patient wanted screening again    Review of Systems   Review of Systems   Constitutional:  Negative for fever.   Neurological:  Positive for dizziness and headaches.     Patient History    Past Medical History:  She has a past medical history of Anemia, Clavicle fracture, ETOH abuse, Malnutrition (HCC), Mild tetrahydrocannabinol (THC) abuse, Multiple sclerosis (HCC), Personality disorder (HCC), Rib fracture, and Smoker.    Social History:  She reports that she has been smoking cigarettes. She started smoking about 34 years ago. She has a 17.2 pack-year smoking history. She has never used smokeless tobacco. She reports current alcohol use of about 3.0 standard drinks of alcohol per week. She reports current drug use. Drug: Marijuana (Weed).     Past Surgical History:   Procedure Laterality Date    COLONOSCOPY  08/16/2022    CYST REMOVAL  2004    right thigh     SHOULDER SURGERY Right 12/2022

## 2024-03-19 NOTE — PROGRESS NOTES
. PRECEPTOR NOTE:    S: 53 y.o. female with   Chief Complaint   Patient presents with    Follow-up     4 Month Follow-up Chronic Conditions       HPI: please see resident note for HPI and ROS.  Here for follow up of chronic conditions. Used to follow with Neurology for MS, but needs a new referral as previous provider no longer in network. Has had falls and dizziness, suspects secondary to MS symptoms.     Thinks RLS is not as well controlled on gabapentin.     Requesting to discuss management of bunion on foot.     BP Readings from Last 3 Encounters:   03/19/24 136/82   01/19/24 (!) 121/58   01/19/24 (!) 121/58     Wt Readings from Last 3 Encounters:   03/19/24 47.6 kg (105 lb)   01/19/24 47.7 kg (105 lb 3.2 oz)   11/29/23 46.6 kg (102 lb 12.8 oz)       O: VS:  height is 1.626 m (5' 4.02\") and weight is 47.6 kg (105 lb). Her oral temperature is 98.6 °F (37 °C). Her blood pressure is 136/82 and her pulse is 98. Her respiration is 12 and oxygen saturation is 98%.       Please see resident's note for complete physical exam     Diagnosis Orders   1. MS (multiple sclerosis) (HCC)        2. Encounter for screening mammogram for malignant neoplasm of breast            Plan:  Please refer to resident note for full plan.  Refer to Neurology for MS management   Increase gabapentin to 400mg   Send nail culture prior to treatment  Refer to podiatry for management of bunion        No follow-ups on file.    Health Maintenance Due   Topic Date Due    Hepatitis B vaccine (1 of 3 - 3-dose series) Never done    COVID-19 Vaccine (1) Never done    Pneumococcal 0-64 years Vaccine (1 of 2 - PCV) Never done    Colorectal Cancer Screen  Never done    Breast cancer screen  Never done    Shingles vaccine (1 of 2) Never done    Flu vaccine (1) Never done     I have discussed the case, including pertinent history and exam findings with the resident and attending physician.   I agree with the documented assessment and plan as documented by

## 2024-03-25 LAB — FUNGUS SPEC CULT: NORMAL

## 2024-04-06 NOTE — PATIENT INSTRUCTIONS
Advance Directives: Care Instructions  Overview  An advance directive is a legal way to state your wishes at the end of your life. It tells your family and your doctor what to do if you can't say what you want. There are two main types of advance directives. You can change them any time your wishes change. Living will. This form tells your family and your doctor your wishes about life support and other treatment. The form is also called a declaration. Medical power of . This form lets you name a person to make treatment decisions for you when you can't speak for yourself. This person is called a health care agent (health care proxy, health care surrogate). The form is also called a durable power of  for health care. If you do not have an advance directive, decisions about your medical care may be made by a family member, or by a doctor or a  who doesn't know you. It may help to think of an advance directive as a gift to the people who care for you. If you have one, they won't have to make tough decisions by themselves. For more information, including forms for your state, see the [address] [place] [address] website (www.caringinfo.org/planning/advance-directives/). Follow-up care is a key part of your treatment and safety. Be sure to make and go to all appointments, and call your doctor if you are having problems. It's also a good idea to know your test results and keep a list of the medicines you take. What should you include in an advance directive? Many states have a unique advance directive form. (It may ask you to address specific issues.) Or you might use a universal form that's approved by many states. If your form doesn't tell you what to address, it may be hard to know what to include in your advance directive. Use the questions below to help you get started. Who do you want to make decisions about your medical care if you are not able to?   What life-support measures do you want if you
Resident

## 2024-04-09 ENCOUNTER — OFFICE VISIT (OUTPATIENT)
Dept: FAMILY MEDICINE CLINIC | Age: 54
End: 2024-04-09
Payer: COMMERCIAL

## 2024-04-09 VITALS
SYSTOLIC BLOOD PRESSURE: 136 MMHG | WEIGHT: 105.8 LBS | BODY MASS INDEX: 18.06 KG/M2 | HEIGHT: 64 IN | TEMPERATURE: 98.1 F | OXYGEN SATURATION: 99 % | RESPIRATION RATE: 16 BRPM | HEART RATE: 82 BPM | DIASTOLIC BLOOD PRESSURE: 82 MMHG

## 2024-04-09 DIAGNOSIS — G35 MS (MULTIPLE SCLEROSIS) (HCC): Primary | ICD-10-CM

## 2024-04-09 DIAGNOSIS — H61.21 IMPACTED CERUMEN OF RIGHT EAR: ICD-10-CM

## 2024-04-09 DIAGNOSIS — D64.9 ANEMIA, UNSPECIFIED TYPE: ICD-10-CM

## 2024-04-09 PROCEDURE — 99213 OFFICE O/P EST LOW 20 MIN: CPT

## 2024-04-09 PROCEDURE — 4004F PT TOBACCO SCREEN RCVD TLK: CPT

## 2024-04-09 PROCEDURE — 3017F COLORECTAL CA SCREEN DOC REV: CPT

## 2024-04-09 PROCEDURE — G8419 CALC BMI OUT NRM PARAM NOF/U: HCPCS

## 2024-04-09 PROCEDURE — G8427 DOCREV CUR MEDS BY ELIG CLIN: HCPCS

## 2024-04-09 NOTE — PROGRESS NOTES
S: 53 y.o. female with   Chief Complaint   Patient presents with    Follow-up     MS and Anemia       HPI: please see resident note for HPI and ROS.    BP Readings from Last 3 Encounters:   04/09/24 136/82   03/19/24 136/82   01/19/24 (!) 121/58     Wt Readings from Last 3 Encounters:   04/09/24 48 kg (105 lb 12.8 oz)   03/19/24 47.6 kg (105 lb)   01/19/24 47.7 kg (105 lb 3.2 oz)       O: VS:  height is 1.626 m (5' 4\") and weight is 48 kg (105 lb 12.8 oz). Her oral temperature is 98.1 °F (36.7 °C). Her blood pressure is 136/82 and her pulse is 82. Her respiration is 16 and oxygen saturation is 99%.        Diagnosis Orders   1. MS (multiple sclerosis) ([facility])  Jeferson Hitchcock MD, Neurology, [place]      2. Impacted cerumen of right ear        3. Anemia, unspecified type            Plan:  Please refer to resident note for full plan.    53-year-old female here for follow up. History of MS; has appt with [facility] neurology in 7/2024. OK to see if she can get in sooner at [facility]. Referred to podiatry at last visit for bunions; scheduled for surgery in 5/2024. Mammogram ordered previously; encouraged to complete. Kegel exercises recommended for stress incontinence. Consider referral to pelvic floor PT. If related to MS, would consider urology referral. Right ear irrigation performed today due to cerumen impaction. Follow up in 3 months or sooner if needed.     Health Maintenance Due   Topic Date Due    Hepatitis B vaccine (1 of 3 - 3-dose series) Never done    COVID-19 Vaccine (1) Never done    Pneumococcal 0-64 years Vaccine (1 of 2 - PCV) Never done    Colorectal Cancer Screen  Never done    Breast cancer screen  Never done    Shingles vaccine (1 of 2) Never done       Attending Physician Statement  I have discussed the case, including pertinent history and exam findings with the resident.  I agree with the documented assessment and plan as documented by the resident.        Anju Burks DO 4/20/2024 2:54 PM    
deficit present.      Mental Status: She is alert.   Psychiatric:         Mood and Affect: Mood normal.         Thought Content: Thought content normal.       Labs & Imaging   Most Recent Labs:  Lab Results   Component Value Date    WBC 8.0 01/19/2024    HGB 13.2 01/19/2024    HCT 39.0 01/19/2024     01/19/2024    CHOL 162 10/09/2019    TRIG 196 10/09/2019     06/16/2021    ALT 34 10/09/2023    AST 47 (H) 10/09/2023     10/16/2023     10/16/2023    K 3.8 10/16/2023    CREATININE 0.5 10/16/2023    BUN 5 (L) 10/16/2023    CO2 27 10/16/2023    TSH 1.460 10/09/2023    INR 1.01 05/24/2023    GLUF 101 07/23/2021    LABA1C 4.8 10/03/2019     Most recent Imaging:  XR FOOT RIGHT (MIN 3 VIEWS)  Narrative: PROCEDURE: XR FOOT RIGHT (MIN 3 VIEWS)    CLINICAL INFORMATION: injury .    TECHNIQUE: 4 projections    COMPARISON: No prior study.    FINDINGS: No acute fracture or dislocation. Moderate hallux varus deformity. Joint spaces are preserved. Bone density is mildly diminished. Images done through clothing. No definite soft tissue abnormality seen  Impression: No acute finding    **This report has been created using voice recognition software.  It may contain minor errors which are inherent in voice recognition technology.**    Final report electronically signed by Dr. Riccardo Ramos on 12/30/2022 11:09 AM    Data     BP Readings from Last 3 Encounters:   04/09/24 136/82   03/19/24 136/82   01/19/24 (!) 121/58     Wt Readings from Last 3 Encounters:   04/09/24 48 kg (105 lb 12.8 oz)   03/19/24 47.6 kg (105 lb)   01/19/24 47.7 kg (105 lb 3.2 oz)     Immunization History   Administered Date(s) Administered    TDaP, ADACEL (age 10y-64y), BOOSTRIX (age 10y+), IM, 0.5mL 10/03/2019     Health Maintenance   Topic Date Due    Hepatitis B vaccine (1 of 3 - 3-dose series) Never done    COVID-19 Vaccine (1) Never done    Pneumococcal 0-64 years Vaccine (1 of 2 - PCV) Never done    Colorectal Cancer Screen  Never done

## 2024-04-22 LAB — FUNGUS SPEC CULT: NORMAL

## 2024-04-23 ENCOUNTER — OFFICE VISIT (OUTPATIENT)
Dept: ONCOLOGY | Age: 54
End: 2024-04-23
Payer: COMMERCIAL

## 2024-04-23 ENCOUNTER — HOSPITAL ENCOUNTER (OUTPATIENT)
Dept: INFUSION THERAPY | Age: 54
Discharge: HOME | End: 2024-04-23
Payer: COMMERCIAL

## 2024-04-23 VITALS
OXYGEN SATURATION: 99 % | HEART RATE: 85 BPM | DIASTOLIC BLOOD PRESSURE: 72 MMHG | SYSTOLIC BLOOD PRESSURE: 165 MMHG | RESPIRATION RATE: 18 BRPM | TEMPERATURE: 97.9 F

## 2024-04-23 VITALS
DIASTOLIC BLOOD PRESSURE: 72 MMHG | RESPIRATION RATE: 18 BRPM | HEIGHT: 64 IN | SYSTOLIC BLOOD PRESSURE: 165 MMHG | WEIGHT: 105.2 LBS | OXYGEN SATURATION: 99 % | HEART RATE: 85 BPM | BODY MASS INDEX: 17.96 KG/M2 | TEMPERATURE: 97.9 F

## 2024-04-23 DIAGNOSIS — D50.8 OTHER IRON DEFICIENCY ANEMIA: ICD-10-CM

## 2024-04-23 DIAGNOSIS — D64.9 NORMOCYTIC ANEMIA: Primary | ICD-10-CM

## 2024-04-23 DIAGNOSIS — D75.89 MACROCYTOSIS: ICD-10-CM

## 2024-04-23 LAB
ANION GAP SERPL CALC-SCNC: 20 MEQ/L (ref 8–16)
BASOPHILS ABSOLUTE: 0.1 THOU/MM3 (ref 0–0.1)
BASOPHILS NFR BLD AUTO: 1 % (ref 0–3)
BUN SERPL-MCNC: 6 MG/DL (ref 7–22)
CALCIUM SERPL-MCNC: 9.1 MG/DL (ref 8.5–10.5)
CHLORIDE SERPL-SCNC: 91 MEQ/L (ref 98–111)
CO2 SERPL-SCNC: 19 MEQ/L (ref 23–33)
CREAT SERPL-MCNC: 0.4 MG/DL (ref 0.4–1.2)
EOSINOPHIL NFR BLD AUTO: 2 % (ref 0–4)
EOSINOPHILS ABSOLUTE: 0.1 THOU/MM3 (ref 0–0.4)
ERYTHROCYTE [DISTWIDTH] IN BLOOD BY AUTOMATED COUNT: 12.8 % (ref 11.5–14.5)
FERRITIN SERPL IA-MCNC: 155 NG/ML (ref 10–291)
FOLATE SERPL-MCNC: 10.9 NG/ML (ref 4.8–24.2)
GFR SERPL CREATININE-BSD FRML MDRD: > 90 ML/MIN/1.73M2
GLUCOSE SERPL-MCNC: 76 MG/DL (ref 70–108)
HCT VFR BLD AUTO: 38.3 % (ref 37–47)
HGB BLD-MCNC: 12.8 GM/DL (ref 12–16)
IMMATURE GRANULOCYTES %: 0 %
IMMATURE GRANULOCYTES ABSOLUTE: 0.03 THOU/MM3 (ref 0–0.07)
IRON SERPL-MCNC: 98 UG/DL (ref 50–170)
LYMPHOCYTES ABSOLUTE: 2.2 THOU/MM3 (ref 1–4.8)
LYMPHOCYTES NFR BLD AUTO: 30 % (ref 15–47)
MCH RBC QN AUTO: 33.5 PG (ref 26–33)
MCHC RBC AUTO-ENTMCNC: 33.4 GM/DL (ref 32.2–35.5)
MCV RBC AUTO: 100 FL (ref 81–99)
MONOCYTES ABSOLUTE: 0.7 THOU/MM3 (ref 0.4–1.3)
MONOCYTES NFR BLD AUTO: 9 % (ref 0–12)
NEUTROPHILS NFR BLD AUTO: 58 % (ref 43–75)
PLATELET # BLD AUTO: 223 THOU/MM3 (ref 130–400)
PMV BLD AUTO: 9 FL (ref 9.4–12.4)
POTASSIUM SERPL-SCNC: 4.4 MEQ/L (ref 3.5–5.2)
RBC # BLD AUTO: 3.82 MILL/MM3 (ref 4.2–5.4)
SEGMENTED NEUTROPHILS ABSOLUTE COUNT: 4.3 THOU/MM3 (ref 1.8–7.7)
SODIUM SERPL-SCNC: 130 MEQ/L (ref 135–145)
TIBC SERPL-MCNC: 353 UG/DL (ref 171–450)
VIT B12 SERPL-MCNC: 612 PG/ML (ref 211–911)
WBC # BLD AUTO: 7.4 THOU/MM3 (ref 4.8–10.8)

## 2024-04-23 PROCEDURE — 4004F PT TOBACCO SCREEN RCVD TLK: CPT | Performed by: PHYSICIAN ASSISTANT

## 2024-04-23 PROCEDURE — 83540 ASSAY OF IRON: CPT

## 2024-04-23 PROCEDURE — 99213 OFFICE O/P EST LOW 20 MIN: CPT | Performed by: PHYSICIAN ASSISTANT

## 2024-04-23 PROCEDURE — G8427 DOCREV CUR MEDS BY ELIG CLIN: HCPCS | Performed by: PHYSICIAN ASSISTANT

## 2024-04-23 PROCEDURE — G8419 CALC BMI OUT NRM PARAM NOF/U: HCPCS | Performed by: PHYSICIAN ASSISTANT

## 2024-04-23 PROCEDURE — 99211 OFF/OP EST MAY X REQ PHY/QHP: CPT

## 2024-04-23 PROCEDURE — 82607 VITAMIN B-12: CPT

## 2024-04-23 PROCEDURE — 85025 COMPLETE CBC W/AUTO DIFF WBC: CPT

## 2024-04-23 PROCEDURE — 80048 BASIC METABOLIC PNL TOTAL CA: CPT

## 2024-04-23 PROCEDURE — 82728 ASSAY OF FERRITIN: CPT

## 2024-04-23 PROCEDURE — 36415 COLL VENOUS BLD VENIPUNCTURE: CPT

## 2024-04-23 PROCEDURE — 3017F COLORECTAL CA SCREEN DOC REV: CPT | Performed by: PHYSICIAN ASSISTANT

## 2024-04-23 PROCEDURE — 83550 IRON BINDING TEST: CPT

## 2024-04-23 PROCEDURE — 82746 ASSAY OF FOLIC ACID SERUM: CPT

## 2024-04-23 RX ORDER — FOLIC ACID 1 MG/1
1 TABLET ORAL DAILY
Qty: 90 TABLET | Refills: 3 | Status: SHIPPED | OUTPATIENT
Start: 2024-04-23

## 2024-04-23 RX ORDER — FERROUS SULFATE 325(65) MG
325 TABLET ORAL DAILY
Qty: 90 TABLET | Refills: 3 | Status: SHIPPED | OUTPATIENT
Start: 2024-04-23

## 2024-04-23 NOTE — PROGRESS NOTES
the second portion of the duodenum.  No evidence of varices.  Colonoscopy was completed the same date showing single sessile polyp located in sigmoid colon measuring 8 mm in diameter which was removed, grade 3 internal hemorrhoids, large internal hemorrhoids diverticula, otherwise normal colonoscopy to the cecum.  -She called the clinic on 5/24/23 and reported she had preoperative lab work completed at [facility] and Hgb dropped to 5.7. She was directed to the ED.  Patient was recommended admission in ED and patient left AMA, declined blood transfusion and IV iron infusion. She was referred to GI. She was given IV Venofer x3 and received blood transfusion on 5/26/23 in clinic.   She has had routine lab monitoring and continued on oral iron and folic acid.      Interval History 04/23/24:  The patient is here for follow up evaluation of iron deficiency anemia. She was last seen in clinic in January 2024. She was recently seen by her PCP and reported dizziness, falls. She was referred to Neurology for MS evaluation. She was referred to Podiatry for left foot pain, bunion and is scheduled to have left foot bunionectomy per Dr. Sharma on 5/3/24. She denies any abnormal bleeding.  Denies epistaxis, hemoptysis, hematemesis, melena, hematochezia, hematuria, vaginal bleeding.  She affirms compliance with oral iron and folic acid supplementation.  She states she has cut back on alcohol significantly over the last 6 months.      PMH, SH, and FH:  I reviewed the patients medication list and allergy list as noted on the electronic medical record. The PMH, SH and FH were also reviewed as noted on the EMR.      Review of Systems:   Review of Systems   Pertinent review of systems noted in HPI, all other ROS negative.   Objective:   Physical Exam   BP (!) 165/72 (Site: Left Upper Arm, Position: Sitting)   Pulse 85   Temp 97.9 °F (36.6 °C) (Oral)   Resp 18   Ht 1.626 m (5' 4.02\")   Wt 47.7 kg (105 lb 3.2 oz)   LMP 08/14/2012   SpO2

## 2024-07-12 ENCOUNTER — HOSPITAL ENCOUNTER (OUTPATIENT)
Dept: INFUSION THERAPY | Age: 54
Discharge: HOME | End: 2024-07-12
Payer: COMMERCIAL

## 2024-07-12 ENCOUNTER — OFFICE VISIT (OUTPATIENT)
Dept: ONCOLOGY | Age: 54
End: 2024-07-12
Payer: COMMERCIAL

## 2024-07-12 VITALS
DIASTOLIC BLOOD PRESSURE: 87 MMHG | TEMPERATURE: 98.2 F | OXYGEN SATURATION: 98 % | HEART RATE: 90 BPM | RESPIRATION RATE: 16 BRPM | SYSTOLIC BLOOD PRESSURE: 159 MMHG

## 2024-07-12 VITALS
RESPIRATION RATE: 16 BRPM | SYSTOLIC BLOOD PRESSURE: 159 MMHG | OXYGEN SATURATION: 98 % | DIASTOLIC BLOOD PRESSURE: 87 MMHG | TEMPERATURE: 98.2 F | BODY MASS INDEX: 16.56 KG/M2 | HEIGHT: 64 IN | HEART RATE: 90 BPM | WEIGHT: 97 LBS

## 2024-07-12 DIAGNOSIS — D50.8 OTHER IRON DEFICIENCY ANEMIA: ICD-10-CM

## 2024-07-12 DIAGNOSIS — D64.9 NORMOCYTIC ANEMIA: Primary | ICD-10-CM

## 2024-07-12 DIAGNOSIS — D64.9 NORMOCYTIC ANEMIA: ICD-10-CM

## 2024-07-12 LAB
BASOPHILS ABSOLUTE: 0.1 THOU/MM3 (ref 0–0.1)
BASOPHILS NFR BLD AUTO: 1.7 %
DEPRECATED RDW RBC AUTO: 46.5 FL (ref 35–45)
EOSINOPHIL NFR BLD AUTO: 0.8 %
EOSINOPHILS ABSOLUTE: 0.1 THOU/MM3 (ref 0–0.4)
ERYTHROCYTE [DISTWIDTH] IN BLOOD BY AUTOMATED COUNT: 12.8 % (ref 11.5–14.5)
FERRITIN SERPL IA-MCNC: 240 NG/ML (ref 10–291)
FOLATE SERPL-MCNC: 6.5 NG/ML (ref 4.8–24.2)
HCT VFR BLD AUTO: 38.9 % (ref 37–47)
HGB BLD-MCNC: 13.7 GM/DL (ref 12–16)
IMM GRANULOCYTES # BLD AUTO: 0.01 THOU/MM3 (ref 0–0.07)
IMM GRANULOCYTES NFR BLD AUTO: 0.1 %
IRON SERPL-MCNC: 116 UG/DL (ref 50–170)
LYMPHOCYTES ABSOLUTE: 2.1 THOU/MM3 (ref 1–4.8)
LYMPHOCYTES NFR BLD AUTO: 29.4 %
MCH RBC QN AUTO: 35 PG (ref 26–33)
MCHC RBC AUTO-ENTMCNC: 35.2 GM/DL (ref 32.2–35.5)
MCV RBC AUTO: 99.5 FL (ref 81–99)
MONOCYTES ABSOLUTE: 0.8 THOU/MM3 (ref 0.4–1.3)
MONOCYTES NFR BLD AUTO: 11 %
NEUTROPHILS ABSOLUTE: 4 THOU/MM3 (ref 1.8–7.7)
NEUTROPHILS NFR BLD AUTO: 57 %
NRBC BLD AUTO-RTO: 0 /100 WBC
PLATELET # BLD AUTO: 195 THOU/MM3 (ref 130–400)
PLATELET BLD QL SMEAR: ADEQUATE
PMV BLD AUTO: 10.6 FL (ref 9.4–12.4)
RBC # BLD AUTO: 3.91 MILL/MM3 (ref 4.2–5.4)
SCAN OF BLOOD SMEAR: NORMAL
TIBC SERPL-MCNC: 273 UG/DL (ref 171–450)
VIT B12 SERPL-MCNC: 1057 PG/ML (ref 211–911)
WBC # BLD AUTO: 7.1 THOU/MM3 (ref 4.8–10.8)

## 2024-07-12 PROCEDURE — 83540 ASSAY OF IRON: CPT

## 2024-07-12 PROCEDURE — 3017F COLORECTAL CA SCREEN DOC REV: CPT | Performed by: PHYSICIAN ASSISTANT

## 2024-07-12 PROCEDURE — 83550 IRON BINDING TEST: CPT

## 2024-07-12 PROCEDURE — 4004F PT TOBACCO SCREEN RCVD TLK: CPT | Performed by: PHYSICIAN ASSISTANT

## 2024-07-12 PROCEDURE — 99211 OFF/OP EST MAY X REQ PHY/QHP: CPT

## 2024-07-12 PROCEDURE — 85025 COMPLETE CBC W/AUTO DIFF WBC: CPT

## 2024-07-12 PROCEDURE — 99213 OFFICE O/P EST LOW 20 MIN: CPT | Performed by: PHYSICIAN ASSISTANT

## 2024-07-12 PROCEDURE — 82607 VITAMIN B-12: CPT

## 2024-07-12 PROCEDURE — G8427 DOCREV CUR MEDS BY ELIG CLIN: HCPCS | Performed by: PHYSICIAN ASSISTANT

## 2024-07-12 PROCEDURE — G8419 CALC BMI OUT NRM PARAM NOF/U: HCPCS | Performed by: PHYSICIAN ASSISTANT

## 2024-07-12 PROCEDURE — 36415 COLL VENOUS BLD VENIPUNCTURE: CPT

## 2024-07-12 PROCEDURE — 82746 ASSAY OF FOLIC ACID SERUM: CPT

## 2024-07-12 PROCEDURE — 82728 ASSAY OF FERRITIN: CPT

## 2024-07-12 NOTE — PROGRESS NOTES
[facility] [address], [address]  [facility] [address]  Dept: [phone]  Dept Fax: [phone] Loc: [phone]      Visit Date:7/12/2024     Rivka Zavala is a 53 y.o. female who presents today for:   Chief Complaint   Patient presents with    Follow-up     Normocytic anemia        HPI:   Rivka Zavala is a 53 y.o. female referred to Hematology/Oncology clinic for evaluation of anemia per her PCP, Dr. Montejo. She was seen as a new patient on 7/19/22. The patient was hospitalized in June 2022 for hyponatremia and found to be anemic. Hgb during hospitalization 6.5 and improved to 8.4 on date of discharge 6/8/22. Most recent CBC 7/13/22 Hgb 8.1, hct 29.1, MCV 85.1. iron studies on 7/13/22 showed iron deficiency with ferritin 13, iron 21, TIBC 559. Folate and vitamin B12 not deficient. She was referred for further evaluation. Per chart review of EMR, the patient has had chronic anemia since 2017 with most recent baseline of Hgb 7-8's since April 2022. The patient was referred to GI as well. The patient affirms prior history of anemia.  She has previously followed with different hematologist clinic and states she has never required IV iron.  The patient denies any abnormal bleeding; no epistaxis, hemoptysis, hematemesis, melena, hematochezia, hematuria or vaginal bleeding.  Her last menstrual cycle was approximately 7 years ago.  The patient denies history of malabsorptive disorders such as IBD or celiac disease.  She denies prior gastrointestinal surgeries.  She reports adequate intake of oral iron in her diet.  The patient states she has never received blood transfusion as she has refused in the past.   Her past medical history includes MS, anemia.  It is noted through chart review she has history of alcohol abuse.    Patient received IV Venofer in July-August 2022.   She had an EGD 8/16/22 per Dr. Turner showing small Schatzki's ring which is non-obstructive, otherwise normal

## 2024-08-03 ENCOUNTER — APPOINTMENT (OUTPATIENT)
Dept: GENERAL RADIOLOGY | Age: 54
DRG: 426 | End: 2024-08-03
Payer: COMMERCIAL

## 2024-08-03 ENCOUNTER — HOSPITAL ENCOUNTER (INPATIENT)
Age: 54
LOS: 7 days | Discharge: HOME | DRG: 426 | End: 2024-08-10
Attending: STUDENT IN AN ORGANIZED HEALTH CARE EDUCATION/TRAINING PROGRAM
Payer: COMMERCIAL

## 2024-08-03 ENCOUNTER — APPOINTMENT (OUTPATIENT)
Dept: CT IMAGING | Age: 54
DRG: 426 | End: 2024-08-03
Payer: COMMERCIAL

## 2024-08-03 DIAGNOSIS — M79.89 SWELLING OF ARM: ICD-10-CM
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
## 2024-08-03 DIAGNOSIS — S82.044A CLOSED NONDISPLACED COMMINUTED FRACTURE OF RIGHT PATELLA, INITIAL ENCOUNTER: ICD-10-CM

## 2024-08-03 DIAGNOSIS — R01.1 HEART MURMUR: ICD-10-CM

## 2024-08-03 DIAGNOSIS — G35 MULTIPLE SCLEROSIS (HCC): ICD-10-CM
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
## 2024-08-03 PROBLEM — R29.6 RECURRENT FALLS: Status: ACTIVE | Noted: 2024-08-03

## 2024-08-03 LAB
ALBUMIN SERPL BCG-MCNC: 3.6 G/DL (ref 3.5–5.1)
ALBUMIN SERPL BCG-MCNC: 4.1 G/DL (ref 3.5–5.1)
ALP SERPL-CCNC: 109 U/L (ref 38–126)
ALP SERPL-CCNC: 97 U/L (ref 38–126)
ALT SERPL W/O P-5'-P-CCNC: 59 U/L (ref 11–66)
ALT SERPL W/O P-5'-P-CCNC: 69 U/L (ref 11–66)
ANION GAP SERPL CALC-SCNC: 10 MEQ/L (ref 8–16)
ANION GAP SERPL CALC-SCNC: 13 MEQ/L (ref 8–16)
ANION GAP SERPL CALC-SCNC: 14 MEQ/L (ref 8–16)
ANION GAP SERPL CALC-SCNC: 18 MEQ/L (ref 8–16)
AST SERPL-CCNC: 103 U/L (ref 5–40)
AST SERPL-CCNC: 120 U/L (ref 5–40)
B-HCG SERPL QL: NEGATIVE
BACTERIA: ABNORMAL
BASOPHILS ABSOLUTE: 0 THOU/MM3 (ref 0–0.1)
BASOPHILS NFR BLD AUTO: 0.2 %
BILIRUB CONJ SERPL-MCNC: 0.5 MG/DL (ref 0.1–13.8)
BILIRUB SERPL-MCNC: 0.9 MG/DL (ref 0.3–1.2)
BILIRUB SERPL-MCNC: 1.1 MG/DL (ref 0.3–1.2)
BILIRUB UR QL STRIP: NEGATIVE
BUN SERPL-MCNC: 4 MG/DL (ref 7–22)
BUN SERPL-MCNC: 5 MG/DL (ref 7–22)
CA-I BLD ISE-SCNC: 0.82 MMOL/L (ref 1.12–1.32)
CALCIUM SERPL-MCNC: 7.9 MG/DL (ref 8.5–10.5)
CALCIUM SERPL-MCNC: 7.9 MG/DL (ref 8.5–10.5)
CALCIUM SERPL-MCNC: 8.2 MG/DL (ref 8.5–10.5)
CALCIUM SERPL-MCNC: 8.6 MG/DL (ref 8.5–10.5)
CASTS #/AREA URNS LPF: ABNORMAL /LPF
CASTS #/AREA URNS LPF: ABNORMAL /LPF
CHARACTER UR: ABNORMAL
CHARCOAL URNS QL MICRO: ABNORMAL
CHLORIDE 24H UR-SRATE: < 20 MEQ/L
CHLORIDE SERPL-SCNC: 71 MEQ/L (ref 98–111)
CHLORIDE SERPL-SCNC: 73 MEQ/L (ref 98–111)
CHLORIDE SERPL-SCNC: 76 MEQ/L (ref 98–111)
CHLORIDE SERPL-SCNC: 76 MEQ/L (ref 98–111)
CK SERPL-CCNC: 330 U/L (ref 30–135)
CO2 SERPL-SCNC: 27 MEQ/L (ref 23–33)
CO2 SERPL-SCNC: 27 MEQ/L (ref 23–33)
CO2 SERPL-SCNC: 31 MEQ/L (ref 23–33)
CO2 SERPL-SCNC: 32 MEQ/L (ref 23–33)
COLOR UR: ABNORMAL
CREAT SERPL-MCNC: 0.2 MG/DL (ref 0.4–1.2)
CREAT SERPL-MCNC: 0.2 MG/DL (ref 0.4–1.2)
CREAT SERPL-MCNC: 0.3 MG/DL (ref 0.4–1.2)
CREAT SERPL-MCNC: 0.3 MG/DL (ref 0.4–1.2)
CREAT UR-MCNC: 83.7 MG/DL
CRYSTALS URNS QL MICRO: ABNORMAL
DEPRECATED RDW RBC AUTO: 43.8 FL (ref 35–45)
DEPRECATED RDW RBC AUTO: 45 FL (ref 35–45)
EOSINOPHIL NFR BLD AUTO: 0 %
EOSINOPHILS ABSOLUTE: 0 THOU/MM3 (ref 0–0.4)
EPITHELIAL CELLS, UA: ABNORMAL /HPF
ERYTHROCYTE [DISTWIDTH] IN BLOOD BY AUTOMATED COUNT: 12.7 % (ref 11.5–14.5)
ERYTHROCYTE [DISTWIDTH] IN BLOOD BY AUTOMATED COUNT: 12.8 % (ref 11.5–14.5)
ETHANOL SERPL-MCNC: 0.08 % (ref 0–0.08)
FOLATE SERPL-MCNC: 10.6 NG/ML (ref 4.8–24.2)
GFR SERPL CREATININE-BSD FRML MDRD: > 90 ML/MIN/1.73M2
GLUCOSE SERPL-MCNC: 115 MG/DL (ref 70–108)
GLUCOSE SERPL-MCNC: 85 MG/DL (ref 70–108)
GLUCOSE SERPL-MCNC: 92 MG/DL (ref 70–108)
GLUCOSE SERPL-MCNC: 98 MG/DL (ref 70–108)
GLUCOSE UR QL STRIP.AUTO: NEGATIVE MG/DL
HCT VFR BLD AUTO: 25.9 % (ref 37–47)
HCT VFR BLD AUTO: 26.4 % (ref 37–47)
HGB BLD-MCNC: 10 GM/DL (ref 12–16)
HGB BLD-MCNC: 9.3 GM/DL (ref 12–16)
HGB UR QL STRIP.AUTO: ABNORMAL
IMM GRANULOCYTES # BLD AUTO: 0.11 THOU/MM3 (ref 0–0.07)
IMM GRANULOCYTES NFR BLD AUTO: 1 %
INR PPP: 0.92 (ref 0.85–1.13)
KETONES UR QL STRIP.AUTO: ABNORMAL
LACTATE SERPL-SCNC: 1 MMOL/L (ref 0.5–2)
LACTIC ACID, SEPSIS: 3.4 MMOL/L (ref 0.5–1.9)
LEUKOCYTE ESTERASE UR QL STRIP.AUTO: ABNORMAL
LIPASE SERPL-CCNC: 24.9 U/L (ref 5.6–51.3)
LYMPHOCYTES ABSOLUTE: 0.8 THOU/MM3 (ref 1–4.8)
LYMPHOCYTES NFR BLD AUTO: 7.2 %
MAGNESIUM SERPL-MCNC: 1.9 MG/DL (ref 1.6–2.4)
MAGNESIUM SERPL-MCNC: 2 MG/DL (ref 1.6–2.4)
MCH RBC QN AUTO: 35.2 PG (ref 26–33)
MCH RBC QN AUTO: 36 PG (ref 26–33)
MCHC RBC AUTO-ENTMCNC: 35.9 GM/DL (ref 32.2–35.5)
MCHC RBC AUTO-ENTMCNC: 37.9 GM/DL (ref 32.2–35.5)
MCV RBC AUTO: 95 FL (ref 81–99)
MCV RBC AUTO: 98.1 FL (ref 81–99)
MONOCYTES ABSOLUTE: 1.7 THOU/MM3 (ref 0.4–1.3)
MONOCYTES NFR BLD AUTO: 15.3 %
NEUTROPHILS ABSOLUTE: 8.2 THOU/MM3 (ref 1.8–7.7)
NEUTROPHILS NFR BLD AUTO: 76.3 %
NITRITE UR QL STRIP.AUTO: NEGATIVE
NRBC BLD AUTO-RTO: 0 /100 WBC
OSMOLALITY SERPL CALC.SUM OF ELEC: 227.6 MOSMOL/KG (ref 275–300)
OSMOLALITY SERPL CALC.SUM OF ELEC: 232.6 MOSMOL/KG (ref 275–300)
OSMOLALITY SERPL CALC.SUM OF ELEC: 235.4 MOSMOL/KG (ref 275–300)
PH UR STRIP.AUTO: 6.5 [PH] (ref 5–9)
PLATELET # BLD AUTO: 140 THOU/MM3 (ref 130–400)
PLATELET # BLD AUTO: 140 THOU/MM3 (ref 130–400)
PMV BLD AUTO: 10 FL (ref 9.4–12.4)
PMV BLD AUTO: 10.3 FL (ref 9.4–12.4)
POTASSIUM SERPL-SCNC: 2.2 MEQ/L (ref 3.5–5.2)
POTASSIUM SERPL-SCNC: 2.6 MEQ/L (ref 3.5–5.2)
POTASSIUM SERPL-SCNC: 3.3 MEQ/L (ref 3.5–5.2)
POTASSIUM SERPL-SCNC: 3.7 MEQ/L (ref 3.5–5.2)
POTASSIUM UR-SCNC: 11.1 MEQ/L
PROT SERPL-MCNC: 5.5 G/DL (ref 6.1–8)
PROT SERPL-MCNC: 6.1 G/DL (ref 6.1–8)
PROT UR STRIP.AUTO-MCNC: 30 MG/DL
RBC # BLD AUTO: 2.64 MILL/MM3 (ref 4.2–5.4)
RBC # BLD AUTO: 2.78 MILL/MM3 (ref 4.2–5.4)
RBC #/AREA URNS HPF: ABNORMAL /HPF
REASON FOR REJECTION: NORMAL
REJECTED TEST: NORMAL
RENAL EPI CELLS #/AREA URNS HPF: ABNORMAL /[HPF]
SODIUM SERPL-SCNC: 114 MEQ/L (ref 135–145)
SODIUM SERPL-SCNC: 116 MEQ/L (ref 135–145)
SODIUM SERPL-SCNC: 118 MEQ/L (ref 135–145)
SODIUM SERPL-SCNC: 118 MEQ/L (ref 135–145)
SODIUM SERPL-SCNC: 120 MEQ/L (ref 135–145)
SODIUM UR-SCNC: < 20 MEQ/L
SP GR UR REFRACT.AUTO: 1.01 (ref 1–1.03)
TROPONIN, HIGH SENSITIVITY: 14 NG/L (ref 0–12)
TROPONIN, HIGH SENSITIVITY: 15 NG/L (ref 0–12)
UROBILINOGEN UR QL STRIP.AUTO: 1 EU/DL (ref 0–1)
VIT B12 SERPL-MCNC: > 2000 PG/ML (ref 211–911)
WBC # BLD AUTO: 10.4 THOU/MM3 (ref 4.8–10.8)
WBC # BLD AUTO: 10.8 THOU/MM3 (ref 4.8–10.8)
WBC #/AREA URNS HPF: ABNORMAL /HPF
YEAST LIKE FUNGI URNS QL MICRO: ABNORMAL

## 2024-08-03 PROCEDURE — 84295 ASSAY OF SERUM SODIUM: CPT

## 2024-08-03 PROCEDURE — 2500000003 HC RX 250 WO HCPCS

## 2024-08-03 PROCEDURE — 6360000002 HC RX W HCPCS

## 2024-08-03 PROCEDURE — 73564 X-RAY EXAM KNEE 4 OR MORE: CPT

## 2024-08-03 PROCEDURE — 74177 CT ABD & PELVIS W/CONTRAST: CPT

## 2024-08-03 PROCEDURE — 76376 3D RENDER W/INTRP POSTPROCES: CPT

## 2024-08-03 PROCEDURE — 81001 URINALYSIS AUTO W/SCOPE: CPT

## 2024-08-03 PROCEDURE — 99223 1ST HOSP IP/OBS HIGH 75: CPT | Performed by: INTERNAL MEDICINE

## 2024-08-03 PROCEDURE — 83605 ASSAY OF LACTIC ACID: CPT

## 2024-08-03 PROCEDURE — 83735 ASSAY OF MAGNESIUM: CPT

## 2024-08-03 PROCEDURE — 82436 ASSAY OF URINE CHLORIDE: CPT

## 2024-08-03 PROCEDURE — 84300 ASSAY OF URINE SODIUM: CPT

## 2024-08-03 PROCEDURE — 96375 TX/PRO/DX INJ NEW DRUG ADDON: CPT

## 2024-08-03 PROCEDURE — 72125 CT NECK SPINE W/O DYE: CPT

## 2024-08-03 PROCEDURE — 6360000004 HC RX CONTRAST MEDICATION: Performed by: STUDENT IN AN ORGANIZED HEALTH CARE EDUCATION/TRAINING PROGRAM

## 2024-08-03 PROCEDURE — 2060000000 HC ICU INTERMEDIATE R&B

## 2024-08-03 PROCEDURE — 70450 CT HEAD/BRAIN W/O DYE: CPT

## 2024-08-03 PROCEDURE — 85027 COMPLETE CBC AUTOMATED: CPT

## 2024-08-03 PROCEDURE — 71260 CT THORAX DX C+: CPT

## 2024-08-03 PROCEDURE — 93005 ELECTROCARDIOGRAM TRACING: CPT | Performed by: STUDENT IN AN ORGANIZED HEALTH CARE EDUCATION/TRAINING PROGRAM

## 2024-08-03 PROCEDURE — 82550 ASSAY OF CK (CPK): CPT

## 2024-08-03 PROCEDURE — 2580000003 HC RX 258

## 2024-08-03 PROCEDURE — 82077 ASSAY SPEC XCP UR&BREATH IA: CPT

## 2024-08-03 PROCEDURE — 84484 ASSAY OF TROPONIN QUANT: CPT

## 2024-08-03 PROCEDURE — 82607 VITAMIN B-12: CPT

## 2024-08-03 PROCEDURE — 83690 ASSAY OF LIPASE: CPT

## 2024-08-03 PROCEDURE — 80053 COMPREHEN METABOLIC PANEL: CPT

## 2024-08-03 PROCEDURE — 96365 THER/PROPH/DIAG IV INF INIT: CPT

## 2024-08-03 PROCEDURE — 36415 COLL VENOUS BLD VENIPUNCTURE: CPT

## 2024-08-03 PROCEDURE — 85610 PROTHROMBIN TIME: CPT

## 2024-08-03 PROCEDURE — 99285 EMERGENCY DEPT VISIT HI MDM: CPT

## 2024-08-03 PROCEDURE — 82746 ASSAY OF FOLIC ACID SERUM: CPT

## 2024-08-03 PROCEDURE — 84133 ASSAY OF URINE POTASSIUM: CPT

## 2024-08-03 PROCEDURE — 70486 CT MAXILLOFACIAL W/O DYE: CPT

## 2024-08-03 PROCEDURE — 82330 ASSAY OF CALCIUM: CPT

## 2024-08-03 PROCEDURE — 6820000001 HC L2 TRAUMA SURGERY EVALUATION: Performed by: SURGERY

## 2024-08-03 PROCEDURE — 6370000000 HC RX 637 (ALT 250 FOR IP): Performed by: INTERNAL MEDICINE

## 2024-08-03 PROCEDURE — 84703 CHORIONIC GONADOTROPIN ASSAY: CPT

## 2024-08-03 PROCEDURE — 82248 BILIRUBIN DIRECT: CPT

## 2024-08-03 PROCEDURE — 96361 HYDRATE IV INFUSION ADD-ON: CPT

## 2024-08-03 PROCEDURE — 6360000002 HC RX W HCPCS: Performed by: INTERNAL MEDICINE

## 2024-08-03 PROCEDURE — 82570 ASSAY OF URINE CREATININE: CPT

## 2024-08-03 PROCEDURE — 99222 1ST HOSP IP/OBS MODERATE 55: CPT | Performed by: SURGERY

## 2024-08-03 PROCEDURE — 85025 COMPLETE CBC W/AUTO DIFF WBC: CPT

## 2024-08-03 PROCEDURE — 73080 X-RAY EXAM OF ELBOW: CPT

## 2024-08-03 RX ORDER — 0.9 % SODIUM CHLORIDE 0.9 %
1000 INTRAVENOUS SOLUTION INTRAVENOUS ONCE
Status: DISCONTINUED | OUTPATIENT
Start: 2024-08-03 | End: 2024-08-03 | Stop reason: ALTCHOICE

## 2024-08-03 RX ORDER — POTASSIUM CHLORIDE 7.45 MG/ML
10 INJECTION INTRAVENOUS
Status: DISCONTINUED | OUTPATIENT
Start: 2024-08-04 | End: 2024-08-04

## 2024-08-03 RX ORDER — ONDANSETRON 4 MG/1
4 TABLET, ORALLY DISINTEGRATING ORAL EVERY 8 HOURS PRN
Status: DISCONTINUED | OUTPATIENT
Start: 2024-08-03 | End: 2024-08-10 | Stop reason: HOSPADM

## 2024-08-03 RX ORDER — ACETAMINOPHEN 325 MG/1
650 TABLET ORAL EVERY 6 HOURS PRN
Status: DISCONTINUED | OUTPATIENT
Start: 2024-08-03 | End: 2024-08-10 | Stop reason: HOSPADM

## 2024-08-03 RX ORDER — MORPHINE SULFATE 2 MG/ML
1 INJECTION, SOLUTION INTRAMUSCULAR; INTRAVENOUS
Status: DISCONTINUED | OUTPATIENT
Start: 2024-08-03 | End: 2024-08-03

## 2024-08-03 RX ORDER — MORPHINE SULFATE 2 MG/ML
2 INJECTION, SOLUTION INTRAMUSCULAR; INTRAVENOUS
Status: DISCONTINUED | OUTPATIENT
Start: 2024-08-03 | End: 2024-08-10 | Stop reason: HOSPADM

## 2024-08-03 RX ORDER — PHENOBARBITAL SODIUM 65 MG/ML
130 INJECTION, SOLUTION INTRAMUSCULAR; INTRAVENOUS
Status: DISCONTINUED | OUTPATIENT
Start: 2024-08-03 | End: 2024-08-10 | Stop reason: HOSPADM

## 2024-08-03 RX ORDER — MORPHINE SULFATE 4 MG/ML
4 INJECTION, SOLUTION INTRAMUSCULAR; INTRAVENOUS ONCE
Status: COMPLETED | OUTPATIENT
Start: 2024-08-03 | End: 2024-08-03

## 2024-08-03 RX ORDER — MORPHINE SULFATE 2 MG/ML
1 INJECTION, SOLUTION INTRAMUSCULAR; INTRAVENOUS
Status: DISCONTINUED | OUTPATIENT
Start: 2024-08-03 | End: 2024-08-10 | Stop reason: HOSPADM

## 2024-08-03 RX ORDER — SODIUM CHLORIDE 0.9 % (FLUSH) 0.9 %
5-40 SYRINGE (ML) INJECTION EVERY 12 HOURS SCHEDULED
Status: DISCONTINUED | OUTPATIENT
Start: 2024-08-03 | End: 2024-08-10 | Stop reason: HOSPADM

## 2024-08-03 RX ORDER — 0.9 % SODIUM CHLORIDE 0.9 %
1000 INTRAVENOUS SOLUTION INTRAVENOUS ONCE
Status: DISCONTINUED | OUTPATIENT
Start: 2024-08-03 | End: 2024-08-03

## 2024-08-03 RX ORDER — SODIUM CHLORIDE 9 MG/ML
INJECTION, SOLUTION INTRAVENOUS PRN
Status: DISCONTINUED | OUTPATIENT
Start: 2024-08-03 | End: 2024-08-10 | Stop reason: HOSPADM

## 2024-08-03 RX ORDER — PHENOBARBITAL SODIUM 65 MG/ML
260 INJECTION, SOLUTION INTRAMUSCULAR; INTRAVENOUS
Status: DISCONTINUED | OUTPATIENT
Start: 2024-08-03 | End: 2024-08-10 | Stop reason: HOSPADM

## 2024-08-03 RX ORDER — ACETAMINOPHEN 650 MG/1
650 SUPPOSITORY RECTAL EVERY 6 HOURS PRN
Status: DISCONTINUED | OUTPATIENT
Start: 2024-08-03 | End: 2024-08-10 | Stop reason: HOSPADM

## 2024-08-03 RX ORDER — MORPHINE SULFATE 2 MG/ML
2 INJECTION, SOLUTION INTRAMUSCULAR; INTRAVENOUS
Status: DISCONTINUED | OUTPATIENT
Start: 2024-08-03 | End: 2024-08-03

## 2024-08-03 RX ORDER — POTASSIUM CHLORIDE 7.45 MG/ML
10 INJECTION INTRAVENOUS ONCE
Status: COMPLETED | OUTPATIENT
Start: 2024-08-03 | End: 2024-08-03

## 2024-08-03 RX ORDER — CALCIUM GLUCONATE 20 MG/ML
2000 INJECTION, SOLUTION INTRAVENOUS ONCE
Status: DISCONTINUED | OUTPATIENT
Start: 2024-08-03 | End: 2024-08-04

## 2024-08-03 RX ORDER — SODIUM CHLORIDE 0.9 % (FLUSH) 0.9 %
5-40 SYRINGE (ML) INJECTION PRN
Status: DISCONTINUED | OUTPATIENT
Start: 2024-08-03 | End: 2024-08-10 | Stop reason: HOSPADM

## 2024-08-03 RX ORDER — POTASSIUM CHLORIDE 7.45 MG/ML
10 INJECTION INTRAVENOUS
Status: DISCONTINUED | OUTPATIENT
Start: 2024-08-03 | End: 2024-08-03

## 2024-08-03 RX ORDER — ONDANSETRON 2 MG/ML
4 INJECTION INTRAMUSCULAR; INTRAVENOUS EVERY 6 HOURS PRN
Status: DISCONTINUED | OUTPATIENT
Start: 2024-08-03 | End: 2024-08-10 | Stop reason: HOSPADM

## 2024-08-03 RX ORDER — LANOLIN ALCOHOL/MO/W.PET/CERES
100 CREAM (GRAM) TOPICAL DAILY
Status: DISCONTINUED | OUTPATIENT
Start: 2024-08-04 | End: 2024-08-10 | Stop reason: HOSPADM

## 2024-08-03 RX ORDER — ONDANSETRON 2 MG/ML
4 INJECTION INTRAMUSCULAR; INTRAVENOUS ONCE
Status: COMPLETED | OUTPATIENT
Start: 2024-08-03 | End: 2024-08-03

## 2024-08-03 RX ORDER — FOLIC ACID 1 MG/1
1 TABLET ORAL DAILY
Status: DISCONTINUED | OUTPATIENT
Start: 2024-08-04 | End: 2024-08-10 | Stop reason: HOSPADM

## 2024-08-03 RX ADMIN — SODIUM CHLORIDE, PRESERVATIVE FREE 10 ML: 5 INJECTION INTRAVENOUS at 19:40

## 2024-08-03 RX ADMIN — POTASSIUM CHLORIDE 10 MEQ: 7.46 INJECTION, SOLUTION INTRAVENOUS at 14:29

## 2024-08-03 RX ADMIN — POTASSIUM CHLORIDE 10 MEQ: 7.46 INJECTION, SOLUTION INTRAVENOUS at 18:44

## 2024-08-03 RX ADMIN — POTASSIUM BICARBONATE 40 MEQ: 782 TABLET, EFFERVESCENT ORAL at 16:26

## 2024-08-03 RX ADMIN — SODIUM CHLORIDE 1000 ML: 9 INJECTION, SOLUTION INTRAVENOUS at 12:20

## 2024-08-03 RX ADMIN — SODIUM CHLORIDE 1000 ML: 9 INJECTION, SOLUTION INTRAVENOUS at 12:50

## 2024-08-03 RX ADMIN — MORPHINE SULFATE 4 MG: 4 INJECTION, SOLUTION INTRAMUSCULAR; INTRAVENOUS at 12:21

## 2024-08-03 RX ADMIN — CALCIUM GLUCONATE 2000 MG: 20 INJECTION, SOLUTION INTRAVENOUS at 23:38

## 2024-08-03 RX ADMIN — MORPHINE SULFATE 2 MG: 2 INJECTION, SOLUTION INTRAMUSCULAR; INTRAVENOUS at 17:42

## 2024-08-03 RX ADMIN — MORPHINE SULFATE 2 MG: 2 INJECTION, SOLUTION INTRAMUSCULAR; INTRAVENOUS at 20:48

## 2024-08-03 RX ADMIN — ONDANSETRON 4 MG: 2 INJECTION INTRAMUSCULAR; INTRAVENOUS at 12:21

## 2024-08-03 RX ADMIN — IOPAMIDOL 80 ML: 755 INJECTION, SOLUTION INTRAVENOUS at 13:45

## 2024-08-03 RX ADMIN — POTASSIUM CHLORIDE 10 MEQ: 7.46 INJECTION, SOLUTION INTRAVENOUS at 17:47

## 2024-08-03 RX ADMIN — POTASSIUM CHLORIDE 10 MEQ: 7.46 INJECTION, SOLUTION INTRAVENOUS at 23:40

## 2024-08-03 ASSESSMENT — PAIN DESCRIPTION - FREQUENCY: FREQUENCY: INTERMITTENT

## 2024-08-03 ASSESSMENT — PAIN SCALES - GENERAL
PAINLEVEL_OUTOF10: 5
PAINLEVEL_OUTOF10: 8
PAINLEVEL_OUTOF10: 5
PAINLEVEL_OUTOF10: 9
PAINLEVEL_OUTOF10: 6
PAINLEVEL_OUTOF10: 7
PAINLEVEL_OUTOF10: 8

## 2024-08-03 ASSESSMENT — PAIN DESCRIPTION - DESCRIPTORS
DESCRIPTORS: ACHING
DESCRIPTORS: ACHING;DISCOMFORT;SHARP;SORE
DESCRIPTORS: ACHING;SHARP;SORE;DISCOMFORT

## 2024-08-03 ASSESSMENT — PAIN DESCRIPTION - ORIENTATION
ORIENTATION: RIGHT

## 2024-08-03 ASSESSMENT — PAIN - FUNCTIONAL ASSESSMENT
PAIN_FUNCTIONAL_ASSESSMENT: PREVENTS OR INTERFERES SOME ACTIVE ACTIVITIES AND ADLS
PAIN_FUNCTIONAL_ASSESSMENT: 0-10
PAIN_FUNCTIONAL_ASSESSMENT: ACTIVITIES ARE NOT PREVENTED

## 2024-08-03 ASSESSMENT — PAIN DESCRIPTION - LOCATION
LOCATION: LEG;FACE
LOCATION: KNEE;FACE
LOCATION: KNEE;FACE

## 2024-08-03 ASSESSMENT — PAIN DESCRIPTION - ONSET: ONSET: SUDDEN

## 2024-08-03 ASSESSMENT — PAIN DESCRIPTION - PAIN TYPE: TYPE: ACUTE PAIN

## 2024-08-03 NOTE — ED NOTES
Patient transported to [facility] on cart and in stable condition. Contacted floor before transport and spoke with Patricia.

## 2024-08-03 NOTE — H&P
Flare  - Patient presents recent history of 7 day long MS flare  -2017 MRI brain with and without contrast as well as MRI cervical spine with and without contrast showing no abnormal signal alteration or enhancement suggestive of a demyelinating process  -2018 MRI brain with and without contrast suggesting previously identified hyperintense T2 signal along the occipital horn of the right lateral ventricle likely corresponds to a terminal zone of myelination.  No focal parenchymal signal alteration suggestive of demyelinating disease or acute intracranial abnormality  -Patient does not currently follow with a neurologist  -Neurology consulted for advance care recommendations; per their recommendations MRI brain with and without contrast ordered for further evaluation  - LP performed February 2018 per [facility] documentation    Hx Left Foot bunionectomy 05/03/2024  Moderate Protein Calorie Malnutrition, Present on Admission  Cervical spine stenosis as well as multilevel degenerative changes  ? Hx Personality Disorder, referenced in prior documentation  Peripheral Neuropathy, on gabapentin (ordered and held)    =======================================================================    SUBJECTIVE    Chief Complaint: S/p recurrent fall    History Of Present Illness:    This is a 53-year-old woman who presented to [facility] ED on 08/03/2024 secondary to recurrent fall.  Patient estimates that she had fallen 5 times in the last 3 days with the potential for falls.  She states that over the last 7 days she has been experiencing a relapse of her multiple sclerosis.  Secondary to the relapse, she endorses decreased appetite, persistent vomiting, as well as difficulty with sleeping.  She states that she has been falling predominantly in the last 3 days.  She states that her falls were unwitnessed and she is unsure of the downtime.  She denies loss of consciousness.    Additional review of systems is positive for vomiting of  date: 10/3/1989    Smokeless tobacco: Never    Tobacco comments:     Declines cessation counseling- 3/20/23     6/13/2023 wants no counseling   Substance and Sexual Activity    Alcohol use: Yes     Alcohol/week: 3.0 standard drinks of alcohol     Types: 3 Cans of beer per week     Comment: once per week 2 beers    Drug use: Yes     Types: Marijuana (Weed)     Comment: occasional     Social Determinants of Health     Financial Resource Strain: High Risk (5/1/2023)    Overall Financial Resource Strain (CARDIA)     Difficulty of Paying Living Expenses: Very hard   Food Insecurity:   Recent Concern: Food Insecurity - Food Insecurity Present (5/1/2023)    Hunger Vital Sign     Worried About Running Out of Food in the Last Year: Often true     Ran Out of Food in the Last Year: Often true   Transportation Needs: Unmet Transportation Needs (5/1/2023)    PRAPARE - Transportation     Lack of Transportation (Non-Medical): Yes   Physical Activity: Insufficiently Active (9/1/2023)    Exercise Vital Sign     Days of Exercise per Week: 4 days     Minutes of Exercise per Session: 20 min   Stress: Stress Concern Present (9/1/2023)    [employer] [place] [employer] - Occupational Stress Questionnaire     Feeling of Stress : Rather much   Social Connections: Socially Isolated (9/1/2023)    Social Connection and Isolation Panel [NHANES]     Frequency of Communication with Friends and Family: More than three times a week     Frequency of Social Gatherings with Friends and Family: Twice a week     Attends Nondenominational Services: Never     Active Member of Clubs or Organizations: No     Attends Club or Organization Meetings: Never     Marital Status:    Housing Stability: High Risk (5/1/2023)    Housing Stability Vital Sign     Unstable Housing in the Last Year: Yes        Code status: Full Code     Electronically signed by Dominic Carolina MD on 8/3/2024 at 3:57 PM.   Case was discussed with the Attending, Dr. Burdick.      This

## 2024-08-03 NOTE — CONSULTS
Trauma Surgery Consultation     Patient:  Rivka Zavala  Admit date: 8/3/2024   YOB: 1970 Date of Evaluation: 8/3/2024  MRN: [id number]  Acct: [id number]    Injury Date: August 2, 2024  injury time: Afternoon  PCP: Ben Montejo MD   Referring physician: Dr Carolina    Time of Trauma Surgeon Notification: 1820 August 3, 2024  Time of Trauma Surgeon Arrival: 1840 August 3, 2024    Assessment:    1.  Multiple falls  2.  Right patella fracture  3.  Left periorbital ecchymosis/swelling  4.  Hypokalemia  5.  Hyponatremia  6.  Alcohol abuse  7.  Multiple sclerosis  Plan:    1.  Orthopedic consultation in process for right patella fracture  2.  Wound care  3.  ENT/ophthalmology consultation depending how she progresses but currently no active epistaxis nor significant eye injury.  Await for swelling and ecchymosis to improve.  4.  IV fluid hydration  5.  Bedrest  6.  A.m. labs  7.  Hyponatremia treatment per admitting  8.  Okay to advance diet as tolerated from a trauma standpoint  9.  Wound nurses as needed  10.  Fall precautions    Activation: []Level I (Trauma Alert) []Level II (Injury Call) [x]Level III (Trauma Consult) [] Downgraded (Time: )   Mode of Arrival: EMS transportation  Referring Facility: NA  Loss of Consciousness [x]No []Yes[]Unknown Duration(min)  Mechanism of Injury:  []Motor Vehicle crash   []Single Vehicle [] []Passenger []Scene Fatality []Front Seat  []Restrained   []Air Bag Deployed   []Ejected []Rollover []Pedestrian []Trapped   Type of vehicle:   Protective Devices:   []Motorcycle  Wearing Helmet []Yes []No  []Bicycle  Wearing Helmet []Yes []No  [x]Fall   Distance -standing height   []Assault    Abuse Reported []Yes []No  []Gunshot  []Stabbing  []Work Related  []Burn: []Flame []Scald []Electrical []Chemical []Contact []Inhalation []House Fire  []Other:   Patient Active Problem List   Diagnosis    Alcohol withdrawal (HCC)    Personality disorder, NOS    Optic neuritis     18, SpO2: 97 %  [place] Coma Scale  Eye Opening: Spontaneous  Best Verbal Response: Oriented  Best Motor Response: Obeys commands  [place] Coma Scale Score: 15  Results for orders placed or performed during the hospital encounter of 08/03/24   CBC with Auto Differential   Result Value Ref Range    WBC 10.8 4.8 - 10.8 thou/mm3    RBC 2.78 (L) 4.20 - 5.40 mill/mm3    Hemoglobin 10.0 (L) 12.0 - 16.0 gm/dl    Hematocrit 26.4 (L) 37.0 - 47.0 %    MCV 95.0 81.0 - 99.0 fL    MCH 36.0 (H) 26.0 - 33.0 pg    MCHC 37.9 (H) 32.2 - 35.5 gm/dl    RDW-CV 12.7 11.5 - 14.5 %    RDW-SD 43.8 35.0 - 45.0 fL    Platelets 140 130 - 400 thou/mm3    MPV 10.0 9.4 - 12.4 fL    Seg Neutrophils 76.3 %    Lymphocytes 7.2 %    Monocytes % 15.3 %    Eosinophils 0.0 %    Basophils 0.2 %    Immature Granulocytes % 1.0 %    Neutrophils Absolute 8.2 (H) 1.8 - 7.7 thou/mm3    Lymphocytes Absolute 0.8 (L) 1.0 - 4.8 thou/mm3    Monocytes Absolute 1.7 (H) 0.4 - 1.3 thou/mm3    Eosinophils Absolute 0.0 0.0 - 0.4 thou/mm3    Basophils Absolute 0.0 0.0 - 0.1 thou/mm3    Immature Grans (Abs) 0.11 (H) 0.00 - 0.07 thou/mm3    nRBC 0 /100 wbc   Comprehensive Metabolic Panel w/ Reflex to MG   Result Value Ref Range    Glucose 115 (H) 70 - 108 mg/dL    Creatinine 0.3 (L) 0.4 - 1.2 mg/dL    BUN 4 (L) 7 - 22 mg/dL    Sodium 116 (LL) 135 - 145 meq/L    Potassium reflex Magnesium 2.6 (LL) 3.5 - 5.2 meq/L    Chloride 71 (L) 98 - 111 meq/L    CO2 27 23 - 33 meq/L    Calcium 8.6 8.5 - 10.5 mg/dL     (H) 5 - 40 U/L    Alkaline Phosphatase 109 38 - 126 U/L    Total Protein 6.1 6.1 - 8.0 g/dL    Albumin 4.1 3.5 - 5.1 g/dL    Total Bilirubin 1.1 0.3 - 1.2 mg/dL    ALT 69 (H) 11 - 66 U/L   Lactate, Sepsis   Result Value Ref Range    Lactic Acid, Sepsis 3.4 (H) 0.5 - 1.9 mmol/L   Protime-INR   Result Value Ref Range    INR 0.92 0.85 - 1.13   Troponin   Result Value Ref Range    Troponin, High Sensitivity 14 (H) 0 - 12 ng/L   HCG Qualitative, Serum   Result Value Ref

## 2024-08-03 NOTE — ED PROVIDER NOTES
[facility] EMERGENCY DEPT  EMERGENCY DEPARTMENT ENCOUNTER          Pt Name: Rivka Zavala  MRN: [id number]  Birthdate 1970  Date of evaluation: 8/3/2024  Physician: Ruthie Gonzalez MD  Supervising Attending Physician: Margarito      CHIEF COMPLAINT       Chief Complaint   Patient presents with    Fall         HISTORY OF PRESENT ILLNESS    HPI  Rivka Zavala is a 53 y.o. female who presents to the emergency department from home for evaluation of repeated falls over the last 3 days.  Patient was brought in by EMS covered in blood and c-collar in place.  Patient says he has multiple sclerosis and had a relapse this week and has been having multiple falls.  Patient says that her appetite and intake have gone down.  She feels like her throat is closed.  Patient's left eye is closed due to soft tissue trauma, ecchymosis and swelling.  Patient says this happened a few days ago.  Patient says that her throat is feeling cold and her back hurts.  She also complains of generalized abdominal tenderness and pain in her right knee.  Patient also has a laceration under her  eyebrow and has bruises on her external ear and behind.  There is also a laceration on the bridge of her nose.  Patient has bruises all over her body but is not tender.  Patient has a history of alcohol abuse, multiple sclerosis and is a smoker.  Patient reports last alcohol usage 2 days ago and last fall last night.  Patient denies any fever, shortness of breath, loss of consciousness, blurred vision, vomiting, GI symptoms.  patient has no other acute complaints at this time.      PAST MEDICAL AND SURGICAL HISTORY     Past Medical History:   Diagnosis Date    Anemia     Clavicle fracture 10/13/2022    ETOH abuse     Malnutrition (HCC)     Mild tetrahydrocannabinol (THC) abuse     Multiple sclerosis (HCC) 2017    Personality disorder (HCC) unknown    Rib fracture 10/13/2022    Smoker      Past Surgical History:   Procedure Laterality  or crepitus. Normal pulse.      Right upper arm: No tenderness or bony tenderness.      Left upper arm: No tenderness or bony tenderness.      Right elbow: Normal range of motion. No tenderness.      Left elbow: Normal range of motion. No tenderness.      Right wrist: No tenderness, bony tenderness, snuff box tenderness or crepitus. Normal pulse.      Left wrist: No tenderness, bony tenderness, snuff box tenderness or crepitus. Normal pulse.      Right hand: Normal.      Left hand: Normal.      Cervical back: Normal. No edema, rigidity or crepitus. No pain with movement, spinous process tenderness or muscular tenderness. Normal range of motion.      Thoracic back: Tenderness and bony tenderness present.      Lumbar back: Normal.      Right knee: Erythema and bony tenderness present.      Comments: Bruises all over.  Patient has range of motion and no significant tenderness over her arms.   Skin:     Findings: Abrasion, bruising, ecchymosis, erythema, lesion and wound present.   Neurological:      Mental Status: She is alert.      Sensory: Sensation is intact.   Psychiatric:         Attention and Perception: Attention and perception normal.         ED RESULTS   Laboratory results (none if blank):  Labs Reviewed   CBC WITH AUTO DIFFERENTIAL - Abnormal; Notable for the following components:       Result Value    RBC 2.78 (*)     Hemoglobin 10.0 (*)     Hematocrit 26.4 (*)     MCH 36.0 (*)     MCHC 37.9 (*)     Neutrophils Absolute 8.2 (*)     Lymphocytes Absolute 0.8 (*)     Monocytes Absolute 1.7 (*)     Immature Grans (Abs) 0.11 (*)     All other components within normal limits   COMPREHENSIVE METABOLIC PANEL W/ REFLEX TO MG FOR LOW K - Abnormal; Notable for the following components:    Glucose 115 (*)     Creatinine 0.3 (*)     BUN 4 (*)     Sodium 116 (*)     Potassium reflex Magnesium 2.6 (*)     Chloride 71 (*)      (*)     ALT 69 (*)     All other components within normal limits   LACTATE, SEPSIS -

## 2024-08-03 NOTE — ED NOTES
Presents to ER from mother home where pt resides with concerns of multiple fall over the last 3 days by EMS. Pt shirt covered in blood. C- collar in place.Reports hx of MS. She reports balance problems. She reports she does not use any assistive devices. Reports most pain in back and right knee. Reports she has been laying in her bed for 6 days and she has not ate. She reports feeling like her throat is closed. Reports she has not been able to take her medications due her throat feeling narrow.Pt arrives with bandage over left eye placed by friend a \"few days ago\". Bandage removed. Eye swollen shut. Lac noted under eye brow. Swelling and ecchymosis on left side of face and into ear. There are contusions noted throughout body. New lc noted to bridge of nose. Quick clot placed. Pt reports breaking 2 pairs of glasses. Pt reports hx of ETOH use. She reports last use 2 days ago. She reports last fall yesterday around 1800. Pt denies and CP or SOB. Will monitor

## 2024-08-03 NOTE — ED NOTES
ED to inpatient nurses report      Chief Complaint:  Chief Complaint   Patient presents with    Fall     Present to ED from: home    MOA:     LOC: alert and orientated to name, place, date  Mobility: Fully dependent  Oxygen Baseline: 96%    Current needs required: RA     Code Status:   Prior    What abnormal results were found and what did you give/do to treat them? Na+, K+  Any procedures or intervention occur? K+    Mental Status:  Level of Consciousness: Alert (0)    Psych Assessment:        Vitals:  Patient Vitals for the past 24 hrs:   BP Temp Temp src Pulse Resp SpO2 Weight   08/03/24 1430 (!) 93/57 -- -- (!) 102 16 96 % --   08/03/24 1217 101/69 -- -- (!) 102 18 97 % --   08/03/24 1109 -- -- -- -- -- -- 43.1 kg (95 lb)   08/03/24 1107 129/86 99.1 °F (37.3 °C) Oral (!) 109 20 99 % --        LDAs:   Peripheral IV 08/03/24 Proximal;Right Forearm (Active)   Site Assessment Clean, dry & intact 08/03/24 1225   Line Status Infusing 08/03/24 1225   Phlebitis Assessment No symptoms 08/03/24 1225   Infiltration Assessment 0 08/03/24 1225       Ambulatory Status:  Presents to emergency department  because of falls (Syncope, seizure, or loss of consciousness): No, Age > 70: No, Altered Mental Status, Intoxication with alcohol or substance confusion (Disorientation, impaired judgment, poor safety awaremess, or inability to follow instructions): No, Impaired Mobility: Ambulates or transfers with assistive devices or assistance; Unable to ambulate or transer.: Yes, Nursing Judgement: Yes    Diagnosis:  DISPOSITION Admitted 08/03/2024 02:55:38 PM   Final diagnoses:   Fall, sequela   Multiple sclerosis (HCC)        Consults:  None     Pain Score:  Pain Assessment  Pain Assessment: 0-10  Pain Level: 5  Patient's Stated Pain Goal: 3    C-SSRS:   Risk of Suicide: No Risk    Sepsis Screening:       [place] Fall Risk:  [place] 1 Fall Risk  Presents to emergency department  because of falls (Syncope, seizure, or loss of

## 2024-08-04 ENCOUNTER — APPOINTMENT (OUTPATIENT)
Dept: MRI IMAGING | Age: 54
DRG: 426 | End: 2024-08-04
Payer: COMMERCIAL

## 2024-08-04 ENCOUNTER — APPOINTMENT (OUTPATIENT)
Dept: CT IMAGING | Age: 54
DRG: 426 | End: 2024-08-04
Payer: COMMERCIAL

## 2024-08-04 PROBLEM — S05.12XA: Status: ACTIVE | Noted: 2024-08-04

## 2024-08-04 PROBLEM — S82.001A CLOSED NONDISPLACED FRACTURE OF RIGHT PATELLA: Status: ACTIVE | Noted: 2024-08-04

## 2024-08-04 LAB
ALBUMIN SERPL BCG-MCNC: 3.7 G/DL (ref 3.5–5.1)
ALP SERPL-CCNC: 103 U/L (ref 38–126)
ALT SERPL W/O P-5'-P-CCNC: 53 U/L (ref 11–66)
ANION GAP SERPL CALC-SCNC: 10 MEQ/L (ref 8–16)
ANION GAP SERPL CALC-SCNC: 10 MEQ/L (ref 8–16)
AST SERPL-CCNC: 79 U/L (ref 5–40)
BASOPHILS ABSOLUTE: 0 THOU/MM3 (ref 0–0.1)
BASOPHILS NFR BLD AUTO: 0.2 %
BILIRUB SERPL-MCNC: 1.2 MG/DL (ref 0.3–1.2)
BUN SERPL-MCNC: 5 MG/DL (ref 7–22)
BUN SERPL-MCNC: 6 MG/DL (ref 7–22)
CA-I BLD ISE-SCNC: 1 MMOL/L (ref 1.12–1.32)
CALCIUM SERPL-MCNC: 8.6 MG/DL (ref 8.5–10.5)
CALCIUM SERPL-MCNC: 8.6 MG/DL (ref 8.5–10.5)
CHLORIDE SERPL-SCNC: 76 MEQ/L (ref 98–111)
CHLORIDE SERPL-SCNC: 77 MEQ/L (ref 98–111)
CO2 SERPL-SCNC: 32 MEQ/L (ref 23–33)
CO2 SERPL-SCNC: 33 MEQ/L (ref 23–33)
CREAT SERPL-MCNC: 0.2 MG/DL (ref 0.4–1.2)
CREAT SERPL-MCNC: < 0.2 MG/DL (ref 0.4–1.2)
DEPRECATED RDW RBC AUTO: 44.3 FL (ref 35–45)
DEPRECATED RDW RBC AUTO: 46.8 FL (ref 35–45)
EKG ATRIAL RATE: 111 BPM
EKG P AXIS: 50 DEGREES
EKG P-R INTERVAL: 144 MS
EKG Q-T INTERVAL: 384 MS
EKG QRS DURATION: 78 MS
EKG QTC CALCULATION (BAZETT): 522 MS
EKG R AXIS: 57 DEGREES
EKG T AXIS: 75 DEGREES
EKG VENTRICULAR RATE: 111 BPM
EOSINOPHIL NFR BLD AUTO: 0.2 %
EOSINOPHILS ABSOLUTE: 0 THOU/MM3 (ref 0–0.4)
ERYTHROCYTE [DISTWIDTH] IN BLOOD BY AUTOMATED COUNT: 12.8 % (ref 11.5–14.5)
ERYTHROCYTE [DISTWIDTH] IN BLOOD BY AUTOMATED COUNT: 13.2 % (ref 11.5–14.5)
GFR SERPL CREATININE-BSD FRML MDRD: > 90 ML/MIN/1.73M2
GFR SERPL CREATININE-BSD FRML MDRD: > 90 ML/MIN/1.73M2
GLUCOSE SERPL-MCNC: 116 MG/DL (ref 70–108)
GLUCOSE SERPL-MCNC: 77 MG/DL (ref 70–108)
HCT VFR BLD AUTO: 25.4 % (ref 37–47)
HCT VFR BLD AUTO: 26.9 % (ref 37–47)
HGB BLD-MCNC: 9.2 GM/DL (ref 12–16)
HGB BLD-MCNC: 9.8 GM/DL (ref 12–16)
IMM GRANULOCYTES # BLD AUTO: 0.09 THOU/MM3 (ref 0–0.07)
IMM GRANULOCYTES NFR BLD AUTO: 0.8 %
LYMPHOCYTES ABSOLUTE: 0.9 THOU/MM3 (ref 1–4.8)
LYMPHOCYTES NFR BLD AUTO: 8.4 %
MAGNESIUM SERPL-MCNC: 2 MG/DL (ref 1.6–2.4)
MCH RBC QN AUTO: 35.2 PG (ref 26–33)
MCH RBC QN AUTO: 36.2 PG (ref 26–33)
MCHC RBC AUTO-ENTMCNC: 36.2 GM/DL (ref 32.2–35.5)
MCHC RBC AUTO-ENTMCNC: 36.4 GM/DL (ref 32.2–35.5)
MCV RBC AUTO: 97.3 FL (ref 81–99)
MCV RBC AUTO: 99.3 FL (ref 81–99)
MONOCYTES ABSOLUTE: 1.5 THOU/MM3 (ref 0.4–1.3)
MONOCYTES NFR BLD AUTO: 13.1 %
NEUTROPHILS ABSOLUTE: 8.6 THOU/MM3 (ref 1.8–7.7)
NEUTROPHILS NFR BLD AUTO: 77.3 %
NRBC BLD AUTO-RTO: 0 /100 WBC
OSMOLALITY SERPL: 249 MOSMOL/KG (ref 275–295)
OSMOLALITY UR: 333 MOSMOL/KG (ref 250–750)
PHOSPHATE SERPL-MCNC: 2.2 MG/DL (ref 2.4–4.7)
PLATELET # BLD AUTO: 142 THOU/MM3 (ref 130–400)
PLATELET # BLD AUTO: 183 THOU/MM3 (ref 130–400)
PMV BLD AUTO: 10.3 FL (ref 9.4–12.4)
PMV BLD AUTO: 10.7 FL (ref 9.4–12.4)
POTASSIUM SERPL-SCNC: 3.4 MEQ/L (ref 3.5–5.2)
POTASSIUM SERPL-SCNC: 3.7 MEQ/L (ref 3.5–5.2)
POTASSIUM SERPL-SCNC: 3.8 MEQ/L (ref 3.5–5.2)
PROT SERPL-MCNC: 6.1 G/DL (ref 6.1–8)
RBC # BLD AUTO: 2.61 MILL/MM3 (ref 4.2–5.4)
RBC # BLD AUTO: 2.71 MILL/MM3 (ref 4.2–5.4)
SODIUM SERPL-SCNC: 116 MEQ/L (ref 135–145)
SODIUM SERPL-SCNC: 118 MEQ/L (ref 135–145)
SODIUM SERPL-SCNC: 120 MEQ/L (ref 135–145)
TSH SERPL DL<=0.005 MIU/L-ACNC: 2.28 UIU/ML (ref 0.4–4.2)
WBC # BLD AUTO: 10 THOU/MM3 (ref 4.8–10.8)
WBC # BLD AUTO: 11.1 THOU/MM3 (ref 4.8–10.8)

## 2024-08-04 PROCEDURE — 85027 COMPLETE CBC AUTOMATED: CPT

## 2024-08-04 PROCEDURE — 93010 ELECTROCARDIOGRAM REPORT: CPT | Performed by: INTERNAL MEDICINE

## 2024-08-04 PROCEDURE — 84443 ASSAY THYROID STIM HORMONE: CPT

## 2024-08-04 PROCEDURE — 80053 COMPREHEN METABOLIC PANEL: CPT

## 2024-08-04 PROCEDURE — 84100 ASSAY OF PHOSPHORUS: CPT

## 2024-08-04 PROCEDURE — 82330 ASSAY OF CALCIUM: CPT

## 2024-08-04 PROCEDURE — 83930 ASSAY OF BLOOD OSMOLALITY: CPT

## 2024-08-04 PROCEDURE — 99232 SBSQ HOSP IP/OBS MODERATE 35: CPT | Performed by: SURGERY

## 2024-08-04 PROCEDURE — 6370000000 HC RX 637 (ALT 250 FOR IP)

## 2024-08-04 PROCEDURE — 2060000000 HC ICU INTERMEDIATE R&B

## 2024-08-04 PROCEDURE — 36415 COLL VENOUS BLD VENIPUNCTURE: CPT

## 2024-08-04 PROCEDURE — 2500000003 HC RX 250 WO HCPCS: Performed by: STUDENT IN AN ORGANIZED HEALTH CARE EDUCATION/TRAINING PROGRAM

## 2024-08-04 PROCEDURE — 6360000004 HC RX CONTRAST MEDICATION

## 2024-08-04 PROCEDURE — 84295 ASSAY OF SERUM SODIUM: CPT

## 2024-08-04 PROCEDURE — 2580000003 HC RX 258: Performed by: STUDENT IN AN ORGANIZED HEALTH CARE EDUCATION/TRAINING PROGRAM

## 2024-08-04 PROCEDURE — 70553 MRI BRAIN STEM W/O & W/DYE: CPT

## 2024-08-04 PROCEDURE — 97116 GAIT TRAINING THERAPY: CPT

## 2024-08-04 PROCEDURE — A9579 GAD-BASE MR CONTRAST NOS,1ML: HCPCS

## 2024-08-04 PROCEDURE — 6360000002 HC RX W HCPCS

## 2024-08-04 PROCEDURE — 97163 PT EVAL HIGH COMPLEX 45 MIN: CPT

## 2024-08-04 PROCEDURE — 83935 ASSAY OF URINE OSMOLALITY: CPT

## 2024-08-04 PROCEDURE — 99223 1ST HOSP IP/OBS HIGH 75: CPT

## 2024-08-04 PROCEDURE — 99233 SBSQ HOSP IP/OBS HIGH 50: CPT | Performed by: INTERNAL MEDICINE

## 2024-08-04 PROCEDURE — 76376 3D RENDER W/INTRP POSTPROCES: CPT

## 2024-08-04 PROCEDURE — 83735 ASSAY OF MAGNESIUM: CPT

## 2024-08-04 PROCEDURE — 85025 COMPLETE CBC W/AUTO DIFF WBC: CPT

## 2024-08-04 PROCEDURE — 84132 ASSAY OF SERUM POTASSIUM: CPT

## 2024-08-04 PROCEDURE — 6360000002 HC RX W HCPCS: Performed by: INTERNAL MEDICINE

## 2024-08-04 RX ORDER — PANTOPRAZOLE SODIUM 40 MG/10ML
40 INJECTION, POWDER, LYOPHILIZED, FOR SOLUTION INTRAVENOUS 2 TIMES DAILY
Status: DISCONTINUED | OUTPATIENT
Start: 2024-08-04 | End: 2024-08-06 | Stop reason: SDUPTHER

## 2024-08-04 RX ORDER — SODIUM CHLORIDE 9 MG/ML
INJECTION, SOLUTION INTRAVENOUS CONTINUOUS
Status: DISCONTINUED | OUTPATIENT
Start: 2024-08-04 | End: 2024-08-05

## 2024-08-04 RX ORDER — POTASSIUM CHLORIDE 20 MEQ/1
40 TABLET, EXTENDED RELEASE ORAL PRN
Status: DISCONTINUED | OUTPATIENT
Start: 2024-08-04 | End: 2024-08-10 | Stop reason: HOSPADM

## 2024-08-04 RX ORDER — POTASSIUM CHLORIDE 7.45 MG/ML
10 INJECTION INTRAVENOUS PRN
Status: DISCONTINUED | OUTPATIENT
Start: 2024-08-04 | End: 2024-08-10 | Stop reason: HOSPADM

## 2024-08-04 RX ORDER — MAGNESIUM SULFATE IN WATER 40 MG/ML
2000 INJECTION, SOLUTION INTRAVENOUS PRN
Status: DISCONTINUED | OUTPATIENT
Start: 2024-08-04 | End: 2024-08-10 | Stop reason: HOSPADM

## 2024-08-04 RX ADMIN — MORPHINE SULFATE 1 MG: 2 INJECTION, SOLUTION INTRAMUSCULAR; INTRAVENOUS at 03:29

## 2024-08-04 RX ADMIN — MORPHINE SULFATE 1 MG: 2 INJECTION, SOLUTION INTRAMUSCULAR; INTRAVENOUS at 12:32

## 2024-08-04 RX ADMIN — SODIUM CHLORIDE: 9 INJECTION, SOLUTION INTRAVENOUS at 18:53

## 2024-08-04 RX ADMIN — GADOTERIDOL 15 ML: 279.3 INJECTION, SOLUTION INTRAVENOUS at 08:44

## 2024-08-04 RX ADMIN — SODIUM PHOSPHATE, MONOBASIC, MONOHYDRATE AND SODIUM PHOSPHATE, DIBASIC, ANHYDROUS 15 MMOL: 142; 276 INJECTION, SOLUTION INTRAVENOUS at 18:57

## 2024-08-04 RX ADMIN — POTASSIUM BICARBONATE 40 MEQ: 782 TABLET, EFFERVESCENT ORAL at 00:50

## 2024-08-04 RX ADMIN — PANTOPRAZOLE SODIUM 40 MG: 40 INJECTION, POWDER, FOR SOLUTION INTRAVENOUS at 18:51

## 2024-08-04 ASSESSMENT — LIFESTYLE VARIABLES
HOW OFTEN DO YOU HAVE A DRINK CONTAINING ALCOHOL: PATIENT DECLINED
HOW MANY STANDARD DRINKS CONTAINING ALCOHOL DO YOU HAVE ON A TYPICAL DAY: PATIENT DECLINED

## 2024-08-04 ASSESSMENT — PAIN DESCRIPTION - DESCRIPTORS
DESCRIPTORS: SORE
DESCRIPTORS: ACHING;SHARP;SORE;DISCOMFORT

## 2024-08-04 ASSESSMENT — PAIN SCALES - GENERAL
PAINLEVEL_OUTOF10: 0
PAINLEVEL_OUTOF10: 6
PAINLEVEL_OUTOF10: 3
PAINLEVEL_OUTOF10: 2
PAINLEVEL_OUTOF10: 5

## 2024-08-04 ASSESSMENT — PAIN DESCRIPTION - LOCATION: LOCATION: KNEE

## 2024-08-04 ASSESSMENT — PAIN - FUNCTIONAL ASSESSMENT: PAIN_FUNCTIONAL_ASSESSMENT: PREVENTS OR INTERFERES SOME ACTIVE ACTIVITIES AND ADLS

## 2024-08-04 ASSESSMENT — PAIN DESCRIPTION - ORIENTATION
ORIENTATION: RIGHT
ORIENTATION: RIGHT;MID

## 2024-08-04 NOTE — PROGRESS NOTES
Patient declined to complete SBIRT per trauma services protocol.    Brief Intervention and Referral to Treatment Summary    Patient was provided PHQ-9, AUDIT-C and DAST Screening:      PHQ-9 Score:   AUDIT-C Score:    DAST Score:      Patient’s substance use is considered         Patient’s depression is considered:       Brief Education Was Provided        Brief Intervention Is Provided (Only for AUDIT-C or DAST)     Patient reports readiness to decrease and/or stop use and a plan was discussed   Patient denies readiness to decrease and/or stop use and a plan was not discussed      Injured Trauma Survivor Screening  1.  When you were injured or right afterward   Did you think you were going to die?   Do you think this was done to you intentionally?     Since your injury  Have you felt more restless, tense or jumpy than usual?   Have you found yourself unable to stop worrying?   Do you find yourself thinking that the world is unsafe and that people are not to be trusted?     TOTAL SCORE from ITSS Questions 1 and 2:   NOTE: A score of greater than or equal to 2 is considered positive for PTSD risk and is to receive a community resource packet to link with appropriate providers.    Recommendations/Referrals for Brief and/or Specialized Treatment Provided to Patient

## 2024-08-04 NOTE — FLOWSHEET NOTE
08/04/24 1140   Treatment Team Notification   Reason for Communication Critical results   Type of Critical Result Laboratory   Critical Lab Information Na 118   Person Result Received From Lab   Critical Lab Result Type Electrolytes   Name of Team Member Notified Dr. Griffin   Treatment Team Role Resident   Method of Communication Secure Message   Response No new orders   Notification Time 1140

## 2024-08-04 NOTE — PLAN OF CARE
Problem: Discharge Planning  Goal: Discharge to home or other facility with appropriate resources  Outcome: Progressing  Flowsheets (Taken 8/3/2024 2126)  Discharge to home or other facility with appropriate resources:   Identify barriers to discharge with patient and caregiver   Arrange for needed discharge resources and transportation as appropriate     Problem: Pain  Goal: Verbalizes/displays adequate comfort level or baseline comfort level  Outcome: Progressing  Flowsheets (Taken 8/3/2024 2126)  Verbalizes/displays adequate comfort level or baseline comfort level:   Encourage patient to monitor pain and request assistance   Assess pain using appropriate pain scale     Problem: Skin/Tissue Integrity  Goal: Absence of new skin breakdown  Description: 1.  Monitor for areas of redness and/or skin breakdown  2.  Assess vascular access sites hourly  3.  Every 4-6 hours minimum:  Change oxygen saturation probe site  4.  Every 4-6 hours:  If on nasal continuous positive airway pressure, respiratory therapy assess nares and determine need for appliance change or resting period.  Outcome: Progressing  Note: No new evidence of skin breakdown.      Problem: Safety - Adult  Goal: Free from fall injury  Outcome: Progressing  Flowsheets (Taken 8/3/2024 2126)  Free From Fall Injury: Instruct family/caregiver on patient safety     Problem: ABCDS Injury Assessment  Goal: Absence of physical injury  Outcome: Progressing  Flowsheets (Taken 8/3/2024 2126)  Absence of Physical Injury: Implement safety measures based on patient assessment     Problem: Chronic Conditions and Co-morbidities  Goal: Patient's chronic conditions and co-morbidity symptoms are monitored and maintained or improved  Outcome: Progressing  Flowsheets (Taken 8/3/2024 2126)  Care Plan - Patient's Chronic Conditions and Co-Morbidity Symptoms are Monitored and Maintained or Improved: Monitor and assess patient's chronic conditions and comorbid symptoms for

## 2024-08-04 NOTE — CONSULTS
Orthopedic Consult    Requesting Physician: Ricardo Burdick MD    CHIEF COMPLAINT:  multiple falls; right patella fracture    HISTORY OF PRESENT ILLNESS:      The patient is a 53 y.o. female with a past medical history significant for alcohol abuse and MS presented to the ED via EMS after experiencing greater than 5 falls at home in the last 3 days. Able to get herself up each time but states she called EMS due nose bleed she could not get to stop. Reported significant right knee pain on initial evaluation in ED and found to have right patella fracture. States right knee pain is more severe but has generalized soreness and achiness \"all over\" with increased pain through the right elbow. Hit her head during a few of the falls but denies LOC. Had right shoulder surgery in December with Dr. Lopez and bunionectomy on 5/3/24 with Dr. Sharma. Ambulates without assistive devices at baseline. States she was using a cane or crutches after foot surgery until 1 month ago. Lives at home with her parents. Right hand dominate. Denies chest pain, SOB, nausea, headache, blurry vision, calf pain, or numbness/tingling in her hand/fingers and feet/toes.    Past Medical History:    Past Medical History:   Diagnosis Date    Anemia     Clavicle fracture 10/13/2022    ETOH abuse     Malnutrition (HCC)     Mild tetrahydrocannabinol (THC) abuse     Multiple sclerosis (HCC) 2017    Personality disorder (HCC) unknown    Rib fracture 10/13/2022    Smoker        Past Surgical History:    Past Surgical History:   Procedure Laterality Date    ANKLE SURGERY  05/03/2024    COLONOSCOPY  08/16/2022    CYST REMOVAL  2004    right thigh     SHOULDER SURGERY Right 12/2022    Hook Plate Placement    SHOULDER SURGERY  10/26/2023    oio    TONSILLECTOMY  1977    WRIST GANGLION EXCISION Right 08/2021       Medications Prior to Admission:   Current Facility-Administered Medications   Medication Dose Route Frequency Provider Last Rate Last Admin    sodium

## 2024-08-04 NOTE — PROGRESS NOTES
examined this morning, Patient is complaining of pain in herright knee, 8/10. Pain in head and left eye area 5/10. Patient states that she worked with PT/OT this morning and did well, Patient states that she had foot surgery in May and used walker for about a month.      HPI / Hospital Course:  This is a 53-year-old woman who presented to [facility] ED on 08/03/2024 secondary to recurrent fall.  Patient estimates that she had fallen 5 times in the last 3 days with the potential for falls.  She states that over the last 7 days she has been experiencing a relapse of her multiple sclerosis.  Secondary to the relapse, she endorses decreased appetite, persistent vomiting, as well as difficulty with sleeping.  She states that she has been falling predominantly in the last 3 days.  She states that her falls were unwitnessed and she is unsure of the downtime.  She denies loss of consciousness.     Additional review of systems is positive for vomiting of predominantly stomach acid/bile (patient denies hemoptysis or hematemesis), and solid/pill dysphagia.  Secondary to the falls, the patient additionally notes pain in her right knee, nose, mid and lower back as well as the left eye.     Review of systems is negative for fever, chills, nausea, vision changes or acute vision loss, cough, sputum production, chest pain/tightness/pressure, abdominal cramping, constipation, diarrhea, hematochezia, melena, changes in urination, dysuria, arm/leg swelling, rashes/skin changes, new headaches, new migraines, photophobia, phonophobia, paresthesias of the upper and lower extremities.     The patient denies hospitalizations, recent illness, sick contacts, recent travel outside of the state or country, animal/insect bites, recent medication changes.  She endorses an unspecified foot surgery on 05/03/2024.     The patient admits to smoking cigarettes 0.5 pack/day for last 30 years.  The patient additionally admits to smoking marijuana on a daily  basis.  She also admits to drinking 2-3 beers per day several times per week.  She denies blackouts from alcohol.  She denies use of chewing tobacco, electronic cigarettes, recreational drugs.  Patient's medications were reviewed with her.  She states that the only medications that she is currently taking includes gabapentin, folic acid, and iron supplementation. She denies any additional acute symptoms or concerns.     ER documentation reviewed.  Patient was brought in by EMS \"covered in blood and c-collar in place.\"  Patient had an endorsed multiple sclerosis relapse with recurrent falls, decreased appetite, sensation that \"throat is closed.\"    Medications:    Infusion Medications    sodium chloride      Scheduled Medications    sodium chloride flush  5-40 mL IntraVENous 2 times per day    [Held by provider] folic acid  1 mg Oral Daily    [Held by provider] thiamine  100 mg Oral Daily    PRN Meds: sodium chloride flush, sodium chloride, ondansetron **OR** ondansetron, acetaminophen **OR** acetaminophen, PHENobarbital **OR** PHENobarbital **OR** PHENobarbital (LUMINAL) 430.95 mg in sodium chloride 0.9 % 100 mL IVPB, morphine **OR** morphine    Exam:  /68   Pulse 96   Temp 98.2 °F (36.8 °C) (Oral)   Resp 18   Wt 44.8 kg (98 lb 12.3 oz)   LMP 08/14/2012   SpO2 98%   BMI 16.95 kg/m²   General appearance: No apparent distress, appears stated age.  Eyes: right pupil equal and reactive to light. Left pupil obscured by significant ecchymosis  HENT: Head normal in appearance. External nares normal.  Oral mucosa moist without lesions.  Hearing grossly intact.   Neck: Supple, with full range of motion. Trachea midline.  No gross JVD appreciated.  Respiratory: clear to auscultation bilaterally with no wheezes, crackles or rhonchi  Cardiovascular: systolic murmur auscultated in the second intercostal space bilaterally  Abdomen: soft, nontender, nondistended   Musculoskeletal: No joint swelling or tenderness.

## 2024-08-04 NOTE — PROGRESS NOTES
Orthopaedic Surgery  Progress Note    Subjective:    Patient resting comfortably in bed. Pain well controlled at rest. Nurse working on finding longer KI. Continues to report increased pain to right elbow. Denies chest pain, SOB, nausea, lightheadedness, dizziness, calf pain, and numbness/tingling in her hand/finger and feet/toes    Vitals  VITALS:  /68   Pulse 96   Temp 98.2 °F (36.8 °C) (Oral)   Resp 18   Wt 44.8 kg (98 lb 12.3 oz)   LMP 08/14/2012   SpO2 98%   BMI 16.95 kg/m²   DRAIN/TUBE OUTPUT:       PHYSICAL EXAM:    General examination:  Patient is a 52yo female resting comfortably in bed. She is awake, alert and oriented x3 and in no acute distress. Answering questions appropriately. Normal mood and affect.  Right upper extremity:  Moderate edema and ecchymosis over olecranon. Skin otherwise intact without evidence of erythema, lacerations or abrasions. Mild tenderness to anterior shoulder. Moderate tenderness to olecranon and elbow. No tenderness to clavicle, periscapular, proximal humerus, forearm, distal radius/ulna, wrist, hand, or fingers. Motor intact AIN, PIN, median, radial, ulnar nerve distribution. Decreased shoulder flexion and abduction compared to left side due to discomfort. Decreased elbow ROM compared to left side due to pain,  approximately  degrees. SILT median, radial, ulnar nerve distribution. 2+ radial pulse. Fingers warm and well perfused.  Left upper extremity:  Skin intact no erythema, edema or abrasion. Multiple areas of ecchymosis over upper arm and forearm. No tenderness to clavicle, periscapular, shoulder, humerus, elbow, forearm, istal radius/ulna, wrist, hand, or fingers. Motor intact AIN, PIN, median, radial, ulnar nerve distribution.SILT median, radial, ulnar nerve distribution. 2+ radial pulse. Fingers warm and well perfused.  Right lower extremity:  Moderate prepatellar edema and ecchymosis. Mild edema and effusion to knee. Skin otherwise intact without

## 2024-08-04 NOTE — CARE COORDINATION
08/04/24 1150   Service Assessment   Patient Orientation Alert and Oriented   Cognition Alert   History Provided By Patient   Primary Caregiver Self   Accompanied By/Relationship unaccompanied   Support Systems Parent;Family Members;Friends/Neighbors   Patient's Healthcare Decision Maker is: Patient Declined (Legal Next of Kin Remains as Decision Maker)  (legal next of kin updated on chart)   PCP Verified by CM Yes   Last Visit to PCP Within last 6 months   Prior Functional Level Independent in ADLs/IADLs   Current Functional Level Independent in ADLs/IADLs   Can patient return to prior living arrangement Yes   Ability to make needs known: Good   Family able to assist with home care needs: Yes   Would you like for me to discuss the discharge plan with any other family members/significant others, and if so, who? No   Financial Resources Medicare   Community Resources None   CM/SW Referral ADLs/IADLs;DME   Social/Functional History   Lives With Parent   Type of Home House   Active  No   Patient's  Info Friend or mother   Discharge Planning   Type of Residence House   Living Arrangements Parent   Current Services Prior To Admission Durable Medical Equipment   Current DME Prior to Arrival Walker   Potential Assistance Needed N/A   DME Ordered? No   Potential Assistance Purchasing Medications No   Type of Home Care Services None   Patient expects to be discharged to: House   History of falls? 1   Services At/After Discharge   Transition of Care Consult (CM Consult) Discharge Planning   Confirm Follow Up Transport Family   Condition of Participation: Discharge Planning   The Plan for Transition of Care is related to the following treatment goals: \"Get better\"     Patient Goals/Plan/Treatment Preferences: Met w/ Rivka. Verified insurance and PCP. She is independent. Her mother or friends provide all transportation. Has RW available at home if needed. Denies needs for HH or OP services. Plans to return  home w/ parents.     If patient is discharged prior to next notation, then this note serves as note for discharge by case management.

## 2024-08-04 NOTE — FLOWSHEET NOTE
08/04/24 1843   Treatment Team Notification   Reason for Communication Critical results   Type of Critical Result Laboratory   Critical Lab Information Na 116   Person Result Received From Lab   Critical Lab Result Type Electrolytes   Name of Team Member Notified Dr. Griffin   Treatment Team Role Resident   Method of Communication Secure Message   Response See orders   Notification Time 1832     0.9% saline started at 50 mls/hr per order.

## 2024-08-04 NOTE — PROGRESS NOTES
Ct of right elbow reconstruction negative for fracture or acute process. Sling for comfort - may come out of sling as tolerated    Patient care discussed with Breanne Romeo PA-C. Agree with assessment and plan.    Alvarado Bell MD  Orthopaedic Surgeon  [facility]  8/4/2024  8:48 PM

## 2024-08-04 NOTE — FLOWSHEET NOTE
08/04/24 1538   Treatment Team Notification   Reason for Communication Critical results   Type of Critical Result Laboratory   Critical Lab Information Na 118   Person Result Received From Lab   Critical Lab Result Type Electrolytes   Name of Team Member Notified Dr. Griffin   Treatment Team Role Resident   Method of Communication Secure Message   Response No new orders   Notification Time 9840

## 2024-08-04 NOTE — CONSULTS
Neurology Consult Note    Date:8/4/2024       Room:[facility]15/015-A  Patient Name:Rivka Zavala     YOB: 1970     Age:53 y.o.    Requesting Physician: Subhash Aarngo DO     Reason for Consult:  Evaluate for previous Diagnosis of MS and patient endorsed flare; recurrent falls    Chief Complaint:   Chief Complaint   Patient presents with    Fall       Subjective     Rivka Zavala is a 53 y.o. female with a history of multiple sclerosis (no medications), anemia, alcohol abuse, THC abuse, malnutrition, personality disorder who presented to [facility] on 8/3/2024 for multiple falls.  She was brought in the to the ED \"covered in blood\" and c-collar in place.  She was also found to have epistaxis.  Patient explained that she has fallen at least 5 times that she \"knows of \" in the past week.  She explained her first fall was approximately a week ago where she injured her left eye -periorbital ecchymosis and subconjunctival hemorrhage noted. She explained that she has not ate in 1 week.  She has been nauseated and vomiting bile.  She explained that she had a bad relapse of her MS.  She explained that she cannot sleep and that if she moves she will vomit.  She also explained that she was sweaty and shaky during this time.  She explained that she tried to eat but her throat \"closed up\".  She explained that she could not swallow due to her throat being sore from vomiting but could not breathe during this time.  She explained that she has mostly been in bed for the last week.Her initial sodium was 116 and potassium 2.2.  Her B12 was found to be > 2000.  Her lactic was 3.4.  UA showed trace leukocytes.  CK was 330.  Trauma saw patient and was scanned and found right patellar fracture. Ortho was consulted and knee immobilizer was applied.  She has multiple bruises scattered throughout.  CT head was negative for acute findings. She explained that she was diagnosed with multiple sclerosis in 2005 but she does not remember  PELVIS W IV CONTRAST CLINICAL INFORMATION: Trauma, fall TECHNIQUE: CT of the abdomen and pelvis was performed following administration of 80 mL Isovue-370 intravenous contrast. Axial images as well as coronal and sagittal reconstructions were obtained. COMPARISON: CT abdomen and pelvis 10/13/2022 FINDINGS: Lower thorax: Please see same day CT of the chest. Abdomen: There is no free intraperitoneal air or fluid. Bowel is normal in course and caliber without evidence of obstruction. Diffuse hypoattenuation in the liver may be secondary to hepatic steatosis. The gallbladder, spleen, pancreas, adrenal glands and kidneys are normal. Atherosclerotic calcifications are present in the abdominal aorta without evidence of aneurysm. There is no mesenteric or retroperitoneal lymphadenopathy. The lumbar spine is evaluated on same day reconstructions. Pelvis: The urinary bladder is unremarkable. There are phleboliths in the pelvis. There is no pelvic or inguinal lymphadenopathy. Degenerative changes are present in the pelvis without evidence of aggressive osseous lesions. Osseous structures are intact.     No acute intra-abdominal or intrapelvic findings. **This report has been created using voice recognition software. It may contain minor errors which are inherent in voice recognition technology.** Electronically signed by Dr. Dylan Shahid    CT LUMBAR RECONSTRUCTION WO POST PROCESS    Result Date: 8/3/2024  PROCEDURE: CT LUMBAR RECONSTRUCTION WO POST PROCESS CLINICAL INFORMATION: Trauma, fall TECHNIQUE: CT of the lumbar spine was reconstructed from same day CT of the abdomen and pelvis. Axial, coronal and sagittal projections were obtained. All CT scans at this facility use dose modulation, iterative reconstruction, and/or weight-based dosing when appropriate to reduce radiation dose to as low as reasonably achievable. COMPARISON: CT lumbar spine reconstruction 10/13/2022 FINDINGS: This report refers to findings in the

## 2024-08-04 NOTE — PROGRESS NOTES
[facility]  INPATIENT PHYSICAL THERAPY  EVALUATION  [facility] 4A - 4A-15/015-A    Time In: 0945  Time Out: 1008  Timed Code Treatment Minutes: 11 Minutes  Minutes: 23          Date: 2024  Patient Name: Rivka Zavala,  Gender:  female        MRN: [id number]  : 1970  (53 y.o.)      Referring Practitioner: BENTON Rosa  Diagnosis: multiple sclerosis  Additional Pertinent Hx: per ortho:53 y.o. female with a past medical history significant for alcohol abuse and MS presented to the ED via EMS after experiencing greater than 5 falls at home in the last 3 days. Able to get herself up each time but states she called EMS due nose bleed she could not get to stop. Reported significant right knee pain on initial evaluation in ED and found to have right patella fracture. States right knee pain is more severe but has generalized soreness and achiness \"all over\" with increased pain through the right elbow. Hit her head during a few of the falls but denies LOC. Had right shoulder surgery in December with Dr. Lopez and bunionectomy on 5/3/24 with Dr. Sharma. Ambulates without assistive devices at baseline. States she was using a cane or crutches after foot surgery until 1 month ago. Lives at home with her parents. Right hand dominate.     Restrictions/Precautions:  Restrictions/Precautions: Fall Risk, Weight Bearing  Right Lower Extremity Weight Bearing: Weight Bearing As Tolerated (WBAT in knee immobilizer)  Required Braces or Orthoses  Right Lower Extremity Brace: Knee Immobilizer  Position Activity Restriction  Other position/activity restrictions: no right knee flexion    Subjective:  Chart Reviewed: Yes  Patient assessed for rehabilitation services?: Yes  Subjective: pleasant and cooperative, CT scan of right elbow-no fracture    General:        Hearing: Within functional limits       Pain: no c/o pain    Vitals: Vitals not assessed per clinical judgement, see nursing

## 2024-08-04 NOTE — PROGRESS NOTES
[facility]  Trauma Surgery - Dr. Blanco Bey  Daily Progress Note  Pt Name: Rivka Zavala  Medical Record Number: [id number]  Date of Birth 1970   Today's Date: 8/4/2024    HD: # 1    CC:  feeling better, can open left eye today and is able to see from it    ASSESSMENT  1.  Active Hospital Problems    Diagnosis Date Noted    Acute hyponatremia [E87.1] 06/03/2022     Priority: Medium    Recurrent falls [R29.6] 08/03/2024    Fall [W19.XXXA] 08/03/2024         PLAN  Patient admitted to medicine service with a trauma consult due to frequent falls     1.  Multiple falls   - patient is a alcoholic with multiple falls and reportedly has MS  - falls likely multi-factoral from MS and severe hyponatremia   - was admitted to medicine for electrolyte repletion and evaluation   - patient on fall precautions   - PT/OT when appropriate    2.  Right patella fracture   - Orthopedic consultation in process for right patella fracture   - Wound care   - okay to work with PT/OT    - per ortho, no surgery, patient is WBAT with knee immobilizer    - 08/04: awaiting PT/OT evaluations.     3.  Left periorbital ecchymosis/swelling   - ENT/ophthalmology consultation depending how she progresses. no active epistaxis nor significant eye injury.    - Await for swelling and ecchymosis to improve.   - 08/04: Swelling is improved today and patient is able to open her eye. She reports having vision but continues to have periorbital swelling. Ice to area PRN. She can follow up as an outpatient with ophthalmology.     4.  Hypokalemia   - replete   - AM labs    - 08/04: Potassium improved. Today K+ 3.8, improved from 2.2     5.  Hyponatremia   - Hyponatremia treatment per admitting    - AM labs   - 08/04: Slowly improving. Admitted with Na+ 114, currently 120 on labs today. Medicine team managing.      6.  Alcohol abuse   - IV fluid hydration   - 08/04: Consider addiction services evaluation     7.  Multiple sclerosis   -  CT of the lumbar spine was reconstructed from same day CT of the abdomen and pelvis. Axial, coronal and sagittal projections were obtained. All CT scans at this facility use dose modulation, iterative reconstruction, and/or weight-based dosing when appropriate to reduce radiation dose to as low as reasonably achievable. COMPARISON: CT lumbar spine reconstruction 10/13/2022 FINDINGS: This report refers to findings in the lumbar spine only. Please see same day CT of the abdomen and pelvis for additional findings. Lumbar vertebral body heights and alignment are preserved. There is mild disc space narrowing at the L1-L2 and L5-S1 levels. There is no fracture or spondylolisthesis. Posterior facet arthropathy is present at L4-L5 and L5-S1.     No fracture or spondylolisthesis of the lumbar spine. **This report has been created using voice recognition software. It may contain minor errors which are inherent in voice recognition technology.** Electronically signed by Dr. Dylan Shahid    CT CHEST W CONTRAST    Result Date: 8/3/2024  PROCEDURE: CT CHEST W CONTRAST CLINICAL INFORMATION: Fall, trauma TECHNIQUE: CT of the chest was performed following administration of 80 mL Isovue-370 intravenous contrast. Axial images as well as coronal and sagittal reconstructions were obtained. All CT scans at this facility use dose modulation, iterative reconstruction, and/or weight-based dosing when appropriate to reduce radiation dose to as low as reasonably achievable. COMPARISON: CT chest 10/13/2022 FINDINGS: Cardiac size is normal. Atherosclerotic calcifications are present in the thoracic aorta and coronary arteries without evidence of aneurysm. There is no pleural or pericardial effusion. There is minimal scarring at the bilateral lung apices. No lung consolidations are identified. There is no pneumothorax. There is no mediastinal, hilar or axillary lymphadenopathy. A small hiatal hernia suggested. The thoracic spine is

## 2024-08-05 ENCOUNTER — APPOINTMENT (OUTPATIENT)
Age: 54
DRG: 426 | End: 2024-08-05
Payer: COMMERCIAL

## 2024-08-05 LAB
ALBUMIN SERPL BCG-MCNC: 3.1 G/DL (ref 3.5–5.1)
ALP SERPL-CCNC: 97 U/L (ref 38–126)
ALT SERPL W/O P-5'-P-CCNC: 40 U/L (ref 11–66)
ANION GAP SERPL CALC-SCNC: 11 MEQ/L (ref 8–16)
AST SERPL-CCNC: 49 U/L (ref 5–40)
BASOPHILS ABSOLUTE: 0 THOU/MM3 (ref 0–0.1)
BASOPHILS NFR BLD AUTO: 0.3 %
BILIRUB SERPL-MCNC: 1 MG/DL (ref 0.3–1.2)
BUN SERPL-MCNC: 6 MG/DL (ref 7–22)
CALCIUM SERPL-MCNC: 8 MG/DL (ref 8.5–10.5)
CHLORIDE SERPL-SCNC: 81 MEQ/L (ref 98–111)
CO2 SERPL-SCNC: 30 MEQ/L (ref 23–33)
CORTIS SERPL-MCNC: 20.88 UG/DL
CORTIS SERPL-MCNC: 25.91 UG/DL
CORTISOL COLLECTION INFO: NORMAL
CORTISOL COLLECTION INFO: NORMAL
CREAT SERPL-MCNC: 0.2 MG/DL (ref 0.4–1.2)
DEPRECATED RDW RBC AUTO: 45.5 FL (ref 35–45)
ECHO AO ASC DIAM: 2.7 CM
ECHO AV AREA PEAK VELOCITY: 2.3 CM2
ECHO AV AREA VTI: 2.3 CM2
ECHO AV CUSP MM: 1.6 CM
ECHO AV MEAN GRADIENT: 8 MMHG
ECHO AV MEAN VELOCITY: 1.3 M/S
ECHO AV PEAK GRADIENT: 15 MMHG
ECHO AV PEAK VELOCITY: 1.9 M/S
ECHO AV VELOCITY RATIO: 0.89
ECHO AV VTI: 34.7 CM
ECHO EST RA PRESSURE: 5 MMHG
ECHO LA AREA 2C: 13.2 CM2
ECHO LA AREA 4C: 11.1 CM2
ECHO LA DIAMETER: 2.6 CM
ECHO LA MAJOR AXIS: 4.2 CM
ECHO LA MINOR AXIS: 4.7 CM
ECHO LA VOL BP: 28 ML (ref 22–52)
ECHO LA VOL MOD A2C: 31 ML (ref 22–52)
ECHO LA VOL MOD A4C: 23 ML (ref 22–52)
ECHO LV E' LATERAL VELOCITY: 7 CM/S
ECHO LV E' SEPTAL VELOCITY: 7 CM/S
ECHO LV FRACTIONAL SHORTENING: 39 % (ref 28–44)
ECHO LV INTERNAL DIMENSION DIASTOLIC: 3.6 CM (ref 3.9–5.3)
ECHO LV INTERNAL DIMENSION SYSTOLIC: 2.2 CM
ECHO LV ISOVOLUMETRIC RELAXATION TIME (IVRT): 74 MS
ECHO LV IVSD: 0.8 CM (ref 0.6–0.9)
ECHO LV MASS 2D: 72.1 G (ref 67–162)
ECHO LV POSTERIOR WALL DIASTOLIC: 0.7 CM (ref 0.6–0.9)
ECHO LV RELATIVE WALL THICKNESS RATIO: 0.39
ECHO LVOT AREA: 2.5 CM2
ECHO LVOT AV VTI INDEX: 0.92
ECHO LVOT DIAM: 1.8 CM
ECHO LVOT MEAN GRADIENT: 6 MMHG
ECHO LVOT PEAK GRADIENT: 12 MMHG
ECHO LVOT PEAK VELOCITY: 1.7 M/S
ECHO LVOT SV: 80.9 ML
ECHO LVOT VTI: 31.8 CM
ECHO MV A VELOCITY: 0.69 M/S
ECHO MV E DECELERATION TIME (DT): 190 MS
ECHO MV E VELOCITY: 0.76 M/S
ECHO MV E/A RATIO: 1.1
ECHO MV E/E' LATERAL: 10.86
ECHO MV E/E' RATIO (AVERAGED): 10.86
ECHO MV E/E' SEPTAL: 10.86
ECHO MV REGURGITANT PEAK GRADIENT: 130 MMHG
ECHO MV REGURGITANT PEAK VELOCITY: 5.7 M/S
ECHO PV MAX VELOCITY: 0.8 M/S
ECHO PV PEAK GRADIENT: 2 MMHG
ECHO RIGHT VENTRICULAR SYSTOLIC PRESSURE (RVSP): 32 MMHG
ECHO RV INTERNAL DIMENSION: 2.3 CM
ECHO RV TAPSE: 2.2 CM (ref 1.7–?)
ECHO TV E WAVE: 0.5 M/S
ECHO TV REGURGITANT MAX VELOCITY: 2.62 M/S
ECHO TV REGURGITANT PEAK GRADIENT: 27 MMHG
EOSINOPHIL NFR BLD AUTO: 0.8 %
EOSINOPHILS ABSOLUTE: 0.1 THOU/MM3 (ref 0–0.4)
ERYTHROCYTE [DISTWIDTH] IN BLOOD BY AUTOMATED COUNT: 12.7 % (ref 11.5–14.5)
GFR SERPL CREATININE-BSD FRML MDRD: > 90 ML/MIN/1.73M2
GLUCOSE SERPL-MCNC: 113 MG/DL (ref 70–108)
HCT VFR BLD AUTO: 25.1 % (ref 37–47)
HGB BLD-MCNC: 9 GM/DL (ref 12–16)
IMM GRANULOCYTES # BLD AUTO: 0.09 THOU/MM3 (ref 0–0.07)
IMM GRANULOCYTES NFR BLD AUTO: 0.9 %
LYMPHOCYTES ABSOLUTE: 1.8 THOU/MM3 (ref 1–4.8)
LYMPHOCYTES NFR BLD AUTO: 17.6 %
MAGNESIUM SERPL-MCNC: 1.8 MG/DL (ref 1.6–2.4)
MCH RBC QN AUTO: 35.4 PG (ref 26–33)
MCHC RBC AUTO-ENTMCNC: 35.9 GM/DL (ref 32.2–35.5)
MCV RBC AUTO: 98.8 FL (ref 81–99)
MONOCYTES ABSOLUTE: 1.5 THOU/MM3 (ref 0.4–1.3)
MONOCYTES NFR BLD AUTO: 15 %
NEUTROPHILS ABSOLUTE: 6.7 THOU/MM3 (ref 1.8–7.7)
NEUTROPHILS NFR BLD AUTO: 65.4 %
NRBC BLD AUTO-RTO: 0 /100 WBC
PHOSPHATE SERPL-MCNC: 2.8 MG/DL (ref 2.4–4.7)
PLATELET # BLD AUTO: 202 THOU/MM3 (ref 130–400)
PMV BLD AUTO: 10.2 FL (ref 9.4–12.4)
POTASSIUM SERPL-SCNC: 3 MEQ/L (ref 3.5–5.2)
POTASSIUM SERPL-SCNC: 3.9 MEQ/L (ref 3.5–5.2)
PROT SERPL-MCNC: 5.6 G/DL (ref 6.1–8)
RBC # BLD AUTO: 2.54 MILL/MM3 (ref 4.2–5.4)
SODIUM SERPL-SCNC: 117 MEQ/L (ref 135–145)
SODIUM SERPL-SCNC: 121 MEQ/L (ref 135–145)
SODIUM SERPL-SCNC: 121 MEQ/L (ref 135–145)
SODIUM SERPL-SCNC: 122 MEQ/L (ref 135–145)
URATE SERPL-MCNC: 2.9 MG/DL (ref 2.4–5.7)
WBC # BLD AUTO: 10.2 THOU/MM3 (ref 4.8–10.8)

## 2024-08-05 PROCEDURE — 83735 ASSAY OF MAGNESIUM: CPT

## 2024-08-05 PROCEDURE — 82533 TOTAL CORTISOL: CPT

## 2024-08-05 PROCEDURE — 97530 THERAPEUTIC ACTIVITIES: CPT

## 2024-08-05 PROCEDURE — 99233 SBSQ HOSP IP/OBS HIGH 50: CPT | Performed by: INTERNAL MEDICINE

## 2024-08-05 PROCEDURE — 97166 OT EVAL MOD COMPLEX 45 MIN: CPT

## 2024-08-05 PROCEDURE — 2060000000 HC ICU INTERMEDIATE R&B

## 2024-08-05 PROCEDURE — 84295 ASSAY OF SERUM SODIUM: CPT

## 2024-08-05 PROCEDURE — 36415 COLL VENOUS BLD VENIPUNCTURE: CPT

## 2024-08-05 PROCEDURE — 97116 GAIT TRAINING THERAPY: CPT

## 2024-08-05 PROCEDURE — 84132 ASSAY OF SERUM POTASSIUM: CPT

## 2024-08-05 PROCEDURE — 80053 COMPREHEN METABOLIC PANEL: CPT

## 2024-08-05 PROCEDURE — 2580000003 HC RX 258: Performed by: STUDENT IN AN ORGANIZED HEALTH CARE EDUCATION/TRAINING PROGRAM

## 2024-08-05 PROCEDURE — 84100 ASSAY OF PHOSPHORUS: CPT

## 2024-08-05 PROCEDURE — 6360000002 HC RX W HCPCS

## 2024-08-05 PROCEDURE — 84550 ASSAY OF BLOOD/URIC ACID: CPT

## 2024-08-05 PROCEDURE — 85025 COMPLETE CBC W/AUTO DIFF WBC: CPT

## 2024-08-05 PROCEDURE — 6360000002 HC RX W HCPCS: Performed by: INTERNAL MEDICINE

## 2024-08-05 PROCEDURE — 6360000002 HC RX W HCPCS: Performed by: STUDENT IN AN ORGANIZED HEALTH CARE EDUCATION/TRAINING PROGRAM

## 2024-08-05 PROCEDURE — 2580000003 HC RX 258

## 2024-08-05 PROCEDURE — 93306 TTE W/DOPPLER COMPLETE: CPT

## 2024-08-05 PROCEDURE — 93306 TTE W/DOPPLER COMPLETE: CPT | Performed by: INTERNAL MEDICINE

## 2024-08-05 RX ORDER — SODIUM CHLORIDE AND POTASSIUM CHLORIDE 150; 900 MG/100ML; MG/100ML
INJECTION, SOLUTION INTRAVENOUS CONTINUOUS
Status: DISCONTINUED | OUTPATIENT
Start: 2024-08-05 | End: 2024-08-09

## 2024-08-05 RX ADMIN — SODIUM CHLORIDE, PRESERVATIVE FREE 10 ML: 5 INJECTION INTRAVENOUS at 19:55

## 2024-08-05 RX ADMIN — PANTOPRAZOLE SODIUM 40 MG: 40 INJECTION, POWDER, FOR SOLUTION INTRAVENOUS at 19:55

## 2024-08-05 RX ADMIN — POTASSIUM CHLORIDE 10 MEQ: 7.46 INJECTION, SOLUTION INTRAVENOUS at 11:03

## 2024-08-05 RX ADMIN — POTASSIUM CHLORIDE 10 MEQ: 7.46 INJECTION, SOLUTION INTRAVENOUS at 05:33

## 2024-08-05 RX ADMIN — SODIUM CHLORIDE, PRESERVATIVE FREE 10 ML: 5 INJECTION INTRAVENOUS at 08:28

## 2024-08-05 RX ADMIN — MORPHINE SULFATE 2 MG: 2 INJECTION, SOLUTION INTRAMUSCULAR; INTRAVENOUS at 08:27

## 2024-08-05 RX ADMIN — POTASSIUM CHLORIDE 10 MEQ: 7.46 INJECTION, SOLUTION INTRAVENOUS at 13:40

## 2024-08-05 RX ADMIN — SODIUM CHLORIDE: 9 INJECTION, SOLUTION INTRAVENOUS at 03:24

## 2024-08-05 RX ADMIN — POTASSIUM CHLORIDE 10 MEQ: 7.46 INJECTION, SOLUTION INTRAVENOUS at 06:26

## 2024-08-05 RX ADMIN — MORPHINE SULFATE 1 MG: 2 INJECTION, SOLUTION INTRAMUSCULAR; INTRAVENOUS at 19:54

## 2024-08-05 RX ADMIN — POTASSIUM CHLORIDE 10 MEQ: 7.46 INJECTION, SOLUTION INTRAVENOUS at 12:08

## 2024-08-05 RX ADMIN — POTASSIUM CHLORIDE AND SODIUM CHLORIDE: 900; 150 INJECTION, SOLUTION INTRAVENOUS at 17:45

## 2024-08-05 RX ADMIN — POTASSIUM CHLORIDE 10 MEQ: 7.46 INJECTION, SOLUTION INTRAVENOUS at 09:47

## 2024-08-05 RX ADMIN — PANTOPRAZOLE SODIUM 40 MG: 40 INJECTION, POWDER, FOR SOLUTION INTRAVENOUS at 08:26

## 2024-08-05 ASSESSMENT — PAIN DESCRIPTION - ORIENTATION
ORIENTATION: RIGHT

## 2024-08-05 ASSESSMENT — PAIN SCALES - GENERAL
PAINLEVEL_OUTOF10: 6
PAINLEVEL_OUTOF10: 6
PAINLEVEL_OUTOF10: 8
PAINLEVEL_OUTOF10: 8
PAINLEVEL_OUTOF10: 6
PAINLEVEL_OUTOF10: 2

## 2024-08-05 ASSESSMENT — PAIN DESCRIPTION - LOCATION: LOCATION: KNEE;ELBOW

## 2024-08-05 ASSESSMENT — PAIN DESCRIPTION - DESCRIPTORS
DESCRIPTORS: ACHING
DESCRIPTORS: ACHING
DESCRIPTORS: ACHING;SORE

## 2024-08-05 ASSESSMENT — PAIN DESCRIPTION - ONSET: ONSET: ON-GOING

## 2024-08-05 ASSESSMENT — PAIN - FUNCTIONAL ASSESSMENT
PAIN_FUNCTIONAL_ASSESSMENT: ACTIVITIES ARE NOT PREVENTED
PAIN_FUNCTIONAL_ASSESSMENT: ACTIVITIES ARE NOT PREVENTED

## 2024-08-05 ASSESSMENT — PAIN DESCRIPTION - FREQUENCY: FREQUENCY: CONTINUOUS

## 2024-08-05 ASSESSMENT — PAIN DESCRIPTION - PAIN TYPE: TYPE: ACUTE PAIN

## 2024-08-05 NOTE — PLAN OF CARE
CT upper extremity reviewed    No acute process to RUE, okay for walker use, discontinue sling, activities as tolerated  Continue strict KI use to RLE for patella fracture, closed treatment, WBAT RLE in KI, only remove for skin checks, no ROM exercises  Multimodal pain control  PT OT as appropriate  Ice to RLE and RUE as well    Follow up 2 weeks upon discharge Dr Ray Bonilla, PA-C

## 2024-08-05 NOTE — PLAN OF CARE
Care plan reviewed with patient. Patient verbalizes understanding of plan of care and contributes to goal setting.

## 2024-08-05 NOTE — PROGRESS NOTES
Hospitalist Progress Note  Internal Medicine Resident      Patient: Rivka Zavala 53 y.o. female      Unit/Bed: 4A-15/015-A    Admit Date: 8/3/2024      ASSESSMENT AND PLAN  Chronic Hyponatremia  - Suspected hypoosmolar hypovolemic secondary to chronic malnutrition as well as alcohol use and decreased PO Intake.   - Sodium trend since admit: 116 > 114 > 118 > 118 > 120 > 118 > 121 > 122  - Studies 8/4: Serum Osm: 249, Andre Serum Osm: 245.7, Osmolal Gap < 10, Less likely from alcohol/unmeasured anions. Urine Osm: 333, Urine sodium < 20,   - Urine Studies at 1300 on 08/03/2024: urine creatinine 83.7, urine potassium 11.1, urine sodium less than 20  - Studies favoring mix pic of (SIADH vs Decreased solute intake/Beer), Will order uric acid level but again patient BMI is 16 with low BNP.which could skew Uric acid interpretation.   - In ER NS Bolus 1000mL [id number], Repeat Bolus 100mL 1250 stopped at 1509.  - Patient was started on gentle IVF with NS @ 50 ml/hr and patient has been responding well to that, will continue for now and follow up     Acute hypokalemia  -Likely secondary to poor diet and poor oral nutrition absorption  -On replacement protocol.      Acute hypochloremia  -Likely secondary to vomiting prior to admission  -Judicious IV fluid resuscitation in the context of electrolyte derangements     CK elevation- Mild  Elevated transaminitis  - Total CK on admission: 330  - Likely secondary to prolonged immobilization  -Judicious IV fluid resuscitation in the context of electrolyte derangements     S/p recurrent falls  - Suspect secondary to alcohol use disorder and poor oral nutrition with electrolyte derangments   - Patient seen and evaluated by trauma surgery  - Orthopedic surgery consulted in the context of patellar fracture, recommended no acute intervention and knee immobilization with Immobilizer.   - MRI Brain W/WO contras showed Worsening global volume loss and Very mild amount of abnormal signal  pain in right elbow, 7/10, Pain in head and left eye area 5/10. Patient states that she worked with PT/OT this morning and did well, Patient states that she had foot surgery in May and used walker for about a month. Patient denies any shortness of breath, chest pain, nausea, vomiting or diarrhea.      HPI / Hospital Course:  This is a 53-year-old woman who presented to [facility] ED on 08/03/2024 secondary to recurrent fall.  Patient estimates that she had fallen 5 times in the last 3 days with the potential for falls.  She states that over the last 7 days she has been experiencing a relapse of her multiple sclerosis.  Secondary to the relapse, she endorses decreased appetite, persistent vomiting, as well as difficulty with sleeping.  She states that she has been falling predominantly in the last 3 days.  She states that her falls were unwitnessed and she is unsure of the downtime.  She denies loss of consciousness.     Additional review of systems is positive for vomiting of predominantly stomach acid/bile (patient denies hemoptysis or hematemesis), and solid/pill dysphagia.  Secondary to the falls, the patient additionally notes pain in her right knee, nose, mid and lower back as well as the left eye.     Review of systems is negative for fever, chills, nausea, vision changes or acute vision loss, cough, sputum production, chest pain/tightness/pressure, abdominal cramping, constipation, diarrhea, hematochezia, melena, changes in urination, dysuria, arm/leg swelling, rashes/skin changes, new headaches, new migraines, photophobia, phonophobia, paresthesias of the upper and lower extremities.     The patient denies hospitalizations, recent illness, sick contacts, recent travel outside of the state or country, animal/insect bites, recent medication changes.  She endorses an unspecified foot surgery on 05/03/2024.     The patient admits to smoking cigarettes 0.5 pack/day for last 30 years.  The patient additionally admits to

## 2024-08-05 NOTE — PROGRESS NOTES
[facility]  INPATIENT PHYSICAL THERAPY  DAILY NOTE  [facility] NEUROSCIENCES 4A - 4A-15/015-A      Discharge Recommendations: Home with Assist as Needed and pt would benefit from cont therapy   Equipment Recommendations:Equipment Needed: No    Time In: 1415  Time Out: 1439  Timed Code Treatment Minutes: 24 Minutes  Minutes: 24          Date: 2024  Patient Name: Rivka Zavala,  Gender:  female        MRN: [id number]  : 1970  (53 y.o.)     Referring Practitioner: BENTON Rosa  Diagnosis: multiple sclerosis  Additional Pertinent Hx: per ortho:53 y.o. female with a past medical history significant for alcohol abuse and MS presented to the ED via EMS after experiencing greater than 5 falls at home in the last 3 days. Able to get herself up each time but states she called EMS due nose bleed she could not get to stop. Reported significant right knee pain on initial evaluation in ED and found to have right patella fracture. States right knee pain is more severe but has generalized soreness and achiness \"all over\" with increased pain through the right elbow. Hit her head during a few of the falls but denies LOC. Had right shoulder surgery in December with Dr. Lopez and bunionectomy on 5/3/24 with Dr. Sharma. Ambulates without assistive devices at baseline. States she was using a cane or crutches after foot surgery until 1 month ago. Lives at home with her parents. Right hand dominate.     Prior Level of Function:  Lives With: Parent  Type of Home: House  Home Layout: One level  Home Access: Stairs to enter without rails  Entrance Stairs - Number of Steps: 3  Home Equipment: Walker - 4-Wheeled, Cane   Bathroom Shower/Tub: Tub/Shower unit  Bathroom Toilet: Handicap height  Bathroom Accessibility: Walker accessible    Receives Help From: Family  ADL Assistance: Independent  Homemaking Assistance: Independent  Ambulation Assistance: Independent  Transfer Assistance: Independent  Active :  No  Additional Comments: patient's parent is home 24/7 however she has to be the caregiver for her parent.    Restrictions/Precautions:  Restrictions/Precautions: Fall Risk, Weight Bearing, General Precautions, Contact Precautions  Right Lower Extremity Weight Bearing: Weight Bearing As Tolerated  Required Braces or Orthoses  Right Lower Extremity Brace: Knee Immobilizer  Position Activity Restriction  Other position/activity restrictions: no right knee flexion, hx MS, sore R elbow-no fracture     SUBJECTIVE: nursing ok'd therapy she reported that she has called distribution 2 times for a longer knee immobilizer and didn't get an answer this therapist will follow up again tomorrow - discussed w/ pt that she isn't allowed to bend her right knee and should keep right LE elevated as she is able to slightly bend it in this current knee immobilizer     PAIN: pt reported pain in elbow and face and min pain in knee     Vitals: Nurse checked vitals prior to session    OBJECTIVE:  Bed Mobility:  Not Tested    Transfers:  Sit to Stand: Stand By Assistance  Stand to Sit:Stand By Assistance  Cues to place right LE out in front to avoid knee flexion   Ambulation:  Stand By Assistance  Distance: 60x2  Surface: Level Tile  Device: Rolling Walker  Gait Deviations: fair debora cues for walker safety in narrow spaces, discussed use of walker at home more than likely till able to remove knee immobilizer and flex her knees- pt reported pets at home we discussed safety concerns with this at home      Stairs:  Stairs: 6\" steps X 3 using one UE at support  and Contact Guard Assistance, cues for proper sequencing, discussed seeing if family can get a rail put up .    Balance:  Standing to complete task in bathroom at sink and toilet with SBA noted most of wbing at left LE     Exercise:  Patient was guided in 1 set(s) 10 reps of exercises to both lower extremities: Glut sets, ,Ankle pumps, Hip abduction/adduction, and discussed no knee

## 2024-08-05 NOTE — DISCHARGE INSTRUCTIONS
Dr Bell  Orthopedic Instructions:  -Weight bearing status: weight bearing as tolerated in knee immobilizer only to right lower extremity, remove only for skin checks and hygiene, no knee range of motion  -Physical Therapy for strengthening and gait training. Occupational therapy for activities of daily living.  -Ice (20 minutes on and off 1 hour) and elevate (above heart) as needed for swelling/pain  -Drink plenty of fluids.  -Take medications as prescribed.  -Take anticoagulation as prescribed   -Follow up with  in his office in 2-3 weeks as outpatient.

## 2024-08-05 NOTE — CARE COORDINATION
8/5/24, 2:21 PM EDT    DISCHARGE ON GOING EVALUATION    Rivka TRAYLOR Sally       [facility] day: 2  Location: -15/015-A Reason for admit: Multiple sclerosis (HCC) [G35]  Recurrent falls [R29.6]  Fall, sequela [W19.XXXS]     Procedures:   8/5 ECHO    Imaging since last note:   8/3 XR Rt knee Acute comminuted fracture of the patella with small joint effusion.     8/3 CT Head WO Contrast 1. No mass effect or acute hemorrhage.   2. Chronic periventricular small vessel ischemic changes and cerebral atrophy.     8/3 XR Right elbow No fracture or dislocation is seen. Elbow joint effusion. The possibility   of an occult fracture is not excluded. Correlate clinically and follow-up   as necessary.     8/4 MRI Brain W WO Contrast 1. No acute findings in the brain.   2. Worsening global volume loss.   3. Very mild amount of abnormal signal in the white matter of the brain   consistent with the patient's history of multiple sclerosis. There are a few new   foci. None of these appear acute.       Barriers to Discharge: Na 122, K+ 3.0, PT/OT,  Ortho, Neurology, Trauma following, IV fluids, pain and nausea control, IV Protonix, Phenobarbital protocol for ETOH withdrawal, electrolyte replacement protocols.     PCP: Ben Montejo MD  Readmission Risk Score: 13.3    Patient Goals/Plan/Treatment Preferences: Plans return home with parents. Denies needs.

## 2024-08-05 NOTE — PROGRESS NOTES
[facility]  INPATIENT OCCUPATIONAL THERAPY  [facility] NEUROSCIENCES 4A  EVALUATION    Time:   Time In: 1354  Time Out: 1412  Timed Code Treatment Minutes: 8 Minutes  Minutes: 18          Date: 2024  Patient Name: Rivka Zavala,   Gender: female      MRN: [id number]  : 1970  (53 y.o.)  Referring Practitioner: BENTON Rahman  Diagnosis: acute hyponatremia  Additional Pertinent Hx: per chart review; Rivka Zavala is a 53 y.o. female who presents to the emergency department from home for evaluation of repeated falls over the last 3 days.  Patient was brought in by EMS covered in blood and c-collar in place.  Patient says he has multiple sclerosis and had a relapse this week and has been having multiple falls.  Patient says that her appetite and intake have gone down.  She feels like her throat is closed.  Patient's left eye is closed due to soft tissue trauma, ecchymosis and swelling.  Patient says this happened a few days ago.  Patient says that her throat is feeling cold and her back hurts.  She also complains of generalized abdominal tenderness and pain in her right knee.  Patient also has a laceration under her  eyebrow and has bruises on her external ear and behind.  There is also a laceration on the bridge of her nose.  Patient has bruises all over her body but is not tender.  Patient has a history of alcohol abuse, multiple sclerosis and is a smoker.  Patient reports last alcohol usage 2 days ago and last fall last night.  Patient denies any fever, shortness of breath, loss of consciousness, blurred vision, vomiting, GI symptoms.  patient has no other acute complaints at this time.    Restrictions/Precautions:  Restrictions/Precautions: Fall Risk, Weight Bearing, General Precautions, Contact Precautions  Right Lower Extremity Weight Bearing: Weight Bearing As Tolerated  Required Braces or Orthoses  Right Lower Extremity Brace: Knee Immobilizer  Position Activity Restriction  Other  forward and then backwards to recliner  No c/o pain in R knee with wt bearing and KI in place     AM-PAC Inpatient Daily Activity Raw Score: 19  AM-PAC Inpatient ADL T-Scale Score : 40.22  ADL Inpatient CMS 0-100% Score: 42.8    Activity Tolerance:  Patient tolerance of  treatment: Good treatment tolerance      Modified Jasmeet:  Premorbid Functional Status: Not Applicable  Current Functional Status:  Not Applicable    Assessment:  Assessment: patient demo overall de-conditoning, and pain s/p fall and was admitted to hospital for acute hyponatremia and has a R patella fracture impacting patient's ability to complete ADLs and functional transfers as prior. patient would benefit from continued, skilled OT to progress toward PLOF, decrease caregiver burden and increase occupational performance.  Performance deficits / Impairments: Decreased functional mobility , Decreased endurance, Decreased coordination, Decreased balance, Decreased safe awareness, Decreased ADL status  Prognosis: Good  REQUIRES OT FOLLOW-UP: Yes  Decision Making: Medium Complexity    Treatment Initiated: Treatment and education initiated within context of evaluation.  Evaluation time included review of current medical information, gathering information related to past medical, social and functional history, completion of standardized testing, formal and informal observation of tasks, assessment of data and development of plan of care and goals.  Treatment time included skilled education and facilitation of tasks to increase safety and independence with ADL's for improved functional independence and quality of life.    Discharge Recommendations:  Continue to assess pending progress, Patient would benefit from continued therapy after discharge    Patient Education:          Patient Education  Education Given To: Patient  Education Provided: Role of Therapy;Transfer Training;Plan of Care;Precautions;Fall Prevention Strategies;ADL Adaptive

## 2024-08-06 PROBLEM — E43 SEVERE MALNUTRITION (HCC): Status: ACTIVE | Noted: 2024-08-06

## 2024-08-06 LAB
ALBUMIN SERPL BCG-MCNC: 3.1 G/DL (ref 3.5–5.1)
ALP SERPL-CCNC: 85 U/L (ref 38–126)
ALT SERPL W/O P-5'-P-CCNC: 34 U/L (ref 11–66)
ANION GAP SERPL CALC-SCNC: 6 MEQ/L (ref 8–16)
AST SERPL-CCNC: 42 U/L (ref 5–40)
BASOPHILS ABSOLUTE: 0 THOU/MM3 (ref 0–0.1)
BASOPHILS NFR BLD AUTO: 0.4 %
BILIRUB SERPL-MCNC: 0.7 MG/DL (ref 0.3–1.2)
BUN SERPL-MCNC: 3 MG/DL (ref 7–22)
CALCIUM SERPL-MCNC: 7.9 MG/DL (ref 8.5–10.5)
CHLORIDE SERPL-SCNC: 82 MEQ/L (ref 98–111)
CO2 SERPL-SCNC: 29 MEQ/L (ref 23–33)
CREAT SERPL-MCNC: 0.3 MG/DL (ref 0.4–1.2)
DEPRECATED RDW RBC AUTO: 46.2 FL (ref 35–45)
EOSINOPHIL NFR BLD AUTO: 1.6 %
EOSINOPHILS ABSOLUTE: 0.2 THOU/MM3 (ref 0–0.4)
ERYTHROCYTE [DISTWIDTH] IN BLOOD BY AUTOMATED COUNT: 12.8 % (ref 11.5–14.5)
GFR SERPL CREATININE-BSD FRML MDRD: > 90 ML/MIN/1.73M2
GLUCOSE SERPL-MCNC: 93 MG/DL (ref 70–108)
HCT VFR BLD AUTO: 24.6 % (ref 37–47)
HGB BLD-MCNC: 8.8 GM/DL (ref 12–16)
IMM GRANULOCYTES # BLD AUTO: 0.11 THOU/MM3 (ref 0–0.07)
IMM GRANULOCYTES NFR BLD AUTO: 1.1 %
LYMPHOCYTES ABSOLUTE: 2.1 THOU/MM3 (ref 1–4.8)
LYMPHOCYTES NFR BLD AUTO: 21.2 %
MAGNESIUM SERPL-MCNC: 1.6 MG/DL (ref 1.6–2.4)
MAGNESIUM SERPL-MCNC: 2.3 MG/DL (ref 1.6–2.4)
MCH RBC QN AUTO: 35.5 PG (ref 26–33)
MCHC RBC AUTO-ENTMCNC: 35.8 GM/DL (ref 32.2–35.5)
MCV RBC AUTO: 99.2 FL (ref 81–99)
MONOCYTES ABSOLUTE: 1.8 THOU/MM3 (ref 0.4–1.3)
MONOCYTES NFR BLD AUTO: 17.6 %
NEUTROPHILS ABSOLUTE: 5.8 THOU/MM3 (ref 1.8–7.7)
NEUTROPHILS NFR BLD AUTO: 58.1 %
NRBC BLD AUTO-RTO: 0 /100 WBC
PHOSPHATE SERPL-MCNC: 1.9 MG/DL (ref 2.4–4.7)
PHOSPHATE SERPL-MCNC: 2.7 MG/DL (ref 2.4–4.7)
PLATELET # BLD AUTO: 222 THOU/MM3 (ref 130–400)
PMV BLD AUTO: 9.9 FL (ref 9.4–12.4)
POTASSIUM SERPL-SCNC: 3.4 MEQ/L (ref 3.5–5.2)
POTASSIUM SERPL-SCNC: 4.3 MEQ/L (ref 3.5–5.2)
PROT SERPL-MCNC: 5 G/DL (ref 6.1–8)
RBC # BLD AUTO: 2.48 MILL/MM3 (ref 4.2–5.4)
SODIUM SERPL-SCNC: 117 MEQ/L (ref 135–145)
SODIUM SERPL-SCNC: 122 MEQ/L (ref 135–145)
SODIUM SERPL-SCNC: 123 MEQ/L (ref 135–145)
SODIUM SERPL-SCNC: 123 MEQ/L (ref 135–145)
SODIUM SERPL-SCNC: 124 MEQ/L (ref 135–145)
WBC # BLD AUTO: 10 THOU/MM3 (ref 4.8–10.8)

## 2024-08-06 PROCEDURE — 99233 SBSQ HOSP IP/OBS HIGH 50: CPT | Performed by: INTERNAL MEDICINE

## 2024-08-06 PROCEDURE — 97530 THERAPEUTIC ACTIVITIES: CPT

## 2024-08-06 PROCEDURE — 6360000002 HC RX W HCPCS

## 2024-08-06 PROCEDURE — 84295 ASSAY OF SERUM SODIUM: CPT

## 2024-08-06 PROCEDURE — 83735 ASSAY OF MAGNESIUM: CPT

## 2024-08-06 PROCEDURE — 6370000000 HC RX 637 (ALT 250 FOR IP)

## 2024-08-06 PROCEDURE — 6360000002 HC RX W HCPCS: Performed by: STUDENT IN AN ORGANIZED HEALTH CARE EDUCATION/TRAINING PROGRAM

## 2024-08-06 PROCEDURE — 84132 ASSAY OF SERUM POTASSIUM: CPT

## 2024-08-06 PROCEDURE — 97116 GAIT TRAINING THERAPY: CPT

## 2024-08-06 PROCEDURE — 2580000003 HC RX 258: Performed by: STUDENT IN AN ORGANIZED HEALTH CARE EDUCATION/TRAINING PROGRAM

## 2024-08-06 PROCEDURE — 2060000000 HC ICU INTERMEDIATE R&B

## 2024-08-06 PROCEDURE — 84100 ASSAY OF PHOSPHORUS: CPT

## 2024-08-06 PROCEDURE — 2500000003 HC RX 250 WO HCPCS: Performed by: STUDENT IN AN ORGANIZED HEALTH CARE EDUCATION/TRAINING PROGRAM

## 2024-08-06 PROCEDURE — 85025 COMPLETE CBC W/AUTO DIFF WBC: CPT

## 2024-08-06 PROCEDURE — 36415 COLL VENOUS BLD VENIPUNCTURE: CPT

## 2024-08-06 PROCEDURE — 80053 COMPREHEN METABOLIC PANEL: CPT

## 2024-08-06 PROCEDURE — 6370000000 HC RX 637 (ALT 250 FOR IP): Performed by: INTERNAL MEDICINE

## 2024-08-06 RX ORDER — POTASSIUM CHLORIDE 7.45 MG/ML
10 INJECTION INTRAVENOUS
Status: COMPLETED | OUTPATIENT
Start: 2024-08-06 | End: 2024-08-06

## 2024-08-06 RX ORDER — PANTOPRAZOLE SODIUM 40 MG/1
40 TABLET, DELAYED RELEASE ORAL 2 TIMES DAILY
Status: DISCONTINUED | OUTPATIENT
Start: 2024-08-06 | End: 2024-08-10 | Stop reason: HOSPADM

## 2024-08-06 RX ADMIN — FOLIC ACID 1 MG: 1 TABLET ORAL at 10:55

## 2024-08-06 RX ADMIN — POTASSIUM CHLORIDE 10 MEQ: 7.46 INJECTION, SOLUTION INTRAVENOUS at 06:19

## 2024-08-06 RX ADMIN — MAGNESIUM SULFATE HEPTAHYDRATE 2000 MG: 40 INJECTION, SOLUTION INTRAVENOUS at 04:06

## 2024-08-06 RX ADMIN — PANTOPRAZOLE SODIUM 40 MG: 40 TABLET, DELAYED RELEASE ORAL at 10:57

## 2024-08-06 RX ADMIN — SODIUM PHOSPHATE, MONOBASIC, MONOHYDRATE AND SODIUM PHOSPHATE, DIBASIC, ANHYDROUS 15 MMOL: 142; 276 INJECTION, SOLUTION INTRAVENOUS at 04:05

## 2024-08-06 RX ADMIN — PANTOPRAZOLE SODIUM 40 MG: 40 TABLET, DELAYED RELEASE ORAL at 19:30

## 2024-08-06 RX ADMIN — MORPHINE SULFATE 1 MG: 2 INJECTION, SOLUTION INTRAMUSCULAR; INTRAVENOUS at 10:57

## 2024-08-06 RX ADMIN — POTASSIUM CHLORIDE 10 MEQ: 7.46 INJECTION, SOLUTION INTRAVENOUS at 04:07

## 2024-08-06 RX ADMIN — POTASSIUM CHLORIDE AND SODIUM CHLORIDE: 900; 150 INJECTION, SOLUTION INTRAVENOUS at 13:02

## 2024-08-06 RX ADMIN — POTASSIUM CHLORIDE 10 MEQ: 7.46 INJECTION, SOLUTION INTRAVENOUS at 05:07

## 2024-08-06 RX ADMIN — Medication 100 MG: at 10:55

## 2024-08-06 ASSESSMENT — PAIN DESCRIPTION - ORIENTATION: ORIENTATION: RIGHT

## 2024-08-06 ASSESSMENT — PAIN DESCRIPTION - DESCRIPTORS: DESCRIPTORS: ACHING;SORE

## 2024-08-06 ASSESSMENT — PAIN - FUNCTIONAL ASSESSMENT: PAIN_FUNCTIONAL_ASSESSMENT: PREVENTS OR INTERFERES SOME ACTIVE ACTIVITIES AND ADLS

## 2024-08-06 ASSESSMENT — PAIN SCALES - GENERAL
PAINLEVEL_OUTOF10: 0
PAINLEVEL_OUTOF10: 6

## 2024-08-06 NOTE — PROGRESS NOTES
[facility]  OCCUPATIONAL THERAPY MISSED TREATMENT NOTE  [facility] NEUROSCIENCES 4A  4A-15/015-A      Date: 2024  Patient Name: Rivka Zavala        CSN: [id number]   : 1970  (53 y.o.)  Gender: female   Referring Practitioner: BENTON Rahman  Diagnosis: acute hyponatremia         REASON FOR MISSED TREATMENT: Patient Refused.  Pt requesting to rest at this time d/t just sitting down from multiple trips to bathroom. Pt requesting to allow patient to rest at this time. OT will check back as time allows.

## 2024-08-06 NOTE — PLAN OF CARE
Problem: Discharge Planning  Goal: Discharge to home or other facility with appropriate resources  8/5/2024 2204 by Violeta Savage RN  Outcome: Progressing  Flowsheets (Taken 8/5/2024 2204)  Discharge to home or other facility with appropriate resources:   Identify barriers to discharge with patient and caregiver   Arrange for needed discharge resources and transportation as appropriate     Problem: Pain  Goal: Verbalizes/displays adequate comfort level or baseline comfort level  8/5/2024 2204 by Violeta Savage RN  Outcome: Progressing  Flowsheets (Taken 8/5/2024 2204)  Verbalizes/displays adequate comfort level or baseline comfort level:   Encourage patient to monitor pain and request assistance   Assess pain using appropriate pain scale     Problem: Skin/Tissue Integrity  Goal: Absence of new skin breakdown  Description: 1.  Monitor for areas of redness and/or skin breakdown  2.  Assess vascular access sites hourly  3.  Every 4-6 hours minimum:  Change oxygen saturation probe site  4.  Every 4-6 hours:  If on nasal continuous positive airway pressure, respiratory therapy assess nares and determine need for appliance change or resting period.  8/5/2024 2204 by Violeta Savage RN  Outcome: Progressing  Note: No new evidence of skin breakdown.      Problem: Safety - Adult  Goal: Free from fall injury  8/5/2024 2204 by Violeta Savage RN  Outcome: Progressing  Flowsheets (Taken 8/5/2024 2204)  Free From Fall Injury: Instruct family/caregiver on patient safety     Problem: ABCDS Injury Assessment  Goal: Absence of physical injury  8/5/2024 2204 by Violeta Savage RN  Outcome: Progressing  Flowsheets (Taken 8/5/2024 2204)  Absence of Physical Injury: Implement safety measures based on patient assessment     Problem: Chronic Conditions and Co-morbidities  Goal: Patient's chronic conditions and co-morbidity symptoms are monitored and maintained or improved  8/5/2024 2204 by Violeta Savage RN  Outcome:

## 2024-08-06 NOTE — PROGRESS NOTES
Hospitalist Progress Note  Internal Medicine Resident      Patient: Rivka Zavala 53 y.o. female      Unit/Bed: 4A-15/015-A    Admit Date: 8/3/2024      ASSESSMENT AND PLAN  Hypoosmolar Hypovolemic Hyponatremia  Secondary to chronic malnutrition as well as alcohol use and decreased PO Intake.   Sodium on admit: 116  On Admission: serum Osm-ca.4, Serum Osm (measured) Next day: 249; Based on Measured Serum Osm  - 249, Calc serum Osm: 244, Osmolal gap < 10 making alcohol less likely cause of hyponatremia. Looks like more likely from decreased PO intake considering patient has low BUN and probably related to excessive vomiting for about 6 days per patient. .  Urine osm: 333, Urine Na: < 20. Patient was started on gentle NS @ 50 ml/hr and responded well, was increased to 100 ml/hr   Sodium trend last 24 hours: 121 > 117 > 117 > 122 > 123.  Will continue NS @ 100 ml/hr and follow up Na Q4H.     Hypokalemia  K on admission: 2.6  Patient was started on potassium replacement  K Trend : 2.6 > 2.2 > 3.7 > 3.3 > 3.8 > 3.7 > 3.4 > 3.0 > 3.9 > 3.4 . 4.3  Urine potassium- low: 11.1 (8/3), Pointing towards non-renal etiology probably related to excessive vomiting for about 6 days per patient.   Continue replacement    Hypochloremia  Cl on admission: 71  Urine Chloride: < 20; related to non-renal etiology probably related to excessive vomiting for about 6 days per patient.  Will continue to monitor     CK elevation- Mild  Total CK on admission: 330  Likely secondary to prolonged immobilization  Patient is on IVF  Will repeat tomorrow to make sure resolution    Recurrent Falls   Suspect secondary to alcohol use disorder and poor oral nutrition with electrolyte derangments   Patient seen and evaluated by trauma surgery  Orthopedic surgery consulted in the context of patellar fracture, recommended no acute intervention and knee immobilization with Immobilizer.  MRI Brain W/WO contras showed Worsening global volume

## 2024-08-06 NOTE — PROGRESS NOTES
[facility] NEUROSCIENCES 4A  Occupational Therapy  Daily Note  Time:   Time In: 1433  Time Out: 1448  Timed Code Treatment Minutes: 15 Minutes  Minutes: 15          Date: 2024  Patient Name: Rivka Zavala,   Gender: female      Room: -15/015-A  MRN: [id number]  : 1970  (53 y.o.)  Referring Practitioner: BENTON Rahman  Diagnosis: acute hyponatremia  Additional Pertinent Hx: per chart review; Rivka Zavala is a 53 y.o. female who presents to the emergency department from home for evaluation of repeated falls over the last 3 days.  Patient was brought in by EMS covered in blood and c-collar in place.  Patient says he has multiple sclerosis and had a relapse this week and has been having multiple falls.  Patient says that her appetite and intake have gone down.  She feels like her throat is closed.  Patient's left eye is closed due to soft tissue trauma, ecchymosis and swelling.  Patient says this happened a few days ago.  Patient says that her throat is feeling cold and her back hurts.  She also complains of generalized abdominal tenderness and pain in her right knee.  Patient also has a laceration under her  eyebrow and has bruises on her external ear and behind.  There is also a laceration on the bridge of her nose.  Patient has bruises all over her body but is not tender.  Patient has a history of alcohol abuse, multiple sclerosis and is a smoker.  Patient reports last alcohol usage 2 days ago and last fall last night.  Patient denies any fever, shortness of breath, loss of consciousness, blurred vision, vomiting, GI symptoms.  patient has no other acute complaints at this time.    Restrictions/Precautions:  Restrictions/Precautions: Fall Risk, Weight Bearing, General Precautions, Contact Precautions  Right Lower Extremity Weight Bearing: Weight Bearing As Tolerated  Required Braces or Orthoses  Right Lower Extremity Brace: Knee Immobilizer  Position Activity  Restriction  Other position/activity restrictions: no right knee flexion, hx MS, sore R elbow-no fracture     Social/Functional History:  Lives With: Parent  Type of Home: House  Home Layout: One level  Home Access: Stairs to enter without rails  Entrance Stairs - Number of Steps: 3  Home Equipment: Walker - 4-Wheeled, Cane   Bathroom Shower/Tub: Tub/Shower unit  Bathroom Toilet: Handicap height  Bathroom Accessibility: Walker accessible    Receives Help From: Family  ADL Assistance: Independent  Homemaking Assistance: Independent  Ambulation Assistance: Independent  Transfer Assistance: Independent    Active : No  Patient's  Info: Friend or mother     Additional Comments: patient's parent is home 24/7 however she has to be the caregiver for her parent.    SUBJECTIVE: Upon OT arrival, pt was sitting in recliner and agreeable to therapy.    PAIN: Pt does not report    Vitals: Vitals not assessed per clinical judgement, see nursing flowsheet    COGNITION: WFL    ADL:   Footwear Management: Supervision.  To doff and don slipper sock with KI on while sitting in recliner .    IADL:   Not Tested    BALANCE:  Sitting Balance:  Modified Independent.    Standing Balance: Stand By Assistance.      BED MOBILITY:  Not Tested    TRANSFERS:  Sit to Stand:  Stand By Assistance. From recliner  Stand to Sit: Supervision. To recliner    FUNCTIONAL MOBILITY:  Assistive Device: None  Assist Level:  Stand By Assistance.   Distance: Household distances within unit  Pt demos moderate pace and no LOB. Pt reports walking \"feels really good\" and OT educated on the importance of continued activity.          ASSESSMENT:     Activity Tolerance:  Patient tolerance of  treatment: Good treatment tolerance      Discharge Recommendations: Home with assist as needed  Equipment Recommendations: Equipment Needed: Yes  Mobility Devices: ADL Assistive Devices  Plan: Times Per Week: 3-5x  Times Per Day: Once a day  Current Treatment

## 2024-08-06 NOTE — PROGRESS NOTES
Comprehensive Nutrition Assessment    Type and Reason for Visit:  Initial, Positive Nutrition Screen (poor oral intake, unplanned weight loss)    Nutrition Recommendations/Plan:   Recommend MVI, folic acid, and thiamin supplementation.   ONS changed: Magic Cups BID and Ensure Clear daily. Dislikes shake ONS.  Continue current diet. Encouraged oral intake. Note allergy to mushrooms.     Malnutrition Assessment:  Malnutrition Status:  Severe malnutrition (08/06/24 1117)    Context:  Acute Illness     Findings of the 6 clinical characteristics of malnutrition:  Energy Intake:  50% or less of estimated energy requirements for 5 or more days  Weight Loss:   (no recent EMR weight, not weight down 12.5% in the last 4 months, patient feels weight loss was very recent, not 4 months ago)     Body Fat Loss:  Moderate body fat loss Orbital, Triceps, Fat Overlying Ribs   Muscle Mass Loss:  Moderate muscle mass loss Temples (temporalis), Clavicles (pectoralis & deltoids), Thigh (quadriceps), Calf (gastrocnemius)  Fluid Accumulation:  Mild (facial edema)     Strength:  Not Performed    Nutrition Assessment:     Pt. severely malnourished AEB criteria listed above.  At risk for further nutritional compromise r/t sore throat from previous nausea/vomiting, admit with multiple sclerosis, recurrent falls, chronic hyponatremia, right patellar fracture, polysubstance use disorder, and underlying medical condition (hx: MS, personality disorder, alcohol abuse, anemia, malnutrition, clavicle fracture, rib fracture, smoking, marijuana).       Nutrition Related Findings:    Pt. Report/Treatments/Miscellaneous: Patient seen earlier this morning.  Reports typically has a good appetite/intake but states she had a MS relapse and was vomiting, acid burnt her esophagus so she had a hard time swallowing and hardly ate anything for the last 10 days, \"starting to get better\". Reports she can't drinking shake ONS, dislikes. Willing to trial other  ONS.  States she has lost over 10# very recently.    GI Status: recent nausea/vomiting/sore throat-improving per patient, states she had BM today  Pertinent Labs: 8/6/24: Sodium 117, Potassium 3.4, BUN 3, Creatinine 0.3, Mg 2.3, Glucose 93, Phos 1.9, AST 42  Pertinent Meds: 0.9 NaCl with KCl at 100 ml/hr, protonix, folvite, thiamin     Wound Type: None       Current Nutrition Intake & Therapies:    Average Meal Intake: 26-50%, 1-25%     ADULT DIET; Regular  ADULT ORAL NUTRITION SUPPLEMENT; Lunch, Dinner; Frozen Oral Supplement  ADULT ORAL NUTRITION SUPPLEMENT; Breakfast; Clear Liquid Oral Supplement    Anthropometric Measures:  Height: 162.6 cm (5' 4\")  Ideal Body Weight (IBW): 120 lbs (55 kg)    Admission Body Weight: 44.8 kg (98 lb 12.3 oz) (8/3/24 trace facial edema)  Current Body Weight: 42 kg (92 lb 9.5 oz) (8/5/24 bedscale, trace facial edema), 77.2 %    Current BMI (kg/m2): 15.9  Usual Body Weight:  (~ 105# per patient. Per EMR: 7/21/23: 100#10oz, 11/29/23: 102#13oz, 4/9/24: 105#13oz)                       BMI Categories: Underweight (BMI less than 18.5)    Estimated Daily Nutrient Needs:  Energy Requirements Based On: Kcal/kg  Weight Used for Energy Requirements:  (42 kg)  Energy (kcal/day): ~ 6916-8984 kcals (30-35 kcals/kg/da) or more  Weight Used for Protein Requirements:  (42 kg)  Protein (g/day): ~ 63 grams (1.5 grams/kg/day)         Nutrition Diagnosis:   Severe malnutrition, In context of acute illness or injury related to inadequate protein-energy intake, altered GI function as evidenced by Criteria as identified in malnutrition assessment    Nutrition Interventions:   Food and/or Nutrient Delivery: Continue Current Diet, Modify Oral Nutrition Supplement  Nutrition Education/Counseling: Education initiated  Coordination of Nutrition Care: Continue to monitor while inpatient       Goals:     Goals: PO intake 75% or greater, by next RD assessment       Nutrition Monitoring and Evaluation:

## 2024-08-06 NOTE — CARE COORDINATION
8/6/24, 1:48 PM EDT    DISCHARGE ON GOING EVALUATION    Rivka A [facility] day: 3  Location: -15/015-A Reason for admit: Multiple sclerosis (HCC) [G35]  Recurrent falls [R29.6]  Fall, sequela [W19.XXXS]     Procedures:   8/5 ECHO EF 70-75%    Imaging since last note: none    Barriers to Discharge: Na 122, , PT/OT,  Ortho, Neurology, Trauma following, IV fluids, pain and nausea control, IV Protonix, Phenobarbital protocol for ETOH withdrawal, electrolyte replacement protocols.        PCP: Ben Montejo MD  Readmission Risk Score: 12.9    Patient Goals/Plan/Treatment Preferences:  Plans return home with parents. Denies needs.

## 2024-08-06 NOTE — PROGRESS NOTES
[facility]  INPATIENT PHYSICAL THERAPY  DAILY NOTE  [facility] NEUROSCIENCES 4A - 4A-15/015-A      Discharge Recommendations: Home with Assist as Needed  Equipment Recommendations:Equipment Needed: No    Time In: 915  Time Out: 938  Timed Code Treatment Minutes: 23 Minutes  Minutes: 23          Date: 2024  Patient Name: Rivka Zavala,  Gender:  female        MRN: [id number]  : 1970  (53 y.o.)     Referring Practitioner: BENTON Rosa  Diagnosis: multiple sclerosis  Additional Pertinent Hx: per ortho:53 y.o. female with a past medical history significant for alcohol abuse and MS presented to the ED via EMS after experiencing greater than 5 falls at home in the last 3 days. Able to get herself up each time but states she called EMS due nose bleed she could not get to stop. Reported significant right knee pain on initial evaluation in ED and found to have right patella fracture. States right knee pain is more severe but has generalized soreness and achiness \"all over\" with increased pain through the right elbow. Hit her head during a few of the falls but denies LOC. Had right shoulder surgery in December with Dr. Lopez and bunionectomy on 5/3/24 with Dr. Sharma. Ambulates without assistive devices at baseline. States she was using a cane or crutches after foot surgery until 1 month ago. Lives at home with her parents. Right hand dominate.     Prior Level of Function:  Lives With: Parent  Type of Home: House  Home Layout: One level  Home Access: Stairs to enter without rails  Entrance Stairs - Number of Steps: 3  Home Equipment: Walker - 4-Wheeled, Cane   Bathroom Shower/Tub: Tub/Shower unit  Bathroom Toilet: Handicap height  Bathroom Accessibility: Walker accessible    Receives Help From: Family  ADL Assistance: Independent  Homemaking Assistance: Independent  Ambulation Assistance: Independent  Transfer Assistance: Independent  Active : No  Additional Comments: patient's parent is home  24/7 however she has to be the caregiver for her parent.    Restrictions/Precautions:  Restrictions/Precautions: Fall Risk, Weight Bearing, General Precautions, Contact Precautions  Right Lower Extremity Weight Bearing: Weight Bearing As Tolerated  Required Braces or Orthoses  Right Lower Extremity Brace: Knee Immobilizer  Position Activity Restriction  Other position/activity restrictions: no right knee flexion, hx MS, sore R elbow-no fracture     SUBJECTIVE: nursing ok'd therapy, did benjamin a longer knee immobilizer as pt had a short one and she was able to bend it and isn't allowed any knee fleixon,  pt reported that she had a rough night and was frustrated that her labs aren't normal this am needing \"a bunch of IV meds\", pt asked to use bathroom during session     PAIN: pt reported pain in right elbow but declined ice to area     Vitals: Vitals not assessed per clinical judgement, see nursing flowsheet    OBJECTIVE:  Bed Mobility:  Rolling to Left: Modified Independent   Supine <--> Sit: Supervision  Transfers:  Sit to Stand: Stand By Assistance  Stand to Sit:Stand By Assistance    Ambulation:  Stand By Assistance  Distance: 20 feet and 10x2  Surface: Level Tile  Device: Rolling Walker  Gait Deviations: step to pattern, cues for walker safety w/ narrow spaces      Stairs:  Not Tested    Balance:  Standing w/o UE at support to complete reaching activity just slightly out of base with SBA     Functional Outcome Measures:  [facility] (6 CLICK) BASIC MOBILITY  AM-PAC Inpatient Mobility Raw Score : 18  AM-PAC Inpatient T-Scale Score : 43.63  Mobility Inpatient CMS 0-100% Score: 46.58        Modified [place] Scale:  Not Applicable    ASSESSMENT:  Assessment: Patient progressing toward established goals.  Activity Tolerance:  Patient tolerance of  treatment:Fair.  Plan: Current Treatment Recommendations: Strengthening, Balance training, Endurance training, Functional mobility training, Transfer training, Gait training, Stair

## 2024-08-06 NOTE — PROGRESS NOTES
[facility] Adult Intravenous to Oral Protocol    Pantoprazole changed to PO per [facility] P[facility]T IV to PO protocol.    Patient meets criteria based on the following:  Tolerating diet more advanced than clear liquids  Tolerating other oral medications - primary team resuming oral medications today  Not on vasopressors  No nausea/vomiting within past 24 hours  No active GI bleed:  Yes  No gastrectomy, gastric outlet or bowel obstruction, ileus, malabsorption syndrome    Thank you,    Soledad Han, PharmD  8/6/2024 10:04 AM

## 2024-08-06 NOTE — PROGRESS NOTES
Pt was sitting on the chair beside her bed and was alone. She said that she does not practice any Sabianist, a way to inform me that my visit was of no use. I wished her well.    08/06/24 1434   Encounter Summary   Encounter Overview/Reason Initial Encounter   Service Provided For Patient   Referral/Consult From [facility]   Support System Family members   Last Encounter  08/06/24   Complexity of Encounter Low   Begin Time 0945   End Time  0950   Total Time Calculated 5 min   Spiritual/Emotional needs   Type Spiritual Support   Assessment/Intervention/Outcome   Assessment Calm

## 2024-08-07 LAB
ALBUMIN SERPL BCG-MCNC: 3.1 G/DL (ref 3.5–5.1)
ALP SERPL-CCNC: 87 U/L (ref 38–126)
ALT SERPL W/O P-5'-P-CCNC: 30 U/L (ref 11–66)
ANION GAP SERPL CALC-SCNC: 11 MEQ/L (ref 8–16)
AST SERPL-CCNC: 31 U/L (ref 5–40)
BASOPHILS ABSOLUTE: 0 THOU/MM3 (ref 0–0.1)
BASOPHILS NFR BLD AUTO: 0.5 %
BILIRUB SERPL-MCNC: 0.4 MG/DL (ref 0.3–1.2)
BUN SERPL-MCNC: 3 MG/DL (ref 7–22)
CALCIUM SERPL-MCNC: 8 MG/DL (ref 8.5–10.5)
CHLORIDE SERPL-SCNC: 91 MEQ/L (ref 98–111)
CK SERPL-CCNC: 105 U/L (ref 30–135)
CO2 SERPL-SCNC: 24 MEQ/L (ref 23–33)
CREAT SERPL-MCNC: 0.3 MG/DL (ref 0.4–1.2)
DEPRECATED RDW RBC AUTO: 49.2 FL (ref 35–45)
EOSINOPHIL NFR BLD AUTO: 1.6 %
EOSINOPHILS ABSOLUTE: 0.2 THOU/MM3 (ref 0–0.4)
ERYTHROCYTE [DISTWIDTH] IN BLOOD BY AUTOMATED COUNT: 13.2 % (ref 11.5–14.5)
GFR SERPL CREATININE-BSD FRML MDRD: > 90 ML/MIN/1.73M2
GLUCOSE SERPL-MCNC: 118 MG/DL (ref 70–108)
HCT VFR BLD AUTO: 23.5 % (ref 37–47)
HGB BLD-MCNC: 8.1 GM/DL (ref 12–16)
IMM GRANULOCYTES # BLD AUTO: 0.14 THOU/MM3 (ref 0–0.07)
IMM GRANULOCYTES NFR BLD AUTO: 1.5 %
LYMPHOCYTES ABSOLUTE: 1.9 THOU/MM3 (ref 1–4.8)
LYMPHOCYTES NFR BLD AUTO: 20 %
MAGNESIUM SERPL-MCNC: 1.6 MG/DL (ref 1.6–2.4)
MCH RBC QN AUTO: 35.5 PG (ref 26–33)
MCHC RBC AUTO-ENTMCNC: 34.5 GM/DL (ref 32.2–35.5)
MCV RBC AUTO: 103.1 FL (ref 81–99)
MONOCYTES ABSOLUTE: 1.5 THOU/MM3 (ref 0.4–1.3)
MONOCYTES NFR BLD AUTO: 16.1 %
NEUTROPHILS ABSOLUTE: 5.7 THOU/MM3 (ref 1.8–7.7)
NEUTROPHILS NFR BLD AUTO: 60.3 %
NRBC BLD AUTO-RTO: 0 /100 WBC
PHOSPHATE SERPL-MCNC: 2.9 MG/DL (ref 2.4–4.7)
PLATELET # BLD AUTO: 271 THOU/MM3 (ref 130–400)
PMV BLD AUTO: 9.8 FL (ref 9.4–12.4)
POTASSIUM SERPL-SCNC: 3.9 MEQ/L (ref 3.5–5.2)
PROT SERPL-MCNC: 5.1 G/DL (ref 6.1–8)
RBC # BLD AUTO: 2.28 MILL/MM3 (ref 4.2–5.4)
SODIUM SERPL-SCNC: 122 MEQ/L (ref 135–145)
SODIUM SERPL-SCNC: 123 MEQ/L (ref 135–145)
SODIUM SERPL-SCNC: 124 MEQ/L (ref 135–145)
SODIUM SERPL-SCNC: 125 MEQ/L (ref 135–145)
SODIUM SERPL-SCNC: 126 MEQ/L (ref 135–145)
SODIUM SERPL-SCNC: 127 MEQ/L (ref 135–145)
WBC # BLD AUTO: 9.4 THOU/MM3 (ref 4.8–10.8)

## 2024-08-07 PROCEDURE — 80053 COMPREHEN METABOLIC PANEL: CPT

## 2024-08-07 PROCEDURE — 6370000000 HC RX 637 (ALT 250 FOR IP)

## 2024-08-07 PROCEDURE — 36415 COLL VENOUS BLD VENIPUNCTURE: CPT

## 2024-08-07 PROCEDURE — 6360000002 HC RX W HCPCS: Performed by: STUDENT IN AN ORGANIZED HEALTH CARE EDUCATION/TRAINING PROGRAM

## 2024-08-07 PROCEDURE — 2060000000 HC ICU INTERMEDIATE R&B

## 2024-08-07 PROCEDURE — 6370000000 HC RX 637 (ALT 250 FOR IP): Performed by: INTERNAL MEDICINE

## 2024-08-07 PROCEDURE — 84295 ASSAY OF SERUM SODIUM: CPT

## 2024-08-07 PROCEDURE — 85025 COMPLETE CBC W/AUTO DIFF WBC: CPT

## 2024-08-07 PROCEDURE — 82550 ASSAY OF CK (CPK): CPT

## 2024-08-07 PROCEDURE — 84100 ASSAY OF PHOSPHORUS: CPT

## 2024-08-07 PROCEDURE — 99233 SBSQ HOSP IP/OBS HIGH 50: CPT | Performed by: INTERNAL MEDICINE

## 2024-08-07 PROCEDURE — 83735 ASSAY OF MAGNESIUM: CPT

## 2024-08-07 RX ADMIN — PANTOPRAZOLE SODIUM 40 MG: 40 TABLET, DELAYED RELEASE ORAL at 21:37

## 2024-08-07 RX ADMIN — POTASSIUM CHLORIDE AND SODIUM CHLORIDE: 900; 150 INJECTION, SOLUTION INTRAVENOUS at 00:09

## 2024-08-07 RX ADMIN — PANTOPRAZOLE SODIUM 40 MG: 40 TABLET, DELAYED RELEASE ORAL at 07:47

## 2024-08-07 RX ADMIN — MAGNESIUM SULFATE HEPTAHYDRATE 2000 MG: 40 INJECTION, SOLUTION INTRAVENOUS at 05:45

## 2024-08-07 RX ADMIN — POTASSIUM CHLORIDE AND SODIUM CHLORIDE: 900; 150 INJECTION, SOLUTION INTRAVENOUS at 11:42

## 2024-08-07 RX ADMIN — FOLIC ACID 1 MG: 1 TABLET ORAL at 07:47

## 2024-08-07 RX ADMIN — Medication 100 MG: at 07:47

## 2024-08-07 ASSESSMENT — PAIN SCALES - GENERAL
PAINLEVEL_OUTOF10: 0
PAINLEVEL_OUTOF10: 0

## 2024-08-07 NOTE — PROGRESS NOTES
signed off    Right Patellar Fracture  Identified on admission on pan CT scan, Orthopedic surgery consulted and Knee immobilizer applied.     Resolved Issues    CK elevation- Mild- Resolved  Total CK on admission: 330 > 105    Hypokalemia- Resolved        Chronic Issues     Acute normocytic anemia, Acute on Chronic  Hx Iron Deficiency Anemia  - With history of receiving IV Venofer; additionally note previous refusal of blood transfusions   - Previously identified Schatzki's ring on August 2022 EGD  - Hx grade 3 internal hemorrhoids with hemrrohoid diverticula  - Suspect secondary to trauma as well as chronic disease  - Monitor for signs and symptoms of current bleeding  - Patient seen and evaluated in the ER with pan scans with no evidence of significant hemorrhage; further evaluated by trauma within 24 hours of admission for advance care recommendations  - Patient seen and evaluted in heme/onc clinic on 07/12/2024 with recommendation for iron supplementation and oral folic acid  - 07/12/2024 Ferritin 240, Iron 116, TIBC 273, Folate 6.5, Vitamin B12 1057  - 08/03/2024 Vitamin B12 >2000, Folate 10.6     Polysubstance Use Disorder  - Patient admits to daily tobacco use as well as marijuana use  - Patient admits to drinking 2-3 beers per day several days per week  - Chart review indicates previous hospitalization with polysubstance use disorder for which patient received phenobarbital, folate, thiamine, and multivitamin  - PRN phenobarbital regimen in place  - Folic Acid, Thiamine supplementation ordered and held     Questionable Multiple Sclerosis  Multiple Sclerosis Flare  - Patient presents recent history of 7 day long MS flare  -2017 MRI brain with and without contrast as well as MRI cervical spine with and without contrast showing no abnormal signal alteration or enhancement suggestive of a demyelinating process  -2018 MRI brain with and without contrast suggesting previously identified hyperintense T2 signal  multiple sclerosis relapse with recurrent falls, decreased appetite, sensation that \"throat is closed.\"    Medications:    Infusion Medications    0.9% NaCl with KCl 20 mEq 100 mL/hr at 08/07/24 0753    sodium chloride      Scheduled Medications    pantoprazole  40 mg Oral BID    sodium chloride flush  5-40 mL IntraVENous 2 times per day    folic acid  1 mg Oral Daily    thiamine  100 mg Oral Daily    PRN Meds: potassium chloride **OR** potassium alternative oral replacement **OR** potassium chloride, magnesium sulfate, sodium phosphate 15 mmol in sodium chloride 0.9 % 250 mL IVPB, sodium chloride flush, sodium chloride, ondansetron **OR** ondansetron, acetaminophen **OR** acetaminophen, PHENobarbital **OR** PHENobarbital **OR** PHENobarbital (LUMINAL) 430.95 mg in sodium chloride 0.9 % 100 mL IVPB, morphine **OR** morphine    Exam:  /86   Pulse 90   Temp 98.4 °F (36.9 °C) (Oral)   Resp 16   Ht 1.626 m (5' 4\")   Wt 42 kg (92 lb 9.5 oz)   LMP 08/14/2012   SpO2 100%   BMI 15.89 kg/m²   General appearance: No apparent distress, appears stated age.  Eyes: right pupil equal and reactive to light. Left pupil obscured by significant ecchymosis  HENT: Head normal in appearance. External nares normal.  Oral mucosa moist without lesions.  Hearing grossly intact.   Neck: Supple, with full range of motion. Trachea midline.  No gross JVD appreciated.  Respiratory: clear to auscultation bilaterally with no wheezes, crackles or rhonchi  Cardiovascular: systolic murmur auscultated in the second intercostal space bilaterally  Abdomen: soft, nontender, nondistended   Musculoskeletal: No joint swelling or tenderness. Normal tone. No abnormal movements. L knee immobilized  Skin: Warm and dry. Diffuse evidence of bruising  Neurologic:  No focal sensory/motor deficits in the upper and lower extremities. Cranial nerves:  grossly non-focal 2-12.     Psychiatric: Alert and oriented, normal insight and thought content.

## 2024-08-07 NOTE — CARE COORDINATION
8/7/24, 2:22 PM EDT    DISCHARGE ON GOING EVALUATION    [facility] day: 4  Location: -15/015-A Reason for admit: Multiple sclerosis (HCC) [G35]  Recurrent falls [R29.6]  Fall, sequela [W19.XXXS]     Procedures: none    Imaging since last note: none    Barriers to Discharge: Continue to monitor Na+, 126 this morning. IVF. MVI. Thiamine. Folic acid.     PCP: Ben Montejo MD  Readmission Risk Score: 13.5    Patient Goals/Plan/Treatment Preferences: Plans home with parents, has rolling walker. Denied needs.

## 2024-08-07 NOTE — PLAN OF CARE
Problem: Discharge Planning  Goal: Discharge to home or other facility with appropriate resources  Outcome: Progressing  Flowsheets (Taken 8/7/2024 1032)  Discharge to home or other facility with appropriate resources:   Identify barriers to discharge with patient and caregiver   Arrange for needed discharge resources and transportation as appropriate   Identify discharge learning needs (meds, wound care, etc)   Refer to discharge planning if patient needs post-hospital services based on physician order or complex needs related to functional status, cognitive ability or social support system     Problem: Pain  Goal: Verbalizes/displays adequate comfort level or baseline comfort level  Outcome: Progressing  Flowsheets (Taken 8/7/2024 1032)  Verbalizes/displays adequate comfort level or baseline comfort level:   Encourage patient to monitor pain and request assistance   Assess pain using appropriate pain scale   Administer analgesics based on type and severity of pain and evaluate response   Implement non-pharmacological measures as appropriate and evaluate response   Consider cultural and social influences on pain and pain management   Notify Licensed Independent Practitioner if interventions unsuccessful or patient reports new pain     Problem: Skin/Tissue Integrity  Goal: Absence of new skin breakdown  Outcome: Progressing  Note: No signs of new skin breakdown with each assessment. Skin integrity documented in head-to-toe assessment. Mucous membranes pink & moist. Patient is able to turn self.        Problem: Safety - Adult  Goal: Free from fall injury  Outcome: Progressing  Flowsheets (Taken 8/7/2024 1032)  Free From Fall Injury: Instruct family/caregiver on patient safety     Problem: ABCDS Injury Assessment  Goal: Absence of physical injury  Outcome: Progressing  Flowsheets (Taken 8/7/2024 1032)  Absence of Physical Injury: Implement safety measures based on patient assessment     Problem: Chronic Conditions  and Co-morbidities  Goal: Patient's chronic conditions and co-morbidity symptoms are monitored and maintained or improved  Outcome: Progressing  Flowsheets (Taken 8/7/2024 1032)  Care Plan - Patient's Chronic Conditions and Co-Morbidity Symptoms are Monitored and Maintained or Improved:   Monitor and assess patient's chronic conditions and comorbid symptoms for stability, deterioration, or improvement   Collaborate with multidisciplinary team to address chronic and comorbid conditions and prevent exacerbation or deterioration   Update acute care plan with appropriate goals if chronic or comorbid symptoms are exacerbated and prevent overall improvement and discharge     Problem: Neurosensory - Adult  Goal: Achieves stable or improved neurological status  Outcome: Progressing  Flowsheets (Taken 8/7/2024 1032)  Achieves stable or improved neurological status:   Assess for and report changes in neurological status   Monitor temperature, glucose, and sodium. Initiate appropriate interventions as ordered     Problem: Musculoskeletal - Adult  Goal: Return ADL status to a safe level of function  Outcome: Progressing  Flowsheets (Taken 8/7/2024 1032)  Return ADL Status to a Safe Level of Function:   Administer medication as ordered   Assess activities of daily living deficits and provide assistive devices as needed   Obtain physical therapy/occupational therapy consults as needed   Assist and instruct patient to increase activity and self care as tolerated     Problem: Metabolic/Fluid and Electrolytes - Adult  Goal: Electrolytes maintained within normal limits  Outcome: Progressing  Flowsheets (Taken 8/7/2024 1032)  Electrolytes maintained within normal limits:   Monitor labs and assess patient for signs and symptoms of electrolyte imbalances   Administer electrolyte replacement as ordered   Monitor response to electrolyte replacements, including repeat lab results as appropriate     Problem: Nutrition Deficit:  Goal:

## 2024-08-07 NOTE — PROGRESS NOTES
[facility]  PHYSICAL THERAPY MISSED TREATMENT NOTE  [facility]    Date: 2024  Patient Name: Rivka Zavala        MRN: [id number]   : 1970  (53 y.o.)  Gender: female   Referring Practitioner: BENTON Rosa  Diagnosis: multiple sclerosis         REASON FOR MISSED TREATMENT:  Pt up in bathroom on arrival she reported that she would be occupied for awhile and didn't feel she needed PT. Discussed just wanted to make sure she kept walking and moving while in the hospital. Will check back later or next available date .

## 2024-08-07 NOTE — PLAN OF CARE
Problem: Discharge Planning  Goal: Discharge to home or other facility with appropriate resources  Outcome: Progressing  Discharge to home or other facility with appropriate resources:   Identify barriers to discharge with patient and caregiver   Arrange for needed discharge resources and transportation as appropriate     Problem: Pain  Goal: Verbalizes/displays adequate comfort level or baseline comfort level  Outcome: Progressing  Verbalizes/displays adequate comfort level or baseline comfort level:   Encourage patient to monitor pain and request assistance   Assess pain using appropriate pain scale     Problem: Skin/Tissue Integrity  Goal: Absence of new skin breakdown  Description: 1.  Monitor for areas of redness and/or skin breakdown  2.  Assess vascular access sites hourly  3.  Every 4-6 hours minimum:  Change oxygen saturation probe site  4.  Every 4-6 hours:  If on nasal continuous positive airway pressure, respiratory therapy assess nares and determine need for appliance change or resting period.  Outcome: Progressing  Note: No new evidence of skin breakdown.      Problem: Safety - Adult  Goal: Free from fall injury  Outcome: Progressing  Free From Fall Injury: Instruct family/caregiver on patient safety     Problem: ABCDS Injury Assessment  Goal: Absence of physical injury  Outcome: Progressing  Absence of Physical Injury: Implement safety measures based on patient assessment     Problem: Chronic Conditions and Co-morbidities  Goal: Patient's chronic conditions and co-morbidity symptoms are monitored and maintained or improved  Outcome: Progressing  Care Plan - Patient's Chronic Conditions and Co-Morbidity Symptoms are Monitored and Maintained or Improved: Monitor and assess patient's chronic conditions and comorbid symptoms for stability, deterioration, or improvement     Problem: Neurosensory - Adult  Goal: Achieves stable or improved neurological status  Outcome: Progressing  Achieves stable or

## 2024-08-08 ENCOUNTER — APPOINTMENT (OUTPATIENT)
Dept: INTERVENTIONAL RADIOLOGY/VASCULAR | Age: 54
DRG: 426 | End: 2024-08-08
Payer: COMMERCIAL

## 2024-08-08 LAB
ALBUMIN SERPL BCG-MCNC: 2.9 G/DL (ref 3.5–5.1)
ALP SERPL-CCNC: 104 U/L (ref 38–126)
ALT SERPL W/O P-5'-P-CCNC: 27 U/L (ref 11–66)
ANION GAP SERPL CALC-SCNC: 11 MEQ/L (ref 8–16)
AST SERPL-CCNC: 29 U/L (ref 5–40)
BASOPHILS ABSOLUTE: 0.1 THOU/MM3 (ref 0–0.1)
BASOPHILS NFR BLD AUTO: 0.8 %
BILIRUB SERPL-MCNC: 0.3 MG/DL (ref 0.3–1.2)
BUN SERPL-MCNC: 3 MG/DL (ref 7–22)
CALCIUM SERPL-MCNC: 8 MG/DL (ref 8.5–10.5)
CHLORIDE SERPL-SCNC: 93 MEQ/L (ref 98–111)
CO2 SERPL-SCNC: 23 MEQ/L (ref 23–33)
CREAT SERPL-MCNC: 0.3 MG/DL (ref 0.4–1.2)
DEPRECATED RDW RBC AUTO: 51.3 FL (ref 35–45)
EOSINOPHIL NFR BLD AUTO: 1.6 %
EOSINOPHILS ABSOLUTE: 0.2 THOU/MM3 (ref 0–0.4)
ERYTHROCYTE [DISTWIDTH] IN BLOOD BY AUTOMATED COUNT: 13.4 % (ref 11.5–14.5)
GFR SERPL CREATININE-BSD FRML MDRD: > 90 ML/MIN/1.73M2
GLUCOSE SERPL-MCNC: 110 MG/DL (ref 70–108)
HCT VFR BLD AUTO: 23.7 % (ref 37–47)
HGB BLD-MCNC: 8.2 GM/DL (ref 12–16)
IMM GRANULOCYTES # BLD AUTO: 0.16 THOU/MM3 (ref 0–0.07)
IMM GRANULOCYTES NFR BLD AUTO: 1.6 %
LYMPHOCYTES ABSOLUTE: 1.9 THOU/MM3 (ref 1–4.8)
LYMPHOCYTES NFR BLD AUTO: 18.3 %
MAGNESIUM SERPL-MCNC: 1.6 MG/DL (ref 1.6–2.4)
MCH RBC QN AUTO: 36.1 PG (ref 26–33)
MCHC RBC AUTO-ENTMCNC: 34.6 GM/DL (ref 32.2–35.5)
MCV RBC AUTO: 104.4 FL (ref 81–99)
MONOCYTES ABSOLUTE: 1.8 THOU/MM3 (ref 0.4–1.3)
MONOCYTES NFR BLD AUTO: 17.4 %
NEUTROPHILS ABSOLUTE: 6.2 THOU/MM3 (ref 1.8–7.7)
NEUTROPHILS NFR BLD AUTO: 60.3 %
NRBC BLD AUTO-RTO: 0 /100 WBC
PHOSPHATE SERPL-MCNC: 2.9 MG/DL (ref 2.4–4.7)
PLATELET # BLD AUTO: 307 THOU/MM3 (ref 130–400)
PMV BLD AUTO: 9.4 FL (ref 9.4–12.4)
POTASSIUM SERPL-SCNC: 3.7 MEQ/L (ref 3.5–5.2)
PROT SERPL-MCNC: 4.9 G/DL (ref 6.1–8)
RBC # BLD AUTO: 2.27 MILL/MM3 (ref 4.2–5.4)
SODIUM SERPL-SCNC: 126 MEQ/L (ref 135–145)
SODIUM SERPL-SCNC: 127 MEQ/L (ref 135–145)
SODIUM SERPL-SCNC: 127 MEQ/L (ref 135–145)
SODIUM SERPL-SCNC: 128 MEQ/L (ref 135–145)
SODIUM SERPL-SCNC: 129 MEQ/L (ref 135–145)
SODIUM SERPL-SCNC: 129 MEQ/L (ref 135–145)
WBC # BLD AUTO: 10.2 THOU/MM3 (ref 4.8–10.8)

## 2024-08-08 PROCEDURE — 6360000002 HC RX W HCPCS: Performed by: STUDENT IN AN ORGANIZED HEALTH CARE EDUCATION/TRAINING PROGRAM

## 2024-08-08 PROCEDURE — 93971 EXTREMITY STUDY: CPT

## 2024-08-08 PROCEDURE — 6370000000 HC RX 637 (ALT 250 FOR IP): Performed by: INTERNAL MEDICINE

## 2024-08-08 PROCEDURE — 80053 COMPREHEN METABOLIC PANEL: CPT

## 2024-08-08 PROCEDURE — 85025 COMPLETE CBC W/AUTO DIFF WBC: CPT

## 2024-08-08 PROCEDURE — 84295 ASSAY OF SERUM SODIUM: CPT

## 2024-08-08 PROCEDURE — 36415 COLL VENOUS BLD VENIPUNCTURE: CPT

## 2024-08-08 PROCEDURE — 2060000000 HC ICU INTERMEDIATE R&B

## 2024-08-08 PROCEDURE — 84100 ASSAY OF PHOSPHORUS: CPT

## 2024-08-08 PROCEDURE — 83735 ASSAY OF MAGNESIUM: CPT

## 2024-08-08 PROCEDURE — 6370000000 HC RX 637 (ALT 250 FOR IP)

## 2024-08-08 RX ADMIN — PANTOPRAZOLE SODIUM 40 MG: 40 TABLET, DELAYED RELEASE ORAL at 19:49

## 2024-08-08 RX ADMIN — FOLIC ACID 1 MG: 1 TABLET ORAL at 08:30

## 2024-08-08 RX ADMIN — PANTOPRAZOLE SODIUM 40 MG: 40 TABLET, DELAYED RELEASE ORAL at 08:30

## 2024-08-08 RX ADMIN — Medication 100 MG: at 08:30

## 2024-08-08 RX ADMIN — MAGNESIUM SULFATE HEPTAHYDRATE 2000 MG: 40 INJECTION, SOLUTION INTRAVENOUS at 05:18

## 2024-08-08 RX ADMIN — POTASSIUM CHLORIDE AND SODIUM CHLORIDE: 900; 150 INJECTION, SOLUTION INTRAVENOUS at 18:00

## 2024-08-08 RX ADMIN — POTASSIUM CHLORIDE AND SODIUM CHLORIDE: 900; 150 INJECTION, SOLUTION INTRAVENOUS at 04:40

## 2024-08-08 ASSESSMENT — PAIN SCALES - GENERAL: PAINLEVEL_OUTOF10: 0

## 2024-08-08 NOTE — CARE COORDINATION
8/8/24, 2:32 PM EDT    DISCHARGE ON GOING EVALUATION    [facility] day: 5  Location: 4A-15/015-A Reason for admit: Multiple sclerosis (HCC) [G35]  Recurrent falls [R29.6]  Fall, sequela [W19.XXXS]     Procedures: none    Imaging since last note:   8/8 LUE doppler left:   1. Sonographic evidence of thrombosis in the left cephalic vein, basilic vein  and antecubital vein. Findings can reflect underlying superficial  thrombophlebitis .  2. There is no deep venous thrombosis within the left upper extremity.  Barriers to Discharge: Last 2 Na+ levels 129 and 127. Plan to monitor today. Possible discharge tomorrow pending labs. Remains on IVF. New LUE swelling, likely related to infiltration. STAT US ordered.     PCP: Ben Montejo MD  Readmission Risk Score: 13.7    Patient Goals/Plan/Treatment Preferences: Plans home with parents, has RW. Denied needs.

## 2024-08-08 NOTE — PROGRESS NOTES
Hospitalist Progress Note  Internal Medicine Resident      Patient: Rivka Zavala 53 y.o. female      Unit/Bed: 4A-15/015-A    Admit Date: 8/3/2024      ASSESSMENT AND PLAN  Hypoosmolar Hypovolemic Hyponatremia  Secondary to chronic malnutrition as well as alcohol use and decreased PO Intake.   Sodium on admit: 116  On Admission: serum Osm-ca.4, Serum Osm (measured) Next day: 249; Based on Measured Serum Osm  - 249, Calc serum Osm: 244, Osmolal gap < 10 making alcohol less likely cause of hyponatremia. Looks like more likely from decreased PO intake considering patient has low BUN and probably related to excessive vomiting for about 6 days per patient. .  Urine osm: 333, Urine Na: < 20. Patient was started on gentle NS @ 50 ml/hr and responded well, increased to 100 ml/hr   Sodium trend last 24 hours:  127 > 123 > 125 > 124 > 127 > 129  Will continue NS @ 100 ml/hr with Kcl 20 and follow up Na Q4H.       Left upper arm swelling  Noted on , Probably related to IV infiltration  STAT Duplex showed Sonographic evidence of thrombosis in the left cephalic vein, basilic vein and antecubital vein. Findings can reflect underlying superficial thrombophlebitis and no deep venous thrombosis within the left upper extremity   Warm and hot compresses    Hypochloremia- Improving  Cl on admission: 71, 93 ()  Urine Chloride: < 20; related to non-renal etiology probably related to excessive vomiting for about 6 days per patient.  Will continue to monitor     Recurrent Falls   Suspect secondary to alcohol use disorder and poor oral nutrition with electrolyte derangments   Patient seen and evaluated by trauma surgery  Orthopedic surgery consulted in the context of patellar fracture, recommended no acute intervention and knee immobilization with Immobilizer.  MRI Brain W/WO contras showed Worsening global volume loss and Very mild amount of abnormal signal in the white matter of the brain consistent with the

## 2024-08-08 NOTE — PLAN OF CARE
Problem: Discharge Planning  Goal: Discharge to home or other facility with appropriate resources  Outcome: Progressing  Discharge to home or other facility with appropriate resources:   Identify barriers to discharge with patient and caregiver   Arrange for needed discharge resources and transportation as appropriate   Identify discharge learning needs (meds, wound care, etc)   Refer to discharge planning if patient needs post-hospital services based on physician order or complex needs related to functional status, cognitive ability or social support system     Problem: Pain  Goal: Verbalizes/displays adequate comfort level or baseline comfort level  Outcome: Progressing  Verbalizes/displays adequate comfort level or baseline comfort level:   Encourage patient to monitor pain and request assistance   Assess pain using appropriate pain scale   Administer analgesics based on type and severity of pain and evaluate response   Implement non-pharmacological measures as appropriate and evaluate response   Consider cultural and social influences on pain and pain management   Notify Licensed Independent Practitioner if interventions unsuccessful or patient reports new pain     Problem: Skin/Tissue Integrity  Goal: Absence of new skin breakdown  Outcome: Progressing  Note: No signs of new skin breakdown with each assessment. Skin integrity documented in head-to-toe assessment. Mucous membranes pink & moist. Patient is able to turn self.        Problem: Safety - Adult  Goal: Free from fall injury  Outcome: Progressing  Free From Fall Injury: Instruct family/caregiver on patient safety     Problem: ABCDS Injury Assessment  Goal: Absence of physical injury  Outcome: Progressing  Absence of Physical Injury: Implement safety measures based on patient assessment     Problem: Chronic Conditions and Co-morbidities  Goal: Patient's chronic conditions and co-morbidity symptoms are monitored and maintained or improved  Outcome:

## 2024-08-09 LAB
ALBUMIN SERPL BCG-MCNC: 3 G/DL (ref 3.5–5.1)
ALP SERPL-CCNC: 93 U/L (ref 38–126)
ALT SERPL W/O P-5'-P-CCNC: 24 U/L (ref 11–66)
ANION GAP SERPL CALC-SCNC: 7 MEQ/L (ref 8–16)
AST SERPL-CCNC: 24 U/L (ref 5–40)
BASOPHILS ABSOLUTE: 0.1 THOU/MM3 (ref 0–0.1)
BASOPHILS NFR BLD AUTO: 0.9 %
BILIRUB SERPL-MCNC: 0.3 MG/DL (ref 0.3–1.2)
BUN SERPL-MCNC: 5 MG/DL (ref 7–22)
CALCIUM SERPL-MCNC: 8.1 MG/DL (ref 8.5–10.5)
CHLORIDE SERPL-SCNC: 95 MEQ/L (ref 98–111)
CO2 SERPL-SCNC: 25 MEQ/L (ref 23–33)
CREAT SERPL-MCNC: 0.4 MG/DL (ref 0.4–1.2)
DEPRECATED RDW RBC AUTO: 55.5 FL (ref 35–45)
EOSINOPHIL NFR BLD AUTO: 2.4 %
EOSINOPHILS ABSOLUTE: 0.2 THOU/MM3 (ref 0–0.4)
ERYTHROCYTE [DISTWIDTH] IN BLOOD BY AUTOMATED COUNT: 14.2 % (ref 11.5–14.5)
GFR SERPL CREATININE-BSD FRML MDRD: > 90 ML/MIN/1.73M2
GLUCOSE SERPL-MCNC: 94 MG/DL (ref 70–108)
HCT VFR BLD AUTO: 21.1 % (ref 37–47)
HGB BLD-MCNC: 7.1 GM/DL (ref 12–16)
IMM GRANULOCYTES # BLD AUTO: 0.21 THOU/MM3 (ref 0–0.07)
IMM GRANULOCYTES NFR BLD AUTO: 2.3 %
LYMPHOCYTES ABSOLUTE: 1.9 THOU/MM3 (ref 1–4.8)
LYMPHOCYTES NFR BLD AUTO: 20.5 %
MAGNESIUM SERPL-MCNC: 1.6 MG/DL (ref 1.6–2.4)
MCH RBC QN AUTO: 36.4 PG (ref 26–33)
MCHC RBC AUTO-ENTMCNC: 33.6 GM/DL (ref 32.2–35.5)
MCV RBC AUTO: 108.2 FL (ref 81–99)
MONOCYTES ABSOLUTE: 1.7 THOU/MM3 (ref 0.4–1.3)
MONOCYTES NFR BLD AUTO: 18.8 %
NEUTROPHILS ABSOLUTE: 5 THOU/MM3 (ref 1.8–7.7)
NEUTROPHILS NFR BLD AUTO: 55.1 %
NRBC BLD AUTO-RTO: 0 /100 WBC
PHOSPHATE SERPL-MCNC: 3.5 MG/DL (ref 2.4–4.7)
PLATELET # BLD AUTO: 304 THOU/MM3 (ref 130–400)
PMV BLD AUTO: 9.4 FL (ref 9.4–12.4)
POTASSIUM SERPL-SCNC: 4.3 MEQ/L (ref 3.5–5.2)
PROT SERPL-MCNC: 5.8 G/DL (ref 6.1–8)
RBC # BLD AUTO: 1.95 MILL/MM3 (ref 4.2–5.4)
REASON FOR REJECTION: NORMAL
REJECTED TEST: NORMAL
SODIUM SERPL-SCNC: 127 MEQ/L (ref 135–145)
SODIUM SERPL-SCNC: 128 MEQ/L (ref 135–145)
WBC # BLD AUTO: 9.1 THOU/MM3 (ref 4.8–10.8)

## 2024-08-09 PROCEDURE — 2060000000 HC ICU INTERMEDIATE R&B

## 2024-08-09 PROCEDURE — 84295 ASSAY OF SERUM SODIUM: CPT

## 2024-08-09 PROCEDURE — 6370000000 HC RX 637 (ALT 250 FOR IP): Performed by: INTERNAL MEDICINE

## 2024-08-09 PROCEDURE — 6370000000 HC RX 637 (ALT 250 FOR IP)

## 2024-08-09 PROCEDURE — 83735 ASSAY OF MAGNESIUM: CPT

## 2024-08-09 PROCEDURE — 2580000003 HC RX 258: Performed by: STUDENT IN AN ORGANIZED HEALTH CARE EDUCATION/TRAINING PROGRAM

## 2024-08-09 PROCEDURE — 6370000000 HC RX 637 (ALT 250 FOR IP): Performed by: STUDENT IN AN ORGANIZED HEALTH CARE EDUCATION/TRAINING PROGRAM

## 2024-08-09 PROCEDURE — 36415 COLL VENOUS BLD VENIPUNCTURE: CPT

## 2024-08-09 PROCEDURE — 85025 COMPLETE CBC W/AUTO DIFF WBC: CPT

## 2024-08-09 PROCEDURE — 80053 COMPREHEN METABOLIC PANEL: CPT

## 2024-08-09 PROCEDURE — 84100 ASSAY OF PHOSPHORUS: CPT

## 2024-08-09 RX ORDER — SODIUM CHLORIDE 9 MG/ML
INJECTION, SOLUTION INTRAVENOUS CONTINUOUS
Status: DISCONTINUED | OUTPATIENT
Start: 2024-08-09 | End: 2024-08-10 | Stop reason: HOSPADM

## 2024-08-09 RX ORDER — SODIUM CHLORIDE 1 G/1
1 TABLET ORAL
Status: DISCONTINUED | OUTPATIENT
Start: 2024-08-09 | End: 2024-08-10

## 2024-08-09 RX ADMIN — SODIUM CHLORIDE: 9 INJECTION, SOLUTION INTRAVENOUS at 09:15

## 2024-08-09 RX ADMIN — Medication 100 MG: at 09:15

## 2024-08-09 RX ADMIN — SODIUM CHLORIDE: 9 INJECTION, SOLUTION INTRAVENOUS at 18:21

## 2024-08-09 RX ADMIN — FOLIC ACID 1 MG: 1 TABLET ORAL at 09:15

## 2024-08-09 RX ADMIN — PANTOPRAZOLE SODIUM 40 MG: 40 TABLET, DELAYED RELEASE ORAL at 20:03

## 2024-08-09 RX ADMIN — SODIUM CHLORIDE 1 G: 1 TABLET ORAL at 17:37

## 2024-08-09 RX ADMIN — PANTOPRAZOLE SODIUM 40 MG: 40 TABLET, DELAYED RELEASE ORAL at 09:15

## 2024-08-09 NOTE — PLAN OF CARE
Problem: Discharge Planning  Goal: Discharge to home or other facility with appropriate resources  8/8/2024 2113 by Anita Ahn RN  Outcome: Progressing  Discharge to home or other facility with appropriate resources:   Identify barriers to discharge with patient and caregiver   Arrange for needed discharge resources and transportation as appropriate   Identify discharge learning needs (meds, wound care, etc)   Refer to discharge planning if patient needs post-hospital services based on physician order or complex needs related to functional status, cognitive ability or social support system     Problem: Pain  Goal: Verbalizes/displays adequate comfort level or baseline comfort level  8/8/2024 2113 by Anita Ahn RN  Outcome: Progressing  Verbalizes/displays adequate comfort level or baseline comfort level:   Encourage patient to monitor pain and request assistance   Assess pain using appropriate pain scale   Administer analgesics based on type and severity of pain and evaluate response   Implement non-pharmacological measures as appropriate and evaluate response   Consider cultural and social influences on pain and pain management   Notify Licensed Independent Practitioner if interventions unsuccessful or patient reports new pain     Problem: Skin/Tissue Integrity  Goal: Absence of new skin breakdown  Description: 1.  Monitor for areas of redness and/or skin breakdown  2.  Assess vascular access sites hourly  3.  Every 4-6 hours minimum:  Change oxygen saturation probe site  4.  Every 4-6 hours:  If on nasal continuous positive airway pressure, respiratory therapy assess nares and determine need for appliance change or resting period.  8/8/2024 2113 by Anita Ahn RN  Outcome: Progressing  Note: No signs of skin breakdown.  Skin warm, dry, and intact.  Mucous membranes pink and moist.  Assistance with turns/ambulation provided PRN.  Will continue to monitor.       Problem: Safety - Adult  Goal: Free

## 2024-08-09 NOTE — PLAN OF CARE
Problem: Discharge Planning  Goal: Discharge to home or other facility with appropriate resources  Outcome: Progressing  Flowsheets (Taken 8/7/2024 1032 by Anjali Robins, RN)  Discharge to home or other facility with appropriate resources:   Identify barriers to discharge with patient and caregiver   Arrange for needed discharge resources and transportation as appropriate   Identify discharge learning needs (meds, wound care, etc)   Refer to discharge planning if patient needs post-hospital services based on physician order or complex needs related to functional status, cognitive ability or social support system     Problem: Pain  Goal: Verbalizes/displays adequate comfort level or baseline comfort level  Outcome: Progressing  Flowsheets (Taken 8/7/2024 1032 by Anjali Robins, RN)  Verbalizes/displays adequate comfort level or baseline comfort level:   Encourage patient to monitor pain and request assistance   Assess pain using appropriate pain scale   Administer analgesics based on type and severity of pain and evaluate response   Implement non-pharmacological measures as appropriate and evaluate response   Consider cultural and social influences on pain and pain management   Notify Licensed Independent Practitioner if interventions unsuccessful or patient reports new pain     Problem: Skin/Tissue Integrity  Goal: Absence of new skin breakdown  Description: 1.  Monitor for areas of redness and/or skin breakdown  2.  Assess vascular access sites hourly  3.  Every 4-6 hours minimum:  Change oxygen saturation probe site  4.  Every 4-6 hours:  If on nasal continuous positive airway pressure, respiratory therapy assess nares and determine need for appliance change or resting period.  Outcome: Progressing     Problem: Safety - Adult  Goal: Free from fall injury  Outcome: Progressing  Flowsheets (Taken 8/7/2024 1032 by Anjali Robins, RN)  Free From Fall Injury: Instruct family/caregiver on patient safety

## 2024-08-09 NOTE — PROGRESS NOTES
[facility]  PHYSICAL THERAPY MISSED TREATMENT NOTE  [facility] NEUROSCIENC[facility] 4A    Date: 2024  Patient Name: Rivka Zavala        MRN: [id number]   : 1970  (53 y.o.)  Gender: female   Referring Practitioner: BENTON Rosa  Diagnosis: multiple sclerosis         REASON FOR MISSED TREATMENT:  Pt up in chair on arrival she declined therapy before breakfast today and reported that she has been getting up and walking just fine.  Discussed steps situation and the proper sequencing in case pt is discharged before therapy able to trial again. Will check back later or next available date .

## 2024-08-09 NOTE — PROGRESS NOTES
Comprehensive Nutrition Assessment    Type and Reason for Visit:  Reassess (po/supplement)    Nutrition Recommendations/Plan:   Recommend MVI, folic acid, and thiamin supplementation.   ONS changed: Ensure Clear daily. Dislikes shake ONS and magic cup.  Continue current diet. Encouraged oral intake. Note allergy to mushrooms.     Malnutrition Assessment:  Malnutrition Status:  Severe malnutrition (08/06/24 1117)    Context:  Acute Illness     Findings of the 6 clinical characteristics of malnutrition:  Energy Intake:  50% or less of estimated energy requirements for 5 or more days  Weight Loss:   (no recent EMR weight, not weight down 12.5% in the last 4 months, patient feels weight loss was very recent, not 4 months ago)     Body Fat Loss:  Moderate body fat loss Orbital, Triceps, Fat Overlying Ribs   Muscle Mass Loss:  Moderate muscle mass loss Temples (temporalis), Clavicles (pectoralis & deltoids), Thigh (quadriceps), Calf (gastrocnemius)  Fluid Accumulation:  Mild (facial edema)     Strength:  Not Performed    Nutrition Assessment:     Pt improving from a nutritional standpoint AEB reports of much improved appetite/intake.  Remains at risk for further nutritional compromise r/t severe malnutrition, previous sore throat from previous nausea/vomiting, admit with multiple sclerosis, recurrent falls, chronic hyponatremia, right patellar fracture, polysubstance use disorder, and underlying medical condition (hx: MS, personality disorder, alcohol abuse, anemia, malnutrition, clavicle fracture, rib fracture, smoking, marijuana).       Nutrition Related Findings:    Pt. Report/Treatments/Miscellaneous: Patient seen, eating breakfast, reports she has a great appetite, is eating everything and more now, states she is making up for previous when her throat hurt and had trouble swallowing. Dislikes magic cup, didn't like texture.  Ensure Clear \"okay\".  Denied any sore throat or swallowing trouble at present.    GI  Status, Chewing or Swallowing, Fluid Status or Edema, Nutrition Focused Physical Findings, Weight    Discharge Planning:    Too soon to determine     Allison C Schwab, RD, LD  Contact: [phone]

## 2024-08-09 NOTE — PROGRESS NOTES
of slight pain in right elbow, otherwise denies any pain.      HPI / Hospital Course:  This is a 53-year-old woman who presented to [facility] ED on 08/03/2024 secondary to recurrent fall.  Patient estimates that she had fallen 5 times in the last 3 days with the potential for falls.  She states that over the last 7 days she has been experiencing a relapse of her multiple sclerosis.  Secondary to the relapse, she endorses decreased appetite, persistent vomiting, as well as difficulty with sleeping.  She states that she has been falling predominantly in the last 3 days.  She states that her falls were unwitnessed and she is unsure of the downtime.  She denies loss of consciousness.     Additional review of systems is positive for vomiting of predominantly stomach acid/bile (patient denies hemoptysis or hematemesis), and solid/pill dysphagia.  Secondary to the falls, the patient additionally notes pain in her right knee, nose, mid and lower back as well as the left eye.     Review of systems is negative for fever, chills, nausea, vision changes or acute vision loss, cough, sputum production, chest pain/tightness/pressure, abdominal cramping, constipation, diarrhea, hematochezia, melena, changes in urination, dysuria, arm/leg swelling, rashes/skin changes, new headaches, new migraines, photophobia, phonophobia, paresthesias of the upper and lower extremities.     The patient denies hospitalizations, recent illness, sick contacts, recent travel outside of the state or country, animal/insect bites, recent medication changes.  She endorses an unspecified foot surgery on 05/03/2024.     The patient admits to smoking cigarettes 0.5 pack/day for last 30 years.  The patient additionally admits to smoking marijuana on a daily basis.  She also admits to drinking 2-3 beers per day several times per week.  She denies blackouts from alcohol.  She denies use of chewing tobacco, electronic cigarettes, recreational drugs.  Patient's  tenderness. Normal tone. No abnormal movements. L knee immobilized. Left Arm swelling from mid arm to down- Improving  Skin: Warm and dry. Diffuse evidence of bruising  Neurologic:  No focal sensory/motor deficits in the upper and lower extremities. Cranial nerves:  grossly non-focal 2-12.     Psychiatric: Alert and oriented, normal insight and thought content.   Capillary Refill: Brisk,< 3 seconds.  Peripheral Pulses: +2 palpable, equal bilaterally.       Labs/Radiology: See chart or assessment above.     Electronically signed by Ruma Griffin MD on 8/9/2024 at 8:10 AM  Case was discussed with Attending, Dr. Coleman.

## 2024-08-09 NOTE — PROGRESS NOTES
Encouragement    08/09/24 1647   Encounter Summary   Service Provided For Patient and family together   Referral/Consult From Rounding   Support System Family members   Last Encounter  08/09/24   Complexity of Encounter Low   Begin Time 1307   End Time  1312   Total Time Calculated 5 min   Spiritual/Emotional needs   Type Spiritual Support   Assessment/Intervention/Outcome   Assessment Hopeful

## 2024-08-09 NOTE — CARE COORDINATION
8/9/24, 1:13 PM EDT    DISCHARGE ON GOING EVALUATION    Rivka A [facility] day: 6  Location: -15/015-A Reason for admit: Multiple sclerosis (HCC) [G35]  Recurrent falls [R29.6]  Fall, sequela [W19.XXXS]     Procedures: none    Imaging since last note: none    Barriers to Discharge: Na 128, IV fluids, pain and nausea control, Phenobarbital protocol for ETOH withdrawal, electrolyte replacement protocols.     PCP: Ben Montejo MD  Readmission Risk Score: 13.3    Patient Goals/Plan/Treatment Preferences:  Plans home with parents, has RW. Denied needs.

## 2024-08-09 NOTE — PROGRESS NOTES
Physician Progress Note      PATIENT:               NIDIA NAJERA  [facility] #:                  [id number]  :                       1970  ADMIT DATE:       8/3/2024 10:58 AM  DISCH DATE:  RESPONDING  PROVIDER #:        Ruma Griffin MD          QUERY TEXT:    Patient admitted with Hyponatremia. Noted to have Severe malnutrition by   Dietary. If possible, please document in progress notes and discharge summary   if you are evaluating and /or treating any of the following:    The medical record reflects the following:  Risk Factors: 53 yr old, MS, drug abuse  Clinical Indicators: Findings of the 6 clinical characteristics of   malnutrition:  Energy Intake:  50% or less of estimated energy requirements for 5 or more   days  Weight Loss:   (no recent EMR weight, not weight down 12.5% in the last 4   months, patient feels weight loss was very recent, not 4 months ago)  Body Fat Loss:  Moderate body fat loss Orbital, Triceps, Fat Overlying Ribs  Muscle Mass Loss:  Moderate muscle mass loss Temples (temporalis), Clavicles   (pectoralis & deltoids), Thigh (quadriceps), Calf (gastrocnemius)  Fluid Accumulation:  Mild (facial edema)   Strength:  Not Performed  Treatment: Dietary consult, Magic cups, Ensure    ASPEN Criteria:    https://aspenjournals.onlinelibrary.cruz.com/doi/full/10.1177/216859006783750  5    Thank You!  Shirlene Saldana RN, CRCR  RN Clinical Documentation Integrity  Options provided:  -- Protein calorie malnutrition severe  -- Other - I will add my own diagnosis  -- Disagree - Not applicable / Not valid  -- Disagree - Clinically unable to determine / Unknown  -- Refer to Clinical Documentation Reviewer    PROVIDER RESPONSE TEXT:    This patient has severe protein calorie malnutrition.    Query created by: Shirlene Saldana on 2024 9:03 AM      Electronically signed by:  Ruma Griffin MD 2024 3:06 PM

## 2024-08-10 VITALS
BODY MASS INDEX: 15.81 KG/M2 | HEART RATE: 117 BPM | TEMPERATURE: 98.5 F | HEIGHT: 64 IN | RESPIRATION RATE: 16 BRPM | OXYGEN SATURATION: 100 % | WEIGHT: 92.59 LBS | SYSTOLIC BLOOD PRESSURE: 157 MMHG | DIASTOLIC BLOOD PRESSURE: 94 MMHG

## 2024-08-10 LAB
ALBUMIN SERPL BCG-MCNC: 2.7 G/DL (ref 3.5–5.1)
ALP SERPL-CCNC: 85 U/L (ref 38–126)
ALT SERPL W/O P-5'-P-CCNC: 19 U/L (ref 11–66)
ANION GAP SERPL CALC-SCNC: 9 MEQ/L (ref 8–16)
AST SERPL-CCNC: 22 U/L (ref 5–40)
BILIRUB SERPL-MCNC: 0.3 MG/DL (ref 0.3–1.2)
BUN SERPL-MCNC: 6 MG/DL (ref 7–22)
CALCIUM SERPL-MCNC: 7.8 MG/DL (ref 8.5–10.5)
CHLORIDE SERPL-SCNC: 97 MEQ/L (ref 98–111)
CO2 SERPL-SCNC: 22 MEQ/L (ref 23–33)
CREAT SERPL-MCNC: 0.4 MG/DL (ref 0.4–1.2)
GFR SERPL CREATININE-BSD FRML MDRD: > 90 ML/MIN/1.73M2
GLUCOSE SERPL-MCNC: 99 MG/DL (ref 70–108)
MAGNESIUM SERPL-MCNC: 1.5 MG/DL (ref 1.6–2.4)
PHOSPHATE SERPL-MCNC: 3.6 MG/DL (ref 2.4–4.7)
POTASSIUM SERPL-SCNC: 3.8 MEQ/L (ref 3.5–5.2)
PROT SERPL-MCNC: 4.9 G/DL (ref 6.1–8)
SCAN OF BLOOD SMEAR: NORMAL
SODIUM SERPL-SCNC: 128 MEQ/L (ref 135–145)
SODIUM SERPL-SCNC: 131 MEQ/L (ref 135–145)

## 2024-08-10 PROCEDURE — 6360000002 HC RX W HCPCS: Performed by: STUDENT IN AN ORGANIZED HEALTH CARE EDUCATION/TRAINING PROGRAM

## 2024-08-10 PROCEDURE — 80053 COMPREHEN METABOLIC PANEL: CPT

## 2024-08-10 PROCEDURE — 2580000003 HC RX 258: Performed by: STUDENT IN AN ORGANIZED HEALTH CARE EDUCATION/TRAINING PROGRAM

## 2024-08-10 PROCEDURE — 84295 ASSAY OF SERUM SODIUM: CPT

## 2024-08-10 PROCEDURE — 83735 ASSAY OF MAGNESIUM: CPT

## 2024-08-10 PROCEDURE — 6370000000 HC RX 637 (ALT 250 FOR IP): Performed by: INTERNAL MEDICINE

## 2024-08-10 PROCEDURE — 85025 COMPLETE CBC W/AUTO DIFF WBC: CPT

## 2024-08-10 PROCEDURE — 6370000000 HC RX 637 (ALT 250 FOR IP)

## 2024-08-10 PROCEDURE — 2580000003 HC RX 258: Performed by: INTERNAL MEDICINE

## 2024-08-10 PROCEDURE — 36415 COLL VENOUS BLD VENIPUNCTURE: CPT

## 2024-08-10 PROCEDURE — 99239 HOSP IP/OBS DSCHRG MGMT >30: CPT | Performed by: INTERNAL MEDICINE

## 2024-08-10 PROCEDURE — 84100 ASSAY OF PHOSPHORUS: CPT

## 2024-08-10 RX ORDER — SODIUM CHLORIDE 1 G/1
2 TABLET ORAL
Status: DISCONTINUED | OUTPATIENT
Start: 2024-08-10 | End: 2024-08-10 | Stop reason: HOSPADM

## 2024-08-10 RX ADMIN — Medication 100 MG: at 11:00

## 2024-08-10 RX ADMIN — SODIUM CHLORIDE 2 G: 1 TABLET ORAL at 11:00

## 2024-08-10 RX ADMIN — SODIUM CHLORIDE: 9 INJECTION, SOLUTION INTRAVENOUS at 01:58

## 2024-08-10 RX ADMIN — PANTOPRAZOLE SODIUM 40 MG: 40 TABLET, DELAYED RELEASE ORAL at 08:38

## 2024-08-10 RX ADMIN — SODIUM CHLORIDE 2 G: 1 TABLET ORAL at 08:38

## 2024-08-10 RX ADMIN — SODIUM CHLORIDE: 9 INJECTION, SOLUTION INTRAVENOUS at 12:06

## 2024-08-10 RX ADMIN — FOLIC ACID 1 MG: 1 TABLET ORAL at 11:00

## 2024-08-10 RX ADMIN — MAGNESIUM SULFATE HEPTAHYDRATE 2000 MG: 40 INJECTION, SOLUTION INTRAVENOUS at 06:32

## 2024-08-10 ASSESSMENT — PAIN SCALES - GENERAL: PAINLEVEL_OUTOF10: 0

## 2024-08-10 NOTE — DISCHARGE SUMMARY
Discharge Summary     Patient Identification:  Rivka Zavala  : 1970  MRN: [id number]   Account: [id number]     Admit date: 8/3/2024  Discharge date: 8/10/24   Attending provider: Ryan Coleman DO        Primary care provider: Ben Montejo MD     Discharge Diagnoses:   Principal Problem:    Acute hyponatremia  Active Problems:    Recurrent falls    Fall    Closed nondisplaced fracture of right patella    Traumatic ecchymosis of left orbit    Severe malnutrition (HCC)  Resolved Problems:    * No resolved hospital problems. *       From prior note: \"ASSESSMENT AND PLAN  Hypoosmolar Hypovolemic Hyponatremia  Secondary to chronic malnutrition as well as alcohol use and decreased PO Intake.   Sodium on admit: 116  On Admission: serum Osm-ca.4, Serum Osm (measured) Next day: 249; Based on Measured Serum Osm  - 249, Calc serum Osm: 244, Osmolal gap < 10 making alcohol less likely cause of hyponatremia. Looks like more likely from decreased PO intake considering patient has low BUN and probably related to excessive vomiting for about 6 days per patient. .  Urine osm: 333, Urine Na: < 20. Patient was started on gentle NS @ 50 ml/hr and responded well, increased to 100 ml/hr   Sodium trend last 24 hours: 129 > 127 > 128 > 127   Increased NS to 125 ml/hr.   Will add Nacl 1 g TID if sodium does not improve        Left upper arm swelling- Improving  Noted on , Probably related to IV infiltration  STAT Duplex showed Sonographic evidence of thrombosis in the left cephalic vein, basilic vein and antecubital vein. Findings can reflect underlying superficial thrombophlebitis and no deep venous thrombosis within the left upper extremity   Warm and hot compresses     Hypochloremia- Improving  Cl on admission: 71, 95 ()  Urine Chloride: < 20; related to non-renal etiology probably related to excessive vomiting for about 6 days per patient.  Will continue to monitor      Recurrent Falls   Suspect

## 2024-08-10 NOTE — PROGRESS NOTES
Patient called out to nursing station stating she wanted to leave AMA after her Na lab draw. This RN educated on importance of staying with no evidence of learning. Dr. Coleman notified. AMA paperwork signed and witnessed by Ofelia Grossman RN. Paperwork placed in chart.

## 2024-08-10 NOTE — PLAN OF CARE
and Maintained or Improved:   Monitor and assess patient's chronic conditions and comorbid symptoms for stability, deterioration, or improvement   Collaborate with multidisciplinary team to address chronic and comorbid conditions and prevent exacerbation or deterioration   Update acute care plan with appropriate goals if chronic or comorbid symptoms are exacerbated and prevent overall improvement and discharge     Problem: Neurosensory - Adult  Goal: Achieves stable or improved neurological status  Outcome: Progressing  Flowsheets (Taken 8/10/2024 0653)  Achieves stable or improved neurological status: Assess for and report changes in neurological status     Problem: Musculoskeletal - Adult  Goal: Return ADL status to a safe level of function  Outcome: Progressing  Flowsheets (Taken 8/10/2024 0653)  Return ADL Status to a Safe Level of Function:   Administer medication as ordered   Assess activities of daily living deficits and provide assistive devices as needed   Obtain physical therapy/occupational therapy consults as needed   Assist and instruct patient to increase activity and self care as tolerated     Problem: Metabolic/Fluid and Electrolytes - Adult  Goal: Electrolytes maintained within normal limits  Outcome: Progressing  Flowsheets (Taken 8/10/2024 0653)  Electrolytes maintained within normal limits:   Monitor labs and assess patient for signs and symptoms of electrolyte imbalances   Administer electrolyte replacement as ordered   Monitor response to electrolyte replacements, including repeat lab results as appropriate     Problem: Nutrition Deficit:  Goal: Optimize nutritional status  Outcome: Progressing  Flowsheets (Taken 8/10/2024 0653)  Nutrient intake appropriate for improving, restoring, or maintaining nutritional needs:   Assess nutritional status and recommend course of action   Monitor oral intake, labs, and treatment plans   Recommend appropriate diets, oral nutritional supplements, and

## 2024-08-11 LAB
ANISOCYTOSIS BLD QL SMEAR: PRESENT
AUTO DIFF PNL BLD: ABNORMAL
BASOPHILS ABSOLUTE: 0.1 THOU/MM3 (ref 0–0.1)
BASOPHILS NFR BLD AUTO: 1.1 %
DEPRECATED RDW RBC AUTO: 59.5 FL (ref 35–45)
EOSINOPHIL NFR BLD AUTO: 1.8 %
EOSINOPHILS ABSOLUTE: 0.2 THOU/MM3 (ref 0–0.4)
ERYTHROCYTE [DISTWIDTH] IN BLOOD BY AUTOMATED COUNT: 14.3 % (ref 11.5–14.5)
HCT VFR BLD AUTO: 23.4 % (ref 37–47)
HGB BLD-MCNC: 7.4 GM/DL (ref 12–16)
IMM GRANULOCYTES # BLD AUTO: 0.21 THOU/MM3 (ref 0–0.07)
IMM GRANULOCYTES NFR BLD AUTO: 2 %
LYMPHOCYTES ABSOLUTE: 1.8 THOU/MM3 (ref 1–4.8)
LYMPHOCYTES NFR BLD AUTO: 17.3 %
MACROCYTES BLD QL SMEAR: PRESENT
MCH RBC QN AUTO: 36.3 PG (ref 26–33)
MCHC RBC AUTO-ENTMCNC: 31.6 GM/DL (ref 32.2–35.5)
MCV RBC AUTO: 114.7 FL (ref 81–99)
MONOCYTES ABSOLUTE: 1.9 THOU/MM3 (ref 0.4–1.3)
MONOCYTES NFR BLD AUTO: 18.3 %
NEUTROPHILS ABSOLUTE: 6.1 THOU/MM3 (ref 1.8–7.7)
NEUTROPHILS NFR BLD AUTO: 59.5 %
NRBC BLD AUTO-RTO: 0 /100 WBC
PATHOLOGIST REVIEW: ABNORMAL
PLATELET # BLD AUTO: 405 THOU/MM3 (ref 130–400)
PMV BLD AUTO: 9.1 FL (ref 9.4–12.4)
POLYCHROMASIA BLD QL SMEAR: ABNORMAL
RBC # BLD AUTO: 2.04 MILL/MM3 (ref 4.2–5.4)
ROULEAUX BLD QL SMEAR: SLIGHT
SPHEROCYTES BLD QL SMEAR: ABNORMAL
WBC # BLD AUTO: 10.3 THOU/MM3 (ref 4.8–10.8)

## 2024-08-12 ENCOUNTER — TELEPHONE (OUTPATIENT)
Dept: FAMILY MEDICINE CLINIC | Age: 54
End: 2024-08-12

## 2024-08-12 NOTE — TELEPHONE ENCOUNTER
Care Transitions Initial Follow Up Call    Outreach made within 2 business days of discharge: Yes    Patient: Rivka Zavala Patient : 1970   MRN: [id number]  Reason for Admission:   Discharge Date: 8/10/24       Spoke with: Rivka    Discharge department/facility: [facility] Interactive Patient Contact:  Was patient able to fill all prescriptions: Yes  Was patient instructed to bring all medications to the follow-up visit: Yes  Is patient taking all medications as directed in the discharge summary? Yes  Does patient understand their discharge instructions: Yes  Does patient have questions or concerns that need addressed prior to 7-14 day follow up office visit: no    Additional needs identified to be addressed with provider               Scheduled appointment with PCP within 7-14 days    Follow Up  Future Appointments   Date Time Provider Department Center   2024 10:40 AM Haily Person PA-C N [facility]       [facility], [facility]

## 2024-08-14 ENCOUNTER — HOSPITAL ENCOUNTER (EMERGENCY)
Age: 54
Discharge: ANOTHER HOSPITAL | End: 2024-08-14
Payer: COMMERCIAL

## 2024-08-14 VITALS
HEART RATE: 107 BPM | RESPIRATION RATE: 16 BRPM | BODY MASS INDEX: 15.71 KG/M2 | TEMPERATURE: 98.7 F | SYSTOLIC BLOOD PRESSURE: 156 MMHG | HEIGHT: 64 IN | WEIGHT: 92 LBS | DIASTOLIC BLOOD PRESSURE: 89 MMHG | OXYGEN SATURATION: 100 %

## 2024-08-14 DIAGNOSIS — R60.0 BILATERAL LOWER EXTREMITY EDEMA: Primary | ICD-10-CM

## 2024-08-14 PROCEDURE — 99215 OFFICE O/P EST HI 40 MIN: CPT

## 2024-08-14 PROCEDURE — 99214 OFFICE O/P EST MOD 30 MIN: CPT

## 2024-08-14 ASSESSMENT — PAIN SCALES - GENERAL: PAINLEVEL_OUTOF10: 10

## 2024-08-14 ASSESSMENT — PAIN DESCRIPTION - LOCATION: LOCATION: LEG

## 2024-08-14 ASSESSMENT — ENCOUNTER SYMPTOMS
SHORTNESS OF BREATH: 0
BACK PAIN: 0
WHEEZING: 0

## 2024-08-14 ASSESSMENT — PAIN DESCRIPTION - DESCRIPTORS: DESCRIPTORS: ACHING

## 2024-08-14 ASSESSMENT — PAIN - FUNCTIONAL ASSESSMENT: PAIN_FUNCTIONAL_ASSESSMENT: 0-10

## 2024-08-14 ASSESSMENT — PAIN DESCRIPTION - ORIENTATION: ORIENTATION: RIGHT;LEFT

## 2024-08-14 NOTE — ED TRIAGE NOTES
Patient comes in with bilateral leg swelling. Patient was recently hospitalized for low Na+ and frequent falls. Patient states the swelling started on Thursday while she was still admitted and was told \"it will go away on its own\". Patient states he legs are painful to walk on and to touch. Patient has bruising to face and bilat arms from previous falls.

## 2024-08-14 NOTE — ED PROVIDER NOTES
[facility] URGENT CARE  Urgent Care Encounter      CHIEF COMPLAINT       Chief Complaint   Patient presents with    Leg Swelling     Bilat legs       Nurses Notes reviewed and I agree except as noted in the HPI.  HISTORY OF PRESENT ILLNESS   iRvka Zavala is a 53 y.o. female who presents to urgent care with complaints of bilateral leg swelling.  Patient was recently hospitalized due to a significant fall and having low sodium.  Patient reports she was discharged 4 days ago.  Patient reports approximately 3 days ago she began having bilateral lower leg swelling. Patient reports she did call her provider on the day it started and was told that it would resolve on its own. Patient denies shortness of breath, chest pain, or fevers.     REVIEW OF SYSTEMS     Review of Systems   Constitutional:  Negative for fever.   HENT:  Negative for congestion.    Respiratory:  Negative for shortness of breath and wheezing.    Cardiovascular:  Positive for leg swelling.   Musculoskeletal:  Positive for gait problem. Negative for back pain.   Neurological:  Negative for dizziness and seizures.       PAST MEDICAL HISTORY         Diagnosis Date    Anemia     Clavicle fracture 10/13/2022    ETOH abuse     Malnutrition (HCC)     Mild tetrahydrocannabinol (THC) abuse     Multiple sclerosis (HCC) 2017    Personality disorder (HCC) unknown    Rib fracture 10/13/2022    Smoker        SURGICAL HISTORY     Patient  has a past surgical history that includes cyst removal (2004); Tonsillectomy (1977); Wrist ganglion excision (Right, 08/2021); Colonoscopy (08/16/2022); shoulder surgery (Right, 12/2022); shoulder surgery (10/26/2023); and Ankle surgery (05/03/2024).    CURRENT MEDICATIONS       Discharge Medication List as of 8/14/2024 12:46 PM        CONTINUE these medications which have NOT CHANGED    Details   ferrous sulfate (IRON 325) 325 (65 Fe) MG tablet Take 1 tablet by mouth daily, Disp-90 tablet, R-3Normal      folic acid

## 2024-08-15 ENCOUNTER — APPOINTMENT (OUTPATIENT)
Dept: INTERVENTIONAL RADIOLOGY/VASCULAR | Age: 54
End: 2024-08-15
Payer: COMMERCIAL

## 2024-08-15 ENCOUNTER — HOSPITAL ENCOUNTER (EMERGENCY)
Age: 54
Discharge: HOME | End: 2024-08-15
Attending: FAMILY MEDICINE
Payer: COMMERCIAL

## 2024-08-15 VITALS
SYSTOLIC BLOOD PRESSURE: 156 MMHG | RESPIRATION RATE: 16 BRPM | HEART RATE: 112 BPM | DIASTOLIC BLOOD PRESSURE: 90 MMHG | OXYGEN SATURATION: 98 % | TEMPERATURE: 98 F

## 2024-08-15 DIAGNOSIS — R60.0 BILATERAL LOWER EXTREMITY EDEMA: Primary | ICD-10-CM

## 2024-08-15 LAB
ALBUMIN SERPL BCG-MCNC: 3.9 G/DL (ref 3.5–5.1)
ALP SERPL-CCNC: 116 U/L (ref 38–126)
ALT SERPL W/O P-5'-P-CCNC: 15 U/L (ref 11–66)
ANION GAP SERPL CALC-SCNC: 13 MEQ/L (ref 8–16)
AST SERPL-CCNC: 25 U/L (ref 5–40)
BASOPHILS ABSOLUTE: 0.2 THOU/MM3 (ref 0–0.1)
BASOPHILS NFR BLD AUTO: 1.9 %
BILIRUB SERPL-MCNC: 0.2 MG/DL (ref 0.3–1.2)
BUN SERPL-MCNC: < 2 MG/DL (ref 7–22)
CALCIUM SERPL-MCNC: 9.2 MG/DL (ref 8.5–10.5)
CHLORIDE SERPL-SCNC: 95 MEQ/L (ref 98–111)
CO2 SERPL-SCNC: 25 MEQ/L (ref 23–33)
CREAT SERPL-MCNC: 0.4 MG/DL (ref 0.4–1.2)
DEPRECATED RDW RBC AUTO: 57.4 FL (ref 35–45)
EOSINOPHIL NFR BLD AUTO: 0.9 %
EOSINOPHILS ABSOLUTE: 0.1 THOU/MM3 (ref 0–0.4)
ERYTHROCYTE [DISTWIDTH] IN BLOOD BY AUTOMATED COUNT: 14.6 % (ref 11.5–14.5)
GFR SERPL CREATININE-BSD FRML MDRD: > 90 ML/MIN/1.73M2
GLUCOSE SERPL-MCNC: 97 MG/DL (ref 70–108)
HCT VFR BLD AUTO: 28.8 % (ref 37–47)
HGB BLD-MCNC: 9.2 GM/DL (ref 12–16)
IMM GRANULOCYTES # BLD AUTO: 0.09 THOU/MM3 (ref 0–0.07)
IMM GRANULOCYTES NFR BLD AUTO: 0.9 %
LYMPHOCYTES ABSOLUTE: 1.8 THOU/MM3 (ref 1–4.8)
LYMPHOCYTES NFR BLD AUTO: 17 %
MCH RBC QN AUTO: 34.7 PG (ref 26–33)
MCHC RBC AUTO-ENTMCNC: 31.9 GM/DL (ref 32.2–35.5)
MCV RBC AUTO: 108.7 FL (ref 81–99)
MONOCYTES ABSOLUTE: 0.9 THOU/MM3 (ref 0.4–1.3)
MONOCYTES NFR BLD AUTO: 8.4 %
NEUTROPHILS ABSOLUTE: 7.5 THOU/MM3 (ref 1.8–7.7)
NEUTROPHILS NFR BLD AUTO: 70.9 %
NRBC BLD AUTO-RTO: 0 /100 WBC
OSMOLALITY SERPL CALC.SUM OF ELEC: 262.5 MOSMOL/KG (ref 275–300)
PLATELET # BLD AUTO: 601 THOU/MM3 (ref 130–400)
PMV BLD AUTO: 8.3 FL (ref 9.4–12.4)
POTASSIUM SERPL-SCNC: 3.6 MEQ/L (ref 3.5–5.2)
PROT SERPL-MCNC: 7.1 G/DL (ref 6.1–8)
RBC # BLD AUTO: 2.65 MILL/MM3 (ref 4.2–5.4)
SODIUM SERPL-SCNC: 133 MEQ/L (ref 135–145)
WBC # BLD AUTO: 10.6 THOU/MM3 (ref 4.8–10.8)

## 2024-08-15 PROCEDURE — 93005 ELECTROCARDIOGRAM TRACING: CPT | Performed by: FAMILY MEDICINE

## 2024-08-15 PROCEDURE — 99284 EMERGENCY DEPT VISIT MOD MDM: CPT

## 2024-08-15 PROCEDURE — 36415 COLL VENOUS BLD VENIPUNCTURE: CPT

## 2024-08-15 PROCEDURE — 80053 COMPREHEN METABOLIC PANEL: CPT

## 2024-08-15 PROCEDURE — 93970 EXTREMITY STUDY: CPT

## 2024-08-15 PROCEDURE — 85025 COMPLETE CBC W/AUTO DIFF WBC: CPT

## 2024-08-15 RX ORDER — FUROSEMIDE 10 MG/ML
20 INJECTION INTRAMUSCULAR; INTRAVENOUS ONCE
Status: DISCONTINUED | OUTPATIENT
Start: 2024-08-15 | End: 2024-08-15 | Stop reason: HOSPADM

## 2024-08-15 RX ORDER — FUROSEMIDE 20 MG/1
20 TABLET ORAL DAILY
Qty: 10 TABLET | Refills: 0 | Status: SHIPPED | OUTPATIENT
Start: 2024-08-15 | End: 2024-08-25

## 2024-08-15 ASSESSMENT — PAIN DESCRIPTION - LOCATION: LOCATION: FOOT

## 2024-08-15 ASSESSMENT — PAIN SCALES - GENERAL: PAINLEVEL_OUTOF10: 8

## 2024-08-15 ASSESSMENT — PAIN DESCRIPTION - ORIENTATION: ORIENTATION: RIGHT;LEFT

## 2024-08-15 ASSESSMENT — PAIN - FUNCTIONAL ASSESSMENT: PAIN_FUNCTIONAL_ASSESSMENT: 0-10

## 2024-08-15 NOTE — DISCHARGE INSTRUCTIONS
NO BLOOD CLOTS WERE FOUND IN YOUR LEGS.    TAKE ONE LASIX TABLET DAILY FOR NEXT 10 DAYS TO HELP DIURESE/DECREASE SWELLING.

## 2024-08-15 NOTE — ED NOTES
Pt in bed reading. Made aware of vascular testing. Pants removed. Pt declines gown. Call light within reach. Will monitor

## 2024-08-15 NOTE — ED NOTES
Pt returned from testing. Pt asking to have medication PO. Reports she is ready to leave. She reports she is a hard stick. Reports she has been urinating already q15. Will notify provider

## 2024-08-15 NOTE — ED TRIAGE NOTES
Presents to ER with concern of bilateral leg swelling. She reports when she was discharged she was told fluid would go away. She reports she went to urgent care yesterday and was advised to come to ED. She reports she did not think it was that big of a deal so she did not come to ED. She reports feeling like \"legs and feet are going to blow up\". She reports not wrapping legs. She reports she has been elevating legs. 4+ pitting edema noted to bilateral legs. Legs shiny, skin taunt. Redness noted to left inner ankle. Denies any sob, cp, or dizziness. Reports she \"feels fine\" besides the swelling.  Will monitor,

## 2024-08-15 NOTE — ED PROVIDER NOTES
EMERGENCY DEPARTMENT ENCOUNTER     CHIEF COMPLAINT   Chief Complaint   Patient presents with    Leg Swelling     Bilateral         HPI   Rivka Zavala is a 53 y.o. female with recent admission for hyponatremia as well as MS flare-up, who presents with a musculoskeletal complaint, tense pitting edema in bilateral LE. The onset was last few days. The reason why the patient has this issue was unknown but she was given plenty of IV fluids to slowly adjust her hyponatremia, which stood at 131 at the time of his discharge from the hospital. The patient complains of LE swelling. The quality is tense pressure. The situation was: as stated The patient has no associated hemoptysis, chest pain or sob.     PAST MEDICAL & SURGICAL HISTORY   Past Medical History:   Diagnosis Date    Anemia     Clavicle fracture 10/13/2022    ETOH abuse     Malnutrition (HCC)     Mild tetrahydrocannabinol (THC) abuse     Multiple sclerosis (HCC) 2017    Personality disorder (HCC) unknown    Rib fracture 10/13/2022    Smoker       Past Surgical History:   Procedure Laterality Date    ANKLE SURGERY  05/03/2024    COLONOSCOPY  08/16/2022    CYST REMOVAL  2004    right thigh     SHOULDER SURGERY Right 12/2022    Hook Plate Placement    SHOULDER SURGERY  10/26/2023    oio    TONSILLECTOMY  1977    WRIST GANGLION EXCISION Right 08/2021        CURRENT MEDICATIONS   Current Outpatient Rx   Medication Sig Dispense Refill    furosemide (LASIX) 20 MG tablet Take 1 tablet by mouth daily for 10 days 10 tablet 0    ferrous sulfate (IRON 325) 325 (65 Fe) MG tablet Take 1 tablet by mouth daily 90 tablet 3    folic acid (FOLVITE) 1 MG tablet Take 1 tablet by mouth daily 90 tablet 3    gabapentin (NEURONTIN) 400 MG capsule Take 1 capsule by mouth at bedtime for 180 days. 90 capsule 1        ALLERGIES   Allergies   Allergen Reactions    Mushroom Extract Complex      Swelling        SOCIAL & FAMILY HISTORY   Social History     Socioeconomic History    Marital

## 2024-08-17 LAB
EKG ATRIAL RATE: 99 BPM
EKG P AXIS: 60 DEGREES
EKG P-R INTERVAL: 124 MS
EKG Q-T INTERVAL: 344 MS
EKG QRS DURATION: 66 MS
EKG QTC CALCULATION (BAZETT): 441 MS
EKG R AXIS: 61 DEGREES
EKG T AXIS: 55 DEGREES
EKG VENTRICULAR RATE: 99 BPM

## 2024-08-17 PROCEDURE — 93010 ELECTROCARDIOGRAM REPORT: CPT | Performed by: INTERNAL MEDICINE

## 2024-08-19 ENCOUNTER — OFFICE VISIT (OUTPATIENT)
Dept: FAMILY MEDICINE CLINIC | Age: 54
End: 2024-08-19

## 2024-08-19 VITALS
WEIGHT: 107.2 LBS | OXYGEN SATURATION: 98 % | HEIGHT: 64 IN | TEMPERATURE: 98.5 F | SYSTOLIC BLOOD PRESSURE: 158 MMHG | HEART RATE: 98 BPM | RESPIRATION RATE: 12 BRPM | DIASTOLIC BLOOD PRESSURE: 90 MMHG | BODY MASS INDEX: 18.3 KG/M2

## 2024-08-19 DIAGNOSIS — Z09 HOSPITAL DISCHARGE FOLLOW-UP: Primary | ICD-10-CM

## 2024-08-19 DIAGNOSIS — D64.9 ANEMIA, UNSPECIFIED TYPE: ICD-10-CM

## 2024-08-19 DIAGNOSIS — M79.89 LEG SWELLING: ICD-10-CM

## 2024-08-19 RX ORDER — HYDROCODONE BITARTRATE AND ACETAMINOPHEN 5; 325 MG/1; MG/1
1 TABLET ORAL EVERY 8 HOURS PRN
Qty: 15 TABLET | Refills: 0 | Status: SHIPPED | OUTPATIENT
Start: 2024-08-19 | End: 2024-08-24

## 2024-08-19 SDOH — ECONOMIC STABILITY: FOOD INSECURITY
WITHIN THE PAST 12 MONTHS, YOU WORRIED THAT YOUR FOOD WOULD RUN OUT BEFORE YOU GOT MONEY TO BUY MORE.: PATIENT UNABLE TO ANSWER

## 2024-08-19 SDOH — ECONOMIC STABILITY: FOOD INSECURITY
WITHIN THE PAST 12 MONTHS, THE FOOD YOU BOUGHT JUST DIDN'T LAST AND YOU DIDN'T HAVE MONEY TO GET MORE.: PATIENT UNABLE TO ANSWER

## 2024-08-19 SDOH — ECONOMIC STABILITY: INCOME INSECURITY
HOW HARD IS IT FOR YOU TO PAY FOR THE VERY BASICS LIKE FOOD, HOUSING, MEDICAL CARE, AND HEATING?: PATIENT UNABLE TO ANSWER

## 2024-08-19 NOTE — PATIENT INSTRUCTIONS
Thank you   Thank you for trusting us with your healthcare needs. You may receive a survey regarding today's visit. It would help us out if you would take a few moments to provide your feedback. We value your input.  Please bring in ALL medications BOTTLES, including inhalers, herbal supplements, over the counter, prescribed & non-prescribed medicine. The office would like actual medication bottles and a list.         4.  Prior to getting your labs drawn, check with your insurance company for benefits and eligibility of lab services.  Often, insurance companies cover certain tests for preventative visits only.  It is patient's responsibility to    see what is covered.    5.  If the list below has been completed, PLEASE FAX RECORDS TO OUR OFFICE @ [phone]. Once the records have been received we will update your records at our office:  Health Maintenance Due   Topic Date Due    Pneumococcal 0-64 years Vaccine (1 of 2 - PCV) Never done    Hepatitis B vaccine (1 of 3 - 19+ 3-dose series) Never done    Breast cancer screen  Never done    Colorectal Cancer Screen  Never done    Shingles vaccine (1 of 2) Never done    COVID-19 Vaccine (1 - 2023-24 season) Never done    Flu vaccine (1) Never done          Tobacco Cessation Programs     Telephonic behavior modification  1-800-QUIT-NOW (128-6773)  Counseling service for those who are ready to quit using tobacco.    Available for uninsured [place] residents, Medicaid recipients, pregnant women, or patients whose health plans or employers are members of the [place] [employer]    Online behavior modification  http://[place].Quitlogix.org  Online support program to help patients through each step of the quitting process.  Available 24 hours a day 7 days a week.  Provides up to date researched based tool, step-by-step guides, and motivational messages.    Online behavior modification  www.lungusa.org/stop-smoking/how-to-quit  HelpLine: 1-800-LUNG-[place] (076-9237)  Email

## 2024-08-19 NOTE — PROGRESS NOTES
S: 53 y.o. female with   Chief Complaint   Patient presents with    Follow-Up from Hospital     Fall and MS d/c 08/10/2024    Swelling     Bilateral lower leg swelling and skin splitting on Right ankle     Admit 8/3, MS flare, poor eating/dehydration, fall with electrolyte abnormalities. Right patellar fracture.   8/14 and 8/15 UC/ED -- due to leg swelling, on lasix.   Various falls due to MS. Anemia chronically   Sees neuro and hematology.  Uses walker, seen PT/Ot in hospital.    Presenting today for follow up  -- left sided bruising  -- bruises on arms (random w/o trauma)  -- right knee immobilizer  -- painful legs swollen     At risk of ongoing falls     BP Readings from Last 3 Encounters:   08/19/24 (!) 158/90   08/15/24 (!) 156/90   08/14/24 (!) 156/89     Wt Readings from Last 3 Encounters:   08/19/24 48.6 kg (107 lb 3.2 oz)   08/14/24 41.7 kg (92 lb)   08/05/24 42 kg (92 lb 9.5 oz)       O: VS:   Vitals:    08/19/24 0931 08/19/24 0938   BP: (!) 166/100 (!) 158/90   Site: Left Upper Arm Left Upper Arm   Position: Sitting Sitting   Cuff Size: Medium Adult Medium Adult   Pulse: 98    Resp: 12    Temp: 98.5 °F (36.9 °C)    TempSrc: Oral    SpO2: 98%    Weight: 48.6 kg (107 lb 3.2 oz)    Height: 1.626 m (5' 4.02\")      Body mass index is 18.39 kg/m².        Lab Results   Component Value Date    WBC 10.6 08/15/2024    HGB 9.2 (L) 08/15/2024    HCT 28.8 (L) 08/15/2024     (H) 08/15/2024    CHOL 162 10/09/2019    TRIG 196 10/09/2019     06/16/2021    LDL 97 06/16/2021    AST 25 08/15/2024     (L) 08/15/2024    K 3.6 08/15/2024    CL 95 (L) 08/15/2024    CREATININE 0.4 08/15/2024    BUN <2 (L) 08/15/2024    CO2 25 08/15/2024    TSH 2.280 08/04/2024    INR 0.92 08/03/2024    GLUF 101 07/23/2021    LABA1C 4.8 10/03/2019    LABGLOM > 90 08/15/2024    MG 1.5 (L) 08/10/2024    CALCIUM 9.2 08/15/2024    VITD25 21 (L) 10/09/2019       Vascular duplex lower extremity venous bilateral    Result Date:  8/15/2024  PROCEDURE: VAS DUP LOWER EXTREMITY VENOUS BILATERAL CLINICAL INFORMATION: bilateral LE swelling, pain. COMPARISON: No prior study. TECHNIQUE: Venous doppler ultrasound was performed of the bilateral lower extremities using gray scale, color flow and spectral doppler imaging. FINDINGS: There is normal color flow, spectral analysis and compressibility of the right and left common femoral vein, femoral vein and popliteal veins.. There is normal color flow and compressibility in the right and left posterior tibial veins, anterior tibial veins and peroneal veins.     No evidence of a DVT in the right and left lower extremities.. **This report has been created using voice recognition software. It may contain minor errors which are inherent in voice recognition technology.** Electronically signed by Dr. Floresita Montejo    Vascular duplex upper extremity venous left    Result Date: 8/8/2024  PROCEDURE: VAS DUP UPPER EXTREMITY VENOUS LEFT CLINICAL INFORMATION: Edema left entire arm COMPARISON: No prior study. TECHNIQUE: Grayscale imaging, color Doppler imaging, and spectral waveform analysis of the left upper extremity performed in longitudinal and traverse planes FINDINGS: There is normal spontaneous and phasic venous flow within and compressibility of  the left internal jugular, subclavian, axillary, brachial, radial, and ulnar veins. There is noncompressibility of the left cephalic vein. There appears to be lack of flow within the antecubital vein. Partial flow and compressibility of the distal left basilic vein. Soft tissue edema is seen in the upper extremity. There is normal spontaneous and phasic venous flow within the right internal jugular vein.     1. Sonographic evidence of thrombosis in the left cephalic vein, basilic vein and antecubital vein. Findings can reflect underlying superficial thrombophlebitis . 2. There is no deep venous thrombosis within the left upper extremity. **This report has been created

## 2024-08-22 DIAGNOSIS — D64.9 NORMOCYTIC ANEMIA: Primary | ICD-10-CM

## 2024-08-22 DIAGNOSIS — D50.8 OTHER IRON DEFICIENCY ANEMIA: ICD-10-CM

## 2024-08-22 NOTE — PROGRESS NOTES
[facility] [address], [address]  [facility] [address]  Dept: [phone]  Dept Fax: [phone] Loc: [phone]      Visit Date:8/23/2024     Rivka Zavala is a 53 y.o. female who presents today for:   No chief complaint on file.       HPI:   Rivka Zavala is a 53 y.o. female referred to Hematology/Oncology clinic for evaluation of anemia per her PCP, Dr. Montejo. She was seen as a new patient on 7/19/22. The patient was hospitalized in June 2022 for hyponatremia and found to be anemic. Hgb during hospitalization 6.5 and improved to 8.4 on date of discharge 6/8/22. Most recent CBC 7/13/22 Hgb 8.1, hct 29.1, MCV 85.1. iron studies on 7/13/22 showed iron deficiency with ferritin 13, iron 21, TIBC 559. Folate and vitamin B12 not deficient. She was referred for further evaluation. Per chart review of EMR, the patient has had chronic anemia since 2017 with most recent baseline of Hgb 7-8's since April 2022. The patient was referred to GI as well. The patient affirms prior history of anemia.  She has previously followed with different hematologist clinic and states she has never required IV iron.  The patient denies any abnormal bleeding; no epistaxis, hemoptysis, hematemesis, melena, hematochezia, hematuria or vaginal bleeding.  Her last menstrual cycle was approximately 7 years ago.  The patient denies history of malabsorptive disorders such as IBD or celiac disease.  She denies prior gastrointestinal surgeries.  She reports adequate intake of oral iron in her diet.  The patient states she has never received blood transfusion as she has refused in the past.   Her past medical history includes MS, anemia.  It is noted through chart review she has history of alcohol abuse.    Patient received IV Venofer in July-August 2022.   She had an EGD 8/16/22 per Dr. Turner showing small Schatzki's ring which is non-obstructive, otherwise normal exam to the second portion of the duodenum.  No

## 2024-08-23 ENCOUNTER — HOSPITAL ENCOUNTER (OUTPATIENT)
Dept: INFUSION THERAPY | Age: 54
Discharge: HOME | End: 2024-08-23
Payer: COMMERCIAL

## 2024-08-23 ENCOUNTER — OFFICE VISIT (OUTPATIENT)
Dept: ONCOLOGY | Age: 54
End: 2024-08-23
Payer: COMMERCIAL

## 2024-08-23 VITALS
DIASTOLIC BLOOD PRESSURE: 82 MMHG | HEART RATE: 105 BPM | OXYGEN SATURATION: 98 % | SYSTOLIC BLOOD PRESSURE: 132 MMHG | RESPIRATION RATE: 18 BRPM | BODY MASS INDEX: 17.67 KG/M2 | WEIGHT: 103 LBS | TEMPERATURE: 99 F

## 2024-08-23 VITALS
TEMPERATURE: 99 F | OXYGEN SATURATION: 98 % | SYSTOLIC BLOOD PRESSURE: 132 MMHG | HEART RATE: 105 BPM | DIASTOLIC BLOOD PRESSURE: 82 MMHG | RESPIRATION RATE: 18 BRPM

## 2024-08-23 DIAGNOSIS — D75.89 MACROCYTOSIS: Primary | ICD-10-CM

## 2024-08-23 DIAGNOSIS — D75.839 THROMBOCYTOSIS: ICD-10-CM

## 2024-08-23 DIAGNOSIS — D50.8 OTHER IRON DEFICIENCY ANEMIA: ICD-10-CM

## 2024-08-23 DIAGNOSIS — D64.9 NORMOCYTIC ANEMIA: ICD-10-CM

## 2024-08-23 DIAGNOSIS — D50.8 IRON DEFICIENCY ANEMIA SECONDARY TO INADEQUATE DIETARY IRON INTAKE: ICD-10-CM

## 2024-08-23 LAB
BASOPHILS ABSOLUTE: 0.1 THOU/MM3 (ref 0–0.1)
BASOPHILS NFR BLD AUTO: 1 % (ref 0–3)
EOSINOPHIL NFR BLD AUTO: 2 % (ref 0–4)
EOSINOPHILS ABSOLUTE: 0.2 THOU/MM3 (ref 0–0.4)
ERYTHROCYTE [DISTWIDTH] IN BLOOD BY AUTOMATED COUNT: 13.5 % (ref 11.5–14.5)
FERRITIN SERPL IA-MCNC: 137 NG/ML (ref 10–291)
FOLATE SERPL-MCNC: 7.9 NG/ML (ref 4.8–24.2)
HCT VFR BLD AUTO: 34.3 % (ref 37–47)
HGB BLD-MCNC: 11 GM/DL (ref 12–16)
IMMATURE GRANULOCYTES %: 0 %
IMMATURE GRANULOCYTES ABSOLUTE: 0.03 THOU/MM3 (ref 0–0.07)
IRON SATN MFR SERPL: 15 % (ref 20–50)
IRON SERPL-MCNC: 60 UG/DL (ref 50–170)
LYMPHOCYTES ABSOLUTE: 2.4 THOU/MM3 (ref 1–4.8)
LYMPHOCYTES NFR BLD AUTO: 25 % (ref 15–47)
MCH RBC QN AUTO: 34.5 PG (ref 26–33)
MCHC RBC AUTO-ENTMCNC: 32.1 GM/DL (ref 32.2–35.5)
MCV RBC AUTO: 108 FL (ref 81–99)
MONOCYTES ABSOLUTE: 0.9 THOU/MM3 (ref 0.4–1.3)
MONOCYTES NFR BLD AUTO: 9 % (ref 0–12)
NEUTROPHILS ABSOLUTE: 6.1 THOU/MM3 (ref 1.8–7.7)
NEUTROPHILS NFR BLD AUTO: 63 % (ref 43–75)
PLATELET # BLD AUTO: 423 THOU/MM3 (ref 130–400)
PMV BLD AUTO: 8.1 FL (ref 9.4–12.4)
RBC # BLD AUTO: 3.19 MILL/MM3 (ref 4.2–5.4)
TIBC SERPL-MCNC: 402 UG/DL (ref 171–450)
WBC # BLD AUTO: 9.8 THOU/MM3 (ref 4.8–10.8)

## 2024-08-23 PROCEDURE — 82746 ASSAY OF FOLIC ACID SERUM: CPT

## 2024-08-23 PROCEDURE — 85025 COMPLETE CBC W/AUTO DIFF WBC: CPT

## 2024-08-23 PROCEDURE — 99214 OFFICE O/P EST MOD 30 MIN: CPT | Performed by: NURSE PRACTITIONER

## 2024-08-23 PROCEDURE — 99211 OFF/OP EST MAY X REQ PHY/QHP: CPT

## 2024-08-23 PROCEDURE — 82728 ASSAY OF FERRITIN: CPT

## 2024-08-23 PROCEDURE — 83550 IRON BINDING TEST: CPT

## 2024-08-23 PROCEDURE — 83540 ASSAY OF IRON: CPT

## 2024-08-23 PROCEDURE — 36415 COLL VENOUS BLD VENIPUNCTURE: CPT
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
## 2024-09-04 ENCOUNTER — OFFICE VISIT (OUTPATIENT)
Dept: FAMILY MEDICINE CLINIC | Age: 54
End: 2024-09-04
Payer: COMMERCIAL

## 2024-09-04 VITALS
OXYGEN SATURATION: 99 % | DIASTOLIC BLOOD PRESSURE: 68 MMHG | SYSTOLIC BLOOD PRESSURE: 108 MMHG | HEIGHT: 64 IN | WEIGHT: 100.2 LBS | BODY MASS INDEX: 17.11 KG/M2 | RESPIRATION RATE: 16 BRPM | TEMPERATURE: 98.9 F | HEART RATE: 98 BPM

## 2024-09-04 DIAGNOSIS — D64.9 ANEMIA, UNSPECIFIED TYPE: ICD-10-CM

## 2024-09-04 DIAGNOSIS — M79.89 LEG SWELLING: Primary | ICD-10-CM

## 2024-09-04 DIAGNOSIS — G35 MS (MULTIPLE SCLEROSIS) (HCC): ICD-10-CM

## 2024-09-04 PROCEDURE — 99213 OFFICE O/P EST LOW 20 MIN: CPT

## 2024-09-04 PROCEDURE — G8419 CALC BMI OUT NRM PARAM NOF/U: HCPCS

## 2024-09-04 PROCEDURE — 4004F PT TOBACCO SCREEN RCVD TLK: CPT

## 2024-09-04 PROCEDURE — G8427 DOCREV CUR MEDS BY ELIG CLIN: HCPCS

## 2024-09-04 PROCEDURE — 3017F COLORECTAL CA SCREEN DOC REV: CPT

## 2024-09-04 PROCEDURE — 1111F DSCHRG MED/CURRENT MED MERGE: CPT

## 2024-09-09 ENCOUNTER — HOSPITAL ENCOUNTER (OUTPATIENT)
Dept: WOMENS IMAGING | Age: 54
Discharge: HOME | End: 2024-09-09
Payer: COMMERCIAL

## 2024-09-09 VITALS — HEIGHT: 64 IN | WEIGHT: 102 LBS | BODY MASS INDEX: 17.42 KG/M2

## 2024-09-09 DIAGNOSIS — Z12.31 ENCOUNTER FOR SCREENING MAMMOGRAM FOR MALIGNANT NEOPLASM OF BREAST: ICD-10-CM

## 2024-09-09 PROCEDURE — 77063 BREAST TOMOSYNTHESIS BI: CPT

## 2024-09-11 ASSESSMENT — ENCOUNTER SYMPTOMS
NAUSEA: 0
COLOR CHANGE: 1
ABDOMINAL PAIN: 0
VOMITING: 0
CONSTIPATION: 0
WHEEZING: 0
DIARRHEA: 0
CHEST TIGHTNESS: 0
SHORTNESS OF BREATH: 0

## 2024-10-09 ENCOUNTER — HOSPITAL ENCOUNTER (INPATIENT)
Age: 54
LOS: 5 days | Discharge: LEFT BEFORE DISCHARGE | DRG: 426 | End: 2024-10-14
Attending: EMERGENCY MEDICINE
Payer: COMMERCIAL

## 2024-10-09 ENCOUNTER — APPOINTMENT (OUTPATIENT)
Dept: GENERAL RADIOLOGY | Age: 54
DRG: 426 | End: 2024-10-09
Payer: COMMERCIAL

## 2024-10-09 ENCOUNTER — APPOINTMENT (OUTPATIENT)
Dept: CT IMAGING | Age: 54
DRG: 426 | End: 2024-10-09
Payer: COMMERCIAL

## 2024-10-09 DIAGNOSIS — J96.00 ACUTE RESPIRATORY FAILURE, UNSPECIFIED WHETHER WITH HYPOXIA OR HYPERCAPNIA: Primary | ICD-10-CM

## 2024-10-09 DIAGNOSIS — E87.1 HYPONATREMIA: ICD-10-CM

## 2024-10-09 LAB
ALBUMIN SERPL BCG-MCNC: 3.6 G/DL (ref 3.5–5.1)
ALP SERPL-CCNC: 113 U/L (ref 38–126)
ALT SERPL W/O P-5'-P-CCNC: 29 U/L (ref 11–66)
ANION GAP SERPL CALC-SCNC: 22 MEQ/L (ref 8–16)
ANION GAP SERPL CALC-SCNC: 23 MEQ/L (ref 8–16)
ANION GAP SERPL CALC-SCNC: 29 MEQ/L (ref 8–16)
AST SERPL-CCNC: 102 U/L (ref 5–40)
BASOPHILS ABSOLUTE: 0 THOU/MM3 (ref 0–0.1)
BASOPHILS NFR BLD AUTO: 0.2 %
BILIRUB SERPL-MCNC: 0.6 MG/DL (ref 0.3–1.2)
BUN SERPL-MCNC: 5 MG/DL (ref 7–22)
CA-I BLD ISE-SCNC: 0.66 MMOL/L (ref 1.12–1.32)
CALCIUM SERPL-MCNC: 7.2 MG/DL (ref 8.5–10.5)
CALCIUM SERPL-MCNC: 7.6 MG/DL (ref 8.5–10.5)
CALCIUM SERPL-MCNC: 7.9 MG/DL (ref 8.5–10.5)
CHLORIDE SERPL-SCNC: 61 MEQ/L (ref 98–111)
CHLORIDE SERPL-SCNC: 62 MEQ/L (ref 98–111)
CHLORIDE SERPL-SCNC: 64 MEQ/L (ref 98–111)
CO2 SERPL-SCNC: 19 MEQ/L (ref 23–33)
CO2 SERPL-SCNC: 19 MEQ/L (ref 23–33)
CO2 SERPL-SCNC: 23 MEQ/L (ref 23–33)
CREAT SERPL-MCNC: 0.2 MG/DL (ref 0.4–1.2)
CREAT SERPL-MCNC: 0.3 MG/DL (ref 0.4–1.2)
CREAT SERPL-MCNC: 0.3 MG/DL (ref 0.4–1.2)
DEPRECATED RDW RBC AUTO: 42.9 FL (ref 35–45)
EKG ATRIAL RATE: 89 BPM
EKG ATRIAL RATE: 90 BPM
EKG P AXIS: 65 DEGREES
EKG P AXIS: 77 DEGREES
EKG P-R INTERVAL: 146 MS
EKG P-R INTERVAL: 152 MS
EKG Q-T INTERVAL: 442 MS
EKG Q-T INTERVAL: 454 MS
EKG QRS DURATION: 72 MS
EKG QRS DURATION: 76 MS
EKG QTC CALCULATION (BAZETT): 537 MS
EKG QTC CALCULATION (BAZETT): 555 MS
EKG R AXIS: 63 DEGREES
EKG R AXIS: 64 DEGREES
EKG T AXIS: 76 DEGREES
EKG T AXIS: 79 DEGREES
EKG VENTRICULAR RATE: 89 BPM
EKG VENTRICULAR RATE: 90 BPM
EOSINOPHIL NFR BLD AUTO: 0.2 %
EOSINOPHILS ABSOLUTE: 0 THOU/MM3 (ref 0–0.4)
ERYTHROCYTE [DISTWIDTH] IN BLOOD BY AUTOMATED COUNT: 13 % (ref 11.5–14.5)
ETHANOL SERPL-MCNC: 0.14 % (ref 0–0.08)
GFR SERPL CREATININE-BSD FRML MDRD: > 90 ML/MIN/1.73M2
GLUCOSE SERPL-MCNC: 102 MG/DL (ref 70–108)
GLUCOSE SERPL-MCNC: 125 MG/DL (ref 70–108)
GLUCOSE SERPL-MCNC: 83 MG/DL (ref 70–108)
HCT VFR BLD AUTO: 32.8 % (ref 37–47)
HGB BLD-MCNC: 11.7 GM/DL (ref 12–16)
IMM GRANULOCYTES # BLD AUTO: 0.07 THOU/MM3 (ref 0–0.07)
IMM GRANULOCYTES NFR BLD AUTO: 0.6 %
LYMPHOCYTES ABSOLUTE: 1.6 THOU/MM3 (ref 1–4.8)
LYMPHOCYTES NFR BLD AUTO: 13.4 %
MAGNESIUM SERPL-MCNC: 1.5 MG/DL (ref 1.6–2.4)
MAGNESIUM SERPL-MCNC: 1.5 MG/DL (ref 1.6–2.4)
MCH RBC QN AUTO: 32.2 PG (ref 26–33)
MCHC RBC AUTO-ENTMCNC: 35.7 GM/DL (ref 32.2–35.5)
MCV RBC AUTO: 90.4 FL (ref 81–99)
MONOCYTES ABSOLUTE: 1.1 THOU/MM3 (ref 0.4–1.3)
MONOCYTES NFR BLD AUTO: 8.9 %
NEUTROPHILS ABSOLUTE: 9.2 THOU/MM3 (ref 1.8–7.7)
NEUTROPHILS NFR BLD AUTO: 76.7 %
NRBC BLD AUTO-RTO: 0 /100 WBC
OSMOLALITY SERPL CALC.SUM OF ELEC: 206.2 MOSMOL/KG (ref 275–300)
OSMOLALITY SERPL CALC.SUM OF ELEC: 220 MOSMOL/KG (ref 275–300)
OSMOLALITY SERPL CALC.SUM OF ELEC: 222.3 MOSMOL/KG (ref 275–300)
PHOSPHATE SERPL-MCNC: 2.2 MG/DL (ref 2.4–4.7)
PLATELET # BLD AUTO: 135 THOU/MM3 (ref 130–400)
PMV BLD AUTO: 10.1 FL (ref 9.4–12.4)
POTASSIUM SERPL-SCNC: 2.7 MEQ/L (ref 3.5–5.2)
POTASSIUM SERPL-SCNC: 2.9 MEQ/L (ref 3.5–5.2)
POTASSIUM SERPL-SCNC: 3.3 MEQ/L (ref 3.5–5.2)
PROT SERPL-MCNC: 6.4 G/DL (ref 6.1–8)
RBC # BLD AUTO: 3.63 MILL/MM3 (ref 4.2–5.4)
SODIUM SERPL-SCNC: 102 MEQ/L (ref 135–145)
SODIUM SERPL-SCNC: 110 MEQ/L (ref 135–145)
SODIUM SERPL-SCNC: 110 MEQ/L (ref 135–145)
WBC # BLD AUTO: 12 THOU/MM3 (ref 4.8–10.8)

## 2024-10-09 PROCEDURE — 2580000003 HC RX 258

## 2024-10-09 PROCEDURE — 02HV33Z INSERTION OF INFUSION DEVICE INTO SUPERIOR VENA CAVA, PERCUTANEOUS APPROACH: ICD-10-PCS | Performed by: INTERNAL MEDICINE

## 2024-10-09 PROCEDURE — 2500000003 HC RX 250 WO HCPCS: Performed by: EMERGENCY MEDICINE

## 2024-10-09 PROCEDURE — 2700000000 HC OXYGEN THERAPY PER DAY

## 2024-10-09 PROCEDURE — 83735 ASSAY OF MAGNESIUM: CPT

## 2024-10-09 PROCEDURE — 2500000003 HC RX 250 WO HCPCS

## 2024-10-09 PROCEDURE — 82570 ASSAY OF URINE CREATININE: CPT

## 2024-10-09 PROCEDURE — 5A1935Z RESPIRATORY VENTILATION, LESS THAN 24 CONSECUTIVE HOURS: ICD-10-PCS | Performed by: INTERNAL MEDICINE

## 2024-10-09 PROCEDURE — 83935 ASSAY OF URINE OSMOLALITY: CPT

## 2024-10-09 PROCEDURE — 93010 ELECTROCARDIOGRAM REPORT: CPT | Performed by: INTERNAL MEDICINE

## 2024-10-09 PROCEDURE — 87205 SMEAR GRAM STAIN: CPT

## 2024-10-09 PROCEDURE — 71045 X-RAY EXAM CHEST 1 VIEW: CPT

## 2024-10-09 PROCEDURE — 72125 CT NECK SPINE W/O DYE: CPT

## 2024-10-09 PROCEDURE — 6360000002 HC RX W HCPCS

## 2024-10-09 PROCEDURE — 85025 COMPLETE CBC W/AUTO DIFF WBC: CPT

## 2024-10-09 PROCEDURE — 87086 URINE CULTURE/COLONY COUNT: CPT

## 2024-10-09 PROCEDURE — 96375 TX/PRO/DX INJ NEW DRUG ADDON: CPT

## 2024-10-09 PROCEDURE — 84300 ASSAY OF URINE SODIUM: CPT

## 2024-10-09 PROCEDURE — 36415 COLL VENOUS BLD VENIPUNCTURE: CPT

## 2024-10-09 PROCEDURE — 83930 ASSAY OF BLOOD OSMOLALITY: CPT

## 2024-10-09 PROCEDURE — 94761 N-INVAS EAR/PLS OXIMETRY MLT: CPT

## 2024-10-09 PROCEDURE — 2580000003 HC RX 258: Performed by: EMERGENCY MEDICINE

## 2024-10-09 PROCEDURE — 87070 CULTURE OTHR SPECIMN AEROBIC: CPT

## 2024-10-09 PROCEDURE — 6360000002 HC RX W HCPCS: Performed by: EMERGENCY MEDICINE

## 2024-10-09 PROCEDURE — 2000000000 HC ICU R&B

## 2024-10-09 PROCEDURE — 80307 DRUG TEST PRSMV CHEM ANLYZR: CPT

## 2024-10-09 PROCEDURE — 84100 ASSAY OF PHOSPHORUS: CPT

## 2024-10-09 PROCEDURE — 84295 ASSAY OF SERUM SODIUM: CPT

## 2024-10-09 PROCEDURE — 36556 INSERT NON-TUNNEL CV CATH: CPT

## 2024-10-09 PROCEDURE — 6360000002 HC RX W HCPCS: Performed by: STUDENT IN AN ORGANIZED HEALTH CARE EDUCATION/TRAINING PROGRAM

## 2024-10-09 PROCEDURE — 82330 ASSAY OF CALCIUM: CPT

## 2024-10-09 PROCEDURE — 70450 CT HEAD/BRAIN W/O DYE: CPT

## 2024-10-09 PROCEDURE — 2500000003 HC RX 250 WO HCPCS: Performed by: STUDENT IN AN ORGANIZED HEALTH CARE EDUCATION/TRAINING PROGRAM

## 2024-10-09 PROCEDURE — 80053 COMPREHEN METABOLIC PANEL: CPT

## 2024-10-09 PROCEDURE — 87641 MR-STAPH DNA AMP PROBE: CPT

## 2024-10-09 PROCEDURE — 81001 URINALYSIS AUTO W/SCOPE: CPT

## 2024-10-09 PROCEDURE — 99291 CRITICAL CARE FIRST HOUR: CPT

## 2024-10-09 PROCEDURE — 94002 VENT MGMT INPAT INIT DAY: CPT

## 2024-10-09 PROCEDURE — 84540 ASSAY OF URINE/UREA-N: CPT

## 2024-10-09 PROCEDURE — 0BH17EZ INSERTION OF ENDOTRACHEAL AIRWAY INTO TRACHEA, VIA NATURAL OR ARTIFICIAL OPENING: ICD-10-PCS | Performed by: INTERNAL MEDICINE

## 2024-10-09 PROCEDURE — 93005 ELECTROCARDIOGRAM TRACING: CPT | Performed by: EMERGENCY MEDICINE

## 2024-10-09 PROCEDURE — 31500 INSERT EMERGENCY AIRWAY: CPT

## 2024-10-09 PROCEDURE — 99292 CRITICAL CARE ADDL 30 MIN: CPT

## 2024-10-09 PROCEDURE — 51702 INSERT TEMP BLADDER CATH: CPT

## 2024-10-09 PROCEDURE — 82077 ASSAY SPEC XCP UR&BREATH IA: CPT

## 2024-10-09 RX ORDER — THIAMINE HYDROCHLORIDE 100 MG/ML
100 INJECTION, SOLUTION INTRAMUSCULAR; INTRAVENOUS DAILY
Status: DISCONTINUED | OUTPATIENT
Start: 2024-10-10 | End: 2024-10-10

## 2024-10-09 RX ORDER — POTASSIUM CHLORIDE 7.45 MG/ML
10 INJECTION INTRAVENOUS ONCE
Status: COMPLETED | OUTPATIENT
Start: 2024-10-09 | End: 2024-10-09

## 2024-10-09 RX ORDER — PHENOBARBITAL SODIUM 65 MG/ML
32.5 INJECTION, SOLUTION INTRAMUSCULAR; INTRAVENOUS EVERY 6 HOURS PRN
Status: DISPENSED | OUTPATIENT
Start: 2024-10-10 | End: 2024-10-12

## 2024-10-09 RX ORDER — SODIUM CHLORIDE 9 MG/ML
INJECTION, SOLUTION INTRAVENOUS PRN
Status: DISCONTINUED | OUTPATIENT
Start: 2024-10-09 | End: 2024-10-14 | Stop reason: HOSPADM

## 2024-10-09 RX ORDER — SUCCINYLCHOLINE CHLORIDE 20 MG/ML
INJECTION INTRAMUSCULAR; INTRAVENOUS DAILY PRN
Status: COMPLETED | OUTPATIENT
Start: 2024-10-09 | End: 2024-10-09

## 2024-10-09 RX ORDER — SODIUM CHLORIDE 0.9 % (FLUSH) 0.9 %
5-40 SYRINGE (ML) INJECTION EVERY 12 HOURS SCHEDULED
Status: DISCONTINUED | OUTPATIENT
Start: 2024-10-09 | End: 2024-10-14 | Stop reason: HOSPADM

## 2024-10-09 RX ORDER — MIDAZOLAM HYDROCHLORIDE 1 MG/ML
4 INJECTION INTRAMUSCULAR; INTRAVENOUS ONCE
Status: COMPLETED | OUTPATIENT
Start: 2024-10-09 | End: 2024-10-09

## 2024-10-09 RX ORDER — POTASSIUM CHLORIDE 29.8 MG/ML
20 INJECTION INTRAVENOUS
Status: COMPLETED | OUTPATIENT
Start: 2024-10-09 | End: 2024-10-10

## 2024-10-09 RX ORDER — DEXMEDETOMIDINE HYDROCHLORIDE 4 UG/ML
.1-1.5 INJECTION, SOLUTION INTRAVENOUS CONTINUOUS
Status: DISCONTINUED | OUTPATIENT
Start: 2024-10-09 | End: 2024-10-11

## 2024-10-09 RX ORDER — MAGNESIUM SULFATE IN WATER 40 MG/ML
2000 INJECTION, SOLUTION INTRAVENOUS ONCE
Status: COMPLETED | OUTPATIENT
Start: 2024-10-09 | End: 2024-10-09

## 2024-10-09 RX ORDER — LORAZEPAM 2 MG/ML
1 INJECTION INTRAMUSCULAR ONCE
Status: COMPLETED | OUTPATIENT
Start: 2024-10-09 | End: 2024-10-09

## 2024-10-09 RX ORDER — FENTANYL CITRATE-0.9 % NACL/PF 10 MCG/ML
25-200 PLASTIC BAG, INJECTION (ML) INTRAVENOUS CONTINUOUS
Status: DISCONTINUED | OUTPATIENT
Start: 2024-10-09 | End: 2024-10-10

## 2024-10-09 RX ORDER — PHENOBARBITAL SODIUM 65 MG/ML
65 INJECTION, SOLUTION INTRAMUSCULAR; INTRAVENOUS EVERY 6 HOURS PRN
Status: ACTIVE | OUTPATIENT
Start: 2024-10-09 | End: 2024-10-10

## 2024-10-09 RX ORDER — PHENOBARBITAL SODIUM 65 MG/ML
16.2 INJECTION, SOLUTION INTRAMUSCULAR; INTRAVENOUS EVERY 6 HOURS PRN
Status: ACTIVE | OUTPATIENT
Start: 2024-10-12 | End: 2024-10-13

## 2024-10-09 RX ORDER — FENTANYL CITRATE-0.9 % NACL/PF 10 MCG/ML
25-200 PLASTIC BAG, INJECTION (ML) INTRAVENOUS CONTINUOUS
Status: DISCONTINUED | OUTPATIENT
Start: 2024-10-09 | End: 2024-10-09

## 2024-10-09 RX ORDER — SODIUM CHLORIDE 0.9 % (FLUSH) 0.9 %
5-40 SYRINGE (ML) INJECTION PRN
Status: DISCONTINUED | OUTPATIENT
Start: 2024-10-09 | End: 2024-10-14 | Stop reason: HOSPADM

## 2024-10-09 RX ORDER — PROPOFOL 10 MG/ML
INJECTION, EMULSION INTRAVENOUS
Status: COMPLETED
Start: 2024-10-09 | End: 2024-10-09

## 2024-10-09 RX ORDER — LIDOCAINE HYDROCHLORIDE AND EPINEPHRINE 10; 10 MG/ML; UG/ML
INJECTION, SOLUTION INFILTRATION; PERINEURAL
Status: COMPLETED
Start: 2024-10-09 | End: 2024-10-09

## 2024-10-09 RX ORDER — FENTANYL CITRATE 50 UG/ML
50 INJECTION, SOLUTION INTRAMUSCULAR; INTRAVENOUS ONCE
Status: COMPLETED | OUTPATIENT
Start: 2024-10-09 | End: 2024-10-09

## 2024-10-09 RX ORDER — FENTANYL CITRATE 50 UG/ML
INJECTION, SOLUTION INTRAMUSCULAR; INTRAVENOUS
Status: COMPLETED
Start: 2024-10-09 | End: 2024-10-09

## 2024-10-09 RX ORDER — PROPOFOL 10 MG/ML
5-50 INJECTION, EMULSION INTRAVENOUS CONTINUOUS
Status: DISCONTINUED | OUTPATIENT
Start: 2024-10-09 | End: 2024-10-10

## 2024-10-09 RX ORDER — 3% SODIUM CHLORIDE 3 G/100ML
25 INJECTION, SOLUTION INTRAVENOUS CONTINUOUS
Status: DISCONTINUED | OUTPATIENT
Start: 2024-10-09 | End: 2024-10-09 | Stop reason: ALTCHOICE

## 2024-10-09 RX ORDER — MULTIVITAMIN WITH IRON
1 TABLET ORAL DAILY
Status: DISCONTINUED | OUTPATIENT
Start: 2024-10-10 | End: 2024-10-14 | Stop reason: HOSPADM

## 2024-10-09 RX ORDER — DESMOPRESSIN ACETATE 4 UG/ML
2 INJECTION, SOLUTION INTRAVENOUS; SUBCUTANEOUS ONCE
Status: COMPLETED | OUTPATIENT
Start: 2024-10-09 | End: 2024-10-09

## 2024-10-09 RX ORDER — ETOMIDATE 2 MG/ML
INJECTION INTRAVENOUS DAILY PRN
Status: COMPLETED | OUTPATIENT
Start: 2024-10-09 | End: 2024-10-09

## 2024-10-09 RX ORDER — FOLIC ACID 1 MG/1
1 TABLET ORAL DAILY
Status: DISCONTINUED | OUTPATIENT
Start: 2024-10-10 | End: 2024-10-14 | Stop reason: HOSPADM

## 2024-10-09 RX ORDER — SODIUM CHLORIDE 9 MG/ML
INJECTION, SOLUTION INTRAVENOUS CONTINUOUS
Status: DISCONTINUED | OUTPATIENT
Start: 2024-10-09 | End: 2024-10-09 | Stop reason: ALTCHOICE

## 2024-10-09 RX ADMIN — LORAZEPAM 1 MG: 2 INJECTION, SOLUTION INTRAMUSCULAR; INTRAVENOUS at 19:36

## 2024-10-09 RX ADMIN — Medication 50 MCG/HR: at 20:25

## 2024-10-09 RX ADMIN — Medication 100 MG: at 20:00

## 2024-10-09 RX ADMIN — SODIUM CHLORIDE: 9 INJECTION, SOLUTION INTRAVENOUS at 19:37

## 2024-10-09 RX ADMIN — SODIUM BICARBONATE 50 MEQ: 84 INJECTION, SOLUTION INTRAVENOUS at 19:50

## 2024-10-09 RX ADMIN — MIDAZOLAM 4 MG: 1 INJECTION INTRAMUSCULAR; INTRAVENOUS at 20:52

## 2024-10-09 RX ADMIN — POTASSIUM CHLORIDE 20 MEQ: 29.8 INJECTION, SOLUTION INTRAVENOUS at 22:30

## 2024-10-09 RX ADMIN — FENTANYL CITRATE 50 MCG: 50 INJECTION, SOLUTION INTRAMUSCULAR; INTRAVENOUS at 20:20

## 2024-10-09 RX ADMIN — Medication 0.4 MCG/KG/HR: at 22:35

## 2024-10-09 RX ADMIN — LIDOCAINE HYDROCHLORIDE AND EPINEPHRINE: 10; 10 INJECTION, SOLUTION INFILTRATION; PERINEURAL at 21:22

## 2024-10-09 RX ADMIN — DEXTROSE MONOHYDRATE 500 ML: 50 INJECTION, SOLUTION INTRAVENOUS at 22:42

## 2024-10-09 RX ADMIN — POTASSIUM CHLORIDE 10 MEQ: 7.46 INJECTION, SOLUTION INTRAVENOUS at 21:10

## 2024-10-09 RX ADMIN — Medication 75 MCG/HR: at 21:49

## 2024-10-09 RX ADMIN — SODIUM CHLORIDE, PRESERVATIVE FREE 10 ML: 5 INJECTION INTRAVENOUS at 22:44

## 2024-10-09 RX ADMIN — POTASSIUM CHLORIDE 20 MEQ: 29.8 INJECTION, SOLUTION INTRAVENOUS at 23:22

## 2024-10-09 RX ADMIN — FENTANYL CITRATE 50 MCG: 50 INJECTION, SOLUTION INTRAMUSCULAR; INTRAVENOUS at 20:41

## 2024-10-09 RX ADMIN — DESMOPRESSIN ACETATE 2 MCG: 4 INJECTION, SOLUTION INTRAVENOUS; SUBCUTANEOUS at 23:23

## 2024-10-09 RX ADMIN — PROPOFOL 20 MCG/KG/MIN: 10 INJECTION, EMULSION INTRAVENOUS at 20:06

## 2024-10-09 RX ADMIN — SODIUM BICARBONATE 50 MEQ: 84 INJECTION, SOLUTION INTRAVENOUS at 19:49

## 2024-10-09 RX ADMIN — MAGNESIUM SULFATE HEPTAHYDRATE 2000 MG: 40 INJECTION, SOLUTION INTRAVENOUS at 21:19

## 2024-10-09 RX ADMIN — ETOMIDATE 20 MG: 2 INJECTION INTRAVENOUS at 19:59

## 2024-10-09 ASSESSMENT — PULMONARY FUNCTION TESTS: PIF_VALUE: 19

## 2024-10-09 ASSESSMENT — PAIN - FUNCTIONAL ASSESSMENT: PAIN_FUNCTIONAL_ASSESSMENT: NONE - DENIES PAIN

## 2024-10-09 NOTE — ED TRIAGE NOTES
Pt arrives via EMS with c/o a fall. Pt states she was getting up. Pt has a laceration above the left eye. Pt alert and oriented x4 at this time. Pt denies taking any blood thinners. Pt states she had 2 beers and 2 shots prior to arrival.

## 2024-10-10 ENCOUNTER — APPOINTMENT (OUTPATIENT)
Dept: GENERAL RADIOLOGY | Age: 54
DRG: 426 | End: 2024-10-10
Payer: COMMERCIAL

## 2024-10-10 PROBLEM — J96.00 ACUTE RESPIRATORY FAILURE: Status: ACTIVE | Noted: 2024-10-10

## 2024-10-10 LAB
ALBUMIN SERPL BCG-MCNC: 3.3 G/DL (ref 3.5–5.1)
ALP SERPL-CCNC: 94 U/L (ref 38–126)
ALT SERPL W/O P-5'-P-CCNC: 27 U/L (ref 11–66)
AMPHETAMINES UR QL SCN: NEGATIVE
ANION GAP SERPL CALC-SCNC: 7 MEQ/L (ref 8–16)
ANION GAP SERPL CALC-SCNC: 8 MEQ/L (ref 8–16)
APTT PPP: 30.1 SECONDS (ref 22–38)
AST SERPL-CCNC: 87 U/L (ref 5–40)
B-OH-BUTYR SERPL-MSCNC: 6.41 MG/DL (ref 0.2–2.81)
BACTERIA URNS QL MICRO: ABNORMAL /HPF
BARBITURATES UR QL SCN: NEGATIVE
BENZODIAZ UR QL SCN: POSITIVE
BILIRUB CONJ SERPL-MCNC: 0.3 MG/DL (ref 0.1–13.8)
BILIRUB SERPL-MCNC: 0.6 MG/DL (ref 0.3–1.2)
BILIRUB UR QL STRIP.AUTO: NEGATIVE
BUN SERPL-MCNC: 5 MG/DL (ref 7–22)
BUN SERPL-MCNC: 6 MG/DL (ref 7–22)
BZE UR QL SCN: NEGATIVE
CA-I BLD ISE-SCNC: 0.91 MMOL/L (ref 1.12–1.32)
CA-I BLD ISE-SCNC: 1.08 MMOL/L (ref 1.12–1.32)
CALCIUM SERPL-MCNC: 7.5 MG/DL (ref 8.5–10.5)
CALCIUM SERPL-MCNC: 8.2 MG/DL (ref 8.5–10.5)
CANNABINOIDS UR QL SCN: POSITIVE
CASTS #/AREA URNS LPF: ABNORMAL /LPF
CASTS 2: ABNORMAL /LPF
CFT BLD TEG: 1.8 MINUTES (ref 0.8–2.1)
CHARACTER UR: CLEAR
CHLORIDE 24H UR-SRATE: 35 MEQ/L
CHLORIDE SERPL-SCNC: 67 MEQ/L (ref 98–111)
CHLORIDE SERPL-SCNC: 71 MEQ/L (ref 98–111)
CK SERPL-CCNC: 1427 U/L (ref 30–135)
CLOT ANGLE BLD TEG: 16 MM (ref 15–32)
CLOT ANGLE BLD TEG: 16.2 MM (ref 15–32)
CLOT ANGLE BLD TEG: 70.3 DEG (ref 63–78)
CLOT INIT BLD TEG: 5.4 MINUTES (ref 4.6–9.1)
CLOT INIT BLD TEG: 5.5 MINUTES (ref 4.6–9.1)
CLOT INIT P HPASE BLD TEG: 5.5 MINUTES (ref 4.3–8.3)
CLOT LYSIS 30M P MA LENFR BLD TEG: 2 % (ref 0–2.6)
CO2 SERPL-SCNC: 33 MEQ/L (ref 23–33)
CO2 SERPL-SCNC: 34 MEQ/L (ref 23–33)
COLOR, UA: YELLOW
CREAT SERPL-MCNC: 0.3 MG/DL (ref 0.4–1.2)
CREAT SERPL-MCNC: 0.3 MG/DL (ref 0.4–1.2)
CREAT UR-MCNC: 110.8 MG/DL
CREAT UR-MCNC: 81.9 MG/DL
CRYSTALS URNS MICRO: ABNORMAL
D DIMER PPP IA.FEU-MCNC: 7575 NG/ML FEU (ref 0–500)
DEPRECATED RDW RBC AUTO: 41 FL (ref 35–45)
DEPRECATED RDW RBC AUTO: 42.4 FL (ref 35–45)
EPITHELIAL CELLS, UA: ABNORMAL /HPF
ERYTHROCYTE [DISTWIDTH] IN BLOOD BY AUTOMATED COUNT: 12.6 % (ref 11.5–14.5)
ERYTHROCYTE [DISTWIDTH] IN BLOOD BY AUTOMATED COUNT: 12.9 % (ref 11.5–14.5)
ETHANOL SERPL-MCNC: < 0.01 % (ref 0–0.08)
FENTANYL: POSITIVE
FIBRINOGEN PPP-MCNC: 210 MG/100ML (ref 155–475)
FSP PPP LA-ACNC: ABNORMAL MCG/ML
FUNCT FIBRINOGEN LEVEL: 292 MG/DL (ref 278–581)
GFR SERPL CREATININE-BSD FRML MDRD: > 90 ML/MIN/1.73M2
GFR SERPL CREATININE-BSD FRML MDRD: > 90 ML/MIN/1.73M2
GLUCOSE SERPL-MCNC: 66 MG/DL (ref 70–108)
GLUCOSE SERPL-MCNC: 91 MG/DL (ref 70–108)
GLUCOSE UR QL STRIP.AUTO: NEGATIVE MG/DL
HCT VFR BLD AUTO: 24.3 % (ref 37–47)
HCT VFR BLD AUTO: 24.5 % (ref 37–47)
HGB BLD-MCNC: 8.8 GM/DL (ref 12–16)
HGB BLD-MCNC: 8.8 GM/DL (ref 12–16)
HGB RETIC QN AUTO: 38.3 PG (ref 28.2–35.7)
HGB UR QL STRIP.AUTO: NEGATIVE
IMM RETICS NFR: 8.3 % (ref 3–15.9)
INR PPP: 1.06 (ref 0.85–1.13)
KETONES UR QL STRIP.AUTO: 15
LDH SERPL L TO P-CCNC: 303 U/L (ref 100–190)
MA AA BLD RES TEG: < 8 MM (ref 51–71)
MA ACTF BLD RES TEG: 2.2 MM (ref 2–19)
MA ADP BLD RES TEG: < 10 MM (ref 45–69)
MA KAOLIN P HPASE BLD RES TEG: 54.7 MM (ref 53–68)
MAGNESIUM SERPL-MCNC: 2.1 MG/DL (ref 1.6–2.4)
MAGNESIUM SERPL-MCNC: 2.3 MG/DL (ref 1.6–2.4)
MCF BLD TEG: 51.7 MM (ref 52–69)
MCF.EXTRINSIC BLD ROTEM: 50.3 MM (ref 52–70)
MCF.EXTRINSIC BLD ROTEM: 50.5 MM (ref 52–70)
MCH RBC QN AUTO: 31.9 PG (ref 26–33)
MCH RBC QN AUTO: 33 PG (ref 26–33)
MCHC RBC AUTO-ENTMCNC: 35.9 GM/DL (ref 32.2–35.5)
MCHC RBC AUTO-ENTMCNC: 36.2 GM/DL (ref 32.2–35.5)
MCV RBC AUTO: 88.8 FL (ref 81–99)
MCV RBC AUTO: 91 FL (ref 81–99)
MISCELLANEOUS 2: ABNORMAL
MRSA DNA SPEC QL NAA+PROBE: POSITIVE
NITRITE UR QL STRIP: NEGATIVE
OPIATES UR QL SCN: NEGATIVE
ORIGINAL SAMPLE NUMBER: NORMAL
ORIGINAL SAMPLE NUMBER: NORMAL
OSMOLALITY SERPL: NORMAL MOSMOL/KG (ref 275–295)
OSMOLALITY SERPL: NORMAL MOSMOL/KG (ref 275–295)
OSMOLALITY UR: NORMAL MOSMOL/KG (ref 250–750)
OSMOLALITY UR: NORMAL MOSMOL/KG (ref 250–750)
OXYCODONE: NEGATIVE
PCP UR QL SCN: NEGATIVE
PH UR STRIP.AUTO: 6.5 [PH] (ref 5–9)
PHOSPHATE SERPL-MCNC: 1.7 MG/DL (ref 2.4–4.7)
PLATELET # BLD AUTO: 92 THOU/MM3 (ref 130–400)
PLATELET # BLD AUTO: 98 THOU/MM3 (ref 130–400)
PMV BLD AUTO: 10.4 FL (ref 9.4–12.4)
PMV BLD AUTO: 11.1 FL (ref 9.4–12.4)
POTASSIUM SERPL-SCNC: 3.4 MEQ/L (ref 3.5–5.2)
POTASSIUM SERPL-SCNC: 3.9 MEQ/L (ref 3.5–5.2)
PROT SERPL-MCNC: 5.4 G/DL (ref 6.1–8)
PROT UR STRIP.AUTO-MCNC: 30 MG/DL
RBC # BLD AUTO: 2.67 MILL/MM3 (ref 4.2–5.4)
RBC # BLD AUTO: 2.76 MILL/MM3 (ref 4.2–5.4)
RBC URINE: ABNORMAL /HPF
REFERENCE LOCATION: NORMAL
REFERENCE LOCATION: NORMAL
REFERENCE RANGE: NORMAL
REFERENCE RANGE: NORMAL
RENAL EPI CELLS #/AREA URNS HPF: ABNORMAL /[HPF]
RETICS # AUTO: 69 THOU/MM3 (ref 20–115)
RETICS/RBC NFR AUTO: 2.6 % (ref 0.5–2)
SCAN OF BLOOD SMEAR: NORMAL
SODIUM SERPL-SCNC: 105 MEQ/L (ref 135–145)
SODIUM SERPL-SCNC: 105 MEQ/L (ref 135–145)
SODIUM SERPL-SCNC: 106 MEQ/L (ref 135–145)
SODIUM SERPL-SCNC: 106 MEQ/L (ref 135–145)
SODIUM SERPL-SCNC: 107 MEQ/L (ref 135–145)
SODIUM SERPL-SCNC: 108 MEQ/L (ref 135–145)
SODIUM SERPL-SCNC: 108 MEQ/L (ref 135–145)
SODIUM SERPL-SCNC: 110 MEQ/L (ref 135–145)
SODIUM SERPL-SCNC: 110 MEQ/L (ref 135–145)
SODIUM SERPL-SCNC: 111 MEQ/L (ref 135–145)
SODIUM SERPL-SCNC: 111 MEQ/L (ref 135–145)
SODIUM SERPL-SCNC: 112 MEQ/L (ref 135–145)
SODIUM UR-SCNC: 25 MEQ/L
SODIUM UR-SCNC: 41 MEQ/L
SP GR UR REFRACT.AUTO: 1.02 (ref 1–1.03)
TEST RESULTS WITH UNITS: 266
TEST RESULTS WITH UNITS: 633
TEST(S) BEING PERFORMED: NORMAL
TEST(S) BEING PERFORMED: NORMAL
UROBILINOGEN, URINE: 0.2 EU/DL (ref 0–1)
UUN 24H UR-MCNC: 340 MG/DL
UUN 24H UR-MCNC: 439 MG/DL
WBC # BLD AUTO: 8 THOU/MM3 (ref 4.8–10.8)
WBC # BLD AUTO: 9.8 THOU/MM3 (ref 4.8–10.8)
WBC #/AREA URNS HPF: ABNORMAL /HPF
WBC #/AREA URNS HPF: NEGATIVE /[HPF]
YEAST LIKE FUNGI URNS QL MICRO: ABNORMAL

## 2024-10-10 PROCEDURE — 84100 ASSAY OF PHOSPHORUS: CPT

## 2024-10-10 PROCEDURE — 2000000000 HC ICU R&B

## 2024-10-10 PROCEDURE — 99223 1ST HOSP IP/OBS HIGH 75: CPT | Performed by: INTERNAL MEDICINE

## 2024-10-10 PROCEDURE — 6360000002 HC RX W HCPCS: Performed by: STUDENT IN AN ORGANIZED HEALTH CARE EDUCATION/TRAINING PROGRAM

## 2024-10-10 PROCEDURE — 6370000000 HC RX 637 (ALT 250 FOR IP)

## 2024-10-10 PROCEDURE — 94003 VENT MGMT INPAT SUBQ DAY: CPT

## 2024-10-10 PROCEDURE — 83935 ASSAY OF URINE OSMOLALITY: CPT

## 2024-10-10 PROCEDURE — 85610 PROTHROMBIN TIME: CPT

## 2024-10-10 PROCEDURE — 2580000003 HC RX 258: Performed by: STUDENT IN AN ORGANIZED HEALTH CARE EDUCATION/TRAINING PROGRAM

## 2024-10-10 PROCEDURE — 2500000003 HC RX 250 WO HCPCS

## 2024-10-10 PROCEDURE — 85384 FIBRINOGEN ACTIVITY: CPT

## 2024-10-10 PROCEDURE — 92610 EVALUATE SWALLOWING FUNCTION: CPT

## 2024-10-10 PROCEDURE — 82077 ASSAY SPEC XCP UR&BREATH IA: CPT

## 2024-10-10 PROCEDURE — 85390 FIBRINOLYSINS SCREEN I&R: CPT

## 2024-10-10 PROCEDURE — 6360000002 HC RX W HCPCS

## 2024-10-10 PROCEDURE — 84300 ASSAY OF URINE SODIUM: CPT

## 2024-10-10 PROCEDURE — 85730 THROMBOPLASTIN TIME PARTIAL: CPT

## 2024-10-10 PROCEDURE — 99291 CRITICAL CARE FIRST HOUR: CPT | Performed by: INTERNAL MEDICINE

## 2024-10-10 PROCEDURE — 82550 ASSAY OF CK (CPK): CPT

## 2024-10-10 PROCEDURE — 84540 ASSAY OF URINE/UREA-N: CPT

## 2024-10-10 PROCEDURE — 71045 X-RAY EXAM CHEST 1 VIEW: CPT

## 2024-10-10 PROCEDURE — 82570 ASSAY OF URINE CREATININE: CPT

## 2024-10-10 PROCEDURE — 2580000003 HC RX 258

## 2024-10-10 PROCEDURE — 82248 BILIRUBIN DIRECT: CPT

## 2024-10-10 PROCEDURE — 82330 ASSAY OF CALCIUM: CPT

## 2024-10-10 PROCEDURE — 87040 BLOOD CULTURE FOR BACTERIA: CPT

## 2024-10-10 PROCEDURE — 83615 LACTATE (LD) (LDH) ENZYME: CPT

## 2024-10-10 PROCEDURE — 84295 ASSAY OF SERUM SODIUM: CPT

## 2024-10-10 PROCEDURE — 36415 COLL VENOUS BLD VENIPUNCTURE: CPT

## 2024-10-10 PROCEDURE — 83930 ASSAY OF BLOOD OSMOLALITY: CPT

## 2024-10-10 PROCEDURE — 83735 ASSAY OF MAGNESIUM: CPT

## 2024-10-10 PROCEDURE — 2580000003 HC RX 258: Performed by: INTERNAL MEDICINE

## 2024-10-10 PROCEDURE — 82436 ASSAY OF URINE CHLORIDE: CPT

## 2024-10-10 PROCEDURE — 85576 BLOOD PLATELET AGGREGATION: CPT

## 2024-10-10 PROCEDURE — 85027 COMPLETE CBC AUTOMATED: CPT

## 2024-10-10 PROCEDURE — 94761 N-INVAS EAR/PLS OXIMETRY MLT: CPT

## 2024-10-10 PROCEDURE — 92526 ORAL FUNCTION THERAPY: CPT

## 2024-10-10 PROCEDURE — 85379 FIBRIN DEGRADATION QUANT: CPT

## 2024-10-10 PROCEDURE — 85362 FIBRIN DEGRADATION PRODUCTS: CPT

## 2024-10-10 PROCEDURE — 85046 RETICYTE/HGB CONCENTRATE: CPT

## 2024-10-10 PROCEDURE — 82010 KETONE BODYS QUAN: CPT

## 2024-10-10 PROCEDURE — 85347 COAGULATION TIME ACTIVATED: CPT

## 2024-10-10 PROCEDURE — 2700000000 HC OXYGEN THERAPY PER DAY

## 2024-10-10 PROCEDURE — 83010 ASSAY OF HAPTOGLOBIN QUANT: CPT

## 2024-10-10 PROCEDURE — 80053 COMPREHEN METABOLIC PANEL: CPT

## 2024-10-10 RX ORDER — POTASSIUM CHLORIDE 7.45 MG/ML
10 INJECTION INTRAVENOUS PRN
Status: DISCONTINUED | OUTPATIENT
Start: 2024-10-10 | End: 2024-10-14 | Stop reason: HOSPADM

## 2024-10-10 RX ORDER — CALCIUM GLUCONATE 20 MG/ML
2000 INJECTION, SOLUTION INTRAVENOUS PRN
Status: DISCONTINUED | OUTPATIENT
Start: 2024-10-10 | End: 2024-10-14 | Stop reason: HOSPADM

## 2024-10-10 RX ORDER — ENOXAPARIN SODIUM 100 MG/ML
30 INJECTION SUBCUTANEOUS DAILY
Status: DISCONTINUED | OUTPATIENT
Start: 2024-10-10 | End: 2024-10-10 | Stop reason: SDUPTHER

## 2024-10-10 RX ORDER — POTASSIUM CHLORIDE 29.8 MG/ML
20 INJECTION INTRAVENOUS PRN
Status: DISCONTINUED | OUTPATIENT
Start: 2024-10-10 | End: 2024-10-14 | Stop reason: HOSPADM

## 2024-10-10 RX ORDER — ACETAMINOPHEN 650 MG/1
650 SUPPOSITORY RECTAL EVERY 6 HOURS PRN
Status: DISCONTINUED | OUTPATIENT
Start: 2024-10-10 | End: 2024-10-14 | Stop reason: HOSPADM

## 2024-10-10 RX ORDER — LANOLIN ALCOHOL/MO/W.PET/CERES
100 CREAM (GRAM) TOPICAL DAILY
Status: DISCONTINUED | OUTPATIENT
Start: 2024-10-11 | End: 2024-10-14 | Stop reason: HOSPADM

## 2024-10-10 RX ORDER — ACETAMINOPHEN 325 MG/1
650 TABLET ORAL EVERY 6 HOURS PRN
Status: DISCONTINUED | OUTPATIENT
Start: 2024-10-10 | End: 2024-10-14 | Stop reason: HOSPADM

## 2024-10-10 RX ORDER — ONDANSETRON 4 MG/1
4 TABLET, ORALLY DISINTEGRATING ORAL EVERY 8 HOURS PRN
Status: DISCONTINUED | OUTPATIENT
Start: 2024-10-10 | End: 2024-10-14 | Stop reason: HOSPADM

## 2024-10-10 RX ORDER — SODIUM CHLORIDE 9 MG/ML
INJECTION, SOLUTION INTRAVENOUS PRN
Status: DISCONTINUED | OUTPATIENT
Start: 2024-10-10 | End: 2024-10-10 | Stop reason: SDUPTHER

## 2024-10-10 RX ORDER — ONDANSETRON 2 MG/ML
4 INJECTION INTRAMUSCULAR; INTRAVENOUS EVERY 6 HOURS PRN
Status: DISCONTINUED | OUTPATIENT
Start: 2024-10-10 | End: 2024-10-14 | Stop reason: HOSPADM

## 2024-10-10 RX ORDER — SODIUM CHLORIDE 9 MG/ML
INJECTION, SOLUTION INTRAVENOUS CONTINUOUS
Status: DISCONTINUED | OUTPATIENT
Start: 2024-10-10 | End: 2024-10-11

## 2024-10-10 RX ORDER — ENOXAPARIN SODIUM 100 MG/ML
30 INJECTION SUBCUTANEOUS DAILY
Status: DISCONTINUED | OUTPATIENT
Start: 2024-10-10 | End: 2024-10-14 | Stop reason: HOSPADM

## 2024-10-10 RX ORDER — SODIUM CHLORIDE 0.9 % (FLUSH) 0.9 %
5-40 SYRINGE (ML) INJECTION PRN
Status: DISCONTINUED | OUTPATIENT
Start: 2024-10-10 | End: 2024-10-10 | Stop reason: SDUPTHER

## 2024-10-10 RX ORDER — FERROUS SULFATE 325(65) MG
325 TABLET ORAL DAILY
Status: DISCONTINUED | OUTPATIENT
Start: 2024-10-10 | End: 2024-10-14 | Stop reason: HOSPADM

## 2024-10-10 RX ORDER — POLYETHYLENE GLYCOL 3350 17 G/17G
17 POWDER, FOR SOLUTION ORAL DAILY PRN
Status: DISCONTINUED | OUTPATIENT
Start: 2024-10-10 | End: 2024-10-14 | Stop reason: HOSPADM

## 2024-10-10 RX ORDER — SODIUM CHLORIDE 0.9 % (FLUSH) 0.9 %
5-40 SYRINGE (ML) INJECTION EVERY 12 HOURS SCHEDULED
Status: DISCONTINUED | OUTPATIENT
Start: 2024-10-10 | End: 2024-10-10 | Stop reason: SDUPTHER

## 2024-10-10 RX ORDER — MAGNESIUM SULFATE IN WATER 40 MG/ML
2000 INJECTION, SOLUTION INTRAVENOUS PRN
Status: DISCONTINUED | OUTPATIENT
Start: 2024-10-10 | End: 2024-10-14 | Stop reason: HOSPADM

## 2024-10-10 RX ADMIN — POTASSIUM CHLORIDE 20 MEQ: 29.8 INJECTION, SOLUTION INTRAVENOUS at 03:00

## 2024-10-10 RX ADMIN — FERROUS SULFATE TAB 325 MG (65 MG ELEMENTAL FE) 325 MG: 325 (65 FE) TAB at 09:54

## 2024-10-10 RX ADMIN — Medication 1 TABLET: at 09:54

## 2024-10-10 RX ADMIN — SODIUM PHOSPHATE, MONOBASIC, MONOHYDRATE AND SODIUM PHOSPHATE, DIBASIC, ANHYDROUS 15 MMOL: 142; 276 INJECTION, SOLUTION INTRAVENOUS at 10:20

## 2024-10-10 RX ADMIN — DEXTROSE MONOHYDRATE 500 ML: 50 INJECTION, SOLUTION INTRAVENOUS at 12:20

## 2024-10-10 RX ADMIN — SODIUM CHLORIDE, PRESERVATIVE FREE 10 ML: 5 INJECTION INTRAVENOUS at 09:55

## 2024-10-10 RX ADMIN — DEXTROSE MONOHYDRATE 500 ML: 50 INJECTION, SOLUTION INTRAVENOUS at 10:17

## 2024-10-10 RX ADMIN — SODIUM CHLORIDE: 9 INJECTION, SOLUTION INTRAVENOUS at 23:32

## 2024-10-10 RX ADMIN — FOLIC ACID 1 MG: 1 TABLET ORAL at 09:54

## 2024-10-10 RX ADMIN — THIAMINE HYDROCHLORIDE 100 MG: 100 INJECTION, SOLUTION INTRAMUSCULAR; INTRAVENOUS at 09:54

## 2024-10-10 RX ADMIN — SODIUM CHLORIDE, PRESERVATIVE FREE 10 ML: 5 INJECTION INTRAVENOUS at 21:56

## 2024-10-10 RX ADMIN — CALCIUM GLUCONATE 2000 MG: 20 INJECTION, SOLUTION INTRAVENOUS at 03:03

## 2024-10-10 RX ADMIN — PHENOBARBITAL SODIUM 32.5 MG: 65 INJECTION INTRAMUSCULAR at 21:55

## 2024-10-10 RX ADMIN — POTASSIUM CHLORIDE 20 MEQ: 29.8 INJECTION, SOLUTION INTRAVENOUS at 04:02

## 2024-10-10 RX ADMIN — ENOXAPARIN SODIUM 30 MG: 100 INJECTION SUBCUTANEOUS at 12:27

## 2024-10-10 ASSESSMENT — PULMONARY FUNCTION TESTS: PIF_VALUE: 17

## 2024-10-10 NOTE — ED NOTES
Bedside report received from [facility] at this time. Pt visibly agitated rolling in bed and attempting to pull off wires.

## 2024-10-10 NOTE — H&P
adenopathy.  Neurologic: Opens eyes spontaneously, moving all four extremities spontaneously, following commands, unable to assess orientation    Data: (All radiographs, tracings, PFTs, and imaging are personally viewed and interpreted unless otherwise noted).   BMP at 1810 sodium 102, potassium 2.9, chloride 61, bicarb 19, BUN 5, creatinine 0.2, anion gap 22, EGFR greater than 90, magnesium 1.5, glucose 102, calcium 7.9, calculated osmolality 206.2, total protein 6.4  BMP at 2009 sodium 110, potassium 3.3, chloride 62, bicarb 19, BUN 5, creatinine 0.3, anion gap 29, ionized calcium 0.66, EGFR greater than 90, magnesium 1.5, glucose 125, calcium 7.6, calculated osmolality 222.3, phosphorus 2.2  BMP at 2107 sodium 107, potassium 2.7, chloride 64, bicarb 23, BUN 5, creatinine 0.3, anion gap 23, EGFR greater than 90, glucose 83, calcium 7.2, osmolality 220.0, glucose 83  UDS positive for benzodiazepine, cannabinoids, fentanyl  CBC WBC 12, Hb 11.7, HCT 32.8, MCV 90.4, platelets 135  Ethanol 0.14  LFT albumin 3.6, alkaline phosphatase 113, ALT 29, , total bilirubin 0.6, total protein 6.4  Urinalysis showing 15 ketones, specific gravity 1.025, 30 protein, 0.2 urobilinogen, pH 6.5, casts 4-8, RBC 0-2, WBC 0-2, epithelial cells 0-2, no bacteria  Imaging:  CT cervical spine without contrast showing no evidence of acute traumatic cervical spine injury  CT head without contrast showing global cerebral volume loss and chronic microvascular ischemic changes; left periorbital laceration and soft tissue swelling without acute intraorbital abnormality  CXR showing endotracheal tube 4.5 cm above the [place] with NG tube extending into stomach with no evidence of acute pathology  Repeat CXR showing right IJ central line terminating within the upper SVC near the brachiocephalic confluence with no acute pathology    Meets Continued ICU Level Care Criteria:    [x] Yes   [] No - Transfer Planned to listed location:  [] HOSPITALIST

## 2024-10-10 NOTE — CARE COORDINATION
Case Management Assessment Initial Evaluation    Date/Time of Evaluation: 10/10/2024 8:20 AM  Assessment Completed by: Heather Vásquez RN    If patient is discharged prior to next notation, then this note serves as note for discharge by case management.    Patient Name: Rivka Zavala                   YOB: 1970  Diagnosis: Chronic hyponatremia [E87.1]  Hyponatremia [E87.1]  Acute respiratory failure, unspecified whether with hypoxia or hypercapnia [J96.00]                   Date / Time: 10/9/2024  5:16 PM  Location: [facility] [place]     Patient Admission Status: Inpatient   Readmission Risk Low 0-14, Mod 15-19), High > 20: Readmission Risk Score: 20.2    Current PCP: Ben Montejo MD  Health Care Decision Makers:   Primary Decision Maker: Chauncey Valles [facility] - [phone]    Supplemental (Other) Decision Maker: Radha Valles [facility] - [phone]    Additional Case Management Notes: Presented to ED after a fall in which she hit her head. In ED, had decreased responsiveness and increased WOB. EOH 0.14. Urine tox +cannabinoids. Na+ 102. She was intubated for airway protection. Nephrology consulted. Admitted to ICU. Q2H Na+ levels. Extubated this morning.     On room air. Tmax 100.1. NSR. A&O. PT/OT. Addiction Services consulted. +MRSA nares. Intensivist and Nephrology following. Telemetry, seizure precautions, finley. Precedex @ 0.6 mcg/kg/hr, lovenox, ferrous sulfate, folic acid, mutivitamin, prn phenobarbital per CIWA scale, IV thiamine, Electrolyte replacement protocols. Received DDAVP x1 and 500 ml D5 bolus x1 yesterday. Received 500 ml D5 bolus x1 today. Blood, respiratory, and urine cultures sent. Na+ 102 - now 111, K+ 2.9 - now 3.9, BUN 19- now 33, ICal 0.66 - now 1.08, Mg 1.5- now 2.1, phos 2.2 - now 1.7, CK 1427, alb 3.3, ast 102 - now 87, total pro 5.4, beta-hydroxybutyrate 6.41, wbc 12 - now 8, hgb 11.7 - now 8.8, plt 135 - now 92.     Procedures:   10/9 Intubated  10/9 CVC

## 2024-10-10 NOTE — SIGNIFICANT EVENT
ED report overphone.   Dr Bertrand called.   Pt is intubated for tachypnea.   Sodium went up from 102 to 110. 2 amps of bicarb,  ml/hr given. 3% hypertonic on the way.   Rec.s: -stop ASAP all sodium containing IV fluids including 3% hypertonic, and NS.   -Repeat sodium serum in 10 min.s  -meanwhile stat consult Ne[hrology since pt is HIGH risk for ODS.  -then we will happy to accommodate  -ED charge RN called by me about instructions regarding IV fluids and sodium re-check.

## 2024-10-10 NOTE — CARE COORDINATION
10/10/24, 11:07 AM EDT    DISCHARGE PLANNING EVALUATION    Received Social Work consult “For consideration of Rehab”.  Addiction/NELDA  consulted.   met with patient, following for needs.  Full Social Consult deferred.

## 2024-10-10 NOTE — ED NOTES
Pt continues to answer questions appropriately. Pt arms and legs rigid with jerking movements. Pt breathing labored with grunting and drooling noted.

## 2024-10-10 NOTE — CONSULTS
Pt declined to complete addiction consult at this time. Denied a need for resources.    Brief Intervention and Referral to Treatment Summary     Patient was provided PHQ-9, AUDIT-C and DAST Screening:      PHQ-9 Score:    AUDIT-C Score:    DAST Score:       Patient’s substance use is considered:    Low Risk/Healthy  Moderate Risk  Harmful  Dependent    Patient’s depression is considered:    Minimal  Mild   Moderate  Moderately Severe  Severe    Brief Education Was Provided     Patient was receptive  Patient was not receptive      Brief Intervention Is Provided (Only for AUDIT-C or DAST)     Patient reports readiness to decrease and/or stop use and a plan was discussed   Patient denies readiness to decrease and/or stop use and a plan was not discussed    Recommendations/Referrals for Brief and/or Specialized Treatment Provided to Patient       
    Continuous Infusions:   propofol Stopped (10/09/24 2043)    sodium chloride      dexmedeTOMIDine 0.6 mcg/kg/hr (10/09/24 2333)    fentaNYL 50 mcg/hr (10/09/24 2220)       Review of Systems  Review of Systems   Unable to perform ROS: Mental status change    Physical Exam  Physical Exam  Vitals reviewed.   Constitutional:       Appearance: Normal appearance.      Comments: Cachectic and malnourished   HENT:      Head: Normocephalic and atraumatic.      Right Ear: External ear normal.      Left Ear: External ear normal.      Nose: Nose normal.      Mouth/Throat:      Mouth: Mucous membranes are moist.   Eyes:      General: No scleral icterus.        Right eye: No discharge.         Left eye: No discharge.      Conjunctiva/sclera: Conjunctivae normal.   Cardiovascular:      Rate and Rhythm: Regular rhythm. Tachycardia present.      Heart sounds: Normal heart sounds.   Pulmonary:      Effort: Pulmonary effort is normal. No respiratory distress.      Breath sounds: Normal breath sounds. No wheezing.   Abdominal:      General: There is no distension.      Palpations: Abdomen is soft.      Tenderness: There is no abdominal tenderness.   Musculoskeletal:         General: No swelling.      Cervical back: Normal range of motion and neck supple.      Right lower leg: No edema.      Left lower leg: No edema.   Skin:     General: Skin is warm and dry.      Findings: No rash.   Neurological:      General: No focal deficit present.      Mental Status: She is alert.   Psychiatric:         Mood and Affect: Mood normal.      Comments: Not agitated          BP (!) 104/54   Pulse 92   Temp 98.6 °F (37 °C)   Resp 18   Ht 1.626 m (5' 4\")   Wt 47.6 kg (105 lb)   LMP 08/14/2012   SpO2 100%   BMI 18.02 kg/m²   Labs, Radiology and Tests :  CBC:   Recent Labs     10/09/24  1810 10/10/24  0535   WBC 12.0* 8.0   HGB 11.7* 8.8*   HCT 32.8* 24.5*    92*     CMP:  Recent Labs     10/09/24  2009 10/09/24  2107 10/09/24  2330

## 2024-10-10 NOTE — ED PROVIDER NOTES
Intubation    Date/Time: 10/9/2024 8:54 PM    Performed by: Jim Martino MD  Authorized by: Yung Bertrand DO    Consent:     Consent obtained:  Emergent situation  Universal protocol:     Relevant documents present and verified: yes      Test results available: yes      Imaging studies available: yes      Required blood products, implants, devices, and special equipment available: yes      Immediately prior to procedure, a time out was called: yes      Patient identity confirmed:  Arm band  Pre-procedure details:     Indications: airway protection and altered consciousness      Patient status:  Altered mental status    Mouth opening - incisor distance:  2 finger widths    Hyoid-mental distance: 3 or more finger widths      Hyoid-thyroid distance: 2 or more finger widths      Mallampati score:  II    Obstruction: none      Neck mobility: normal      Pharmacologic strategy: RSI      Induction agents:  Etomidate    Paralytics:  Succinylcholine  Procedure details:     Preoxygenation:  Nonrebreather mask    CPR in progress: no      Number of attempts:  1  Successful intubation attempt details:     Intubation method:  Oral    Intubation technique: direct and video assisted      Laryngoscope blade:  Mac 3    Bougie used: no      Grade view: II      Tube size (mm):  7.5    Tube type:  Cuffed    Tube visualized through cords: yes    Placement assessment:     ETT at teeth/gumline (cm):  23    Tube secured with:  ETT moralez    Breath sounds:  Equal    Placement verification: chest rise, colorimetric ETCO2, CXR verification, direct visualization and tube exhalation      CXR findings:  Appropriate position  Post-procedure details:     Procedure completion:  Tolerated  Central Line    Date/Time: 10/9/2024 8:57 PM    Performed by: Jim Martino MD  Authorized by: Yung Bertrand DO    Consent:     Consent obtained:  Emergent situation  Universal protocol:     Relevant documents present and verified: yes      Test 
LOW K - Abnormal; Notable for the following components:    Sodium 110 (*)     Potassium reflex Magnesium 3.3 (*)     Chloride 62 (*)     CO2 19 (*)     Glucose 125 (*)     BUN 5 (*)     Creatinine 0.3 (*)     Calcium 7.6 (*)     All other components within normal limits   CALCIUM, IONIZED - Abnormal; Notable for the following components:    Calcium, Ionized 0.66 (*)     All other components within normal limits   PHOSPHORUS - Abnormal; Notable for the following components:    Phosphorus 2.2 (*)     All other components within normal limits   ANION GAP - Abnormal; Notable for the following components:    Anion Gap 29.0 (*)     All other components within normal limits   MAGNESIUM - Abnormal; Notable for the following components:    Magnesium 1.5 (*)     All other components within normal limits   OSMOLALITY - Abnormal; Notable for the following components:    Osmolality Calc 222.3 (*)     All other components within normal limits   GLOMERULAR FILTRATION RATE, ESTIMATED   ETHANOL   GLOMERULAR FILTRATION RATE, ESTIMATED   URINE DRUG SCREEN   URINALYSIS WITH REFLEX TO CULTURE   BASIC METABOLIC PANEL W/ REFLEX TO MG FOR LOW K   BASIC METABOLIC PANEL W/ REFLEX TO MG FOR LOW K   BASIC METABOLIC PANEL W/ REFLEX TO MG FOR LOW K   BASIC METABOLIC PANEL   SODIUM       EMERGENCY DEPARTMENT COURSE:   Vitals:    Vitals:    10/09/24 2054 10/09/24 2105 10/09/24 2109 10/09/24 2123   BP: 95/67 (!) 60/50 (!) 77/62 (!) 75/64   Pulse: (!) 105 (!) 108 (!) 104 (!) 104   Resp: 17  12 16   Temp:    100.1 °F (37.8 °C)   TempSrc:    Rectal   SpO2: 100% 100% 100% 100%   Weight:       Height:         I responded to the call to come see this patient right away.  Patient was initially triaged to the green zone as she was awake and alert at the time.  Has had sudden deterioration requiring intubation.  Patient was intubated by my resident under my supervision.  This was a direct intubation.  Obtained on the first attempt without difficulty.

## 2024-10-11 PROBLEM — E87.1 HYPONATREMIA WITH DECREASED SERUM OSMOLALITY: Status: ACTIVE | Noted: 2024-10-11

## 2024-10-11 PROBLEM — E83.42 HYPOMAGNESEMIA: Status: ACTIVE | Noted: 2024-10-11

## 2024-10-11 PROBLEM — E87.6 HYPOKALEMIA: Status: ACTIVE | Noted: 2024-10-11

## 2024-10-11 PROBLEM — E46 MALNUTRITION (HCC): Status: ACTIVE | Noted: 2024-10-11

## 2024-10-11 LAB
ALBUMIN SERPL BCG-MCNC: 3 G/DL (ref 3.5–5.1)
ALP SERPL-CCNC: 92 U/L (ref 38–126)
ALT SERPL W/O P-5'-P-CCNC: 27 U/L (ref 11–66)
ANION GAP SERPL CALC-SCNC: 10 MEQ/L (ref 8–16)
ANION GAP SERPL CALC-SCNC: 11 MEQ/L (ref 8–16)
ANION GAP SERPL CALC-SCNC: 12 MEQ/L (ref 8–16)
ANION GAP SERPL CALC-SCNC: 7 MEQ/L (ref 8–16)
ANION GAP SERPL CALC-SCNC: 8 MEQ/L (ref 8–16)
ANION GAP SERPL CALC-SCNC: 9 MEQ/L (ref 8–16)
AST SERPL-CCNC: 87 U/L (ref 5–40)
BACTERIA UR CULT: NORMAL
BASOPHILS ABSOLUTE: 0 THOU/MM3 (ref 0–0.1)
BASOPHILS NFR BLD AUTO: 0.1 %
BILIRUB SERPL-MCNC: 0.4 MG/DL (ref 0.3–1.2)
BUN SERPL-MCNC: 3 MG/DL (ref 7–22)
BUN SERPL-MCNC: 4 MG/DL (ref 7–22)
CA-I BLD ISE-SCNC: 0.87 MMOL/L (ref 1.12–1.32)
CALCIUM SERPL-MCNC: 7.4 MG/DL (ref 8.5–10.5)
CALCIUM SERPL-MCNC: 7.5 MG/DL (ref 8.5–10.5)
CALCIUM SERPL-MCNC: 7.6 MG/DL (ref 8.5–10.5)
CALCIUM SERPL-MCNC: 7.6 MG/DL (ref 8.5–10.5)
CALCIUM SERPL-MCNC: 7.9 MG/DL (ref 8.5–10.5)
CALCIUM SERPL-MCNC: 7.9 MG/DL (ref 8.5–10.5)
CALCIUM SERPL-MCNC: 8.2 MG/DL (ref 8.5–10.5)
CALCIUM SERPL-MCNC: 8.2 MG/DL (ref 8.5–10.5)
CALCIUM SERPL-MCNC: 8.4 MG/DL (ref 8.5–10.5)
CALCIUM SERPL-MCNC: 8.6 MG/DL (ref 8.5–10.5)
CHLORIDE SERPL-SCNC: 67 MEQ/L (ref 98–111)
CHLORIDE SERPL-SCNC: 70 MEQ/L (ref 98–111)
CHLORIDE SERPL-SCNC: 75 MEQ/L (ref 98–111)
CHLORIDE SERPL-SCNC: 76 MEQ/L (ref 98–111)
CHLORIDE SERPL-SCNC: 77 MEQ/L (ref 98–111)
CHLORIDE SERPL-SCNC: 77 MEQ/L (ref 98–111)
CHLORIDE SERPL-SCNC: 78 MEQ/L (ref 98–111)
CHLORIDE SERPL-SCNC: 79 MEQ/L (ref 98–111)
CO2 SERPL-SCNC: 25 MEQ/L (ref 23–33)
CO2 SERPL-SCNC: 26 MEQ/L (ref 23–33)
CO2 SERPL-SCNC: 27 MEQ/L (ref 23–33)
CO2 SERPL-SCNC: 28 MEQ/L (ref 23–33)
CO2 SERPL-SCNC: 29 MEQ/L (ref 23–33)
CO2 SERPL-SCNC: 30 MEQ/L (ref 23–33)
CO2 SERPL-SCNC: 30 MEQ/L (ref 23–33)
CREAT SERPL-MCNC: 0.2 MG/DL (ref 0.4–1.2)
CREAT SERPL-MCNC: < 0.2 MG/DL (ref 0.4–1.2)
DEPRECATED RDW RBC AUTO: 42.5 FL (ref 35–45)
EOSINOPHIL NFR BLD AUTO: 0.5 %
EOSINOPHILS ABSOLUTE: 0 THOU/MM3 (ref 0–0.4)
ERYTHROCYTE [DISTWIDTH] IN BLOOD BY AUTOMATED COUNT: 12.8 % (ref 11.5–14.5)
GFR SERPL CREATININE-BSD FRML MDRD: > 90 ML/MIN/1.73M2
GLUCOSE SERPL-MCNC: 118 MG/DL (ref 70–108)
GLUCOSE SERPL-MCNC: 169 MG/DL (ref 70–108)
GLUCOSE SERPL-MCNC: 183 MG/DL (ref 70–108)
GLUCOSE SERPL-MCNC: 254 MG/DL (ref 70–108)
GLUCOSE SERPL-MCNC: 68 MG/DL (ref 70–108)
GLUCOSE SERPL-MCNC: 68 MG/DL (ref 70–108)
GLUCOSE SERPL-MCNC: 70 MG/DL (ref 70–108)
GLUCOSE SERPL-MCNC: 77 MG/DL (ref 70–108)
GLUCOSE SERPL-MCNC: 79 MG/DL (ref 70–108)
GLUCOSE SERPL-MCNC: 84 MG/DL (ref 70–108)
HAPTOGLOB SERPL-MCNC: 47 MG/DL (ref 30–200)
HCT VFR BLD AUTO: 24.3 % (ref 37–47)
HGB BLD-MCNC: 8.8 GM/DL (ref 12–16)
IMM GRANULOCYTES # BLD AUTO: 0.03 THOU/MM3 (ref 0–0.07)
IMM GRANULOCYTES NFR BLD AUTO: 0.4 %
LYMPHOCYTES ABSOLUTE: 1.2 THOU/MM3 (ref 1–4.8)
LYMPHOCYTES NFR BLD AUTO: 15.8 %
MAGNESIUM SERPL-MCNC: 1.5 MG/DL (ref 1.6–2.4)
MAGNESIUM SERPL-MCNC: 2.4 MG/DL (ref 1.6–2.4)
MCH RBC QN AUTO: 32.8 PG (ref 26–33)
MCHC RBC AUTO-ENTMCNC: 36.2 GM/DL (ref 32.2–35.5)
MCV RBC AUTO: 90.7 FL (ref 81–99)
MONOCYTES ABSOLUTE: 0.9 THOU/MM3 (ref 0.4–1.3)
MONOCYTES NFR BLD AUTO: 11.5 %
NEUTROPHILS ABSOLUTE: 5.6 THOU/MM3 (ref 1.8–7.7)
NEUTROPHILS NFR BLD AUTO: 71.7 %
NRBC BLD AUTO-RTO: 0 /100 WBC
ORIGINAL SAMPLE NUMBER: NORMAL
ORIGINAL SAMPLE NUMBER: NORMAL
PHOSPHATE SERPL-MCNC: 1.9 MG/DL (ref 2.4–4.7)
PHOSPHATE SERPL-MCNC: 3.1 MG/DL (ref 2.4–4.7)
PLATELET # BLD AUTO: 107 THOU/MM3 (ref 130–400)
PMV BLD AUTO: 11 FL (ref 9.4–12.4)
POTASSIUM SERPL-SCNC: 2.9 MEQ/L (ref 3.5–5.2)
POTASSIUM SERPL-SCNC: 3.2 MEQ/L (ref 3.5–5.2)
POTASSIUM SERPL-SCNC: 3.3 MEQ/L (ref 3.5–5.2)
POTASSIUM SERPL-SCNC: 3.6 MEQ/L (ref 3.5–5.2)
POTASSIUM SERPL-SCNC: 3.8 MEQ/L (ref 3.5–5.2)
POTASSIUM SERPL-SCNC: 3.9 MEQ/L (ref 3.5–5.2)
POTASSIUM SERPL-SCNC: 4 MEQ/L (ref 3.5–5.2)
POTASSIUM SERPL-SCNC: 4 MEQ/L (ref 3.5–5.2)
POTASSIUM SERPL-SCNC: 4.2 MEQ/L (ref 3.5–5.2)
POTASSIUM SERPL-SCNC: 4.3 MEQ/L (ref 3.5–5.2)
PROT SERPL-MCNC: 5.1 G/DL (ref 6.1–8)
RBC # BLD AUTO: 2.68 MILL/MM3 (ref 4.2–5.4)
REFERENCE LOCATION: NORMAL
REFERENCE LOCATION: NORMAL
REFERENCE RANGE: NORMAL
REFERENCE RANGE: NORMAL
REVIEWED BY: NORMAL
SMEAR REVIEW: NORMAL
SODIUM SERPL-SCNC: 105 MEQ/L (ref 135–145)
SODIUM SERPL-SCNC: 107 MEQ/L (ref 135–145)
SODIUM SERPL-SCNC: 111 MEQ/L (ref 135–145)
SODIUM SERPL-SCNC: 112 MEQ/L (ref 135–145)
SODIUM SERPL-SCNC: 113 MEQ/L (ref 135–145)
SODIUM SERPL-SCNC: 114 MEQ/L (ref 135–145)
SODIUM SERPL-SCNC: 116 MEQ/L (ref 135–145)
TEST RESULTS WITH UNITS: NORMAL
TEST RESULTS WITH UNITS: NORMAL
TEST(S) BEING PERFORMED: NORMAL
TEST(S) BEING PERFORMED: NORMAL
WBC # BLD AUTO: 7.8 THOU/MM3 (ref 4.8–10.8)

## 2024-10-11 PROCEDURE — 99291 CRITICAL CARE FIRST HOUR: CPT | Performed by: INTERNAL MEDICINE

## 2024-10-11 PROCEDURE — 97116 GAIT TRAINING THERAPY: CPT

## 2024-10-11 PROCEDURE — 80053 COMPREHEN METABOLIC PANEL: CPT

## 2024-10-11 PROCEDURE — 97162 PT EVAL MOD COMPLEX 30 MIN: CPT

## 2024-10-11 PROCEDURE — 2580000003 HC RX 258

## 2024-10-11 PROCEDURE — 84100 ASSAY OF PHOSPHORUS: CPT

## 2024-10-11 PROCEDURE — 97535 SELF CARE MNGMENT TRAINING: CPT

## 2024-10-11 PROCEDURE — 2060000000 HC ICU INTERMEDIATE R&B

## 2024-10-11 PROCEDURE — 6360000002 HC RX W HCPCS: Performed by: STUDENT IN AN ORGANIZED HEALTH CARE EDUCATION/TRAINING PROGRAM

## 2024-10-11 PROCEDURE — 92523 SPEECH SOUND LANG COMPREHEN: CPT

## 2024-10-11 PROCEDURE — 6360000002 HC RX W HCPCS

## 2024-10-11 PROCEDURE — 2580000003 HC RX 258: Performed by: INTERNAL MEDICINE

## 2024-10-11 PROCEDURE — 6370000000 HC RX 637 (ALT 250 FOR IP)

## 2024-10-11 PROCEDURE — 85025 COMPLETE CBC W/AUTO DIFF WBC: CPT

## 2024-10-11 PROCEDURE — 99233 SBSQ HOSP IP/OBS HIGH 50: CPT | Performed by: INTERNAL MEDICINE

## 2024-10-11 PROCEDURE — 97530 THERAPEUTIC ACTIVITIES: CPT

## 2024-10-11 PROCEDURE — 2500000003 HC RX 250 WO HCPCS

## 2024-10-11 PROCEDURE — 83735 ASSAY OF MAGNESIUM: CPT

## 2024-10-11 PROCEDURE — 82330 ASSAY OF CALCIUM: CPT

## 2024-10-11 PROCEDURE — 97166 OT EVAL MOD COMPLEX 45 MIN: CPT

## 2024-10-11 PROCEDURE — 36415 COLL VENOUS BLD VENIPUNCTURE: CPT

## 2024-10-11 PROCEDURE — 2580000003 HC RX 258: Performed by: STUDENT IN AN ORGANIZED HEALTH CARE EDUCATION/TRAINING PROGRAM

## 2024-10-11 PROCEDURE — 2000000000 HC ICU R&B

## 2024-10-11 RX ORDER — SODIUM CHLORIDE 9 MG/ML
INJECTION, SOLUTION INTRAVENOUS CONTINUOUS
Status: DISCONTINUED | OUTPATIENT
Start: 2024-10-11 | End: 2024-10-11

## 2024-10-11 RX ORDER — DESMOPRESSIN ACETATE 4 UG/ML
2 INJECTION, SOLUTION INTRAVENOUS; SUBCUTANEOUS 2 TIMES DAILY
Status: DISCONTINUED | OUTPATIENT
Start: 2024-10-11 | End: 2024-10-12

## 2024-10-11 RX ORDER — DEXTROSE MONOHYDRATE 50 MG/ML
INJECTION, SOLUTION INTRAVENOUS CONTINUOUS
Status: DISCONTINUED | OUTPATIENT
Start: 2024-10-11 | End: 2024-10-11

## 2024-10-11 RX ADMIN — Medication 1 TABLET: at 09:19

## 2024-10-11 RX ADMIN — Medication 100 MG: at 09:19

## 2024-10-11 RX ADMIN — POTASSIUM CHLORIDE 20 MEQ: 29.8 INJECTION, SOLUTION INTRAVENOUS at 01:41

## 2024-10-11 RX ADMIN — DESMOPRESSIN ACETATE 2 MCG: 4 INJECTION, SOLUTION INTRAVENOUS; SUBCUTANEOUS at 21:23

## 2024-10-11 RX ADMIN — POTASSIUM CHLORIDE 20 MEQ: 29.8 INJECTION, SOLUTION INTRAVENOUS at 02:47

## 2024-10-11 RX ADMIN — POTASSIUM CHLORIDE 20 MEQ: 29.8 INJECTION, SOLUTION INTRAVENOUS at 00:59

## 2024-10-11 RX ADMIN — MAGNESIUM SULFATE HEPTAHYDRATE 2000 MG: 40 INJECTION, SOLUTION INTRAVENOUS at 00:58

## 2024-10-11 RX ADMIN — DEXTROSE MONOHYDRATE: 50 INJECTION, SOLUTION INTRAVENOUS at 10:14

## 2024-10-11 RX ADMIN — SODIUM CHLORIDE, PRESERVATIVE FREE 10 ML: 5 INJECTION INTRAVENOUS at 21:23

## 2024-10-11 RX ADMIN — FERROUS SULFATE TAB 325 MG (65 MG ELEMENTAL FE) 325 MG: 325 (65 FE) TAB at 09:19

## 2024-10-11 RX ADMIN — SODIUM CHLORIDE, PRESERVATIVE FREE 10 ML: 5 INJECTION INTRAVENOUS at 10:11

## 2024-10-11 RX ADMIN — POTASSIUM CHLORIDE 20 MEQ: 29.8 INJECTION, SOLUTION INTRAVENOUS at 15:13

## 2024-10-11 RX ADMIN — CALCIUM GLUCONATE 2000 MG: 20 INJECTION, SOLUTION INTRAVENOUS at 05:35

## 2024-10-11 RX ADMIN — FOLIC ACID 1 MG: 1 TABLET ORAL at 09:19

## 2024-10-11 RX ADMIN — DESMOPRESSIN ACETATE 2 MCG: 4 INJECTION, SOLUTION INTRAVENOUS; SUBCUTANEOUS at 10:06

## 2024-10-11 RX ADMIN — POTASSIUM PHOSPHATE, MONOBASIC POTASSIUM PHOSPHATE, DIBASIC 15 MMOL: 224; 236 INJECTION, SOLUTION, CONCENTRATE INTRAVENOUS at 01:37

## 2024-10-11 RX ADMIN — DEXTROSE MONOHYDRATE 500 ML: 50 INJECTION, SOLUTION INTRAVENOUS at 09:42

## 2024-10-11 RX ADMIN — SODIUM CHLORIDE: 9 INJECTION, SOLUTION INTRAVENOUS at 09:18

## 2024-10-11 RX ADMIN — POTASSIUM CHLORIDE 20 MEQ: 29.8 INJECTION, SOLUTION INTRAVENOUS at 14:10

## 2024-10-11 RX ADMIN — ENOXAPARIN SODIUM 30 MG: 100 INJECTION SUBCUTANEOUS at 09:31

## 2024-10-11 RX ADMIN — POLYETHYLENE GLYCOL 3350 17 G: 17 POWDER, FOR SOLUTION ORAL at 09:31

## 2024-10-11 NOTE — PLAN OF CARE
Problem: Safety - Adult  Goal: Free from fall injury  10/11/2024 1808 by Holley Donis, RN  Outcome: Progressing  Flowsheets (Taken 10/11/2024 1808)  Free From Fall Injury: Instruct family/caregiver on patient safety  Problem: Pain  Goal: Verbalizes/displays adequate comfort level or baseline comfort level  10/11/2024 1808 by Holley Donis, RN  Outcome: Progressing  Flowsheets  Taken 10/11/2024 1808  Verbalizes/displays adequate comfort level or baseline comfort level:   Assess pain using appropriate pain scale   Administer analgesics based on type and severity of pain and evaluate response   Encourage patient to monitor pain and request assistance  Taken 10/11/2024 0800  Verbalizes/displays adequate comfort level or baseline comfort level:   Encourage patient to monitor pain and request assistance   Assess pain using appropriate pain scale   Administer analgesics based on type and severity of pain and evaluate response

## 2024-10-11 NOTE — PLAN OF CARE
Problem: Discharge Planning  Goal: Discharge to home or other facility with appropriate resources  Outcome: Progressing  Flowsheets (Taken 10/10/2024 2100)  Discharge to home or other facility with appropriate resources:   Identify barriers to discharge with patient and caregiver   Identify discharge learning needs (meds, wound care, etc)   Arrange for needed discharge resources and transportation as appropriate   Refer to discharge planning if patient needs post-hospital services based on physician order or complex needs related to functional status, cognitive ability or social support system     Problem: Safety - Adult  Goal: Free from fall injury  Outcome: Progressing  Flowsheets (Taken 10/11/2024 0634)  Free From Fall Injury:   Instruct family/caregiver on patient safety   Based on caregiver fall risk screen, instruct family/caregiver to ask for assistance with transferring infant if caregiver noted to have fall risk factors     Problem: Pain  Goal: Verbalizes/displays adequate comfort level or baseline comfort level  Outcome: Progressing  Flowsheets (Taken 10/10/2024 2100)  Verbalizes/displays adequate comfort level or baseline comfort level:   Encourage patient to monitor pain and request assistance   Assess pain using appropriate pain scale   Administer analgesics based on type and severity of pain and evaluate response   Implement non-pharmacological measures as appropriate and evaluate response   Consider cultural and social influences on pain and pain management   Notify Licensed Independent Practitioner if interventions unsuccessful or patient reports new pain   careplan reviewed with patient and. Verbalize understanding plan of care and contribute to goal setting

## 2024-10-11 NOTE — CARE COORDINATION
10/11/24, 2:48 PM EDT    DISCHARGE ON GOING EVALUATION    Rivka TRAYLOR [facility] day: 2  Location: -08/008-A Reason for admit: Chronic hyponatremia [E87.1]  Hyponatremia [E87.1]  Acute respiratory failure, unspecified whether with hypoxia or hypercapnia [J96.00]     Procedures:   10/9 CVC RIJ    Imaging since last note: none    Barriers to Discharge: Precedex stopped yesterday. Na+ 116 this morning. NS stopped, gave 500 ml D5 bolus and DDAVP. Started on D5 drip. Continues on Q2H BMP.     On room air. Afebrile. 's. Ox4. PT/OT. Addiction Services consulted. +MRSA nares. Intensivist and Nephrology following. Telemetry, CVC, seizure precautions, finley. D5 @ 50 ml/hr, IV DDAVP bid, lovenox, ferrous sulfate, folic acid, mutivitamin, prn phenobarbital per CIWA scale, thiamine, Electrolyte replacement protocols. Na+ 112, K+ 3.2, Alb 3, ast 87, total pro 5.1, hgb 8.8, plt 107.     PCP: Ben Montejo MD  Readmission Risk Score: 19.6    Patient Goals/Plan/Treatment Preferences: Home w/parents. Denies needs, declines HH.

## 2024-10-12 LAB
ALBUMIN SERPL BCG-MCNC: 3.1 G/DL (ref 3.5–5.1)
ALP SERPL-CCNC: 94 U/L (ref 38–126)
ALT SERPL W/O P-5'-P-CCNC: 28 U/L (ref 11–66)
ANION GAP SERPL CALC-SCNC: 10 MEQ/L (ref 8–16)
ANION GAP SERPL CALC-SCNC: 10 MEQ/L (ref 8–16)
ANION GAP SERPL CALC-SCNC: 13 MEQ/L (ref 8–16)
ANION GAP SERPL CALC-SCNC: 8 MEQ/L (ref 8–16)
ANION GAP SERPL CALC-SCNC: 9 MEQ/L (ref 8–16)
AST SERPL-CCNC: 68 U/L (ref 5–40)
BACTERIA SPEC RESP CULT: NORMAL
BASOPHILS ABSOLUTE: 0 THOU/MM3 (ref 0–0.1)
BASOPHILS NFR BLD AUTO: 0.3 %
BILIRUB SERPL-MCNC: 0.4 MG/DL (ref 0.3–1.2)
BUN SERPL-MCNC: 3 MG/DL (ref 7–22)
CALCIUM SERPL-MCNC: 7.9 MG/DL (ref 8.5–10.5)
CALCIUM SERPL-MCNC: 8 MG/DL (ref 8.5–10.5)
CALCIUM SERPL-MCNC: 8 MG/DL (ref 8.5–10.5)
CALCIUM SERPL-MCNC: 8.1 MG/DL (ref 8.5–10.5)
CALCIUM SERPL-MCNC: 8.3 MG/DL (ref 8.5–10.5)
CHLORIDE SERPL-SCNC: 76 MEQ/L (ref 98–111)
CHLORIDE SERPL-SCNC: 77 MEQ/L (ref 98–111)
CHLORIDE SERPL-SCNC: 77 MEQ/L (ref 98–111)
CHLORIDE SERPL-SCNC: 78 MEQ/L (ref 98–111)
CHLORIDE SERPL-SCNC: 78 MEQ/L (ref 98–111)
CO2 SERPL-SCNC: 23 MEQ/L (ref 23–33)
CO2 SERPL-SCNC: 25 MEQ/L (ref 23–33)
CO2 SERPL-SCNC: 25 MEQ/L (ref 23–33)
CO2 SERPL-SCNC: 26 MEQ/L (ref 23–33)
CO2 SERPL-SCNC: 27 MEQ/L (ref 23–33)
CREAT SERPL-MCNC: 0.2 MG/DL (ref 0.4–1.2)
CREAT SERPL-MCNC: 0.3 MG/DL (ref 0.4–1.2)
CREAT SERPL-MCNC: < 0.2 MG/DL (ref 0.4–1.2)
DEPRECATED RDW RBC AUTO: 42.6 FL (ref 35–45)
EOSINOPHIL NFR BLD AUTO: 1 %
EOSINOPHILS ABSOLUTE: 0.1 THOU/MM3 (ref 0–0.4)
ERYTHROCYTE [DISTWIDTH] IN BLOOD BY AUTOMATED COUNT: 12.8 % (ref 11.5–14.5)
GFR SERPL CREATININE-BSD FRML MDRD: > 90 ML/MIN/1.73M2
GLUCOSE SERPL-MCNC: 122 MG/DL (ref 70–108)
GLUCOSE SERPL-MCNC: 137 MG/DL (ref 70–108)
GLUCOSE SERPL-MCNC: 83 MG/DL (ref 70–108)
GLUCOSE SERPL-MCNC: 83 MG/DL (ref 70–108)
GLUCOSE SERPL-MCNC: 92 MG/DL (ref 70–108)
GRAM STN SPEC: NORMAL
HCT VFR BLD AUTO: 22.3 % (ref 37–47)
HCT VFR BLD AUTO: 24.7 % (ref 37–47)
HGB BLD-MCNC: 7.9 GM/DL (ref 12–16)
HGB BLD-MCNC: 8.6 GM/DL (ref 12–16)
IMM GRANULOCYTES # BLD AUTO: 0.04 THOU/MM3 (ref 0–0.07)
IMM GRANULOCYTES NFR BLD AUTO: 0.5 %
LYMPHOCYTES ABSOLUTE: 2 THOU/MM3 (ref 1–4.8)
LYMPHOCYTES NFR BLD AUTO: 26.1 %
MAGNESIUM SERPL-MCNC: 1.6 MG/DL (ref 1.6–2.4)
MCH RBC QN AUTO: 32.2 PG (ref 26–33)
MCHC RBC AUTO-ENTMCNC: 35.4 GM/DL (ref 32.2–35.5)
MCV RBC AUTO: 91 FL (ref 81–99)
MONOCYTES ABSOLUTE: 1.2 THOU/MM3 (ref 0.4–1.3)
MONOCYTES NFR BLD AUTO: 15.3 %
NEUTROPHILS ABSOLUTE: 4.4 THOU/MM3 (ref 1.8–7.7)
NEUTROPHILS NFR BLD AUTO: 56.8 %
NRBC BLD AUTO-RTO: 0 /100 WBC
PHOSPHATE SERPL-MCNC: 1.9 MG/DL (ref 2.4–4.7)
PHOSPHATE SERPL-MCNC: 3.1 MG/DL (ref 2.4–4.7)
PLATELET # BLD AUTO: 145 THOU/MM3 (ref 130–400)
PMV BLD AUTO: 10.9 FL (ref 9.4–12.4)
POTASSIUM SERPL-SCNC: 3.5 MEQ/L (ref 3.5–5.2)
POTASSIUM SERPL-SCNC: 3.8 MEQ/L (ref 3.5–5.2)
POTASSIUM SERPL-SCNC: 4 MEQ/L (ref 3.5–5.2)
POTASSIUM SERPL-SCNC: 4.3 MEQ/L (ref 3.5–5.2)
POTASSIUM SERPL-SCNC: 4.4 MEQ/L (ref 3.5–5.2)
PROT SERPL-MCNC: 5.3 G/DL (ref 6.1–8)
RBC # BLD AUTO: 2.45 MILL/MM3 (ref 4.2–5.4)
SODIUM SERPL-SCNC: 111 MEQ/L (ref 135–145)
SODIUM SERPL-SCNC: 112 MEQ/L (ref 135–145)
SODIUM SERPL-SCNC: 112 MEQ/L (ref 135–145)
SODIUM SERPL-SCNC: 113 MEQ/L (ref 135–145)
SODIUM SERPL-SCNC: 113 MEQ/L (ref 135–145)
SODIUM SERPL-SCNC: 114 MEQ/L (ref 135–145)
SODIUM UR-SCNC: < 20 MEQ/L
WBC # BLD AUTO: 7.8 THOU/MM3 (ref 4.8–10.8)

## 2024-10-12 PROCEDURE — 85014 HEMATOCRIT: CPT

## 2024-10-12 PROCEDURE — 6360000002 HC RX W HCPCS

## 2024-10-12 PROCEDURE — 51798 US URINE CAPACITY MEASURE: CPT

## 2024-10-12 PROCEDURE — 36592 COLLECT BLOOD FROM PICC: CPT

## 2024-10-12 PROCEDURE — 6370000000 HC RX 637 (ALT 250 FOR IP): Performed by: INTERNAL MEDICINE

## 2024-10-12 PROCEDURE — 2580000003 HC RX 258

## 2024-10-12 PROCEDURE — 2500000003 HC RX 250 WO HCPCS

## 2024-10-12 PROCEDURE — 2060000000 HC ICU INTERMEDIATE R&B

## 2024-10-12 PROCEDURE — 83735 ASSAY OF MAGNESIUM: CPT

## 2024-10-12 PROCEDURE — 84100 ASSAY OF PHOSPHORUS: CPT

## 2024-10-12 PROCEDURE — 6360000002 HC RX W HCPCS: Performed by: STUDENT IN AN ORGANIZED HEALTH CARE EDUCATION/TRAINING PROGRAM

## 2024-10-12 PROCEDURE — 99232 SBSQ HOSP IP/OBS MODERATE 35: CPT | Performed by: INTERNAL MEDICINE

## 2024-10-12 PROCEDURE — 84300 ASSAY OF URINE SODIUM: CPT

## 2024-10-12 PROCEDURE — 85025 COMPLETE CBC W/AUTO DIFF WBC: CPT

## 2024-10-12 PROCEDURE — 36415 COLL VENOUS BLD VENIPUNCTURE: CPT

## 2024-10-12 PROCEDURE — 2580000003 HC RX 258: Performed by: INTERNAL MEDICINE

## 2024-10-12 PROCEDURE — 85018 HEMOGLOBIN: CPT

## 2024-10-12 PROCEDURE — 84295 ASSAY OF SERUM SODIUM: CPT

## 2024-10-12 PROCEDURE — 99233 SBSQ HOSP IP/OBS HIGH 50: CPT | Performed by: INTERNAL MEDICINE

## 2024-10-12 PROCEDURE — 80053 COMPREHEN METABOLIC PANEL: CPT

## 2024-10-12 PROCEDURE — 6370000000 HC RX 637 (ALT 250 FOR IP)

## 2024-10-12 RX ORDER — SODIUM CHLORIDE 9 MG/ML
INJECTION, SOLUTION INTRAVENOUS CONTINUOUS
Status: DISCONTINUED | OUTPATIENT
Start: 2024-10-12 | End: 2024-10-13

## 2024-10-12 RX ORDER — SODIUM CHLORIDE 1 G/1
2 TABLET ORAL ONCE
Status: COMPLETED | OUTPATIENT
Start: 2024-10-12 | End: 2024-10-12

## 2024-10-12 RX ADMIN — POTASSIUM & SODIUM PHOSPHATES POWDER PACK 280-160-250 MG 250 MG: 280-160-250 PACK at 16:03

## 2024-10-12 RX ADMIN — SODIUM CHLORIDE, PRESERVATIVE FREE 10 ML: 5 INJECTION INTRAVENOUS at 09:38

## 2024-10-12 RX ADMIN — FERROUS SULFATE TAB 325 MG (65 MG ELEMENTAL FE) 325 MG: 325 (65 FE) TAB at 09:37

## 2024-10-12 RX ADMIN — FOLIC ACID 1 MG: 1 TABLET ORAL at 09:37

## 2024-10-12 RX ADMIN — POTASSIUM PHOSPHATE, MONOBASIC POTASSIUM PHOSPHATE, DIBASIC 10 MMOL: 224; 236 INJECTION, SOLUTION, CONCENTRATE INTRAVENOUS at 09:41

## 2024-10-12 RX ADMIN — Medication 100 MG: at 09:37

## 2024-10-12 RX ADMIN — ONDANSETRON 4 MG: 2 INJECTION INTRAMUSCULAR; INTRAVENOUS at 08:02

## 2024-10-12 RX ADMIN — POTASSIUM CHLORIDE 20 MEQ: 29.8 INJECTION, SOLUTION INTRAVENOUS at 07:25

## 2024-10-12 RX ADMIN — Medication 1 TABLET: at 09:37

## 2024-10-12 RX ADMIN — SODIUM CHLORIDE, PRESERVATIVE FREE 10 ML: 5 INJECTION INTRAVENOUS at 20:38

## 2024-10-12 RX ADMIN — ENOXAPARIN SODIUM 30 MG: 100 INJECTION SUBCUTANEOUS at 09:37

## 2024-10-12 RX ADMIN — SODIUM CHLORIDE: 9 INJECTION, SOLUTION INTRAVENOUS at 18:12

## 2024-10-12 RX ADMIN — SODIUM CHLORIDE 2 G: 1 TABLET ORAL at 18:18

## 2024-10-12 RX ADMIN — POTASSIUM CHLORIDE 20 MEQ: 29.8 INJECTION, SOLUTION INTRAVENOUS at 08:14

## 2024-10-12 RX ADMIN — POTASSIUM & SODIUM PHOSPHATES POWDER PACK 280-160-250 MG 250 MG: 280-160-250 PACK at 20:36

## 2024-10-12 NOTE — PLAN OF CARE
Problem: Discharge Planning  Goal: Discharge to home or other facility with appropriate resources  Outcome: Progressing  Flowsheets (Taken 10/12/2024 0027)  Discharge to home or other facility with appropriate resources:   Identify barriers to discharge with patient and caregiver   Identify discharge learning needs (meds, wound care, etc)     Problem: Safety - Adult  Goal: Free from fall injury  10/12/2024 0027 by Shruthi Tavera, RN  Outcome: Progressing  Flowsheets (Taken 10/12/2024 0027)  Free From Fall Injury:   Instruct family/caregiver on patient safety   Based on caregiver fall risk screen, instruct family/caregiver to ask for assistance with transferring infant if caregiver noted to have fall risk factors     Problem: Pain  Goal: Verbalizes/displays adequate comfort level or baseline comfort level  10/12/2024 0027 by Shruthi Tavera, RN  Outcome: Progressing  Flowsheets (Taken 10/12/2024 0027)  Verbalizes/displays adequate comfort level or baseline comfort level:   Encourage patient to monitor pain and request assistance   Assess pain using appropriate pain scale     Problem: Nutrition Deficit:  Goal: Optimize nutritional status  10/12/2024 0027 by Shruthi Tavera, RN  Outcome: Progressing       Care plan reviewed with patient at bedside

## 2024-10-12 NOTE — PLAN OF CARE
Problem: Discharge Planning  Goal: Discharge to home or other facility with appropriate resources  10/12/2024 1207 by Tristen Koroma RN  Outcome: Progressing  10/12/2024 0027 by Shruthi Tavera RN  Outcome: Progressing  Flowsheets (Taken 10/12/2024 0027)  Discharge to home or other facility with appropriate resources:   Identify barriers to discharge with patient and caregiver   Identify discharge learning needs (meds, wound care, etc)     Problem: Safety - Adult  Goal: Free from fall injury  10/12/2024 1207 by Tristen Koroma RN  Outcome: Progressing  10/12/2024 0027 by Shruthi Tavera RN  Outcome: Progressing  Flowsheets (Taken 10/12/2024 0027)  Free From Fall Injury:   Instruct family/caregiver on patient safety   Based on caregiver fall risk screen, instruct family/caregiver to ask for assistance with transferring infant if caregiver noted to have fall risk factors     Problem: Pain  Goal: Verbalizes/displays adequate comfort level or baseline comfort level  10/12/2024 1207 by Tristen Koroma RN  Outcome: Progressing  10/12/2024 0027 by Shruthi Tavera RN  Outcome: Progressing  Flowsheets (Taken 10/12/2024 0027)  Verbalizes/displays adequate comfort level or baseline comfort level:   Encourage patient to monitor pain and request assistance   Assess pain using appropriate pain scale     Problem: Nutrition Deficit:  Goal: Optimize nutritional status  10/12/2024 1207 by Tristen Koroma RN  Outcome: Progressing  10/12/2024 0027 by Shruthi Tavera RN  Outcome: Progressing     Care plan reviewed with patient.  Patient verbalize understanding of the plan of care and contribute to goal setting.

## 2024-10-13 LAB
ALBUMIN SERPL BCG-MCNC: 3.1 G/DL (ref 3.5–5.1)
ALP SERPL-CCNC: 85 U/L (ref 38–126)
ALT SERPL W/O P-5'-P-CCNC: 28 U/L (ref 11–66)
ANION GAP SERPL CALC-SCNC: 11 MEQ/L (ref 8–16)
AST SERPL-CCNC: 50 U/L (ref 5–40)
BASOPHILS ABSOLUTE: 0 THOU/MM3 (ref 0–0.1)
BASOPHILS NFR BLD AUTO: 0.3 %
BILIRUB SERPL-MCNC: 0.3 MG/DL (ref 0.3–1.2)
BUN SERPL-MCNC: < 2 MG/DL (ref 7–22)
CALCIUM SERPL-MCNC: 8 MG/DL (ref 8.5–10.5)
CHLORIDE SERPL-SCNC: 86 MEQ/L (ref 98–111)
CO2 SERPL-SCNC: 26 MEQ/L (ref 23–33)
CREAT SERPL-MCNC: 0.2 MG/DL (ref 0.4–1.2)
DEPRECATED RDW RBC AUTO: 45.5 FL (ref 35–45)
EOSINOPHIL NFR BLD AUTO: 0.9 %
EOSINOPHILS ABSOLUTE: 0.1 THOU/MM3 (ref 0–0.4)
ERYTHROCYTE [DISTWIDTH] IN BLOOD BY AUTOMATED COUNT: 13.2 % (ref 11.5–14.5)
GFR SERPL CREATININE-BSD FRML MDRD: > 90 ML/MIN/1.73M2
GLUCOSE SERPL-MCNC: 89 MG/DL (ref 70–108)
HCT VFR BLD AUTO: 23.1 % (ref 37–47)
HGB BLD-MCNC: 8 GM/DL (ref 12–16)
IMM GRANULOCYTES # BLD AUTO: 0.04 THOU/MM3 (ref 0–0.07)
IMM GRANULOCYTES NFR BLD AUTO: 0.6 %
LYMPHOCYTES ABSOLUTE: 1.9 THOU/MM3 (ref 1–4.8)
LYMPHOCYTES NFR BLD AUTO: 28.5 %
MAGNESIUM SERPL-MCNC: 1.6 MG/DL (ref 1.6–2.4)
MCH RBC QN AUTO: 33.1 PG (ref 26–33)
MCHC RBC AUTO-ENTMCNC: 34.6 GM/DL (ref 32.2–35.5)
MCV RBC AUTO: 95.5 FL (ref 81–99)
MONOCYTES ABSOLUTE: 1.2 THOU/MM3 (ref 0.4–1.3)
MONOCYTES NFR BLD AUTO: 17.8 %
NEUTROPHILS ABSOLUTE: 3.4 THOU/MM3 (ref 1.8–7.7)
NEUTROPHILS NFR BLD AUTO: 51.9 %
NRBC BLD AUTO-RTO: 0 /100 WBC
PHOSPHATE SERPL-MCNC: 3.3 MG/DL (ref 2.4–4.7)
PLATELET # BLD AUTO: 198 THOU/MM3 (ref 130–400)
PMV BLD AUTO: 10.1 FL (ref 9.4–12.4)
POTASSIUM SERPL-SCNC: 3.6 MEQ/L (ref 3.5–5.2)
PROT SERPL-MCNC: 5.1 G/DL (ref 6.1–8)
RBC # BLD AUTO: 2.42 MILL/MM3 (ref 4.2–5.4)
SODIUM SERPL-SCNC: 118 MEQ/L (ref 135–145)
SODIUM SERPL-SCNC: 120 MEQ/L (ref 135–145)
SODIUM SERPL-SCNC: 121 MEQ/L (ref 135–145)
SODIUM SERPL-SCNC: 123 MEQ/L (ref 135–145)
SODIUM SERPL-SCNC: 123 MEQ/L (ref 135–145)
WBC # BLD AUTO: 6.6 THOU/MM3 (ref 4.8–10.8)

## 2024-10-13 PROCEDURE — 6360000002 HC RX W HCPCS

## 2024-10-13 PROCEDURE — 84100 ASSAY OF PHOSPHORUS: CPT

## 2024-10-13 PROCEDURE — 99232 SBSQ HOSP IP/OBS MODERATE 35: CPT | Performed by: INTERNAL MEDICINE

## 2024-10-13 PROCEDURE — 99233 SBSQ HOSP IP/OBS HIGH 50: CPT | Performed by: INTERNAL MEDICINE

## 2024-10-13 PROCEDURE — 2060000000 HC ICU INTERMEDIATE R&B

## 2024-10-13 PROCEDURE — 36415 COLL VENOUS BLD VENIPUNCTURE: CPT

## 2024-10-13 PROCEDURE — 6360000002 HC RX W HCPCS: Performed by: INTERNAL MEDICINE

## 2024-10-13 PROCEDURE — 2580000003 HC RX 258

## 2024-10-13 PROCEDURE — 83735 ASSAY OF MAGNESIUM: CPT

## 2024-10-13 PROCEDURE — 84295 ASSAY OF SERUM SODIUM: CPT

## 2024-10-13 PROCEDURE — 80053 COMPREHEN METABOLIC PANEL: CPT

## 2024-10-13 PROCEDURE — 6370000000 HC RX 637 (ALT 250 FOR IP)

## 2024-10-13 PROCEDURE — 6370000000 HC RX 637 (ALT 250 FOR IP): Performed by: INTERNAL MEDICINE

## 2024-10-13 PROCEDURE — 6360000002 HC RX W HCPCS: Performed by: STUDENT IN AN ORGANIZED HEALTH CARE EDUCATION/TRAINING PROGRAM

## 2024-10-13 PROCEDURE — 85025 COMPLETE CBC W/AUTO DIFF WBC: CPT

## 2024-10-13 PROCEDURE — 2580000003 HC RX 258: Performed by: INTERNAL MEDICINE

## 2024-10-13 RX ORDER — DESMOPRESSIN ACETATE 4 UG/ML
2 INJECTION, SOLUTION INTRAVENOUS; SUBCUTANEOUS ONCE
Status: COMPLETED | OUTPATIENT
Start: 2024-10-13 | End: 2024-10-13

## 2024-10-13 RX ADMIN — MAGNESIUM SULFATE HEPTAHYDRATE 2000 MG: 40 INJECTION, SOLUTION INTRAVENOUS at 06:07

## 2024-10-13 RX ADMIN — POTASSIUM & SODIUM PHOSPHATES POWDER PACK 280-160-250 MG 250 MG: 280-160-250 PACK at 09:13

## 2024-10-13 RX ADMIN — Medication 100 MG: at 09:13

## 2024-10-13 RX ADMIN — FERROUS SULFATE TAB 325 MG (65 MG ELEMENTAL FE) 325 MG: 325 (65 FE) TAB at 09:13

## 2024-10-13 RX ADMIN — Medication 1 TABLET: at 09:13

## 2024-10-13 RX ADMIN — SODIUM CHLORIDE, PRESERVATIVE FREE 10 ML: 5 INJECTION INTRAVENOUS at 20:19

## 2024-10-13 RX ADMIN — DESMOPRESSIN ACETATE 2 MCG: 4 SOLUTION INTRAVENOUS at 11:53

## 2024-10-13 RX ADMIN — FOLIC ACID 1 MG: 1 TABLET ORAL at 09:13

## 2024-10-13 RX ADMIN — ENOXAPARIN SODIUM 30 MG: 100 INJECTION SUBCUTANEOUS at 09:13

## 2024-10-13 RX ADMIN — DEXTROSE MONOHYDRATE 250 ML: 50 INJECTION, SOLUTION INTRAVENOUS at 09:17

## 2024-10-13 RX ADMIN — SODIUM CHLORIDE, PRESERVATIVE FREE 10 ML: 5 INJECTION INTRAVENOUS at 09:13

## 2024-10-13 RX ADMIN — POTASSIUM & SODIUM PHOSPHATES POWDER PACK 280-160-250 MG 250 MG: 280-160-250 PACK at 17:08

## 2024-10-13 NOTE — PLAN OF CARE
Problem: Discharge Planning  Goal: Discharge to home or other facility with appropriate resources  10/12/2024 2321 by Shruthi Tavera RN  Outcome: Progressing  Flowsheets (Taken 10/12/2024 2321)  Discharge to home or other facility with appropriate resources:   Identify barriers to discharge with patient and caregiver   Identify discharge learning needs (meds, wound care, etc)     Problem: Safety - Adult  Goal: Free from fall injury  10/12/2024 2321 by Shruthi Tavera RN  Outcome: Progressing  Flowsheets (Taken 10/12/2024 2321)  Free From Fall Injury:   Instruct family/caregiver on patient safety   Based on caregiver fall risk screen, instruct family/caregiver to ask for assistance with transferring infant if caregiver noted to have fall risk factors     Problem: Pain  Goal: Verbalizes/displays adequate comfort level or baseline comfort level  10/12/2024 2321 by Shruthi Tavera RN  Outcome: Progressing  Flowsheets (Taken 10/12/2024 2321)  Verbalizes/displays adequate comfort level or baseline comfort level:   Administer analgesics based on type and severity of pain and evaluate response   Encourage patient to monitor pain and request assistance   Assess pain using appropriate pain scale     Problem: Nutrition Deficit:  Goal: Optimize nutritional status  10/12/2024 2321 by Shruthi Tavera RN  Outcome: Progressing  Flowsheets (Taken 10/12/2024 2321)  Nutrient intake appropriate for improving, restoring, or maintaining nutritional needs:   Assess nutritional status and recommend course of action   Monitor oral intake, labs, and treatment plans      No

## 2024-10-13 NOTE — PLAN OF CARE
Problem: Discharge Planning  Goal: Discharge to home or other facility with appropriate resources  10/13/2024 1400 by Tristen Koroma RN  Outcome: Progressing  10/13/2024 1400 by Tristen Koroma RN  Outcome: Progressing     Problem: Safety - Adult  Goal: Free from fall injury  10/13/2024 1400 by Tristen Koroma RN  Outcome: Progressing  10/13/2024 1400 by Tristen Koroma RN  Outcome: Progressing     Problem: Pain  Goal: Verbalizes/displays adequate comfort level or baseline comfort level  10/13/2024 1400 by Tristen Koroma RN  Outcome: Progressing  10/13/2024 1400 by Tristen Koroma RN  Outcome: Progressing     Problem: Nutrition Deficit:  Goal: Optimize nutritional status  10/13/2024 1400 by Tristen Koroma RN  Outcome: Progressing  10/13/2024 1400 by Tristen Koroma RN  Outcome: Progressing     Problem: Metabolic/Fluid and Electrolytes - Adult  Goal: Electrolytes maintained within normal limits  Outcome: Progressing  Goal: Hemodynamic stability and optimal renal function maintained  Outcome: Progressing     Care plan reviewed with patient.  Patient verbalize understanding of the plan of care and contribute to goal setting.

## 2024-10-14 VITALS
DIASTOLIC BLOOD PRESSURE: 67 MMHG | HEIGHT: 64 IN | WEIGHT: 99.9 LBS | SYSTOLIC BLOOD PRESSURE: 109 MMHG | BODY MASS INDEX: 17.05 KG/M2 | OXYGEN SATURATION: 100 % | TEMPERATURE: 97.9 F | HEART RATE: 95 BPM | RESPIRATION RATE: 17 BRPM

## 2024-10-14 LAB
ALBUMIN SERPL BCG-MCNC: 3.3 G/DL (ref 3.5–5.1)
ALP SERPL-CCNC: 90 U/L (ref 38–126)
ALT SERPL W/O P-5'-P-CCNC: 29 U/L (ref 11–66)
ANION GAP SERPL CALC-SCNC: 11 MEQ/L (ref 8–16)
AST SERPL-CCNC: 48 U/L (ref 5–40)
BASOPHILS ABSOLUTE: 0 THOU/MM3 (ref 0–0.1)
BASOPHILS NFR BLD AUTO: 0.6 %
BILIRUB SERPL-MCNC: 0.2 MG/DL (ref 0.3–1.2)
BUN SERPL-MCNC: 4 MG/DL (ref 7–22)
CALCIUM SERPL-MCNC: 8.6 MG/DL (ref 8.5–10.5)
CHLORIDE SERPL-SCNC: 87 MEQ/L (ref 98–111)
CO2 SERPL-SCNC: 24 MEQ/L (ref 23–33)
CREAT SERPL-MCNC: 0.3 MG/DL (ref 0.4–1.2)
DEPRECATED RDW RBC AUTO: 49.8 FL (ref 35–45)
EOSINOPHIL NFR BLD AUTO: 1.1 %
EOSINOPHILS ABSOLUTE: 0.1 THOU/MM3 (ref 0–0.4)
ERYTHROCYTE [DISTWIDTH] IN BLOOD BY AUTOMATED COUNT: 13.6 % (ref 11.5–14.5)
GFR SERPL CREATININE-BSD FRML MDRD: > 90 ML/MIN/1.73M2
GLUCOSE SERPL-MCNC: 92 MG/DL (ref 70–108)
HCT VFR BLD AUTO: 24.4 % (ref 37–47)
HGB BLD-MCNC: 8.1 GM/DL (ref 12–16)
IMM GRANULOCYTES # BLD AUTO: 0.05 THOU/MM3 (ref 0–0.07)
IMM GRANULOCYTES NFR BLD AUTO: 0.8 %
LYMPHOCYTES ABSOLUTE: 2.1 THOU/MM3 (ref 1–4.8)
LYMPHOCYTES NFR BLD AUTO: 32.7 %
MAGNESIUM SERPL-MCNC: 1.8 MG/DL (ref 1.6–2.4)
MCH RBC QN AUTO: 32.9 PG (ref 26–33)
MCHC RBC AUTO-ENTMCNC: 33.2 GM/DL (ref 32.2–35.5)
MCV RBC AUTO: 99.2 FL (ref 81–99)
MONOCYTES ABSOLUTE: 1.5 THOU/MM3 (ref 0.4–1.3)
MONOCYTES NFR BLD AUTO: 23.1 %
NEUTROPHILS ABSOLUTE: 2.7 THOU/MM3 (ref 1.8–7.7)
NEUTROPHILS NFR BLD AUTO: 41.7 %
NRBC BLD AUTO-RTO: 0 /100 WBC
PHOSPHATE SERPL-MCNC: 3.7 MG/DL (ref 2.4–4.7)
PLATELET # BLD AUTO: 255 THOU/MM3 (ref 130–400)
PMV BLD AUTO: 9.4 FL (ref 9.4–12.4)
POTASSIUM SERPL-SCNC: 3.7 MEQ/L (ref 3.5–5.2)
PROT SERPL-MCNC: 5.7 G/DL (ref 6.1–8)
RBC # BLD AUTO: 2.46 MILL/MM3 (ref 4.2–5.4)
SODIUM SERPL-SCNC: 121 MEQ/L (ref 135–145)
SODIUM SERPL-SCNC: 122 MEQ/L (ref 135–145)
SODIUM SERPL-SCNC: 122 MEQ/L (ref 135–145)
WBC # BLD AUTO: 6.4 THOU/MM3 (ref 4.8–10.8)

## 2024-10-14 PROCEDURE — 83735 ASSAY OF MAGNESIUM: CPT

## 2024-10-14 PROCEDURE — 6370000000 HC RX 637 (ALT 250 FOR IP)

## 2024-10-14 PROCEDURE — 99238 HOSP IP/OBS DSCHRG MGMT 30/<: CPT | Performed by: INTERNAL MEDICINE

## 2024-10-14 PROCEDURE — 84295 ASSAY OF SERUM SODIUM: CPT

## 2024-10-14 PROCEDURE — 36415 COLL VENOUS BLD VENIPUNCTURE: CPT

## 2024-10-14 PROCEDURE — 84100 ASSAY OF PHOSPHORUS: CPT

## 2024-10-14 PROCEDURE — 85025 COMPLETE CBC W/AUTO DIFF WBC: CPT

## 2024-10-14 PROCEDURE — 2580000003 HC RX 258

## 2024-10-14 PROCEDURE — 6360000002 HC RX W HCPCS: Performed by: STUDENT IN AN ORGANIZED HEALTH CARE EDUCATION/TRAINING PROGRAM

## 2024-10-14 PROCEDURE — 80053 COMPREHEN METABOLIC PANEL: CPT

## 2024-10-14 PROCEDURE — 99232 SBSQ HOSP IP/OBS MODERATE 35: CPT | Performed by: INTERNAL MEDICINE

## 2024-10-14 RX ADMIN — FOLIC ACID 1 MG: 1 TABLET ORAL at 08:32

## 2024-10-14 RX ADMIN — FERROUS SULFATE TAB 325 MG (65 MG ELEMENTAL FE) 325 MG: 325 (65 FE) TAB at 08:32

## 2024-10-14 RX ADMIN — ENOXAPARIN SODIUM 30 MG: 100 INJECTION SUBCUTANEOUS at 08:32

## 2024-10-14 RX ADMIN — Medication 100 MG: at 08:32

## 2024-10-14 RX ADMIN — SODIUM CHLORIDE, PRESERVATIVE FREE 10 ML: 5 INJECTION INTRAVENOUS at 08:32

## 2024-10-14 RX ADMIN — Medication 1 TABLET: at 08:32

## 2024-10-14 NOTE — DISCHARGE SUMMARY
evaluation, counseling and review of medications and discharge plan.    Thank you Ben Montejo MD for the opportunity to be involved in this patient's care.      Signed:    Electronically signed by Cristobal Hamilton MD on 10/14/24 at 10:45 AM EDT     Case was discussed with Attending, Dr. Bunn

## 2024-10-14 NOTE — PLAN OF CARE
Problem: Discharge Planning  Goal: Discharge to home or other facility with appropriate resources  10/13/2024 2131 by Zander Benavidez RN  Outcome: Progressing  Flowsheets (Taken 10/13/2024 2020)  Discharge to home or other facility with appropriate resources: Identify barriers to discharge with patient and caregiver  10/13/2024 1400 by Tristen Koroma RN  Outcome: Progressing  10/13/2024 1400 by Tristen Koroma, RN  Outcome: Progressing     Problem: Safety - Adult  Goal: Free from fall injury  10/13/2024 2131 by Zander Benavidez RN  Outcome: Progressing  10/13/2024 1400 by Tristen Koroma RN  Outcome: Progressing  10/13/2024 1400 by Tristen Koroma RN  Outcome: Progressing     Problem: Pain  Goal: Verbalizes/displays adequate comfort level or baseline comfort level  10/13/2024 2131 by Zander Benavidez RN  Outcome: Progressing  10/13/2024 1400 by Tristen Koroma RN  Outcome: Progressing  10/13/2024 1400 by Tristen Koroma RN  Outcome: Progressing     Problem: Nutrition Deficit:  Goal: Optimize nutritional status  10/13/2024 2131 by Zander Benavidez RN  Outcome: Progressing  10/13/2024 1400 by Tristen Koroma RN  Outcome: Progressing  10/13/2024 1400 by Tristen Koroma RN  Outcome: Progressing     Problem: Metabolic/Fluid and Electrolytes - Adult  Goal: Electrolytes maintained within normal limits  10/13/2024 2131 by Zander Benavidez RN  Outcome: Progressing  Flowsheets (Taken 10/13/2024 2020)  Electrolytes maintained within normal limits:   Monitor labs and assess patient for signs and symptoms of electrolyte imbalances   Administer electrolyte replacement as ordered  10/13/2024 1400 by Tristen Koroma, RN  Outcome: Progressing  Goal: Hemodynamic stability and optimal renal function maintained  10/13/2024 2131 by Zander Benavidez RN  Outcome: Progressing  Flowsheets (Taken 10/13/2024 2020)  Hemodynamic stability and optimal renal function maintained:   Monitor labs and assess for signs and symptoms of

## 2024-10-15 ENCOUNTER — TELEPHONE (OUTPATIENT)
Dept: FAMILY MEDICINE CLINIC | Age: 54
End: 2024-10-15

## 2024-10-15 DIAGNOSIS — G35 MS (MULTIPLE SCLEROSIS) (HCC): ICD-10-CM

## 2024-10-15 LAB
BACTERIA BLD AEROBE CULT: NORMAL
BACTERIA BLD AEROBE CULT: NORMAL

## 2024-10-15 RX ORDER — GABAPENTIN 400 MG/1
400 CAPSULE ORAL NIGHTLY
Qty: 90 CAPSULE | Refills: 1 | Status: SHIPPED | OUTPATIENT
Start: 2024-10-15 | End: 2025-04-13

## 2024-10-15 NOTE — TELEPHONE ENCOUNTER
Patient's last appointment was: 9/4/2024  with our   Patient's next appointment is: 10/21/2024  with our Dr. Potter  Last refilled on: 03/19/2024  Which pharmacy does the script need sent to: [facility] [place]      Lab Results   Component Value Date    LABA1C 4.8 10/03/2019     Lab Results   Component Value Date    CHOL 162 10/09/2019    TRIG 196 10/09/2019     06/16/2021     Lab Results   Component Value Date     (L) 10/14/2024    K 3.7 10/14/2024    CL 87 (L) 10/14/2024    CO2 24 10/14/2024    BUN 4 (L) 10/14/2024    CREATININE 0.3 (L) 10/14/2024    GLUCOSE 92 10/14/2024    CALCIUM 8.6 10/14/2024    BILITOT 0.2 (L) 10/14/2024    ALKPHOS 90 10/14/2024    AST 48 (H) 10/14/2024    ALT 29 10/14/2024    LABGLOM > 90 10/14/2024    AGRATIO 1.2 (L) 05/14/2022     Lab Results   Component Value Date    TSH 2.280 08/04/2024    T4FREE 0.96 04/11/2022     Lab Results   Component Value Date    WBC 6.4 10/14/2024    HGB 8.1 (L) 10/14/2024    HCT 24.4 (L) 10/14/2024    MCV 99.2 (H) 10/14/2024     10/14/2024

## 2024-10-15 NOTE — PROGRESS NOTES
Hospitalist Progress Note  Internal Medicine Resident      Patient: Rivka Zavala 53 y.o. female      Unit/Bed: -15/015-A    Admit Date: 10/9/2024      ASSESSMENT AND PLAN  Active Problems  Chronic severe hypotonic hypovolemic hyponatremia: Patient initially presented with sodium 102, has a long history of having hyponatremia, has had past admissions related to it.  Corrected from 102 to roughly 110 over the first day of admission, then down trended to 105 before uptrending to 112 on 10/11.  As of 10/12 patient AOx4, neurological status normal, denying any confusion, dizziness, weakness, headache, seizure-like activity. Sodium yesterday 10/12 evening was 113, on 10/13 morning went up to 123, 2 mcg desmopressin delivered, recheck 120  Nephrology consulted and following for hyponatremia management, appreciate input and direction  DDAVP stopped 10/12 morning, D5W stopped 10/11 evening.  Further DDAVP as needed per nephrology  Sodium rechecks per nephrology direction  Electrolytes have been stable over past several days, reducing frequency of rechecks to daily labs  Acute on chronic normocytic anemia, improving: Mildly anemic with hemoglobin 11.7 upon admission, downtrending to around 8. Reticulocyte count had been appropriately elevated.  LDH high at 303.  Haptoglobin WNL and most recent total bilirubin 0.4 make hemolytic process less likely. Hemoglobin on 10/13 stable at 8.0.  Will continue to trend daily CBC and transfuse if necessary.    EtOH abuse: Patient has a history of alcohol use, upon a presentation to ED EtOH level was 0.14.  Hypocalcemia: Calcium low throughout admission, last ionized calcium 0.87 checked on 10/11.  Will repeat ionized calcium and replete accordingly.  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 
    Pharmacist Review and Automatic Dose Adjustment of Prophylactic Enoxaparin or Heparin    Reviewed reason for admission/hospital problem list    The reviewing pharmacist has made an adjustment to the ordered enoxaparin or heparin dose or converted to heparin per the approved [facility] protocol and table as identified below.      Recent Labs     10/09/24  2107 10/10/24  0137 10/10/24  0535   CREATININE 0.3* 0.3* 0.3*     Estimated Creatinine Clearance: 163 mL/min (A) (based on SCr of 0.3 mg/dL (L)).    Recent Labs     10/09/24  1810 10/10/24  0535   HGB 11.7* 8.8*   HCT 32.8* 24.5*    92*     No results for input(s): \"INR\" in the last 72 hours.    Height:   Ht Readings from Last 1 Encounters:   10/09/24 1.626 m (5' 4\")     Weight:  Wt Readings from Last 1 Encounters:   10/09/24 47.6 kg (105 lb)       *Do not exceed enoxaparin 40mg daily or UFH 5000 units SUBQ TID in patients with epidurals,  lumbar drains, or external ventricular drains.    Plan:   Changed enoxaparin 40 mg subq daily to enoxaparin 30 mg subq daily.     Thank you,  Betty Andre, PharmD, BCPS, BCCCP  10/10/2024 9:30 AM      
  CRITICAL CARE PROGRESS NOTE      Patient:  Rivka Zavala    Unit/Bed:4D-08/008-A  YOB: 1970  MRN: [id number]   PCP: Ben Montejo MD  Date of Admission: 10/9/2024  Chief Complaint:- S/P Fall    Assessment and Plan:    Acute on Chronic Hyponatremia  Patient was admitted with similar episode (alcohol abuse, fall and hyponatremia) from 08/04/24 - 08/10/24 and  was assessed to be hypo-osmolar hypovolemic secondary to chronic malnutrition as well as alcohol use.   Initial sodium 102 at 1810 in ER, with repeat 110 at 2009, repeat 110 again at 2107  In ER, patient received normal saline 100cc/hr starting at 1937, held by provider at 2114. Patient additionally administered sodium bicarbonate 8.4% injection at 1949 and 1950 in the ER.   Nephrology was consulted in the ED, recommendation for 2 mcg DDAVP (10/9), 500cc D5W bolus over 30 minutes, with repeat sodium checks every 2 hours with correction goal of 106-107  Nephrology recommending keeping sodium level by 110 at the end of today. Patient received another dose of D5W 500 cc this morning  Sodium Trend: 102 > 110 > 110 > 106 > 108 > 110 > 112 > 111 > 110 > 108  Strict I/O's, Daily Weights    Thrombocytopenia  PLT Trend: 135 > 92  Last known PLT 8/23: 423  TEG studies 10/10/2024: R time: 5.4 (n), K time 1.8 (n), Angle 70.3 (n), PLATELET MAPPING: ADP < 10 (low), AA < 8.0 (low).  Peripheral smear pending  D-dimer: 7575 (h), Fibrinogen: 210 (n), Fibrin split : 5-20: positive   INR: 1.06 (n), aPTT: 30.1 (n)  All cell lines seems to drop, Patient is oozing from central line otherwise denying any other bleed, Patient is complaining of some bruising , could be room fall? Will repeat CBC and follow up    Acute on chronic anemia  Hemoglobin trend: 11.7 > 8.8  LDH: 303 (h), Retic count: 2.6 (high), Haptoglobin pending,   All cell lines seems to drop, Patient is oozing from central line otherwise denying any other bleed, Patient is complaining of some bruising , 
  Physician Progress Note      PATIENT:               NIDIA NAJERA  [facility] #:                  [id number]  :                       1970  ADMIT DATE:       10/9/2024 5:16 PM  DISCH DATE:        10/14/2024 11:37 AM  RESPONDING  PROVIDER #:        FLASH NIXON          QUERY TEXT:    Pt admitted with Hyponatremia, Alcoholic Brain Atrophy and other electrolyte   abnormalities . Pt noted to have Altered mental status requiring intubation .   If possible, please document in the progress notes and discharge summary if   you are evaluating and / or treating any of the following:    The medical record reflects the following:  Risk Factors: Hyponatremia with Sodium 102, Alcoholic Brain Atrophy and other   electrolyte abnormalities  Clinical Indicators: Per ED notes, H&P and Critical Care notes Patient   presented with Altered mental status requiring intubation for Acute   respiratory failure - resp rate 30, grunting sounds , increased secretions.   Mental status improved with treatment.  Treatment: Admission , labs , imaging, intubation ,ICU monitoring , Nephrology   consult , treatment of electrolyte imbalances specifically slow correction   for Sodium 102 ,  DDAVP  infusions. Neuro checks and monitoring for improved   mental status.    Thank You,  Olinda CAI, RN, CRCR  Clinical   P: [phone]  F: [phone]  Options provided:  -- Metabolic encephalopathy POA due to Hyponatremia now resolved  -- Alcoholic encephalopathy  -- Other - I will add my own diagnosis  -- Disagree - Not applicable / Not valid  -- Disagree - Clinically unable to determine / Unknown  -- Refer to Clinical Documentation Reviewer    PROVIDER RESPONSE TEXT:    This patient has metabolic encephalopathy due to hyponatremia now resolved    Query created by: Olinda Nunez on 10/14/2024 11:07 AM      Electronically signed by:  FLASH NIXON 10/15/2024 2:41 PM          
 A size 7.5 endotracheal tube was successfully placed using a size 3 blade. The Lot number for he endotracheal tube was ***  
[facility]  SPEECH THERAPY  STRZ ICU 4D  Speech - Language - Cognitive Evaluation    Discharge Recommendations: Continue to Assess Pending Progress    SLP Individual Minutes  Time In: 1043  Time Out: 1056  Minutes: 13  Timed Code Treatment Minutes: 0 Minutes       Date: 10/11/2024  Patient Name: Rivka Zavala      CSN: [id number]   : 1970  (53 y.o.)  Gender: female   Referring Physician:  Rusty Frazier MD  Diagnosis: <principal problem not specified>  Precautions: Fall Risk, Aspiration Risk   History of Present Illness/Injury: History obtained from chart review. This is a 53 year old female who [facility] ED on 10/09/2024 by EMS secondary to fall.  Patient admitted to alcohol use prior to arrival.  Per ER physician documentation, the patient was reported to have fallen and hit her head.  CT head as well as cervical spine was obtained and in the emergency department.  After return from imaging, patient was reported to have deteriorated mentally with increased respiratory effort.  Secondary to concerns for airway protection, the patient was intubated in the emergency department.  A central line was also placed.  While in the emergency department, the patient was noted to have multiple electrolyte abnormalities for which correction was initiated.  In the emergency department the patient sodium was noted to increase from 102-110, at which point nephrology was consulted.  Secondary to the patient's fall, the trauma surgeon was also consulted; from the trauma surgery perspective, the patient's case appeared to be more of a medical issue with recommendation for admission to the ICU.   Past Medical History:   Diagnosis Date    Anemia     Clavicle fracture 10/13/2022    ETOH abuse     Malnutrition (HCC)     Mild tetrahydrocannabinol (THC) abuse     Multiple sclerosis (HCC) 2017    Personality disorder (HCC) unknown    Rib fracture 10/13/2022    Smoker        Pain: No pain 
[facility]  INPATIENT PHYSICAL THERAPY  EVALUATION  [facility] ICU 4D - 4D-08/008-A    Discharge Recommendations: Continue to assess pending progress  Equipment Recommendations: No               Time In: 830  Time Out: 908  Timed Code Treatment Minutes: 27 Minutes  Minutes: 38          Date: 10/11/2024  Patient Name: Rivka Zavala,  Gender:  female        MRN: [id number]  : 1970  (53 y.o.)      Referring Practitioner: Ruma Griffin MD  Diagnosis: <principal problem not specified>  Additional Pertinent Hx: She had reportedly had a few drinks at a bar.  She had fallen and hit her head.  But mentating normally.  Had a laceration near the left periorbital area.  my predecessor had ordered some labs as well as a CT of the head and cervical spine. Shortly after coming back from CT, I am told the mental deterioration seemed to start.  She was having some shaking although was still talking somewhat.She seemed to be developing respiratory distress.  Had a respiratory rate approaching 30, and was making grunting sounds.  I did note secretions in the mouth and airway.  Felt airway needed to be protected.  Due to sudden deterioration and  what appears to be respiratory distress I was asked to intubate the patient and I have assumed ED management from here on out.     It was also noted that the patient was to have a markedly low sodium of 102 and the chemistry was initially obtained.     Restrictions/Precautions:  Restrictions/Precautions: Fall Risk, General Precautions, Contact Precautions  Position Activity Restriction  Other position/activity restrictions: hx MS    Subjective:  Chart Reviewed: Yes  Patient assessed for rehabilitation services?: Yes  Subjective: Nurse approved session.  Patient in bed upon arrival and agreeable to get up for therapy and sit up in chair for breakfast.  Not happy with IV in (R) side of face.    General:                Pain: 0/10: denies pain    Vitals: Vitals not 
Codie CARUSO and Dr. Rose to bedside to extubate patient. Red biohazard bag available, darryl hooked up to suction and suction on and ready. Nasal Cannula prepared at 2L. Patient following commands, answering yes and no questions.     0502: Patient successful extubated to 2L NC. Mouth suctioned via yankauer and patient instructed to cough. Patient able to produce moderate cough and state first and last name. Breath sounds clear bilaterally, diminished in bilateral bases. Patient 100% on 2L NC, weaned to room air. Patient spO2 100% on room air, 86bpm and RR 12 breaths/min.  
Comprehensive Nutrition Assessment    Type and Reason for Visit:  Initial, Consult (ONS recommendations)    Nutrition Recommendations/Plan:   Continue diet per Physician - note allergy to mushrooms - kitchen notified  Begin Compact ONS TID  Continue MVI, thiamine, folic acid  FR per Physician d/t hyponatremia     Malnutrition Assessment:  Malnutrition Status:  Severe malnutrition (10/11/24 1416)    Context:  Acute Illness     Findings of the 6 clinical characteristics of malnutrition:  Energy Intake:  50% or less of estimated energy requirements for 5 or more days  Weight Loss:  No significant weight loss (wt. has increased per EMR however pt. feels her wt. is 105# not the current 113# - ? bedscale accuracy)     Body Fat Loss:  Moderate body fat loss Triceps, Fat Overlying Ribs (assessed with RN)   Muscle Mass Loss:  Mild muscle mass loss Temples (temporalis), Clavicles (pectoralis & deltoids)  Fluid Accumulation:  Mild     Strength:  Not Performed    Nutrition Assessment:     Pt. severely malnourished AEB criteria listed above.  At risk for further nutritional compromise r/t admit with hyponatremia, Fall at home, intubated d/t AMS 10/9-10, reports of poor po intake past 5 days d/t N/V and now with sore throat, MS relapse,daily ETOH intake pta and underlying medical condition personality disorder, polysubstance abuse, MS, malnutrition., electrolyte abnormalities.      Nutrition Related Findings:    Pt. Report/Treatments/Miscellaneous: pt. Seen; parents at bedside; spoke with RN; pt. Very fidgety - focused on finding things in her purse; pt. And mother stated pt. Had N/V for past 5d and limited po intake; lunch still in front of pt. At ~1400 untouched - offered to warm items but pt. Declined; UO 2080ml; SLP evaluated and pt . On regular textures  GI Status: (+) BM 10/11 per pt.  Pertinent Labs: glucose 84  BUN 3  creatinine 0.2  Na 114  K+ 4  phosphorus 3.1 (lowest this admit 1.7 on 10/10)  Pertinent Meds: DDAVP, 
[facility]   Nephrology progress note  10/11/2024, 10:26 AM      Pt Name:    Rivka Zavala  MRN:     [id number]     YOB: 1970  Admit Date:    10/9/2024  5:16 PM    Chief Complaint: Nephrology following for hyponatremia and electrolyte abnormalities.    Subjective:  Patient seen and examined  No chest pain or shortness of breath  Eating and drinking well  Decent urine output    Objective:  24HR INTAKE/OUTPUT:    Intake/Output Summary (Last 24 hours) at 10/11/2024 1026  Last data filed at 10/11/2024 0956  Gross per 24 hour   Intake 2528.35 ml   Output 2580 ml   Net -51.65 ml      Admission weight: 47.6 kg (105 lb)  Wt Readings from Last 3 Encounters:   10/11/24 51.7 kg (113 lb 15.7 oz)   09/09/24 46.3 kg (102 lb)   09/04/24 45.5 kg (100 lb 3.2 oz)        Vitals :   Vitals:    10/11/24 0600 10/11/24 0700 10/11/24 0800 10/11/24 0900   BP: (!) 92/52 130/61 (!) 112/52 113/80   Pulse: 79 77 90 99   Resp: 10 10 13 17   Temp: 98.4 °F (36.9 °C) 98.6 °F (37 °C) 98.1 °F (36.7 °C) 98.2 °F (36.8 °C)   TempSrc: Bladder Bladder Bladder    SpO2: 100% 100% 100% 99%   Weight: 51.7 kg (113 lb 15.7 oz)      Height:           Physical examination  General Appearance: Cachectic ill nourished  Mouth/Throat:  Oral mucosa moist  Neck:  Supple, no JVD  Lungs:  Breath sounds: clear  Heart::  S1,S2 heard  Abdomen:  Soft, non - tender  Musculoskeletal:  Edema -no edema    Medications:  Infusion:    dextrose 50 mL/hr at 10/11/24 1014    sodium chloride       Meds:    DESMOpressin  2 mcg IntraVENous BID    ferrous sulfate  325 mg Oral Daily    enoxaparin  30 mg SubCUTAneous Daily    thiamine  100 mg Oral Daily    sodium chloride flush  5-40 mL IntraVENous 2 times per day    multivitamin  1 tablet Oral Daily    folic acid  1 mg Oral Daily       Lab Data :  CBC:   Recent Labs     10/10/24  0535 10/10/24  1518 10/11/24  0320   WBC 8.0 9.8 7.8   HGB 8.8* 8.8* 8.8*   HCT 24.5* 24.3* 24.3*   PLT 92* 98* 107* 
[facility]   Nephrology progress note  10/12/2024, 1:04 PM      Pt Name:    Rivka Zavala  MRN:     [id number]     YOB: 1970  Admit Date:    10/9/2024  5:16 PM    Chief Complaint: Nephrology following for hyponatremia and electrolyte abnormalities.    Subjective:  Patient seen and examined  No chest pain or shortness of breath  Awake and alert  DDAVP stopped this morning  D5W stopped last night  + Has some diarrhea      Objective:  24HR INTAKE/OUTPUT:    Intake/Output Summary (Last 24 hours) at 10/12/2024 1304  Last data filed at 10/12/2024 1118  Gross per 24 hour   Intake 1107.19 ml   Output 600 ml   Net 507.19 ml      Admission weight: 47.6 kg (105 lb)  Wt Readings from Last 3 Encounters:   10/11/24 51.7 kg (113 lb 15.7 oz)   09/09/24 46.3 kg (102 lb)   09/04/24 45.5 kg (100 lb 3.2 oz)        Vitals :   Vitals:    10/11/24 2355 10/12/24 0347 10/12/24 0757 10/12/24 1118   BP: 131/84 119/84 112/73 (!) 90/57   Pulse: 83 82 94 84   Resp: 16 17 18 18   Temp: 98.2 °F (36.8 °C) 98.1 °F (36.7 °C) 97.2 °F (36.2 °C) 97.5 °F (36.4 °C)   TempSrc: Oral Oral Oral Oral   SpO2: 100% 100% 100% 100%   Weight:       Height:           Physical examination  General Appearance: No acute distress, awake and alert  Mouth/Throat:  Oral mucosa moist  Neck:  Supple, no JVD  Lungs:  Breath sounds: clear  Heart::  S1,S2 heard  Abdomen:  Soft, non - tender  Musculoskeletal:  Edema -no edema    Medications:  Infusion:    sodium chloride       Meds:    ferrous sulfate  325 mg Oral Daily    enoxaparin  30 mg SubCUTAneous Daily    thiamine  100 mg Oral Daily    sodium chloride flush  5-40 mL IntraVENous 2 times per day    multivitamin  1 tablet Oral Daily    folic acid  1 mg Oral Daily       Lab Data :  CBC:   Recent Labs     10/10/24  1518 10/11/24  0320 10/12/24  0350   WBC 9.8 7.8 7.8   HGB 8.8* 8.8* 7.9*   HCT 24.3* 24.3* 22.3*   PLT 98* 107* 145     CMP:  Recent Labs     10/10/24  4990 10/11/24  013 
[facility]   Nephrology progress note  10/13/2024, 12:22 PM      Pt Name:    Rivka Zavala  MRN:     [id number]     YOB: 1970  Admit Date:    10/9/2024  5:16 PM    Chief Complaint: Nephrology following for hyponatremia and electrolyte abnormalities.    Subjective:  Patient seen and examined  No chest pain or shortness of breath  Awake and alert  Sodium 123  D5W bolus given  DDAVP ordered this morning      Objective:  24HR INTAKE/OUTPUT:    Intake/Output Summary (Last 24 hours) at 10/13/2024 1222  Last data filed at 10/13/2024 0919  Gross per 24 hour   Intake 1384.15 ml   Output 1200 ml   Net 184.15 ml      Admission weight: 47.6 kg (105 lb)  Wt Readings from Last 3 Encounters:   10/13/24 50.5 kg (111 lb 6 oz)   09/09/24 46.3 kg (102 lb)   09/04/24 45.5 kg (100 lb 3.2 oz)        Vitals :   Vitals:    10/13/24 0349 10/13/24 0608 10/13/24 0756 10/13/24 1125   BP: 123/73  (!) 100/56 91/60   Pulse: 76  96 88   Resp: 16  18    Temp: 98.2 °F (36.8 °C)  98.3 °F (36.8 °C) 98.4 °F (36.9 °C)   TempSrc: Oral  Oral Oral   SpO2: 98%  99% 100%   Weight:  50.5 kg (111 lb 6 oz)     Height:           Physical examination  General Appearance: No acute distress, awake and alert  Lungs: No labored breathing noted  Psych: Normal mood and affect  Neuro: Awake alert oriented      Medications:  Infusion:    sodium chloride       Meds:    potassium & sodium phosphates  1 packet Oral 4x Daily    ferrous sulfate  325 mg Oral Daily    enoxaparin  30 mg SubCUTAneous Daily    thiamine  100 mg Oral Daily    sodium chloride flush  5-40 mL IntraVENous 2 times per day    multivitamin  1 tablet Oral Daily    folic acid  1 mg Oral Daily       Lab Data :  CBC:   Recent Labs     10/11/24  0320 10/12/24  0350 10/12/24  1420 10/13/24  0453   WBC 7.8 7.8  --  6.6   HGB 8.8* 7.9* 8.6* 8.0*   HCT 24.3* 22.3* 24.7* 23.1*   * 145  --  198     CMP:  Recent Labs     10/11/24  0535 10/11/24  0730 10/12/24  0351 
[facility]   Nephrology progress note  10/14/2024, 10:32 AM      Pt Name:    Rivka Zavala  MRN:     [id number]     YOB: 1970  Admit Date:    10/9/2024  5:16 PM    Chief Complaint: Nephrology following for hyponatremia and electrolyte abnormalities.    Subjective:  Patient seen and examined this morning this is a late entry  No chest pain or shortness of breath  Apparently patient is having some family emergency and wanting to leave AMA    Objective:  24HR INTAKE/OUTPUT:    Intake/Output Summary (Last 24 hours) at 10/14/2024 1032  Last data filed at 10/14/2024 0634  Gross per 24 hour   Intake 1096.03 ml   Output 700 ml   Net 396.03 ml      Admission weight: 47.6 kg (105 lb)  Wt Readings from Last 3 Encounters:   10/14/24 45.3 kg (99 lb 14.4 oz)   09/09/24 46.3 kg (102 lb)   09/04/24 45.5 kg (100 lb 3.2 oz)        Vitals :   Vitals:    10/13/24 2020 10/13/24 2321 10/14/24 0355 10/14/24 0815   BP: 135/77 123/80 126/77 109/67   Pulse: 82 84 85 95   Resp: 18 17 18 17   Temp: 98.6 °F (37 °C) 98.8 °F (37.1 °C) 98.6 °F (37 °C) 97.9 °F (36.6 °C)   TempSrc: Oral Oral Oral Oral   SpO2: 100% 100% 99% 100%   Weight:   45.3 kg (99 lb 14.4 oz)    Height:           Physical examination  General Appearance: Cachectic ill nourished  Mouth/Throat:  Oral mucosa moist  Neck:  Supple, no JVD  Lungs:  Breath sounds: clear  Heart::  S1,S2 heard  Abdomen:  Soft, non - tender  Musculoskeletal:  Edema -no edema    Medications:  Infusion:    sodium chloride       Meds:    ferrous sulfate  325 mg Oral Daily    enoxaparin  30 mg SubCUTAneous Daily    thiamine  100 mg Oral Daily    sodium chloride flush  5-40 mL IntraVENous 2 times per day    multivitamin  1 tablet Oral Daily    folic acid  1 mg Oral Daily       Lab Data :  CBC:   Recent Labs     10/12/24  0350 10/12/24  1420 10/13/24  0453 10/14/24  0205   WBC 7.8  --  6.6 6.4   HGB 7.9* 8.6* 8.0* 8.1*   HCT 22.3* 24.7* 23.1* 24.4*     --  198 255 
[facility]  INPATIENT OCCUPATIONAL THERAPY  STRZ ICU 4D  EVALUATION      Discharge Recommendations: Continue to assess pending progress  Equipment Recommendations:   Recommend walker use all the time       Time In: 1149  Time Out: 1219  Timed Code Treatment Minutes: 15 Minutes  Minutes: 30          Date: 10/11/2024  Patient Name: Rivka Zavala,   Gender: female      MRN: [id number]  : 1970  (53 y.o.)  Referring Practitioner: Ruma Griffin MD  Diagnosis: chronic hyponatremia  Additional Pertinent Hx: Per MD H & P:53 year old female who presented to [facility] ED on 10/09/2024 by EMS secondary to fall.  Patient admitted to alcohol use prior to arrival.  Per ER physician documentation, the patient was reported to have fallen and hit her head.  CT head as well as cervical spine was obtained and in the emergency department.  After return from imaging, patient was reported to have deteriorated mentally with increased respiratory effort.  Secondary to concerns for airway protection, the patient was intubated in the emergency department.  A central line was also placed.  While in the emergency department, the patient was noted to have multiple electrolyte abnormalities for which correction was initiated.  In the emergency department the patient sodium was noted to increase from 102-110, at which point nephrology was consulted.  Secondary to the patient's fall, the trauma surgeon was also consulted; from the trauma surgery perspective, the patient's case appeared to be more of a medical issue with recommendation for admission to the ICU. Intubated 10/9 to 10/10.    Restrictions/Precautions:  Restrictions/Precautions: Fall Risk, General Precautions, Contact Precautions  Position Activity Restriction  Other position/activity restrictions: hx MS    Subjective  Chart Reviewed: Yes, Orders, Progress Notes, History and Physical  Patient assessed for rehabilitation services?: Yes  Family / Caregiver Present: 
Order received for CVC removal per Kurtis Herrera MD. Patient placed in bed.  Transparent dressing removed. Skin accessed appears eccymotic, dry and intact.  Sutures removed, area cleaned with chloro prep, sterile gauze and Vaseline gauze placed over site, CVC removed with tip intact, pressure held with sterile gauze for 3 minutes.  New transparent dressing applied.  Educated the patient on remaining in bed for 30 min, dressing stays on for 24 hours, signs and symptoms of infection and notify primary nurse if any blood or oozing.  Tristen SOTO notified.   
Patient arrived to unit from ED via bed. Patient transferred to ICU bed and placed on continuous ICU bedside monitor. Patient admitted for Chronic hyponatremia [E87.1]  Hyponatremia [E87.1]  Acute respiratory failure, unspecified whether with hypoxia or hypercapnia [J96.00]. Vitals obtained. See flowsheets. Patient's IV access includes 20g left AC, 22g left wrist, 22g right wrist. Current infusions and rates of infusion include mag sulfate, potassium. Assessment completed by Megha SOTO. Two nurse skin assessment completed by Megha SOTO and Layne SOTO. See flowsheets for assessment details. Policies and procedures of ICU unable to be explained to patient at this time. Family member(s)/representative(s) present at time of admission include n/a. Patient rights explained to family member(s)/representatives and patient, as able. Patient/patient's family member(s)/representative(s) N/A to have physician notified of their admission. All questions posed by patient's family member(s)/representative(s) and patient answered at this time.     
Patient has been successfully weaned from Mechanical Ventilation.  RSBI before extubation was 26 with SpO2 of 100 on 40% FiO2.  Patient extubated and placed on 2 liters/min via nasal cannula.  Post extubation SpO2 is 100% with HR  91 bpm and RR 12 breaths/min.  Patient had moderate cough that was non-productive.  Extubation Well tolerated by patient..  
Patient was seen after being transferred out of the ICU this evening. Patient is alert and oriented x 4 at this time. She denies any acute complaints. Upon assessment lungs were clear bilaterally, Heart tones without any adventitious sounds and regular rate and rhythm. Denies fevers, chills, nausea, vomiting and diarrhea. Endorses some right leg pain that is not new. Denies any new paresthesias.     Reviewed ICU plan and agree with current plan. Nephrology managing hyponatremia and hospitalist will manage medically.   
Pt admitted to  4K15 from ICU.     Complaints: fall .      IV none infusing into the right intrajugualr vein CVC . IV site free of s/s of infection or infiltration.     Vital signs obtained. Assessment and data collection initiated. Two nurse skin assessment performed by Pankaj SOTO and taylor RN.    Swallow Screen completed and documented for all patients ages 45 and older. PASSED  If patient fails bedside swallow, obtain order for speech therapy consult and keep patient NPO.    Policies and procedures for 4K explained. All questions answered with no further questions at this time. Fall prevention and safety brochure discussed with patient.  Bed alarm on. Call light in reach. Oriented to room.      
Pt is a 77y.o. female, propped up in bed visiting with family, in 4D-08. Pt  and family friend present as well. Ying shared with a smile 'when I was brought in here, I was in a bad way; I'm doing better than when I was brought up here.' She shared lovingly the support of her  and that she is looking forward to transfer off the floor.  provided active listening, brief conversation on her health challenges and prayer-met with gratitude by all present.     10/09/24 1229   Encounter Summary   Encounter Overview/Reason Spiritual/Emotional Needs   Service Provided For Patient and family together   Referral/Consult From [facility]   Support System Family members   Last Encounter  10/10/24   Complexity of Encounter Low   Begin Time 1229   End Time  1234   Total Time Calculated 5 min   Spiritual/Emotional needs   Type Spiritual Support   Assessment/Intervention/Outcome   Assessment Calm;Compromised coping;Impaired resilience;Peaceful   Intervention Active listening;Prayer (assurance of)/[place];Nurtured Hope;Explored/Affirmed feelings, thoughts, concerns;Discussed illness injury and it’s impact   Outcome Engaged in conversation;Expressed feelings, needs, and concerns;Expressed Gratitude;Coping       
RN entered room with patient very tearful and states she needs to leave AMA d/t family emergency and unsure of positive outcome in regards to father. RN notifed Dr. Bunn and Dr. Hamilton they went in and spoke with patient and explained the benefits of leaving AMA. Pt understood and signed the AMA paperwork. RN made follow up with Dr. Anne in 2 weeks with BMP prior to appointment. RN went over AVS with patient and discharged with all personal belongings.   
Utilize [facility] alcohol withdrawal scale (Based on Jeremiah Modified Alcohol Withdrawal Scale).  Tabulate score based on classifications including Tremor, Sweating, Hallucination, Orientation, and Agitation.    Tremor: 0  Sweatin  Hallucinations: 0  Orientation: 0  Agitation: 0  Total Score: 0  Action perform as described below     Tremor:  No tremor is 0 points.  Tremor on movement is 1 point.  Tremor at rest is 2 points.  Sweating: No Sweat 0 points. Moist is 1 point.  Drenching sweats is 2 points.  Hallucinations: No present 0 points. Dissuadable is 1 point. Not dissuadable is 2 points.  Orientation: Oriented 0 points. Vague/detached 1 point. Disoriented/no contact 2 points.  Agitation: Calm 0 points.  Anxious 1 point. Panicky 2 points.    Check scale every 2 hours.  Discontinue scoring with 4 consecutive scorings of 0.  Scale 0: No phenobarbital given.  Re-assess every 60 minutes as needed.   Scale 1-3: Phenobarbital 130 mg IV over 3 minutes. Re-assess every 60 minutes as needed.  May administer every 60 minutes to a maximum dose of phenobarbital 1040 mg in 24 hours!  Scale 4-8: Phenobarbital 260 mg IV over 5 minutes.  Re-assess every 60 minutes as needed. May administer every 60 minutes to a maximum dose of phenobarbital 1040mg in 24 hours!  Scale 9-10: Transfer to ICU (if not already in ICU).  Administer 10mg/kg phenobarbital IV over 60 minutes.  Maximum dose phenobarbital is 1040mg in 24 hours!   
Utilize [facility] alcohol withdrawal scale (Based on Jeremiah Modified Alcohol Withdrawal Scale).  Tabulate score based on classifications including Tremor, Sweating, Hallucination, Orientation, and Agitation.    Tremor: 0  Sweatin  Hallucinations: 0  Orientation: 0  Agitation: 0  Total Score: 0  Action perform as described below     Tremor:  No tremor is 0 points.  Tremor on movement is 1 point.  Tremor at rest is 2 points.  Sweating: No Sweat 0 points. Moist is 1 point.  Drenching sweats is 2 points.  Hallucinations: No present 0 points. Dissuadable is 1 point. Not dissuadable is 2 points.  Orientation: Oriented 0 points. Vague/detached 1 point. Disoriented/no contact 2 points.  Agitation: Calm 0 points.  Anxious 1 point. Panicky 2 points.    Check scale every 2 hours.  Discontinue scoring with 4 consecutive scorings of 0.  Scale 0: No phenobarbital given.  Re-assess every 60 minutes as needed.   Scale 1-3: Phenobarbital 130 mg IV over 3 minutes. Re-assess every 60 minutes as needed.  May administer every 60 minutes to a maximum dose of phenobarbital 1040 mg in 24 hours!  Scale 4-8: Phenobarbital 260 mg IV over 5 minutes.  Re-assess every 60 minutes as needed. May administer every 60 minutes to a maximum dose of phenobarbital 1040mg in 24 hours!  Scale 9-10: Transfer to ICU (if not already in ICU).  Administer 10mg/kg phenobarbital IV over 60 minutes.  Maximum dose phenobarbital is 1040mg in 24 hours!     
Malnutrition (HCC)     Mild tetrahydrocannabinol (THC) abuse     Multiple sclerosis (HCC) 2017    Personality disorder (HCC) unknown    Rib fracture 10/13/2022    Smoker        SUBJECTIVE:  Spoke with RN Justin for approval of CSE. Upon arrival, patient sitting upright in bed. Alert and cooperative     OBJECTIVE:    Pain:  No pain reported.    Current Diet: NPO    Respiratory Status:  Room Air    Behavioral Observation:  Alert    CRANIAL NERVE ASSESSMENT   CN V (Trigeminal) Closes and Opens Mandible WFL    Rotary Jaw Movement WFL      CN VII (Facial) Cheeks Hold Food out of Sulci WFL    Opens, Closes/Seals, Protrudes, Retracts Lips WFL    General Appearance WFL    Sensation WFL      CN X (Vagus - Pharyngeal) Raises Back of Tongue WFL      CN XI (Accessory) Lifts Soft Palate WFL      CN XII (Hypoglossal) Elevates Tongue Up and Back WFL    Protrusion   WFL    Lateralizes Tongue WFL    Sensation Not Tested      Other Observations Dentition Upper dentures    Vocal Quality WFL    Cough WFL     Patient Evaluated Using: Thin Liquids, Puree, and Coarse Solids    Oral Phase:  WFL    Pharyngeal Phase: WFL:  Pharyngeal phase appears WFL but cannot rule out pharyngeal phase deficits from a bedside swallowing evaluation alone.    Signs and Symptoms of Laryngeal Penetration/Aspiration: No signs/symptoms of aspiration evident in this evaluation, but cannot rule out silent aspiration.      Functional Oral Intake Scale: Total Oral Intake: 7.  Total oral intake with no restrictions    Impressions: Patient presents with oral phase of swallow function that is essentially WFL with low suspicion for presence of a pharyngeal dysphagia. No overt s/s aspiration exhibited across all consistencies/trials consumed, certainly not able to exclude pharyngeal phase dysfunction and/or airway invasion events without supportive imaging. Patient's swallow physiology does appear appropriate to support PO intake without distress with instrumental 
protocol ordered with withdrawal assessments, Precedex weaned off.     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  No focal parenchymal signal alteration suggestive of demyelinating disease or acute intracranial abnormality  08/03/2024 MRI Brain W WO Contrast worsening global volume loss; abnormal signal in white matter reviewed by neurointerventionalist Dr. Shah on prior admission on 08/10/2024 with no definite signs of multiple sclerosis from his perspective. He attributed patient's atrophy to chronic alcohol use with recurrent falls secondary to organic brain atrophy    Chronic normocytic anemia  Hx iron deficiency anemia  Monitor for signs and symptoms of bleeding  Folic acid, B12 supplementation initiated  07/12/2024 ferritin 240, iron 116, TIBC 273  08/03/2024 vitamin 
gross JVD appreciated.  Respiratory:  Normal effort. Clear to auscultation, without rales or wheezes or rhonchi.  Cardiovascular: Normal rate, regular rhythm with normal S1/S2 without murmurs.    No lower extremity edema.   Abdomen: Soft, non-tender, non-distended with normal bowel sounds.  Musculoskeletal: No joint swelling or tenderness. Normal tone. No abnormal movements.   Skin: Warm and dry. No rashes or lesions.  Neurologic:  No focal sensory/motor deficits in the upper or lower extremities. Cranial nerves:  grossly non-focal 2-12.     Psychiatric: Alert and oriented, normal insight and thought content.   Capillary Refill: Brisk,< 3 seconds.  Peripheral Pulses: +2 palpable, equal bilaterally.       Labs/Radiology: See chart or assessment above.     Electronically signed by Kurtis Herrera MD on 10/12/2024 at 9:25 AM  Case was discussed with Attending, Dr. Bunn.

## 2024-10-15 NOTE — TELEPHONE ENCOUNTER
Care Transitions Initial Follow Up Call    Outreach made within 2 business days of discharge: Yes    Patient: Rivka Zavala Patient : 1970   MRN: [id number]  Reason for Admission:   Discharge Date: 10/14/24       Spoke with: Rivka, patient    Discharge department/facility: [facility] Interactive Patient Contact:  Was patient able to fill all prescriptions: Yes  Was patient instructed to bring all medications to the follow-up visit: Yes  Is patient taking all medications as directed in the discharge summary? Yes  Does patient understand their discharge instructions: Yes  Does patient have questions or concerns that need addressed prior to 7-14 day follow up office visit: no    Additional needs identified to be addressed with provider  No needs identified             Scheduled appointment with PCP within 7-14 days    Follow Up  Future Appointments   Date Time Provider Department Center   10/21/2024 10:00 AM Dariana Potter DO [facility]   2024 11:00 AM Fernando Anne MD [facility]   2024  2:20 PM Ben Montejo MD [facility]   2024 10:40 AM Haily Person PA-C [facility]       Joan Johnson MA

## 2024-10-21 ENCOUNTER — LAB (OUTPATIENT)
Dept: LAB | Age: 54
End: 2024-10-21

## 2024-10-21 ENCOUNTER — OFFICE VISIT (OUTPATIENT)
Dept: FAMILY MEDICINE CLINIC | Age: 54
End: 2024-10-21
Payer: COMMERCIAL

## 2024-10-21 VITALS
SYSTOLIC BLOOD PRESSURE: 136 MMHG | BODY MASS INDEX: 18.34 KG/M2 | OXYGEN SATURATION: 99 % | HEIGHT: 64 IN | WEIGHT: 107.4 LBS | DIASTOLIC BLOOD PRESSURE: 82 MMHG | HEART RATE: 109 BPM | TEMPERATURE: 98.9 F | RESPIRATION RATE: 16 BRPM

## 2024-10-21 DIAGNOSIS — Z09 HOSPITAL DISCHARGE FOLLOW-UP: ICD-10-CM

## 2024-10-21 DIAGNOSIS — Z78.9 ALCOHOL USE: ICD-10-CM

## 2024-10-21 DIAGNOSIS — E87.1 HYPONATREMIA: ICD-10-CM

## 2024-10-21 DIAGNOSIS — D64.9 ANEMIA, UNSPECIFIED TYPE: ICD-10-CM

## 2024-10-21 DIAGNOSIS — Z78.9 ALCOHOL USE: Primary | ICD-10-CM

## 2024-10-21 LAB
ALBUMIN SERPL BCG-MCNC: 3.7 G/DL (ref 3.5–5.1)
ALP SERPL-CCNC: 107 U/L (ref 38–126)
ALT SERPL W/O P-5'-P-CCNC: 20 U/L (ref 11–66)
ANION GAP SERPL CALC-SCNC: 16 MEQ/L (ref 8–16)
AST SERPL-CCNC: 30 U/L (ref 5–40)
BILIRUB CONJ SERPL-MCNC: < 0.1 MG/DL (ref 0.1–13.8)
BILIRUB SERPL-MCNC: 0.2 MG/DL (ref 0.3–1.2)
BUN SERPL-MCNC: 4 MG/DL (ref 7–22)
CALCIUM SERPL-MCNC: 8.8 MG/DL (ref 8.5–10.5)
CHLORIDE SERPL-SCNC: 97 MEQ/L (ref 98–111)
CO2 SERPL-SCNC: 24 MEQ/L (ref 23–33)
CREAT SERPL-MCNC: 0.4 MG/DL (ref 0.4–1.2)
DEPRECATED RDW RBC AUTO: 55.6 FL (ref 35–45)
ERYTHROCYTE [DISTWIDTH] IN BLOOD BY AUTOMATED COUNT: 14.6 % (ref 11.5–14.5)
GFR SERPL CREATININE-BSD FRML MDRD: > 90 ML/MIN/1.73M2
GLUCOSE SERPL-MCNC: 66 MG/DL (ref 70–108)
HCT VFR BLD AUTO: 27 % (ref 37–47)
HGB BLD-MCNC: 8.4 GM/DL (ref 12–16)
MCH RBC QN AUTO: 32.4 PG (ref 26–33)
MCHC RBC AUTO-ENTMCNC: 31.1 GM/DL (ref 32.2–35.5)
MCV RBC AUTO: 104.2 FL (ref 81–99)
PLATELET # BLD AUTO: 576 THOU/MM3 (ref 130–400)
PMV BLD AUTO: 8.6 FL (ref 9.4–12.4)
POTASSIUM SERPL-SCNC: 3.7 MEQ/L (ref 3.5–5.2)
PROT SERPL-MCNC: 6.5 G/DL (ref 6.1–8)
RBC # BLD AUTO: 2.59 MILL/MM3 (ref 4.2–5.4)
SODIUM SERPL-SCNC: 137 MEQ/L (ref 135–145)
WBC # BLD AUTO: 10.8 THOU/MM3 (ref 4.8–10.8)

## 2024-10-21 PROCEDURE — G8484 FLU IMMUNIZE NO ADMIN: HCPCS

## 2024-10-21 PROCEDURE — 1111F DSCHRG MED/CURRENT MED MERGE: CPT

## 2024-10-21 PROCEDURE — 99213 OFFICE O/P EST LOW 20 MIN: CPT

## 2024-10-21 PROCEDURE — 4004F PT TOBACCO SCREEN RCVD TLK: CPT

## 2024-10-21 PROCEDURE — G8427 DOCREV CUR MEDS BY ELIG CLIN: HCPCS

## 2024-10-21 PROCEDURE — G8419 CALC BMI OUT NRM PARAM NOF/U: HCPCS

## 2024-10-21 PROCEDURE — 3017F COLORECTAL CA SCREEN DOC REV: CPT

## 2024-10-21 RX ORDER — GABAPENTIN 300 MG/1
CAPSULE ORAL
COMMUNITY
Start: 2024-07-31

## 2024-10-21 NOTE — PATIENT INSTRUCTIONS
Thank you   Thank you for trusting us with your healthcare needs. You may receive a survey regarding today's visit. It would help us out if you would take a few moments to provide your feedback. We value your input.  Please bring in ALL medications BOTTLES, including inhalers, herbal supplements, over the counter, prescribed & non-prescribed medicine. The office would like actual medication bottles and a list.         4.  Prior to getting your labs drawn, check with your insurance company for benefits and eligibility of lab services.  Often, insurance companies cover certain tests for preventative visits only.  It is patient's responsibility to    see what is covered.    5.  If the list below has been completed, PLEASE FAX RECORDS TO OUR OFFICE @ [phone]. Once the records have been received we will update your records at our office:  Health Maintenance Due   Topic Date Due    Pneumococcal 0-64 years Vaccine (1 of 2 - PCV) Never done    Hepatitis B vaccine (1 of 3 - 19+ 3-dose series) Never done    Colorectal Cancer Screen  Never done    Shingles vaccine (1 of 2) Never done    Flu vaccine (1) Never done    COVID-19 Vaccine (1 - 2023-24 season) Never done

## 2024-10-21 NOTE — PROGRESS NOTES
Health Maintenance Due   Topic Date Due    Pneumococcal 0-64 years Vaccine (1 of 2 - PCV) Never done    Hepatitis B vaccine (1 of 3 - 19+ 3-dose series) Never done    Colorectal Cancer Screen  Never done    Shingles vaccine (1 of 2) Never done    Flu vaccine (1) Never done    COVID-19 Vaccine (1 - 2023-24 season) Never done

## 2024-10-21 NOTE — PROGRESS NOTES
S: 53 y.o. female with   Chief Complaint   Patient presents with    Follow-Up from Hospital     Left AMA 10/14/2024     Admission 10/9 -- hyponatremia. Etiology alcohol use. Has MS. Fell as well. Hit head. Scans okay.  Has some stitches. Following with nephrology.  Left AMA due to dad with stroke   anemia    BP Readings from Last 3 Encounters:   10/21/24 136/82   10/14/24 109/67   09/04/24 108/68     Wt Readings from Last 3 Encounters:   10/21/24 48.7 kg (107 lb 6.4 oz)   10/14/24 45.3 kg (99 lb 14.4 oz)   09/09/24 46.3 kg (102 lb)       O: VS:   Vitals:    10/21/24 1013   BP: 136/82   Site: Right Upper Arm   Position: Sitting   Cuff Size: Medium Adult   Pulse: (!) 109   Resp: 16   Temp: 98.9 °F (37.2 °C)   TempSrc: Oral   SpO2: 99%   Weight: 48.7 kg (107 lb 6.4 oz)   Height: 1.626 m (5' 4.02\")     Body mass index is 18.43 kg/m².    AAO/NAD, appropriate affect for mood  Puffy cheeks bilate  Normocephalic, atraumatic, eyes - conjunctiva and sclera normal,   skin no rashes on exposed areas   Insight, judgement fair and in no acute distress      Lab Results   Component Value Date    WBC 6.4 10/14/2024    HGB 8.1 (L) 10/14/2024    HCT 24.4 (L) 10/14/2024     10/14/2024    CHOL 162 10/09/2019    TRIG 196 10/09/2019     06/16/2021    LDL 97 06/16/2021    AST 48 (H) 10/14/2024     (L) 10/14/2024    K 3.7 10/14/2024    CL 87 (L) 10/14/2024    CREATININE 0.3 (L) 10/14/2024    BUN 4 (L) 10/14/2024    CO2 24 10/14/2024    TSH 2.280 08/04/2024    INR 1.06 10/10/2024    GLUF 101 07/23/2021    LABA1C 4.8 10/03/2019    LABGLOM > 90 10/14/2024    MG 1.8 10/14/2024    CALCIUM 8.6 10/14/2024    VITD25 21 (L) 10/09/2019       XR CHEST PORTABLE    Result Date: 10/10/2024  PROCEDURE: XR CHEST PORTABLE CLINICAL INFORMATION: rule out aspiration COMPARISON: 10/9/2024 TECHNIQUE: A single mobile view of the chest was obtained.     1. Normal heart size. Right jugular line with catheter tip in superior aspect of superior

## 2024-10-21 NOTE — PROGRESS NOTES
Post-Discharge Transitional Care Follow Up      Rivka Zavala   YOB: 1970    Date of Office Visit:  10/21/2024  Date of Hospital Admission: 10/9/24  Date of Hospital Discharge: 10/14/24  Readmission Risk Score (high >=14%. Medium >=10%):Readmission Risk Score: 20.9      Care management risk score Rising risk (score 2-5) and Complex Care (Scores >=6): No Risk Score On File     Non face to face  following discharge, date last encounter closed (first attempt may have been earlier): 10/15/2024     Call initiated 2 business days of discharge: Yes     Alcohol use  -     Hepatic Function Panel; Future  Anemia, unspecified type  -     CBC; Future      Medical Decision Making: low complexity  No follow-ups on file.    On this date 10/21/2024 I have spent 25 minutes reviewing previous notes, test results and face to face with the patient discussing the diagnosis and importance of compliance with the treatment plan as well as documenting on the day of the visit.       Subjective:   Pt is a 54 yo female that is here for hospital visit followup. She has a PMHX of Chronic severe hypotonic hypovolemic hyponatremia, Acute on chronic normocytic anemia, History of alcohol abuse, Hypocalcemia, Brain atrophy, 2/2 alcohol use disorder, Recurrent falls, Malnutrition, Polysubstance use disorder, LISA.  Patient was hospitalized after having a fall and was found to have hyponatremia patient ended up leaving [place] due to a family emergency. Pt says she feels really well and is eating and sleeping well. She denies confusion, dizziness, nausea vomiting, headache, chest pain, shortness of breath.  During today's visit patient mentioned that she had some problems with her foot and follows up with [place].  Patient had a laceration on the left forehead and waned her stitches to be removed.                Inpatient course: Discharge summary reviewed- see chart.    Interval history/Current status: stable     Patient Active Problem

## 2024-10-21 NOTE — PROGRESS NOTES
Patient:Rivka Zavala  YOB: 1970   MRN:[id number]    Subjective   53 y.o. female who presents for the following: Follow-Up from Hospital (Left AMA 10/14/2024)    HPI  Review of Systems  PMHx: She has a past medical history of Anemia, Clavicle fracture, ETOH abuse, Malnutrition (HCC), Mild tetrahydrocannabinol (THC) abuse, Multiple sclerosis (HCC), Personality disorder (HCC), Rib fracture, and Smoker.    Objective     Vitals:    10/21/24 1013   BP: 136/82   Site: Right Upper Arm   Position: Sitting   Cuff Size: Medium Adult   Pulse: (!) 109   Resp: 16   Temp: 98.9 °F (37.2 °C)   TempSrc: Oral   SpO2: 99%   Weight: 48.7 kg (107 lb 6.4 oz)   Height: 1.626 m (5' 4.02\")   Body mass index is 18.43 kg/m².    Physical Exam  Most Recent Data:  Lab Results   Component Value Date    WBC 6.4 10/14/2024    HGB 8.1 (L) 10/14/2024    HCT 24.4 (L) 10/14/2024     10/14/2024    CHOL 162 10/09/2019    TRIG 196 10/09/2019     06/16/2021    ALT 29 10/14/2024    AST 48 (H) 10/14/2024     (L) 10/14/2024    CL 87 (L) 10/14/2024    K 3.7 10/14/2024    CREATININE 0.3 (L) 10/14/2024    BUN 4 (L) 10/14/2024    CO2 24 10/14/2024    TSH 2.280 08/04/2024    INR 1.06 10/10/2024    GLUF 101 07/23/2021    LABA1C 4.8 10/03/2019     XR CHEST PORTABLE  Narrative: PROCEDURE: XR CHEST PORTABLE    CLINICAL INFORMATION: rule out aspiration    COMPARISON: 10/9/2024    TECHNIQUE: A single mobile view of the chest was obtained.  Impression: 1. Normal heart size. Right jugular line with catheter tip in superior aspect of  superior vena cava. Old fracture distal shaft right clavicle.  2. Lungs very well inflated. Cannot exclude COPD.  3. No acute findings. No acute infiltrates or effusions are seen.    **This report has been created using voice recognition software.  It may contain  minor errors which are inherent in voice recognition technology.**    Electronically signed by Dr. Kurt Yu    Current Outpatient

## 2024-11-11 ENCOUNTER — TELEPHONE (OUTPATIENT)
Dept: FAMILY MEDICINE CLINIC | Age: 54
End: 2024-11-11

## 2024-11-11 DIAGNOSIS — G35 MS (MULTIPLE SCLEROSIS) (HCC): Primary | ICD-10-CM

## 2024-11-11 NOTE — TELEPHONE ENCOUNTER
Ben Montejo MD Stephanie KYMBERLY Sally called in stating that she went to get a refill of the Gabapentin 400 mg and states that the pharmacy told her this medication was discontinued on 10/21/2024. Please Advise.

## 2024-11-12 RX ORDER — GABAPENTIN 300 MG/1
300 CAPSULE ORAL NIGHTLY
Qty: 90 CAPSULE | Refills: 0 | Status: SHIPPED | OUTPATIENT
Start: 2024-11-12 | End: 2025-02-10

## 2024-11-12 NOTE — TELEPHONE ENCOUNTER
Per chart review, the medication was adjusted at the last office visit. Did camila receive the 300 mg gabapentin?   Please let me know if anything else is needed. Thank you.

## 2024-11-12 NOTE — TELEPHONE ENCOUNTER
Medication refilled to [facility] pharmacy, please let us know if you need anything else.

## 2024-11-12 NOTE — TELEPHONE ENCOUNTER
Patient states hse did not get the fill for the 300 mg and will be out tomorrow and needs medication sent to [facility]

## 2024-11-15 ENCOUNTER — OFFICE VISIT (OUTPATIENT)
Dept: NEPHROLOGY | Age: 54
End: 2024-11-15
Payer: COMMERCIAL

## 2024-11-15 VITALS
DIASTOLIC BLOOD PRESSURE: 66 MMHG | WEIGHT: 100 LBS | OXYGEN SATURATION: 100 % | SYSTOLIC BLOOD PRESSURE: 122 MMHG | HEART RATE: 96 BPM | BODY MASS INDEX: 17.16 KG/M2

## 2024-11-15 DIAGNOSIS — E87.1 HYPONATREMIA: Primary | ICD-10-CM

## 2024-11-15 PROCEDURE — G8427 DOCREV CUR MEDS BY ELIG CLIN: HCPCS | Performed by: INTERNAL MEDICINE

## 2024-11-15 PROCEDURE — G8484 FLU IMMUNIZE NO ADMIN: HCPCS | Performed by: INTERNAL MEDICINE

## 2024-11-15 PROCEDURE — 99213 OFFICE O/P EST LOW 20 MIN: CPT | Performed by: INTERNAL MEDICINE

## 2024-11-15 PROCEDURE — 3017F COLORECTAL CA SCREEN DOC REV: CPT | Performed by: INTERNAL MEDICINE

## 2024-11-15 PROCEDURE — G8419 CALC BMI OUT NRM PARAM NOF/U: HCPCS | Performed by: INTERNAL MEDICINE

## 2024-11-15 PROCEDURE — 4004F PT TOBACCO SCREEN RCVD TLK: CPT | Performed by: INTERNAL MEDICINE

## 2024-11-15 NOTE — PROGRESS NOTES
[facility]        Outpatient Follow-Up note         11/15/2024 10:06 AM    Patient Name:   Rivka Zavala  YOB: 1970  Primary Care Physician:  Ben Montejo MD   [facility] [place] [facility]  [facility]  [address]  [address]  [place] [facility] [place] [facility] [address]  Dept: [phone]  Loc: [phone]     Chief Complaint / Reason for follow-up : Follow Up of hyponatremia     Interval History :  Patient seen and examined by me.  Feels okay.  Patient was seen by me in October for hyponatremia very severe almost 106 thought to be due to increased oral intake of liquids and alcohol     Past History :  Past Medical History:   Diagnosis Date    Anemia     Clavicle fracture 10/13/2022    ETOH abuse     Malnutrition (HCC)     Mild tetrahydrocannabinol (THC) abuse     Multiple sclerosis (HCC) 2017    Personality disorder (HCC) unknown    Rib fracture 10/13/2022    Smoker      Past Surgical History:   Procedure Laterality Date    ANKLE SURGERY  05/03/2024    COLONOSCOPY  08/16/2022    CYST REMOVAL  2004    right thigh     SHOULDER SURGERY Right 12/2022    Hook Plate Placement    SHOULDER SURGERY  10/26/2023    oio    TONSILLECTOMY  1977    WRIST GANGLION EXCISION Right 08/2021        Medications :     Outpatient Medications Marked as Taking for the 11/15/24 encounter (Office Visit) with Fernando Anne MD   Medication Sig Dispense Refill    gabapentin (NEURONTIN) 300 MG capsule Take 1 capsule by mouth nightly for 90 days. 90 capsule 0    ferrous sulfate (IRON 325) 325 (65 Fe) MG tablet Take 1 tablet by mouth daily 90 tablet 3    folic acid (FOLVITE) 1 MG tablet Take 1 tablet by mouth daily 90 tablet 3       Vitals     /66 (Site: Left Upper Arm, Position: Sitting, Cuff Size: Medium Adult)   Pulse 96   Wt 45.4 kg (100 lb)   LMP 08/14/2012   SpO2 100%   BMI 17.16 kg/m²  Wt Readings from Last 3 Encounters:   11/15/24 45.4 kg (100 lb)

## 2025-02-18 DIAGNOSIS — G35 MS (MULTIPLE SCLEROSIS) (HCC): ICD-10-CM

## 2025-02-18 RX ORDER — GABAPENTIN 300 MG/1
300 CAPSULE ORAL NIGHTLY
Qty: 90 CAPSULE | Refills: 0 | Status: SHIPPED | OUTPATIENT
Start: 2025-02-18 | End: 2025-05-19

## 2025-02-18 NOTE — TELEPHONE ENCOUNTER
Patient's last appointment was: 10/21/2024  with our   Patient's next appointment is: 2/24/2025  with our Dr. Montejo  Last refilled on: 11/12/2024  Which pharmacy does the script need sent to: [facility]      Lab Results   Component Value Date    LABA1C 4.8 10/03/2019     Lab Results   Component Value Date    CHOL 162 10/09/2019    TRIG 196 10/09/2019     06/16/2021     Lab Results   Component Value Date     (L) 11/21/2024    K 3.9 11/21/2024    CL 93 (L) 11/21/2024    CO2 25 11/21/2024    BUN 4 (L) 11/21/2024    CREATININE 0.4 11/21/2024    GLUCOSE 119 (H) 11/21/2024    CALCIUM 9.3 11/21/2024    BILITOT 0.2 (L) 10/21/2024    ALKPHOS 107 10/21/2024    AST 30 10/21/2024    ALT 20 10/21/2024    LABGLOM > 90 11/21/2024    AGRATIO 1.2 (L) 05/14/2022     Lab Results   Component Value Date    TSH 2.280 08/04/2024    T4FREE 0.96 04/11/2022     Lab Results   Component Value Date    WBC 9.0 11/21/2024    HGB 10.6 (L) 11/21/2024    HCT 34.0 (L) 11/21/2024    MCV 94.2 11/21/2024     11/21/2024

## 2025-02-24 ENCOUNTER — TRANSCRIBE ORDERS (OUTPATIENT)
Dept: ADMINISTRATIVE | Age: 55
End: 2025-02-24

## 2025-02-24 ENCOUNTER — OFFICE VISIT (OUTPATIENT)
Dept: FAMILY MEDICINE CLINIC | Age: 55
End: 2025-02-24
Payer: COMMERCIAL

## 2025-02-24 VITALS
HEIGHT: 64 IN | DIASTOLIC BLOOD PRESSURE: 84 MMHG | SYSTOLIC BLOOD PRESSURE: 128 MMHG | HEART RATE: 78 BPM | TEMPERATURE: 98.4 F | RESPIRATION RATE: 20 BRPM | OXYGEN SATURATION: 100 % | BODY MASS INDEX: 17 KG/M2 | WEIGHT: 99.6 LBS

## 2025-02-24 DIAGNOSIS — L29.9 ITCHING: ICD-10-CM

## 2025-02-24 DIAGNOSIS — Z47.89 ENCOUNTER FOR OTHER ORTHOPEDIC AFTERCARE: Primary | ICD-10-CM

## 2025-02-24 DIAGNOSIS — L20.82 FLEXURAL ECZEMA: Primary | ICD-10-CM

## 2025-02-24 DIAGNOSIS — R22.42 LOCALIZED SWELLING OF LEFT FOOT: ICD-10-CM

## 2025-02-24 PROCEDURE — 3017F COLORECTAL CA SCREEN DOC REV: CPT

## 2025-02-24 PROCEDURE — 4004F PT TOBACCO SCREEN RCVD TLK: CPT

## 2025-02-24 PROCEDURE — G8427 DOCREV CUR MEDS BY ELIG CLIN: HCPCS

## 2025-02-24 PROCEDURE — 99214 OFFICE O/P EST MOD 30 MIN: CPT

## 2025-02-24 PROCEDURE — G8419 CALC BMI OUT NRM PARAM NOF/U: HCPCS

## 2025-02-24 RX ORDER — MUPIROCIN 20 MG/G
OINTMENT TOPICAL
Qty: 15 G | Refills: 0 | Status: ON HOLD | OUTPATIENT
Start: 2025-02-24 | End: 2025-03-03

## 2025-02-24 RX ORDER — HYDROXYZINE HYDROCHLORIDE 25 MG/1
25 TABLET, FILM COATED ORAL EVERY 8 HOURS PRN
Qty: 30 TABLET | Refills: 0 | Status: ON HOLD | OUTPATIENT
Start: 2025-02-24 | End: 2025-03-06

## 2025-02-24 RX ORDER — TRIAMCINOLONE ACETONIDE 0.25 MG/G
OINTMENT TOPICAL
Qty: 15 G | Refills: 1 | Status: ON HOLD | OUTPATIENT
Start: 2025-02-24 | End: 2025-03-03

## 2025-02-24 SDOH — ECONOMIC STABILITY: FOOD INSECURITY: WITHIN THE PAST 12 MONTHS, YOU WORRIED THAT YOUR FOOD WOULD RUN OUT BEFORE YOU GOT MONEY TO BUY MORE.: PATIENT DECLINED

## 2025-02-24 SDOH — ECONOMIC STABILITY: FOOD INSECURITY: WITHIN THE PAST 12 MONTHS, THE FOOD YOU BOUGHT JUST DIDN'T LAST AND YOU DIDN'T HAVE MONEY TO GET MORE.: PATIENT DECLINED

## 2025-02-24 ASSESSMENT — PATIENT HEALTH QUESTIONNAIRE - PHQ9: DEPRESSION UNABLE TO ASSESS: PT REFUSES

## 2025-02-24 NOTE — PROGRESS NOTES
Health Maintenance Due   Topic Date Due    Hepatitis B vaccine (1 of 3 - 19+ 3-dose series) Never done    Pneumococcal 50+ years Vaccine (1 of 2 - PCV) Never done    Colorectal Cancer Screen  Never done    Shingles vaccine (1 of 2) Never done    Flu vaccine (1) Never done    COVID-19 Vaccine (1 - 2024-25 season) Never done    Depression Monitoring  03/19/2025       
S: 54 y.o. female with   Chief Complaint   Patient presents with    Follow-up     Rash all over has been ongoing about a month, itchy. Follow up foot surgeries.        HPI: please see resident note for HPI and ROS.    BP Readings from Last 3 Encounters:   02/24/25 128/84   11/15/24 122/66   10/21/24 136/82     Wt Readings from Last 3 Encounters:   02/24/25 45.2 kg (99 lb 9.6 oz)   11/15/24 45.4 kg (100 lb)   10/21/24 48.7 kg (107 lb 6.4 oz)       O: VS:  height is 1.626 m (5' 4.02\") and weight is 45.2 kg (99 lb 9.6 oz). Her oral temperature is 98.4 °F (36.9 °C). Her blood pressure is 128/84 and her pulse is 78. Her respiration is 20 and oxygen saturation is 100%.   AAO/NAD, appropriate affect for mood  CV:  RRR, no murmur  Resp: CTAB  Left great toe: Erythematous and swollen left great toe consistent with baseline per patient report.     Diagnosis Orders   1. Flexural eczema  triamcinolone (KENALOG) 0.025 % ointment      2. Itching  hydrOXYzine HCl (ATARAX) 25 MG tablet      3. Localized swelling of left foot  mupirocin (BACTROBAN) 2 % ointment          Plan:  Please refer to resident note for full plan.    54 year old female presents to the office for rash over the past month or so in multiple flexural regions. Image per media tab. Etiology of patients symptoms consistent with eczema.  Educated about topical emollient use twice a day and steroid as needed for flare.    Patient has chronic left foot pain with history of fusion with hardware removal chronic erythema and swelling of left great toe that has not improved.  Is following the podiatry every couple weeks and should continue to do this.  Will give some Bactroban to have on hand but otherwise follow-up with podiatry.  No acute changes per history.      Health Maintenance Due   Topic Date Due    Hepatitis B vaccine (1 of 3 - 19+ 3-dose series) Never done    Pneumococcal 50+ years Vaccine (1 of 2 - PCV) Never done    Colorectal Cancer Screen  Never done    
    Stroke Father     Hypertension Father     No Known Problems Sister      Objective     Vitals:    02/24/25 1007   BP: 128/84   Site: Right Upper Arm   Position: Sitting   Cuff Size: Medium Adult   Pulse: 78   Resp: 20   Temp: 98.4 °F (36.9 °C)   TempSrc: Oral   SpO2: 100%   Weight: 45.2 kg (99 lb 9.6 oz)   Height: 1.626 m (5' 4.02\")   Body mass index is 17.09 kg/m².    Physical Exam  Vitals reviewed.   Constitutional:       General: She is not in acute distress.     Appearance: Normal appearance. She is not ill-appearing.   HENT:      Head: Normocephalic and atraumatic.      Right Ear: External ear normal.      Left Ear: External ear normal.      Nose: Nose normal.   Eyes:      General:         Right eye: No discharge.         Left eye: No discharge.      Conjunctiva/sclera: Conjunctivae normal.   Cardiovascular:      Rate and Rhythm: Normal rate and regular rhythm.      Pulses: Normal pulses.      Heart sounds: Normal heart sounds. No murmur heard.  Pulmonary:      Effort: Pulmonary effort is normal. No respiratory distress.      Breath sounds: Normal breath sounds.   Abdominal:      General: Abdomen is flat. Bowel sounds are normal.      Palpations: Abdomen is soft.   Musculoskeletal:      Left foot: Swelling and tenderness present.   Skin:     General: Skin is warm.      Findings: Rash present. Rash is vesicular.   Neurological:      General: No focal deficit present.      Mental Status: She is alert.   Psychiatric:         Mood and Affect: Mood normal.         Thought Content: Thought content normal.       Rash         Foot       Labs & Imaging   Most Recent Labs:  Lab Results   Component Value Date    WBC 9.0 11/21/2024    HGB 10.6 (L) 11/21/2024    HCT 34.0 (L) 11/21/2024     11/21/2024    CHOL 162 10/09/2019    TRIG 196 10/09/2019     06/16/2021    ALT 20 10/21/2024    AST 30 10/21/2024     (L) 11/21/2024    CL 93 (L) 11/21/2024    K 3.9 11/21/2024    CREATININE 0.4 11/21/2024    BUN 4

## 2025-02-27 ENCOUNTER — TELEPHONE (OUTPATIENT)
Dept: INFECTIOUS DISEASES | Age: 55
End: 2025-02-27

## 2025-02-28 ENCOUNTER — APPOINTMENT (OUTPATIENT)
Dept: CT IMAGING | Age: 55
End: 2025-02-28
Payer: COMMERCIAL

## 2025-02-28 ENCOUNTER — APPOINTMENT (OUTPATIENT)
Dept: GENERAL RADIOLOGY | Age: 55
End: 2025-02-28
Payer: COMMERCIAL

## 2025-02-28 ENCOUNTER — HOSPITAL ENCOUNTER (INPATIENT)
Age: 55
LOS: 7 days | Discharge: NURSING, REHAB OR LONG-TERM CARE | End: 2025-03-07
Attending: EMERGENCY MEDICINE
Payer: COMMERCIAL

## 2025-02-28 DIAGNOSIS — R57.9 SHOCK (HCC): ICD-10-CM

## 2025-02-28 DIAGNOSIS — R40.2430 GLASGOW COMA SCALE TOTAL SCORE 3-8, UNSPECIFIED COMA TIMING (HCC): ICD-10-CM

## 2025-02-28 DIAGNOSIS — I61.9 INTRAPARENCHYMAL HEMORRHAGE OF BRAIN (HCC): Primary | ICD-10-CM

## 2025-02-28 DIAGNOSIS — J96.01 ACUTE HYPOXIC RESPIRATORY FAILURE (HCC): ICD-10-CM

## 2025-02-28 DIAGNOSIS — E87.1 HYPONATREMIA: ICD-10-CM

## 2025-02-28 PROBLEM — S06.5XAA SUBDURAL HEMATOMA (HCC): Status: ACTIVE | Noted: 2025-02-28

## 2025-02-28 LAB
ALBUMIN SERPL BCG-MCNC: 4.1 G/DL (ref 3.4–4.9)
ALP SERPL-CCNC: 206 U/L (ref 35–104)
ALT SERPL W/O P-5'-P-CCNC: 89 U/L (ref 10–35)
AMMONIA PLAS-MCNC: 33 UMOL/L (ref 11–51)
ANION GAP SERPL CALC-SCNC: 13 MEQ/L (ref 8–16)
ANION GAP SERPL CALC-SCNC: 14 MEQ/L (ref 8–16)
ANION GAP SERPL CALC-SCNC: 15 MEQ/L (ref 8–16)
APAP SERPL-MCNC: < 5 UG/ML (ref 10–30)
APTT PPP: 31.6 SECONDS (ref 22–38)
AST SERPL-CCNC: 272 U/L (ref 10–35)
BASE EXCESS BLDA CALC-SCNC: 5.1 MMOL/L (ref -2–3)
BASOPHILS ABSOLUTE: 0.1 THOU/MM3 (ref 0–0.1)
BASOPHILS NFR BLD AUTO: 0.8 %
BILIRUB CONJ SERPL-MCNC: 0.2 MG/DL (ref 0–0.2)
BILIRUB SERPL-MCNC: 0.3 MG/DL (ref 0.3–1.2)
BUN SERPL-MCNC: 3 MG/DL (ref 8–23)
BUN SERPL-MCNC: 4 MG/DL (ref 8–23)
BUN SERPL-MCNC: 4 MG/DL (ref 8–23)
CALCIUM SERPL-MCNC: 7.4 MG/DL (ref 8.6–10)
CALCIUM SERPL-MCNC: 7.5 MG/DL (ref 8.6–10)
CALCIUM SERPL-MCNC: 8.3 MG/DL (ref 8.6–10)
CHLORIDE SERPL-SCNC: 84 MEQ/L (ref 98–111)
CHLORIDE SERPL-SCNC: 85 MEQ/L (ref 98–111)
CHLORIDE SERPL-SCNC: 86 MEQ/L (ref 98–111)
CO2 SERPL-SCNC: 23 MEQ/L (ref 22–29)
CO2 SERPL-SCNC: 24 MEQ/L (ref 22–29)
CO2 SERPL-SCNC: 25 MEQ/L (ref 22–29)
COLLECTED BY:: ABNORMAL
CREAT SERPL-MCNC: 0.3 MG/DL (ref 0.5–0.9)
CREAT SERPL-MCNC: 0.4 MG/DL (ref 0.5–0.9)
CREAT SERPL-MCNC: 0.4 MG/DL (ref 0.5–0.9)
DEPRECATED RDW RBC AUTO: 51 FL (ref 35–45)
DEVICE: ABNORMAL
EKG ATRIAL RATE: 115 BPM
EKG P AXIS: 73 DEGREES
EKG P-R INTERVAL: 130 MS
EKG Q-T INTERVAL: 352 MS
EKG QRS DURATION: 60 MS
EKG QTC CALCULATION (BAZETT): 486 MS
EKG R AXIS: 69 DEGREES
EKG T AXIS: 72 DEGREES
EKG VENTRICULAR RATE: 115 BPM
EOSINOPHIL NFR BLD AUTO: 1.2 %
EOSINOPHILS ABSOLUTE: 0.1 THOU/MM3 (ref 0–0.4)
ERYTHROCYTE [DISTWIDTH] IN BLOOD BY AUTOMATED COUNT: 14.3 % (ref 11.5–14.5)
ETHANOL SERPL-MCNC: < 0.01 % (ref 0–0.08)
GFR SERPL CREATININE-BSD FRML MDRD: > 90 ML/MIN/1.73M2
GLUCOSE SERPL-MCNC: 128 MG/DL (ref 74–109)
GLUCOSE SERPL-MCNC: 86 MG/DL (ref 74–109)
GLUCOSE SERPL-MCNC: 89 MG/DL (ref 74–109)
HCO3 BLDA-SCNC: 31 MMOL/L (ref 23–28)
HCT VFR BLD AUTO: 36.5 % (ref 37–47)
HGB BLD-MCNC: 12.5 GM/DL (ref 12–16)
IMM GRANULOCYTES # BLD AUTO: 0.04 THOU/MM3 (ref 0–0.07)
IMM GRANULOCYTES NFR BLD AUTO: 0.5 %
INR PPP: 1.01 (ref 0.85–1.13)
LACTATE SERPL-SCNC: 3.4 MMOL/L (ref 0.5–2)
LIPASE SERPL-CCNC: 15 U/L (ref 13–60)
LYMPHOCYTES ABSOLUTE: 0.8 THOU/MM3 (ref 1–4.8)
LYMPHOCYTES NFR BLD AUTO: 9.4 %
MAGNESIUM SERPL-MCNC: 1.3 MG/DL (ref 1.6–2.6)
MAGNESIUM SERPL-MCNC: 1.3 MG/DL (ref 1.6–2.6)
MAGNESIUM SERPL-MCNC: 1.4 MG/DL (ref 1.6–2.6)
MCH RBC QN AUTO: 33.5 PG (ref 26–33)
MCHC RBC AUTO-ENTMCNC: 34.2 GM/DL (ref 32.2–35.5)
MCV RBC AUTO: 97.9 FL (ref 81–99)
MONOCYTES ABSOLUTE: 0.7 THOU/MM3 (ref 0.4–1.3)
MONOCYTES NFR BLD AUTO: 7.9 %
NEUTROPHILS ABSOLUTE: 6.7 THOU/MM3 (ref 1.8–7.7)
NEUTROPHILS NFR BLD AUTO: 80.2 %
NRBC BLD AUTO-RTO: 0 /100 WBC
OSMOLALITY SERPL CALC.SUM OF ELEC: 242.1 MOSMOL/KG (ref 275–300)
OSMOLALITY SERPL CALC.SUM OF ELEC: 243.8 MOSMOL/KG (ref 275–300)
OSMOLALITY SERPL CALC.SUM OF ELEC: 248.2 MOSMOL/KG (ref 275–300)
PCO2 TEMP ADJ BLDMV: 46 MMHG (ref 41–51)
PH BLDMV: 7.43 [PH] (ref 7.31–7.41)
PLATELET # BLD AUTO: 193 THOU/MM3 (ref 130–400)
PMV BLD AUTO: 9.7 FL (ref 9.4–12.4)
PO2 BLDMV: 24 MMHG (ref 25–40)
POTASSIUM SERPL-SCNC: 3.1 MEQ/L (ref 3.5–5.2)
POTASSIUM SERPL-SCNC: 3.3 MEQ/L (ref 3.5–5.2)
POTASSIUM SERPL-SCNC: 3.9 MEQ/L (ref 3.5–5.2)
PROCALCITONIN SERPL IA-MCNC: 0.11 NG/ML (ref 0.01–0.09)
PROLACTIN SERPL-MCNC: 3.9 NG/ML
PROT SERPL-MCNC: 7.5 G/DL (ref 6.4–8.3)
RBC # BLD AUTO: 3.73 MILL/MM3 (ref 4.2–5.4)
SALICYLATES SERPL-MCNC: < 0.5 MG/DL (ref 2–10)
SAO2 % BLDMV: 45 %
SITE: ABNORMAL
SODIUM SERPL-SCNC: 122 MEQ/L (ref 135–145)
SODIUM SERPL-SCNC: 123 MEQ/L (ref 135–145)
SODIUM SERPL-SCNC: 124 MEQ/L (ref 135–145)
VENTILATION MODE VENT: ABNORMAL
WBC # BLD AUTO: 8.4 THOU/MM3 (ref 4.8–10.8)

## 2025-02-28 PROCEDURE — 82140 ASSAY OF AMMONIA: CPT

## 2025-02-28 PROCEDURE — 2500000003 HC RX 250 WO HCPCS

## 2025-02-28 PROCEDURE — 82248 BILIRUBIN DIRECT: CPT

## 2025-02-28 PROCEDURE — 2100000000 HC CCU R&B

## 2025-02-28 PROCEDURE — 87040 BLOOD CULTURE FOR BACTERIA: CPT

## 2025-02-28 PROCEDURE — 80179 DRUG ASSAY SALICYLATE: CPT

## 2025-02-28 PROCEDURE — 70450 CT HEAD/BRAIN W/O DYE: CPT

## 2025-02-28 PROCEDURE — 2580000003 HC RX 258: Performed by: EMERGENCY MEDICINE

## 2025-02-28 PROCEDURE — 80143 DRUG ASSAY ACETAMINOPHEN: CPT

## 2025-02-28 PROCEDURE — 36556 INSERT NON-TUNNEL CV CATH: CPT

## 2025-02-28 PROCEDURE — 6370000000 HC RX 637 (ALT 250 FOR IP)

## 2025-02-28 PROCEDURE — 71045 X-RAY EXAM CHEST 1 VIEW: CPT

## 2025-02-28 PROCEDURE — 36415 COLL VENOUS BLD VENIPUNCTURE: CPT

## 2025-02-28 PROCEDURE — 93010 ELECTROCARDIOGRAM REPORT: CPT | Performed by: NUCLEAR MEDICINE

## 2025-02-28 PROCEDURE — 96374 THER/PROPH/DIAG INJ IV PUSH: CPT

## 2025-02-28 PROCEDURE — 6360000002 HC RX W HCPCS

## 2025-02-28 PROCEDURE — 80307 DRUG TEST PRSMV CHEM ANLYZR: CPT

## 2025-02-28 PROCEDURE — 84146 ASSAY OF PROLACTIN: CPT

## 2025-02-28 PROCEDURE — 83690 ASSAY OF LIPASE: CPT

## 2025-02-28 PROCEDURE — 83735 ASSAY OF MAGNESIUM: CPT

## 2025-02-28 PROCEDURE — 70498 CT ANGIOGRAPHY NECK: CPT

## 2025-02-28 PROCEDURE — 82803 BLOOD GASES ANY COMBINATION: CPT

## 2025-02-28 PROCEDURE — 72125 CT NECK SPINE W/O DYE: CPT

## 2025-02-28 PROCEDURE — 99285 EMERGENCY DEPT VISIT HI MDM: CPT

## 2025-02-28 PROCEDURE — 94002 VENT MGMT INPAT INIT DAY: CPT

## 2025-02-28 PROCEDURE — 6360000004 HC RX CONTRAST MEDICATION: Performed by: EMERGENCY MEDICINE

## 2025-02-28 PROCEDURE — 84145 PROCALCITONIN (PCT): CPT

## 2025-02-28 PROCEDURE — 2580000003 HC RX 258

## 2025-02-28 PROCEDURE — 85730 THROMBOPLASTIN TIME PARTIAL: CPT

## 2025-02-28 PROCEDURE — 6360000002 HC RX W HCPCS: Performed by: EMERGENCY MEDICINE

## 2025-02-28 PROCEDURE — 93005 ELECTROCARDIOGRAM TRACING: CPT | Performed by: EMERGENCY MEDICINE

## 2025-02-28 PROCEDURE — 82077 ASSAY SPEC XCP UR&BREATH IA: CPT

## 2025-02-28 PROCEDURE — 83605 ASSAY OF LACTIC ACID: CPT

## 2025-02-28 PROCEDURE — 96361 HYDRATE IV INFUSION ADD-ON: CPT

## 2025-02-28 PROCEDURE — 74018 RADEX ABDOMEN 1 VIEW: CPT

## 2025-02-28 PROCEDURE — 96375 TX/PRO/DX INJ NEW DRUG ADDON: CPT

## 2025-02-28 PROCEDURE — 31500 INSERT EMERGENCY AIRWAY: CPT

## 2025-02-28 PROCEDURE — 85025 COMPLETE CBC W/AUTO DIFF WBC: CPT

## 2025-02-28 PROCEDURE — 85610 PROTHROMBIN TIME: CPT

## 2025-02-28 PROCEDURE — 80053 COMPREHEN METABOLIC PANEL: CPT

## 2025-02-28 PROCEDURE — 70496 CT ANGIOGRAPHY HEAD: CPT

## 2025-02-28 RX ORDER — SODIUM CHLORIDE 9 MG/ML
INJECTION, SOLUTION INTRAVENOUS CONTINUOUS
Status: DISCONTINUED | OUTPATIENT
Start: 2025-02-28 | End: 2025-03-01

## 2025-02-28 RX ORDER — SODIUM CHLORIDE 9 MG/ML
INJECTION, SOLUTION INTRAVENOUS PRN
Status: DISCONTINUED | OUTPATIENT
Start: 2025-02-28 | End: 2025-03-07 | Stop reason: HOSPADM

## 2025-02-28 RX ORDER — 3% SODIUM CHLORIDE 3 G/100ML
25 INJECTION, SOLUTION INTRAVENOUS CONTINUOUS
Status: DISCONTINUED | OUTPATIENT
Start: 2025-02-28 | End: 2025-03-01

## 2025-02-28 RX ORDER — ACETAMINOPHEN 650 MG/1
650 SUPPOSITORY RECTAL EVERY 6 HOURS PRN
Status: DISCONTINUED | OUTPATIENT
Start: 2025-02-28 | End: 2025-03-07 | Stop reason: HOSPADM

## 2025-02-28 RX ORDER — SUCCINYLCHOLINE CHLORIDE 20 MG/ML
100 INJECTION INTRAMUSCULAR; INTRAVENOUS ONCE
Status: COMPLETED | OUTPATIENT
Start: 2025-02-28 | End: 2025-02-28

## 2025-02-28 RX ORDER — POLYETHYLENE GLYCOL 3350 17 G/17G
17 POWDER, FOR SOLUTION ORAL DAILY PRN
Status: DISCONTINUED | OUTPATIENT
Start: 2025-02-28 | End: 2025-03-07 | Stop reason: HOSPADM

## 2025-02-28 RX ORDER — POTASSIUM CHLORIDE 29.8 MG/ML
20 INJECTION INTRAVENOUS PRN
Status: DISCONTINUED | OUTPATIENT
Start: 2025-02-28 | End: 2025-03-07 | Stop reason: HOSPADM

## 2025-02-28 RX ORDER — PHENOBARBITAL SODIUM 65 MG/ML
16.2 INJECTION, SOLUTION INTRAMUSCULAR; INTRAVENOUS EVERY 12 HOURS
Status: DISCONTINUED | OUTPATIENT
Start: 2025-03-03 | End: 2025-02-28

## 2025-02-28 RX ORDER — PHENOBARBITAL SODIUM 65 MG/ML
65 INJECTION, SOLUTION INTRAMUSCULAR; INTRAVENOUS EVERY 6 HOURS PRN
Status: DISCONTINUED | OUTPATIENT
Start: 2025-02-28 | End: 2025-02-28

## 2025-02-28 RX ORDER — PROPOFOL 10 MG/ML
1 INJECTION, EMULSION INTRAVENOUS ONCE
Status: COMPLETED | OUTPATIENT
Start: 2025-02-28 | End: 2025-02-28

## 2025-02-28 RX ORDER — SODIUM CHLORIDE 0.9 % (FLUSH) 0.9 %
5-40 SYRINGE (ML) INJECTION PRN
Status: DISCONTINUED | OUTPATIENT
Start: 2025-02-28 | End: 2025-03-07 | Stop reason: SDUPTHER

## 2025-02-28 RX ORDER — SODIUM CHLORIDE 0.9 % (FLUSH) 0.9 %
5-40 SYRINGE (ML) INJECTION EVERY 12 HOURS SCHEDULED
Status: DISCONTINUED | OUTPATIENT
Start: 2025-02-28 | End: 2025-03-07 | Stop reason: SDUPTHER

## 2025-02-28 RX ORDER — TRANEXAMIC ACID 10 MG/ML
1000 INJECTION, SOLUTION INTRAVENOUS ONCE
Status: COMPLETED | OUTPATIENT
Start: 2025-02-28 | End: 2025-02-28

## 2025-02-28 RX ORDER — MAGNESIUM SULFATE IN WATER 40 MG/ML
2000 INJECTION, SOLUTION INTRAVENOUS ONCE
Status: COMPLETED | OUTPATIENT
Start: 2025-02-28 | End: 2025-03-01

## 2025-02-28 RX ORDER — SODIUM CHLORIDE 9 MG/ML
INJECTION, SOLUTION INTRAVENOUS PRN
Status: DISCONTINUED | OUTPATIENT
Start: 2025-02-28 | End: 2025-03-07 | Stop reason: SDUPTHER

## 2025-02-28 RX ORDER — SODIUM CHLORIDE 0.9 % (FLUSH) 0.9 %
5-40 SYRINGE (ML) INJECTION PRN
Status: DISCONTINUED | OUTPATIENT
Start: 2025-02-28 | End: 2025-03-07 | Stop reason: HOSPADM

## 2025-02-28 RX ORDER — PHENOBARBITAL SODIUM 65 MG/ML
32.5 INJECTION, SOLUTION INTRAMUSCULAR; INTRAVENOUS EVERY 6 HOURS
Status: DISCONTINUED | OUTPATIENT
Start: 2025-03-01 | End: 2025-02-28

## 2025-02-28 RX ORDER — PHENOBARBITAL SODIUM 65 MG/ML
65 INJECTION, SOLUTION INTRAMUSCULAR; INTRAVENOUS EVERY 6 HOURS
Status: DISCONTINUED | OUTPATIENT
Start: 2025-02-28 | End: 2025-02-28

## 2025-02-28 RX ORDER — ONDANSETRON 4 MG/1
4 TABLET, ORALLY DISINTEGRATING ORAL EVERY 8 HOURS PRN
Status: DISCONTINUED | OUTPATIENT
Start: 2025-02-28 | End: 2025-03-07 | Stop reason: HOSPADM

## 2025-02-28 RX ORDER — ACETAMINOPHEN 650 MG/1
975 SUPPOSITORY RECTAL ONCE
Status: COMPLETED | OUTPATIENT
Start: 2025-02-28 | End: 2025-02-28

## 2025-02-28 RX ORDER — PHENOBARBITAL SODIUM 65 MG/ML
16.2 INJECTION, SOLUTION INTRAMUSCULAR; INTRAVENOUS EVERY 6 HOURS PRN
Status: DISCONTINUED | OUTPATIENT
Start: 2025-03-03 | End: 2025-02-28

## 2025-02-28 RX ORDER — 0.9 % SODIUM CHLORIDE 0.9 %
1000 INTRAVENOUS SOLUTION INTRAVENOUS ONCE
Status: COMPLETED | OUTPATIENT
Start: 2025-03-01 | End: 2025-03-01

## 2025-02-28 RX ORDER — MIDAZOLAM HYDROCHLORIDE 1 MG/ML
1-10 INJECTION, SOLUTION INTRAVENOUS CONTINUOUS
Status: DISCONTINUED | OUTPATIENT
Start: 2025-02-28 | End: 2025-03-01

## 2025-02-28 RX ORDER — PROPOFOL 10 MG/ML
INJECTION, EMULSION INTRAVENOUS
Status: COMPLETED
Start: 2025-02-28 | End: 2025-03-01

## 2025-02-28 RX ORDER — DEXTROSE MONOHYDRATE 100 MG/ML
INJECTION, SOLUTION INTRAVENOUS CONTINUOUS
Status: DISCONTINUED | OUTPATIENT
Start: 2025-02-28 | End: 2025-03-01

## 2025-02-28 RX ORDER — POTASSIUM CHLORIDE 7.45 MG/ML
10 INJECTION INTRAVENOUS PRN
Status: DISCONTINUED | OUTPATIENT
Start: 2025-02-28 | End: 2025-03-01

## 2025-02-28 RX ORDER — LORAZEPAM 2 MG/ML
1 INJECTION INTRAMUSCULAR ONCE
Status: COMPLETED | OUTPATIENT
Start: 2025-02-28 | End: 2025-02-28

## 2025-02-28 RX ORDER — ETOMIDATE 2 MG/ML
20 INJECTION INTRAVENOUS ONCE
Status: COMPLETED | OUTPATIENT
Start: 2025-02-28 | End: 2025-02-28

## 2025-02-28 RX ORDER — PHENOBARBITAL SODIUM 65 MG/ML
32.5 INJECTION, SOLUTION INTRAMUSCULAR; INTRAVENOUS EVERY 12 HOURS
Status: DISCONTINUED | OUTPATIENT
Start: 2025-03-02 | End: 2025-02-28

## 2025-02-28 RX ORDER — MAGNESIUM SULFATE IN WATER 40 MG/ML
2000 INJECTION, SOLUTION INTRAVENOUS PRN
Status: DISCONTINUED | OUTPATIENT
Start: 2025-02-28 | End: 2025-03-07 | Stop reason: HOSPADM

## 2025-02-28 RX ORDER — 0.9 % SODIUM CHLORIDE 0.9 %
1000 INTRAVENOUS SOLUTION INTRAVENOUS ONCE
Status: COMPLETED | OUTPATIENT
Start: 2025-02-28 | End: 2025-02-28

## 2025-02-28 RX ORDER — IOPAMIDOL 755 MG/ML
80 INJECTION, SOLUTION INTRAVASCULAR
Status: COMPLETED | OUTPATIENT
Start: 2025-02-28 | End: 2025-02-28

## 2025-02-28 RX ORDER — PHENOBARBITAL SODIUM 65 MG/ML
32.5 INJECTION, SOLUTION INTRAMUSCULAR; INTRAVENOUS EVERY 6 HOURS PRN
Status: DISCONTINUED | OUTPATIENT
Start: 2025-03-01 | End: 2025-02-28

## 2025-02-28 RX ORDER — SODIUM CHLORIDE 0.9 % (FLUSH) 0.9 %
5-40 SYRINGE (ML) INJECTION EVERY 12 HOURS SCHEDULED
Status: DISCONTINUED | OUTPATIENT
Start: 2025-02-28 | End: 2025-03-07 | Stop reason: HOSPADM

## 2025-02-28 RX ORDER — ONDANSETRON 2 MG/ML
4 INJECTION INTRAMUSCULAR; INTRAVENOUS EVERY 6 HOURS PRN
Status: DISCONTINUED | OUTPATIENT
Start: 2025-02-28 | End: 2025-03-07 | Stop reason: HOSPADM

## 2025-02-28 RX ORDER — FENTANYL CITRATE-0.9 % NACL/PF 10 MCG/ML
25-200 PLASTIC BAG, INJECTION (ML) INTRAVENOUS CONTINUOUS
Status: DISCONTINUED | OUTPATIENT
Start: 2025-02-28 | End: 2025-03-02

## 2025-02-28 RX ORDER — THIAMINE HYDROCHLORIDE 100 MG/ML
100 INJECTION, SOLUTION INTRAMUSCULAR; INTRAVENOUS ONCE
Status: DISCONTINUED | OUTPATIENT
Start: 2025-02-28 | End: 2025-02-28

## 2025-02-28 RX ORDER — ACETAMINOPHEN 325 MG/1
650 TABLET ORAL EVERY 6 HOURS PRN
Status: DISCONTINUED | OUTPATIENT
Start: 2025-02-28 | End: 2025-03-07 | Stop reason: HOSPADM

## 2025-02-28 RX ADMIN — ACETAMINOPHEN 650 MG: 650 SUPPOSITORY RECTAL at 22:40

## 2025-02-28 RX ADMIN — Medication 50 MCG/HR: at 19:08

## 2025-02-28 RX ADMIN — ACETAMINOPHEN 975 MG: 650 SUPPOSITORY RECTAL at 19:33

## 2025-02-28 RX ADMIN — SODIUM CHLORIDE 1000 ML: 9 INJECTION, SOLUTION INTRAVENOUS at 14:43

## 2025-02-28 RX ADMIN — IOPAMIDOL 80 ML: 755 INJECTION, SOLUTION INTRAVENOUS at 18:21

## 2025-02-28 RX ADMIN — Medication 100 MG: at 16:52

## 2025-02-28 RX ADMIN — SODIUM CHLORIDE 1000 ML: 0.9 INJECTION, SOLUTION INTRAVENOUS at 23:43

## 2025-02-28 RX ADMIN — Medication 2 MG/HR: at 17:38

## 2025-02-28 RX ADMIN — TRANEXAMIC ACID 1000 MG: 10 INJECTION, SOLUTION INTRAVENOUS at 17:17

## 2025-02-28 RX ADMIN — PHENOBARBITAL SODIUM 65 MG: 65 INJECTION INTRAMUSCULAR at 15:33

## 2025-02-28 RX ADMIN — PROPOFOL 20 MCG/KG/MIN: 10 INJECTION, EMULSION INTRAVENOUS at 16:58

## 2025-02-28 RX ADMIN — MAGNESIUM SULFATE HEPTAHYDRATE 2000 MG: 40 INJECTION, SOLUTION INTRAVENOUS at 23:43

## 2025-02-28 RX ADMIN — LORAZEPAM 1 MG: 2 INJECTION INTRAMUSCULAR; INTRAVENOUS at 14:28

## 2025-02-28 RX ADMIN — SODIUM CHLORIDE 25 ML/HR: 3 INJECTION, SOLUTION INTRAVENOUS at 17:41

## 2025-02-28 RX ADMIN — DEXTROSE MONOHYDRATE: 100 INJECTION, SOLUTION INTRAVENOUS at 14:40

## 2025-02-28 RX ADMIN — ETOMIDATE 20 MG: 2 INJECTION INTRAVENOUS at 16:51

## 2025-02-28 ASSESSMENT — PULMONARY FUNCTION TESTS
PIF_VALUE: 18
PIF_VALUE: 19

## 2025-02-28 NOTE — ED PROVIDER NOTES
[facility] EMERGENCY DEPARTMENT      EMERGENCY MEDICINE     Pt Name: Rivka Zavala  MRN: [id number]  Birthdate 1970  Date of evaluation: 2/28/2025  Provider: Pako Dwyer DO  Supervising Physician: Dominic Perez MD    CHIEF COMPLAINT       Chief Complaint   Patient presents with    Alcohol Intoxication     HISTORY OF PRESENT ILLNESS   Rivka Zavala is a 54 y.o. female with a history of MS and alcohol abuse who presents to the emergency department from home via EMS for alcohol intoxication.  Reportedly she lives with her elderly parents who called EMS for a lift assist.  They found her underneath a bed surrounded by 15 beer cans.  Reportedly she is able to talk at baseline, EMS found her covered in stool.  History limited secondary to patient's mental status.    PASTMEDICAL HISTORY     Past Medical History:   Diagnosis Date    Anemia     Clavicle fracture 10/13/2022    ETOH abuse     Malnutrition     Mild tetrahydrocannabinol (THC) abuse     Multiple sclerosis ([facility]) 2017    Personality disorder ([facility]) unknown    Rib fracture 10/13/2022    Smoker        Patient Active Problem List   Diagnosis Code    Alcohol withdrawal ([facility]) F10.939    Personality disorder, NOS F60.9    Optic neuritis H46.9    Alcohol use Z78.9    Alcohol abuse F10.10    Anemia D64.9    Acute hyponatremia E87.1    Moderate malnutrition E44.0    Syncope and collapse R55    Normocytic anemia D64.9    Iron deficiency anemia, unspecified D50.9    MS (multiple sclerosis) ([facility]) G35    Symptomatic anemia D64.9    Iron deficiency anemia secondary to inadequate dietary iron intake D50.8    Recurrent falls R29.6    Closed nondisplaced fracture of right patella S82.001A    Traumatic ecchymosis of left orbit S05.12XA    Severe malnutrition E43    Acute respiratory failure ([facility]) J96.00    Hypokalemia E87.6    Hypomagnesemia E83.42    Malnutrition E46    Hyponatremia with decreased serum osmolality E87.1     SURGICAL HISTORY       Past Surgical

## 2025-02-28 NOTE — ED TRIAGE NOTES
Pt presents to the ED with c/o alcohol intoxication. [facility] they were called to her residence for a lift assist and they found the pt laying underneath her bed with about 15 empty beer cans. EMS states the pt has a hx of MS and that she normally does talk, but that she has not spoken with them in route to the ED. Pt is covered in stool upon arrival to the ED and is attempting to rip vital sign monitoring devices off. Pt placed on 1L nasal cannula d/t pulse ox reading of 90% on room air.

## 2025-02-28 NOTE — ED NOTES
Preparing for intubation at this time. [facility] at bedside at this time. Dr. Herrera preparing for intubation.

## 2025-02-28 NOTE — ED PROVIDER NOTES
Transfer of Care Note:   Physician Signing out: Pako Dwyer DO  Receiving Physician: Jonathan Noriega MD  Sign out time: 4pm      Brief history:  Patient 54-year-old female history of MS, alcohol abuse presenting to the ED from home for EMS with concern of alcohol intoxication.  Patient lives with her elderly parents called EMS for lift assist.  They found her underneath the bed surrounded by 15 beer cans.  Reportedly she is able to talk at baseline.  EMS found her covered in stool.  History limited secondary to patient's mental status.    Items pending that need to be checked:  Labs, reevaluation, imaging      Tentative Impression of patient:  Altered mental status unknown cause    Expected disposition of patient:  Pending results, admitted.        Additional Assessment and results:   I have personally performed a face to face diagnostic evaluation on this patient. The patient's initial evaluation and plan have been discussed with the prior physician who initially evaluated the patient. Nursing Notes, Past Medical Hx, Past Surgical Hx, Social Hx, Allergies, vital signs and Family Hx were all reviewed.      Vitals:    02/28/25 1845   BP: (!) 133/114   Pulse: (!) 123   Resp: 24   Temp:    SpO2: 100%     Physical Exam  Constitutional:       General: She is in acute distress.      Appearance: She is ill-appearing.   HENT:      Head: Normocephalic and atraumatic.      Right Ear: External ear normal.      Left Ear: External ear normal.      Nose: No congestion or rhinorrhea.   Eyes:      Comments: Left gaze deviation, pupils 3 mm sluggishly reactive bilaterally   Cardiovascular:      Rate and Rhythm: Regular rhythm. Tachycardia present.      Pulses: Normal pulses.   Pulmonary:      Effort: Pulmonary effort is normal.   Musculoskeletal:      Cervical back: No rigidity.      Comments: Random movements of left upper and lower extremity, right upper lower extremity largely rigid, no tonic-clonic movement noted on exam  bleed within the left basal ganglia with large with shift.  Patient initially given D10 due to thought of her being alcoholic going through alcohol withdrawal and started on phenobarb.  Both of those were stopped patient started on 3% sodium chloride.    Plan:   Admit to ICU.    Final diagnoses:   Intraparenchymal hemorrhage of brain (HCC)   Hyponatremia   Ellis coma scale total score 3-8, unspecified coma timing (HCC)   Acute hypoxic respiratory failure (HCC)     New Prescriptions    No medications on file         Condition: condition: Critical  Dispo: Admit to CCU/ICU  DISPOSITION Decision To Admit 02/28/2025 05:38:05 PM   DISPOSITION CONDITION Stable           This transcription was electronically signed. It was dictated by use of voice recognition software and electronically transcribed. The transcription may contain errors not detected in proofreading.

## 2025-02-28 NOTE — ED NOTES
This RN to bedside patient placed on oxygen due to hypoxia. Sitter at bedside. Pt refusing to talk to this rn but attempting to crawl out of bed. Tachycardia noted. Pt changed of bowel and urine incontinence. Call light in reach of sitter.

## 2025-02-28 NOTE — ED NOTES
Pt being bagged at this time per RT. Jaw lift per Dr. Herrera. RN Anayeli at bedside for medication administration. Pharmacist Monique at bedside.

## 2025-02-28 NOTE — SIGNIFICANT EVENT
Addendum to prior Provider Note: Code stroke not called as we were unable to obtain a LKN from Pt, EMS, or the number listed for her family.

## 2025-02-28 NOTE — ED NOTES
This RN at bedside pt medicated per MAR. Pt hypoxic post administration. Pt placed on non-re breather.

## 2025-02-28 NOTE — ED PROVIDER NOTES
Signed out to me at shift change 4:14 PM EST    This patient has been seen and evaluated by previous shift physician, Dr. Perez.     Please refer to his/her note for detailed history of present illness, physical exam and medical decision making.      I was signed out to follow up ED course.     I have personally seen and evaluated this patient at time of sign-out.     ED COURSE / MEDICAL DECISION MAKING:       Rivka Zavala is a 54 y.o. female who presents to Emergency Department with Alcohol Intoxication    A 54-year-old woman with PMH significant for alcohol abuse, MS, hyponatremia, acute respiratory failure, metabolic encephalopathy, malnutrition, tobacco abuse, THC abuse, clavicle and rib fracture presents with altered mental status.    EMS reports 15 empty beer cans were found around her.    Upon arrival, her physical examination reveals tachycardia and confused woman with GCS 8 (E2 M4 V2).  Heart: S1-S2, no murmurs.  Lungs are clear to auscultation bilaterally.  Soft abdomen without tenderness.Patient has bilateral left lateral gaze.  Patient seems to have left upper extremity paralysis.     Pertinent labs include sodium of 124, alcohol level less than 0.01, and lactic acid 3.4.    Head CT Reviews acute hemorrhage in the left basal ganglia extending into the left temporal lobe measuring 5 x 3 times a 4 cm, moderate surrounding edema and mass effect upon left lateral ventricle, 5.6 mm midline deviation to moderate side.    ED management includes oral drinking ovation for airway protection, IV nicardipine targeting , TXA, hypertonic saline targeting sodium level 130 during the first 6-8 hours, neurosurgery consultation, and ICU admission.  Grygla and CVC placement were performed by residents.    CRITICAL CARE: There was a high probability of clinically significant/life threatening deterioration in this patient's condition of acute ICH and respiratory failure which required my urgent intervention.  I            MEDICATIONS ON DISCHARGE  New Prescriptions    No medications on file     Geo Farrell M.D.        Geo Farrell MD  02/28/25 1918

## 2025-02-28 NOTE — ED PROVIDER NOTES
[facility]  EMERGENCY MEDICINE ATTENDING ATTESTATION      Evaluation of Rivka Zavala.   Case discussed and care plan developed with resident physician.   I agree with the resident physician documentation and plan as documented by him, except if my documentation differs.   Patient seen, interviewed and examined by me.  I reviewed the medical, surgical, family and social history, medications and allergies.   I have reviewed and interpreted all available lab, radiology and ekg results available at the moment.  I have reviewed the nursing documentation.     Please see the resident physician completed note for final disposition except as documented on this attestation.   I have reviewed and interpreted all available lab, radiology and ekg results available at the moment.  Diagnosis, treatment and disposition plans were discussed and agreed upon by patient.   This transcription was electronically signed. It was dictated by use of voice recognition software and electronically transcribed. The transcription may contain errors not detected in proofreading.     I performed direct supervision and was present for the critical portion following procedures: None  Critical care time on this case: None    Electronically signed by Dominic Perez MD on 2/28/25 at 2:37 PM Dominic Blum MD  02/28/25 [id number]

## 2025-02-28 NOTE — PROGRESS NOTES
The transport originated from ER room 21. Pt. was transported to CT scanning. Assisting with the transport was BRITTANY Lindsey.   A defibrillator was brought along on transport. Appropriate devices were applied to monitor the patient's condition during transport. A transport backpack was brought on transport, to be used in case of emergency. Patient transported  via 100% O2 via ventilator. Patient tolerated procedure well.   Sat's 100%. Pt returned to room 21 in ER.

## 2025-03-01 ENCOUNTER — APPOINTMENT (OUTPATIENT)
Dept: GENERAL RADIOLOGY | Age: 55
End: 2025-03-01
Payer: COMMERCIAL

## 2025-03-01 LAB
% INHIBITION AA: 92.1 %
% INHIBITION ADP: 61.1 %
AA % AGGREGATION: 7.9 %
ADP PERCENT AGGREGATION: 38.9 %
ALBUMIN SERPL BCG-MCNC: 2.8 G/DL (ref 3.4–4.9)
ALP SERPL-CCNC: 113 U/L (ref 35–104)
ALT SERPL W/O P-5'-P-CCNC: 46 U/L (ref 10–35)
AMPHETAMINES UR QL SCN: NEGATIVE
ANION GAP SERPL CALC-SCNC: 11 MEQ/L (ref 8–16)
ANION GAP SERPL CALC-SCNC: 7 MEQ/L (ref 8–16)
AST SERPL-CCNC: 135 U/L (ref 10–35)
B-OH-BUTYR SERPL-MSCNC: 3.03 MG/DL (ref 0.2–2.81)
BACTERIA URNS QL MICRO: ABNORMAL /HPF
BARBITURATES UR QL SCN: NORMAL
BASOPHILS ABSOLUTE: 0 THOU/MM3 (ref 0–0.1)
BASOPHILS NFR BLD AUTO: 0.2 %
BENZODIAZ UR QL SCN: NORMAL
BILIRUB CONJ SERPL-MCNC: 0.2 MG/DL (ref 0–0.2)
BILIRUB SERPL-MCNC: 0.2 MG/DL (ref 0.3–1.2)
BILIRUB UR QL STRIP.AUTO: NEGATIVE
BUN SERPL-MCNC: 4 MG/DL (ref 8–23)
BUN SERPL-MCNC: 4 MG/DL (ref 8–23)
BZE UR QL SCN: NEGATIVE
CA-I BLD ISE-SCNC: 0.99 MMOL/L (ref 1.12–1.32)
CA-I BLD ISE-SCNC: 0.99 MMOL/L (ref 1.12–1.32)
CALCIUM SERPL-MCNC: 6.7 MG/DL (ref 8.6–10)
CALCIUM SERPL-MCNC: 7.1 MG/DL (ref 8.6–10)
CANNABINOIDS UR QL SCN: NORMAL
CASTS #/AREA URNS LPF: ABNORMAL /LPF
CASTS 2: ABNORMAL /LPF
CHARACTER UR: CLEAR
CHLORIDE SERPL-SCNC: 100 MEQ/L (ref 98–111)
CHLORIDE SERPL-SCNC: 93 MEQ/L (ref 98–111)
CLOT ANGLE BLD TEG: 19.3 MM (ref 15–32)
CLOT INIT BLD TEG: 5.7 MINUTES (ref 4.6–9.1)
CLOT LYSIS 30M P MA LENFR BLD TEG: 0.9 % (ref 0–2.6)
CO2 SERPL-SCNC: 24 MEQ/L (ref 22–29)
CO2 SERPL-SCNC: 25 MEQ/L (ref 22–29)
COLOR, UA: YELLOW
CORTIS SERPL-MCNC: 10.8 UG/DL
CORTISOL COLLECTION INFO: NORMAL
CREAT SERPL-MCNC: 0.4 MG/DL (ref 0.5–0.9)
CREAT SERPL-MCNC: 0.4 MG/DL (ref 0.5–0.9)
CREAT UR-MCNC: 69.7 MG/DL
CRYSTALS URNS MICRO: ABNORMAL
DEPRECATED RDW RBC AUTO: 51.2 FL (ref 35–45)
EOSINOPHIL NFR BLD AUTO: 0 %
EOSINOPHILS ABSOLUTE: 0 THOU/MM3 (ref 0–0.4)
EPITHELIAL CELLS, UA: ABNORMAL /HPF
ERYTHROCYTE [DISTWIDTH] IN BLOOD BY AUTOMATED COUNT: 14.4 % (ref 11.5–14.5)
FENTANYL: NORMAL
GFR SERPL CREATININE-BSD FRML MDRD: > 90 ML/MIN/1.73M2
GFR SERPL CREATININE-BSD FRML MDRD: > 90 ML/MIN/1.73M2
GLUCOSE SERPL-MCNC: 102 MG/DL (ref 74–109)
GLUCOSE SERPL-MCNC: 83 MG/DL (ref 74–109)
GLUCOSE UR QL STRIP.AUTO: NEGATIVE MG/DL
HCT VFR BLD AUTO: 29.8 % (ref 37–47)
HGB BLD-MCNC: 10.2 GM/DL (ref 12–16)
HGB UR QL STRIP.AUTO: ABNORMAL
IMM GRANULOCYTES # BLD AUTO: 0.09 THOU/MM3 (ref 0–0.07)
IMM GRANULOCYTES NFR BLD AUTO: 0.7 %
KETONES UR QL STRIP.AUTO: NEGATIVE
LACTATE SERPL-SCNC: 1 MMOL/L (ref 0.5–2)
LYMPHOCYTES ABSOLUTE: 0.9 THOU/MM3 (ref 1–4.8)
LYMPHOCYTES NFR BLD AUTO: 7.7 %
MA AA BLD RES TEG: 14.8 MM (ref 51–71)
MA ACTF BLD RES TEG: 10.8 MM (ref 2–19)
MA ADP BLD RES TEG: 30.4 MM (ref 45–69)
MA KAOLIN P HPASE BLD RES TEG: 61.2 MM (ref 53–68)
MAGNESIUM SERPL-MCNC: 2 MG/DL (ref 1.6–2.6)
MAGNESIUM SERPL-MCNC: 2.2 MG/DL (ref 1.6–2.6)
MCF.EXTRINSIC BLD ROTEM: 55.9 MM (ref 52–70)
MCH RBC QN AUTO: 33.4 PG (ref 26–33)
MCHC RBC AUTO-ENTMCNC: 34.2 GM/DL (ref 32.2–35.5)
MCV RBC AUTO: 97.7 FL (ref 81–99)
MISCELLANEOUS 2: ABNORMAL
MONOCYTES ABSOLUTE: 0.8 THOU/MM3 (ref 0.4–1.3)
MONOCYTES NFR BLD AUTO: 6.3 %
MRSA DNA SPEC QL NAA+PROBE: POSITIVE
NEUTROPHILS ABSOLUTE: 10.2 THOU/MM3 (ref 1.8–7.7)
NEUTROPHILS NFR BLD AUTO: 85.1 %
NITRITE UR QL STRIP: NEGATIVE
NRBC BLD AUTO-RTO: 0 /100 WBC
OPIATES UR QL SCN: NEGATIVE
OSMOLALITY SERPL: 270 MOSMOL/KG (ref 275–295)
OSMOLALITY UR: 550 MOSMOL/KG (ref 250–750)
OXYCODONE: NEGATIVE
PCP UR QL SCN: NEGATIVE
PH UR STRIP.AUTO: 6.5 [PH] (ref 5–9)
PHOSPHATE SERPL-MCNC: 2.2 MG/DL (ref 2.5–4.5)
PLATELET # BLD AUTO: 157 THOU/MM3 (ref 130–400)
PMV BLD AUTO: 9.9 FL (ref 9.4–12.4)
POTASSIUM SERPL-SCNC: 2.9 MEQ/L (ref 3.5–5.2)
POTASSIUM SERPL-SCNC: 4.7 MEQ/L (ref 3.5–5.2)
PROT SERPL-MCNC: 5 G/DL (ref 6.4–8.3)
PROT UR STRIP.AUTO-MCNC: 100 MG/DL
RBC # BLD AUTO: 3.05 MILL/MM3 (ref 4.2–5.4)
RBC URINE: ABNORMAL /HPF
RENAL EPI CELLS #/AREA URNS HPF: ABNORMAL /[HPF]
SODIUM SERPL-SCNC: 128 MEQ/L (ref 135–145)
SODIUM SERPL-SCNC: 132 MEQ/L (ref 135–145)
SODIUM SERPL-SCNC: 134 MEQ/L (ref 135–145)
SODIUM SERPL-SCNC: 137 MEQ/L (ref 135–145)
SODIUM UR-SCNC: 39 MEQ/L
SP GR UR REFRACT.AUTO: > 1.03 (ref 1–1.03)
T4 FREE SERPL-MCNC: 0.7 NG/DL (ref 0.92–1.68)
TSH SERPL DL<=0.05 MIU/L-ACNC: 1.05 UIU/ML (ref 0.27–4.2)
UROBILINOGEN, URINE: 0.2 EU/DL (ref 0–1)
UUN 24H UR-MCNC: 200 MG/DL
VANCOMYCIN SERPL-MCNC: 14 UG/ML (ref 10–39.9)
WBC # BLD AUTO: 12 THOU/MM3 (ref 4.8–10.8)
WBC #/AREA URNS HPF: ABNORMAL /HPF
WBC #/AREA URNS HPF: NEGATIVE /[HPF]
YEAST LIKE FUNGI URNS QL MICRO: ABNORMAL

## 2025-03-01 PROCEDURE — 80048 BASIC METABOLIC PNL TOTAL CA: CPT

## 2025-03-01 PROCEDURE — 82570 ASSAY OF URINE CREATININE: CPT

## 2025-03-01 PROCEDURE — 83935 ASSAY OF URINE OSMOLALITY: CPT

## 2025-03-01 PROCEDURE — 84443 ASSAY THYROID STIM HORMONE: CPT

## 2025-03-01 PROCEDURE — 2500000003 HC RX 250 WO HCPCS: Performed by: EMERGENCY MEDICINE

## 2025-03-01 PROCEDURE — 51798 US URINE CAPACITY MEASURE: CPT

## 2025-03-01 PROCEDURE — 0BH17EZ INSERTION OF ENDOTRACHEAL AIRWAY INTO TRACHEA, VIA NATURAL OR ARTIFICIAL OPENING: ICD-10-PCS

## 2025-03-01 PROCEDURE — 2580000003 HC RX 258

## 2025-03-01 PROCEDURE — 85390 FIBRINOLYSINS SCREEN I&R: CPT

## 2025-03-01 PROCEDURE — 94003 VENT MGMT INPAT SUBQ DAY: CPT

## 2025-03-01 PROCEDURE — 2500000003 HC RX 250 WO HCPCS

## 2025-03-01 PROCEDURE — 6370000000 HC RX 637 (ALT 250 FOR IP)

## 2025-03-01 PROCEDURE — 84300 ASSAY OF URINE SODIUM: CPT

## 2025-03-01 PROCEDURE — 99223 1ST HOSP IP/OBS HIGH 75: CPT

## 2025-03-01 PROCEDURE — 87086 URINE CULTURE/COLONY COUNT: CPT

## 2025-03-01 PROCEDURE — 73620 X-RAY EXAM OF FOOT: CPT

## 2025-03-01 PROCEDURE — 82010 KETONE BODYS QUAN: CPT

## 2025-03-01 PROCEDURE — 87077 CULTURE AEROBIC IDENTIFY: CPT

## 2025-03-01 PROCEDURE — 87186 SC STD MICRODIL/AGAR DIL: CPT

## 2025-03-01 PROCEDURE — 84295 ASSAY OF SERUM SODIUM: CPT

## 2025-03-01 PROCEDURE — 81001 URINALYSIS AUTO W/SCOPE: CPT

## 2025-03-01 PROCEDURE — 84540 ASSAY OF URINE/UREA-N: CPT

## 2025-03-01 PROCEDURE — 82330 ASSAY OF CALCIUM: CPT

## 2025-03-01 PROCEDURE — 5A1945Z RESPIRATORY VENTILATION, 24-96 CONSECUTIVE HOURS: ICD-10-PCS

## 2025-03-01 PROCEDURE — 84100 ASSAY OF PHOSPHORUS: CPT

## 2025-03-01 PROCEDURE — 6360000002 HC RX W HCPCS

## 2025-03-01 PROCEDURE — 3E033XZ INTRODUCTION OF VASOPRESSOR INTO PERIPHERAL VEIN, PERCUTANEOUS APPROACH: ICD-10-PCS

## 2025-03-01 PROCEDURE — 85347 COAGULATION TIME ACTIVATED: CPT

## 2025-03-01 PROCEDURE — 85384 FIBRINOGEN ACTIVITY: CPT

## 2025-03-01 PROCEDURE — 82533 TOTAL CORTISOL: CPT

## 2025-03-01 PROCEDURE — 99291 CRITICAL CARE FIRST HOUR: CPT | Performed by: NEUROLOGICAL SURGERY

## 2025-03-01 PROCEDURE — 89220 SPUTUM SPECIMEN COLLECTION: CPT

## 2025-03-01 PROCEDURE — 87641 MR-STAPH DNA AMP PROBE: CPT

## 2025-03-01 PROCEDURE — 84439 ASSAY OF FREE THYROXINE: CPT

## 2025-03-01 PROCEDURE — 2580000003 HC RX 258: Performed by: NURSE PRACTITIONER

## 2025-03-01 PROCEDURE — 80076 HEPATIC FUNCTION PANEL: CPT

## 2025-03-01 PROCEDURE — 2500000003 HC RX 250 WO HCPCS: Performed by: INTERNAL MEDICINE

## 2025-03-01 PROCEDURE — 6360000002 HC RX W HCPCS: Performed by: INTERNAL MEDICINE

## 2025-03-01 PROCEDURE — 83735 ASSAY OF MAGNESIUM: CPT

## 2025-03-01 PROCEDURE — 5A0935A ASSISTANCE WITH RESPIRATORY VENTILATION, LESS THAN 24 CONSECUTIVE HOURS, HIGH NASAL FLOW/VELOCITY: ICD-10-PCS

## 2025-03-01 PROCEDURE — 2100000000 HC CCU R&B

## 2025-03-01 PROCEDURE — 80202 ASSAY OF VANCOMYCIN: CPT

## 2025-03-01 PROCEDURE — 83930 ASSAY OF BLOOD OSMOLALITY: CPT

## 2025-03-01 PROCEDURE — 83605 ASSAY OF LACTIC ACID: CPT

## 2025-03-01 PROCEDURE — 85025 COMPLETE CBC W/AUTO DIFF WBC: CPT

## 2025-03-01 PROCEDURE — 36415 COLL VENOUS BLD VENIPUNCTURE: CPT

## 2025-03-01 PROCEDURE — 85576 BLOOD PLATELET AGGREGATION: CPT

## 2025-03-01 RX ORDER — PROPOFOL 10 MG/ML
5-50 INJECTION, EMULSION INTRAVENOUS CONTINUOUS
Status: DISCONTINUED | OUTPATIENT
Start: 2025-02-28 | End: 2025-03-02

## 2025-03-01 RX ORDER — PROPOFOL 10 MG/ML
5-50 INJECTION, EMULSION INTRAVENOUS CONTINUOUS
Status: DISCONTINUED | OUTPATIENT
Start: 2025-03-01 | End: 2025-03-01

## 2025-03-01 RX ORDER — THIAMINE HYDROCHLORIDE 100 MG/ML
100 INJECTION, SOLUTION INTRAMUSCULAR; INTRAVENOUS DAILY
Status: DISCONTINUED | OUTPATIENT
Start: 2025-03-01 | End: 2025-03-03

## 2025-03-01 RX ORDER — NOREPINEPHRINE BITARTRATE 0.06 MG/ML
1-100 INJECTION, SOLUTION INTRAVENOUS CONTINUOUS
Status: DISCONTINUED | OUTPATIENT
Start: 2025-03-01 | End: 2025-03-02

## 2025-03-01 RX ORDER — FUROSEMIDE 10 MG/ML
60 INJECTION INTRAMUSCULAR; INTRAVENOUS ONCE
Status: COMPLETED | OUTPATIENT
Start: 2025-03-01 | End: 2025-03-01

## 2025-03-01 RX ORDER — DEXMEDETOMIDINE HYDROCHLORIDE 4 UG/ML
.1-1.5 INJECTION, SOLUTION INTRAVENOUS CONTINUOUS
Status: DISCONTINUED | OUTPATIENT
Start: 2025-03-01 | End: 2025-03-02

## 2025-03-01 RX ORDER — FOLIC ACID 1 MG/1
1 TABLET ORAL DAILY
Status: DISCONTINUED | OUTPATIENT
Start: 2025-03-01 | End: 2025-03-07 | Stop reason: HOSPADM

## 2025-03-01 RX ORDER — POTASSIUM CHLORIDE 7.45 MG/ML
10 INJECTION INTRAVENOUS
Status: ACTIVE | OUTPATIENT
Start: 2025-03-01 | End: 2025-03-01

## 2025-03-01 RX ORDER — PANTOPRAZOLE SODIUM 40 MG/10ML
40 INJECTION, POWDER, LYOPHILIZED, FOR SOLUTION INTRAVENOUS DAILY
Status: DISCONTINUED | OUTPATIENT
Start: 2025-03-01 | End: 2025-03-07 | Stop reason: HOSPADM

## 2025-03-01 RX ORDER — MULTIVITAMIN WITH IRON
1 TABLET ORAL DAILY
Status: DISCONTINUED | OUTPATIENT
Start: 2025-03-01 | End: 2025-03-07 | Stop reason: HOSPADM

## 2025-03-01 RX ADMIN — SODIUM CHLORIDE, PRESERVATIVE FREE 10 ML: 5 INJECTION INTRAVENOUS at 20:11

## 2025-03-01 RX ADMIN — VANCOMYCIN HYDROCHLORIDE 1250 MG: 5 INJECTION, POWDER, LYOPHILIZED, FOR SOLUTION INTRAVENOUS at 05:29

## 2025-03-01 RX ADMIN — Medication 125 MCG/HR: at 05:16

## 2025-03-01 RX ADMIN — FOLIC ACID 1 MG: 1 TABLET ORAL at 07:37

## 2025-03-01 RX ADMIN — POTASSIUM CHLORIDE 20 MEQ: 29.8 INJECTION INTRAVENOUS at 03:50

## 2025-03-01 RX ADMIN — SODIUM CHLORIDE, PRESERVATIVE FREE 10 ML: 5 INJECTION INTRAVENOUS at 07:24

## 2025-03-01 RX ADMIN — PIPERACILLIN AND TAZOBACTAM 3375 MG: 3; .375 INJECTION, POWDER, FOR SOLUTION INTRAVENOUS at 21:35

## 2025-03-01 RX ADMIN — PROPOFOL 30 MCG/KG/MIN: 10 INJECTION, EMULSION INTRAVENOUS at 04:53

## 2025-03-01 RX ADMIN — PIPERACILLIN AND TAZOBACTAM 4500 MG: 4; .5 INJECTION, POWDER, LYOPHILIZED, FOR SOLUTION INTRAVENOUS at 16:23

## 2025-03-01 RX ADMIN — PROPOFOL 25 MCG/KG/MIN: 10 INJECTION, EMULSION INTRAVENOUS at 14:15

## 2025-03-01 RX ADMIN — VANCOMYCIN HYDROCHLORIDE 1000 MG: 1 INJECTION, POWDER, LYOPHILIZED, FOR SOLUTION INTRAVENOUS at 17:15

## 2025-03-01 RX ADMIN — PANTOPRAZOLE SODIUM 40 MG: 40 INJECTION, POWDER, FOR SOLUTION INTRAVENOUS at 07:28

## 2025-03-01 RX ADMIN — SODIUM CHLORIDE, PRESERVATIVE FREE 10 ML: 5 INJECTION INTRAVENOUS at 07:25

## 2025-03-01 RX ADMIN — Medication 5 MCG/MIN: at 04:46

## 2025-03-01 RX ADMIN — ACETAMINOPHEN 650 MG: 650 SUPPOSITORY RECTAL at 06:07

## 2025-03-01 RX ADMIN — POTASSIUM BICARBONATE 40 MEQ: 782 TABLET, EFFERVESCENT ORAL at 03:51

## 2025-03-01 RX ADMIN — SODIUM CHLORIDE 25 ML/HR: 3 INJECTION, SOLUTION INTRAVENOUS at 12:07

## 2025-03-01 RX ADMIN — Medication 1 TABLET: at 07:37

## 2025-03-01 RX ADMIN — THIAMINE HYDROCHLORIDE 100 MG: 100 INJECTION, SOLUTION INTRAMUSCULAR; INTRAVENOUS at 07:36

## 2025-03-01 RX ADMIN — SODIUM CHLORIDE: 0.9 INJECTION, SOLUTION INTRAVENOUS at 00:45

## 2025-03-01 RX ADMIN — FUROSEMIDE 60 MG: 10 INJECTION, SOLUTION INTRAMUSCULAR; INTRAVENOUS at 17:09

## 2025-03-01 RX ADMIN — Medication 125 MCG/HR: at 12:08

## 2025-03-01 RX ADMIN — Medication 0.2 MCG/KG/HR: at 15:14

## 2025-03-01 RX ADMIN — POTASSIUM CHLORIDE 20 MEQ: 29.8 INJECTION INTRAVENOUS at 04:48

## 2025-03-01 ASSESSMENT — PULMONARY FUNCTION TESTS
PIF_VALUE: 16
PIF_VALUE: 15
PIF_VALUE: 17
PIF_VALUE: 16
PIF_VALUE: 16

## 2025-03-01 NOTE — ED NOTES
3a ICU contacted prior to departure. Pt resting with VSS and consistent sedation. No acute distress noted at this time.

## 2025-03-01 NOTE — CARE COORDINATION
3/1/25, 7:03 AM EST    DISCHARGE PLANNING EVALUATION    Received Social Work consult “For consideration of Rehab”.  Addiction/NELDA  consulted.   met with patient, following for needs.  Full Social Consult deferred.

## 2025-03-01 NOTE — PROGRESS NOTES
Neurosurgery interim note.    Patient medical records and brain imaging studies were reviewed.  The case was discussed with the ER team.  A detailed neurosurgery note will come later.    Patient recommendation treatment plan from neurosurgical perspective at this time:    Admit to the ICU.  Medical management per ICU team and patient primary  A dose of TXA to be given to the patient.  Keep systolic blood pressure less than 160 and above 100.  Coagulation profile.  Seizure precaution.  Brain and neck CTA   Brain CT in the morning.  Neurosurgery will follow

## 2025-03-01 NOTE — PROGRESS NOTES
Comprehensive Nutrition Assessment    Type and Reason for Visit:  Initial, Consult (Oral Nutrition Supplements; vent - TF recommendations)    Nutrition Recommendations/Plan:   If  u/a to extubate- recommend begin TF - Vital 1.2 at 10 ml/hr; increase by 10 ml every 12 hours as tolerated, to goal rate of 30 ml/hr.  Note slow advancement d/t risk of refeeding syndrome.  Consider protein modulars once tolerance of TF established.  Additional free water flush per provider.  Will monitor need for additional nutrition interventions.      Malnutrition Assessment:  Malnutrition Status:  Severe malnutrition (03/01/25 0958)    Context:  Chronic Illness     Findings of the 6 clinical characteristics of malnutrition:  Energy Intake:  75% or less estimated energy requirements for 1 month or longer  Weight Loss:   (-16.5% in 5 months)     Body Fat Loss:  Unable to assess (limited d/t intubation, sedation)     Muscle Mass Loss:  Severe muscle mass loss Calf (gastrocnemius)  Fluid Accumulation:  Unable to assess     Strength:  Not Performed    Nutrition Assessment:     Pt. severely malnourished AEB criteria listed above.  At risk for further nutritional compromise r/t intubation (intubated 2/28), admit with subdural hematoma, substance abuse and underlying medical condition (hx malnutrition, etoh abuse, MS).       Nutrition Related Findings:    Pt. Report/Treatments/Miscellaneous:   Pt. Seen - intubated; +OGT, belly soft per RN; spoke with pt's mother on the phone - reports pt. Doesn't eat much at all pta; states drinks a lot and has to be reminded to eat; mother reports pt. Had diarrhea pta; hx malnutrition on previous admissions to hospital  GI Status: BMs 2/28  Pertinent Labs: 3/1 Sodium 132, Potassium 4.7, BUN 4, Cr 0.4, Magnesium 2.2, Phosphorus 2.2  Pertinent Meds: ATB, Levo, Folvite, MVI, Thiamine, Glycolax, Zofran      Wound Type: None       Current Nutrition Intake & Therapies:          Diet NPO  Current Tube Feeding  Status or Edema, Hemodynamic Status, Nutrition Focused Physical Findings, Skin, Weight    Discharge Planning:    Too soon to determine     Katarina Esqueda RD, LD  Contact: [phone]

## 2025-03-01 NOTE — PLAN OF CARE
Problem: Safety - Medical Restraint  Goal: Remains free of injury from restraints (Restraint for Interference with Medical Device)  Description: INTERVENTIONS:  1. Determine that other, less restrictive measures have been tried or would not be effective before applying the restraint  2. Evaluate the patient's condition at the time of restraint application  3. Inform patient/family regarding the reason for restraint  4. Q2H: Monitor safety, psychosocial status, comfort, nutrition and hydration  3/1/2025 1724 by Lauren Barger RN  Outcome: Completed  3/1/2025 0932 by Lauren Barger RN  Outcome: Progressing  Flowsheets (Taken 3/1/2025 0600 by Poonam Savage RN)  Remains free of injury from restraints (restraint for interference with medical device):   Determine that other, less restrictive measures have been tried or would not be effective before applying the restraint   Evaluate the patient's condition at the time of restraint application   Inform patient/family regarding the reason for restraint   Every 2 hours: Monitor safety, psychosocial status, comfort, nutrition and hydration  3/1/2025 0432 by Poonam Savage RN  Outcome: Not Progressing  Flowsheets (Taken 3/1/2025 0001)  Remains free of injury from restraints (restraint for interference with medical device):   Determine that other, less restrictive measures have been tried or would not be effective before applying the restraint   Evaluate the patient's condition at the time of restraint application   Inform patient/family regarding the reason for restraint   Every 2 hours: Monitor safety, psychosocial status, comfort, nutrition and hydration

## 2025-03-01 NOTE — ED NOTES
2mg Versed bolus; 50mcg fent bolus at this time per Dr. Noriega. Due to agitation along with spontaneous eye opening.

## 2025-03-01 NOTE — PROGRESS NOTES
Chart review for SBIRT per addiction consult. Patient is currently under \"intubation and sedation\". NELDA to follow case and complete when appropriate.

## 2025-03-01 NOTE — H&P
CRITICAL CARE H&P NOTE    Patient:  Rivka Zavala    Unit/Bed:[facility]/005-A  YOB: 1970  MRN: [id number]   PCP: Ben Montejo MD  Date of Admission: 2/28/2025  Chief Complaint:-AMS    Assessment and Plan:    Left basal ganglia hemorrhage: 5.6 mm midline shift.  4.7 x 3 x 3.7 cm.  Moderate surrounding edema and mass effect on the left lateral ventricle.  CTA with no abnormal contrast puddling within the parenchymal hemorrhage in the left basal ganglia and left temporal lobe to suggest active bleed.  Neurosurgery consulted.  Appreciate recs.  S/p 1G TXA.  -160.  INR 1.01.  Check teg global hemostasis and platelet mapping.  Head of bed 30 degrees. Repeat CT head in the am.   Acute hypoxic respiratory failure: Intubated in the ED for airway protection.  Propofol and fentanyl gtt.'s. lung protection strategies.   Moderate hyponatremia: Sodium 124 on arrival.  Repeat 122. Historically low has been in ICU for hyponatremia before.   She did receive hypertonic bolus in the emergency department.  I had ordered urine lites; ever, not likely to be accurate given hypertonic administration.  She does appear dry.  Isotonic crystalloids.  Continue IV hypertonic saline infusion with eventual goal sodium 145-150. Next 24 hours goal up to 134. Bmp q 4 hours.   Lactic acidosis: Likely 2/2 dehydration from poor oral intake.  IV fluid bolus.  Continue IV maintenance fluids.  Monitor sodium.  Trend lactic.  EtOH abuse: Monitor for S&S of withdrawal. Thiamine, folate, multivitamin.  Avoiding phenobarbital for now given brain bleed as above.   Brain Atrophy Secondary to Alcohol Use Disorder. Previous Diagnosis of Multiple Sclerosis, Questionable: 08/03/2024 MRI Brain W WO Contrast worsening global volume loss; abnormal signal in white matter reviewed by neurointerventionalist Dr. Shah on prior admission on 08/10/2024 with no definite signs of multiple sclerosis from his perspective. He attributed patient's  SPECIALIST

## 2025-03-01 NOTE — PROGRESS NOTES
[facility]   Pharmacy Pharmacokinetic Monitoring Service - Vancomycin     Rivka Zavala is a 54 y.o. female starting on vancomycin therapy for SSTI. Pharmacy consulted by Dr Cr for monitoring and adjustment.    Target Concentration: Goal AUC/STEVEN 400-600 mg*hr/L    Additional Antimicrobials: none    Pertinent Laboratory Values:   Wt Readings from Last 1 Encounters:   02/28/25 45.4 kg (100 lb)     Temp Readings from Last 1 Encounters:   03/01/25 (!) 100.7 °F (38.2 °C) (Rectal)     Estimated Creatinine Clearance: 115 mL/min (A) (based on SCr of 0.4 mg/dL (L)).  Recent Labs     02/28/25  1421 02/28/25  1923 02/28/25 2018 03/01/25  0106   CREATININE 0.4*   < > 0.4* 0.4*   BUN 4*   < > 4* 4*   WBC 8.4  --   --   --     < > = values in this interval not displayed.     Pertinent Cultures:  Date Source Results   3/1/25 Blood x 2    3/1/25 MRSA    MRSA Nasal Swab: was ordered by provider, awaiting results.    Plan:  Dosing recommendations based on Bayesian software  Start vancomycin 1250 mg loading dose, followed by 1000 mg IV q12h  Anticipated AUC of 482 and trough concentration of 10.5 at steady state  Renal labs as indicated   Vancomycin concentration ordered for 3/2 @ 0600   Pharmacy will continue to monitor patient and adjust therapy as indicated    Thank you for the consult,  Hollie Cloud RPh, ADRIAN, JULESP  3/1/2025     4:10 AM

## 2025-03-01 NOTE — PROGRESS NOTES
Patient Weaning Progress    The patient's vent settings was able to be weaned this shift.      Ventilator settings that were weaned              [x] Mode   [x] Pressure support weaned   [] Fio2 weaned   [] Peep weaned      Spontaneous weaning trial  was attempted.   .    Reason that defined ventilator parameters for SBT was not met              [] Patient condition requires increased ventilator settings  [] Requires increased sedation   [] Settings not within weaning range   [] SAT not completed   [] Physician orders    The patient was able to tolerate SBT.   RSBI  was 20 with EtCO2 of 35 and SpO2 of 97 on 30% FiO2.  Spontanteous VT was 630 and RR 18 breaths/min.      Cuff was  deflated to determine cuff leak. A leak  was detected.       Unable to get agreement for goals because no family is present and patient cannot respond.

## 2025-03-01 NOTE — PLAN OF CARE
H&P and ICU course:  Patient 54-year-old with PMH of MS, alcohol abuse, personality disorder, LISA presents with chief complaint of altered mental status.  Patient found by her family surrounded by beer cans covered in stool.  Alcohol level emergency department negative.  CT scan of head showed acute hemorrhage in the left basal ganglia 7 to left temporal lobe measuring 4.7 x 3 x 3.7 cm in size.  Moderate surrounding edema and mass effect on the left lateral ventricle.  5.6 mm midline deviation to the right.  Neurosurgery consulted.    Left basal ganglia hemorrhage: S/p 1 g TXA.  SBP goal of 100-160.  Will repeat CT head 3/2/2025  Acute hypoxic respiratory failure: Patient intubated in ED for airway protection.  Extubated 3/1/2025.  Currently on heated high flow.  Moderate hyponatremia: Sodium 124 on arrival.  Patient historically has been low in the past.  Was placed on IV hypertonic saline.  Sodium corrected to 137.  Hypertonic saline discontinued.  Will continue to monitor  Workup for septic shock: Patient positive for MRSA nares. Patient on Levophed given hypotension.  Started on Zosyn, vancomycin.  Echo, total cortisol, LFTs ordered.

## 2025-03-01 NOTE — PLAN OF CARE
Problem: Pain  Goal: Verbalizes/displays adequate comfort level or baseline comfort level  Outcome: Progressing     Problem: Safety - Adult  Goal: Free from fall injury  Outcome: Progressing     Problem: Safety - Medical Restraint  Goal: Remains free of injury from restraints (Restraint for Interference with Medical Device)  Description: INTERVENTIONS:  1. Determine that other, less restrictive measures have been tried or would not be effective before applying the restraint  2. Evaluate the patient's condition at the time of restraint application  3. Inform patient/family regarding the reason for restraint  4. Q2H: Monitor safety, psychosocial status, comfort, nutrition and hydration  3/1/2025 0932 by Lauren Barger RN  Outcome: Progressing  Flowsheets (Taken 3/1/2025 0600 by Poonam Savage RN)  Remains free of injury from restraints (restraint for interference with medical device):   Determine that other, less restrictive measures have been tried or would not be effective before applying the restraint   Evaluate the patient's condition at the time of restraint application   Inform patient/family regarding the reason for restraint   Every 2 hours: Monitor safety, psychosocial status, comfort, nutrition and hydration  3/1/2025 0432 by Poonam Savage RN  Outcome: Not Progressing  Flowsheets (Taken 3/1/2025 0001)  Remains free of injury from restraints (restraint for interference with medical device):   Determine that other, less restrictive measures have been tried or would not be effective before applying the restraint   Evaluate the patient's condition at the time of restraint application   Inform patient/family regarding the reason for restraint   Every 2 hours: Monitor safety, psychosocial status, comfort, nutrition and hydration     Problem: Safety - Medical Restraint  Goal: Remains free of injury from restraints (Restraint for Interference with Medical Device)  Description: INTERVENTIONS:  1. Determine that  other, less restrictive measures have been tried or would not be effective before applying the restraint  2. Evaluate the patient's condition at the time of restraint application  3. Inform patient/family regarding the reason for restraint  4. Q2H: Monitor safety, psychosocial status, comfort, nutrition and hydration  3/1/2025 0932 by Lauren Barger RN  Outcome: Progressing  Flowsheets (Taken 3/1/2025 0600 by Poonam Savage RN)  Remains free of injury from restraints (restraint for interference with medical device):   Determine that other, less restrictive measures have been tried or would not be effective before applying the restraint   Evaluate the patient's condition at the time of restraint application   Inform patient/family regarding the reason for restraint   Every 2 hours: Monitor safety, psychosocial status, comfort, nutrition and hydration  3/1/2025 0432 by Poonam Savage RN  Outcome: Not Progressing  Flowsheets (Taken 3/1/2025 0001)  Remains free of injury from restraints (restraint for interference with medical device):   Determine that other, less restrictive measures have been tried or would not be effective before applying the restraint   Evaluate the patient's condition at the time of restraint application   Inform patient/family regarding the reason for restraint   Every 2 hours: Monitor safety, psychosocial status, comfort, nutrition and hydration

## 2025-03-01 NOTE — PROGRESS NOTES
Patient has been successfully weaned from Mechanical Ventilation.  RSBI before extubation was 17 with EtCO2 of 35 and SpO2 of 94 on 40% FiO2.  Patient extubated and placed on 4 liters/min via nasal cannula.  Post extubation SpO2 is 90% with HR  111 bpm and RR 14 breaths/min.  Patient had strong cough that was productive of white and yellow sputum.  Extubation Well tolerated by patient..

## 2025-03-01 NOTE — CONSULTS
[place], [place]                                          NEUROSURGICAL ACONSULTATION NOTE       Rivka Zavala   YOB: 1970  Account Number: [id number]   Date of Examination: 3/1/2025    ASSESSMENT:    -This is a 54-year-old female who developed spontaneous left-sided basal ganglia bleed  -GCS: 9T/15  -Focal neurological deficit: Right sided weakness  -ICH score: 2  -History of alcoholism     PLAN:    (Please see neurosurgery interim note for more information)  -Medical management per ICU team and patient primary.  -Keep systolic blood pressure less than 160 and above 110.  -A dose of TXA to be given to the patient.  -Coagulation profile.  -Platelet mapping  -Stop any anticoagulation medication at this time.  -Seizure precaution.  -Decision made making there is no strong justification for any acute neurosurgical intervention at this time (based of the characteristics of patient intracranial bleed.  However neurosurgical intervention may only consider for lifesaving purposes and not to save the quality of the life)  -New brain CT tomorrow morning.  -Neurosurgery will follow.  -The case was discussed in detail with the ICU team.    HISTORY OF PRESENT ILLNESS:  Rivka Zavala is a 54 y.o. female, admitted on :2/28/2025  1:11 PM  This is a 54-year-old female who was brought to the ER for evaluation of altered mental status and strokelike symptoms.  Patient's underwent brain CT that showed large left side deep basal ganglia hemorrhage.  Patient underwent intubation because of concern of airway protection.  Patient has a history of alcoholism  ROS:    Review of Systems  Was not obtained was not able to obtain a comprehensive ROS because of patient level of consciousness.    PROBLEM LIST:  Patient Active Problem List   Diagnosis    Alcohol withdrawal (HCC)    Personality disorder, NOS    Optic neuritis    Alcohol use    Alcohol abuse    Anemia    Acute hyponatremia    Moderate      PTT:    Lab Results   Component Value Date/Time    APTT 31.6 02/28/2025 07:23 PM   [APTT}    RADIOLOGY:  Pertinent images have been reviewed.    Brain CT:      EXAMINATION:    Patient patient is intubated and sedated  GCS : 9T/15  She is open her eyes to voice.  Moves her left  sided extremities spontaneously.  No movement in the right  She does not following the commands.  Pupils equal and reactive.      *Time spent was at least 35  min of critical time evaluating patient, discussion with staff, planning for treatment and evaluation. The time was spent examining patient face to face, reviewing the images personally, reviewing the chart, perform high complexity decision making and speaking with the nursing staff regarding recommendations. There was a high probability of clinically significant life-threatening deterioration of the patient's condition requiring my urgent intervention.  Time may be discontiguous. Time does not include procedures.     Dilan Garcia MD,MD  Electronically signed 3/1/2025

## 2025-03-02 ENCOUNTER — APPOINTMENT (OUTPATIENT)
Dept: CT IMAGING | Age: 55
End: 2025-03-02
Payer: COMMERCIAL

## 2025-03-02 ENCOUNTER — APPOINTMENT (OUTPATIENT)
Age: 55
End: 2025-03-02
Payer: COMMERCIAL

## 2025-03-02 ENCOUNTER — APPOINTMENT (OUTPATIENT)
Dept: GENERAL RADIOLOGY | Age: 55
End: 2025-03-02
Payer: COMMERCIAL

## 2025-03-02 PROBLEM — I61.9 INTRAPARENCHYMAL HEMORRHAGE OF BRAIN (HCC): Status: ACTIVE | Noted: 2025-03-02

## 2025-03-02 PROBLEM — E87.1 HYPONATREMIA: Status: ACTIVE | Noted: 2025-03-02

## 2025-03-02 PROBLEM — J96.01 ACUTE HYPOXIC RESPIRATORY FAILURE (HCC): Status: ACTIVE | Noted: 2025-03-02

## 2025-03-02 LAB
ALBUMIN SERPL BCG-MCNC: 2.4 G/DL (ref 3.4–4.9)
ALP SERPL-CCNC: 105 U/L (ref 35–104)
ALT SERPL W/O P-5'-P-CCNC: 40 U/L (ref 10–35)
ANION GAP SERPL CALC-SCNC: 11 MEQ/L (ref 8–16)
AST SERPL-CCNC: 118 U/L (ref 10–35)
BILIRUB SERPL-MCNC: 0.2 MG/DL (ref 0.3–1.2)
BUN SERPL-MCNC: 8 MG/DL (ref 8–23)
CA-I BLD ISE-SCNC: 0.93 MMOL/L (ref 1.12–1.32)
CALCIUM SERPL-MCNC: 6 MG/DL (ref 8.6–10)
CHLORIDE SERPL-SCNC: 108 MEQ/L (ref 98–111)
CO2 SERPL-SCNC: 22 MEQ/L (ref 22–29)
CREAT SERPL-MCNC: 0.3 MG/DL (ref 0.5–0.9)
ECHO AO ASC DIAM: 2.9 CM
ECHO AO ASCENDING AORTA INDEX: 2.06 CM/M2
ECHO AO SINUS VALSALVA DIAM: 2.8 CM
ECHO AO SINUS VALSALVA INDEX: 1.99 CM/M2
ECHO AO ST JNCT DIAM: 2.3 CM
ECHO AV CUSP MM: 1.7 CM
ECHO AV MEAN GRADIENT: 3 MMHG
ECHO AV MEAN VELOCITY: 0.8 M/S
ECHO AV PEAK GRADIENT: 5 MMHG
ECHO AV PEAK VELOCITY: 1.2 M/S
ECHO AV VELOCITY RATIO: 0.83
ECHO AV VTI: 20.9 CM
ECHO BSA: 1.43 M2
ECHO EST RA PRESSURE: 3 MMHG
ECHO LA AREA 2C: 13.2 CM2
ECHO LA AREA 4C: 12.9 CM2
ECHO LA DIAMETER INDEX: 1.77 CM/M2
ECHO LA DIAMETER: 2.5 CM
ECHO LA MAJOR AXIS: 4.3 CM
ECHO LA MINOR AXIS: 4.9 CM
ECHO LA VOL BP: 32 ML (ref 22–52)
ECHO LA VOL MOD A2C: 30 ML (ref 22–52)
ECHO LA VOL MOD A4C: 31 ML (ref 22–52)
ECHO LA VOL/BSA BIPLANE: 23 ML/M2 (ref 16–34)
ECHO LA VOLUME INDEX MOD A2C: 21 ML/M2 (ref 16–34)
ECHO LA VOLUME INDEX MOD A4C: 22 ML/M2 (ref 16–34)
ECHO LV E' LATERAL VELOCITY: 8.9 CM/S
ECHO LV E' SEPTAL VELOCITY: 8.7 CM/S
ECHO LV EF PHYSICIAN: 55 %
ECHO LV EJECTION FRACTION BIPLANE: 55 % (ref 55–100)
ECHO LV FRACTIONAL SHORTENING: 28 % (ref 28–44)
ECHO LV INTERNAL DIMENSION DIASTOLE INDEX: 2.27 CM/M2
ECHO LV INTERNAL DIMENSION DIASTOLIC: 3.2 CM (ref 3.9–5.3)
ECHO LV INTERNAL DIMENSION SYSTOLIC INDEX: 1.63 CM/M2
ECHO LV INTERNAL DIMENSION SYSTOLIC: 2.3 CM
ECHO LV ISOVOLUMETRIC RELAXATION TIME (IVRT): 79 MS
ECHO LV IVSD: 1.1 CM (ref 0.6–0.9)
ECHO LV MASS 2D: 104.3 G (ref 67–162)
ECHO LV MASS INDEX 2D: 74 G/M2 (ref 43–95)
ECHO LV POSTERIOR WALL DIASTOLIC: 1.1 CM (ref 0.6–0.9)
ECHO LV RELATIVE WALL THICKNESS RATIO: 0.69
ECHO LVOT AV VTI INDEX: 0.86
ECHO LVOT MEAN GRADIENT: 2 MMHG
ECHO LVOT PEAK GRADIENT: 4 MMHG
ECHO LVOT PEAK VELOCITY: 1 M/S
ECHO LVOT VTI: 18 CM
ECHO MV A VELOCITY: 0.7 M/S
ECHO MV E DECELERATION TIME (DT): 156 MS
ECHO MV E VELOCITY: 0.93 M/S
ECHO MV E/A RATIO: 1.33
ECHO MV E/E' LATERAL: 10.45
ECHO MV E/E' RATIO (AVERAGED): 10.57
ECHO MV E/E' SEPTAL: 10.69
ECHO MV REGURGITANT PEAK GRADIENT: 92 MMHG
ECHO MV REGURGITANT PEAK VELOCITY: 4.8 M/S
ECHO PV MAX VELOCITY: 0.5 M/S
ECHO PV PEAK GRADIENT: 1 MMHG
ECHO RV FREE WALL PEAK S': 11.9 CM/S
ECHO RV INTERNAL DIMENSION: 2.4 CM
ECHO RV TAPSE: 1.7 CM (ref 1.7–?)
ECHO TV E WAVE: 0.4 M/S
ECHO TV REGURGITANT MAX VELOCITY: 2.12 M/S
ECHO TV REGURGITANT MAX VELOCITY: 2.12 M/S
ECHO TV REGURGITANT PEAK GRADIENT: 18 MMHG
GFR SERPL CREATININE-BSD FRML MDRD: > 90 ML/MIN/1.73M2
GLUCOSE SERPL-MCNC: 77 MG/DL (ref 74–109)
MAGNESIUM SERPL-MCNC: 1.6 MG/DL (ref 1.6–2.6)
PHOSPHATE SERPL-MCNC: 2 MG/DL (ref 2.5–4.5)
POTASSIUM SERPL-SCNC: 2.8 MEQ/L (ref 3.5–5.2)
POTASSIUM SERPL-SCNC: 4.4 MEQ/L (ref 3.5–5.2)
PROT SERPL-MCNC: 4.8 G/DL (ref 6.4–8.3)
SODIUM SERPL-SCNC: 141 MEQ/L (ref 135–145)
VANCOMYCIN SERPL-MCNC: 19.3 UG/ML (ref 10–39.9)

## 2025-03-02 PROCEDURE — 6360000002 HC RX W HCPCS

## 2025-03-02 PROCEDURE — 2580000003 HC RX 258

## 2025-03-02 PROCEDURE — 2500000003 HC RX 250 WO HCPCS

## 2025-03-02 PROCEDURE — 84132 ASSAY OF SERUM POTASSIUM: CPT

## 2025-03-02 PROCEDURE — 6370000000 HC RX 637 (ALT 250 FOR IP)

## 2025-03-02 PROCEDURE — 99233 SBSQ HOSP IP/OBS HIGH 50: CPT | Performed by: INTERNAL MEDICINE

## 2025-03-02 PROCEDURE — 36415 COLL VENOUS BLD VENIPUNCTURE: CPT

## 2025-03-02 PROCEDURE — 95705 EEG W/O VID 2-12 HR UNMNTR: CPT

## 2025-03-02 PROCEDURE — 2500000003 HC RX 250 WO HCPCS: Performed by: EMERGENCY MEDICINE

## 2025-03-02 PROCEDURE — 94761 N-INVAS EAR/PLS OXIMETRY MLT: CPT

## 2025-03-02 PROCEDURE — APPSS30 APP SPLIT SHARED TIME 16-30 MINUTES: Performed by: PHYSICIAN ASSISTANT

## 2025-03-02 PROCEDURE — 6360000002 HC RX W HCPCS: Performed by: STUDENT IN AN ORGANIZED HEALTH CARE EDUCATION/TRAINING PROGRAM

## 2025-03-02 PROCEDURE — 80053 COMPREHEN METABOLIC PANEL: CPT

## 2025-03-02 PROCEDURE — 93306 TTE W/DOPPLER COMPLETE: CPT | Performed by: INTERNAL MEDICINE

## 2025-03-02 PROCEDURE — 82330 ASSAY OF CALCIUM: CPT

## 2025-03-02 PROCEDURE — 83735 ASSAY OF MAGNESIUM: CPT

## 2025-03-02 PROCEDURE — 84100 ASSAY OF PHOSPHORUS: CPT

## 2025-03-02 PROCEDURE — 70450 CT HEAD/BRAIN W/O DYE: CPT

## 2025-03-02 PROCEDURE — 2500000003 HC RX 250 WO HCPCS: Performed by: INTERNAL MEDICINE

## 2025-03-02 PROCEDURE — 80202 ASSAY OF VANCOMYCIN: CPT

## 2025-03-02 PROCEDURE — 2700000000 HC OXYGEN THERAPY PER DAY

## 2025-03-02 PROCEDURE — 93306 TTE W/DOPPLER COMPLETE: CPT

## 2025-03-02 PROCEDURE — 2140000000 HC CCU INTERMEDIATE R&B

## 2025-03-02 PROCEDURE — 71045 X-RAY EXAM CHEST 1 VIEW: CPT

## 2025-03-02 PROCEDURE — 2720000010 HC SURG SUPPLY STERILE

## 2025-03-02 RX ORDER — LORAZEPAM 2 MG/ML
1 INJECTION INTRAMUSCULAR ONCE
Status: COMPLETED | OUTPATIENT
Start: 2025-03-02 | End: 2025-03-02

## 2025-03-02 RX ORDER — LABETALOL HYDROCHLORIDE 5 MG/ML
10 INJECTION, SOLUTION INTRAVENOUS ONCE
Status: COMPLETED | OUTPATIENT
Start: 2025-03-03 | End: 2025-03-03

## 2025-03-02 RX ORDER — LEVETIRACETAM 500 MG/5ML
2000 INJECTION, SOLUTION, CONCENTRATE INTRAVENOUS ONCE
Status: COMPLETED | OUTPATIENT
Start: 2025-03-02 | End: 2025-03-02

## 2025-03-02 RX ORDER — LEVETIRACETAM 500 MG/5ML
500 INJECTION, SOLUTION, CONCENTRATE INTRAVENOUS EVERY 12 HOURS
Status: DISCONTINUED | OUTPATIENT
Start: 2025-03-02 | End: 2025-03-06

## 2025-03-02 RX ADMIN — LORAZEPAM 1 MG: 2 INJECTION INTRAMUSCULAR; INTRAVENOUS at 21:00

## 2025-03-02 RX ADMIN — LEVETIRACETAM 500 MG: 100 INJECTION INTRAVENOUS at 22:36

## 2025-03-02 RX ADMIN — POTASSIUM CHLORIDE 20 MEQ: 29.8 INJECTION INTRAVENOUS at 06:08

## 2025-03-02 RX ADMIN — Medication 1 TABLET: at 07:38

## 2025-03-02 RX ADMIN — FOLIC ACID 1 MG: 1 TABLET ORAL at 07:39

## 2025-03-02 RX ADMIN — LEVETIRACETAM 2000 MG: 100 INJECTION INTRAVENOUS at 08:10

## 2025-03-02 RX ADMIN — POTASSIUM CHLORIDE 20 MEQ: 29.8 INJECTION INTRAVENOUS at 07:32

## 2025-03-02 RX ADMIN — Medication 0.6 MCG/KG/HR: at 05:54

## 2025-03-02 RX ADMIN — THIAMINE HYDROCHLORIDE 100 MG: 100 INJECTION, SOLUTION INTRAMUSCULAR; INTRAVENOUS at 07:38

## 2025-03-02 RX ADMIN — POTASSIUM CHLORIDE 20 MEQ: 29.8 INJECTION INTRAVENOUS at 06:59

## 2025-03-02 RX ADMIN — SODIUM CHLORIDE, PRESERVATIVE FREE 10 ML: 5 INJECTION INTRAVENOUS at 07:32

## 2025-03-02 RX ADMIN — PIPERACILLIN AND TAZOBACTAM 3375 MG: 3; .375 INJECTION, POWDER, FOR SOLUTION INTRAVENOUS at 22:48

## 2025-03-02 RX ADMIN — SODIUM CHLORIDE, PRESERVATIVE FREE 10 ML: 5 INJECTION INTRAVENOUS at 21:23

## 2025-03-02 RX ADMIN — PANTOPRAZOLE SODIUM 40 MG: 40 INJECTION, POWDER, FOR SOLUTION INTRAVENOUS at 07:32

## 2025-03-02 RX ADMIN — PIPERACILLIN AND TAZOBACTAM 3375 MG: 3; .375 INJECTION, POWDER, FOR SOLUTION INTRAVENOUS at 06:05

## 2025-03-02 RX ADMIN — SODIUM CHLORIDE, PRESERVATIVE FREE 10 ML: 5 INJECTION INTRAVENOUS at 21:22

## 2025-03-02 RX ADMIN — PIPERACILLIN AND TAZOBACTAM 3375 MG: 3; .375 INJECTION, POWDER, FOR SOLUTION INTRAVENOUS at 12:43

## 2025-03-02 RX ADMIN — VANCOMYCIN HYDROCHLORIDE 1000 MG: 1 INJECTION, POWDER, LYOPHILIZED, FOR SOLUTION INTRAVENOUS at 16:44

## 2025-03-02 RX ADMIN — VANCOMYCIN HYDROCHLORIDE 1000 MG: 1 INJECTION, POWDER, LYOPHILIZED, FOR SOLUTION INTRAVENOUS at 06:24

## 2025-03-02 ASSESSMENT — PAIN SCALES - GENERAL
PAINLEVEL_OUTOF10: 0
PAINLEVEL_OUTOF10: 0

## 2025-03-02 NOTE — PLAN OF CARE
Problem: Discharge Planning  Goal: Discharge to home or other facility with appropriate resources  Outcome: Progressing  Flowsheets (Taken 3/2/2025 0416)  Discharge to home or other facility with appropriate resources:   Identify barriers to discharge with patient and caregiver   Arrange for needed discharge resources and transportation as appropriate   Identify discharge learning needs (meds, wound care, etc)   Arrange for interpreters to assist at discharge as needed   Refer to discharge planning if patient needs post-hospital services based on physician order or complex needs related to functional status, cognitive ability or social support system     Problem: Pain  Goal: Verbalizes/displays adequate comfort level or baseline comfort level  Outcome: Progressing  Flowsheets (Taken 3/2/2025 0416)  Verbalizes/displays adequate comfort level or baseline comfort level:   Encourage patient to monitor pain and request assistance   Assess pain using appropriate pain scale   Administer analgesics based on type and severity of pain and evaluate response   Implement non-pharmacological measures as appropriate and evaluate response   Consider cultural and social influences on pain and pain management   Notify Licensed Independent Practitioner if interventions unsuccessful or patient reports new pain     Problem: Safety - Adult  Goal: Free from fall injury  Outcome: Progressing  Flowsheets (Taken 3/2/2025 0416)  Free From Fall Injury:   Instruct family/caregiver on patient safety   Based on caregiver fall risk screen, instruct family/caregiver to ask for assistance with transferring infant if caregiver noted to have fall risk factors     Problem: Nutrition Deficit:  Goal: Optimize nutritional status  Outcome: Progressing  Flowsheets (Taken 3/1/2025 1007 by Katarina Esqueda, RD, LD)  Nutrient intake appropriate for improving, restoring, or maintaining nutritional needs:   Assess nutritional status and recommend course

## 2025-03-02 NOTE — PROGRESS NOTES
[facility]  PHYSICAL THERAPY MISSED TREATMENT NOTE  [facility] CCU 3A    Date: 3/2/2025  Patient Name: Rivka Zavala        MRN: [id number]   : 1970  (54 y.o.)  Gender: female                REASON FOR MISSED TREATMENT:  Orders received for PT evaluation and treatment; however notes indicate that patient is unable to arouse to participate in skilled therapy session at this time. Pt also with Strict bedrest order. Will try 3/3/25 pending updated activity orders.     JOLIE FritzT

## 2025-03-02 NOTE — PLAN OF CARE
Rivka Zavala   YOB: 1970  Account Number: [id number]   Date of Examination: 3/1/2025    Alerted by nursing at 0546 that patient had returned from CT and had 1-2 minutes of seizure-like activity, described as shaking, no loss of bowel or bladder control, did not appear to bite tongue.  Patient seen at ~0610, at that time would awaken to voice, did not follow directions, eyes permanently deviated to the left.  No seizure-like activity noted.    A&P:  Concern for seizure-like activity:  -Will order 30-minute EEG  -Will Keppra load with 2000 mg  -Keppra 500 bid

## 2025-03-02 NOTE — PROGRESS NOTES
CRITICAL CARE PROGRESS NOTE      Patient:  Rivka Zavala    Unit/Bed:3A-05/005-A  YOB: 1970  MRN: [id number]   PCP: Ben Montejo MD  Date of Admission: 2/28/2025  Chief Complaint:-AMS    Assessment and Plan:    Left basal ganglia hemorrhage measuring 5.5 x 3.3 x 3.1 cm: Found on CT head on arrival.  Appears to be stable on serial imaging. Neurosurgery consulted with plan for repeat CT tomorrow. Patient cleared by them to transfer out of ICU.  Encephalopathy secondary to brain bleed. Patient remains encephalopathic. Speech therapy consulted for eval.  Seizure-like activity: Patient was noted to have seizure-like activity 3/2.  EEG placed and patient started on Keppra.  Acute hypoxic respiratory failure. Intubated for airway protection in the ED and extubated 3/1.  She did have an SpO2 of 88 and 76 3/1 as well as 89 on 3/2. Remains on 3 L NC.  Continue to wean oxygen as tolerated.  Severe hyponatremia. On arrival,  2/28. Patient has chronic hyponatremia.  Resolved with hypertonic saline.  Septic shock secondary to hypotension, resolved.  Most likely secondary to UTI. Urine culture positive for gram-negative bacteria. Keep patient on Zosyn and vancomycin until sensitivities return. Patient required Levophed which was weaned off 3/1. Cortisol and TSH WNL. 3/1 echo   PFO present on echo 3/1. Of note, EF 55-60%. No comment on diastolic function. Mild MR and TR. Prominent Chiari network in RA.    INITIAL H AND P AND ICU COURSE:  Patient is a 54-year-old female with PMH of MS, alcohol abuse, personality disorder, smoking, anemia, malnutrition who was brought to the ED by her parents for AMS.  Patient reportedly was found beer cans and covered in feces.  In the ED, alcohol level was negative. CT head showed acute brain hemorrhage in the left basal ganglia measuring 5.5 x 3.3 x 3.1 cm with mass effect upon the left lateral ventricle with left-to-right midline shift of 4 mm. Neurosurgery was  culture positive for gram-negative bacteria.    Meets Continued ICU Level Care Criteria:    [] Yes   [x] No - Transfer   [] HOSPITALIST CONTACTED-      Case and plan discussed with Dr. Otto.    Electronically signed by Ophelia Muro DO    Addendum by Dr. Fam MD:  Patient seen by me independently including key components of medical care. Face to face evaluation and examination was performed. Case discussed with the resident physician/TATA. Agree with their findings and plan as documented in the resident/TATA's note.   More than 50% of the encounter time involved with patient care by the Pulmonary & Critical care service team spent by me.  I have modified the note above.     Electronically signed by   Dave Otto MD on 3/2/2025 at 3:23 PM

## 2025-03-02 NOTE — CARE COORDINATION
03/02/25 0911   Service Assessment   Patient Orientation Unresponsive  (Sedated with Precedex)   Cognition Other (see comment)  (Unable to assess)   History Provided By Medical Record;Child/Family  (Mother, Radha)   Support Systems Parent   Patient's Healthcare Decision Maker is: Legal Next of Kin   PCP Verified by CM Yes   Last Visit to PCP Within last 3 months   Prior Functional Level Cooking;Assistance with the following:;Housework;Shopping   Current Functional Level Shopping;Housework;Cooking;Assistance with the following:   Can patient return to prior living arrangement Yes   Ability to make needs known: Poor   Family able to assist with home care needs: Yes   Would you like for me to discuss the discharge plan with any other family members/significant others, and if so, who? No   Financial Resources Medicaid   Community Resources None   CM/SW Referral Behavioral management problem   Social/Functional History   Active  No   Patient's  Info Mother, [place] or friends   Occupation On disability   Discharge Planning   Type of Residence House   Living Arrangements Parent   Current Services Prior To Admission None   Potential Assistance Needed (S)  Other (Comment)  (Counseling-Addiction Services to see)   DME Ordered? No   Potential Assistance Purchasing Medications No   Type of Home Care Services None   Patient expects to be discharged to: House   History of falls? 1   Services At/After Discharge   Transition of Care Consult (CM Consult) Discharge Planning   Services At/After Discharge None   Confirm Follow Up Transport Family   Condition of Participation: Discharge Planning   The Plan for Transition of Care is related to the following treatment goals: get off sedation   Freedom of Choice list was provided with basic dialogue that supports the patient's individualized plan of care/goals, treatment preferences, and shares the quality data associated with the providers?  No     Patient

## 2025-03-02 NOTE — PROGRESS NOTES
0920  Attempted addiction consult. Pt seen in bed, resting, eyes closed. Attempted to call pt name numerous times, pt did not wake. After 4th attempt calling pt name, pt opened eyes briefly, did not seem to recognize this writer's presence. Pt then closed eyes again and did not answer this writer. NELDA to re-attempt when pt is more alert.     Of note, it documented at 0915 by Josefina SOTO that pt \"Unresponsive(Sedated with Precedex)\"

## 2025-03-02 NOTE — PROGRESS NOTES
Neurosurgery Progress Note    Patient:  Rivka Zavala      Unit/Bed:3A-05/005-A    YOB: 1970    MRN: [id number]     Acct: [id number]     Admit date: 2/28/2025    Chief Complaint   Patient presents with    Alcohol Intoxication       Patient Seen, Chart, Physician notes, Labs, Radiology studies reviewed.    Subjective: Patient was seen and evaluated on 3A with evaluation exam findings reviewed discussed with Dr. Garcia/neurosurgeon with nursing.  Patient was resting and appeared comfortable this morning with pain well-controlled.        Past, Family, Social History unchanged from admission.    Diet:  Diet NPO    Medications:  Scheduled Meds:   levETIRAcetam  500 mg IntraVENous Q12H    folic acid  1 mg Oral Daily    multivitamin  1 tablet Oral Daily    thiamine  100 mg IntraVENous Daily    vancomycin (VANCOCIN) intermittent dosing (placeholder)   Other RX Placeholder    vancomycin  1,000 mg IntraVENous Q12H    pantoprazole  40 mg IntraVENous Daily    piperacillin-tazobactam  3,375 mg IntraVENous Q8H    nicotine  1 patch TransDERmal Q24H    thiamine (B-1) 500 mg in sodium chloride 0.9 % 100 mL IVPB  500 mg IntraVENous Once    sodium chloride flush  5-40 mL IntraVENous 2 times per day    sodium chloride flush  5-40 mL IntraVENous 2 times per day     Continuous Infusions:   sodium chloride      sodium chloride       PRN Meds:sodium chloride flush, sodium chloride, sodium chloride flush, sodium chloride, potassium chloride **OR** [DISCONTINUED] potassium chloride, magnesium sulfate, ondansetron **OR** ondansetron, polyethylene glycol, acetaminophen **OR** acetaminophen    Objective: Patient remains with some AMS per baseline but is stable with no acute changes noted overnight.  CT scan performed yesterday is stable with 5.5 x 3.3 x 3.1 cm intraparenchymal hemorrhage unchanged from prior imaging.  A repeat CT scan is ordered for tomorrow and will be reviewed as available.    Vitals: BP (!) 146/102

## 2025-03-02 NOTE — PROGRESS NOTES
[facility]   Pharmacy Pharmacokinetic Monitoring Service - Vancomycin    Indication: SSTI  Target Concentration: Goal AUC/STEVEN 400-600 mg*hr/L  Day of Therapy: 2  Additional Antimicrobials: none    Pertinent Laboratory Values:   Wt Readings from Last 1 Encounters:   03/01/25 42.2 kg (93 lb 0.6 oz)     Temp Readings from Last 1 Encounters:   03/02/25 98.2 °F (36.8 °C) (Oral)     Estimated Creatinine Clearance: 143 mL/min (A) (based on SCr of 0.3 mg/dL (L)).  Recent Labs     02/28/25  1421 02/28/25  1923 03/01/25  0547 03/02/25  0400   CREATININE 0.4*   < > 0.4* 0.3*   BUN 4*   < > 4* 8   WBC 8.4  --  12.0*  --     < > = values in this interval not displayed.     Pertinent Cultures:  Date Source Results   3/1/25 Blood x 2 ngtd   3/1/25 MRSA PCR positive   3/1/25 UC Enteric GNB   MRSA Nasal Swab: showed MRSA positive result on 3/1/25    Recent vancomycin administrations                     vancomycin (VANCOCIN) 1,000 mg in sodium chloride 0.9 % 250 mL IVPB ([id number]) (mg) 1,000 mg New Bag 03/02/25 0624     1,000 mg New Bag 03/01/25 1715    vancomycin (VANCOCIN) 1250 mg in sodium chloride 0.9% 250 mL IVPB ()  Restarted 03/01/25 0536     1,250 mg New Bag  0529               Assessment:  Date/Time Current Dose Concentration Timing of Concentration (hr) AUC C trough,ss   3/2/25 1202 1000mg q12h 19.3 ~ 5.5 hrs post dose 504 10.2   Note: Serum concentrations collected for AUC dosing may appear elevated if collected in close proximity to the dose administered, this is not necessarily an indication of toxicity    Plan:  Current dosing regimen is therapeutic  Continue current dose of 1000mg q12h  Repeat vancomycin concentration not ordered at this time  Pharmacy will monitor renal function daily and adjust therapy as indicated.    Thank you for the consult,  Alyssia Whiteside, Pharm.D., BCPS, BCCCP 3/2/2025 2:53 PM

## 2025-03-02 NOTE — PROGRESS NOTES
[facility]  OCCUPATIONAL THERAPY MISSED TREATMENT NOTE  [facility] CCU 3A  3A-05/005-A      Date: 3/2/2025  Patient Name: Rivka Zavala        CSN: [id number]   : 1970  (54 y.o.)  Gender: female                REASON FOR MISSED TREATMENT: OT eval/tx orders received. Pt with strict bedrest orders, and nursing reporting pt has been unable to sustain alertness levels to participate in therapy yet. Will check back as able

## 2025-03-02 NOTE — PROGRESS NOTES
Neurosurgery Progress Note    Patient:  Rivka Zavala      Unit/Bed:3A-05/005-A    YOB: 1970    MRN: [id number]     Acct: [id number]     Admit date: 2/28/2025    Chief Complaint   Patient presents with    Alcohol Intoxication       Patient Seen, Chart, Physician notes, Labs, Radiology studies reviewed.    Subjective: Patient was seen and evaluated on 3A with evaluation exam findings reviewed discussed with Dr. Garcia/neurosurgeon with nursing.  Patient was resting and appeared comfortable this morning with pain well-controlled.        Past, Family, Social History unchanged from admission.    Diet:  Diet NPO    Medications:  Scheduled Meds:   levETIRAcetam  500 mg IntraVENous Q12H    folic acid  1 mg Oral Daily    multivitamin  1 tablet Oral Daily    thiamine  100 mg IntraVENous Daily    vancomycin (VANCOCIN) intermittent dosing (placeholder)   Other RX Placeholder    vancomycin  1,000 mg IntraVENous Q12H    pantoprazole  40 mg IntraVENous Daily    piperacillin-tazobactam  3,375 mg IntraVENous Q8H    nicotine  1 patch TransDERmal Q24H    thiamine (B-1) 500 mg in sodium chloride 0.9 % 100 mL IVPB  500 mg IntraVENous Once    sodium chloride flush  5-40 mL IntraVENous 2 times per day    sodium chloride flush  5-40 mL IntraVENous 2 times per day     Continuous Infusions:   sodium chloride      sodium chloride       PRN Meds:sodium chloride flush, sodium chloride, sodium chloride flush, sodium chloride, potassium chloride **OR** [DISCONTINUED] potassium chloride, magnesium sulfate, ondansetron **OR** ondansetron, polyethylene glycol, acetaminophen **OR** acetaminophen    Objective: Patient remains with some AMS per baseline but is stable with no acute changes noted overnight.  CT scan performed yesterday is stable with 5.5 x 3.3 x 3.1 cm intraparenchymal hemorrhage unchanged from prior imaging.  A repeat CT scan is ordered for tomorrow and will be reviewed as available.    Vitals: BP (!) 146/102    fractures or suspicious osseous lesions.     There is no acute intrathoracic process. **This report has been created using voice recognition software. It may contain minor errors which are inherent in voice recognition technology.** Electronically signed by Dr William Riley                   Assessment: Intraparenchymal hemorrhage of the left basal ganglia measuring 5.5 x 3.3 x 3.1 cm    Principal Problem:    Subdural hematoma (HCC)  Active Problems:    Severe malnutrition    Intraparenchymal hemorrhage of brain (HCC)    Hyponatremia    Acute hypoxic respiratory failure (HCC)  Resolved Problems:    * No resolved hospital problems. *        Plan: Patient is seen and evaluated on 3A with evaluation exam findings reviewed discussed with Dr. Garcia/neurosurgeon with nursing.  Patient is resting comfortably and tolerated extubation but remains with AMS per admission.  CT scan performed yesterday is stable for intraparenchymal hemorrhage measured at 5.5 x 3.3 x 3.1 cm grossly similar to prior imaging.  A repeat CT scan is ordered as a comparison for tomorrow.  Neurosurgery to follow      Electronically signed by Richy Duque PA-C on 3/2/2025 at 12:06 PM    Neurosurgery

## 2025-03-02 NOTE — PROGRESS NOTES
[facility]  SPEECH THERAPY MISSED TREATMENT NOTE  [facility] CCU 3A      Date: 3/2/2025  Patient Name: Rivka Zavala        MRN: [id number]    : 1970  (54 y.o.)    REASON FOR MISSED TREATMENT:  Attempted to see patient for completion of CSE/cognitive evaluation. BRITTANY Aguilar reports patient with lack of sustained alertness levels appropriate for PO intake. 0.3mcg of Precedex active. ST to follow up on sequential date for evaluation completion.     Kaitlynn Guajardo M.A., CCC-SLP [id number]

## 2025-03-03 ENCOUNTER — APPOINTMENT (OUTPATIENT)
Dept: CT IMAGING | Age: 55
End: 2025-03-03
Payer: COMMERCIAL

## 2025-03-03 ENCOUNTER — APPOINTMENT (OUTPATIENT)
Dept: GENERAL RADIOLOGY | Age: 55
End: 2025-03-03
Payer: COMMERCIAL

## 2025-03-03 LAB
% INHIBITION AA: 1.7 %
% INHIBITION ADP: 10.5 %
AA % AGGREGATION: 98.3 %
ADP PERCENT AGGREGATION: 89.5 %
ALBUMIN SERPL BCG-MCNC: 2.9 G/DL (ref 3.4–4.9)
ALP SERPL-CCNC: 113 U/L (ref 35–104)
ALT SERPL W/O P-5'-P-CCNC: 40 U/L (ref 10–35)
ANION GAP SERPL CALC-SCNC: 26 MEQ/L (ref 8–16)
ANION GAP SERPL CALC-SCNC: 29 MEQ/L (ref 8–16)
APTT PPP: 29 SECONDS (ref 22–38)
ARTERIAL PATENCY WRIST A: POSITIVE
ARTERIAL PATENCY WRIST A: POSITIVE
AST SERPL-CCNC: 113 U/L (ref 10–35)
B-OH-BUTYR SERPL-MSCNC: > 46.5 MG/DL (ref 0.2–2.81)
BASE EXCESS BLDA CALC-SCNC: -11.2 MMOL/L (ref -2.5–2.5)
BASE EXCESS BLDA CALC-SCNC: -12.6 MMOL/L (ref -2.5–2.5)
BDY SITE: ABNORMAL
BDY SITE: ABNORMAL
BILIRUB SERPL-MCNC: 0.2 MG/DL (ref 0.3–1.2)
BUN SERPL-MCNC: 13 MG/DL (ref 8–23)
BUN SERPL-MCNC: 13 MG/DL (ref 8–23)
CA-I BLD ISE-SCNC: 1.02 MMOL/L (ref 1.12–1.32)
CA-I BLD ISE-SCNC: 1.12 MMOL/L (ref 1.12–1.32)
CALCIUM SERPL-MCNC: 8 MG/DL (ref 8.6–10)
CALCIUM SERPL-MCNC: 8.1 MG/DL (ref 8.6–10)
CHLORIDE SERPL-SCNC: 106 MEQ/L (ref 98–111)
CHLORIDE SERPL-SCNC: 108 MEQ/L (ref 98–111)
CO2 SERPL-SCNC: 14 MEQ/L (ref 22–29)
CO2 SERPL-SCNC: 9 MEQ/L (ref 22–29)
COLLECTED BY:: ABNORMAL
COLLECTED BY:: ABNORMAL
CREAT SERPL-MCNC: 0.8 MG/DL (ref 0.5–0.9)
CREAT SERPL-MCNC: 0.8 MG/DL (ref 0.5–0.9)
DEVICE: ABNORMAL
DEVICE: ABNORMAL
EKG ATRIAL RATE: 125 BPM
EKG P AXIS: 68 DEGREES
EKG P-R INTERVAL: 126 MS
EKG Q-T INTERVAL: 336 MS
EKG QRS DURATION: 62 MS
EKG QTC CALCULATION (BAZETT): 484 MS
EKG R AXIS: 83 DEGREES
EKG T AXIS: 73 DEGREES
EKG VENTRICULAR RATE: 125 BPM
GFR SERPL CREATININE-BSD FRML MDRD: 87 ML/MIN/1.73M2
GFR SERPL CREATININE-BSD FRML MDRD: 87 ML/MIN/1.73M2
GLUCOSE BLD STRIP.AUTO-MCNC: 101 MG/DL (ref 70–108)
GLUCOSE SERPL-MCNC: 93 MG/DL (ref 74–109)
GLUCOSE SERPL-MCNC: 97 MG/DL (ref 74–109)
HCO3 BLDA-SCNC: 11 MMOL/L (ref 23–28)
HCO3 BLDA-SCNC: 13 MMOL/L (ref 23–28)
INR PPP: 0.85 (ref 0.85–1.13)
LACTATE SERPL-SCNC: 2.3 MMOL/L (ref 0.5–2)
MA AA BLD RES TEG: 67.4 MM (ref 51–71)
MA ACTF BLD RES TEG: 26.2 MM (ref 2–19)
MA ADP BLD RES TEG: 63.7 MM (ref 45–69)
MA KAOLIN P HPASE BLD RES TEG: 68.1 MM (ref 53–68)
MAGNESIUM SERPL-MCNC: 2.2 MG/DL (ref 1.6–2.6)
MAGNESIUM SERPL-MCNC: 2.4 MG/DL (ref 1.6–2.6)
NT-PROBNP SERPL IA-MCNC: 2948 PG/ML (ref 0–124)
NT-PROBNP SERPL IA-MCNC: 5659 PG/ML (ref 0–124)
PCO2 BLDA: 21 MMHG (ref 35–45)
PCO2 BLDA: 22 MMHG (ref 35–45)
PH BLDA: 7.33 [PH] (ref 7.35–7.45)
PH BLDA: 7.36 [PH] (ref 7.35–7.45)
PHOSPHATE SERPL-MCNC: 2.4 MG/DL (ref 2.5–4.5)
PO2 BLDA: 65 MMHG (ref 71–104)
PO2 BLDA: 69 MMHG (ref 71–104)
POTASSIUM SERPL-SCNC: 3.8 MEQ/L (ref 3.5–5.2)
POTASSIUM SERPL-SCNC: 4.5 MEQ/L (ref 3.5–5.2)
PROCALCITONIN SERPL IA-MCNC: 1.37 NG/ML (ref 0.01–0.09)
PROT SERPL-MCNC: 6.1 G/DL (ref 6.4–8.3)
SAO2 % BLDA: 92 %
SAO2 % BLDA: 93 %
SODIUM SERPL-SCNC: 144 MEQ/L (ref 135–145)
SODIUM SERPL-SCNC: 148 MEQ/L (ref 135–145)
TROPONIN, HIGH SENSITIVITY: 16 NG/L (ref 0–12)
VENTILATION MODE VENT: ABNORMAL
VENTILATION MODE VENT: ABNORMAL

## 2025-03-03 PROCEDURE — 31500 INSERT EMERGENCY AIRWAY: CPT

## 2025-03-03 PROCEDURE — 99291 CRITICAL CARE FIRST HOUR: CPT | Performed by: INTERNAL MEDICINE

## 2025-03-03 PROCEDURE — 94002 VENT MGMT INPAT INIT DAY: CPT

## 2025-03-03 PROCEDURE — 36415 COLL VENOUS BLD VENIPUNCTURE: CPT

## 2025-03-03 PROCEDURE — 6360000002 HC RX W HCPCS

## 2025-03-03 PROCEDURE — 2500000003 HC RX 250 WO HCPCS: Performed by: EMERGENCY MEDICINE

## 2025-03-03 PROCEDURE — 6360000002 HC RX W HCPCS: Performed by: STUDENT IN AN ORGANIZED HEALTH CARE EDUCATION/TRAINING PROGRAM

## 2025-03-03 PROCEDURE — 82330 ASSAY OF CALCIUM: CPT

## 2025-03-03 PROCEDURE — 6360000002 HC RX W HCPCS: Performed by: INTERNAL MEDICINE

## 2025-03-03 PROCEDURE — 6360000002 HC RX W HCPCS: Performed by: NURSE PRACTITIONER

## 2025-03-03 PROCEDURE — 71045 X-RAY EXAM CHEST 1 VIEW: CPT

## 2025-03-03 PROCEDURE — 2500000003 HC RX 250 WO HCPCS: Performed by: NURSE PRACTITIONER

## 2025-03-03 PROCEDURE — 6370000000 HC RX 637 (ALT 250 FOR IP)

## 2025-03-03 PROCEDURE — 84145 PROCALCITONIN (PCT): CPT

## 2025-03-03 PROCEDURE — 85730 THROMBOPLASTIN TIME PARTIAL: CPT

## 2025-03-03 PROCEDURE — 80053 COMPREHEN METABOLIC PANEL: CPT

## 2025-03-03 PROCEDURE — 84484 ASSAY OF TROPONIN QUANT: CPT

## 2025-03-03 PROCEDURE — 31500 INSERT EMERGENCY AIRWAY: CPT | Performed by: NURSE PRACTITIONER

## 2025-03-03 PROCEDURE — 70450 CT HEAD/BRAIN W/O DYE: CPT

## 2025-03-03 PROCEDURE — 2580000003 HC RX 258

## 2025-03-03 PROCEDURE — 74018 RADEX ABDOMEN 1 VIEW: CPT

## 2025-03-03 PROCEDURE — 89220 SPUTUM SPECIMEN COLLECTION: CPT

## 2025-03-03 PROCEDURE — 82010 KETONE BODYS QUAN: CPT

## 2025-03-03 PROCEDURE — 83605 ASSAY OF LACTIC ACID: CPT

## 2025-03-03 PROCEDURE — 83880 ASSAY OF NATRIURETIC PEPTIDE: CPT

## 2025-03-03 PROCEDURE — 82948 REAGENT STRIP/BLOOD GLUCOSE: CPT

## 2025-03-03 PROCEDURE — 84100 ASSAY OF PHOSPHORUS: CPT

## 2025-03-03 PROCEDURE — 2580000003 HC RX 258: Performed by: INTERNAL MEDICINE

## 2025-03-03 PROCEDURE — 93005 ELECTROCARDIOGRAM TRACING: CPT | Performed by: NURSE PRACTITIONER

## 2025-03-03 PROCEDURE — 82803 BLOOD GASES ANY COMBINATION: CPT

## 2025-03-03 PROCEDURE — APPSS30 APP SPLIT SHARED TIME 16-30 MINUTES: Performed by: NURSE PRACTITIONER

## 2025-03-03 PROCEDURE — 2100000000 HC CCU R&B

## 2025-03-03 PROCEDURE — 83735 ASSAY OF MAGNESIUM: CPT

## 2025-03-03 PROCEDURE — 2500000003 HC RX 250 WO HCPCS

## 2025-03-03 PROCEDURE — 85576 BLOOD PLATELET AGGREGATION: CPT

## 2025-03-03 PROCEDURE — 85610 PROTHROMBIN TIME: CPT

## 2025-03-03 PROCEDURE — 36600 WITHDRAWAL OF ARTERIAL BLOOD: CPT

## 2025-03-03 RX ORDER — DEXMEDETOMIDINE HYDROCHLORIDE 4 UG/ML
.1-1.5 INJECTION, SOLUTION INTRAVENOUS CONTINUOUS
Status: DISCONTINUED | OUTPATIENT
Start: 2025-03-03 | End: 2025-03-07 | Stop reason: HOSPADM

## 2025-03-03 RX ORDER — THIAMINE HYDROCHLORIDE 100 MG/ML
200 INJECTION, SOLUTION INTRAMUSCULAR; INTRAVENOUS 2 TIMES DAILY
Status: DISCONTINUED | OUTPATIENT
Start: 2025-03-03 | End: 2025-03-03

## 2025-03-03 RX ORDER — MIDAZOLAM HYDROCHLORIDE 1 MG/ML
INJECTION, SOLUTION INTRAMUSCULAR; INTRAVENOUS
Status: COMPLETED | OUTPATIENT
Start: 2025-03-03 | End: 2025-03-03

## 2025-03-03 RX ORDER — FENTANYL CITRATE 50 UG/ML
50 INJECTION, SOLUTION INTRAMUSCULAR; INTRAVENOUS
Status: DISCONTINUED | OUTPATIENT
Start: 2025-03-03 | End: 2025-03-07 | Stop reason: HOSPADM

## 2025-03-03 RX ORDER — PROPOFOL 10 MG/ML
5-50 INJECTION, EMULSION INTRAVENOUS CONTINUOUS
Status: DISCONTINUED | OUTPATIENT
Start: 2025-03-03 | End: 2025-03-06

## 2025-03-03 RX ORDER — FUROSEMIDE 10 MG/ML
40 INJECTION INTRAMUSCULAR; INTRAVENOUS ONCE
Status: COMPLETED | OUTPATIENT
Start: 2025-03-03 | End: 2025-03-03

## 2025-03-03 RX ORDER — MORPHINE SULFATE 2 MG/ML
2 INJECTION, SOLUTION INTRAMUSCULAR; INTRAVENOUS ONCE
Status: COMPLETED | OUTPATIENT
Start: 2025-03-03 | End: 2025-03-03

## 2025-03-03 RX ORDER — PROPOFOL 10 MG/ML
INJECTION, EMULSION INTRAVENOUS
Status: COMPLETED
Start: 2025-03-03 | End: 2025-03-03

## 2025-03-03 RX ORDER — THIAMINE HYDROCHLORIDE 100 MG/ML
200 INJECTION, SOLUTION INTRAMUSCULAR; INTRAVENOUS 3 TIMES DAILY
Status: DISCONTINUED | OUTPATIENT
Start: 2025-03-03 | End: 2025-03-07 | Stop reason: HOSPADM

## 2025-03-03 RX ORDER — ETOMIDATE 2 MG/ML
INJECTION INTRAVENOUS
Status: COMPLETED | OUTPATIENT
Start: 2025-03-03 | End: 2025-03-03

## 2025-03-03 RX ADMIN — THIAMINE HYDROCHLORIDE 100 MG: 100 INJECTION, SOLUTION INTRAMUSCULAR; INTRAVENOUS at 07:30

## 2025-03-03 RX ADMIN — BUMETANIDE 0.5 MG/HR: 0.25 INJECTION INTRAMUSCULAR; INTRAVENOUS at 22:41

## 2025-03-03 RX ADMIN — Medication 1.5 MCG/KG/HR: at 19:49

## 2025-03-03 RX ADMIN — ETOMIDATE 10 MG: 2 INJECTION INTRAVENOUS at 01:33

## 2025-03-03 RX ADMIN — PROPOFOL 5 MCG/KG/MIN: 10 INJECTION, EMULSION INTRAVENOUS at 01:50

## 2025-03-03 RX ADMIN — SODIUM CHLORIDE, PRESERVATIVE FREE 10 ML: 5 INJECTION INTRAVENOUS at 07:46

## 2025-03-03 RX ADMIN — FUROSEMIDE 40 MG: 10 INJECTION, SOLUTION INTRAMUSCULAR; INTRAVENOUS at 01:17

## 2025-03-03 RX ADMIN — SODIUM CHLORIDE, PRESERVATIVE FREE 10 ML: 5 INJECTION INTRAVENOUS at 07:45

## 2025-03-03 RX ADMIN — PROPOFOL 35 MCG/KG/MIN: 10 INJECTION, EMULSION INTRAVENOUS at 20:04

## 2025-03-03 RX ADMIN — Medication 1 TABLET: at 08:42

## 2025-03-03 RX ADMIN — Medication 1.5 MCG/KG/HR: at 12:49

## 2025-03-03 RX ADMIN — PIPERACILLIN AND TAZOBACTAM 3375 MG: 3; .375 INJECTION, POWDER, FOR SOLUTION INTRAVENOUS at 13:17

## 2025-03-03 RX ADMIN — PANTOPRAZOLE SODIUM 40 MG: 40 INJECTION, POWDER, FOR SOLUTION INTRAVENOUS at 07:45

## 2025-03-03 RX ADMIN — FENTANYL CITRATE 50 MCG: 50 INJECTION INTRAMUSCULAR; INTRAVENOUS at 04:58

## 2025-03-03 RX ADMIN — LABETALOL HYDROCHLORIDE 10 MG: 5 INJECTION, SOLUTION INTRAVENOUS at 00:34

## 2025-03-03 RX ADMIN — PIPERACILLIN AND TAZOBACTAM 3375 MG: 3; .375 INJECTION, POWDER, FOR SOLUTION INTRAVENOUS at 20:01

## 2025-03-03 RX ADMIN — FENTANYL CITRATE 50 MCG: 50 INJECTION INTRAMUSCULAR; INTRAVENOUS at 07:43

## 2025-03-03 RX ADMIN — THIAMINE HYDROCHLORIDE 200 MG: 100 INJECTION, SOLUTION INTRAMUSCULAR; INTRAVENOUS at 20:51

## 2025-03-03 RX ADMIN — LEVETIRACETAM 500 MG: 100 INJECTION INTRAVENOUS at 09:35

## 2025-03-03 RX ADMIN — VANCOMYCIN HYDROCHLORIDE 1000 MG: 1 INJECTION, POWDER, LYOPHILIZED, FOR SOLUTION INTRAVENOUS at 04:04

## 2025-03-03 RX ADMIN — MIDAZOLAM 2 MG: 1 INJECTION INTRAMUSCULAR; INTRAVENOUS at 01:31

## 2025-03-03 RX ADMIN — SODIUM CHLORIDE, PRESERVATIVE FREE 10 ML: 5 INJECTION INTRAVENOUS at 19:49

## 2025-03-03 RX ADMIN — PROPOFOL 50 MCG/KG/MIN: 10 INJECTION, EMULSION INTRAVENOUS at 08:40

## 2025-03-03 RX ADMIN — PIPERACILLIN AND TAZOBACTAM 3375 MG: 3; .375 INJECTION, POWDER, FOR SOLUTION INTRAVENOUS at 05:27

## 2025-03-03 RX ADMIN — Medication 0.2 MCG/KG/HR: at 03:30

## 2025-03-03 RX ADMIN — MORPHINE SULFATE 2 MG: 2 INJECTION, SOLUTION INTRAMUSCULAR; INTRAVENOUS at 01:54

## 2025-03-03 RX ADMIN — FOLIC ACID 1 MG: 1 TABLET ORAL at 08:42

## 2025-03-03 RX ADMIN — LEVETIRACETAM 500 MG: 100 INJECTION INTRAVENOUS at 19:50

## 2025-03-03 ASSESSMENT — PAIN SCALES - GENERAL
PAINLEVEL_OUTOF10: 0

## 2025-03-03 ASSESSMENT — PULMONARY FUNCTION TESTS
PIF_VALUE: 22
PIF_VALUE: 13
PIF_VALUE: 14
PIF_VALUE: 15
PIF_VALUE: 18
PIF_VALUE: 20
PIF_VALUE: 15

## 2025-03-03 NOTE — PROGRESS NOTES
[facility]  PHYSICAL THERAPY MISSED TREATMENT NOTE  [facility] CCU 3A    Date: 3/3/2025  Patient Name: Rivka Zavala        MRN: [id number]   : 1970  (54 y.o.)  Gender: female                REASON FOR MISSED TREATMENT:  Hold treatment per nursing request.      Pt just intubated early this AM and RN reports she was not following commands or redirectable prior to being intubated as well as in withdrawal. Not a candidate for early mobility at this time. Will hold and try back as able

## 2025-03-03 NOTE — PROGRESS NOTES
Patient was successfully intubated with a 7.5 ETT at the 23cm romi. Positive color change & bilateral breath sounds noted

## 2025-03-03 NOTE — PROGRESS NOTES
Patient pulled off leads, went into room. Patient states does not feel well, HR noted at 129. 96% on RA, but sounds wheezy. Ordered EKG at time. Notified primary RN

## 2025-03-03 NOTE — PLAN OF CARE
Problem: Discharge Planning  Goal: Discharge to home or other facility with appropriate resources  Outcome: Progressing     Problem: Pain  Goal: Verbalizes/displays adequate comfort level or baseline comfort level  Outcome: Progressing     Problem: Safety - Adult  Goal: Free from fall injury  Outcome: Progressing     Problem: Nutrition Deficit:  Goal: Optimize nutritional status  Outcome: Progressing     Problem: Skin/Tissue Integrity  Goal: Skin integrity remains intact  Description: 1.  Monitor for areas of redness and/or skin breakdown  2.  Assess vascular access sites hourly  3.  Every 4-6 hours minimum:  Change oxygen saturation probe site  4.  Every 4-6 hours:  If on nasal continuous positive airway pressure, respiratory therapy assess nares and determine need for appliance change or resting period  Outcome: Progressing     Problem: Safety - Medical Restraint  Goal: Remains free of injury from restraints (Restraint for Interference with Medical Device)  Description: INTERVENTIONS:  1. Determine that other, less restrictive measures have been tried or would not be effective before applying the restraint  2. Evaluate the patient's condition at the time of restraint application  3. Inform patient/family regarding the reason for restraint  4. Q2H: Monitor safety, psychosocial status, comfort, nutrition and hydration  Outcome: Progressing  Flowsheets (Taken 3/3/2025 0242)  Remains free of injury from restraints (restraint for interference with medical device): Determine that other, less restrictive measures have been tried or would not be effective before applying the restraint

## 2025-03-03 NOTE — PROGRESS NOTES
[facility]  OCCUPATIONAL THERAPY MISSED TREATMENT NOTE  STRZ CCU 3A  3A-05/005-A      Date: 3/3/2025  Patient Name: Rivka Zavala        CSN: [id number]   : 1970  (54 y.o.)  Gender: female                REASON FOR MISSED TREATMENT: Hold Treatment per Nursing due to being intubated early this morning in in ETOH. Is not an early mobility candidate at this time. Will check back next availability.      Naa Sarmiento, OTR/L [id number]

## 2025-03-03 NOTE — PROGRESS NOTES
CRITICAL CARE PROGRESS NOTE      Patient:  Rivka Zavala    Unit/Bed:3A-05/005-A  YOB: 1970  MRN: [id number]   PCP: Ben Montejo MD  Date of Admission: 2/28/2025  Chief Complaint:-AMS    Assessment and Plan:    Acute hypoxic  respiratory failure:  Intubated for airway protection in the ED and extubated 3/1. She did have an SpO2 of 88 and 76 3/1 as well as 89 on 3/2. Patient was tachycardic, hypertensive, tachypneic and had mottling overnight of  3/2.  She was reintubated.  Blood gas showed pCO2 22, pO2 65, bicarb 13. Patient had crackles. Chest x-ray had evidence of pulmonary edema versus mild ARDS. P/F ratio 270. BNP 2948. Ordered repeat BNP. No prior history of heart failure.  Recent echo 3/1 EF 55-60%. No comment on diastolic function. Mild MR and TR. Prominent Chiari network in RA. PFO present. Pt did have good urine output with lasix and no crackles noted on exam today. WT 93 3/1 to 85 lb today. This maybe more of a fluid overload picture.  Left basal ganglia hemorrhage measuring 5.5 x 3.3 x 3.1 cm: Found on CT head on arrival.  Appears to be stable on serial imaging. Neurosurgery following with plan for repeat CT tomorrow.   Encephalopathy secondary to brain bleed. Patient remains encephalopathic.   Seizure-like activity: Patient was noted to have seizure-like activity 3/2.  EEG placed and patient started on Keppra.  HAGMA with respiratory alkalosis compensation. May be secondary to starvation/alcoholic ketoacidosis.  Beta hydroxybutyrate >46.50 3/3. Will recheck lactic acid given her vitals..  Hypertension: Patient was on cardene drip most of her admission. Not on it now as sedative meds helping with HTN. Will add prn IV meds.  Severe hyponatremia, resolved. On arrival,  2/28. Patient has chronic hyponatremia.  Resolved with hypertonic saline.  SHEILA CR 0.3 3/2 => 0.8 3/3.  Suspect secondary to hypervolemia. Vancomycin discontinued as well.  Septic shock secondary to UTI, resolved.

## 2025-03-03 NOTE — PROGRESS NOTES
[facility]  SPEECH THERAPY MISSED TREATMENT NOTE  STRZ CCU 3A      Date: 3/3/2025  Patient Name: Rivka Zavala        MRN: [id number]    : 1970  (54 y.o.)    REASON FOR MISSED TREATMENT:  BRITTANY Aguilar reports patient was re-intubated early this morning. Not appropriate for early mobility at this time. ST to follow up as patient is medically appropriate.     Kaitlynn Guajardo M.A., CCC-SLP [id number]

## 2025-03-03 NOTE — PROGRESS NOTES
0128: Preparing for intubation.  0132: 2mg versed given. Manually bagging patient.   0133: 10 mg etomidate given.  0134: Intubated by David Clarke- 7.5 ETT, 23 @ lip, + color change, bilateral breath sounds  0135: Portable chest xray ordered

## 2025-03-03 NOTE — SIGNIFICANT EVENT
Rivka Zavala is a 54-year-old female presented to [facility] 3/1/2025 with chief complaint of altered mental status.    Patient has past medical history significant for current everyday smoker, MS, personality disorder, anemia, AUD.    She lives with a elderly parents who found her underneath a bed surrounded by beer cans covered in stool.  Alcohol level in the emergency department was negative.  CT scan of the head was performed and showed acute hemorrhage in the left basal ganglia extending to left temporal lobe measuring 4.7 x 3 x 3.7 cm in size.  Moderate surrounding edema and mass effect on the left lateral ventricle.  5.6 mm midline deviation to the right.  Neurosurgery was consulted and she was admitted to the ICU for further management.  Patient was intubated for airway protection.  Patient was successfully extubated to 3 L nasal cannula 3/2/2025 and transferred to stepdown unit.    I was called to evaluate patient.   Patient was tachycardic, hypertensive and tachypneic.  Noted pursed lip breathing.  Patient was mottled.  Positive for random movement of all 4 extremities and not following commands.  Pupils were midline with no nystagmus present.  Audible crackles     Labs pending  ABG pending  Chest x-ray pending

## 2025-03-03 NOTE — PROGRESS NOTES
[place], [place]  NEUROSURGERY PROGRESS NOTE    Rivka Zavala   YOB: 1970  Account Number: [id number]   Date of Examination: 3/3/2025      ASSESSMENT:    Subdural hematoma  Intraparenchymal hemorrhage basal ganglia hemorrhage along the left temporal  Alcohol intoxication history  Malnutrition  Seizure activity        PLAN:   No emergency neurosurgery intervention at this time  Serial head CT scan- stable   Platelet function studies ordered  Every hour neurochecks-contact neurosurgery with changes in exam  Sodium goal 145-150  Osmolarity goals <320  Keep systolic blood pressure 100-160  Keppra 500 mg twice daily x 7 days  No NSAIDs, antiplatelets or anticoagulation at this time  HOB above 30 degrees   SCDs for DVT prophylaxis  Medical management per primary         Rivka Zavala is a 54 y.o. female, admitted on :2/28/2025  1:11 PM  Bambi was seen in the intensive care currently intubated.  She was intubated overnight due to declining respiratory status.  She has undergone a CT of the head which did show acute hemorrhage in the left basal ganglia extending to the left temporal lobe with moderate surrounding edema and mass effect on the left lateral ventricle.   Unable to assess as patient is sedated on ventilator she does respond to noxious stimuli. [facility]          VITALS:    Vitals:    03/03/25 0743 03/03/25 0800 03/03/25 0847 03/03/25 0900   BP:  (!) 99/55 96/63 93/64   Pulse:  (!) 101 100 99   Resp: (!) 34 25 24 23   Temp:    98 °F (36.7 °C)   TempSrc:    Oral   SpO2:  97% 98% 99%   Weight:       Height:           CBC:   Lab Results   Component Value Date/Time    WBC 12.0 03/01/2025 05:47 AM    RBC 3.05 03/01/2025 05:47 AM    HGB 10.2 03/01/2025 05:47 AM    HCT 29.8 03/01/2025 05:47 AM    MCV 97.7 03/01/2025 05:47 AM    RDW 13.5 08/23/2024 10:09 AM     03/01/2025 05:47 AM     BMP:    Lab Results   Component Value Date/Time     03/03/2025 06:24 AM    K 3.8

## 2025-03-03 NOTE — PROGRESS NOTES
Patient Weaning Progress    The patient's vent settings was able to be weaned this shift.      Ventilator settings that were weaned              [x] Mode   [] Pressure support weaned   [] Fio2 weaned   [] Peep weaned             Spontaneous weaning trial  was attempted.       Cuff was not  deflated to determine cuff leak.   *Specific details of weaning located in Ventilator documentation flowsheets*

## 2025-03-03 NOTE — PROGRESS NOTES
Assessment:  I visited Rivka, a 54 year old female admitted on 3A due to Subdural hematoma medical problem. At the time of this encounter, patient is none responsive and unable to engage in conversation. Patient has no family members present at time of this visit. Spiritual Health  is unable to assess patient's emotional and spiritual needs at this time.  will follow up for continue emotional support as needed. Spiritual Health Service remain available to patient and family at this time.

## 2025-03-03 NOTE — PROGRESS NOTES
Patient . and Sinus. Tech in at bedside. Refusing any EKG testing at time. States she feels fine and wants to sleep. Left patient alone per request.

## 2025-03-03 NOTE — PROGRESS NOTES
Comprehensive Nutrition Assessment    Type and Reason for Visit:  Reassess, Consult (Tube Feeding Ordering and Management)    Nutrition Recommendations/Plan:   Start TF - Vital 1.2 at 10 mL/hr, advance 10 mL/hr every 12 hours until goal rate of 30 mL/hr is achieved   Slowly advance d/t high risk for refeeding syndrome; Phos low this AM- recommend replacement  Additional free H2O per provider  Will consider protein modulars once TF tolerance established     Malnutrition Assessment:  Malnutrition Status:  Severe malnutrition (03/01/25 0958)    Context:  Chronic Illness     Findings of the 6 clinical characteristics of malnutrition:  Energy Intake:  75% or less estimated energy requirements for 1 month or longer  Weight Loss:   (-16.5% in 5 months)     Body Fat Loss:  Unable to assess (limited d/t intubation, sedation)     Muscle Mass Loss:  Severe muscle mass loss Calf (gastrocnemius)  Fluid Accumulation:  Unable to assess     Strength:  Not Performed    Nutrition Assessment:     Pt. severely malnourished AEB criteria listed above.  At risk for further nutritional compromise r/t intubation 2/28 - extubated 3/1 - re-intubated 3/3, admit with subdural hematoma, substance abuse and underlying medical condition (hx malnutrition, etoh abuse, MS).     Nutrition Related Findings:    Pt. Report/Treatments/Miscellaneous: Pt seen, intubated - thin in appearance. OK to start TF - spoke with RN. High risk of refeeding syndrome - will monitor closely and advance TF slowly. UO: 950 mL over last 24 hours. Pt with wt loss over last few days - per provider suspecting fluid shifts.  GI Status: BM - 3/1  Pertinent Labs: Na 148, phos 2.4, elevated LFTs  Pertinent Meds: folic acid, MVI, thiamine, keppra, protonix, zosyn, precedex, propofol, fentanyl     Wound Type: None       Current Nutrition Intake & Therapies:    Average Meal Intake: NPO  Average Supplements Intake: NPO  Diet NPO  ADULT TUBE FEEDING; Orogastric; Peptide Based;  Monitoring and Evaluation:      Food/Nutrient Intake Outcomes: Progression of Nutrition, Enteral Nutrition Intake/Tolerance  Physical Signs/Symptoms Outcomes: Biochemical Data, GI Status, Fluid Status or Edema, Nutrition Focused Physical Findings, Skin, Weight, Hemodynamic Status    Discharge Planning:    Too soon to determine     Rashel Horvath RD, LD  Contact: [phone]

## 2025-03-03 NOTE — PROCEDURES
PROCEDURE NOTE  Date: 3/2/2025   Name: Rivka Zavala  YOB: 1970    Procedures  Patient refused to cooperate for EKG. Spoke to Terrell SOTO

## 2025-03-03 NOTE — PROCEDURES
ICU PROCEDURE - ENDOTRACHEAL INTUBATION    Rivka Zavala     MRN#: [id number]  3/3/25      Acct #:[unfilled]     : 1970      INDICATION: Acute respiratory failure, hypoxia      TIME OUT: taken    Permission obtained, risks/benefits reviewed:    ANESTHESIA:   []Ketamine  []Ativan  [] Morphine  []Propofol  [x]Other medications: Versed, etomidate      ESTIMATED BLOOD LOSS:  None.    COMPLICATIONS:  [x]N/A  [] Other:    LARYNGOSCOPIC AIRWAY GRADE (CORMACK-LEHANE):[]1  [x]2a  []2b []3  []4        INTUBATION EQUIPMENT USED:  [x] video laryngoscope only    OUTCOME: Successful placement of # 7.5 Taperguard Evac endotracheal via   [x]Oral route    INSERTION DEPTH: 23    cm from   [x]lip       CONFIRMATION OF TUBE POSITION:   [x]Capnography - Strong & repeatable exhaled CO2 detection   [x]Multiple point auscultation   [x]SpO2 response   [x]STAT X-ray   [x]Bronchoscopic assessment    UNUSUAL FINDINGS:    PROCEDURE:     Using direct laryngoscopy, the vocal cords were visualized and the endotracheal tube was placed through the cords under direct vision.     Good breath sounds were auscultated bilaterally without sounds over abdomen.  Appropriate strong & repeatable exhaled CO2 detection was confirmed.       Electronically signed by MK Montes De Oca CNP on 3/3/2025 at 2:15 AM    I attempted to reach out to parents there was no answer.   Handoff was given to Dr. Ramon/Dr. Rose

## 2025-03-04 ENCOUNTER — APPOINTMENT (OUTPATIENT)
Dept: GENERAL RADIOLOGY | Age: 55
End: 2025-03-04
Payer: COMMERCIAL

## 2025-03-04 ENCOUNTER — APPOINTMENT (OUTPATIENT)
Dept: CT IMAGING | Age: 55
End: 2025-03-04
Payer: COMMERCIAL

## 2025-03-04 LAB
ALBUMIN SERPL BCG-MCNC: 3.3 G/DL (ref 3.4–4.9)
ALP SERPL-CCNC: 125 U/L (ref 35–104)
ALT SERPL W/O P-5'-P-CCNC: 37 U/L (ref 10–35)
ANION GAP SERPL CALC-SCNC: 19 MEQ/L (ref 8–16)
ANION GAP SERPL CALC-SCNC: 23 MEQ/L (ref 8–16)
AST SERPL-CCNC: 82 U/L (ref 10–35)
B PERT DNA NPH QL NAA+PROBE: NOT DETECTED
BACTERIA UR CULT: ABNORMAL
BACTERIA UR CULT: ABNORMAL
BILIRUB SERPL-MCNC: 0.3 MG/DL (ref 0.3–1.2)
BORDETELLA PARAPERTUSSIS BY PCR: NOT DETECTED
BUN SERPL-MCNC: 15 MG/DL (ref 8–23)
BUN SERPL-MCNC: 15 MG/DL (ref 8–23)
C PNEUM DNA SPEC QL NAA+PROBE: NOT DETECTED
CALCIUM SERPL-MCNC: 8.7 MG/DL (ref 8.6–10)
CALCIUM SERPL-MCNC: 8.9 MG/DL (ref 8.6–10)
CHLORIDE SERPL-SCNC: 109 MEQ/L (ref 98–111)
CHLORIDE SERPL-SCNC: 110 MEQ/L (ref 98–111)
CO2 SERPL-SCNC: 20 MEQ/L (ref 22–29)
CO2 SERPL-SCNC: 21 MEQ/L (ref 22–29)
CREAT SERPL-MCNC: 0.9 MG/DL (ref 0.5–0.9)
CREAT SERPL-MCNC: 1 MG/DL (ref 0.5–0.9)
DEPRECATED RDW RBC AUTO: 57.9 FL (ref 35–45)
EKG ATRIAL RATE: 100 BPM
EKG P AXIS: 62 DEGREES
EKG P-R INTERVAL: 122 MS
EKG Q-T INTERVAL: 376 MS
EKG QRS DURATION: 74 MS
EKG QTC CALCULATION (BAZETT): 485 MS
EKG R AXIS: 71 DEGREES
EKG T AXIS: 76 DEGREES
EKG VENTRICULAR RATE: 100 BPM
ERYTHROCYTE [DISTWIDTH] IN BLOOD BY AUTOMATED COUNT: 15 % (ref 11.5–14.5)
FLUAV H1 2009 PAND RNA NPH QL NAA+PROBE: DETECTED
FLUBV RNA NPH QL NAA+PROBE: NOT DETECTED
GFR SERPL CREATININE-BSD FRML MDRD: 67 ML/MIN/1.73M2
GFR SERPL CREATININE-BSD FRML MDRD: 76 ML/MIN/1.73M2
GLUCOSE SERPL-MCNC: 110 MG/DL (ref 74–109)
GLUCOSE SERPL-MCNC: 127 MG/DL (ref 74–109)
HADV DNA NPH QL NAA+PROBE: NOT DETECTED
HCOV 229E RNA SPEC QL NAA+PROBE: NOT DETECTED
HCOV HKU1 RNA SPEC QL NAA+PROBE: NOT DETECTED
HCOV NL63 RNA SPEC QL NAA+PROBE: NOT DETECTED
HCOV OC43 RNA SPEC QL NAA+PROBE: NOT DETECTED
HCT VFR BLD AUTO: 37.9 % (ref 37–47)
HGB BLD-MCNC: 12.1 GM/DL (ref 12–16)
HMPV RNA NPH QL NAA+PROBE: NOT DETECTED
HPIV1 RNA NPH QL NAA+PROBE: NOT DETECTED
HPIV2 RNA NPH QL NAA+PROBE: NOT DETECTED
HPIV3 RNA NPH QL NAA+PROBE: NOT DETECTED
HPIV4 RNA NPH QL NAA+PROBE: NOT DETECTED
LACTATE SERPL-SCNC: 2.3 MMOL/L (ref 0.5–2)
LACTATE SERPL-SCNC: 2.3 MMOL/L (ref 0.5–2)
M PNEUMO DNA SPEC QL NAA+PROBE: NOT DETECTED
MAGNESIUM SERPL-MCNC: 1.9 MG/DL (ref 1.6–2.6)
MCH RBC QN AUTO: 33.1 PG (ref 26–33)
MCHC RBC AUTO-ENTMCNC: 31.9 GM/DL (ref 32.2–35.5)
MCV RBC AUTO: 103.6 FL (ref 81–99)
ORGANISM: ABNORMAL
ORGANISM: ABNORMAL
PHOSPHATE SERPL-MCNC: 1.6 MG/DL (ref 2.5–4.5)
PHOSPHATE SERPL-MCNC: 1.7 MG/DL (ref 2.5–4.5)
PLATELET # BLD AUTO: 220 THOU/MM3 (ref 130–400)
PMV BLD AUTO: 10.1 FL (ref 9.4–12.4)
POTASSIUM SERPL-SCNC: 2.9 MEQ/L (ref 3.5–5.2)
POTASSIUM SERPL-SCNC: 5.1 MEQ/L (ref 3.5–5.2)
PROT SERPL-MCNC: 7.1 G/DL (ref 6.4–8.3)
RBC # BLD AUTO: 3.66 MILL/MM3 (ref 4.2–5.4)
REASON FOR REJECTION: NORMAL
REJECTED TEST: NORMAL
RSV RNA NPH QL NAA+PROBE: NOT DETECTED
RV+EV RNA SPEC QL NAA+PROBE: NOT DETECTED
SARS-COV-2 RNA NPH QL NAA+NON-PROBE: NOT DETECTED
SODIUM SERPL-SCNC: 150 MEQ/L (ref 135–145)
SODIUM SERPL-SCNC: 152 MEQ/L (ref 135–145)
UUN 24H UR-MCNC: 99 MG/DL
WBC # BLD AUTO: 7.5 THOU/MM3 (ref 4.8–10.8)

## 2025-03-04 PROCEDURE — 71045 X-RAY EXAM CHEST 1 VIEW: CPT

## 2025-03-04 PROCEDURE — 95816 EEG AWAKE AND DROWSY: CPT

## 2025-03-04 PROCEDURE — 70450 CT HEAD/BRAIN W/O DYE: CPT

## 2025-03-04 PROCEDURE — 94761 N-INVAS EAR/PLS OXIMETRY MLT: CPT

## 2025-03-04 PROCEDURE — 83935 ASSAY OF URINE OSMOLALITY: CPT

## 2025-03-04 PROCEDURE — 6360000002 HC RX W HCPCS: Performed by: NURSE PRACTITIONER

## 2025-03-04 PROCEDURE — 2700000000 HC OXYGEN THERAPY PER DAY

## 2025-03-04 PROCEDURE — 2500000003 HC RX 250 WO HCPCS

## 2025-03-04 PROCEDURE — 2580000003 HC RX 258

## 2025-03-04 PROCEDURE — 95819 EEG AWAKE AND ASLEEP: CPT | Performed by: PSYCHIATRY & NEUROLOGY

## 2025-03-04 PROCEDURE — 6360000002 HC RX W HCPCS: Performed by: INTERNAL MEDICINE

## 2025-03-04 PROCEDURE — 82570 ASSAY OF URINE CREATININE: CPT

## 2025-03-04 PROCEDURE — 36415 COLL VENOUS BLD VENIPUNCTURE: CPT

## 2025-03-04 PROCEDURE — 6360000002 HC RX W HCPCS: Performed by: STUDENT IN AN ORGANIZED HEALTH CARE EDUCATION/TRAINING PROGRAM

## 2025-03-04 PROCEDURE — 99291 CRITICAL CARE FIRST HOUR: CPT | Performed by: INTERNAL MEDICINE

## 2025-03-04 PROCEDURE — 84300 ASSAY OF URINE SODIUM: CPT

## 2025-03-04 PROCEDURE — 6360000002 HC RX W HCPCS

## 2025-03-04 PROCEDURE — 6370000000 HC RX 637 (ALT 250 FOR IP)

## 2025-03-04 PROCEDURE — 80053 COMPREHEN METABOLIC PANEL: CPT

## 2025-03-04 PROCEDURE — 84100 ASSAY OF PHOSPHORUS: CPT

## 2025-03-04 PROCEDURE — 94003 VENT MGMT INPAT SUBQ DAY: CPT

## 2025-03-04 PROCEDURE — 99291 CRITICAL CARE FIRST HOUR: CPT | Performed by: NURSE PRACTITIONER

## 2025-03-04 PROCEDURE — 2500000003 HC RX 250 WO HCPCS: Performed by: EMERGENCY MEDICINE

## 2025-03-04 PROCEDURE — 36592 COLLECT BLOOD FROM PICC: CPT

## 2025-03-04 PROCEDURE — 82436 ASSAY OF URINE CHLORIDE: CPT

## 2025-03-04 PROCEDURE — 83605 ASSAY OF LACTIC ACID: CPT

## 2025-03-04 PROCEDURE — 93005 ELECTROCARDIOGRAM TRACING: CPT

## 2025-03-04 PROCEDURE — 2580000003 HC RX 258: Performed by: STUDENT IN AN ORGANIZED HEALTH CARE EDUCATION/TRAINING PROGRAM

## 2025-03-04 PROCEDURE — 84133 ASSAY OF URINE POTASSIUM: CPT

## 2025-03-04 PROCEDURE — 85027 COMPLETE CBC AUTOMATED: CPT

## 2025-03-04 PROCEDURE — 2100000000 HC CCU R&B

## 2025-03-04 PROCEDURE — 83930 ASSAY OF BLOOD OSMOLALITY: CPT

## 2025-03-04 PROCEDURE — 2500000003 HC RX 250 WO HCPCS: Performed by: STUDENT IN AN ORGANIZED HEALTH CARE EDUCATION/TRAINING PROGRAM

## 2025-03-04 PROCEDURE — 83735 ASSAY OF MAGNESIUM: CPT

## 2025-03-04 PROCEDURE — 0202U NFCT DS 22 TRGT SARS-COV-2: CPT

## 2025-03-04 PROCEDURE — 84540 ASSAY OF URINE/UREA-N: CPT

## 2025-03-04 PROCEDURE — 2580000003 HC RX 258: Performed by: INTERNAL MEDICINE

## 2025-03-04 RX ORDER — METOLAZONE 5 MG/1
5 TABLET ORAL DAILY
Status: COMPLETED | OUTPATIENT
Start: 2025-03-04 | End: 2025-03-05

## 2025-03-04 RX ORDER — POTASSIUM CHLORIDE 7.45 MG/ML
10 INJECTION INTRAVENOUS
Status: COMPLETED | OUTPATIENT
Start: 2025-03-04 | End: 2025-03-04

## 2025-03-04 RX ORDER — HYDRALAZINE HYDROCHLORIDE 20 MG/ML
10 INJECTION INTRAMUSCULAR; INTRAVENOUS EVERY 8 HOURS PRN
Status: DISCONTINUED | OUTPATIENT
Start: 2025-03-04 | End: 2025-03-07 | Stop reason: HOSPADM

## 2025-03-04 RX ORDER — NOREPINEPHRINE BITARTRATE 0.06 MG/ML
1-100 INJECTION, SOLUTION INTRAVENOUS CONTINUOUS PRN
Status: DISCONTINUED | OUTPATIENT
Start: 2025-03-04 | End: 2025-03-07 | Stop reason: HOSPADM

## 2025-03-04 RX ADMIN — SODIUM CHLORIDE, PRESERVATIVE FREE 5 ML: 5 INJECTION INTRAVENOUS at 09:08

## 2025-03-04 RX ADMIN — METOLAZONE 5 MG: 5 TABLET ORAL at 19:33

## 2025-03-04 RX ADMIN — POTASSIUM CHLORIDE 10 MEQ: 7.45 INJECTION INTRAVENOUS at 12:10

## 2025-03-04 RX ADMIN — PROPOFOL 45 MCG/KG/MIN: 10 INJECTION, EMULSION INTRAVENOUS at 22:52

## 2025-03-04 RX ADMIN — POTASSIUM & SODIUM PHOSPHATES POWDER PACK 280-160-250 MG 250 MG: 280-160-250 PACK at 19:27

## 2025-03-04 RX ADMIN — LEVETIRACETAM 500 MG: 100 INJECTION INTRAVENOUS at 19:33

## 2025-03-04 RX ADMIN — PANTOPRAZOLE SODIUM 40 MG: 40 INJECTION, POWDER, FOR SOLUTION INTRAVENOUS at 08:41

## 2025-03-04 RX ADMIN — Medication 1 TABLET: at 09:03

## 2025-03-04 RX ADMIN — WATER 1000 MG: 1 INJECTION INTRAMUSCULAR; INTRAVENOUS; SUBCUTANEOUS at 19:33

## 2025-03-04 RX ADMIN — THIAMINE HYDROCHLORIDE 200 MG: 100 INJECTION, SOLUTION INTRAMUSCULAR; INTRAVENOUS at 14:58

## 2025-03-04 RX ADMIN — Medication 5 MCG/MIN: at 22:54

## 2025-03-04 RX ADMIN — POTASSIUM CHLORIDE 10 MEQ: 7.45 INJECTION INTRAVENOUS at 14:17

## 2025-03-04 RX ADMIN — ACETAMINOPHEN 650 MG: 650 SUPPOSITORY RECTAL at 19:52

## 2025-03-04 RX ADMIN — Medication 1.5 MCG/KG/HR: at 18:12

## 2025-03-04 RX ADMIN — POTASSIUM CHLORIDE 10 MEQ: 7.45 INJECTION INTRAVENOUS at 13:15

## 2025-03-04 RX ADMIN — PIPERACILLIN AND TAZOBACTAM 3375 MG: 3; .375 INJECTION, POWDER, FOR SOLUTION INTRAVENOUS at 14:45

## 2025-03-04 RX ADMIN — FENTANYL CITRATE 50 MCG: 50 INJECTION INTRAMUSCULAR; INTRAVENOUS at 03:56

## 2025-03-04 RX ADMIN — POTASSIUM PHOSPHATE, MONOBASIC POTASSIUM PHOSPHATE, DIBASIC 20 MMOL: 224; 236 INJECTION, SOLUTION, CONCENTRATE INTRAVENOUS at 22:58

## 2025-03-04 RX ADMIN — PROPOFOL 40 MCG/KG/MIN: 10 INJECTION, EMULSION INTRAVENOUS at 14:01

## 2025-03-04 RX ADMIN — THIAMINE HYDROCHLORIDE 200 MG: 100 INJECTION, SOLUTION INTRAMUSCULAR; INTRAVENOUS at 19:27

## 2025-03-04 RX ADMIN — Medication 1.5 MCG/KG/HR: at 02:05

## 2025-03-04 RX ADMIN — THIAMINE HYDROCHLORIDE 200 MG: 100 INJECTION, SOLUTION INTRAMUSCULAR; INTRAVENOUS at 09:03

## 2025-03-04 RX ADMIN — Medication 1.5 MCG/KG/HR: at 10:45

## 2025-03-04 RX ADMIN — LEVETIRACETAM 500 MG: 100 INJECTION INTRAVENOUS at 08:41

## 2025-03-04 RX ADMIN — SODIUM CHLORIDE, PRESERVATIVE FREE 10 ML: 5 INJECTION INTRAVENOUS at 19:27

## 2025-03-04 RX ADMIN — FOLIC ACID 1 MG: 1 TABLET ORAL at 09:03

## 2025-03-04 RX ADMIN — POTASSIUM & SODIUM PHOSPHATES POWDER PACK 280-160-250 MG 250 MG: 280-160-250 PACK at 09:03

## 2025-03-04 RX ADMIN — BUMETANIDE 0.5 MG/HR: 0.25 INJECTION INTRAMUSCULAR; INTRAVENOUS at 20:37

## 2025-03-04 RX ADMIN — PROPOFOL 40 MCG/KG/MIN: 10 INJECTION, EMULSION INTRAVENOUS at 04:03

## 2025-03-04 RX ADMIN — POTASSIUM CHLORIDE 10 MEQ: 7.45 INJECTION INTRAVENOUS at 11:06

## 2025-03-04 RX ADMIN — PIPERACILLIN AND TAZOBACTAM 3375 MG: 3; .375 INJECTION, POWDER, FOR SOLUTION INTRAVENOUS at 05:31

## 2025-03-04 RX ADMIN — POTASSIUM CHLORIDE 10 MEQ: 7.45 INJECTION INTRAVENOUS at 08:41

## 2025-03-04 RX ADMIN — POTASSIUM CHLORIDE 10 MEQ: 7.45 INJECTION INTRAVENOUS at 10:03

## 2025-03-04 ASSESSMENT — PULMONARY FUNCTION TESTS
PIF_VALUE: 17
PIF_VALUE: 16
PIF_VALUE: 19
PIF_VALUE: 16
PIF_VALUE: 20
PIF_VALUE: 15

## 2025-03-04 ASSESSMENT — PAIN SCALES - GENERAL: PAINLEVEL_OUTOF10: 0

## 2025-03-04 NOTE — PROGRESS NOTES
[place], [place]  NEUROSURGERY PROGRESS NOTE    Rivka Zavala   YOB: 1970  Account Number: [id number]   Date of Examination: 3/4/2025      ASSESSMENT:    Subdural hematoma  Intraparenchymal hemorrhage basal ganglia hemorrhage along the left temporal  Alcohol intoxication history  Malnutrition  Seizure activity        PLAN:   No emergency neurosurgery intervention at this time  Serial head CT scan- stable   Platelet function studies ordered  Every hour neurochecks-contact neurosurgery with changes in exam  Sodium goal 145-150  Osmolarity goals <320  Keep systolic blood pressure 100-160  Keppra 500 mg twice daily x 7 days  No NSAIDs, antiplatelets or anticoagulation at this time  HOB above 30 degrees   SCDs for DVT prophylaxis  Medical management per primary maximize medical treatment        Rivka Zavala is a 54 y.o. female, admitted on :2/28/2025  1:11 PM  Bambi was seen in the intensive care currently intubated.  She was intubated overnight due to declining respiratory status.  She has undergone a CT of the head which did show acute hemorrhage in the left basal ganglia extending to the left temporal lobe with moderate surrounding edema and mass effect on the left lateral ventricle.   Unable to assess as patient is sedated on ventilator she does respond to noxious stimuli. [facility].    03/5/25  Patient continues to be sedated on ventilator in which she does continue to respond to noxious stimuli. [facility].  30-minute EEG completed this morning.  Repeat head CT is pending with a repeat CT yesterday showing mild improvement slightly smaller hematoma compared to prior CT on 3/2/2025.          VITALS:    Vitals:    03/04/25 0500 03/04/25 0530 03/04/25 0600 03/04/25 0630   BP: (!) 172/103 (!) 159/99 (!) 141/88 (!) 161/99   Pulse: 86 89 86 87   Resp: 14 28 18 18   Temp:       TempSrc:       SpO2: 100% 100% 100% 100%   Weight:       Height:           CBC:   Lab Results   Component Value  tablet 1 mg  1 mg Oral Daily Yosi Cr DO   1 mg at 03/03/25 0842    multivitamin 1 tablet  1 tablet Oral Daily Yosi Cr DO   1 tablet at 03/03/25 0842    pantoprazole (PROTONIX) injection 40 mg  40 mg IntraVENous Daily Yosi Cr DO   40 mg at 03/03/25 0745    thiamine (B-1) 500 mg in sodium chloride 0.9 % 100 mL IVPB  500 mg IntraVENous Once Pako Dwyer DO        sodium chloride flush 0.9 % injection 5-40 mL  5-40 mL IntraVENous 2 times per day Pako Dwyer, DO   10 mL at 03/03/25 0746    sodium chloride flush 0.9 % injection 5-40 mL  5-40 mL IntraVENous PRN Pako Dwyer DO        0.9 % sodium chloride infusion   IntraVENous PRN Pako Dwyer DO        sodium chloride flush 0.9 % injection 5-40 mL  5-40 mL IntraVENous 2 times per day Yosi Cr DO   10 mL at 03/03/25 1949    sodium chloride flush 0.9 % injection 5-40 mL  5-40 mL IntraVENous PRN Yosi Cr DO        0.9 % sodium chloride infusion   IntraVENous PRN Yosi Cr DO        potassium chloride 20 mEq/50 mL IVPB (Central Line)  20 mEq IntraVENous PRN Yosi Cr DO   Stopped at 03/02/25 0833    magnesium sulfate 2000 mg in 50 mL IVPB premix  2,000 mg IntraVENous PRN Yosi Cr R, DO        ondansetron (ZOFRAN-ODT) disintegrating tablet 4 mg  4 mg Oral Q8H PRN Yosi Cr DO        Or    ondansetron (ZOFRAN) injection 4 mg  4 mg IntraVENous Q6H PRN Yosi Cr R, DO        polyethylene glycol (GLYCOLAX) packet 17 g  17 g Oral Daily PRN Yosi Cr DO        acetaminophen (TYLENOL) tablet 650 mg  650 mg Oral Q6H PRN Yosi Cr DO        Or    acetaminophen (TYLENOL) suppository 650 mg  650 mg Rectal Q6H PRN Yosi Cr, DO   650 mg at 03/01/25 0607        hydrALAZINE, 10 mg, Q8H PRN  fentanNYL, 50 mcg, Q2H PRN  sodium chloride flush, 5-40 mL, PRN  sodium chloride, , PRN  sodium chloride flush, 5-40 mL, PRN  sodium chloride, , PRN  potassium chloride, 20 mEq,

## 2025-03-04 NOTE — PROGRESS NOTES
Patient Weaning Progress    The patient's vent settings was able to be weaned this shift.      Ventilator settings that were weaned              [x] Mode   [] Pressure support weaned   [] Fio2 weaned   [] Peep weaned      Spontaneous weaning trial  was attempted.     The patient was able to tolerate SBT.   RSBI  was 51.4 with EtCO2 of 26 and SpO2 of 100 on 25% FiO2.  Spontanteous VT was 428 and RR 22 breaths/min.      Evac tube was  hooked up with continuous low suction(20-30mmHg)      Cuff was  deflated to determine cuff leak. A leak  was detected.   Patient mutually agreed on goals.    Family mutually agreed on goals.    Unable to get agreement for goals because no family is present and patient cannot respond.

## 2025-03-04 NOTE — PROGRESS NOTES
Smoking.    ROS   Unable to obtain as patient is encephalopathic    Scheduled Meds:   thiamine  200 mg IntraVENous TID    levETIRAcetam  500 mg IntraVENous Q12H    folic acid  1 mg Oral Daily    multivitamin  1 tablet Oral Daily    pantoprazole  40 mg IntraVENous Daily    piperacillin-tazobactam  3,375 mg IntraVENous Q8H    thiamine (B-1) 500 mg in sodium chloride 0.9 % 100 mL IVPB  500 mg IntraVENous Once    sodium chloride flush  5-40 mL IntraVENous 2 times per day    sodium chloride flush  5-40 mL IntraVENous 2 times per day     Continuous Infusions:   propofol 40 mcg/kg/min (03/04/25 0403)    niCARdipine Stopped (03/03/25 0352)    dexmedeTOMIDine 1.5 mcg/kg/hr (03/04/25 0315)    bumetanide (BUMEX) 12.5 mg in sodium chloride 0.9 % 125 mL infusion 0.5 mg/hr (03/04/25 0315)    sodium chloride      sodium chloride         PHYSICAL EXAMINATION:  T:  07.7.  P:  121. RR:  18. B/P:  141/88.  On vent 12/6. RR18. . FiO2 25%.  Body mass index is 14.61 kg/m².   GCS:   14  General:   Acutely ill. Cachectic.  HEENT:  normocephalic and atraumatic.  No scleral icterus. PERR  Neck: supple.  No Thyromegaly.  Lungs: clear to auscultation.  No retractions  Cardiac: RRR.  No JVD.  Abdomen: soft.  Nontender.  Extremities:  No clubbing, cyanosis, or edema x 4.    Vasculature: capillary refill < 3 seconds. Palpable dorsalis pedis pulses.  Skin:  warm and dry.  Psych: Encephalopathic  Lymph:  No supraclavicular adenopathy.  Neurologic:  No focal deficit. No seizures.    Data: (All radiographs, tracings, PFTs, and imaging are personally viewed and interpreted unless otherwise noted).   BMP , K 3.8 => 2.9, , bicarb 20, BUN 15, CR 1, Mg 2.4, Glu 110.  Hepatic panel albumin 3.3, alk phos 125, ALT 37, AST 82  INR 0.85  PTT 29  Lactic acid 2.3 3/3  BNP 2948 => 5659 3/3  Beta hydroxybutyrate >46.5  3/3 urine culture positive for E. Coli  Telemetry showed NSR with rate of 90    Meets Continued ICU Level Care Criteria:    [x]  Yes   [] No - Transfer   [] HOSPITALIST CONTACTED-      Case and plan discussed with Dr. Frazier.    Electronically signed by Ophelia Muro DO  Patient seen by me including key components of medical care.  Case discussed with resident physician.  Transition to Rocephin.  WOB precludes extubation.  Italicized bold font, if present,  represents changes to the note made by me.  CC time 35 minutes.  Time was discontiguous. Time does not include procedure. Time does include my direct assessment of the patient and coordination of care.  Time represents more than 50% of the time involved with patient care by the CC team.  Electronically signed by Rusty Frazier MD.

## 2025-03-04 NOTE — PROGRESS NOTES
During my encounter with the 54  yr old patient, I attempted to visit with the pt on 3A. The patient appears to be resting (not responsive/resting) now and I didn't want to disturb the patient. I or another  will attempt to visit the patient or the family at another time. The pt was admitted due to subdural hematoma.

## 2025-03-04 NOTE — PROCEDURES
PROCEDURE NOTE  Date: 3/4/2025   Name: Rivka Zavala  YOB: 1970    Procedures            Date: 3/4/2025  Referring physician: Dr. Freddie Talley  Patient aged 54 y with encephalopathy. EEG done to assess for epileptiform activity.    Introduction  This routine 20-minute EEG was recorded using the International 10-20 System on a fitkit workstation at 256 samples/s. Automated spike and seizure detection algorithms were applied.    Description  The background consistent of diffuse none to minimally reactive polymorphic delta and theta slowing. No consistent focal slowing or interhemispheric asymmetry was noted. Stage I and stage II sleep were observed. There were no interictal epileptiform discharges or electrographic seizures.    Activations  Hyperventilation was not performed. Intermittent photic stimulation was performed and demonstrated no posterior driving response.    Impression  Abnormal awake EEG. The slowing mentioned above suggests mild-moderate non specific encephalopathy.     No epileptiform discharges were identified. Please note the absence of such activity on this record cannot conclusively rule out an epileptic disorder. If such is still clinically suspected, a repeat study with sleep deprivation and/or prolonged sampling may be helpful.    Hina Medrano MD  Epilepsy Board Certified.  Neurology Board Certified.    Electronically Signed

## 2025-03-04 NOTE — PROGRESS NOTES
[facility]  SPEECH THERAPY MISSED TREATMENT NOTE  [facility] CCU 3A      Date: 3/4/2025  Patient Name: Rivka Zavala        MRN: [id number]    : 1970  (54 y.o.)    REASON FOR MISSED TREATMENT:  Attempted to see patient for completion of early mobility evaluation. BRITTANY Tineo reports patient is not appropriate d/t lack of alertness and command following. ST to follow up as patient is available/appropriate.     Kaitlynn Guajardo M.A., CCC-SLP [id number]

## 2025-03-04 NOTE — CARE COORDINATION
Case Management Assessment Initial Evaluation    Date/Time of Evaluation: 3/4/2025 3:29 PM  Assessment Completed by: Heather Vásquez RN    If patient is discharged prior to next notation, then this note serves as note for discharge by case management.    Patient Name: Rivka Zavala                   YOB: 1970  Diagnosis: Hyponatremia [E87.1]  Subdural hematoma (HCC) [S06.5XAA]  Intraparenchymal hemorrhage of brain (HCC) [I61.9]  Jeremiah coma scale total score 3-8, unspecified coma timing (HCC) [R40.2430]  Acute hypoxic respiratory failure (HCC) [J96.01]                   Date / Time: 2/28/2025  1:11 PM  Location: [address] [place] [address]     Patient Admission Status: Inpatient   Readmission Risk Low 0-14, Mod 15-19), High > 20: Readmission Risk Score: 15.2    Current PCP: Ben Montejo MD  Health Care Decision Makers:   Primary Decision Maker: Chauncey Valles - Parent - [phone]    Primary Decision Maker: Radha Valles - Parent - [phone]    Additional Case Management Notes: Presented to ED for AMS after parents found her under a bed with beer cars around her and patient was covered in stool. Na+ 124. Found to have Left basal ganglia to left temporal SDH with edema and mass effect on left ventricle with 5.6mm shift. Hypertonic bolus given in ED. Intubated for airway protection. Neurosurgery consulted. TXA given. Admitted to ICU. Pt was extubated on 3/1. Required reintubation on 3/3 d/t respiratory distress. EEG today was neg for seizures; suggests mild-mod encephalopathy.    Remains on vent w/ETT on SBT, FIO2 25%, sats 100%. Tmax 100.2. -120's. Unable to follow commands, CROWDER x4 to painful stim. SLP/PT/OT. Dietitian and Addiction Services consulted. Intensivist and Neurosurgery following. Telemetry, seizure precautions, OG w/TF, malia, MOISESs. Bumex @ 0.5 mg/hr, precedex @ 1.5 mcg/kg/hr, diprivan @ 40 mcg/kgmin, folic acid, IV keppra, multivitamin, IV protonix, IV zosyn Q8H, phos nak, IV

## 2025-03-04 NOTE — PROGRESS NOTES
[facility]  Neurodiagnostic Laboratory Technician Worksheet  [facility] CCU 3A  EEG Date: 3/4/2025    Name: Rivka Zavala  : 1970  Age: 54 y.o.  Sex: female  MRN: [id number]  CSN: [id number]    Ordering Provider: HILARIA Ramon       EEG Number: [id number]    Time In: Time In: 918 (3/4/25)  Time Out: Time Out: 949 (3/4/2025)  Total Treatment Time: Minutes: 31    Clinical History: patient has a Left basal ganglia hemorrhage. Was seen having 1-2 mins of shaking, concerned for seizure activity.  CT of the head 3/3/2025  IMPRESSION:     1. Acute hematoma in the left basal ganglia and left temporal lobe slightly  smaller than on previous study dated 3/2/2025.   Past Medical History:       Diagnosis Date    Anemia     Clavicle fracture 10/13/2022    ETOH abuse     Malnutrition     Mild tetrahydrocannabinol (THC) abuse     Multiple sclerosis (HCC) 2017    Personality disorder (HCC) unknown    Rib fracture 10/13/2022    Smoker        Medications:   Prior to Admission medications    Medication Sig Start Date End Date Taking? Authorizing Provider   hydrOXYzine HCl (ATARAX) 25 MG tablet Take 1 tablet by mouth every 8 hours as needed for Itching 2/24/25 3/6/25  Ben Montejo MD   gabapentin (NEURONTIN) 300 MG capsule Take 1 capsule by mouth nightly for 90 days. 25  Ben Montejo MD   aspirin-acetaminophen-caffeine (EXCEDRIN MIGRAINE) 250-250-65 MG per tablet Take 1 tablet by mouth every 6 hours as needed for Headaches  Patient not taking: Reported on 11/15/2024    Provider, MD Jess   ferrous sulfate (IRON 325) 325 (65 Fe) MG tablet Take 1 tablet by mouth daily 24   Haily Person PA-C   folic acid (FOLVITE) 1 MG tablet Take 1 tablet by mouth daily 24   Haily Person PA-C       Hand Dominance: Unknown/ intubated   Sedation: Yes, Propofol   H.V. Completed: No, on vent   Photic: Yes   Sleep: No   Drowsy: Yes   Sleep Deprived: No   Seizures Observed: No   Mentality:       Technician: Alycia Middleton 3/4/2025

## 2025-03-04 NOTE — PROGRESS NOTES
[facility]  PHYSICAL THERAPY MISSED TREATMENT NOTE  [facility] CCU 3A    Date: 3/4/2025  Patient Name: Rivka Zavala        MRN: [id number]   : 1970  (54 y.o.)  Gender: female                REASON FOR MISSED TREATMENT:  Hold treatment per nursing request.      Hold Treatment per Nursing as patient is not following commands and not an early mobility candidate at this time. Will attempt next availability and as patient medically appropriate.

## 2025-03-04 NOTE — PLAN OF CARE
Problem: Discharge Planning  Goal: Discharge to home or other facility with appropriate resources  Outcome: Progressing  Flowsheets (Taken 3/3/2025 2030)  Discharge to home or other facility with appropriate resources: Identify barriers to discharge with patient and caregiver     Problem: Pain  Goal: Verbalizes/displays adequate comfort level or baseline comfort level  Outcome: Progressing  Flowsheets (Taken 3/3/2025 2015)  Verbalizes/displays adequate comfort level or baseline comfort level: Encourage patient to monitor pain and request assistance     Problem: Safety - Adult  Goal: Free from fall injury  Outcome: Progressing     Problem: Nutrition Deficit:  Goal: Optimize nutritional status  Outcome: Progressing     Problem: Skin/Tissue Integrity  Goal: Skin integrity remains intact  Description: 1.  Monitor for areas of redness and/or skin breakdown  2.  Assess vascular access sites hourly  3.  Every 4-6 hours minimum:  Change oxygen saturation probe site  4.  Every 4-6 hours:  If on nasal continuous positive airway pressure, respiratory therapy assess nares and determine need for appliance change or resting period  Outcome: Progressing  Flowsheets (Taken 3/3/2025 2030)  Skin Integrity Remains Intact: Monitor for areas of redness and/or skin breakdown     Problem: Safety - Medical Restraint  Goal: Remains free of injury from restraints (Restraint for Interference with Medical Device)  Description: INTERVENTIONS:  1. Determine that other, less restrictive measures have been tried or would not be effective before applying the restraint  2. Evaluate the patient's condition at the time of restraint application  3. Inform patient/family regarding the reason for restraint  4. Q2H: Monitor safety, psychosocial status, comfort, nutrition and hydration  Outcome: Progressing  Flowsheets (Taken 3/3/2025 1600 by Laura Dow RN)  Remains free of injury from restraints (restraint for interference with medical device):  Determine that other, less restrictive measures have been tried or would not be effective before applying the restraint

## 2025-03-04 NOTE — PROGRESS NOTES
[facility]  OCCUPATIONAL THERAPY MISSED TREATMENT NOTE  [facility]Z CCU 3A  3A-05/005-A      Date: 3/4/2025  Patient Name: Rivka Zavala        CSN: [id number]   : 1970  (54 y.o.)  Gender: female                REASON FOR MISSED TREATMENT: Hold Treatment per Nursing as patient is not following commands and not an early mobility candidate at this time. Will attempt next availability and as patient medically appropriate.       Naa Sarmiento, OTR/L [id number]

## 2025-03-05 ENCOUNTER — APPOINTMENT (OUTPATIENT)
Dept: ULTRASOUND IMAGING | Age: 55
End: 2025-03-05
Payer: COMMERCIAL

## 2025-03-05 LAB
ALBUMIN SERPL BCG-MCNC: 3.3 G/DL (ref 3.4–4.9)
ALP SERPL-CCNC: 124 U/L (ref 35–104)
ALT SERPL W/O P-5'-P-CCNC: 25 U/L (ref 10–35)
ANION GAP SERPL CALC-SCNC: 22 MEQ/L (ref 8–16)
ANION GAP SERPL CALC-SCNC: 22 MEQ/L (ref 8–16)
ARTERIAL PATENCY WRIST A: POSITIVE
AST SERPL-CCNC: 60 U/L (ref 10–35)
BACTERIA BLD AEROBE CULT: NORMAL
BACTERIA BLD AEROBE CULT: NORMAL
BACTERIA: NORMAL
BASE EXCESS BLDA CALC-SCNC: 6.1 MMOL/L (ref -2.5–2.5)
BASOPHILS ABSOLUTE: 0 THOU/MM3 (ref 0–0.1)
BASOPHILS NFR BLD AUTO: 0.3 %
BDY SITE: ABNORMAL
BILIRUB SERPL-MCNC: 0.3 MG/DL (ref 0.3–1.2)
BILIRUB UR QL STRIP: NEGATIVE
BUN SERPL-MCNC: 15 MG/DL (ref 8–23)
BUN SERPL-MCNC: 16 MG/DL (ref 8–23)
CALCIUM SERPL-MCNC: 8.3 MG/DL (ref 8.6–10)
CALCIUM SERPL-MCNC: 8.3 MG/DL (ref 8.6–10)
CASTS #/AREA URNS LPF: NORMAL /LPF
CASTS #/AREA URNS LPF: NORMAL /LPF
CHARACTER UR: CLEAR
CHARCOAL URNS QL MICRO: NORMAL
CHLORIDE 24H UR-SRATE: 130 MEQ/L
CHLORIDE 24H UR-SRATE: 148 MEQ/L
CHLORIDE SERPL-SCNC: 108 MEQ/L (ref 98–111)
CHLORIDE SERPL-SCNC: 109 MEQ/L (ref 98–111)
CO2 SERPL-SCNC: 23 MEQ/L (ref 22–29)
CO2 SERPL-SCNC: 24 MEQ/L (ref 22–29)
COLLECTED BY:: ABNORMAL
COLOR UR: YELLOW
CREAT SERPL-MCNC: 1 MG/DL (ref 0.5–0.9)
CREAT SERPL-MCNC: 1 MG/DL (ref 0.5–0.9)
CREAT UR-MCNC: 24.3 MG/DL
CRYSTALS URNS QL MICRO: NORMAL
DEPRECATED RDW RBC AUTO: 55.5 FL (ref 35–45)
DEVICE: ABNORMAL
EKG ATRIAL RATE: 117 BPM
EKG ATRIAL RATE: 177 BPM
EKG P AXIS: 72 DEGREES
EKG P-R INTERVAL: 100 MS
EKG P-R INTERVAL: 114 MS
EKG Q-T INTERVAL: 220 MS
EKG Q-T INTERVAL: 362 MS
EKG QRS DURATION: 66 MS
EKG QRS DURATION: 70 MS
EKG QTC CALCULATION (BAZETT): 377 MS
EKG QTC CALCULATION (BAZETT): 504 MS
EKG R AXIS: 64 DEGREES
EKG R AXIS: 73 DEGREES
EKG T AXIS: -115 DEGREES
EKG T AXIS: 71 DEGREES
EKG VENTRICULAR RATE: 117 BPM
EKG VENTRICULAR RATE: 177 BPM
EOSINOPHIL NFR BLD AUTO: 0.2 %
EOSINOPHIL SMEAR, URINE: NORMAL
EOSINOPHILS ABSOLUTE: 0 THOU/MM3 (ref 0–0.4)
EPITHELIAL CELLS, UA: NORMAL /HPF
ERYTHROCYTE [DISTWIDTH] IN BLOOD BY AUTOMATED COUNT: 15.1 % (ref 11.5–14.5)
FIO2 ON VENT O2 ANALYZER: 4 %
GFR SERPL CREATININE-BSD FRML MDRD: 67 ML/MIN/1.73M2
GFR SERPL CREATININE-BSD FRML MDRD: 67 ML/MIN/1.73M2
GLUCOSE SERPL-MCNC: 136 MG/DL (ref 74–109)
GLUCOSE SERPL-MCNC: 149 MG/DL (ref 74–109)
GLUCOSE UR QL STRIP.AUTO: NEGATIVE MG/DL
HCO3 BLDA-SCNC: 27 MMOL/L (ref 23–28)
HCT VFR BLD AUTO: 36.6 % (ref 37–47)
HGB BLD-MCNC: 12 GM/DL (ref 12–16)
HGB UR QL STRIP.AUTO: NEGATIVE
HYPOCHROMIA BLD QL SMEAR: PRESENT
IMM GRANULOCYTES # BLD AUTO: 0.26 THOU/MM3 (ref 0–0.07)
IMM GRANULOCYTES NFR BLD AUTO: 2.7 %
KETONES UR QL STRIP.AUTO: NEGATIVE
LACTATE SERPL-SCNC: 1.7 MMOL/L (ref 0.5–2)
LEUKOCYTE ESTERASE UR QL STRIP.AUTO: NEGATIVE
LYMPHOCYTES ABSOLUTE: 2.5 THOU/MM3 (ref 1–4.8)
LYMPHOCYTES NFR BLD AUTO: 26.8 %
MCH RBC QN AUTO: 33 PG (ref 26–33)
MCHC RBC AUTO-ENTMCNC: 32.8 GM/DL (ref 32.2–35.5)
MCV RBC AUTO: 100.5 FL (ref 81–99)
MONOCYTES ABSOLUTE: 1.3 THOU/MM3 (ref 0.4–1.3)
MONOCYTES NFR BLD AUTO: 14.2 %
NEUTROPHILS ABSOLUTE: 5.3 THOU/MM3 (ref 1.8–7.7)
NEUTROPHILS NFR BLD AUTO: 55.8 %
NITRITE UR QL STRIP.AUTO: NEGATIVE
NRBC BLD AUTO-RTO: 0 /100 WBC
NT-PROBNP SERPL IA-MCNC: 5772 PG/ML (ref 0–124)
OSMOLALITY SERPL: 334 MOSMOL/KG (ref 275–295)
OSMOLALITY UR: 335 MOSMOL/KG (ref 250–750)
OSMOLALITY UR: 407 MOSMOL/KG (ref 250–750)
PCO2 BLDA: 25 MMHG (ref 35–45)
PH BLDA: 7.63 [PH] (ref 7.35–7.45)
PH UR STRIP.AUTO: 5.5 [PH] (ref 5–9)
PHOSPHATE SERPL-MCNC: 4.1 MG/DL (ref 2.5–4.5)
PLATELET # BLD AUTO: 234 THOU/MM3 (ref 130–400)
PLATELET BLD QL SMEAR: ADEQUATE
PMV BLD AUTO: 10.5 FL (ref 9.4–12.4)
PO2 BLDA: 54 MMHG (ref 71–104)
POTASSIUM SERPL-SCNC: 3.1 MEQ/L (ref 3.5–5.2)
POTASSIUM SERPL-SCNC: 4.6 MEQ/L (ref 3.5–5.2)
POTASSIUM UR-SCNC: 39 MEQ/L
POTASSIUM UR-SCNC: 44.2 MEQ/L
PROCALCITONIN SERPL IA-MCNC: 0.42 NG/ML (ref 0.01–0.09)
PROT SERPL-MCNC: 6.8 G/DL (ref 6.4–8.3)
PROT UR STRIP.AUTO-MCNC: NEGATIVE MG/DL
RBC # BLD AUTO: 3.64 MILL/MM3 (ref 4.2–5.4)
RBC #/AREA URNS HPF: NORMAL /HPF
RENAL EPI CELLS #/AREA URNS HPF: NORMAL /[HPF]
SAO2 % BLDA: 94 %
SCAN OF BLOOD SMEAR: NORMAL
SODIUM SERPL-SCNC: 153 MEQ/L (ref 135–145)
SODIUM SERPL-SCNC: 155 MEQ/L (ref 135–145)
SODIUM UR-SCNC: 114 MEQ/L
SODIUM UR-SCNC: 92 MEQ/L
SPECIFIC GRAVITY UA: 1.01 (ref 1–1.03)
STOMATOCYTES: ABNORMAL
TARGETS BLD QL SMEAR: ABNORMAL
TROPONIN, HIGH SENSITIVITY: 14 NG/L (ref 0–12)
UROBILINOGEN, URINE: 0.2 EU/DL (ref 0–1)
UUN 24H UR-MCNC: 114 MG/DL
UUN 24H UR-MCNC: 181 MG/DL
VARIANT LYMPHS BLD QL SMEAR: ABNORMAL %
VENTILATION MODE VENT: ABNORMAL
WBC # BLD AUTO: 9.5 THOU/MM3 (ref 4.8–10.8)
WBC #/AREA URNS HPF: NORMAL /HPF
YEAST LIKE FUNGI URNS QL MICRO: NORMAL

## 2025-03-05 PROCEDURE — 2100000000 HC CCU R&B

## 2025-03-05 PROCEDURE — 36556 INSERT NON-TUNNEL CV CATH: CPT

## 2025-03-05 PROCEDURE — 84145 PROCALCITONIN (PCT): CPT

## 2025-03-05 PROCEDURE — 6360000002 HC RX W HCPCS

## 2025-03-05 PROCEDURE — 2500000003 HC RX 250 WO HCPCS: Performed by: EMERGENCY MEDICINE

## 2025-03-05 PROCEDURE — 2500000003 HC RX 250 WO HCPCS

## 2025-03-05 PROCEDURE — 93005 ELECTROCARDIOGRAM TRACING: CPT | Performed by: STUDENT IN AN ORGANIZED HEALTH CARE EDUCATION/TRAINING PROGRAM

## 2025-03-05 PROCEDURE — 02HV33Z INSERTION OF INFUSION DEVICE INTO SUPERIOR VENA CAVA, PERCUTANEOUS APPROACH: ICD-10-PCS | Performed by: STUDENT IN AN ORGANIZED HEALTH CARE EDUCATION/TRAINING PROGRAM

## 2025-03-05 PROCEDURE — P9047 ALBUMIN (HUMAN), 25%, 50ML: HCPCS | Performed by: INTERNAL MEDICINE

## 2025-03-05 PROCEDURE — 6360000002 HC RX W HCPCS: Performed by: INTERNAL MEDICINE

## 2025-03-05 PROCEDURE — 94761 N-INVAS EAR/PLS OXIMETRY MLT: CPT

## 2025-03-05 PROCEDURE — 84300 ASSAY OF URINE SODIUM: CPT

## 2025-03-05 PROCEDURE — 80048 BASIC METABOLIC PNL TOTAL CA: CPT

## 2025-03-05 PROCEDURE — 76770 US EXAM ABDO BACK WALL COMP: CPT

## 2025-03-05 PROCEDURE — 84540 ASSAY OF URINE/UREA-N: CPT

## 2025-03-05 PROCEDURE — 82803 BLOOD GASES ANY COMBINATION: CPT

## 2025-03-05 PROCEDURE — 6360000002 HC RX W HCPCS: Performed by: NURSE PRACTITIONER

## 2025-03-05 PROCEDURE — 36415 COLL VENOUS BLD VENIPUNCTURE: CPT

## 2025-03-05 PROCEDURE — 85025 COMPLETE CBC W/AUTO DIFF WBC: CPT

## 2025-03-05 PROCEDURE — 6370000000 HC RX 637 (ALT 250 FOR IP)

## 2025-03-05 PROCEDURE — 99291 CRITICAL CARE FIRST HOUR: CPT | Performed by: INTERNAL MEDICINE

## 2025-03-05 PROCEDURE — 81001 URINALYSIS AUTO W/SCOPE: CPT

## 2025-03-05 PROCEDURE — 2700000000 HC OXYGEN THERAPY PER DAY

## 2025-03-05 PROCEDURE — 6370000000 HC RX 637 (ALT 250 FOR IP): Performed by: STUDENT IN AN ORGANIZED HEALTH CARE EDUCATION/TRAINING PROGRAM

## 2025-03-05 PROCEDURE — APPSS30 APP SPLIT SHARED TIME 16-30 MINUTES: Performed by: PHYSICIAN ASSISTANT

## 2025-03-05 PROCEDURE — 83605 ASSAY OF LACTIC ACID: CPT

## 2025-03-05 PROCEDURE — 2580000003 HC RX 258

## 2025-03-05 PROCEDURE — 94003 VENT MGMT INPAT SUBQ DAY: CPT

## 2025-03-05 PROCEDURE — 84133 ASSAY OF URINE POTASSIUM: CPT

## 2025-03-05 PROCEDURE — 6360000002 HC RX W HCPCS: Performed by: STUDENT IN AN ORGANIZED HEALTH CARE EDUCATION/TRAINING PROGRAM

## 2025-03-05 PROCEDURE — 89190 NASAL SMEAR FOR EOSINOPHILS: CPT

## 2025-03-05 PROCEDURE — 82436 ASSAY OF URINE CHLORIDE: CPT

## 2025-03-05 PROCEDURE — 36600 WITHDRAWAL OF ARTERIAL BLOOD: CPT

## 2025-03-05 PROCEDURE — 83935 ASSAY OF URINE OSMOLALITY: CPT

## 2025-03-05 PROCEDURE — 83880 ASSAY OF NATRIURETIC PEPTIDE: CPT

## 2025-03-05 PROCEDURE — 84484 ASSAY OF TROPONIN QUANT: CPT

## 2025-03-05 PROCEDURE — 2580000003 HC RX 258: Performed by: INTERNAL MEDICINE

## 2025-03-05 PROCEDURE — 93005 ELECTROCARDIOGRAM TRACING: CPT

## 2025-03-05 RX ORDER — ALBUMIN (HUMAN) 12.5 G/50ML
25 SOLUTION INTRAVENOUS EVERY 6 HOURS
Status: COMPLETED | OUTPATIENT
Start: 2025-03-05 | End: 2025-03-06

## 2025-03-05 RX ORDER — SODIUM CHLORIDE 9 MG/ML
INJECTION, SOLUTION INTRAVENOUS CONTINUOUS
Status: ACTIVE | OUTPATIENT
Start: 2025-03-05 | End: 2025-03-05

## 2025-03-05 RX ORDER — PHENOBARBITAL SODIUM 65 MG/ML
260 INJECTION, SOLUTION INTRAMUSCULAR; INTRAVENOUS
Status: DISCONTINUED | OUTPATIENT
Start: 2025-03-05 | End: 2025-03-07 | Stop reason: HOSPADM

## 2025-03-05 RX ORDER — OSELTAMIVIR PHOSPHATE 6 MG/ML
75 FOR SUSPENSION ORAL ONCE
Status: COMPLETED | OUTPATIENT
Start: 2025-03-05 | End: 2025-03-05

## 2025-03-05 RX ORDER — PHENOBARBITAL SODIUM 65 MG/ML
130 INJECTION, SOLUTION INTRAMUSCULAR; INTRAVENOUS ONCE
Status: COMPLETED | OUTPATIENT
Start: 2025-03-05 | End: 2025-03-05

## 2025-03-05 RX ORDER — OSELTAMIVIR PHOSPHATE 6 MG/ML
30 FOR SUSPENSION ORAL 2 TIMES DAILY
Status: DISCONTINUED | OUTPATIENT
Start: 2025-03-05 | End: 2025-03-06

## 2025-03-05 RX ORDER — OSELTAMIVIR PHOSPHATE 6 MG/ML
75 FOR SUSPENSION ORAL 2 TIMES DAILY
Status: DISCONTINUED | OUTPATIENT
Start: 2025-03-05 | End: 2025-03-05 | Stop reason: DRUGHIGH

## 2025-03-05 RX ORDER — PHENOBARBITAL SODIUM 65 MG/ML
130 INJECTION, SOLUTION INTRAMUSCULAR; INTRAVENOUS
Status: DISCONTINUED | OUTPATIENT
Start: 2025-03-05 | End: 2025-03-07 | Stop reason: HOSPADM

## 2025-03-05 RX ADMIN — SODIUM CHLORIDE, PRESERVATIVE FREE 10 ML: 5 INJECTION INTRAVENOUS at 08:33

## 2025-03-05 RX ADMIN — PHENOBARBITAL SODIUM 260 MG: 65 INJECTION INTRAMUSCULAR at 18:37

## 2025-03-05 RX ADMIN — LEVETIRACETAM 500 MG: 100 INJECTION INTRAVENOUS at 08:29

## 2025-03-05 RX ADMIN — Medication 1.5 MCG/KG/HR: at 00:57

## 2025-03-05 RX ADMIN — POTASSIUM CHLORIDE 20 MEQ: 29.8 INJECTION INTRAVENOUS at 02:41

## 2025-03-05 RX ADMIN — HYDRALAZINE HYDROCHLORIDE 10 MG: 20 INJECTION INTRAMUSCULAR; INTRAVENOUS at 19:33

## 2025-03-05 RX ADMIN — SODIUM CHLORIDE, PRESERVATIVE FREE 10 ML: 5 INJECTION INTRAVENOUS at 21:31

## 2025-03-05 RX ADMIN — THIAMINE HYDROCHLORIDE 200 MG: 100 INJECTION, SOLUTION INTRAMUSCULAR; INTRAVENOUS at 08:29

## 2025-03-05 RX ADMIN — FOLIC ACID 1 MG: 1 TABLET ORAL at 08:28

## 2025-03-05 RX ADMIN — SODIUM CHLORIDE, PRESERVATIVE FREE 10 ML: 5 INJECTION INTRAVENOUS at 08:28

## 2025-03-05 RX ADMIN — Medication 1.5 MCG/KG/HR: at 11:21

## 2025-03-05 RX ADMIN — POTASSIUM CHLORIDE 20 MEQ: 29.8 INJECTION INTRAVENOUS at 03:44

## 2025-03-05 RX ADMIN — THIAMINE HYDROCHLORIDE 200 MG: 100 INJECTION, SOLUTION INTRAMUSCULAR; INTRAVENOUS at 21:32

## 2025-03-05 RX ADMIN — PHENOBARBITAL SODIUM 384.15 MG: 65 INJECTION INTRAMUSCULAR; INTRAVENOUS at 19:54

## 2025-03-05 RX ADMIN — THIAMINE HYDROCHLORIDE 200 MG: 100 INJECTION, SOLUTION INTRAMUSCULAR; INTRAVENOUS at 13:57

## 2025-03-05 RX ADMIN — WATER 1000 MG: 1 INJECTION INTRAMUSCULAR; INTRAVENOUS; SUBCUTANEOUS at 18:35

## 2025-03-05 RX ADMIN — Medication 1.5 MCG/KG/HR: at 21:27

## 2025-03-05 RX ADMIN — ALBUMIN (HUMAN) 25 G: 0.25 INJECTION, SOLUTION INTRAVENOUS at 14:04

## 2025-03-05 RX ADMIN — PHENOBARBITAL SODIUM 130 MG: 65 INJECTION INTRAMUSCULAR; INTRAVENOUS at 17:00

## 2025-03-05 RX ADMIN — SODIUM CHLORIDE: 0.9 INJECTION, SOLUTION INTRAVENOUS at 09:10

## 2025-03-05 RX ADMIN — PROPOFOL 30 MCG/KG/MIN: 10 INJECTION, EMULSION INTRAVENOUS at 07:10

## 2025-03-05 RX ADMIN — PANTOPRAZOLE SODIUM 40 MG: 40 INJECTION, POWDER, FOR SOLUTION INTRAVENOUS at 08:32

## 2025-03-05 RX ADMIN — ALBUMIN (HUMAN) 25 G: 0.25 INJECTION, SOLUTION INTRAVENOUS at 19:40

## 2025-03-05 RX ADMIN — Medication 1 TABLET: at 08:28

## 2025-03-05 RX ADMIN — LEVETIRACETAM 500 MG: 100 INJECTION INTRAVENOUS at 21:26

## 2025-03-05 RX ADMIN — OSELTAMIVIR PHOSPHATE 75 MG: 6 POWDER, FOR SUSPENSION ORAL at 08:30

## 2025-03-05 RX ADMIN — ALBUMIN (HUMAN) 25 G: 0.25 INJECTION, SOLUTION INTRAVENOUS at 09:06

## 2025-03-05 RX ADMIN — METOLAZONE 5 MG: 5 TABLET ORAL at 08:28

## 2025-03-05 RX ADMIN — FENTANYL CITRATE 50 MCG: 50 INJECTION INTRAMUSCULAR; INTRAVENOUS at 23:21

## 2025-03-05 ASSESSMENT — PULMONARY FUNCTION TESTS
PIF_VALUE: 17
PIF_VALUE: 16

## 2025-03-05 ASSESSMENT — PAIN SCALES - GENERAL
PAINLEVEL_OUTOF10: 8
PAINLEVEL_OUTOF10: 0

## 2025-03-05 NOTE — PROGRESS NOTES
Chart Review for SBIRT per AOD services. Per EPIC, pt is not appropriate due to requirements for increased sedation and lack of alertness and command following.

## 2025-03-05 NOTE — FLOWSHEET NOTE
03/05/25 1552   Treatment Team Notification   Reason for Communication Change in status   Name of Team Member Notified    Treatment Team Role Resident   Method of Communication Secure Message   Response Waiting for response   Notification Time 1552       Perfect Serve message sent to  in regards to patient clinical status. This RN had to restart Precedex due to patient increased agitation and attempting to crawl out of bed. Patient 's, SpO2 90% on 2L and RR increased. Patient is still not following commands and speech incomprehensible.

## 2025-03-05 NOTE — PROGRESS NOTES
[facility]  PHYSICAL THERAPY MISSED TREATMENT NOTE  STRZ CCU 3A    Date: 3/5/2025  Patient Name: Rivka Zavala        MRN: [id number]   : 1970  (54 y.o.)  Gender: female                REASON FOR MISSED TREATMENT:  Hold treatment per nursing request.  Pt continues on sedation and intubation. RN reports that pt not following commands and that sedation needed to be increased. Will check back tomorrow.    Keisha Broussard, MPT [id number]

## 2025-03-05 NOTE — PROGRESS NOTES
Neurosurgery Progress Note    Patient:  Rivka Zavala      Unit/Bed:3A-05/005-A    YOB: 1970    MRN: [id number]     Acct: [id number]     Admit date: 2/28/2025    Chief Complaint   Patient presents with    Alcohol Intoxication       Patient Seen, Chart, Physician notes, Labs, Radiology studies reviewed.    Subjective: Patient is seen and evaluated on 3A with evaluation exam findings reviewed and discussed with Dr. Garcia/neurosurgeon with nursing.  He was intubated and sedated limiting a more inclusive exam but appeared comfortable.        Past, Family, Social History unchanged from admission.    Diet:  Diet NPO  ADULT TUBE FEEDING; Orogastric; Peptide Based; Continuous; 10; Yes; 10; Q 12 hours; 30; 30; Q 4 hours    Medications:  Scheduled Meds:   oseltamivir 6mg/ml  30 mg Oral BID    albumin human 25%  25 g IntraVENous Q6H    cefTRIAXone (ROCEPHIN) IV  1,000 mg IntraVENous Q24H    thiamine  200 mg IntraVENous TID    levETIRAcetam  500 mg IntraVENous Q12H    folic acid  1 mg Oral Daily    multivitamin  1 tablet Oral Daily    pantoprazole  40 mg IntraVENous Daily    thiamine (B-1) 500 mg in sodium chloride 0.9 % 100 mL IVPB  500 mg IntraVENous Once    sodium chloride flush  5-40 mL IntraVENous 2 times per day    sodium chloride flush  5-40 mL IntraVENous 2 times per day     Continuous Infusions:   sodium chloride      norepinephrine 3 mcg/min (03/05/25 0446)    propofol 30 mcg/kg/min (03/05/25 0710)    dexmedeTOMIDine 1.1 mcg/kg/hr (03/05/25 5654)    [Held by provider] bumetanide (BUMEX) 12.5 mg in sodium chloride 0.9 % 125 mL infusion Stopped (03/05/25 0434)    sodium chloride      sodium chloride       PRN Meds:hydrALAZINE, norepinephrine, sodium phosphate 15 mmol in sodium chloride 0.9 % 250 mL IVPB, fentanNYL, sodium chloride flush, sodium chloride, sodium chloride flush, sodium chloride, potassium chloride **OR** [DISCONTINUED] potassium chloride, magnesium sulfate, ondansetron **OR**  TUBE PLACEMENT    Result Date: 2/28/2025  1 view abdomen Comparison: CT/SR - CT ABDOMEN PELVIS W IV CONTRAST - 8/3/24 13:48 EDT Findings: Side-hole of the NG tube in the stomach with tip directed distally. No pneumoperitoneum or pneumatosis. No abnormal calcifications. No acute fractures.     Side-hole of the NG tube in the stomach with tip directed distally. This document has been electronically signed by: Pierce Clemente MD on 02/28/2025 06:10 PM    XR CHEST PORTABLE    Result Date: 2/28/2025  1 view chest x-ray Comparison: CR/SR - XR CHEST PORTABLE - 2/28/25 14:47 EST Findings: Endotracheal tube 4.6 cm above the [place]. Enteric tube coursing below the inferior margin of the radiograph. The lungs are clear. Normal size heart. No acute fracture.     Endotracheal tube 4.6 cm above the [place]. Lungs are clear. This document has been electronically signed by: Pierce Clemente MD on 02/28/2025 06:09 PM    CT CERVICAL SPINE WO CONTRAST    Result Date: 2/28/2025  PROCEDURE: CT CERVICAL SPINE WO CONTRAST CLINICAL INFORMATION: AMS, intracranial bleed. COMPARISON: CT scan of the brain dated 9 October 2024.. TECHNIQUE: 3 mm noncontrast axial images were obtained through the cervical spine with sagittal and coronal reconstructions. All CT scans at this facility use dose modulation, iterative reconstruction, and/or weight-based dosing when appropriate to reduce radiation dose to as low as reasonably achievable. FINDINGS: There is a mild dextroscoliosis.  There is no fracture.  There is no prevertebral soft tissue swelling. There are degenerative changes at C4-5, C5-6 and C6-7.. No suspicious osseous lesions are present. There are no suspicious findings in the cervical soft tissues.  There are no suspicious findings in the lung apices.      No fracture. **This report has been created using voice recognition software. It may contain minor errors which are inherent in voice recognition technology.** Electronically signed by Dr. Canas

## 2025-03-05 NOTE — PROGRESS NOTES
Utilize [facility] alcohol withdrawal scale (Based on Jeremiah Modified Alcohol Withdrawal Scale).  Tabulate score based on classifications including Tremor, Sweating, Hallucination, Orientation, and Agitation.    Tremor: 1  Sweatin  Hallucinations: 2  Orientation: 2  Agitation: 2  Total Score: 7  Action perform as described below     Tremor:  No tremor is 0 points.  Tremor on movement is 1 point.  Tremor at rest is 2 points.  Sweating: No Sweat 0 points. Moist is 1 point.  Drenching sweats is 2 points.  Hallucinations: No present 0 points. Dissuadable is 1 point. Not dissuadable is 2 points.  Orientation: Oriented 0 points. Vague/detached 1 point. Disoriented/no contact 2 points.  Agitation: Calm 0 points.  Anxious 1 point. Panicky 2 points.    Check scale every 2 hours.  Discontinue scoring with 4 consecutive scorings of 0.  Scale 0: No phenobarbital given.  Re-assess every 60 minutes as needed.   Scale 1-3: Phenobarbital 130 mg IV over 3 minutes. Re-assess every 60 minutes as needed.  May administer every 60 minutes to a maximum dose of phenobarbital 1040 mg in 24 hours!  Scale 4-8: Phenobarbital 260 mg IV over 5 minutes.  Re-assess every 60 minutes as needed. May administer every 60 minutes to a maximum dose of phenobarbital 1040mg in 24 hours!  Scale 9-10: Transfer to ICU (if not already in ICU).  Administer 10mg/kg phenobarbital IV over 60 minutes.  Maximum dose phenobarbital is 1040mg in 24 hours!

## 2025-03-05 NOTE — PLAN OF CARE
Problem: Discharge Planning  Goal: Discharge to home or other facility with appropriate resources  Outcome: Progressing  Flowsheets (Taken 3/5/2025 0815)  Discharge to home or other facility with appropriate resources:   Identify barriers to discharge with patient and caregiver   Arrange for needed discharge resources and transportation as appropriate   Identify discharge learning needs (meds, wound care, etc)     Problem: Pain  Goal: Verbalizes/displays adequate comfort level or baseline comfort level  Outcome: Progressing     Problem: Safety - Adult  Goal: Free from fall injury  Outcome: Progressing     Problem: Nutrition Deficit:  Goal: Optimize nutritional status  Outcome: Progressing     Problem: Skin/Tissue Integrity  Goal: Skin integrity remains intact  Description: 1.  Monitor for areas of redness and/or skin breakdown  2.  Assess vascular access sites hourly  3.  Every 4-6 hours minimum:  Change oxygen saturation probe site  4.  Every 4-6 hours:  If on nasal continuous positive airway pressure, respiratory therapy assess nares and determine need for appliance change or resting period  Outcome: Progressing  Flowsheets (Taken 3/5/2025 0815)  Skin Integrity Remains Intact:   Monitor for areas of redness and/or skin breakdown   Assess vascular access sites hourly   Every 4-6 hours minimum: Change oxygen saturation probe site   Every 4-6 hours: If on nasal continuous positive airway pressure, respiratory therapy assesses nares and determine need for appliance change or resting period     Problem: Safety - Medical Restraint  Goal: Remains free of injury from restraints (Restraint for Interference with Medical Device)  Description: INTERVENTIONS:  1. Determine that other, less restrictive measures have been tried or would not be effective before applying the restraint  2. Evaluate the patient's condition at the time of restraint application  3. Inform patient/family regarding the reason for restraint  4. Q2H:  good hand hygiene technique   Identify and instruct in appropriate isolation precautions for identified infection/condition     Problem: Metabolic/Fluid and Electrolytes - Adult  Goal: Electrolytes maintained within normal limits  Outcome: Progressing  Flowsheets (Taken 3/5/2025 0815)  Electrolytes maintained within normal limits:   Monitor labs and assess patient for signs and symptoms of electrolyte imbalances   Administer electrolyte replacement as ordered   Monitor response to electrolyte replacements, including repeat lab results as appropriate   Fluid restriction as ordered   Instruct patient on fluid and nutrition restrictions as appropriate  Goal: Hemodynamic stability and optimal renal function maintained  Outcome: Progressing  Flowsheets (Taken 3/5/2025 0815)  Hemodynamic stability and optimal renal function maintained:   Monitor labs and assess for signs and symptoms of volume excess or deficit   Monitor urine specific gravity, serum osmolarity and serum sodium as indicated or ordered   Monitor intake, output and patient weight   Monitor response to interventions for patient's volume status, including labs, urine output, blood pressure (other measures as available)   Encourage oral intake as appropriate   Instruct patient on fluid and nutrition restrictions as appropriate  Goal: Glucose maintained within prescribed range  Outcome: Progressing  Flowsheets (Taken 3/5/2025 0815)  Glucose maintained within prescribed range:   Assess for signs and symptoms of hyperglycemia and hypoglycemia   Monitor blood glucose as ordered   Administer ordered medications to maintain glucose within target range   Assess barriers to adequate nutritional intake and initiate nutrition consult as needed   Instruct patient on self management of diabetes and initiate consult as needed     Problem: Anxiety  Goal: Will report anxiety at manageable levels  Description: INTERVENTIONS:  1. Administer medication as ordered  2. Teach and

## 2025-03-05 NOTE — PROGRESS NOTES
Per chart review, pt is on a ventilator at this time. NELDA to continue to follow case and complete addiction consult when appropriate.

## 2025-03-05 NOTE — PROGRESS NOTES
[place], [place]                                          NEUROSURGICAL PROGRESS NOTE       Rivka Zavala   YOB: 1970  Account Number: [id number]   Date of Examination: 3/5/2025      Rivka was seen this afternoon with Dr. Garcia she is currently extubated.  We have discussed. No neurosurgical intervention is advised as it would not be likely any benefit neurologically or physically.  Advise maximal medical management with consideration of palliative care.  At this time neurosurgery will sign off and patient to follow-up as an outpatient on an as-needed basis.        MK Valdovinos - CNP,APRN  Electronically signed 3/5/2025 at 4:04 PM

## 2025-03-05 NOTE — PLAN OF CARE
Problem: Respiratory - Adult  Goal: Achieves optimal ventilation and oxygenation  Outcome: Progressing                                                Patient Weaning Progress    The patient's vent settings was able to be weaned this shift.      Ventilator settings that were weaned              [] Mode   [x] Pressure support weaned   [] Fio2 weaned   [] Peep weaned      Spontaneous weaning trial  was attempted.     due to defined parameters for SBT (spontaneous breathing trial) not being met.     *Results of SBT documented under SBTOUTCOME note.    Reason that defined ventilator parameters for SBT was not met              [] Patient condition requires increased ventilator settings  [] Requires increased sedation   [] Settings not within weaning range   [] SAT not completed   [] Physician orders    The patient was able to tolerate SBT.    SpO2 of 99 on 21% FiO2.  Spontanteous VT was 511 and RR 18 breaths/min.     Cuff was  deflated to determine cuff leak. A leak  was not detected.       Unable to get agreement for goals because no family is present and patient cannot respond.

## 2025-03-05 NOTE — PROCEDURES
PROCEDURE NOTE  Date: 3/5/2025   Name: Rivka Zavala  YOB: 1970    Procedures      EKG was completed and handed to RN

## 2025-03-05 NOTE — PROGRESS NOTES
[facility]  SPEECH THERAPY MISSED TREATMENT NOTE  [facility] CCU 3A      Date: 3/5/2025  Patient Name: Rivka Zavala        MRN: [id number]    : 1970  (54 y.o.)    REASON FOR MISSED TREATMENT:  Attempted to see patient for completion of multi-modal communication via early mobility. BRITTANY Gerber reports patient is not appropriate d/t requirements for increased sedative medications and lack of alertness and command following. ST to defer orders at this time. Please re-consult once patient is medically appropriate for evaluation.     Kaitlynn Guajardo M.A., CCC-SLP [id number]

## 2025-03-05 NOTE — PROCEDURES
PROCEDURE NOTE  Date: 3/5/2025   Name: Rivka Zavala  YOB: 1970    CENTRAL LINE    Date/Time: 3/5/2025 4:47 AM    Performed by: Mike Ramon DO  Authorized by: Mike Ramon DO  Consent: The procedure was performed in an emergent situation.  Risks and benefits: risks, benefits and alternatives were discussed  Relevant documents: relevant documents present and verified  Site marked: the operative site was marked  Imaging studies: imaging studies available  Patient identity confirmed: hospital-assigned identification number and arm band  Time out: Immediately prior to procedure a \"time out\" was called to verify the correct patient, procedure, equipment, support staff and site/side marked as required.  Indications: vascular access  Anesthesia: local infiltration    Anesthesia:  Local Anesthetic: lidocaine 1% without epinephrine  Anesthetic total: 3 mL    Sedation:  Patient sedated: no    Preparation: skin prepped with Betadine  Skin prep agent dried: skin prep agent completely dried prior to procedure  Sterile barriers: all five maximum sterile barriers used - cap, mask, sterile gown, sterile gloves, and large sterile sheet  Hand hygiene: hand hygiene performed prior to central venous catheter insertion  Location details: right internal jugular  Patient position: Trendelenburg  Catheter type: triple lumen  Catheter size: 7 Fr  Pre-procedure: landmarks identified  Ultrasound guidance: yes  Sterile ultrasound techniques: sterile gel and sterile probe covers were used  Number of attempts: 3  Successful placement: yes  Post-procedure: line sutured and dressing applied  Assessment: blood return through all ports, placement verified by x-ray and free fluid flow  Patient tolerance: patient tolerated the procedure well with no immediate complications  Comments: Pt already intubated and sedated. Nursing requested CVC for pressor support.

## 2025-03-05 NOTE — PROGRESS NOTES
Pharmacy Renal Adjustment    Pharmacy renally adjusted the following medication(s) per P&T approved policy: Tamiflu    Recent Labs     03/04/25  1420 03/04/25  2357   BUN 15 15   CREATININE 0.9 1.0*     eGFR:   Recent Labs     03/04/25  0456 03/04/25  1420 03/04/25  2357   LABGLOM 67 76 67     Estimated Creatinine Clearance: 39 mL/min (A) (based on SCr of 1 mg/dL (H)).    Assessment:  N/A    Plan:   Change Tamiflu regimen from 75 mg BID to 75 mg x 1 dose, then 30 mg BID for 9 doses.    Please call pharmacy with any questions.    Fernanda Awad [facility] 3/5/2025 2:07 AM

## 2025-03-05 NOTE — PROCEDURES
PROCEDURE NOTE  Date: 3/4/2025   Name: Rivka Zavala  YOB: 1970    Procedures      EKG was completed and handed to RN

## 2025-03-05 NOTE — PROGRESS NOTES
Order obtained for extubation.  SpO2 of 98% on 21% FiO2.   Patient extubated and placed on 2 liters/min via nasal cannula.   Post extubation SpO2 is 97% with HR  115 bpm and RR 20 breaths/min.    Patient had moderate cough that was productive of white sputum.  Extubation Well tolerated by patient..   Breath Sounds: clear/dim    Jim Enamorado RCP   3:05 PM

## 2025-03-05 NOTE — PLAN OF CARE
CRITICAL CARE PROGRESS NOTE      Patient:  Rivka Zavala    Unit/Bed:3A-05/005-A  YOB: 1970  MRN: [id number]   PCP: Ben Montejo MD  Date of Admission: 2/28/2025  Chief Complaint:- AMS    Assessment and Plan:    ***:  ***  ***:  ***  ***:  ***  ***:  ***  ***:  ***  ***:  ***    Plan of care:  Called by nursing staff stating patient was hypotensive and needed central line in order to give pressor support.    Past Medical History:  ***.  Family History:  ***.  Social History:  ***.    ROS   ***    Scheduled Meds:   oseltamivir 6mg/ml  75 mg Oral Once    Followed by    oseltamivir 6mg/ml  30 mg Oral BID    cefTRIAXone (ROCEPHIN) IV  1,000 mg IntraVENous Q24H    metOLazone  5 mg Per NG tube Daily    thiamine  200 mg IntraVENous TID    levETIRAcetam  500 mg IntraVENous Q12H    folic acid  1 mg Oral Daily    multivitamin  1 tablet Oral Daily    pantoprazole  40 mg IntraVENous Daily    thiamine (B-1) 500 mg in sodium chloride 0.9 % 100 mL IVPB  500 mg IntraVENous Once    sodium chloride flush  5-40 mL IntraVENous 2 times per day    sodium chloride flush  5-40 mL IntraVENous 2 times per day     Continuous Infusions:   norepinephrine 3 mcg/min (03/05/25 0446)    propofol 30 mcg/kg/min (03/05/25 0414)    dexmedeTOMIDine 1.1 mcg/kg/hr (03/05/25 0414)    [Held by provider] bumetanide (BUMEX) 12.5 mg in sodium chloride 0.9 % 125 mL infusion Stopped (03/05/25 0434)    sodium chloride      sodium chloride         PHYSICAL EXAMINATION:  T: 100.6.  P: 94. RR: 23. B/P: 84/66.  FiO2: 21%. O2 Sat: 100%.  I/O: +1385.2?  Body mass index is 14.61 kg/m².     General:   Thin, euvolemic appearing middle-aged female, laying in bed, in no acute distress,  HEENT: NC/AT, no scleral icterus.  EOMI  Neck: supple.  No Thyromegaly.  Lungs: CTA, no retractions, no crackles  Cardiac: RRR.  No JVD.  Abdomen: soft.  Nontender.  Extremities:  No clubbing, cyanosis, or edema x 4.    Vasculature: capillary refill 3-4 seconds.

## 2025-03-05 NOTE — PLAN OF CARE
Problem: Discharge Planning  Goal: Discharge to home or other facility with appropriate resources  Outcome: Progressing  Flowsheets  Taken 3/4/2025 1943 by Terrell Atwood RN  Discharge to home or other facility with appropriate resources: Identify barriers to discharge with patient and caregiver  Taken 3/4/2025 1100 by Mariama Pérez RN  Discharge to home or other facility with appropriate resources:   Identify barriers to discharge with patient and caregiver   Arrange for needed discharge resources and transportation as appropriate   Identify discharge learning needs (meds, wound care, etc)   Refer to discharge planning if patient needs post-hospital services based on physician order or complex needs related to functional status, cognitive ability or social support system     Problem: Pain  Goal: Verbalizes/displays adequate comfort level or baseline comfort level  Outcome: Progressing     Problem: Safety - Adult  Goal: Free from fall injury  Outcome: Progressing     Problem: Nutrition Deficit:  Goal: Optimize nutritional status  Outcome: Progressing     Problem: Skin/Tissue Integrity  Goal: Skin integrity remains intact  Description: 1.  Monitor for areas of redness and/or skin breakdown  2.  Assess vascular access sites hourly  3.  Every 4-6 hours minimum:  Change oxygen saturation probe site  4.  Every 4-6 hours:  If on nasal continuous positive airway pressure, respiratory therapy assess nares and determine need for appliance change or resting period  Outcome: Progressing  Flowsheets  Taken 3/4/2025 1943 by Terrell Atwood RN  Skin Integrity Remains Intact: Monitor for areas of redness and/or skin breakdown  Taken 3/4/2025 1100 by Mariama Pérez RN  Skin Integrity Remains Intact:   Monitor for areas of redness and/or skin breakdown   Assess vascular access sites hourly     Problem: Safety - Medical Restraint  Goal: Remains free of injury from restraints (Restraint for Interference with Medical

## 2025-03-05 NOTE — PROGRESS NOTES
CRITICAL CARE PROGRESS NOTE      Patient:  Rivka Zavala    Unit/Bed:3A-05/005-A  YOB: 1970  MRN: [id number]   PCP: Ben Montejo MD  Date of Admission: 2/28/2025  Chief Complaint:-AMS    Assessment and Plan:    Acute hypoxic respiratory failure: infectious versus alveolar hemorrhage. Intubated for airway protection ISO encephalopathy in the ED and extubated 3/1. She was reintubated overnight of 3/2 due to tachypnea, mottling. Chest x-ray with bilateral infiltrates. BNP uptrending 2948 3/2 => 5659 3/3 => 5772. Initially, patient was diuresed. Bumex gtt added 3/3 with good urine output 3/4, 3 L out. Metolazone added 3/4. Repeat chest x-ray remains with diffuse infiltrates and increased opacity in upper R lung. Patient was febrile and placed back on Levophed overnight of 3/4 due to hypotension. Bumex was placed on hold. Tested Influenza A+. Pneumonia panel and sputum culture pending. Patient was started on Tamiflu. Considering transitioning back to Zosyn given possibility of multilobar pneumonia. Also on differential is possible alveolar hemorrhage given brain bleed.  Septic shock secondary to UTI and influenza A with possible superimposed pneumonia: UA with moderate bacteria. Urine culture positive for E. coli and Proteus. Levophed was weaned off 3/1. Chest x-ray shows bilateral infiltrates with increased opacity in right upper lobe. Awaiting pneumonia panel and sputum culture. Central line was also placed overnight 3/4.   Left basal ganglia hemorrhage measuring 5.5 x 3.3 x 3.1 cm: Found on CT head on arrival.   Repeat CT 3/5 stable intraparenchymal hemorrhage. Neurosurgery following. Coag studies negative.   Encephalopathy secondary to brain bleed versus Wernicke's. Patient remains encephalopathic. Increased her thiamine to 200 3 times daily.  Hypernatremia  3/3 => 155 3/5. May be secondary to hypovolemia.  SHEILA, worsening Cr 0.3 3/2 => 1 3/4 => 1 3/5. Suspect prerenal secondary to  or edema x 4.    Vasculature: capillary refill < 3 seconds. Palpable dorsalis pedis pulses.  Skin:  warm and dry.  Psych: Encephalopathic  Lymph:  No supraclavicular adenopathy.  Neurologic:  No focal deficit. No seizures.    Data: (All radiographs, tracings, PFTs, and imaging are personally viewed and interpreted unless otherwise noted).   BMP , K 4.6, , bicarb 24, BUN 16, CR 1, Glu 136.  Hepatic panel albumin 3.3, alk phos 124, ALT 25, AST 60  Lactic acid 1.7  BNP 5772  3/3 urine culture positive for E. Coli and Proteus  Influenza A+  Telemetry showed NSR with rate of 90    Meets Continued ICU Level Care Criteria:    [x] Yes   [] No - Transfer   [] HOSPITALIST CONTACTED-      Case and plan discussed with Dr. Frazier.  Patient seen by me including key components of medical care.  Case discussed with resident physician.  Suspect infiltrates are due to Influenza.  Proceed with SBT.  Italicized bold font, if present,  represents changes to the note made by me.  CC time 35 minutes.  Time was discontiguous. Time does not include procedure. Time does include my direct assessment of the patient and coordination of care.  Time represents more than 50% of the time involved with patient care by the CC team.  Electronically signed by Rusty Frazier MD.

## 2025-03-06 ENCOUNTER — APPOINTMENT (OUTPATIENT)
Dept: GENERAL RADIOLOGY | Age: 55
End: 2025-03-06
Payer: COMMERCIAL

## 2025-03-06 LAB
ANION GAP SERPL CALC-SCNC: 15 MEQ/L (ref 8–16)
ANION GAP SERPL CALC-SCNC: 17 MEQ/L (ref 8–16)
BUN SERPL-MCNC: 12 MG/DL (ref 8–23)
BUN SERPL-MCNC: 13 MG/DL (ref 8–23)
CALCIUM SERPL-MCNC: 8 MG/DL (ref 8.6–10)
CALCIUM SERPL-MCNC: 8.2 MG/DL (ref 8.6–10)
CHLORIDE SERPL-SCNC: 113 MEQ/L (ref 98–111)
CHLORIDE SERPL-SCNC: 114 MEQ/L (ref 98–111)
CO2 SERPL-SCNC: 26 MEQ/L (ref 22–29)
CO2 SERPL-SCNC: 27 MEQ/L (ref 22–29)
CREAT SERPL-MCNC: 0.6 MG/DL (ref 0.5–0.9)
CREAT SERPL-MCNC: 0.6 MG/DL (ref 0.5–0.9)
DEPRECATED RDW RBC AUTO: 55.3 FL (ref 35–45)
ERYTHROCYTE [DISTWIDTH] IN BLOOD BY AUTOMATED COUNT: 15 % (ref 11.5–14.5)
GFR SERPL CREATININE-BSD FRML MDRD: > 90 ML/MIN/1.73M2
GFR SERPL CREATININE-BSD FRML MDRD: > 90 ML/MIN/1.73M2
GLUCOSE SERPL-MCNC: 120 MG/DL (ref 74–109)
GLUCOSE SERPL-MCNC: 126 MG/DL (ref 74–109)
HCT VFR BLD AUTO: 32.3 % (ref 37–47)
HGB BLD-MCNC: 10.4 GM/DL (ref 12–16)
MAGNESIUM SERPL-MCNC: 2.2 MG/DL (ref 1.6–2.6)
MCH RBC QN AUTO: 32.6 PG (ref 26–33)
MCHC RBC AUTO-ENTMCNC: 32.2 GM/DL (ref 32.2–35.5)
MCV RBC AUTO: 101.3 FL (ref 81–99)
PLATELET # BLD AUTO: 214 THOU/MM3 (ref 130–400)
PMV BLD AUTO: 10.5 FL (ref 9.4–12.4)
POTASSIUM SERPL-SCNC: 2.3 MEQ/L (ref 3.5–5.2)
POTASSIUM SERPL-SCNC: 2.6 MEQ/L (ref 3.5–5.2)
POTASSIUM SERPL-SCNC: 3.8 MEQ/L (ref 3.5–5.2)
RBC # BLD AUTO: 3.19 MILL/MM3 (ref 4.2–5.4)
SODIUM SERPL-SCNC: 156 MEQ/L (ref 135–145)
SODIUM SERPL-SCNC: 156 MEQ/L (ref 135–145)
WBC # BLD AUTO: 9.3 THOU/MM3 (ref 4.8–10.8)

## 2025-03-06 PROCEDURE — 6360000002 HC RX W HCPCS

## 2025-03-06 PROCEDURE — 99291 CRITICAL CARE FIRST HOUR: CPT | Performed by: INTERNAL MEDICINE

## 2025-03-06 PROCEDURE — 2500000003 HC RX 250 WO HCPCS

## 2025-03-06 PROCEDURE — P9047 ALBUMIN (HUMAN), 25%, 50ML: HCPCS | Performed by: INTERNAL MEDICINE

## 2025-03-06 PROCEDURE — 2100000000 HC CCU R&B

## 2025-03-06 PROCEDURE — 6360000002 HC RX W HCPCS: Performed by: INTERNAL MEDICINE

## 2025-03-06 PROCEDURE — 2580000003 HC RX 258: Performed by: STUDENT IN AN ORGANIZED HEALTH CARE EDUCATION/TRAINING PROGRAM

## 2025-03-06 PROCEDURE — 80048 BASIC METABOLIC PNL TOTAL CA: CPT

## 2025-03-06 PROCEDURE — 97166 OT EVAL MOD COMPLEX 45 MIN: CPT

## 2025-03-06 PROCEDURE — 85027 COMPLETE CBC AUTOMATED: CPT

## 2025-03-06 PROCEDURE — 71045 X-RAY EXAM CHEST 1 VIEW: CPT

## 2025-03-06 PROCEDURE — 36415 COLL VENOUS BLD VENIPUNCTURE: CPT

## 2025-03-06 PROCEDURE — 84132 ASSAY OF SERUM POTASSIUM: CPT

## 2025-03-06 PROCEDURE — 97530 THERAPEUTIC ACTIVITIES: CPT

## 2025-03-06 PROCEDURE — 2500000003 HC RX 250 WO HCPCS: Performed by: EMERGENCY MEDICINE

## 2025-03-06 PROCEDURE — 97162 PT EVAL MOD COMPLEX 30 MIN: CPT

## 2025-03-06 PROCEDURE — 83735 ASSAY OF MAGNESIUM: CPT

## 2025-03-06 RX ORDER — DEXTROSE MONOHYDRATE 50 MG/ML
INJECTION, SOLUTION INTRAVENOUS CONTINUOUS
Status: ACTIVE | OUTPATIENT
Start: 2025-03-06 | End: 2025-03-06

## 2025-03-06 RX ORDER — MAGNESIUM SULFATE 1 G/100ML
1000 INJECTION INTRAVENOUS ONCE
Status: COMPLETED | OUTPATIENT
Start: 2025-03-06 | End: 2025-03-06

## 2025-03-06 RX ORDER — LANOLIN ALCOHOL/MO/W.PET/CERES
400 CREAM (GRAM) TOPICAL DAILY
Status: DISCONTINUED | OUTPATIENT
Start: 2025-03-06 | End: 2025-03-07 | Stop reason: HOSPADM

## 2025-03-06 RX ORDER — OSELTAMIVIR PHOSPHATE 6 MG/ML
75 FOR SUSPENSION ORAL 2 TIMES DAILY
Status: DISCONTINUED | OUTPATIENT
Start: 2025-03-06 | End: 2025-03-07 | Stop reason: HOSPADM

## 2025-03-06 RX ORDER — POTASSIUM CHLORIDE 29.8 MG/ML
20 INJECTION INTRAVENOUS
Status: COMPLETED | OUTPATIENT
Start: 2025-03-06 | End: 2025-03-06

## 2025-03-06 RX ADMIN — POTASSIUM CHLORIDE 20 MEQ: 29.8 INJECTION INTRAVENOUS at 04:14

## 2025-03-06 RX ADMIN — POTASSIUM CHLORIDE 20 MEQ: 29.8 INJECTION INTRAVENOUS at 06:21

## 2025-03-06 RX ADMIN — THIAMINE HYDROCHLORIDE 200 MG: 100 INJECTION, SOLUTION INTRAMUSCULAR; INTRAVENOUS at 20:25

## 2025-03-06 RX ADMIN — MAGNESIUM SULFATE HEPTAHYDRATE 1000 MG: 1 INJECTION, SOLUTION INTRAVENOUS at 07:03

## 2025-03-06 RX ADMIN — POTASSIUM CHLORIDE 20 MEQ: 29.8 INJECTION INTRAVENOUS at 09:02

## 2025-03-06 RX ADMIN — Medication 0.8 MCG/KG/HR: at 21:46

## 2025-03-06 RX ADMIN — THIAMINE HYDROCHLORIDE 200 MG: 100 INJECTION, SOLUTION INTRAMUSCULAR; INTRAVENOUS at 07:58

## 2025-03-06 RX ADMIN — SODIUM CHLORIDE, PRESERVATIVE FREE 10 ML: 5 INJECTION INTRAVENOUS at 07:58

## 2025-03-06 RX ADMIN — FENTANYL CITRATE 50 MCG: 50 INJECTION INTRAMUSCULAR; INTRAVENOUS at 04:18

## 2025-03-06 RX ADMIN — PHENOBARBITAL SODIUM 260 MG: 65 INJECTION INTRAMUSCULAR at 06:29

## 2025-03-06 RX ADMIN — DEXTROSE MONOHYDRATE: 50 INJECTION, SOLUTION INTRAVENOUS at 03:05

## 2025-03-06 RX ADMIN — POTASSIUM CHLORIDE 20 MEQ: 29.8 INJECTION INTRAVENOUS at 07:57

## 2025-03-06 RX ADMIN — THIAMINE HYDROCHLORIDE 200 MG: 100 INJECTION, SOLUTION INTRAMUSCULAR; INTRAVENOUS at 14:36

## 2025-03-06 RX ADMIN — WATER 1000 MG: 1 INJECTION INTRAMUSCULAR; INTRAVENOUS; SUBCUTANEOUS at 18:22

## 2025-03-06 RX ADMIN — POTASSIUM CHLORIDE 20 MEQ: 29.8 INJECTION INTRAVENOUS at 05:17

## 2025-03-06 RX ADMIN — PHENOBARBITAL SODIUM 130 MG: 65 INJECTION INTRAMUSCULAR; INTRAVENOUS at 20:33

## 2025-03-06 RX ADMIN — Medication 1.5 MCG/KG/HR: at 04:58

## 2025-03-06 RX ADMIN — ALBUMIN (HUMAN) 25 G: 0.25 INJECTION, SOLUTION INTRAVENOUS at 03:13

## 2025-03-06 RX ADMIN — SODIUM CHLORIDE, PRESERVATIVE FREE 10 ML: 5 INJECTION INTRAVENOUS at 07:59

## 2025-03-06 RX ADMIN — PANTOPRAZOLE SODIUM 40 MG: 40 INJECTION, POWDER, FOR SOLUTION INTRAVENOUS at 07:58

## 2025-03-06 RX ADMIN — POTASSIUM CHLORIDE 20 MEQ: 29.8 INJECTION INTRAVENOUS at 10:06

## 2025-03-06 RX ADMIN — Medication 1.5 MCG/KG/HR: at 12:53

## 2025-03-06 RX ADMIN — POTASSIUM CHLORIDE 20 MEQ: 29.8 INJECTION INTRAVENOUS at 11:10

## 2025-03-06 ASSESSMENT — PAIN SCALES - GENERAL
PAINLEVEL_OUTOF10: 0
PAINLEVEL_OUTOF10: 8
PAINLEVEL_OUTOF10: 0

## 2025-03-06 NOTE — PROGRESS NOTES
Utilize [facility] alcohol withdrawal scale (Based on [place] Modified Alcohol Withdrawal Scale).  Tabulate score based on classifications including Tremor, Sweating, Hallucination, Orientation, and Agitation.     Tremor: 1  Sweatin  Hallucinations: 2  Orientation: 2  Agitation: 2  Total Score: 7  Action perform as described below      Tremor:  No tremor is 0 points.  Tremor on movement is 1 point.  Tremor at rest is 2 points.  Sweating: No Sweat 0 points. Moist is 1 point.  Drenching sweats is 2 points.  Hallucinations: No present 0 points. Dissuadable is 1 point. Not dissuadable is 2 points.  Orientation: Oriented 0 points. Vague/detached 1 point. Disoriented/no contact 2 points.  Agitation: Calm 0 points.  Anxious 1 point. Panicky 2 points.     Check scale every 2 hours.  Discontinue scoring with 4 consecutive scorings of 0.  Scale 0: No phenobarbital given.  Re-assess every 60 minutes as needed.   Scale 1-3: Phenobarbital 130 mg IV over 3 minutes. Re-assess every 60 minutes as needed.  May administer every 60 minutes to a maximum dose of phenobarbital 1040 mg in 24 hours!  Scale 4-8: Phenobarbital 260 mg IV over 5 minutes.  Re-assess every 60 minutes as needed. May administer every 60 minutes to a maximum dose of phenobarbital 1040mg in 24 hours!  Scale 9-10: Transfer to ICU (if not already in ICU).  Administer 10mg/kg phenobarbital IV over 60 minutes.  Maximum dose phenobarbital is 1040mg in 24 hours!

## 2025-03-06 NOTE — PROGRESS NOTES
CRITICAL CARE PROGRESS NOTE      Patient:  Rivka Zavala    Unit/Bed:3A-05/005-A  YOB: 1970  MRN: [id number]   PCP: Ben Montejo MD  Date of Admission: 2/28/2025  Chief Complaint:-AMS    Assessment and Plan:    Acute hypoxic respiratory failure: secondary to flu A with possible superimposed multilobar pneumonia. Intubated for airway protection ISO encephalopathy in the ED and extubated 3/1. She was reintubated overnight of 3/2 due to tachypnea, mottling.  Extubated 3/5. Repeat chest x-ray 3/6 remains with diffuse infiltrates and increased opacity in upper R lung. Patient was febrile and placed back on Levophed overnight of 3/4 due to hypotension. Tested Influenza A+. Pneumonia panel and sputum culture pending. Cont Tamiflu. Currently, she is on ceftriaxone for her UTI.  Will escalate depending on micro labs  Septic shock secondary to UTI and influenza A with possible superimposed pneumonia, shock resolved: UA with moderate bacteria. Urine culture positive for E. coli and Proteus. Levophed was weaned off 3/1. Chest x-ray shows bilateral infiltrates with increased opacity in right upper lobe. Awaiting pneumonia panel and sputum culture. Central line was placed overnight 3/4.   Left basal ganglia hemorrhage measuring 5.5 x 3.3 x 3.1 cm: Found on CT head on arrival.   Repeat CT 3/5 stable intraparenchymal hemorrhage. Neurosurgery following. Coag studies negative. N/S did not recommend any intervention. They recommended palliative care.  Encephalopathy secondary to brain bleed versus Wernicke's. Patient remains encephalopathic. Cont thiamine to 200 3 times daily.  Agitation: Patient is agitated. This has been due to her tachycardic and tachypneic. She is on a phenobarbital protocol.  Hypernatremia  3/3 => 155 3/5 and 3/6. May be secondary to hypovolemia. Pt was given D5.  Malnutrition secondary to alcohol use. Hypophosphatemia. phosphorus 2.4 3/3 => 1.6 3/4 => 4.1 3/4. Resolved. Receiving  pulses.  Skin:  warm and dry.  Psych: Encephalopathic  Lymph:  No supraclavicular adenopathy.  Neurologic:  No focal deficit. No seizures.    Data: (All radiographs, tracings, PFTs, and imaging are personally viewed and interpreted unless otherwise noted).   BMP , K 2.3, , bicarb 27, BUN 12, CR .6, Glu 126.  WBC 9.3, Hb 10.7, HCT 32.3, platelet 214  Blood gas pCO2 25, pO2 54, pH 7.63  3/3 urine culture positive for E. Coli and Proteus  Influenza A+  Telemetry showed NSR with rate of 90    Meets Continued ICU Level Care Criteria:    [x] Yes   [] No - Transfer   [] HOSPITALIST CONTACTED-      Patient seen by me including key components of medical care.  Case discussed with resident physician.  Patient extubated 3/5/25.  Patient agitated.  On Precedex and Phenobarbital.  Italicized bold font, if present,  represents changes to the note made by me.  CC time 35 minutes.  Time was discontiguous. Time does not include procedure. Time does include my direct assessment of the patient and coordination of care.  Time represents more than 50% of the time involved with patient care by the CC team.  Electronically signed by Rusty Frazier MD.      Yes

## 2025-03-06 NOTE — PROGRESS NOTES
[facility]  OCCUPATIONAL THERAPY MISSED TREATMENT NOTE  [facility] CCU 3A  3A-05/005-A      Date: 3/6/2025  Patient Name: Rvika Zavala        CSN: [id number]   : 1970  (54 y.o.)  Gender: female                REASON FOR MISSED TREATMENT: Pt continues to have strict bedrest orders and with difficulty managing withdrawal symptoms. Per RN, pt unable to follow commands. Therapy will follow up as medically appropriate and as schedule allows.

## 2025-03-06 NOTE — PROGRESS NOTES
Utilize [facility] alcohol withdrawal scale (Based on [place] Modified Alcohol Withdrawal Scale).  Tabulate score based on classifications including Tremor, Sweating, Hallucination, Orientation, and Agitation.     Tremor: 2  Sweatin  Hallucinations: 2  Orientation: 2  Agitation: 2  Total Score: 9  Action perform as described below      Tremor:  No tremor is 0 points.  Tremor on movement is 1 point.  Tremor at rest is 2 points.  Sweating: No Sweat 0 points. Moist is 1 point.  Drenching sweats is 2 points.  Hallucinations: No present 0 points. Dissuadable is 1 point. Not dissuadable is 2 points.  Orientation: Oriented 0 points. Vague/detached 1 point. Disoriented/no contact 2 points.  Agitation: Calm 0 points.  Anxious 1 point. Panicky 2 points.     Check scale every 2 hours.  Discontinue scoring with 4 consecutive scorings of 0.  Scale 0: No phenobarbital given.  Re-assess every 60 minutes as needed.   Scale 1-3: Phenobarbital 130 mg IV over 3 minutes. Re-assess every 60 minutes as needed.  May administer every 60 minutes to a maximum dose of phenobarbital 1040 mg in 24 hours!  Scale 4-8: Phenobarbital 260 mg IV over 5 minutes.  Re-assess every 60 minutes as needed. May administer every 60 minutes to a maximum dose of phenobarbital 1040mg in 24 hours!  Scale 9-10: Transfer to ICU (if not already in ICU).  Administer 10mg/kg phenobarbital IV over 60 minutes.  Maximum dose phenobarbital is 1040mg in 24 hours!

## 2025-03-06 NOTE — PROGRESS NOTES
[facility]  INPATIENT PHYSICAL THERAPY  EVALUATION  [facility] CCU 3A - 3A-05/005-A    Discharge Recommendations: Continue to assess pending progress, Patient would benefit from continued therapy after discharge (inpatient therapy stay)  Equipment Recommendations: No (will cont to assess needs)             Time In: 1152  Time Out: 1217  Timed Code Treatment Minutes: 15 Minutes  Minutes: 25      *Co-tx required with OT due to medical complexity of the pt requiring 2 skilled therapists and pt not able to tolerate 2 separate sessions.           Date: 3/6/2025  Patient Name: Rivka Zavala,  Gender:  female        MRN: [id number]  : 1970  (54 y.o.)      Referring Practitioner: Dave Otto MD  Diagnosis: Subdural hematoma (HCC)  Additional Pertinent Hx: 54-year-old female with PMH of MS, alcohol abuse, personality disorder, smoking, anemia, malnutrition who was brought to the ED by her parents for AMS.  Patient reportedly was found beer cans and covered in feces.  In the ED, alcohol level was negative. CT head showed acute brain hemorrhage in the left basal ganglia measuring 5.5 x 3.3 x 3.1 cm with mass effect upon the left lateral ventricle with left-to-right midline shift of 4 mm. Neurosurgery was consulted and she was admitted to ICU per NS. In the ED, patient was intubated for airway protection. She was then extubated 3/1. Patient had increased work of breathing and was reintubated on night of 3/2.     Restrictions/Precautions:  Restrictions/Precautions: Contact Precautions, Fall Risk, Seizure       Other Position/Activity Restrictions: R sided weakness    Required Braces or Orthoses?: No      Subjective:  Chart Reviewed: Yes  Patient assessed for rehabilitation services?: Yes  Family/Caregiver Present: No  Subjective: Ok to see pt per nursing. Pt in bed with restraints, limited communication during session. Pt would ask for water and was \"thirsty.\" Once back in bed, pt was able to verbalize to    Scooting: Dependent, X 2 up in bed at end of session  Assist for trunk and B LE support    Transfers:  Not Tested due to increased assist in sitting and overall weakness    Ambulation:  Not Tested    Stairs:  Not Tested    Exercise:  None    Functional Outcome Measures:  [facility] (6 CLICK) BASIC MOBILITY     AM-PAC Inpatient Mobility without Stair Climbing Raw Score : 5  AM-PAC Inpatient without Stair Climbing T-Scale Score : 23.59       Modified Jasmeet:  Premorbid Functional Status: 0 - No symptoms at all. Fully independent.  Current Functional Status:  +5 - Severe disability; bedridden, incontinent and requiring constant nursing care and attention   Patient could not live alone, could not walk from one room to another without physical help from another person, and can not sit up in bed without any help.  Education provided regarding stroke rehabilitation management.    ASSESSMENT:  Activity Tolerance:  Patient tolerance of treatment:Fair.    Treatment Initiated: Treatment and education initiated within context of evaluation.  Evaluation time included review of current medical information, gathering information related to past medical, social and functional history, completion of standardized testing, formal and informal observation of tasks, assessment of data and development of plan of care and goals.  Treatment time included skilled education and facilitation of tasks to increase safety and independence with functional mobility for improved independence and quality of life.    Assessment:  Body Structures, Functions, Activity Limitations Requiring Skilled Therapeutic Intervention: Decreased functional mobility , Decreased endurance, Decreased strength, Decreased safe awareness, Decreased cognition, Decreased posture, Decreased balance  Assessment: Rivka Zavala is a 54 y.o. female who presents with the deficits stated previously. Pt requires 2 person assist for functional tasks with completion of bed mobility

## 2025-03-06 NOTE — PLAN OF CARE
Problem: Anxiety  Goal: Will report anxiety at manageable levels  Description: INTERVENTIONS:  1. Administer medication as ordered  2. Teach and rehearse alternative coping skills  3. Provide emotional support with 1:1 interaction with staff  3/5/2025 2352 by Eliu Ramirez RN  Outcome: Not Progressing  Flowsheets (Taken 3/5/2025 0815 by Violeta Choe RN)  Will report anxiety at manageable levels:   Administer medication as ordered   Teach and rehearse alternative coping skills   Provide emotional support with 1:1 interaction with staff  3/5/2025 1425 by Violeta Choe RN  Outcome: Progressing  Flowsheets (Taken 3/5/2025 0815)  Will report anxiety at manageable levels:   Administer medication as ordered   Teach and rehearse alternative coping skills   Provide emotional support with 1:1 interaction with staff     Problem: Confusion  Goal: Confusion, delirium, dementia, or psychosis is improved or at baseline  Description: INTERVENTIONS:  1. Assess for possible contributors to thought disturbance, including medications, impaired vision or hearing, underlying metabolic abnormalities, dehydration, psychiatric diagnoses, and notify attending LIP  2. [place] high risk fall precautions, as indicated  3. Provide frequent short contacts to provide reality reorientation, refocusing and direction  4. Decrease environmental stimuli, including noise as appropriate  5. Monitor and intervene to maintain adequate nutrition, hydration, elimination, sleep and activity  6. If unable to ensure safety without constant attention obtain sitter and review sitter guidelines with assigned personnel  7. Initiate Psychosocial CNS and Spiritual Care consult, as indicated  3/5/2025 2352 by Eliu Ramirez RN  Outcome: Not Progressing  Flowsheets (Taken 3/5/2025 0815 by Violeta Choe RN)  Effect of thought disturbance (confusion, delirium, dementia, or psychosis) are managed with adequate functional status:   Assess for  contributors to thought disturbance, including medications, impaired vision or hearing, underlying metabolic abnormalities, dehydration, psychiatric diagnoses, notify [facility] high risk fall precautions, as indicated   Provide frequent short contacts to provide reality reorientation, refocusing and direction   Decrease environmental stimuli, including noise as appropriate  3/5/2025 1425 by Violeta Choe, RN  Outcome: Progressing  Flowsheets (Taken 3/5/2025 0815)  Effect of thought disturbance (confusion, delirium, dementia, or psychosis) are managed with adequate functional status:   Assess for contributors to thought disturbance, including medications, impaired vision or hearing, underlying metabolic abnormalities, dehydration, psychiatric diagnoses, notify [facility] high risk fall precautions, as indicated   Provide frequent short contacts to provide reality reorientation, refocusing and direction   Decrease environmental stimuli, including noise as appropriate

## 2025-03-06 NOTE — PLAN OF CARE
Problem: Safety - Medical Restraint  Goal: Remains free of injury from restraints (Restraint for Interference with Medical Device)  Description: INTERVENTIONS:  1. Determine that other, less restrictive measures have been tried or would not be effective before applying the restraint  2. Evaluate the patient's condition at the time of restraint application  3. Inform patient/family regarding the reason for restraint  4. Q2H: Monitor safety, psychosocial status, comfort, nutrition and hydration  3/6/2025 0228 by Nataly Tang RN  Outcome: Progressing  3/6/2025 0209 by Nataly Tang RN  Remains free of injury from restraints (restraint for interference with medical device):   Determine that other, less restrictive measures have been tried or would not be effective before applying the restraint   Evaluate the patient's condition at the time of restraint application   Inform patient/family regarding the reason for restraint   Every 2 hours: Monitor safety, psychosocial status, comfort, nutrition and hydration  Remains free of injury from restraints (restraint for interference with medical device): Determine that other, less restrictive measures have been tried or would not be effective before applying the restraint  Care plan reviewed with pt. Pt unable to verbalizes understanding of the care plan or contributed to goal setting.

## 2025-03-06 NOTE — PLAN OF CARE
Problem: Anxiety  Goal: Will report anxiety at manageable levels  Description: INTERVENTIONS:  1. Administer medication as ordered  2. Teach and rehearse alternative coping skills  3. Provide emotional support with 1:1 interaction with staff  3/5/2025 2352 by Eliu Ramirez RN  Outcome: Not Progressing  Flowsheets (Taken 3/5/2025 0815 by Violeta Choe RN)  Will report anxiety at manageable levels:   Administer medication as ordered   Teach and rehearse alternative coping skills   Provide emotional support with 1:1 interaction with staff  3/5/2025 1425 by Violeta Choe RN  Outcome: Progressing  Flowsheets (Taken 3/5/2025 0815)  Will report anxiety at manageable levels:   Administer medication as ordered   Teach and rehearse alternative coping skills   Provide emotional support with 1:1 interaction with staff     Problem: Confusion  Goal: Confusion, delirium, dementia, or psychosis is improved or at baseline  Description: INTERVENTIONS:  1. Assess for possible contributors to thought disturbance, including medications, impaired vision or hearing, underlying metabolic abnormalities, dehydration, psychiatric diagnoses, and notify attending LIP  2. [place] high risk fall precautions, as indicated  3. Provide frequent short contacts to provide reality reorientation, refocusing and direction  4. Decrease environmental stimuli, including noise as appropriate  5. Monitor and intervene to maintain adequate nutrition, hydration, elimination, sleep and activity  6. If unable to ensure safety without constant attention obtain sitter and review sitter guidelines with assigned personnel  7. Initiate Psychosocial CNS and Spiritual Care consult, as indicated  3/5/2025 2352 by Eliu Ramirez RN  Outcome: Not Progressing  Flowsheets (Taken 3/5/2025 0815 by Violeta Choe RN)  Effect of thought disturbance (confusion, delirium, dementia, or psychosis) are managed with adequate functional status:   Assess for  contributors to thought disturbance, including medications, impaired vision or hearing, underlying metabolic abnormalities, dehydration, psychiatric diagnoses, [facility], as indicated   Provide frequent short contacts to provide reality reorientation, refocusing and direction   Decrease environmental stimuli, including noise as appropriate  3/5/2025 1425 by Violeta Choe RN  Outcome: Progressing  Flowsheets (Taken 3/5/2025 0815)  Effect of thought disturbance (confusion, delirium, dementia, or psychosis) are managed with adequate functional status:   Assess for contributors to thought disturbance, including medications, impaired vision or hearing, underlying metabolic abnormalities, dehydration, psychiatric diagnoses, notify [facility] high risk fall precautions, as indicated   Provide frequent short contacts to provide reality reorientation, refocusing and direction   Decrease environmental stimuli, including noise as appropriate     Problem: Anxiety  Goal: Will report anxiety at manageable levels  Description: INTERVENTIONS:  1. Administer medication as ordered  2. Teach and rehearse alternative coping skills  3. Provide emotional support with 1:1 interaction with staff  3/5/2025 2352 by Eliu Ramirez RN  Outcome: Not Progressing  Flowsheets (Taken 3/5/2025 0815 by Violeta Choe, RN)  Will report anxiety at manageable levels:   Administer medication as ordered   Teach and rehearse alternative coping skills   Provide emotional support with 1:1 interaction with staff  3/5/2025 1425 by Violeta Choe RN  Outcome: Progressing  Flowsheets (Taken 3/5/2025 0815)  Will report anxiety at manageable levels:   Administer medication as ordered   Teach and rehearse alternative coping skills   Provide emotional support with 1:1 interaction with staff     Problem: Confusion  Goal: Confusion, delirium, dementia, or psychosis is improved or at baseline  Description:

## 2025-03-06 NOTE — PROGRESS NOTES
Comprehensive Nutrition Assessment    Type and Reason for Visit:  Reassess, Nutrition support    Nutrition Recommendations/Plan:   Pending appropriateness for SLP evaluation - recommend diet per SLP recommendations  If started on PO diet, will initiate ONS: Ensure Clear (TID) - refuses all other ONS options  If unable to safely assess swallowing function 3/7 - recommend placing NGT and to start enteral feedings pending appropriateness.   TF recommendations if needed - Jevity 1.5 at 35 mL/hr for 24 hours  Additional free H2O per provider - recommend 100 mL q 4 hours     Malnutrition Assessment:  Malnutrition Status:  Severe malnutrition (03/01/25 0958)    Context:  Chronic Illness     Findings of the 6 clinical characteristics of malnutrition:  Energy Intake:  75% or less estimated energy requirements for 1 month or longer  Weight Loss:   (-16.5% in 5 months)     Body Fat Loss:  Severe body fat loss Orbital, Buccal region   Muscle Mass Loss:  Severe muscle mass loss Clavicles (pectoralis & deltoids), Temples (temporalis), Scapula (trapezius), Calf (gastrocnemius)  Fluid Accumulation:  Unable to assess     Strength:  Not Performed    Nutrition Assessment:     Pt. severely malnourished AEB criteria listed above.  At risk for further nutritional compromise r/t intubation 2/28 - extubated 3/1 - re-intubated 3/3 - extubated 3/5, admit with subdural hematoma, substance abuse, +Flu A and possible PNA, and underlying medical condition (hx malnutrition, etoh abuse, MS).     Nutrition Related Findings:   Pt. Report/Treatments/Miscellaneous: Pt with agitation s/p extubation. Pending SLP evaluation when appropriate for swallow evaluation. If unable to complete swallow eval 3/7, would recommend NGT placement and TF until appropriate for PO diet trial. Will monitor for ability to add ONS.   GI Status: BM - 3/5  Pertinent Labs: Na 156, K 2.3, glucose 126  Pertinent Meds: thiamine, protonix, precedex, fentanyl, rocephin,

## 2025-03-06 NOTE — PROGRESS NOTES
Utilize [facility] alcohol withdrawal scale (Based on Jeremiah Modified Alcohol Withdrawal Scale).  Tabulate score based on classifications including Tremor, Sweating, Hallucination, Orientation, and Agitation.     Tremor: 2  Sweatin  Hallucinations: 2  Orientation: 2  Agitation: 2  Total Score: 9  Action perform as described below      Tremor:  No tremor is 0 points.  Tremor on movement is 1 point.  Tremor at rest is 2 points.  Sweating: No Sweat 0 points. Moist is 1 point.  Drenching sweats is 2 points.  Hallucinations: No present 0 points. Dissuadable is 1 point. Not dissuadable is 2 points.  Orientation: Oriented 0 points. Vague/detached 1 point. Disoriented/no contact 2 points.  Agitation: Calm 0 points.  Anxious 1 point. Panicky 2 points.     Check scale every 2 hours.  Discontinue scoring with 4 consecutive scorings of 0.  Scale 0: No phenobarbital given.  Re-assess every 60 minutes as needed.   Scale 1-3: Phenobarbital 130 mg IV over 3 minutes. Re-assess every 60 minutes as needed.  May administer every 60 minutes to a maximum dose of phenobarbital 1040 mg in 24 hours!  Scale 4-8: Phenobarbital 260 mg IV over 5 minutes.  Re-assess every 60 minutes as needed. May administer every 60 minutes to a maximum dose of phenobarbital 1040mg in 24 hours!  Scale 9-10: Transfer to ICU (if not already in ICU).  Administer 10mg/kg phenobarbital IV over 60 minutes.  Maximum dose phenobarbital is 1040mg in 24 hours!

## 2025-03-06 NOTE — PLAN OF CARE
Problem: Discharge Planning  Goal: Discharge to home or other facility with appropriate resources  3/6/2025 0936 by Ely Lester RN  Outcome: Progressing     Problem: Pain  Goal: Verbalizes/displays adequate comfort level or baseline comfort level  3/6/2025 0936 by Ely Lester RN  Outcome: Progressing     Problem: Safety - Adult  Goal: Free from fall injury  3/6/2025 0936 by Ely Lester RN  Outcome: Progressing     Problem: Safety - Medical Restraint  Goal: Remains free of injury from restraints (Restraint for Interference with Medical Device)  Description: INTERVENTIONS:  1. Determine that other, less restrictive measures have been tried or would not be effective before applying the restraint  2. Evaluate the patient's condition at the time of restraint application  3. Inform patient/family regarding the reason for restraint  4. Q2H: Monitor safety, psychosocial status, comfort, nutrition and hydration  3/6/2025 0936 by Ely Lester RN  Outcome: Progressing     Problem: Skin/Tissue Integrity  Goal: Skin integrity remains intact  Description: 1.  Monitor for areas of redness and/or skin breakdown  2.  Assess vascular access sites hourly  3.  Every 4-6 hours minimum:  Change oxygen saturation probe site  4.  Every 4-6 hours:  If on nasal continuous positive airway pressure, respiratory therapy assess nares and determine need for appliance change or resting period  3/6/2025 0936 by Ely Lester RN  Outcome: Progressing     Problem: Respiratory - Adult  Goal: Achieves optimal ventilation and oxygenation  3/6/2025 0936 by Ely Lester RN  Outcome: Progressing     Problem: Neurosensory - Adult  Goal: Achieves stable or improved neurological status  3/6/2025 0936 by Ely Lester RN  Outcome: Progressing     Problem: Neurosensory - Adult  Goal: Absence of seizures  3/6/2025 0936 by Ely Lester RN  Outcome: Progressing     Problem: Neurosensory - Adult  Goal: Remains free of injury related to seizures  Adult  Goal: Maintains or returns to baseline bowel function  3/6/2025 0936 by Ely Lester RN  Outcome: Progressing     Problem: Gastrointestinal - Adult  Goal: Maintains adequate nutritional intake  3/6/2025 0936 by Ely Lester RN  Outcome: Progressing     Problem: Genitourinary - Adult  Goal: Absence of urinary retention  3/6/2025 0936 by Ely Lester RN  Outcome: Progressing     Problem: Genitourinary - Adult  Goal: Urinary catheter remains patent  3/6/2025 0936 by Ely Lester RN  Outcome: Progressing     Problem: Infection - Adult  Goal: Absence of infection at discharge  3/6/2025 0936 by Ely Lester RN  Outcome: Progressing     Problem: Infection - Adult  Goal: Absence of infection during hospitalization  3/6/2025 0936 by Ely Lester RN  Outcome: Progressing     Problem: Metabolic/Fluid and Electrolytes - Adult  Goal: Electrolytes maintained within normal limits  3/6/2025 0936 by Ely Lester RN  Outcome: Progressing     Problem: Metabolic/Fluid and Electrolytes - Adult  Goal: Hemodynamic stability and optimal renal function maintained  3/6/2025 0936 by Ely Lester RN  Outcome: Progressing     Problem: Metabolic/Fluid and Electrolytes - Adult  Goal: Glucose maintained within prescribed range  3/6/2025 0936 by Ely Lester RN  Outcome: Progressing     Problem: Anxiety  Goal: Will report anxiety at manageable levels  Description: INTERVENTIONS:  1. Administer medication as ordered  2. Teach and rehearse alternative coping skills  3. Provide emotional support with 1:1 interaction with staff  3/6/2025 0936 by Ely Lester RN  Outcome: Not Progressing     Problem: Confusion  Goal: Confusion, delirium, dementia, or psychosis is improved or at baseline  Description: INTERVENTIONS:  1. Assess for possible contributors to thought disturbance, including medications, impaired vision or hearing, underlying metabolic abnormalities, dehydration, psychiatric diagnoses, and notify attending LIP  2.

## 2025-03-06 NOTE — CARE COORDINATION
03/06/25 2:34 PM    Received LTACH eval order in [facility].     Spoke with Rivka's mother - Radha Valles; reported received LTACH eval order. Explained LTACH vs SNF and what each offers. Radha states she would prefer LTACH at discharge. Reported closest locations of LTACHs: [place], [place], [place], and [place]. Radha states she would prefer Rivka go to [facility] [place] at discharge.     Referral given to Ti Ingram. Plan to start precert today.

## 2025-03-06 NOTE — PROGRESS NOTES
Spoke to Ely SOTO about patient's appropriateness for addiction consult order. Pt not appropriate today. NELDA will check back tomorrow.

## 2025-03-06 NOTE — PROGRESS NOTES
Decreased balance, Decreased high-level IADLs, Decreased coordination, Decreased posture, Decreased fine motor control, Decreased vision/visual deficit, Decreased sensation, Decreased cognition, Decreased strength, Decreased ADL status  Prognosis: Fair  REQUIRES OT FOLLOW-UP: Yes  Decision Making: Medium Complexity    Treatment Initiated: Treatment and education initiated within context of evaluation.  Evaluation time included review of current medical information, gathering information related to past medical, social and functional history, completion of standardized testing, formal and informal observation of tasks, assessment of data and development of plan of care and goals.  Treatment time included skilled education and facilitation of tasks to increase safety and independence with ADL's for improved functional independence and quality of life.    Patient Education:          Patient Education  Education Given To: Patient  Education Provided: Role of Therapy;Precautions;Fall Prevention Strategies;Plan of Care;Orientation  Education Method: Demonstration;Verbal  Barriers to Learning: Cognition  Education Outcome: Unable to verbalize;Continued education needed    Plan:  Times Per Week: 5x-C  Current Treatment Recommendations: Strengthening, ROM, Balance training, Functional mobility training, Endurance training, Neuromuscular re-education, Cognitive reorientation, Pain management, Safety education & training, Patient/Caregiver education & training, Equipment evaluation, education, & procurement, Positioning, Modalities, Self-Care / ADL, Home management training, Coordination training, Cognitive/Perceptual training.  See long-term goal time frame for expected duration of plan of care.  If no long-term goals established, a short length of stay is anticipated.    Goals:  Patient goals : unable to state  Short Term Goals  Time Frame for Short Term Goals: by discharge  Short Term Goal 1: Pt will progress to ADL  transfer via LRD as appropriate.  Short Term Goal 2: Pt will complete simple ADL task w/ Mod assist and 0-2 cues for sequencing.  Short Term Goal 3: Pt will complete dynamic sitting task >10 min for improved upright ADL tolerance.  Short Term Goal 4: Pt will follow 75% of 1 step commands.  Long Term Goals  Time Frame for Long Term Goals : No LTGs set 2/2 short ELOS    AM-PAC Inpatient Daily Activity Raw Score: 6  AM-PAC Inpatient ADL T-Scale Score : 17.07    Following session, patient left in safe position with all fall risk precautions in place.

## 2025-03-06 NOTE — PROGRESS NOTES
Writer and nurse Kailey Langford RN obtained consent for NG tube placement from patient's mother Tracy Valles. However, Tracy would like the patient to be assessed for swallow function first before NG tube being placed. Tracy then went on to say she will be here tomorrow to speak with nursing staff and doctor.

## 2025-03-06 NOTE — PROGRESS NOTES
[facility]  SPEECH THERAPY MISSED TREATMENT NOTE  STRZ CCU 3A      Date: 3/6/2025  Patient Name: Rivka Zavala        MRN: [id number]    : 1970  (54 y.o.)    REASON FOR MISSED TREATMENT:  ST attempted to see patient at 0907 for completion of clinical swallow evaluation and cognitive linguistic assessment s/t acute findings of acute hemorrhage in the left basal ganglia and left temporal lobe. RN Ely requesting ST hold at this time d/t heightened agitation levels. ST to f/u on 25 as medically appropriate.     Caro Ingram MA, CCC-SLP [id number]

## 2025-03-07 ENCOUNTER — APPOINTMENT (OUTPATIENT)
Dept: GENERAL RADIOLOGY | Age: 55
End: 2025-03-07
Payer: COMMERCIAL

## 2025-03-07 ENCOUNTER — HOSPITAL ENCOUNTER (OUTPATIENT)
Age: 55
Discharge: HOME | End: 2025-03-28
Attending: INTERNAL MEDICINE | Admitting: INTERNAL MEDICINE
Payer: COMMERCIAL

## 2025-03-07 VITALS
BODY MASS INDEX: 14.45 KG/M2 | DIASTOLIC BLOOD PRESSURE: 89 MMHG | HEIGHT: 64 IN | RESPIRATION RATE: 22 BRPM | SYSTOLIC BLOOD PRESSURE: 139 MMHG | HEART RATE: 125 BPM | TEMPERATURE: 98.2 F | WEIGHT: 84.66 LBS | OXYGEN SATURATION: 96 %

## 2025-03-07 LAB
ALBUMIN SERPL BCG-MCNC: 3.8 G/DL (ref 3.4–4.9)
ALP SERPL-CCNC: 100 U/L (ref 35–104)
ALT SERPL W/O P-5'-P-CCNC: 19 U/L (ref 10–35)
ANION GAP SERPL CALC-SCNC: 17 MEQ/L (ref 8–16)
AST SERPL-CCNC: 55 U/L (ref 10–35)
BILIRUB CONJ SERPL-MCNC: 0.2 MG/DL (ref 0–0.2)
BILIRUB SERPL-MCNC: 0.2 MG/DL (ref 0.3–1.2)
BUN SERPL-MCNC: 12 MG/DL (ref 8–23)
CALCIUM SERPL-MCNC: 9 MG/DL (ref 8.6–10)
CHLORIDE SERPL-SCNC: 120 MEQ/L (ref 98–111)
CO2 SERPL-SCNC: 23 MEQ/L (ref 22–29)
CREAT SERPL-MCNC: 0.6 MG/DL (ref 0.5–0.9)
DEPRECATED RDW RBC AUTO: 59.9 FL (ref 35–45)
ERYTHROCYTE [DISTWIDTH] IN BLOOD BY AUTOMATED COUNT: 15.5 % (ref 11.5–14.5)
GFR SERPL CREATININE-BSD FRML MDRD: > 90 ML/MIN/1.73M2
GLUCOSE SERPL-MCNC: 80 MG/DL (ref 74–109)
HCT VFR BLD AUTO: 34.2 % (ref 37–47)
HGB BLD-MCNC: 10.7 GM/DL (ref 12–16)
MAGNESIUM SERPL-MCNC: 2.1 MG/DL (ref 1.6–2.6)
MCH RBC QN AUTO: 32.8 PG (ref 26–33)
MCHC RBC AUTO-ENTMCNC: 31.3 GM/DL (ref 32.2–35.5)
MCV RBC AUTO: 104.9 FL (ref 81–99)
PHOSPHATE SERPL-MCNC: 2.5 MG/DL (ref 2.5–4.5)
PLATELET # BLD AUTO: 307 THOU/MM3 (ref 130–400)
PMV BLD AUTO: 10.9 FL (ref 9.4–12.4)
POTASSIUM SERPL-SCNC: 3.5 MEQ/L (ref 3.5–5.2)
PROT SERPL-MCNC: 7.3 G/DL (ref 6.4–8.3)
RBC # BLD AUTO: 3.26 MILL/MM3 (ref 4.2–5.4)
SODIUM SERPL-SCNC: 157 MEQ/L (ref 135–145)
SODIUM SERPL-SCNC: 160 MEQ/L (ref 135–145)
TRIGL SERPL-MCNC: 185 MG/DL (ref 0–199)
WBC # BLD AUTO: 12.6 THOU/MM3 (ref 4.8–10.8)

## 2025-03-07 PROCEDURE — 2580000003 HC RX 258: Performed by: NURSE PRACTITIONER

## 2025-03-07 PROCEDURE — 84100 ASSAY OF PHOSPHORUS: CPT

## 2025-03-07 PROCEDURE — 2500000003 HC RX 250 WO HCPCS

## 2025-03-07 PROCEDURE — 97530 THERAPEUTIC ACTIVITIES: CPT

## 2025-03-07 PROCEDURE — 84478 ASSAY OF TRIGLYCERIDES: CPT

## 2025-03-07 PROCEDURE — 99291 CRITICAL CARE FIRST HOUR: CPT | Performed by: INTERNAL MEDICINE

## 2025-03-07 PROCEDURE — 2500000003 HC RX 250 WO HCPCS: Performed by: INTERNAL MEDICINE

## 2025-03-07 PROCEDURE — 84295 ASSAY OF SERUM SODIUM: CPT

## 2025-03-07 PROCEDURE — 6360000002 HC RX W HCPCS

## 2025-03-07 PROCEDURE — 80053 COMPREHEN METABOLIC PANEL: CPT

## 2025-03-07 PROCEDURE — 85027 COMPLETE CBC AUTOMATED: CPT

## 2025-03-07 PROCEDURE — 92610 EVALUATE SWALLOWING FUNCTION: CPT

## 2025-03-07 PROCEDURE — 92611 MOTION FLUOROSCOPY/SWALLOW: CPT

## 2025-03-07 PROCEDURE — 71045 X-RAY EXAM CHEST 1 VIEW: CPT

## 2025-03-07 PROCEDURE — 36415 COLL VENOUS BLD VENIPUNCTURE: CPT

## 2025-03-07 PROCEDURE — 74230 X-RAY XM SWLNG FUNCJ C+: CPT

## 2025-03-07 PROCEDURE — 97112 NEUROMUSCULAR REEDUCATION: CPT

## 2025-03-07 PROCEDURE — 82248 BILIRUBIN DIRECT: CPT

## 2025-03-07 PROCEDURE — 83735 ASSAY OF MAGNESIUM: CPT

## 2025-03-07 RX ORDER — DEXTROSE MONOHYDRATE 50 MG/ML
INJECTION, SOLUTION INTRAVENOUS CONTINUOUS
Status: DISCONTINUED | OUTPATIENT
Start: 2025-03-07 | End: 2025-03-07 | Stop reason: HOSPADM

## 2025-03-07 RX ADMIN — DEXTROSE MONOHYDRATE: 50 INJECTION, SOLUTION INTRAVENOUS at 07:00

## 2025-03-07 RX ADMIN — FENTANYL CITRATE 50 MCG: 50 INJECTION INTRAMUSCULAR; INTRAVENOUS at 11:24

## 2025-03-07 RX ADMIN — PANTOPRAZOLE SODIUM 40 MG: 40 INJECTION, POWDER, FOR SOLUTION INTRAVENOUS at 08:13

## 2025-03-07 RX ADMIN — FENTANYL CITRATE 50 MCG: 50 INJECTION INTRAMUSCULAR; INTRAVENOUS at 05:06

## 2025-03-07 RX ADMIN — POTASSIUM CHLORIDE 20 MEQ: 29.8 INJECTION INTRAVENOUS at 08:22

## 2025-03-07 RX ADMIN — POTASSIUM CHLORIDE 20 MEQ: 29.8 INJECTION INTRAVENOUS at 09:14

## 2025-03-07 RX ADMIN — DEXTROSE MONOHYDRATE: 50 INJECTION, SOLUTION INTRAVENOUS at 18:28

## 2025-03-07 RX ADMIN — THIAMINE HYDROCHLORIDE 200 MG: 100 INJECTION, SOLUTION INTRAMUSCULAR; INTRAVENOUS at 15:40

## 2025-03-07 RX ADMIN — BARIUM SULFATE 10 ML: 400 PASTE ORAL at 10:08

## 2025-03-07 RX ADMIN — FENTANYL CITRATE 50 MCG: 50 INJECTION INTRAMUSCULAR; INTRAVENOUS at 15:40

## 2025-03-07 RX ADMIN — FENTANYL CITRATE 50 MCG: 50 INJECTION INTRAMUSCULAR; INTRAVENOUS at 00:08

## 2025-03-07 RX ADMIN — Medication 0.9 MCG/KG/HR: at 10:36

## 2025-03-07 RX ADMIN — THIAMINE HYDROCHLORIDE 200 MG: 100 INJECTION, SOLUTION INTRAMUSCULAR; INTRAVENOUS at 08:13

## 2025-03-07 RX ADMIN — BARIUM SULFATE 30 ML: 0.81 POWDER, FOR SUSPENSION ORAL at 10:07

## 2025-03-07 RX ADMIN — SODIUM CHLORIDE, PRESERVATIVE FREE 10 ML: 5 INJECTION INTRAVENOUS at 08:13

## 2025-03-07 ASSESSMENT — PAIN SCALES - WONG BAKER
WONGBAKER_NUMERICALRESPONSE: NO HURT
WONGBAKER_NUMERICALRESPONSE: NO HURT

## 2025-03-07 ASSESSMENT — PAIN SCALES - GENERAL
PAINLEVEL_OUTOF10: 0
PAINLEVEL_OUTOF10: 0
PAINLEVEL_OUTOF10: 7
PAINLEVEL_OUTOF10: 0
PAINLEVEL_OUTOF10: 7
PAINLEVEL_OUTOF10: 0

## 2025-03-07 ASSESSMENT — PAIN DESCRIPTION - LOCATION
LOCATION: GENERALIZED
LOCATION: GENERALIZED

## 2025-03-07 NOTE — PROGRESS NOTES
[facility]  SPEECH THERAPY  [facility] CCU 3A  Clinical Swallow Evaluation    Discharge Recommendations:  Continue to assess pending progress  DIET ORDER RECOMMENDATIONS AFTER EVALUATION: NPO  Strategies:  Recommend NPO and Strategies pending MBS completion     SLP Individual Minutes  Time In: 822  Time Out: 831  Minutes: 9  Timed Code Treatment Minutes: 0 Minutes       Date: 3/7/2025  Patient Name: Rivka Zavala      CSN: [id number]   : 1970  (54 y.o.)  Gender: female   Referring Physician:  Ophelia Muro DO     Diagnosis: Subdural hematoma (HCC)    History of Present Illness/Injury: Patient is a 54-year-old female with PMH of MS, alcohol abuse, personality disorder, smoking, anemia, malnutrition who was brought to the ED by her parents for AMS. Patient reportedly was found near beer cans and covered in feces. In the ED, alcohol level was negative. CT head showed acute brain hemorrhage in the left basal ganglia measuring 5.5 x 3.3 x 3.1 cm with mass effect upon the left lateral ventricle with left-to-right midline shift of 4 mm. Neurosurgery was consulted and she was admitted to ICU per NS. In the ED, patient was intubated for airway protection. She was then extubated 3/1. Patient had increased work of breathing and was reintubated on night of 3/2.     Past Medical History:   Diagnosis Date    Anemia     Clavicle fracture 10/13/2022    ETOH abuse     Malnutrition     Mild tetrahydrocannabinol (THC) abuse     Multiple sclerosis (HCC) 2017    Personality disorder (HCC) unknown    Rib fracture 10/13/2022    Smoker        SUBJECTIVE:  Patient seen with BRITTANY Graves's approval. Patient upright in bed with live sitter present at bedside. Bilateral wrist restraints in place. Patient confused with suspected aphasia (however, not formally addressed) given nonsensical conversational speech. Poor speech intelligibility evident. No family present at bedside.     OBJECTIVE:    Pain:  No pain  serial intubations, ?presence of pharyngeal compromise. At this time, recommend patient remain NPO with q2H oral care. Will plan for completion of MBS on this date to further assess pharyngeal integrity and aide in POC development.    RECOMMENDATIONS/ASSESSMENT:  Instrumental Evaluation: Modified Barium Swallow (MBS)  Rehabilitation Potential: good    EDUCATION:  Learner: Patient  Education:  Reviewed results and recommendations of this evaluation, Education Related to Stroke Diagnosis, Reviewed ST goals and Plan of Care, and Reviewed recommendations for follow-up  Evaluation of Education: No evidence of learning and Family not present    PLAN:  Skilled SLP intervention on acute care 1-3 x per week or until goals met and or patient plateaus in function.  Specific interventions for next session may include: MBS    PATIENT GOAL:    Did not state.  Will further assess during treatment.    SHORT TERM GOALS:  Short Term Goals  Time Frame for Short Term Goals: 1-3 sessions  Goal 1: Patient will complete MBS to further assess pharyngeal integrity and aide in POC development  Goal 2: Staff will adhere to q2h oral care to decrease colonization of oral bacteria and ermelinda risk for further infection  Goal 3: Patient will complete cognitive linguistic assessment (?need for MAST) to further develop POC given recent neurological insult    LONG TERM GOALS:  No LTGs established d/t short YOUSUF Ingram MA, CCC-SLP [id number]

## 2025-03-07 NOTE — PROGRESS NOTES
Attempted Addiction Service Consult. Upon arrival patient was being taken off the floor. Nurse informed  patient has incomprehensible speech. NELDA to re-attempt.

## 2025-03-07 NOTE — PROGRESS NOTES
[facility]  STRZ CCU 3A  Occupational Therapy  Daily Note    Discharge Recommendations: Not safe to return home at this time and LTACH  Equipment Recommendations:  (continue to monitor for needs)      Co-tx completed with PT due to medically complex, requiring assist X2 to safely complete, and would not tolerate 2 separate therapy sessions. Plan to trial separate therapy sessions next tx date.    Time In: 1114  Time Out: 1131  Timed Code Treatment Minutes: 17 Minutes  Minutes: 17          Date: 3/7/2025  Patient Name: Rivka Zavala,   Gender: female      Room: [facility]005-  MRN: [id number]  : 1970  (54 y.o.)  Referring Practitioner: Dave Otto MD  Diagnosis: Subdural hematoma (HCC)  Additional Pertinent Hx: Per EMR: 54-year-old female with past medical history of MS, alcohol abuse, personality disorder, LISA who presents to [facility] with chief complaint of altered mental status.  She lives with a elderly parents who found her underneath a bed surrounded by beer cans covered in stool.  Alcohol level in the emergency department was negative.  CT scan of the head was performed and showed acute hemorrhage in the left basal ganglia extending to left temporal lobe measuring 4.7 x 3 x 3.7 cm in size.  Moderate surrounding edema and mass effect on the left lateral ventricle.  5.6 mm midline deviation to the right.    Restrictions/Precautions:  Restrictions/Precautions: Contact Precautions, Fall Risk, Seizure  Position Activity Restriction  Other Position/Activity Restrictions: R sided weakness     Social/Functional History:  Lives With: Parent  Type of Home: House  Home Layout: One level  Home Access: Stairs to enter without rails  Entrance Stairs - Number of Steps: 2  Home Equipment: Walker - 4-Wheeled   Bathroom Toilet: Handicap height  Bathroom Equipment: Tub transfer bench       Prior Level of Assist for ADLs: Independent  Prior Level of Assist for Homemaking: Independent (shared tasks with  mother)  Prior Level of Assist for Transfers: Independent  Prior Level of Assist for Ambulation: Independent household ambulator, with or without device, Independent community ambulator, with or without device    Active : No  Patient's  Info: Mother, Caludine or friends  Occupation: On disability  Additional Comments: Pt minimally verbal on eval. Above information from therapy eval 10/11/24.    SUBJECTIVE: Pt supine in bed upon arrival, in bilateral wrist restraints. Live sitter present.   **Pt verbalizes some throughout, dysarthria and aphasia noted.     PAIN: No signs of pain throughout.     Vitals: Blood Pressure: 121/93  Oxygen: 100% on RA  Heart Rate: 122 bpm on arrival; increased to 151 bpm at highest while seated at EOB with increased restlessness noted. Pt thus returned to supine then with RN present and giving IV pain meds.     COGNITION: Slow Processing, Decreased Recall, Impaired Memory, Inattention, Decreased Problem Solving, Decreased Safety Awareness, Impaired Attention, Difficulty Following Commands, Impulsive, and Tangential    ADL:   No ADL's completed this session..    IADL:   Not Tested    Sitting Balance: Pt tolerates sitting at EOB X3.5 minutes with initial moderate assistance although is able to progress to minimal assistance. Pt additionally requires CGA of additional staff for safety. Pt continuously restless, fidgety, and frequently trying to pull at linens/sheets on bed, unable to be redirected but pt also unable to specify what she was trying to do.     BED MOBILITY:  Rolling to Left: Moderate Assistance, X 1, with head of bed flat, with verbal cues , with increased time for completion    Supine to Sit: Moderate Assistance, X 1, with head of bed flat, with verbal cues , with increased time for completion    Sit to Supine: Minimal Assistance X2, with head of bed raised, with verbal cues , with increased time for completion    Scooting: Moderate Assistance, with increased time

## 2025-03-07 NOTE — PROGRESS NOTES
Notified patient's mother about patient moving to [place] at 6pm. Mother also would like to give consent for PEG tube placement.

## 2025-03-07 NOTE — PROGRESS NOTES
[facility]  SPEECH THERAPY  STRZ CCU 3A  Modified Barium Swallow    Discharge Recommendations: LTACH  DIET ORDER RECOMMENDATIONS AFTER EVALUATION: NPO with considerations for alternative means of nutrition   Strategies:  Recommend NPO     SLP Individual Minutes  Time In: 1020  Time Out: 1030       Date: 3/7/2025  Patient Name: Rivka Zavala      CSN: [id number]   : 1970  (54 y.o.)  Gender: female   Referring Physician:  Rusty Frazier MD    Diagnosis: Subdural hematoma (HCC)    History of Present Illness/Injury: Patient is a 54-year-old female with PMH of MS, alcohol abuse, personality disorder, smoking, anemia, malnutrition who was brought to the ED by her parents for AMS. Patient reportedly was found near beer cans and covered in feces. In the ED, alcohol level was negative. CT head showed acute brain hemorrhage in the left basal ganglia measuring 5.5 x 3.3 x 3.1 cm with mass effect upon the left lateral ventricle with left-to-right midline shift of 4 mm. Neurosurgery was consulted and she was admitted to ICU per NS. In the ED, patient was intubated for airway protection. She was then extubated 3/1. Patient had increased work of breathing and was reintubated on night of 3/2.     Past Medical History:   Diagnosis Date    Anemia     Clavicle fracture 10/13/2022    ETOH abuse     Malnutrition     Mild tetrahydrocannabinol (THC) abuse     Multiple sclerosis (HCC) 2017    Personality disorder (HCC) unknown    Rib fracture 10/13/2022    Smoker        SUBJECTIVE:  Patient resting in bed in fluoro suite accompanied by BRITTANY Graves. Patient with continued nonsensical, dysarthric speech. Limited direction following noted, R neglect suspected. No family present at bedside.     OBJECTIVE:    Pain:  No pain reported.    Current Diet: NPO    Respiratory Status:  Nasal Canula: 4LPM    Behavioral Observation:  Confused, Dysarthric, Limited Direction Following, and Agitated    PATIENT WAS EVALUATED USING:

## 2025-03-07 NOTE — CARE COORDINATION
3/7/25, 3:56 PM EST    Patient goals/plan/ treatment preferences discussed by  and .  Patient goals/plan/ treatment preferences reviewed with patient/ family.  Patient/ family verbalize understanding of discharge plan and are in agreement with goal/plan/treatment preferences.  Understanding was demonstrated using the teach back method.  AVS provided by RN at time of discharge, which includes all necessary medical information pertaining to the patients current course of illness, treatment, post-discharge goals of care, and treatment preferences.     Services At/After Discharge: [facility] - [facility] [place]    Pt going to [facility] ICU room 2. They can accept at 1800 today. Primary RN, Ely, and Dr Muro updated.

## 2025-03-07 NOTE — PROGRESS NOTES
Utilize [facility] alcohol withdrawal scale (Based on Jeremiah Modified Alcohol Withdrawal Scale).  Tabulate score based on classifications including Tremor, Sweating, Hallucination, Orientation, and Agitation.    Tremor: 0  Sweatin  Hallucinations: 1  Orientation: 1  Agitation: 1  Total Score: 3  Action perform as described below     Tremor:  No tremor is 0 points.  Tremor on movement is 1 point.  Tremor at rest is 2 points.  Sweating: No Sweat 0 points. Moist is 1 point.  Drenching sweats is 2 points.  Hallucinations: No present 0 points. Dissuadable is 1 point. Not dissuadable is 2 points.  Orientation: Oriented 0 points. Vague/detached 1 point. Disoriented/no contact 2 points.  Agitation: Calm 0 points.  Anxious 1 point. Panicky 2 points.    Check scale every 2 hours.  Discontinue scoring with 4 consecutive scorings of 0.  Scale 0: No phenobarbital given.  Re-assess every 60 minutes as needed.   Scale 1-3: Phenobarbital 130 mg IV over 3 minutes. Re-assess every 60 minutes as needed.  May administer every 60 minutes to a maximum dose of phenobarbital 1040 mg in 24 hours!  Scale 4-8: Phenobarbital 260 mg IV over 5 minutes.  Re-assess every 60 minutes as needed. May administer every 60 minutes to a maximum dose of phenobarbital 1040mg in 24 hours!  Scale 9-10: Transfer to ICU (if not already in ICU).  Administer 10mg/kg phenobarbital IV over 60 minutes.  Maximum dose phenobarbital is 1040mg in 24 hours!

## 2025-03-07 NOTE — PLAN OF CARE
Problem: Discharge Planning  Goal: Discharge to home or other facility with appropriate resources  3/7/2025 1638 by Ely Lester RN  Outcome: Adequate for Discharge     Problem: Pain  Goal: Verbalizes/displays adequate comfort level or baseline comfort level  3/7/2025 1638 by Ely Lester RN  Outcome: Adequate for Discharge     Problem: Safety - Adult  Goal: Free from fall injury  3/7/2025 1638 by Ely Lester RN  Outcome: Adequate for Discharge     Problem: Safety - Medical Restraint  Goal: Remains free of injury from restraints (Restraint for Interference with Medical Device)  Description: INTERVENTIONS:  1. Determine that other, less restrictive measures have been tried or would not be effective before applying the restraint  2. Evaluate the patient's condition at the time of restraint application  3. Inform patient/family regarding the reason for restraint  4. Q2H: Monitor safety, psychosocial status, comfort, nutrition and hydration  3/7/2025 1638 by Ely Lester RN  Outcome: Adequate for Discharge     Problem: Nutrition Deficit:  Goal: Optimize nutritional status  3/7/2025 1638 by Ely Lester RN  Outcome: Adequate for Discharge     Problem: Skin/Tissue Integrity  Goal: Skin integrity remains intact  Description: 1.  Monitor for areas of redness and/or skin breakdown  2.  Assess vascular access sites hourly  3.  Every 4-6 hours minimum:  Change oxygen saturation probe site  4.  Every 4-6 hours:  If on nasal continuous positive airway pressure, respiratory therapy assess nares and determine need for appliance change or resting period  3/7/2025 1638 by Ely Lester RN  Outcome: Adequate for Discharge     Problem: Respiratory - Adult  Goal: Achieves optimal ventilation and oxygenation  3/7/2025 1638 by Ely Lester RN  Outcome: Adequate for Discharge     Problem: Neurosensory - Adult  Goal: Achieves stable or improved neurological status  3/7/2025 1638 by Ely Lester RN  Outcome: Adequate for

## 2025-03-07 NOTE — PLAN OF CARE
Problem: Discharge Planning  Goal: Discharge to home or other facility with appropriate resources  3/7/2025 0922 by Ely Lester RN  Outcome: Progressing     Problem: Pain  Goal: Verbalizes/displays adequate comfort level or baseline comfort level  3/7/2025 0922 by Ely Lester RN  Outcome: Progressing     Problem: Safety - Adult  Goal: Free from fall injury  3/7/2025 0922 by Ely Lester RN  Outcome: Progressing     Problem: Safety - Medical Restraint  Goal: Remains free of injury from restraints (Restraint for Interference with Medical Device)  Description: INTERVENTIONS:  1. Determine that other, less restrictive measures have been tried or would not be effective before applying the restraint  2. Evaluate the patient's condition at the time of restraint application  3. Inform patient/family regarding the reason for restraint  4. Q2H: Monitor safety, psychosocial status, comfort, nutrition and hydration  3/7/2025 0922 by Ely Lester RN  Outcome: Progressing     Problem: Nutrition Deficit:  Goal: Optimize nutritional status  3/7/2025 0922 by Ely Lester RN  Outcome: Not Progressing     Problem: Skin/Tissue Integrity  Goal: Skin integrity remains intact  Description: 1.  Monitor for areas of redness and/or skin breakdown  2.  Assess vascular access sites hourly  3.  Every 4-6 hours minimum:  Change oxygen saturation probe site  4.  Every 4-6 hours:  If on nasal continuous positive airway pressure, respiratory therapy assess nares and determine need for appliance change or resting period  3/7/2025 0922 by Ely Lester RN  Outcome: Progressing     Problem: Respiratory - Adult  Goal: Achieves optimal ventilation and oxygenation  3/7/2025 0922 by Ely Lester RN  Outcome: Progressing     Problem: Neurosensory - Adult  Goal: Achieves stable or improved neurological status  3/7/2025 0922 by Ely Lester RN  Outcome: Progressing     Problem: Neurosensory - Adult  Goal: Absence of seizures  3/7/2025 0922  absence of nausea and vomiting  3/7/2025 0922 by Ely Lester RN  Outcome: Progressing     Problem: Gastrointestinal - Adult  Goal: Maintains or returns to baseline bowel function  3/7/2025 0922 by Ely Lester RN  Outcome: Progressing     Problem: Gastrointestinal - Adult  Goal: Maintains adequate nutritional intake  3/7/2025 0922 by Ely Lester RN  Outcome: Progressing     Problem: Genitourinary - Adult  Goal: Absence of urinary retention  3/7/2025 0922 by Ely Lester RN  Outcome: Progressing     Problem: Genitourinary - Adult  Goal: Urinary catheter remains patent  3/7/2025 0922 by Ely Lester RN  Outcome: Progressing     Problem: Infection - Adult  Goal: Absence of infection at discharge  3/7/2025 0922 by Ely Lester RN  Outcome: Progressing     Problem: Infection - Adult  Goal: Absence of infection during hospitalization  3/7/2025 0922 by Ely Lester RN  Outcome: Progressing     Problem: Metabolic/Fluid and Electrolytes - Adult  Goal: Electrolytes maintained within normal limits  3/7/2025 0922 by Ely Lester RN  Outcome: Progressing     Problem: Metabolic/Fluid and Electrolytes - Adult  Goal: Hemodynamic stability and optimal renal function maintained  3/7/2025 0922 by Ely Lester RN  Outcome: Progressing     Problem: Metabolic/Fluid and Electrolytes - Adult  Goal: Glucose maintained within prescribed range  3/7/2025 0922 by Ely Lester RN  Outcome: Progressing     Problem: Anxiety  Goal: Will report anxiety at manageable levels  Description: INTERVENTIONS:  1. Administer medication as ordered  2. Teach and rehearse alternative coping skills  3. Provide emotional support with 1:1 interaction with staff  3/7/2025 0922 by Ely Lester RN  Outcome: Progressing     Problem: Confusion  Goal: Confusion, delirium, dementia, or psychosis is improved or at baseline  Description: INTERVENTIONS:  1. Assess for possible contributors to thought disturbance, including medications, impaired

## 2025-03-07 NOTE — PROGRESS NOTES
[facility]  INPATIENT PHYSICAL THERAPY  DAILY NOTE  STR CCU 3A - 3A-05/005-A      Discharge Recommendations: LTACH  Equipment Recommendations: No (will cont to assess needs)               Time In: 1113  Time Out: 1131  Timed Code Treatment Minutes: 18 Minutes  Minutes: 18      Co-tx with OT due to medical complexity, pt requires mult staff assist for mobility. Pt would not tolerate separate sessions.     Date: 3/7/2025  Patient Name: Rivka Zavala,  Gender:  female        MRN: [id number]  : 1970  (54 y.o.)     Referring Practitioner: Dave Otto MD  Diagnosis: Subdural hematoma (HCC)  Additional Pertinent Hx: 54-year-old female with PMH of MS, alcohol abuse, personality disorder, smoking, anemia, malnutrition who was brought to the ED by her parents for AMS.  Patient reportedly was found beer cans and covered in feces.  In the ED, alcohol level was negative. CT head showed acute brain hemorrhage in the left basal ganglia measuring 5.5 x 3.3 x 3.1 cm with mass effect upon the left lateral ventricle with left-to-right midline shift of 4 mm. Neurosurgery was consulted and she was admitted to ICU per NS. In the ED, patient was intubated for airway protection. She was then extubated 3/1. Patient had increased work of breathing and was reintubated on night of 3/2.     Prior Level of Function:  Lives With: Parent  Type of Home: House  Home Layout: One level  Home Access: Stairs to enter without rails  Entrance Stairs - Number of Steps: 2  Home Equipment: Walker - 4-Wheeled   Bathroom Toilet: Handicap height  Bathroom Equipment: Tub transfer bench    Prior Level of Assist for ADLs: Independent  Prior Level of Assist for Homemaking: Independent (shared tasks with mother)  Prior Level of Assist for Transfers: Independent  Active : No  Additional Comments: Pt minimally verbal on eval. Above information from therapy eval 10/11/24.  Prior Level of Assist for Ambulation: Independent household  balance, safety and would benefit from continued skilled PT to address these impairments and return to PLOF.   Activity Tolerance:  Patient tolerance of  treatment:Fair.  Plan: Current Treatment Recommendations: Strengthening, Balance training, Endurance training, Neuromuscular re-education, Safety education & training, Patient/Caregiver education & training, Equipment evaluation, education, & procurement, Positioning, Therapeutic activities, Co-Treatment  General Plan:  (5x C)    Education:  Learners: Patient  Patient Education: Bed Mobility, postural awareness    Goals:  Patient Goals : not able state  Short Term Goals  Time Frame for Short Term Goals: by discharge  Short Term Goal 1: Pt will demo rolling L and R in bed with mod A x1 to progress with mobility.  Short Term Goal 2: Pt will demo supine to/from sit transfers with mod A x1 to progress with mobility.  Short Term Goal 3: Pt will tolerate >15 min sitting on EOB with min A to progress with functional tasks.  Short Term Goal 4: PT to assess transfers/gait once appropriate.  Long Term Goals  Time Frame for Long Term Goals : NA due to short ELOS    Following session, patient left in safe position with all fall risk precautions in place.

## 2025-03-07 NOTE — PROGRESS NOTES
CRITICAL CARE PROGRESS NOTE      Patient:  Rivka Zavala    Unit/Bed:3A-05/005-A  YOB: 1970  MRN: [id number]   PCP: Ben Montejo MD  Date of Admission: 2/28/2025  Chief Complaint:-AMS    Assessment and Plan:    Acute hypoxic respiratory failure secondary to flu A. Intubated for airway protection ISO encephalopathy in the ED and extubated 3/1. She was reintubated overnight of 3/2 due to tachypnea, mottling. Extubated 3/5. Repeat chest x-ray 3/7 remains with diffuse infiltrates and increased opacity in upper R lung. Patient was febrile and placed back on Levophed overnight of 3/4 due to hypotension. Tested Influenza A+. She has been off levo since yesterday. Precert to Ti pending.  Septic shock secondary to UTI and influenza A with possible superimposed pneumonia, shock resolved: UA with moderate bacteria. Urine culture positive for E. coli and Proteus.  Currently off pressors. Chest x-ray shows bilateral infiltrates with increased opacity in right upper lobe. Awaiting pneumonia panel and sputum culture. Cont Tamiflu. Currently, she is on ceftriaxone for her UTI. Will escalate abx depending on pneumonia panel and respiratory panel.  Left basal ganglia hemorrhage measuring 5.5 x 3.3 x 3.1 cm: Found on CT head on arrival.   Repeat CT 3/5 stable intraparenchymal hemorrhage. Neurosurgery following. Coag studies negative. N/S did not recommend any intervention. They recommended palliative care.  Encephalopathy secondary to brain bleed versus Wernicke's. Patient remains encephalopathic. Cont thiamine to 200 3 times daily.  Agitation: Patient is agitated. This has been due to her tachycardic and tachypneic. She is on a phenobarbital protocol.  Hypernatremia  3/3 => 160 3/7. May be secondary to hypovolemia. Patient started on D5 at 50 mL/hour. Patient will be started on NG tube feeds with water flushes depending on speech eval.  Malnutrition secondary to alcohol use. Hypophosphatemia.  adenopathy.  Neurologic:  No focal deficit. No seizures.    Data: (All radiographs, tracings, PFTs, and imaging are personally viewed and interpreted unless otherwise noted).   BMP , K 3.5, , bicarb 23, BUN 12, CR .6, Glu 80  WBC 12.6, Hb 10.7, HCT 34.2, platelet 307  3/3 urine culture positive for E. Coli and Proteus  Influenza A+  Telemetry showed NSR with rate of 90    Meets Continued ICU Level Care Criteria:    [x] Yes   [] No - Transfer   [] HOSPITALIST CONTACTED-    Patient seen by me including key components of medical care.  Case discussed with resident physician.  Increase free water.  Cut Precedex in half and monitor phenobarbital utilization.  Italicized bold font, if present,  represents changes to the note made by me.  CC time 35 minutes.  Time was discontiguous. Time does not include procedure. Time does include my direct assessment of the patient and coordination of care.  Time represents more than 50% of the time involved with patient care by the CC team.  Electronically signed by Rusty Frazier MD.

## 2025-03-07 NOTE — DISCHARGE INSTR - COC
Continuity of Care Form    Patient Name: Rivka Zavala   :  1970  MRN:  [id number]    Admit date:  2025  Discharge date:  3/7/25    Code Status Order: Full Code   Advance Directives:   Advance Care Flowsheet Documentation             Admitting Physician:  No admitting provider for patient encounter.  PCP: Ben Montejo MD    Discharging Nurse: Ely Lester RN  Discharging Hospital Unit/Room#: 3A-05/005-A  Discharging Unit Phone Number: [phone]    Emergency Contact:   Extended Emergency Contact Information  Primary Emergency Contact: Juan Miguel Yañez  Address: [address] [place] [address] [place] [address] [facility]  Home Phone: [phone]  Relation: Friend  Secondary Emergency Contact: Chauncey Valles  Home Phone: [phone]  Relation: Parent    Past Surgical History:  Past Surgical History:   Procedure Laterality Date    ANKLE SURGERY  2024    COLONOSCOPY  2022    CYST REMOVAL  2004    right thigh     SHOULDER SURGERY Right 2022    Hook Plate Placement    SHOULDER SURGERY  10/26/2023    oio    TONSILLECTOMY  1977    WRIST GANGLION EXCISION Right 2021       Immunization History:   Immunization History   Administered Date(s) Administered    TDaP, ADACEL (age 10y-64y), BOOSTRIX (age 10y+), IM, 0.5mL 10/03/2019       Active Problems:  Patient Active Problem List   Diagnosis Code    Alcohol withdrawal ([facility]) F10.939    Personality disorder, NOS F60.9    Optic neuritis H46.9    Alcohol use Z78.9    Alcohol abuse F10.10    Anemia D64.9    Acute hyponatremia E87.1    Moderate malnutrition E44.0    Syncope and collapse R55    Normocytic anemia D64.9    Iron deficiency anemia, unspecified D50.9    MS (multiple sclerosis) ([facility]) G35    Symptomatic anemia D64.9    Iron deficiency anemia secondary to inadequate dietary iron intake D50.8    Recurrent falls R29.6    Closed nondisplaced fracture of right patella S82.001A    Traumatic ecchymosis of left orbit S05.12XA    Severe  PM EST

## 2025-03-07 NOTE — PLAN OF CARE
Problem: Discharge Planning  Goal: Discharge to home or other facility with appropriate resources  Outcome: Progressing  Flowsheets (Taken 3/6/2025 1945)  Discharge to home or other facility with appropriate resources: Identify barriers to discharge with patient and caregiver     Problem: Pain  Goal: Verbalizes/displays adequate comfort level or baseline comfort level  Outcome: Progressing     Problem: Safety - Adult  Goal: Free from fall injury  Outcome: Progressing     Problem: Skin/Tissue Integrity  Goal: Skin integrity remains intact  Description: 1.  Monitor for areas of redness and/or skin breakdown  2.  Assess vascular access sites hourly  3.  Every 4-6 hours minimum:  Change oxygen saturation probe site  4.  Every 4-6 hours:  If on nasal continuous positive airway pressure, respiratory therapy assess nares and determine need for appliance change or resting period  Outcome: Progressing  Flowsheets (Taken 3/6/2025 1945)  Skin Integrity Remains Intact: Monitor for areas of redness and/or skin breakdown

## 2025-03-08 ENCOUNTER — APPOINTMENT (OUTPATIENT)
Dept: GENERAL RADIOLOGY | Age: 55
End: 2025-03-08
Attending: INTERNAL MEDICINE
Payer: COMMERCIAL

## 2025-03-08 LAB
ALBUMIN SERPL BCG-MCNC: 3.7 G/DL (ref 3.4–4.9)
ALP SERPL-CCNC: 116 U/L (ref 35–104)
ALT SERPL W/O P-5'-P-CCNC: 18 U/L (ref 10–35)
ANION GAP SERPL CALC-SCNC: 14 MEQ/L (ref 8–16)
AST SERPL-CCNC: 60 U/L (ref 10–35)
BASOPHILS ABSOLUTE: 0.1 THOU/MM3 (ref 0–0.1)
BASOPHILS NFR BLD AUTO: 0.7 %
BILIRUB SERPL-MCNC: 0.3 MG/DL (ref 0.3–1.2)
BUN SERPL-MCNC: 13 MG/DL (ref 8–23)
CALCIUM SERPL-MCNC: 9.3 MG/DL (ref 8.6–10)
CHLORIDE SERPL-SCNC: 118 MEQ/L (ref 98–111)
CO2 SERPL-SCNC: 25 MEQ/L (ref 22–29)
CREAT SERPL-MCNC: 0.6 MG/DL (ref 0.5–0.9)
DEPRECATED RDW RBC AUTO: 60.6 FL (ref 35–45)
EOSINOPHIL NFR BLD AUTO: 1.4 %
EOSINOPHILS ABSOLUTE: 0.2 THOU/MM3 (ref 0–0.4)
ERYTHROCYTE [DISTWIDTH] IN BLOOD BY AUTOMATED COUNT: 15.2 % (ref 11.5–14.5)
GFR SERPL CREATININE-BSD FRML MDRD: > 90 ML/MIN/1.73M2
GLUCOSE SERPL-MCNC: 108 MG/DL (ref 74–109)
HCT VFR BLD AUTO: 37.5 % (ref 37–47)
HGB BLD-MCNC: 11.3 GM/DL (ref 12–16)
IMM GRANULOCYTES # BLD AUTO: 0.34 THOU/MM3 (ref 0–0.07)
IMM GRANULOCYTES NFR BLD AUTO: 2.5 %
LYMPHOCYTES ABSOLUTE: 2.7 THOU/MM3 (ref 1–4.8)
LYMPHOCYTES NFR BLD AUTO: 20 %
MAGNESIUM SERPL-MCNC: 2 MG/DL (ref 1.6–2.6)
MCH RBC QN AUTO: 32.6 PG (ref 26–33)
MCHC RBC AUTO-ENTMCNC: 30.1 GM/DL (ref 32.2–35.5)
MCV RBC AUTO: 108.1 FL (ref 81–99)
MONOCYTES ABSOLUTE: 1.2 THOU/MM3 (ref 0.4–1.3)
MONOCYTES NFR BLD AUTO: 9.2 %
NEUTROPHILS ABSOLUTE: 8.9 THOU/MM3 (ref 1.8–7.7)
NEUTROPHILS NFR BLD AUTO: 66.2 %
NRBC BLD AUTO-RTO: 0 /100 WBC
PLATELET # BLD AUTO: 348 THOU/MM3 (ref 130–400)
PMV BLD AUTO: 11.5 FL (ref 9.4–12.4)
POTASSIUM SERPL-SCNC: 3.4 MEQ/L (ref 3.5–5.2)
PREALB SERPL-MCNC: 12.6 MG/DL (ref 20–40)
PROT SERPL-MCNC: 7.6 G/DL (ref 6.4–8.3)
RBC # BLD AUTO: 3.47 MILL/MM3 (ref 4.2–5.4)
SCAN OF BLOOD SMEAR: NORMAL
SODIUM SERPL-SCNC: 157 MEQ/L (ref 135–145)
SODIUM SERPL-SCNC: 157 MEQ/L (ref 135–145)
STOMATOCYTES: ABNORMAL
TOXIC GRANULES BLD QL SMEAR: PRESENT
VARIANT LYMPHS BLD QL SMEAR: ABNORMAL %
WBC # BLD AUTO: 13.5 THOU/MM3 (ref 4.8–10.8)

## 2025-03-08 PROCEDURE — 74018 RADEX ABDOMEN 1 VIEW: CPT

## 2025-03-09 ENCOUNTER — APPOINTMENT (OUTPATIENT)
Dept: GENERAL RADIOLOGY | Age: 55
End: 2025-03-09
Attending: INTERNAL MEDICINE
Payer: COMMERCIAL

## 2025-03-09 LAB
ANION GAP SERPL CALC-SCNC: 11 MEQ/L (ref 8–16)
BUN SERPL-MCNC: 10 MG/DL (ref 8–23)
CALCIUM SERPL-MCNC: 8.7 MG/DL (ref 8.6–10)
CHLORIDE SERPL-SCNC: 107 MEQ/L (ref 98–111)
CO2 SERPL-SCNC: 25 MEQ/L (ref 22–29)
CREAT SERPL-MCNC: 0.5 MG/DL (ref 0.5–0.9)
DEPRECATED RDW RBC AUTO: 55.7 FL (ref 35–45)
ERYTHROCYTE [DISTWIDTH] IN BLOOD BY AUTOMATED COUNT: 14.6 % (ref 11.5–14.5)
GFR SERPL CREATININE-BSD FRML MDRD: > 90 ML/MIN/1.73M2
GLUCOSE SERPL-MCNC: 108 MG/DL (ref 74–109)
HCT VFR BLD AUTO: 33.5 % (ref 37–47)
HGB BLD-MCNC: 10.3 GM/DL (ref 12–16)
MAGNESIUM SERPL-MCNC: 1.7 MG/DL (ref 1.6–2.6)
MCH RBC QN AUTO: 32 PG (ref 26–33)
MCHC RBC AUTO-ENTMCNC: 30.7 GM/DL (ref 32.2–35.5)
MCV RBC AUTO: 104 FL (ref 81–99)
PLATELET # BLD AUTO: 376 THOU/MM3 (ref 130–400)
PMV BLD AUTO: 11.3 FL (ref 9.4–12.4)
POTASSIUM SERPL-SCNC: 3 MEQ/L (ref 3.5–5.2)
RBC # BLD AUTO: 3.22 MILL/MM3 (ref 4.2–5.4)
SODIUM SERPL-SCNC: 143 MEQ/L (ref 135–145)
SODIUM SERPL-SCNC: 146 MEQ/L (ref 135–145)
WBC # BLD AUTO: 13.8 THOU/MM3 (ref 4.8–10.8)

## 2025-03-09 PROCEDURE — 71045 X-RAY EXAM CHEST 1 VIEW: CPT

## 2025-03-09 NOTE — DISCHARGE SUMMARY
CRITICAL CARE PROGRESS NOTE      Patient:  Rivka Zavala    Unit/Bed:3A-05/005-A  YOB: 1970  MRN: [id number]   PCP: Ben Montejo MD  Date of Admission: 2/28/2025  Chief Complaint:-AMS    Pt was discharged out of ICU care to [place].    Assessment and Plan:    Acute hypoxic respiratory failure secondary to flu A. Intubated for airway protection ISO encephalopathy in the ED and extubated 3/1. She was reintubated overnight of 3/2 due to tachypnea, mottling. Extubated 3/5. Repeat chest x-ray 3/7 remains with diffuse infiltrates and increased opacity in upper R lung. Patient was febrile and placed back on Levophed overnight of 3/4 due to hypotension. Tested Influenza A+. She has been off levo 3/6.   Septic shock secondary to UTI and influenza A with possible superimposed pneumonia, shock resolved: UA with moderate bacteria. Urine culture positive for E. coli and Proteus.  Currently off pressors. Chest x-ray shows bilateral infiltrates with increased opacity in right upper lobe. Awaiting pneumonia panel and sputum culture. Cont Tamiflu. Currently, she is on ceftriaxone for her UTI. Will escalate abx depending on pneumonia panel and respiratory panel.  Left basal ganglia hemorrhage measuring 5.5 x 3.3 x 3.1 cm: Found on CT head on arrival.   Repeat CT 3/5 stable intraparenchymal hemorrhage. Neurosurgery following. Coag studies negative. N/S did not recommend any intervention. They recommended palliative care.  Encephalopathy secondary to brain bleed versus Wernicke's. Patient remains encephalopathic. Cont thiamine to 200 3 times daily.  Agitation: Patient is agitated. This has been due to her tachycardic and tachypneic. She is on a phenobarbital protocol.  Hypernatremia  3/3 => 160 3/7. May be secondary to hypovolemia. Patient started on D5 at 50 mL/hour. Patient will be started on NG tube feeds with water flushes depending on speech eval.  Malnutrition secondary to alcohol use.

## 2025-03-10 LAB
ALBUMIN SERPL BCG-MCNC: 3 G/DL (ref 3.4–4.9)
ALP SERPL-CCNC: 106 U/L (ref 35–104)
ALT SERPL W/O P-5'-P-CCNC: 11 U/L (ref 10–35)
ANION GAP SERPL CALC-SCNC: 12 MEQ/L (ref 8–16)
AST SERPL-CCNC: 40 U/L (ref 10–35)
BASOPHILS ABSOLUTE: 0.1 THOU/MM3 (ref 0–0.1)
BASOPHILS NFR BLD AUTO: 0.6 %
BILIRUB CONJ SERPL-MCNC: < 0.1 MG/DL (ref 0–0.2)
BILIRUB SERPL-MCNC: 0.2 MG/DL (ref 0.3–1.2)
BUN SERPL-MCNC: 9 MG/DL (ref 8–23)
CA-I BLD ISE-SCNC: 1.21 MMOL/L (ref 1.12–1.32)
CALCIUM SERPL-MCNC: 9 MG/DL (ref 8.6–10)
CHLORIDE SERPL-SCNC: 110 MEQ/L (ref 98–111)
CO2 SERPL-SCNC: 22 MEQ/L (ref 22–29)
CREAT SERPL-MCNC: 0.4 MG/DL (ref 0.5–0.9)
DEPRECATED RDW RBC AUTO: 53.9 FL (ref 35–45)
EOSINOPHIL NFR BLD AUTO: 3.2 %
EOSINOPHILS ABSOLUTE: 0.3 THOU/MM3 (ref 0–0.4)
ERYTHROCYTE [DISTWIDTH] IN BLOOD BY AUTOMATED COUNT: 14.5 % (ref 11.5–14.5)
FERRITIN SERPL IA-MCNC: 311 NG/ML (ref 13–150)
FOLATE SERPL-MCNC: 12.8 NG/ML (ref 4.6–34.8)
GFR SERPL CREATININE-BSD FRML MDRD: > 90 ML/MIN/1.73M2
GLUCOSE SERPL-MCNC: 92 MG/DL (ref 74–109)
HCT VFR BLD AUTO: 32.4 % (ref 37–47)
HGB BLD-MCNC: 10.2 GM/DL (ref 12–16)
IMM GRANULOCYTES # BLD AUTO: 0.1 THOU/MM3 (ref 0–0.07)
IMM GRANULOCYTES NFR BLD AUTO: 1 %
IRON SATN MFR SERPL: 32 % (ref 20–50)
IRON SERPL-MCNC: 45 UG/DL (ref 37–145)
LYMPHOCYTES ABSOLUTE: 2.5 THOU/MM3 (ref 1–4.8)
LYMPHOCYTES NFR BLD AUTO: 23.4 %
MAGNESIUM SERPL-MCNC: 2.5 MG/DL (ref 1.6–2.6)
MCH RBC QN AUTO: 32.6 PG (ref 26–33)
MCHC RBC AUTO-ENTMCNC: 31.5 GM/DL (ref 32.2–35.5)
MCV RBC AUTO: 103.5 FL (ref 81–99)
MONOCYTES ABSOLUTE: 0.8 THOU/MM3 (ref 0.4–1.3)
MONOCYTES NFR BLD AUTO: 7.4 %
NEUTROPHILS ABSOLUTE: 6.8 THOU/MM3 (ref 1.8–7.7)
NEUTROPHILS NFR BLD AUTO: 64.4 %
NRBC BLD AUTO-RTO: 0 /100 WBC
PHOSPHATE SERPL-MCNC: 3.7 MG/DL (ref 2.5–4.5)
PLATELET # BLD AUTO: 391 THOU/MM3 (ref 130–400)
PMV BLD AUTO: 11.8 FL (ref 9.4–12.4)
POTASSIUM SERPL-SCNC: 4.3 MEQ/L (ref 3.5–5.2)
PROT SERPL-MCNC: 6.4 G/DL (ref 6.4–8.3)
RBC # BLD AUTO: 3.13 MILL/MM3 (ref 4.2–5.4)
SODIUM SERPL-SCNC: 144 MEQ/L (ref 135–145)
TIBC SERPL-MCNC: 141 UG/DL (ref 171–450)
VIT B12 SERPL-MCNC: 1649 PG/ML (ref 232–1245)
WBC # BLD AUTO: 10.5 THOU/MM3 (ref 4.8–10.8)

## 2025-03-10 NOTE — SIGNIFICANT EVENT
Touched base with primary IM team for report regarding this case, as we had received a consultation order. They have informed me that our consultation is no longer needed for this case. They will discontinue the consult order. Therefore, we did not complete this consultation. If anything changes, please let us know if consultation is desired.       Discussed with Dr. Turner and Dr. Frazier.    Electronically signed by MK Asencio CNP on 3/10/2025 at 1:31 PM

## 2025-03-11 ENCOUNTER — APPOINTMENT (OUTPATIENT)
Dept: GENERAL RADIOLOGY | Age: 55
End: 2025-03-11
Attending: INTERNAL MEDICINE
Payer: COMMERCIAL

## 2025-03-11 LAB
ANION GAP SERPL CALC-SCNC: 17 MEQ/L (ref 8–16)
BUN SERPL-MCNC: 11 MG/DL (ref 8–23)
CALCIUM SERPL-MCNC: 9 MG/DL (ref 8.6–10)
CHLORIDE SERPL-SCNC: 104 MEQ/L (ref 98–111)
CO2 SERPL-SCNC: 16 MEQ/L (ref 22–29)
CREAT SERPL-MCNC: 0.5 MG/DL (ref 0.5–0.9)
DEPRECATED RDW RBC AUTO: 54.3 FL (ref 35–45)
DEPRECATED RDW RBC AUTO: 57.1 FL (ref 35–45)
ERYTHROCYTE [DISTWIDTH] IN BLOOD BY AUTOMATED COUNT: 14.4 % (ref 11.5–14.5)
ERYTHROCYTE [DISTWIDTH] IN BLOOD BY AUTOMATED COUNT: 14.5 % (ref 11.5–14.5)
GFR SERPL CREATININE-BSD FRML MDRD: > 90 ML/MIN/1.73M2
GLUCOSE SERPL-MCNC: 90 MG/DL (ref 74–109)
HCT VFR BLD AUTO: 32.9 % (ref 37–47)
HCT VFR BLD AUTO: 33.2 % (ref 37–47)
HGB BLD-MCNC: 10 GM/DL (ref 12–16)
HGB BLD-MCNC: 10.5 GM/DL (ref 12–16)
LACTATE SERPL-SCNC: 1.1 MMOL/L (ref 0.5–2)
MAGNESIUM SERPL-MCNC: 2.1 MG/DL (ref 1.6–2.6)
MCH RBC QN AUTO: 32.6 PG (ref 26–33)
MCH RBC QN AUTO: 32.9 PG (ref 26–33)
MCHC RBC AUTO-ENTMCNC: 30.4 GM/DL (ref 32.2–35.5)
MCHC RBC AUTO-ENTMCNC: 31.6 GM/DL (ref 32.2–35.5)
MCV RBC AUTO: 103.1 FL (ref 81–99)
MCV RBC AUTO: 108.2 FL (ref 81–99)
NT-PROBNP SERPL IA-MCNC: 5955 PG/ML (ref 0–124)
PLATELET # BLD AUTO: 421 THOU/MM3 (ref 130–400)
PLATELET # BLD AUTO: 459 THOU/MM3 (ref 130–400)
PMV BLD AUTO: 11.5 FL (ref 9.4–12.4)
PMV BLD AUTO: 11.6 FL (ref 9.4–12.4)
POTASSIUM SERPL-SCNC: 4.3 MEQ/L (ref 3.5–5.2)
RBC # BLD AUTO: 3.04 MILL/MM3 (ref 4.2–5.4)
RBC # BLD AUTO: 3.22 MILL/MM3 (ref 4.2–5.4)
SODIUM SERPL-SCNC: 137 MEQ/L (ref 135–145)
WBC # BLD AUTO: 11.8 THOU/MM3 (ref 4.8–10.8)
WBC # BLD AUTO: 12.5 THOU/MM3 (ref 4.8–10.8)

## 2025-03-11 PROCEDURE — 74018 RADEX ABDOMEN 1 VIEW: CPT

## 2025-03-12 LAB
ANION GAP SERPL CALC-SCNC: 11 MEQ/L (ref 8–16)
BUN SERPL-MCNC: 10 MG/DL (ref 8–23)
CALCIUM SERPL-MCNC: 9.4 MG/DL (ref 8.6–10)
CHLORIDE SERPL-SCNC: 103 MEQ/L (ref 98–111)
CO2 SERPL-SCNC: 23 MEQ/L (ref 22–29)
CREAT SERPL-MCNC: 0.5 MG/DL (ref 0.5–0.9)
DEPRECATED RDW RBC AUTO: 55.6 FL (ref 35–45)
ERYTHROCYTE [DISTWIDTH] IN BLOOD BY AUTOMATED COUNT: 14.4 % (ref 11.5–14.5)
GFR SERPL CREATININE-BSD FRML MDRD: > 90 ML/MIN/1.73M2
GLUCOSE SERPL-MCNC: 129 MG/DL (ref 74–109)
HCT VFR BLD AUTO: 35.3 % (ref 37–47)
HGB BLD-MCNC: 10.8 GM/DL (ref 12–16)
LACTATE SERPL-SCNC: 1.5 MMOL/L (ref 0.5–2)
MCH RBC QN AUTO: 32.5 PG (ref 26–33)
MCHC RBC AUTO-ENTMCNC: 30.6 GM/DL (ref 32.2–35.5)
MCV RBC AUTO: 106.3 FL (ref 81–99)
NT-PROBNP SERPL IA-MCNC: 4261 PG/ML (ref 0–124)
PLATELET # BLD AUTO: 426 THOU/MM3 (ref 130–400)
PMV BLD AUTO: 11.5 FL (ref 9.4–12.4)
POTASSIUM SERPL-SCNC: 4.3 MEQ/L (ref 3.5–5.2)
RBC # BLD AUTO: 3.32 MILL/MM3 (ref 4.2–5.4)
SODIUM SERPL-SCNC: 137 MEQ/L (ref 135–145)
WBC # BLD AUTO: 14.4 THOU/MM3 (ref 4.8–10.8)

## 2025-03-12 PROCEDURE — 99291 CRITICAL CARE FIRST HOUR: CPT | Performed by: INTERNAL MEDICINE

## 2025-03-12 NOTE — CONSULTS
Patient:  Rivka Zavala    Unit/Bed:[facility] KNDPOOLRM/NONE  MRN: [id number]   PCP: Ben Montejo MD  Date of Admission: 3/7/2025    Assessment and Plan(All pulmonary edema, renal failure, PE, and respiratory failure diagnoses are acute in nature unless otherwise specified):        Hypotension: on very low dose levophed of 1 mcg/min. Appears euvolemic. Patient completed treatment for infections and remains afebrile off antimicrobials. Lactic acid is WNL at 1.5. Low suspicion for sepsis. Will screen for adrenal insufficiency. Anticipate resolution of hypotension once precedex is weaned. Add midodrine to assist wean off levophed, with recommendation to taper off midodrine in the near future as indicated.   Encephalopathy: listed history of personality disorder. Also known recent IPH of left basal ganglia, stable in ize by last CT head 3/4/25. Appears to be on high dose thiamine for possible Wernicke encephalopathy from chronic alcohol abuse. LFT's elevated at [facility], which resolved. Check ammonia level in the morning. Recheck thyroid function tests. Recheck UA given recent UTI. Consider repeating CT head if reduction in GCS is observed, new focal deficit, or if unimproved encephalopathy. Add scheduled seroquel. Avoid benzodiazepines strictly. D/c prn Fentanyl. Wean precedex infusion. Recommend sitter if necessary.   Leukocytosis: mild. Completed treatments for influenza A and UTI recently. Remains afebrile. Blood cultures 3/11/25 NGTD. Will recheck UA with reflex. Has congested cough. Will follow up a CXR in the morning.   Left-sided basal ganglia IPH: s/p neurosurgical eval at [facility]. No intervention was recommended. Encephalopathy noted as above, but no new focal deficits. Known RUE weakness, present since [facility] presentation. GCS remains 14. Monitor neurological checks. SCD's for DVT prophylaxis.   Dysphagia: SLP services recommended. Has NG tube.   Chronic alcohol abuse: on thiamine, folic, acid,

## 2025-03-13 ENCOUNTER — APPOINTMENT (OUTPATIENT)
Dept: GENERAL RADIOLOGY | Age: 55
End: 2025-03-13
Attending: INTERNAL MEDICINE
Payer: COMMERCIAL

## 2025-03-13 LAB
AMMONIA PLAS-MCNC: 30 UMOL/L (ref 11–51)
ANION GAP SERPL CALC-SCNC: 26 MEQ/L (ref 8–16)
BACTERIA URNS QL MICRO: ABNORMAL /HPF
BASE EXCESS BLDA CALC-SCNC: 1.2 MMOL/L (ref -2–3)
BASOPHILS ABSOLUTE: 0.1 THOU/MM3 (ref 0–0.1)
BASOPHILS NFR BLD AUTO: 0.6 %
BILIRUB UR QL STRIP.AUTO: NEGATIVE
BUN SERPL-MCNC: 11 MG/DL (ref 8–23)
C CAYETANENSIS DNA SPEC QL NAA+PROBE: NOT DETECTED
C DIFF GDH STL QL: ABNORMAL
CALCIUM SERPL-MCNC: 9.2 MG/DL (ref 8.6–10)
CAMPY SP DNA.DIARRHEA STL QL NAA+PROBE: NOT DETECTED
CASTS #/AREA URNS LPF: ABNORMAL /LPF
CASTS 2: ABNORMAL /LPF
CHARACTER UR: CLEAR
CHLORIDE SERPL-SCNC: 103 MEQ/L (ref 98–111)
CO2 SERPL-SCNC: 11 MEQ/L (ref 22–29)
COLLECTED BY:: ABNORMAL
COLOR, UA: YELLOW
CORTIS SERPL-MCNC: 19.9 UG/DL
CORTISOL COLLECTION INFO: NORMAL
CREAT SERPL-MCNC: 0.6 MG/DL (ref 0.5–0.9)
CRYPTOSP DNA SPEC QL NAA+PROBE: NOT DETECTED
CRYSTALS URNS MICRO: ABNORMAL
DEPRECATED RDW RBC AUTO: 54.7 FL (ref 35–45)
DEVICE: ABNORMAL
E COLI O157H7 DNA SPEC QL NAA+PROBE: NORMAL
E HISTOLYT DNA SPEC QL NAA+PROBE: NOT DETECTED
EAEC PAA PLAS AGGR+AATA ST NAA+NON-PRB: NOT DETECTED
EC STX1+STX2 + H7 FLIC SPEC NAA+PROBE: NOT DETECTED
EOSINOPHIL NFR BLD AUTO: 0.8 %
EOSINOPHILS ABSOLUTE: 0.1 THOU/MM3 (ref 0–0.4)
EPEC EAE GENE STL QL NAA+NON-PROBE: NOT DETECTED
EPITHELIAL CELLS, UA: ABNORMAL /HPF
ERYTHROCYTE [DISTWIDTH] IN BLOOD BY AUTOMATED COUNT: 14.5 % (ref 11.5–14.5)
ETEC LTA+ST1A+ST1B TOX ST NAA+NON-PROBE: NOT DETECTED
G LAMBLIA DNA SPEC QL NAA+PROBE: NOT DETECTED
GFR SERPL CREATININE-BSD FRML MDRD: > 90 ML/MIN/1.73M2
GLUCOSE SERPL-MCNC: 69 MG/DL (ref 74–109)
GLUCOSE UR QL STRIP.AUTO: NEGATIVE MG/DL
HADV DNA SPEC QL NAA+PROBE: NOT DETECTED
HASTV RNA SPEC QL NAA+PROBE: NOT DETECTED
HCO3 BLDA-SCNC: 26 MMOL/L (ref 23–28)
HCT VFR BLD AUTO: 28.8 % (ref 37–47)
HGB BLD-MCNC: 9.1 GM/DL (ref 12–16)
HGB UR QL STRIP.AUTO: NEGATIVE
IMM GRANULOCYTES # BLD AUTO: 0.13 THOU/MM3 (ref 0–0.07)
IMM GRANULOCYTES NFR BLD AUTO: 0.7 %
KETONES UR QL STRIP.AUTO: NEGATIVE
LYMPHOCYTES ABSOLUTE: 2.3 THOU/MM3 (ref 1–4.8)
LYMPHOCYTES NFR BLD AUTO: 12.2 %
MCH RBC QN AUTO: 33 PG (ref 26–33)
MCHC RBC AUTO-ENTMCNC: 31.6 GM/DL (ref 32.2–35.5)
MCV RBC AUTO: 104.3 FL (ref 81–99)
MISCELLANEOUS 2: ABNORMAL
MONOCYTES ABSOLUTE: 1.9 THOU/MM3 (ref 0.4–1.3)
MONOCYTES NFR BLD AUTO: 10 %
NEUTROPHILS ABSOLUTE: 14.1 THOU/MM3 (ref 1.8–7.7)
NEUTROPHILS NFR BLD AUTO: 75.7 %
NITRITE UR QL STRIP: NEGATIVE
NOROVIRUS GI + GII RNA STL NAA+PROBE: NOT DETECTED
NRBC BLD AUTO-RTO: 0 /100 WBC
NT-PROBNP SERPL IA-MCNC: 7144 PG/ML (ref 0–124)
P SHIGELLOIDES DNA STL QL NAA+PROBE: NOT DETECTED
PCO2 TEMP ADJ BLDMV: 40 MMHG (ref 41–51)
PH BLDMV: 7.42 [PH] (ref 7.31–7.41)
PH UR STRIP.AUTO: >= 9 [PH] (ref 5–9)
PLATELET # BLD AUTO: 396 THOU/MM3 (ref 130–400)
PMV BLD AUTO: 11.4 FL (ref 9.4–12.4)
PO2 BLDMV: 27 MMHG (ref 25–40)
POTASSIUM SERPL-SCNC: 5.4 MEQ/L (ref 3.5–5.2)
PROT UR STRIP.AUTO-MCNC: ABNORMAL MG/DL
RBC # BLD AUTO: 2.76 MILL/MM3 (ref 4.2–5.4)
RBC URINE: ABNORMAL /HPF
RENAL EPI CELLS #/AREA URNS HPF: ABNORMAL /[HPF]
RV RNA SPEC QL NAA+PROBE: NOT DETECTED
SALMONELLA DNA SPEC QL NAA+PROBE: NOT DETECTED
SAO2 % BLDMV: 52 %
SAPOVIRUS RNA SPEC QL NAA+PROBE: NOT DETECTED
SHIGELLA SP+EIEC IPAH ST NAA+NON-PROBE: NOT DETECTED
SITE: ABNORMAL
SODIUM SERPL-SCNC: 140 MEQ/L (ref 135–145)
SP GR UR REFRACT.AUTO: 1.01 (ref 1–1.03)
T3FREE SERPL-MCNC: 2.7 PG/ML (ref 2–4.4)
TSH SERPL DL<=0.05 MIU/L-ACNC: 1.6 UIU/ML (ref 0.27–4.2)
UROBILINOGEN, URINE: 0.2 EU/DL (ref 0–1)
V CHOLERAE DNA SPEC QL NAA+PROBE: NOT DETECTED
VENTILATION MODE VENT: ABNORMAL
VIBRIO DNA SPEC NAA+PROBE: NOT DETECTED
WBC # BLD AUTO: 18.6 THOU/MM3 (ref 4.8–10.8)
WBC #/AREA URNS HPF: ABNORMAL /HPF
WBC #/AREA URNS HPF: NEGATIVE /[HPF]
Y ENTERO RECN STL QL NAA+PROBE: NOT DETECTED
YEAST LIKE FUNGI URNS QL MICRO: ABNORMAL

## 2025-03-13 PROCEDURE — 99291 CRITICAL CARE FIRST HOUR: CPT | Performed by: INTERNAL MEDICINE

## 2025-03-13 PROCEDURE — 71045 X-RAY EXAM CHEST 1 VIEW: CPT

## 2025-03-13 PROCEDURE — 74018 RADEX ABDOMEN 1 VIEW: CPT

## 2025-03-13 NOTE — PROGRESS NOTES
SUBJECTIVE: Very limited by encephalopathy. Patient denies dyspnea, headaches, nausea or vomiting, abdominal pain, chest pain, dysuria, or otherwise pain. Endorses cough. No new complaints.         OBJECTIVE:  T 98.0  P 88  RR 16  B/P 104/59  O2Sat 99%  MAR printed and reviewed.   General:   Acute on chronically ill adult female. Appears stated age.   HEENT:  normocephalic and atraumatic.  No scleral icterus. PERR. Tongue is moist. NG tube in place.   Neck: supple.  No Thyromegaly. Right IJ central line.   Lungs: clear to auscultation.  No retractions. Normal resp rate and effort. Congested occasional cough.   Cardiac: RRR.  No JVD.  Abdomen: soft.  Nontender.  Extremities:  No clubbing, cyanosis, or edema x 4.    Vasculature: capillary refill < 3 seconds. Palpable dorsalis pedis pulses.  Skin:  warm and dry.  Psych:  Alert and confused.  Affect appropriate  Lymph:  No supraclavicular adenopathy.  Neurologic:  RUE weakness, can lift a few inches off bed. No other focal deficits. No seizures.  DATA:  Telemetry shows NSR.  Last BMP (3/12/25):   Ammonia 30  Cortisol, AM: 19.90  TSH 1.60  WBC 18.6 hemoglobin 9.1 hematocrit 28.8 platelets 396  Blood cultures x2 3/11/25: NGTD.   Urinalysis 3/12/2025: Yellow, negative ketones, specific gravity 1.015, negative blood, trace protein, negative nitrite, negative leukocyte esterase, 8-15 hyaline casts, 5-9 WBC, no bacteria, few yeast  CXR 3/13/25 report: Hyperinflated lungs, consistent with COPD. Normal heart size. Apparent old fracture distal shaft right clavicle. NG tube passes into stomach. Right jugular line with catheter tip in SVC. Moderate insertion pneumonia/edema involving both lungs diffusely. Appearance  improved from yesterday.        A/P:     Hypotension: weaned off levophed successfully. On midodrine 5 mg TID. Recommend to taper off midodrine in the near future as indicated. Appears euvolemic. Not septic. Lactic acid is WNL at 1.5. Excluded adrenal insufficiency.

## 2025-03-14 ENCOUNTER — APPOINTMENT (OUTPATIENT)
Dept: GENERAL RADIOLOGY | Age: 55
End: 2025-03-14
Attending: INTERNAL MEDICINE
Payer: COMMERCIAL

## 2025-03-14 LAB
ANION GAP SERPL CALC-SCNC: 10 MEQ/L (ref 8–16)
BASOPHILS ABSOLUTE: 0.1 THOU/MM3 (ref 0–0.1)
BASOPHILS NFR BLD AUTO: 0.9 %
BUN SERPL-MCNC: 8 MG/DL (ref 8–23)
CALCIUM SERPL-MCNC: 9.3 MG/DL (ref 8.6–10)
CHLORIDE SERPL-SCNC: 100 MEQ/L (ref 98–111)
CO2 SERPL-SCNC: 23 MEQ/L (ref 22–29)
CREAT SERPL-MCNC: 0.4 MG/DL (ref 0.5–0.9)
DEPRECATED RDW RBC AUTO: 54.4 FL (ref 35–45)
EOSINOPHIL NFR BLD AUTO: 2 %
EOSINOPHILS ABSOLUTE: 0.2 THOU/MM3 (ref 0–0.4)
ERYTHROCYTE [DISTWIDTH] IN BLOOD BY AUTOMATED COUNT: 14.7 % (ref 11.5–14.5)
GFR SERPL CREATININE-BSD FRML MDRD: > 90 ML/MIN/1.73M2
GLUCOSE SERPL-MCNC: 93 MG/DL (ref 74–109)
HCT VFR BLD AUTO: 31.3 % (ref 37–47)
HGB BLD-MCNC: 9.9 GM/DL (ref 12–16)
IMM GRANULOCYTES # BLD AUTO: 0.1 THOU/MM3 (ref 0–0.07)
IMM GRANULOCYTES NFR BLD AUTO: 0.8 %
LYMPHOCYTES ABSOLUTE: 2.3 THOU/MM3 (ref 1–4.8)
LYMPHOCYTES NFR BLD AUTO: 18.8 %
MAGNESIUM SERPL-MCNC: 2 MG/DL (ref 1.6–2.6)
MCH RBC QN AUTO: 32.5 PG (ref 26–33)
MCHC RBC AUTO-ENTMCNC: 31.6 GM/DL (ref 32.2–35.5)
MCV RBC AUTO: 102.6 FL (ref 81–99)
MONOCYTES ABSOLUTE: 1.8 THOU/MM3 (ref 0.4–1.3)
MONOCYTES NFR BLD AUTO: 15.4 %
NEUTROPHILS ABSOLUTE: 7.5 THOU/MM3 (ref 1.8–7.7)
NEUTROPHILS NFR BLD AUTO: 62.1 %
NRBC BLD AUTO-RTO: 0 /100 WBC
PLATELET # BLD AUTO: 420 THOU/MM3 (ref 130–400)
PMV BLD AUTO: 11.4 FL (ref 9.4–12.4)
POTASSIUM SERPL-SCNC: 4.1 MEQ/L (ref 3.5–5.2)
PROCALCITONIN SERPL IA-MCNC: 5.78 NG/ML (ref 0.01–0.09)
RBC # BLD AUTO: 3.05 MILL/MM3 (ref 4.2–5.4)
SODIUM SERPL-SCNC: 133 MEQ/L (ref 135–145)
WBC # BLD AUTO: 12 THOU/MM3 (ref 4.8–10.8)

## 2025-03-14 PROCEDURE — 74018 RADEX ABDOMEN 1 VIEW: CPT

## 2025-03-15 ENCOUNTER — APPOINTMENT (OUTPATIENT)
Dept: CT IMAGING | Age: 55
End: 2025-03-15
Attending: INTERNAL MEDICINE
Payer: COMMERCIAL

## 2025-03-15 LAB
BASOPHILS ABSOLUTE: 0.2 THOU/MM3 (ref 0–0.1)
BASOPHILS NFR BLD AUTO: 0.9 %
BUN SERPL-MCNC: 10 MG/DL (ref 8–23)
CALCIUM SERPL-MCNC: 9.6 MG/DL (ref 8.6–10)
CHLORIDE SERPL-SCNC: 99 MEQ/L (ref 98–111)
CREAT SERPL-MCNC: 0.5 MG/DL (ref 0.5–0.9)
DEPRECATED RDW RBC AUTO: 56.1 FL (ref 35–45)
EOSINOPHIL NFR BLD AUTO: 1.8 %
EOSINOPHILS ABSOLUTE: 0.4 THOU/MM3 (ref 0–0.4)
ERYTHROCYTE [DISTWIDTH] IN BLOOD BY AUTOMATED COUNT: 14.7 % (ref 11.5–14.5)
GFR SERPL CREATININE-BSD FRML MDRD: > 90 ML/MIN/1.73M2
GLUCOSE SERPL-MCNC: 104 MG/DL (ref 74–109)
HCT VFR BLD AUTO: 34.9 % (ref 37–47)
HGB BLD-MCNC: 10.9 GM/DL (ref 12–16)
IMM GRANULOCYTES # BLD AUTO: 0.17 THOU/MM3 (ref 0–0.07)
IMM GRANULOCYTES NFR BLD AUTO: 0.9 %
LYMPHOCYTES ABSOLUTE: 3.4 THOU/MM3 (ref 1–4.8)
LYMPHOCYTES NFR BLD AUTO: 17.3 %
MAGNESIUM SERPL-MCNC: 2.1 MG/DL (ref 1.6–2.6)
MCH RBC QN AUTO: 32.6 PG (ref 26–33)
MCHC RBC AUTO-ENTMCNC: 31.2 GM/DL (ref 32.2–35.5)
MCV RBC AUTO: 104.5 FL (ref 81–99)
MONOCYTES ABSOLUTE: 4.3 THOU/MM3 (ref 0.4–1.3)
MONOCYTES NFR BLD AUTO: 21.5 %
NEUTROPHILS ABSOLUTE: 11.5 THOU/MM3 (ref 1.8–7.7)
NEUTROPHILS NFR BLD AUTO: 57.6 %
NRBC BLD AUTO-RTO: 0 /100 WBC
PATHOLOGIST REVIEW: ABNORMAL
PLATELET # BLD AUTO: 468 THOU/MM3 (ref 130–400)
PLATELET BLD QL SMEAR: ABNORMAL
PMV BLD AUTO: 11.6 FL (ref 9.4–12.4)
POIKILOCYTES: ABNORMAL
POTASSIUM SERPL-SCNC: 4.6 MEQ/L (ref 3.5–5.2)
RBC # BLD AUTO: 3.34 MILL/MM3 (ref 4.2–5.4)
SCAN OF BLOOD SMEAR: NORMAL
SODIUM SERPL-SCNC: 133 MEQ/L (ref 135–145)
VARIANT LYMPHS BLD QL SMEAR: ABNORMAL %
WBC # BLD AUTO: 19.9 THOU/MM3 (ref 4.8–10.8)

## 2025-03-15 PROCEDURE — 70450 CT HEAD/BRAIN W/O DYE: CPT

## 2025-03-16 ENCOUNTER — APPOINTMENT (OUTPATIENT)
Dept: GENERAL RADIOLOGY | Age: 55
End: 2025-03-16
Attending: INTERNAL MEDICINE
Payer: COMMERCIAL

## 2025-03-16 LAB
ANION GAP SERPL CALC-SCNC: 9 MEQ/L (ref 8–16)
BACTERIA BLD AEROBE CULT: NORMAL
BACTERIA BLD AEROBE CULT: NORMAL
BASOPHILS ABSOLUTE: 0.1 THOU/MM3 (ref 0–0.1)
BASOPHILS NFR BLD AUTO: 0.9 %
BUN SERPL-MCNC: 9 MG/DL (ref 8–23)
C DIFFICILE TOXINS, PCR: ABNORMAL
CALCIUM SERPL-MCNC: 9.4 MG/DL (ref 8.6–10)
CHLORIDE SERPL-SCNC: 95 MEQ/L (ref 98–111)
CO2 SERPL-SCNC: 25 MEQ/L (ref 22–29)
CREAT SERPL-MCNC: 0.4 MG/DL (ref 0.5–0.9)
DEPRECATED RDW RBC AUTO: 55 FL (ref 35–45)
EOSINOPHIL NFR BLD AUTO: 1.9 %
EOSINOPHILS ABSOLUTE: 0.2 THOU/MM3 (ref 0–0.4)
ERYTHROCYTE [DISTWIDTH] IN BLOOD BY AUTOMATED COUNT: 14.6 % (ref 11.5–14.5)
GFR SERPL CREATININE-BSD FRML MDRD: > 90 ML/MIN/1.73M2
GLUCOSE SERPL-MCNC: 108 MG/DL (ref 74–109)
HCT VFR BLD AUTO: 30.3 % (ref 37–47)
HGB BLD-MCNC: 9.7 GM/DL (ref 12–16)
IMM GRANULOCYTES # BLD AUTO: 0.07 THOU/MM3 (ref 0–0.07)
IMM GRANULOCYTES NFR BLD AUTO: 0.6 %
LYMPHOCYTES ABSOLUTE: 2.1 THOU/MM3 (ref 1–4.8)
LYMPHOCYTES NFR BLD AUTO: 18.2 %
MAGNESIUM SERPL-MCNC: 1.9 MG/DL (ref 1.6–2.6)
MCH RBC QN AUTO: 32.6 PG (ref 26–33)
MCHC RBC AUTO-ENTMCNC: 32 GM/DL (ref 32.2–35.5)
MCV RBC AUTO: 101.7 FL (ref 81–99)
MONOCYTES ABSOLUTE: 1.5 THOU/MM3 (ref 0.4–1.3)
MONOCYTES NFR BLD AUTO: 13.7 %
NEUTROPHILS ABSOLUTE: 7.3 THOU/MM3 (ref 1.8–7.7)
NEUTROPHILS NFR BLD AUTO: 64.7 %
NRBC BLD AUTO-RTO: 0 /100 WBC
PLATELET # BLD AUTO: 531 THOU/MM3 (ref 130–400)
PMV BLD AUTO: 11.3 FL (ref 9.4–12.4)
POTASSIUM SERPL-SCNC: 4.9 MEQ/L (ref 3.5–5.2)
RBC # BLD AUTO: 2.98 MILL/MM3 (ref 4.2–5.4)
SODIUM SERPL-SCNC: 129 MEQ/L (ref 135–145)
SODIUM SERPL-SCNC: 130 MEQ/L (ref 135–145)
SODIUM SERPL-SCNC: 131 MEQ/L (ref 135–145)
WBC # BLD AUTO: 11.3 THOU/MM3 (ref 4.8–10.8)

## 2025-03-16 PROCEDURE — 74018 RADEX ABDOMEN 1 VIEW: CPT

## 2025-03-17 ENCOUNTER — APPOINTMENT (OUTPATIENT)
Dept: GENERAL RADIOLOGY | Age: 55
End: 2025-03-17
Attending: INTERNAL MEDICINE
Payer: COMMERCIAL

## 2025-03-17 LAB
ANION GAP SERPL CALC-SCNC: 7 MEQ/L (ref 8–16)
BASOPHILS ABSOLUTE: 0.1 THOU/MM3 (ref 0–0.1)
BASOPHILS NFR BLD AUTO: 1.5 %
BUN SERPL-MCNC: 11 MG/DL (ref 8–23)
CALCIUM SERPL-MCNC: 9.7 MG/DL (ref 8.6–10)
CHLORIDE SERPL-SCNC: 99 MEQ/L (ref 98–111)
CO2 SERPL-SCNC: 27 MEQ/L (ref 22–29)
CREAT SERPL-MCNC: 0.4 MG/DL (ref 0.5–0.9)
DEPRECATED RDW RBC AUTO: 52.7 FL (ref 35–45)
EOSINOPHIL NFR BLD AUTO: 2.2 %
EOSINOPHILS ABSOLUTE: 0.2 THOU/MM3 (ref 0–0.4)
ERYTHROCYTE [DISTWIDTH] IN BLOOD BY AUTOMATED COUNT: 14.5 % (ref 11.5–14.5)
GFR SERPL CREATININE-BSD FRML MDRD: > 90 ML/MIN/1.73M2
GLUCOSE SERPL-MCNC: 113 MG/DL (ref 74–109)
HCT VFR BLD AUTO: 29.8 % (ref 37–47)
HGB BLD-MCNC: 9.5 GM/DL (ref 12–16)
IMM GRANULOCYTES # BLD AUTO: 0.07 THOU/MM3 (ref 0–0.07)
IMM GRANULOCYTES NFR BLD AUTO: 0.7 %
LYMPHOCYTES ABSOLUTE: 2.3 THOU/MM3 (ref 1–4.8)
LYMPHOCYTES NFR BLD AUTO: 23.8 %
MAGNESIUM SERPL-MCNC: 2.2 MG/DL (ref 1.6–2.6)
MCH RBC QN AUTO: 31.9 PG (ref 26–33)
MCHC RBC AUTO-ENTMCNC: 31.9 GM/DL (ref 32.2–35.5)
MCV RBC AUTO: 100 FL (ref 81–99)
MONOCYTES ABSOLUTE: 1.5 THOU/MM3 (ref 0.4–1.3)
MONOCYTES NFR BLD AUTO: 15.7 %
NEUTROPHILS ABSOLUTE: 5.4 THOU/MM3 (ref 1.8–7.7)
NEUTROPHILS NFR BLD AUTO: 56.1 %
NRBC BLD AUTO-RTO: 0 /100 WBC
PLATELET # BLD AUTO: 512 THOU/MM3 (ref 130–400)
PMV BLD AUTO: 10.8 FL (ref 9.4–12.4)
POTASSIUM SERPL-SCNC: 4.4 MEQ/L (ref 3.5–5.2)
RBC # BLD AUTO: 2.98 MILL/MM3 (ref 4.2–5.4)
SODIUM SERPL-SCNC: 133 MEQ/L (ref 135–145)
WBC # BLD AUTO: 9.6 THOU/MM3 (ref 4.8–10.8)

## 2025-03-17 PROCEDURE — 99233 SBSQ HOSP IP/OBS HIGH 50: CPT | Performed by: INTERNAL MEDICINE

## 2025-03-17 NOTE — PROGRESS NOTES
SUBJECTIVE: Patient denies dyspnea, headaches, nausea or vomiting, abdominal pain, chest pain, dysuria, or otherwise pain. ROS otherwise negative. No new complaints. Staff states that patient has required intermittent PRN meds for control of restless and anxious behavior the past few days, but overall her behavior has improved. No issues with hypotension the past several days. Remains off pressors.         OBJECTIVE:  T 98.6  P 116  RR 18  B/P 104/73  O2Sat 97%  MAR printed and reviewed.   General:   Acute on chronically ill adult female. Appears stated age.   HEENT:  normocephalic and atraumatic.  No scleral icterus. PERR. Tongue is moist. NG tube in place.   Neck: supple.  No Thyromegaly. Right IJ central line.   Lungs: clear to auscultation.  No retractions. Normal resp rate and effort. Congested occasional cough.   Cardiac: RRR.  No JVD.  Abdomen: soft.  Nontender.  Extremities:  No clubbing, cyanosis, or edema x 4.    Vasculature: capillary refill < 3 seconds. Palpable dorsalis pedis pulses.  Skin:  warm and dry.  Psych:  Alert and confused.  Affect appropriate  Lymph:  No supraclavicular adenopathy.  Neurologic:  RUE weakness, can lift a few inches off bed. No other focal deficits. No seizures.  DATA:  Telemetry shows NSR.  Sodium 133 potassium 4.4 chloride 99 CO2 27 BUN 11 creatinine 0.4 anion gap 7.0 magnesium 2.2 glucose 113 calcium 9.7  WBC 9.6 hemoglobin 9.5 hematocrit 29.8 platelets 512  CT head wo contrast 3/15/25 report: Resolving hematoma in the left basal ganglia and left temporal lobe. Moderate surrounding edema. Mild mass effect upon the left lateral ventricle.        A/P:     Hypotension (resolved): Recommend to taper off midodrine in the near future as indicated. We will sign off as the patient is no longer on pressors. Primary team to manage from here.   Encephalopathy: listed history of personality disorder. Also known recent IPH of left basal ganglia, stable and decreasing in size by last CT

## 2025-03-18 ENCOUNTER — APPOINTMENT (OUTPATIENT)
Dept: GENERAL RADIOLOGY | Age: 55
End: 2025-03-18
Attending: INTERNAL MEDICINE
Payer: COMMERCIAL

## 2025-03-18 LAB
ANION GAP SERPL CALC-SCNC: 9 MEQ/L (ref 8–16)
BASOPHILS ABSOLUTE: 0.2 THOU/MM3 (ref 0–0.1)
BASOPHILS NFR BLD AUTO: 1.9 %
BUN SERPL-MCNC: 9 MG/DL (ref 8–23)
CALCIUM SERPL-MCNC: 9.6 MG/DL (ref 8.6–10)
CHLORIDE SERPL-SCNC: 100 MEQ/L (ref 98–111)
CO2 SERPL-SCNC: 26 MEQ/L (ref 22–29)
CREAT SERPL-MCNC: 0.4 MG/DL (ref 0.5–0.9)
DEPRECATED RDW RBC AUTO: 54 FL (ref 35–45)
EOSINOPHIL NFR BLD AUTO: 3.5 %
EOSINOPHILS ABSOLUTE: 0.3 THOU/MM3 (ref 0–0.4)
ERYTHROCYTE [DISTWIDTH] IN BLOOD BY AUTOMATED COUNT: 14.4 % (ref 11.5–14.5)
GFR SERPL CREATININE-BSD FRML MDRD: > 90 ML/MIN/1.73M2
GLUCOSE SERPL-MCNC: 94 MG/DL (ref 74–109)
HCT VFR BLD AUTO: 27.8 % (ref 37–47)
HGB BLD-MCNC: 8.9 GM/DL (ref 12–16)
IMM GRANULOCYTES # BLD AUTO: 0.04 THOU/MM3 (ref 0–0.07)
IMM GRANULOCYTES NFR BLD AUTO: 0.4 %
LYMPHOCYTES ABSOLUTE: 2.3 THOU/MM3 (ref 1–4.8)
LYMPHOCYTES NFR BLD AUTO: 25.6 %
MAGNESIUM SERPL-MCNC: 1.9 MG/DL (ref 1.6–2.6)
MCH RBC QN AUTO: 33 PG (ref 26–33)
MCHC RBC AUTO-ENTMCNC: 32 GM/DL (ref 32.2–35.5)
MCV RBC AUTO: 103 FL (ref 81–99)
MONOCYTES ABSOLUTE: 1.4 THOU/MM3 (ref 0.4–1.3)
MONOCYTES NFR BLD AUTO: 15.8 %
NEUTROPHILS ABSOLUTE: 4.8 THOU/MM3 (ref 1.8–7.7)
NEUTROPHILS NFR BLD AUTO: 52.8 %
NRBC BLD AUTO-RTO: 0 /100 WBC
PLATELET # BLD AUTO: 493 THOU/MM3 (ref 130–400)
PMV BLD AUTO: 10.8 FL (ref 9.4–12.4)
POTASSIUM SERPL-SCNC: 4.8 MEQ/L (ref 3.5–5.2)
RBC # BLD AUTO: 2.7 MILL/MM3 (ref 4.2–5.4)
SODIUM SERPL-SCNC: 135 MEQ/L (ref 135–145)
WBC # BLD AUTO: 9 THOU/MM3 (ref 4.8–10.8)

## 2025-03-18 PROCEDURE — 74230 X-RAY XM SWLNG FUNCJ C+: CPT

## 2025-03-18 PROCEDURE — 92611 MOTION FLUOROSCOPY/SWALLOW: CPT

## 2025-03-18 PROCEDURE — 2500000003 HC RX 250 WO HCPCS: Performed by: INTERNAL MEDICINE

## 2025-03-18 RX ADMIN — BARIUM SULFATE 40 ML: 400 SUSPENSION ORAL at 09:53

## 2025-03-18 RX ADMIN — BARIUM SULFATE 20 ML: 400 PASTE ORAL at 09:54

## 2025-03-18 RX ADMIN — BARIUM SULFATE 50 ML: 0.81 POWDER, FOR SUSPENSION ORAL at 09:53

## 2025-03-18 NOTE — DISCHARGE SUMMARY
[facility]  SPEECH THERAPY  STRZ HANNAHCIELO [place]  Modified Barium Swallow    Discharge Recommendations:  LTACH  DIET ORDER RECOMMENDATIONS AFTER EVALUATION: Minced and moist, mildly thick liquids   Strategies:  Full Upright Position, Small Bite/Sip, Pulmonary Monitoring, Alternate Solids and Liquids, Limit Distractions, and Monitor for Fatigue     SLP Individual Minutes  Time In: 0944  Time Out: 1000  Minutes: 16  Timed Code Treatment Minutes: 0 Minutes       Date: 3/18/2025  Patient Name: Rivka Zavala      CSN: [id number]   : 1970  (54 y.o.)  Gender: female   Referring Physician:  Ricardo Burdick MD   Diagnosis: Respiratory failure, unspecified, unspecified whether with hypoxia or hypercapnia (HCC) [J96.90]    Precautions: Fall risk, Contact precautions   History of Present Illness/Injury: Patient presents to [facility] from [facility] for MBS completion. Per chart review of hospitalization at [facility], \"Patient is a 54-year-old female with PMH of MS, alcohol abuse, personality disorder, smoking, anemia, malnutrition who was brought to the ED by her parents for AMS. Patient reportedly was found near beer cans and covered in feces. In the ED, alcohol level was negative. CT head showed acute brain hemorrhage in the left basal ganglia measuring 5.5 x 3.3 x 3.1 cm with mass effect upon the left lateral ventricle with left-to-right midline shift of 4 mm. Neurosurgery was consulted and she was admitted to ICU per NS. In the ED, patient was intubated for airway protection. She was then extubated 3/1. Patient had increased work of breathing and was reintubated on night of 3/2. Repeat chest x-ray 3/7 had diffuse infiltrates and increased opacity in upper R lung. Patient was febrile and placed back on Levophed overnight of 3/4 due to hypotension. Tested Influenza A+. She was weaned off pressors. As for her brain bleed, NS recommended palliative care. Pt was evaluated by speech and she was recommended

## 2025-03-20 LAB
ANION GAP SERPL CALC-SCNC: 10 MEQ/L (ref 8–16)
BASOPHILS ABSOLUTE: 0.1 THOU/MM3 (ref 0–0.1)
BASOPHILS NFR BLD AUTO: 1.3 %
BUN SERPL-MCNC: 8 MG/DL (ref 8–23)
CALCIUM SERPL-MCNC: 9.6 MG/DL (ref 8.6–10)
CHLORIDE SERPL-SCNC: 101 MEQ/L (ref 98–111)
CO2 SERPL-SCNC: 24 MEQ/L (ref 22–29)
CREAT SERPL-MCNC: 0.5 MG/DL (ref 0.5–0.9)
DEPRECATED RDW RBC AUTO: 53 FL (ref 35–45)
EOSINOPHIL NFR BLD AUTO: 4.3 %
EOSINOPHILS ABSOLUTE: 0.4 THOU/MM3 (ref 0–0.4)
ERYTHROCYTE [DISTWIDTH] IN BLOOD BY AUTOMATED COUNT: 14.1 % (ref 11.5–14.5)
GFR SERPL CREATININE-BSD FRML MDRD: > 90 ML/MIN/1.73M2
GLUCOSE SERPL-MCNC: 84 MG/DL (ref 74–109)
HCT VFR BLD AUTO: 28.2 % (ref 37–47)
HGB BLD-MCNC: 8.9 GM/DL (ref 12–16)
IMM GRANULOCYTES # BLD AUTO: 0.05 THOU/MM3 (ref 0–0.07)
IMM GRANULOCYTES NFR BLD AUTO: 0.6 %
LYMPHOCYTES ABSOLUTE: 2.7 THOU/MM3 (ref 1–4.8)
LYMPHOCYTES NFR BLD AUTO: 29.2 %
MAGNESIUM SERPL-MCNC: 1.7 MG/DL (ref 1.6–2.6)
MCH RBC QN AUTO: 31.9 PG (ref 26–33)
MCHC RBC AUTO-ENTMCNC: 31.6 GM/DL (ref 32.2–35.5)
MCV RBC AUTO: 101.1 FL (ref 81–99)
MONOCYTES ABSOLUTE: 1.1 THOU/MM3 (ref 0.4–1.3)
MONOCYTES NFR BLD AUTO: 12.5 %
NEUTROPHILS ABSOLUTE: 4.7 THOU/MM3 (ref 1.8–7.7)
NEUTROPHILS NFR BLD AUTO: 52.1 %
NRBC BLD AUTO-RTO: 0 /100 WBC
PLATELET # BLD AUTO: 487 THOU/MM3 (ref 130–400)
PMV BLD AUTO: 10.1 FL (ref 9.4–12.4)
POTASSIUM SERPL-SCNC: 4.3 MEQ/L (ref 3.5–5.2)
RBC # BLD AUTO: 2.79 MILL/MM3 (ref 4.2–5.4)
SODIUM SERPL-SCNC: 135 MEQ/L (ref 135–145)
WBC # BLD AUTO: 9.1 THOU/MM3 (ref 4.8–10.8)

## 2025-03-21 LAB
ANION GAP SERPL CALC-SCNC: 11 MEQ/L (ref 8–16)
BASOPHILS ABSOLUTE: 0.1 THOU/MM3 (ref 0–0.1)
BASOPHILS NFR BLD AUTO: 1.2 %
BUN SERPL-MCNC: 6 MG/DL (ref 8–23)
CALCIUM SERPL-MCNC: 9.8 MG/DL (ref 8.6–10)
CHLORIDE SERPL-SCNC: 97 MEQ/L (ref 98–111)
CO2 SERPL-SCNC: 26 MEQ/L (ref 22–29)
CREAT SERPL-MCNC: 0.5 MG/DL (ref 0.5–0.9)
DEPRECATED RDW RBC AUTO: 52.5 FL (ref 35–45)
EOSINOPHIL NFR BLD AUTO: 4.2 %
EOSINOPHILS ABSOLUTE: 0.5 THOU/MM3 (ref 0–0.4)
ERYTHROCYTE [DISTWIDTH] IN BLOOD BY AUTOMATED COUNT: 14.2 % (ref 11.5–14.5)
GFR SERPL CREATININE-BSD FRML MDRD: > 90 ML/MIN/1.73M2
GLUCOSE SERPL-MCNC: 93 MG/DL (ref 74–109)
HCT VFR BLD AUTO: 30.5 % (ref 37–47)
HGB BLD-MCNC: 9.8 GM/DL (ref 12–16)
IMM GRANULOCYTES # BLD AUTO: 0.07 THOU/MM3 (ref 0–0.07)
IMM GRANULOCYTES NFR BLD AUTO: 0.6 %
LYMPHOCYTES ABSOLUTE: 3 THOU/MM3 (ref 1–4.8)
LYMPHOCYTES NFR BLD AUTO: 25.1 %
MAGNESIUM SERPL-MCNC: 2.1 MG/DL (ref 1.6–2.6)
MCH RBC QN AUTO: 32.8 PG (ref 26–33)
MCHC RBC AUTO-ENTMCNC: 32.1 GM/DL (ref 32.2–35.5)
MCV RBC AUTO: 102 FL (ref 81–99)
MONOCYTES ABSOLUTE: 1.2 THOU/MM3 (ref 0.4–1.3)
MONOCYTES NFR BLD AUTO: 9.9 %
NEUTROPHILS ABSOLUTE: 7 THOU/MM3 (ref 1.8–7.7)
NEUTROPHILS NFR BLD AUTO: 59 %
NRBC BLD AUTO-RTO: 0 /100 WBC
PHOSPHATE SERPL-MCNC: 4.2 MG/DL (ref 2.5–4.5)
PLATELET # BLD AUTO: 539 THOU/MM3 (ref 130–400)
PMV BLD AUTO: 9.7 FL (ref 9.4–12.4)
POTASSIUM SERPL-SCNC: 3.9 MEQ/L (ref 3.5–5.2)
RBC # BLD AUTO: 2.99 MILL/MM3 (ref 4.2–5.4)
SODIUM SERPL-SCNC: 134 MEQ/L (ref 135–145)
WBC # BLD AUTO: 11.8 THOU/MM3 (ref 4.8–10.8)

## 2025-03-24 LAB
ANION GAP SERPL CALC-SCNC: 9 MEQ/L (ref 8–16)
BASOPHILS ABSOLUTE: 0.1 THOU/MM3 (ref 0–0.1)
BASOPHILS NFR BLD AUTO: 0.9 %
BUN SERPL-MCNC: 7 MG/DL (ref 8–23)
CALCIUM SERPL-MCNC: 9.4 MG/DL (ref 8.6–10)
CHLORIDE SERPL-SCNC: 97 MEQ/L (ref 98–111)
CO2 SERPL-SCNC: 26 MEQ/L (ref 22–29)
CREAT SERPL-MCNC: 0.4 MG/DL (ref 0.5–0.9)
DEPRECATED RDW RBC AUTO: 54.3 FL (ref 35–45)
EOSINOPHIL NFR BLD AUTO: 5.6 %
EOSINOPHILS ABSOLUTE: 0.6 THOU/MM3 (ref 0–0.4)
ERYTHROCYTE [DISTWIDTH] IN BLOOD BY AUTOMATED COUNT: 14.1 % (ref 11.5–14.5)
GFR SERPL CREATININE-BSD FRML MDRD: > 90 ML/MIN/1.73M2
GLUCOSE SERPL-MCNC: 75 MG/DL (ref 74–109)
HCT VFR BLD AUTO: 29.2 % (ref 37–47)
HGB BLD-MCNC: 9.2 GM/DL (ref 12–16)
IMM GRANULOCYTES # BLD AUTO: 0.06 THOU/MM3 (ref 0–0.07)
IMM GRANULOCYTES NFR BLD AUTO: 0.5 %
LYMPHOCYTES ABSOLUTE: 3.1 THOU/MM3 (ref 1–4.8)
LYMPHOCYTES NFR BLD AUTO: 26.9 %
MCH RBC QN AUTO: 32.6 PG (ref 26–33)
MCHC RBC AUTO-ENTMCNC: 31.5 GM/DL (ref 32.2–35.5)
MCV RBC AUTO: 103.5 FL (ref 81–99)
MONOCYTES ABSOLUTE: 1.2 THOU/MM3 (ref 0.4–1.3)
MONOCYTES NFR BLD AUTO: 10.3 %
NEUTROPHILS ABSOLUTE: 6.5 THOU/MM3 (ref 1.8–7.7)
NEUTROPHILS NFR BLD AUTO: 55.8 %
NRBC BLD AUTO-RTO: 0 /100 WBC
PLATELET # BLD AUTO: 447 THOU/MM3 (ref 130–400)
PMV BLD AUTO: 9.7 FL (ref 9.4–12.4)
POTASSIUM SERPL-SCNC: 4.5 MEQ/L (ref 3.5–5.2)
RBC # BLD AUTO: 2.82 MILL/MM3 (ref 4.2–5.4)
SODIUM SERPL-SCNC: 132 MEQ/L (ref 135–145)
WBC # BLD AUTO: 11.6 THOU/MM3 (ref 4.8–10.8)

## 2025-03-25 LAB
BASOPHILS ABSOLUTE: 0.1 THOU/MM3 (ref 0–0.1)
BASOPHILS NFR BLD AUTO: 1 %
DEPRECATED RDW RBC AUTO: 53.1 FL (ref 35–45)
EOSINOPHIL NFR BLD AUTO: 7 %
EOSINOPHILS ABSOLUTE: 0.8 THOU/MM3 (ref 0–0.4)
ERYTHROCYTE [DISTWIDTH] IN BLOOD BY AUTOMATED COUNT: 14.1 % (ref 11.5–14.5)
HCT VFR BLD AUTO: 27.4 % (ref 37–47)
HGB BLD-MCNC: 8.7 GM/DL (ref 12–16)
IMM GRANULOCYTES # BLD AUTO: 0.06 THOU/MM3 (ref 0–0.07)
IMM GRANULOCYTES NFR BLD AUTO: 0.5 %
LYMPHOCYTES ABSOLUTE: 2.8 THOU/MM3 (ref 1–4.8)
LYMPHOCYTES NFR BLD AUTO: 25.2 %
MCH RBC QN AUTO: 32.3 PG (ref 26–33)
MCHC RBC AUTO-ENTMCNC: 31.8 GM/DL (ref 32.2–35.5)
MCV RBC AUTO: 101.9 FL (ref 81–99)
MONOCYTES ABSOLUTE: 1.2 THOU/MM3 (ref 0.4–1.3)
MONOCYTES NFR BLD AUTO: 10.8 %
NEUTROPHILS ABSOLUTE: 6.2 THOU/MM3 (ref 1.8–7.7)
NEUTROPHILS NFR BLD AUTO: 55.5 %
NRBC BLD AUTO-RTO: 0 /100 WBC
PLATELET # BLD AUTO: 405 THOU/MM3 (ref 130–400)
PMV BLD AUTO: 9.6 FL (ref 9.4–12.4)
RBC # BLD AUTO: 2.69 MILL/MM3 (ref 4.2–5.4)
WBC # BLD AUTO: 11.2 THOU/MM3 (ref 4.8–10.8)

## 2025-03-26 LAB
ANION GAP SERPL CALC-SCNC: 9 MEQ/L (ref 8–16)
BASOPHILS ABSOLUTE: 0.1 THOU/MM3 (ref 0–0.1)
BASOPHILS NFR BLD AUTO: 0.5 %
BUN SERPL-MCNC: 5 MG/DL (ref 8–23)
CALCIUM SERPL-MCNC: 9.5 MG/DL (ref 8.6–10)
CHLORIDE SERPL-SCNC: 97 MEQ/L (ref 98–111)
CO2 SERPL-SCNC: 24 MEQ/L (ref 22–29)
CREAT SERPL-MCNC: 0.4 MG/DL (ref 0.5–0.9)
DEPRECATED RDW RBC AUTO: 52.7 FL (ref 35–45)
EOSINOPHIL NFR BLD AUTO: 4.5 %
EOSINOPHILS ABSOLUTE: 0.7 THOU/MM3 (ref 0–0.4)
ERYTHROCYTE [DISTWIDTH] IN BLOOD BY AUTOMATED COUNT: 14.2 % (ref 11.5–14.5)
GFR SERPL CREATININE-BSD FRML MDRD: > 90 ML/MIN/1.73M2
GLUCOSE SERPL-MCNC: 84 MG/DL (ref 74–109)
HCT VFR BLD AUTO: 26.5 % (ref 37–47)
HGB BLD-MCNC: 8.5 GM/DL (ref 12–16)
IMM GRANULOCYTES # BLD AUTO: 0.09 THOU/MM3 (ref 0–0.07)
IMM GRANULOCYTES NFR BLD AUTO: 0.5 %
LYMPHOCYTES ABSOLUTE: 2.6 THOU/MM3 (ref 1–4.8)
LYMPHOCYTES NFR BLD AUTO: 16.1 %
MAGNESIUM SERPL-MCNC: 1.7 MG/DL (ref 1.6–2.6)
MCH RBC QN AUTO: 32.6 PG (ref 26–33)
MCHC RBC AUTO-ENTMCNC: 32.1 GM/DL (ref 32.2–35.5)
MCV RBC AUTO: 101.5 FL (ref 81–99)
MONOCYTES ABSOLUTE: 1.3 THOU/MM3 (ref 0.4–1.3)
MONOCYTES NFR BLD AUTO: 7.8 %
NEUTROPHILS ABSOLUTE: 11.6 THOU/MM3 (ref 1.8–7.7)
NEUTROPHILS NFR BLD AUTO: 70.6 %
NRBC BLD AUTO-RTO: 0 /100 WBC
PLATELET # BLD AUTO: 399 THOU/MM3 (ref 130–400)
PMV BLD AUTO: 9.6 FL (ref 9.4–12.4)
POTASSIUM SERPL-SCNC: 4.9 MEQ/L (ref 3.5–5.2)
RBC # BLD AUTO: 2.61 MILL/MM3 (ref 4.2–5.4)
SODIUM SERPL-SCNC: 130 MEQ/L (ref 135–145)
WBC # BLD AUTO: 16.4 THOU/MM3 (ref 4.8–10.8)

## 2025-03-27 LAB
ANION GAP SERPL CALC-SCNC: 11 MEQ/L (ref 8–16)
BASOPHILS ABSOLUTE: 0 THOU/MM3 (ref 0–0.1)
BASOPHILS NFR BLD AUTO: 0.4 %
BUN SERPL-MCNC: 6 MG/DL (ref 8–23)
CALCIUM SERPL-MCNC: 9.4 MG/DL (ref 8.6–10)
CHLORIDE SERPL-SCNC: 101 MEQ/L (ref 98–111)
CO2 SERPL-SCNC: 24 MEQ/L (ref 22–29)
CREAT SERPL-MCNC: 0.5 MG/DL (ref 0.5–0.9)
DEPRECATED RDW RBC AUTO: 54 FL (ref 35–45)
EOSINOPHIL NFR BLD AUTO: 7.3 %
EOSINOPHILS ABSOLUTE: 0.9 THOU/MM3 (ref 0–0.4)
ERYTHROCYTE [DISTWIDTH] IN BLOOD BY AUTOMATED COUNT: 14.4 % (ref 11.5–14.5)
GFR SERPL CREATININE-BSD FRML MDRD: > 90 ML/MIN/1.73M2
GLUCOSE SERPL-MCNC: 78 MG/DL (ref 74–109)
HCT VFR BLD AUTO: 27.4 % (ref 37–47)
HGB BLD-MCNC: 8.7 GM/DL (ref 12–16)
IMM GRANULOCYTES # BLD AUTO: 0.06 THOU/MM3 (ref 0–0.07)
IMM GRANULOCYTES NFR BLD AUTO: 0.5 %
LYMPHOCYTES ABSOLUTE: 2.7 THOU/MM3 (ref 1–4.8)
LYMPHOCYTES NFR BLD AUTO: 22.8 %
MAGNESIUM SERPL-MCNC: 1.6 MG/DL (ref 1.6–2.6)
MCH RBC QN AUTO: 32.6 PG (ref 26–33)
MCHC RBC AUTO-ENTMCNC: 31.8 GM/DL (ref 32.2–35.5)
MCV RBC AUTO: 102.6 FL (ref 81–99)
MONOCYTES ABSOLUTE: 1.1 THOU/MM3 (ref 0.4–1.3)
MONOCYTES NFR BLD AUTO: 9.3 %
NEUTROPHILS ABSOLUTE: 7.2 THOU/MM3 (ref 1.8–7.7)
NEUTROPHILS NFR BLD AUTO: 59.7 %
NRBC BLD AUTO-RTO: 0 /100 WBC
PLATELET # BLD AUTO: 382 THOU/MM3 (ref 130–400)
PMV BLD AUTO: 9.4 FL (ref 9.4–12.4)
POTASSIUM SERPL-SCNC: 4.4 MEQ/L (ref 3.5–5.2)
RBC # BLD AUTO: 2.67 MILL/MM3 (ref 4.2–5.4)
SODIUM SERPL-SCNC: 136 MEQ/L (ref 135–145)
WBC # BLD AUTO: 12 THOU/MM3 (ref 4.8–10.8)

## 2025-03-28 LAB
DEPRECATED RDW RBC AUTO: 51.7 FL (ref 35–45)
ERYTHROCYTE [DISTWIDTH] IN BLOOD BY AUTOMATED COUNT: 14 % (ref 11.5–14.5)
HCT VFR BLD AUTO: 26 % (ref 37–47)
HGB BLD-MCNC: 8.3 GM/DL (ref 12–16)
MCH RBC QN AUTO: 32.2 PG (ref 26–33)
MCHC RBC AUTO-ENTMCNC: 31.9 GM/DL (ref 32.2–35.5)
MCV RBC AUTO: 100.8 FL (ref 81–99)
PLATELET # BLD AUTO: 354 THOU/MM3 (ref 130–400)
PMV BLD AUTO: 9.5 FL (ref 9.4–12.4)
RBC # BLD AUTO: 2.58 MILL/MM3 (ref 4.2–5.4)
WBC # BLD AUTO: 15.6 THOU/MM3 (ref 4.8–10.8)

## 2025-03-29 ENCOUNTER — APPOINTMENT (OUTPATIENT)
Dept: GENERAL RADIOLOGY | Age: 55
DRG: 139 | End: 2025-03-29
Payer: COMMERCIAL

## 2025-03-29 ENCOUNTER — APPOINTMENT (OUTPATIENT)
Dept: CT IMAGING | Age: 55
DRG: 139 | End: 2025-03-29
Payer: COMMERCIAL

## 2025-03-29 ENCOUNTER — HOSPITAL ENCOUNTER (INPATIENT)
Age: 55
LOS: 4 days | Discharge: HOME | DRG: 139 | End: 2025-04-02
Attending: INTERNAL MEDICINE | Admitting: INTERNAL MEDICINE
Payer: COMMERCIAL

## 2025-03-29 DIAGNOSIS — E87.1 HYPONATREMIA: ICD-10-CM

## 2025-03-29 DIAGNOSIS — A41.9 SEPSIS WITHOUT ACUTE ORGAN DYSFUNCTION, DUE TO UNSPECIFIED ORGANISM (HCC): Primary | ICD-10-CM

## 2025-03-29 DIAGNOSIS — D53.9 MACROCYTIC ANEMIA: ICD-10-CM

## 2025-03-29 DIAGNOSIS — R00.0 TACHYCARDIA: ICD-10-CM

## 2025-03-29 DIAGNOSIS — J18.9 MULTIFOCAL PNEUMONIA: ICD-10-CM

## 2025-03-29 PROBLEM — R45.1 AGITATION: Status: ACTIVE | Noted: 2025-03-29

## 2025-03-29 LAB
ALBUMIN SERPL BCG-MCNC: 3.6 G/DL (ref 3.4–4.9)
ALP SERPL-CCNC: 137 U/L (ref 35–104)
ALT SERPL W/O P-5'-P-CCNC: 28 U/L (ref 10–35)
ANION GAP SERPL CALC-SCNC: 14 MEQ/L (ref 8–16)
AST SERPL-CCNC: 45 U/L (ref 10–35)
BACTERIA URNS QL MICRO: ABNORMAL /HPF
BASOPHILS ABSOLUTE: 0.1 THOU/MM3 (ref 0–0.1)
BASOPHILS NFR BLD AUTO: 0.4 %
BILIRUB SERPL-MCNC: < 0.2 MG/DL (ref 0.3–1.2)
BILIRUB UR QL STRIP.AUTO: NEGATIVE
BUN SERPL-MCNC: 9 MG/DL (ref 8–23)
CALCIUM SERPL-MCNC: 9.7 MG/DL (ref 8.6–10)
CASTS #/AREA URNS LPF: ABNORMAL /LPF
CASTS 2: ABNORMAL /LPF
CHARACTER UR: CLEAR
CHLORIDE SERPL-SCNC: 92 MEQ/L (ref 98–111)
CK SERPL-CCNC: 63 U/L (ref 26–192)
CO2 SERPL-SCNC: 23 MEQ/L (ref 22–29)
COLOR, UA: YELLOW
CREAT SERPL-MCNC: 0.5 MG/DL (ref 0.5–0.9)
CRYSTALS URNS MICRO: ABNORMAL
D DIMER PPP IA.FEU-MCNC: 6347 NG/ML FEU (ref 0–500)
DEPRECATED RDW RBC AUTO: 53.2 FL (ref 35–45)
EKG ATRIAL RATE: 104 BPM
EKG P AXIS: 72 DEGREES
EKG P-R INTERVAL: 132 MS
EKG Q-T INTERVAL: 338 MS
EKG QRS DURATION: 72 MS
EKG QTC CALCULATION (BAZETT): 444 MS
EKG R AXIS: 80 DEGREES
EKG T AXIS: 81 DEGREES
EKG VENTRICULAR RATE: 104 BPM
EOSINOPHIL NFR BLD AUTO: 2.4 %
EOSINOPHILS ABSOLUTE: 0.3 THOU/MM3 (ref 0–0.4)
EPITHELIAL CELLS, UA: ABNORMAL /HPF
ERYTHROCYTE [DISTWIDTH] IN BLOOD BY AUTOMATED COUNT: 14.1 % (ref 11.5–14.5)
GFR SERPL CREATININE-BSD FRML MDRD: > 90 ML/MIN/1.73M2
GLUCOSE SERPL-MCNC: 88 MG/DL (ref 74–109)
GLUCOSE UR QL STRIP.AUTO: NEGATIVE MG/DL
HCT VFR BLD AUTO: 28.4 % (ref 37–47)
HGB BLD-MCNC: 8.8 GM/DL (ref 12–16)
HGB UR QL STRIP.AUTO: NEGATIVE
IMM GRANULOCYTES # BLD AUTO: 0.08 THOU/MM3 (ref 0–0.07)
IMM GRANULOCYTES NFR BLD AUTO: 0.6 %
KETONES UR QL STRIP.AUTO: NEGATIVE
LACTIC ACID, SEPSIS: 1.2 MMOL/L (ref 0.5–1.9)
LACTIC ACID, SEPSIS: 2.1 MMOL/L (ref 0.5–1.9)
LYMPHOCYTES ABSOLUTE: 2.8 THOU/MM3 (ref 1–4.8)
LYMPHOCYTES NFR BLD AUTO: 20.3 %
MCH RBC QN AUTO: 32 PG (ref 26–33)
MCHC RBC AUTO-ENTMCNC: 31 GM/DL (ref 32.2–35.5)
MCV RBC AUTO: 103.3 FL (ref 81–99)
MISCELLANEOUS 2: ABNORMAL
MONOCYTES ABSOLUTE: 1.4 THOU/MM3 (ref 0.4–1.3)
MONOCYTES NFR BLD AUTO: 10.4 %
NEUTROPHILS ABSOLUTE: 9.2 THOU/MM3 (ref 1.8–7.7)
NEUTROPHILS NFR BLD AUTO: 65.9 %
NITRITE UR QL STRIP: NEGATIVE
NRBC BLD AUTO-RTO: 0 /100 WBC
OSMOLALITY SERPL CALC.SUM OF ELEC: 257 MOSMOL/KG (ref 275–300)
PH UR STRIP.AUTO: 8 [PH] (ref 5–9)
PLATELET # BLD AUTO: 362 THOU/MM3 (ref 130–400)
PMV BLD AUTO: 9.5 FL (ref 9.4–12.4)
POTASSIUM SERPL-SCNC: 4.5 MEQ/L (ref 3.5–5.2)
PROT SERPL-MCNC: 7.1 G/DL (ref 6.4–8.3)
PROT UR STRIP.AUTO-MCNC: NEGATIVE MG/DL
RBC # BLD AUTO: 2.75 MILL/MM3 (ref 4.2–5.4)
RBC URINE: ABNORMAL /HPF
RENAL EPI CELLS #/AREA URNS HPF: ABNORMAL /[HPF]
SODIUM SERPL-SCNC: 129 MEQ/L (ref 135–145)
SP GR UR REFRACT.AUTO: 1.01 (ref 1–1.03)
TROPONIN, HIGH SENSITIVITY: 13 NG/L (ref 0–12)
TROPONIN, HIGH SENSITIVITY: 15 NG/L (ref 0–12)
TSH SERPL DL<=0.05 MIU/L-ACNC: 2.37 UIU/ML (ref 0.27–4.2)
UROBILINOGEN, URINE: 0.2 EU/DL (ref 0–1)
WBC # BLD AUTO: 13.9 THOU/MM3 (ref 4.8–10.8)
WBC #/AREA URNS HPF: ABNORMAL /HPF
WBC #/AREA URNS HPF: ABNORMAL /[HPF]
YEAST LIKE FUNGI URNS QL MICRO: ABNORMAL

## 2025-03-29 PROCEDURE — 71045 X-RAY EXAM CHEST 1 VIEW: CPT

## 2025-03-29 PROCEDURE — 6360000002 HC RX W HCPCS: Performed by: PHYSICIAN ASSISTANT

## 2025-03-29 PROCEDURE — 80053 COMPREHEN METABOLIC PANEL: CPT

## 2025-03-29 PROCEDURE — 93005 ELECTROCARDIOGRAM TRACING: CPT

## 2025-03-29 PROCEDURE — 2500000003 HC RX 250 WO HCPCS

## 2025-03-29 PROCEDURE — 70450 CT HEAD/BRAIN W/O DYE: CPT

## 2025-03-29 PROCEDURE — 6360000002 HC RX W HCPCS

## 2025-03-29 PROCEDURE — 6360000004 HC RX CONTRAST MEDICATION: Performed by: PHYSICIAN ASSISTANT

## 2025-03-29 PROCEDURE — 80307 DRUG TEST PRSMV CHEM ANLYZR: CPT

## 2025-03-29 PROCEDURE — 6370000000 HC RX 637 (ALT 250 FOR IP)

## 2025-03-29 PROCEDURE — 82550 ASSAY OF CK (CPK): CPT

## 2025-03-29 PROCEDURE — 71275 CT ANGIOGRAPHY CHEST: CPT

## 2025-03-29 PROCEDURE — 85025 COMPLETE CBC W/AUTO DIFF WBC: CPT

## 2025-03-29 PROCEDURE — 2580000003 HC RX 258

## 2025-03-29 PROCEDURE — 99285 EMERGENCY DEPT VISIT HI MDM: CPT

## 2025-03-29 PROCEDURE — 96374 THER/PROPH/DIAG INJ IV PUSH: CPT

## 2025-03-29 PROCEDURE — 81001 URINALYSIS AUTO W/SCOPE: CPT

## 2025-03-29 PROCEDURE — 1200000000 HC SEMI PRIVATE

## 2025-03-29 PROCEDURE — 84484 ASSAY OF TROPONIN QUANT: CPT

## 2025-03-29 PROCEDURE — 2580000003 HC RX 258: Performed by: PHYSICIAN ASSISTANT

## 2025-03-29 PROCEDURE — 83605 ASSAY OF LACTIC ACID: CPT

## 2025-03-29 PROCEDURE — 36415 COLL VENOUS BLD VENIPUNCTURE: CPT

## 2025-03-29 PROCEDURE — 71275 CT ANGIOGRAPHY CHEST: CPT | Performed by: RADIOLOGY

## 2025-03-29 PROCEDURE — 84443 ASSAY THYROID STIM HORMONE: CPT

## 2025-03-29 PROCEDURE — 85379 FIBRIN DEGRADATION QUANT: CPT

## 2025-03-29 RX ORDER — SODIUM CHLORIDE 9 MG/ML
INJECTION, SOLUTION INTRAVENOUS CONTINUOUS
Status: DISCONTINUED | OUTPATIENT
Start: 2025-03-29 | End: 2025-03-29

## 2025-03-29 RX ORDER — AZITHROMYCIN 250 MG/1
500 TABLET, FILM COATED ORAL ONCE
Status: COMPLETED | OUTPATIENT
Start: 2025-03-29 | End: 2025-03-29

## 2025-03-29 RX ORDER — AZITHROMYCIN 250 MG/1
500 TABLET, FILM COATED ORAL EVERY 24 HOURS
Status: COMPLETED | OUTPATIENT
Start: 2025-03-30 | End: 2025-03-31

## 2025-03-29 RX ORDER — IOPAMIDOL 755 MG/ML
80 INJECTION, SOLUTION INTRAVASCULAR
Status: COMPLETED | OUTPATIENT
Start: 2025-03-29 | End: 2025-03-29

## 2025-03-29 RX ORDER — AZITHROMYCIN 250 MG/1
500 TABLET, FILM COATED ORAL ONCE
Status: DISCONTINUED | OUTPATIENT
Start: 2025-03-29 | End: 2025-03-29

## 2025-03-29 RX ADMIN — SODIUM CHLORIDE: 0.9 INJECTION, SOLUTION INTRAVENOUS at 23:07

## 2025-03-29 RX ADMIN — AZITHROMYCIN DIHYDRATE 500 MG: 250 TABLET ORAL at 18:26

## 2025-03-29 RX ADMIN — SODIUM CHLORIDE: 9 INJECTION, SOLUTION INTRAVENOUS at 19:08

## 2025-03-29 RX ADMIN — VANCOMYCIN HYDROCHLORIDE 1000 MG: 1 INJECTION, POWDER, LYOPHILIZED, FOR SOLUTION INTRAVENOUS at 23:51

## 2025-03-29 RX ADMIN — IOPAMIDOL 80 ML: 755 INJECTION, SOLUTION INTRAVENOUS at 20:27

## 2025-03-29 RX ADMIN — WATER 1000 MG: 1 INJECTION INTRAMUSCULAR; INTRAVENOUS; SUBCUTANEOUS at 18:27

## 2025-03-29 ASSESSMENT — PAIN - FUNCTIONAL ASSESSMENT
PAIN_FUNCTIONAL_ASSESSMENT: NONE - DENIES PAIN

## 2025-03-29 NOTE — ED TRIAGE NOTES
Pt comes to ED for nursing home placement. Pt was admitted to [facility] after being discharged from [facility] several days prior. Pt states that she has been frustrated with [facility] due to them not allowing her to visit her parents. Pt reports that she wanted to be discharged from [facility]. Per pt boyfriend, the pts confusion and behavior are at baseline. During triage pt expression frustrations regarding her nursing home placement.

## 2025-03-29 NOTE — ED NOTES
Pt updated on plan of care at this time. Pt voices no new needs or concerns. Call light in reach. Telemetry in place.

## 2025-03-29 NOTE — ED PROVIDER NOTES
[facility] EMERGENCY DEPARTMENT      EMERGENCY MEDICINE     Pt Name: Rivka Zavala  MRN: [id number]  Birthdate 1970  Date of evaluation: 3/29/2025  Provider: Catrachita Nair PA-C    CHIEF COMPLAINT     No chief complaint on file.    HISTORY OF PRESENT ILLNESS   Rivka Zavala is a pleasant 54 y.o. female who presents to the emergency department from from nursing home, brought in by EMS for evaluation of nursing home placement.    Patient supposedly had unwitnessed falls at SNF.  Family at bedside saying she is at baseline mentation after stroke.     PASTMEDICAL HISTORY     Past Medical History:   Diagnosis Date   • Anemia    • Clavicle fracture 10/13/2022   • ETOH abuse    • Malnutrition    • Mild tetrahydrocannabinol (THC) abuse    • Multiple sclerosis ([facility]) 2017   • Personality disorder ([facility]) unknown   • Rib fracture 10/13/2022   • Smoker        Patient Active Problem List   Diagnosis Code   • Alcohol withdrawal ([facility]) F10.939   • Personality disorder, NOS F60.9   • Optic neuritis H46.9   • Alcohol use Z78.9   • Alcohol abuse F10.10   • Anemia D64.9   • Acute hyponatremia E87.1   • Moderate malnutrition E44.0   • Syncope and collapse R55   • Normocytic anemia D64.9   • Iron deficiency anemia, unspecified D50.9   • MS (multiple sclerosis) ([facility]) G35   • Symptomatic anemia D64.9   • Iron deficiency anemia secondary to inadequate dietary iron intake D50.8   • Recurrent falls R29.6   • Closed nondisplaced fracture of right patella S82.001A   • Traumatic ecchymosis of left orbit S05.12XA   • Severe malnutrition E43   • Acute respiratory failure (HCC) J96.00   • Hypokalemia E87.6   • Hypomagnesemia E83.42   • Malnutrition E46   • Hyponatremia with decreased serum osmolality E87.1   • Subdural hematoma (HCC) S06.5XAA   • Intraparenchymal hemorrhage of brain ([facility]) I61.9   • Hyponatremia E87.1   • Acute hypoxic respiratory failure ([facility]) J96.01     SURGICAL HISTORY       Past Surgical History:   Procedure 
 This patient was endorsed to me by the previous APC, Catrachita.  Case was discussed.  Patient was awaiting CT scan to rule out pulmonary embolism.  Patient had a recent intracranial hemorrhage and was transferred to rehabilitation unit after being inpatient at another facility.  Apparently the patient was disruptive at that facility.  That facility is not going to take the patient back.  Patient was sent here for nursing home placement.  Patient found to be tachycardic.  Patient also has evidence of pneumonia on plain film chest x-ray.    As I assessed the patient she is still tachycardic with heart rate between 110 and 120.  Temp 99.  SpO2 97% on room air.  Patient does not appear to have any increased work of breathing.  Patient essentially alert and oriented x 1 which is apparently her baseline since intracranial hemorrhage, this was according to family that was in the room.    Patient has some scattered rhonchi, no wheezes.  No tachypnea.  Normal S1 and S2, no murmur.  Tachycardia.  No abdominal tenderness.  No evidence of focal neurologic deficit.    CT scan done here shows the intracranial hemorrhage without any interval worsening or new pathology since that study on March 15, 2025, there remains some surrounding vasogenic edema.    Reviewed all laboratory work.  Patient does have elevated white count, chronic macrocytic anemia, multifocal pneumonia on CT scan.  Lactate was 2.1.  Patient does meet sepsis criteria.  I did add on vancomycin to the ceftriaxone and azithromycin.  Discussed the case with Andrey Starr, the APC for the hospitalist group for admission    Will admit the patient for further evaluation and treatment.     Chaucney Ellington PA  03/29/25 1238    
Diastolic BP Percentile Temp Temp Source Pulse Respirations SpO2   (!) 153/96 -- -- 99 °F (37.2 °C) Oral (!) 117 16 98 %      Height Weight - Scale         1.626 m (5' 4\") 38.6 kg (85 lb)             Additional Vital Signs:  Vitals:    03/29/25 1751   BP: (!) 159/92   Pulse: (!) 116   Resp: 19   Temp:    SpO2:      Physical Exam  Vitals and nursing note reviewed.   Constitutional:       Appearance: Normal appearance.   HENT:      Head: Normocephalic and atraumatic.      Right Ear: External ear normal.      Left Ear: External ear normal.      Nose: Nose normal.      Mouth/Throat:      Mouth: Mucous membranes are moist.   Eyes:      Extraocular Movements: Extraocular movements intact.      Conjunctiva/sclera: Conjunctivae normal.      Pupils: Pupils are equal, round, and reactive to light.   Cardiovascular:      Rate and Rhythm: Regular rhythm. Tachycardia present.      Pulses: Normal pulses.      Heart sounds: Normal heart sounds.   Pulmonary:      Effort: Pulmonary effort is normal.      Breath sounds: Normal breath sounds.   Abdominal:      General: Abdomen is flat.      Tenderness: There is no abdominal tenderness.   Musculoskeletal:         General: No swelling.      Cervical back: Normal range of motion.   Skin:     Capillary Refill: Capillary refill takes less than 2 seconds.      Findings: Erythema (small area to right wrist) present.   Neurological:      Mental Status: She is alert. Mental status is at baseline. She is disoriented.      Motor: No weakness.         FORMAL DIAGNOSTIC RESULTS     RADIOLOGY: Interpretation per the Radiologist below, if available at the time of this note (none if blank):    CT HEAD WO CONTRAST   Final Result   1. The left basal ganglia hemorrhage measures 1.8 x 1.5 cm compared to 2.8 x 1.9   cm on the examination of 3/15/2025. There remains surrounding vasogenic edema.            **This report has been created using voice recognition software.  It may contain   minor errors which

## 2025-03-30 ENCOUNTER — APPOINTMENT (OUTPATIENT)
Dept: MRI IMAGING | Age: 55
DRG: 139 | End: 2025-03-30
Payer: COMMERCIAL

## 2025-03-30 PROBLEM — F80.2: Status: ACTIVE | Noted: 2025-03-30

## 2025-03-30 PROBLEM — J18.9 MULTIFOCAL PNEUMONIA: Status: ACTIVE | Noted: 2025-03-30

## 2025-03-30 PROBLEM — A41.9 SEPSIS WITHOUT ACUTE ORGAN DYSFUNCTION (HCC): Status: ACTIVE | Noted: 2025-03-30

## 2025-03-30 LAB
ALBUMIN SERPL BCG-MCNC: 3.7 G/DL (ref 3.4–4.9)
ALP SERPL-CCNC: 121 U/L (ref 35–104)
ALT SERPL W/O P-5'-P-CCNC: 26 U/L (ref 10–35)
AMPHETAMINES UR QL SCN: NEGATIVE
ANION GAP SERPL CALC-SCNC: 11 MEQ/L (ref 8–16)
AST SERPL-CCNC: 35 U/L (ref 10–35)
BARBITURATES UR QL SCN: NEGATIVE
BENZODIAZ UR QL SCN: NEGATIVE
BILIRUB CONJ SERPL-MCNC: < 0.1 MG/DL (ref 0–0.2)
BILIRUB SERPL-MCNC: 0.2 MG/DL (ref 0.3–1.2)
BUN SERPL-MCNC: 4 MG/DL (ref 8–23)
BZE UR QL SCN: NEGATIVE
CALCIUM SERPL-MCNC: 9.6 MG/DL (ref 8.6–10)
CANNABINOIDS UR QL SCN: NEGATIVE
CHLORIDE SERPL-SCNC: 93 MEQ/L (ref 98–111)
CO2 SERPL-SCNC: 24 MEQ/L (ref 22–29)
CREAT SERPL-MCNC: 0.4 MG/DL (ref 0.5–0.9)
DEPRECATED RDW RBC AUTO: 50.2 FL (ref 35–45)
ERYTHROCYTE [DISTWIDTH] IN BLOOD BY AUTOMATED COUNT: 13.9 % (ref 11.5–14.5)
ETHANOL SERPL-MCNC: < 0.01 % (ref 0–0.08)
FENTANYL: NEGATIVE
GFR SERPL CREATININE-BSD FRML MDRD: > 90 ML/MIN/1.73M2
GLUCOSE SERPL-MCNC: 86 MG/DL (ref 74–109)
HCT VFR BLD AUTO: 27.9 % (ref 37–47)
HGB BLD-MCNC: 9 GM/DL (ref 12–16)
LACTATE SERPL-SCNC: 0.6 MMOL/L (ref 0.5–2)
MCH RBC QN AUTO: 32 PG (ref 26–33)
MCHC RBC AUTO-ENTMCNC: 32.3 GM/DL (ref 32.2–35.5)
MCV RBC AUTO: 99.3 FL (ref 81–99)
OPIATES UR QL SCN: NEGATIVE
OSMOLALITY SERPL: 269 MOSMOL/KG (ref 275–295)
OXYCODONE: NEGATIVE
PCP UR QL SCN: NEGATIVE
PLATELET # BLD AUTO: 352 THOU/MM3 (ref 130–400)
PMV BLD AUTO: 9.1 FL (ref 9.4–12.4)
POTASSIUM SERPL-SCNC: 3.9 MEQ/L (ref 3.5–5.2)
PROT SERPL-MCNC: 7.2 G/DL (ref 6.4–8.3)
RBC # BLD AUTO: 2.81 MILL/MM3 (ref 4.2–5.4)
SODIUM SERPL-SCNC: 128 MEQ/L (ref 135–145)
TROPONIN, HIGH SENSITIVITY: 16 NG/L (ref 0–12)
WBC # BLD AUTO: 13.4 THOU/MM3 (ref 4.8–10.8)

## 2025-03-30 PROCEDURE — 6370000000 HC RX 637 (ALT 250 FOR IP)

## 2025-03-30 PROCEDURE — 1200000000 HC SEMI PRIVATE

## 2025-03-30 PROCEDURE — 83605 ASSAY OF LACTIC ACID: CPT

## 2025-03-30 PROCEDURE — 6360000002 HC RX W HCPCS

## 2025-03-30 PROCEDURE — 97166 OT EVAL MOD COMPLEX 45 MIN: CPT

## 2025-03-30 PROCEDURE — 82248 BILIRUBIN DIRECT: CPT

## 2025-03-30 PROCEDURE — 97535 SELF CARE MNGMENT TRAINING: CPT

## 2025-03-30 PROCEDURE — 87449 NOS EACH ORGANISM AG IA: CPT

## 2025-03-30 PROCEDURE — 70553 MRI BRAIN STEM W/O & W/DYE: CPT

## 2025-03-30 PROCEDURE — 94640 AIRWAY INHALATION TREATMENT: CPT

## 2025-03-30 PROCEDURE — 87899 AGENT NOS ASSAY W/OPTIC: CPT

## 2025-03-30 PROCEDURE — 2500000003 HC RX 250 WO HCPCS

## 2025-03-30 PROCEDURE — 99223 1ST HOSP IP/OBS HIGH 75: CPT | Performed by: NURSE PRACTITIONER

## 2025-03-30 PROCEDURE — A9579 GAD-BASE MR CONTRAST NOS,1ML: HCPCS

## 2025-03-30 PROCEDURE — 83930 ASSAY OF BLOOD OSMOLALITY: CPT

## 2025-03-30 PROCEDURE — 82077 ASSAY SPEC XCP UR&BREATH IA: CPT

## 2025-03-30 PROCEDURE — 80053 COMPREHEN METABOLIC PANEL: CPT

## 2025-03-30 PROCEDURE — 85027 COMPLETE CBC AUTOMATED: CPT

## 2025-03-30 PROCEDURE — 92610 EVALUATE SWALLOWING FUNCTION: CPT

## 2025-03-30 PROCEDURE — 6360000004 HC RX CONTRAST MEDICATION

## 2025-03-30 PROCEDURE — 87081 CULTURE SCREEN ONLY: CPT

## 2025-03-30 PROCEDURE — 84484 ASSAY OF TROPONIN QUANT: CPT

## 2025-03-30 PROCEDURE — 6370000000 HC RX 637 (ALT 250 FOR IP): Performed by: STUDENT IN AN ORGANIZED HEALTH CARE EDUCATION/TRAINING PROGRAM

## 2025-03-30 PROCEDURE — 36415 COLL VENOUS BLD VENIPUNCTURE: CPT

## 2025-03-30 PROCEDURE — 99223 1ST HOSP IP/OBS HIGH 75: CPT | Performed by: STUDENT IN AN ORGANIZED HEALTH CARE EDUCATION/TRAINING PROGRAM

## 2025-03-30 PROCEDURE — 87040 BLOOD CULTURE FOR BACTERIA: CPT

## 2025-03-30 RX ORDER — PANTOPRAZOLE SODIUM 40 MG/1
40 TABLET, DELAYED RELEASE ORAL DAILY
COMMUNITY
Start: 2025-03-28

## 2025-03-30 RX ORDER — LORAZEPAM 2 MG/ML
0.5 INJECTION INTRAMUSCULAR EVERY 6 HOURS PRN
Status: DISCONTINUED | OUTPATIENT
Start: 2025-03-30 | End: 2025-04-02 | Stop reason: HOSPADM

## 2025-03-30 RX ORDER — IPRATROPIUM BROMIDE AND ALBUTEROL SULFATE 2.5; .5 MG/3ML; MG/3ML
1 SOLUTION RESPIRATORY (INHALATION)
Status: DISCONTINUED | OUTPATIENT
Start: 2025-03-30 | End: 2025-03-31

## 2025-03-30 RX ORDER — ONDANSETRON 2 MG/ML
4 INJECTION INTRAMUSCULAR; INTRAVENOUS EVERY 6 HOURS PRN
Status: DISCONTINUED | OUTPATIENT
Start: 2025-03-30 | End: 2025-04-02 | Stop reason: HOSPADM

## 2025-03-30 RX ORDER — ONDANSETRON 4 MG/1
4 TABLET, ORALLY DISINTEGRATING ORAL EVERY 8 HOURS PRN
Status: DISCONTINUED | OUTPATIENT
Start: 2025-03-30 | End: 2025-04-02 | Stop reason: HOSPADM

## 2025-03-30 RX ORDER — MIDODRINE HYDROCHLORIDE 5 MG/1
5 TABLET ORAL 3 TIMES DAILY
COMMUNITY
Start: 2025-03-28

## 2025-03-30 RX ORDER — ACETAMINOPHEN 650 MG/1
650 SUPPOSITORY RECTAL EVERY 6 HOURS PRN
Status: DISCONTINUED | OUTPATIENT
Start: 2025-03-30 | End: 2025-04-02 | Stop reason: HOSPADM

## 2025-03-30 RX ORDER — LANOLIN ALCOHOL/MO/W.PET/CERES
400 CREAM (GRAM) TOPICAL DAILY
COMMUNITY
Start: 2025-03-28

## 2025-03-30 RX ORDER — THIAMINE MONONITRATE (VIT B1) 100 MG
100 TABLET ORAL DAILY
COMMUNITY
Start: 2025-03-28

## 2025-03-30 RX ORDER — SODIUM CHLORIDE 1 G/1
1 TABLET ORAL 3 TIMES DAILY
COMMUNITY
Start: 2025-03-28

## 2025-03-30 RX ORDER — QUETIAPINE FUMARATE 25 MG/1
25 TABLET, FILM COATED ORAL 2 TIMES DAILY
COMMUNITY
Start: 2025-03-28

## 2025-03-30 RX ORDER — M-VIT,TX,IRON,MINS/CALC/FOLIC 27MG-0.4MG
1 TABLET ORAL DAILY
COMMUNITY
Start: 2025-03-28

## 2025-03-30 RX ORDER — LANOLIN ALCOHOL/MO/W.PET/CERES
400 CREAM (GRAM) TOPICAL DAILY
Status: DISCONTINUED | OUTPATIENT
Start: 2025-03-30 | End: 2025-04-02 | Stop reason: HOSPADM

## 2025-03-30 RX ORDER — SODIUM CHLORIDE 0.9 % (FLUSH) 0.9 %
5-40 SYRINGE (ML) INJECTION EVERY 12 HOURS SCHEDULED
Status: DISCONTINUED | OUTPATIENT
Start: 2025-03-30 | End: 2025-04-02 | Stop reason: HOSPADM

## 2025-03-30 RX ORDER — SODIUM CHLORIDE 0.9 % (FLUSH) 0.9 %
5-40 SYRINGE (ML) INJECTION PRN
Status: DISCONTINUED | OUTPATIENT
Start: 2025-03-30 | End: 2025-04-02 | Stop reason: HOSPADM

## 2025-03-30 RX ORDER — ACETAMINOPHEN 325 MG/1
650 TABLET ORAL EVERY 6 HOURS PRN
COMMUNITY
Start: 2025-03-28

## 2025-03-30 RX ORDER — TIOTROPIUM BROMIDE 18 UG/1
18 CAPSULE ORAL; RESPIRATORY (INHALATION) DAILY
COMMUNITY
Start: 2025-03-28

## 2025-03-30 RX ORDER — METOPROLOL TARTRATE 25 MG/1
25 TABLET, FILM COATED ORAL 2 TIMES DAILY
COMMUNITY
Start: 2025-03-28

## 2025-03-30 RX ORDER — POLYETHYLENE GLYCOL 3350 17 G/17G
17 POWDER, FOR SOLUTION ORAL DAILY PRN
COMMUNITY
Start: 2025-03-28

## 2025-03-30 RX ORDER — MULTIVITAMIN WITH IRON
1 TABLET ORAL DAILY
Status: DISCONTINUED | OUTPATIENT
Start: 2025-03-30 | End: 2025-04-02 | Stop reason: HOSPADM

## 2025-03-30 RX ORDER — POLYETHYLENE GLYCOL 3350 17 G/17G
17 POWDER, FOR SOLUTION ORAL DAILY PRN
Status: DISCONTINUED | OUTPATIENT
Start: 2025-03-30 | End: 2025-04-02 | Stop reason: HOSPADM

## 2025-03-30 RX ORDER — ACETAMINOPHEN 325 MG/1
650 TABLET ORAL EVERY 6 HOURS PRN
Status: DISCONTINUED | OUTPATIENT
Start: 2025-03-30 | End: 2025-04-02 | Stop reason: HOSPADM

## 2025-03-30 RX ORDER — FOLIC ACID 1 MG/1
1 TABLET ORAL DAILY
Status: DISCONTINUED | OUTPATIENT
Start: 2025-03-30 | End: 2025-04-02 | Stop reason: HOSPADM

## 2025-03-30 RX ORDER — ALBUTEROL SULFATE 0.83 MG/ML
2.5 SOLUTION RESPIRATORY (INHALATION) EVERY 6 HOURS PRN
Status: DISCONTINUED | OUTPATIENT
Start: 2025-03-30 | End: 2025-03-30

## 2025-03-30 RX ORDER — SODIUM CHLORIDE 1 G/1
1 TABLET ORAL
Status: DISCONTINUED | OUTPATIENT
Start: 2025-03-30 | End: 2025-04-02 | Stop reason: HOSPADM

## 2025-03-30 RX ORDER — PANTOPRAZOLE SODIUM 40 MG/1
40 TABLET, DELAYED RELEASE ORAL
Status: DISCONTINUED | OUTPATIENT
Start: 2025-03-30 | End: 2025-04-02 | Stop reason: HOSPADM

## 2025-03-30 RX ORDER — SODIUM CHLORIDE 9 MG/ML
INJECTION, SOLUTION INTRAVENOUS PRN
Status: DISCONTINUED | OUTPATIENT
Start: 2025-03-30 | End: 2025-04-02 | Stop reason: HOSPADM

## 2025-03-30 RX ORDER — METOPROLOL TARTRATE 25 MG/1
25 TABLET, FILM COATED ORAL 2 TIMES DAILY
Status: DISCONTINUED | OUTPATIENT
Start: 2025-03-30 | End: 2025-04-02 | Stop reason: HOSPADM

## 2025-03-30 RX ORDER — LANOLIN ALCOHOL/MO/W.PET/CERES
100 CREAM (GRAM) TOPICAL DAILY
Status: DISCONTINUED | OUTPATIENT
Start: 2025-03-30 | End: 2025-04-02 | Stop reason: HOSPADM

## 2025-03-30 RX ORDER — QUETIAPINE FUMARATE 25 MG/1
25 TABLET, FILM COATED ORAL 2 TIMES DAILY
Status: DISCONTINUED | OUTPATIENT
Start: 2025-03-30 | End: 2025-04-02 | Stop reason: HOSPADM

## 2025-03-30 RX ORDER — MIDODRINE HYDROCHLORIDE 5 MG/1
5 TABLET ORAL
Status: DISCONTINUED | OUTPATIENT
Start: 2025-03-30 | End: 2025-04-02 | Stop reason: HOSPADM

## 2025-03-30 RX ADMIN — SODIUM CHLORIDE 1 G: 1 TABLET ORAL at 17:59

## 2025-03-30 RX ADMIN — QUETIAPINE FUMARATE 25 MG: 25 TABLET ORAL at 20:48

## 2025-03-30 RX ADMIN — SODIUM CHLORIDE, PRESERVATIVE FREE 10 ML: 5 INJECTION INTRAVENOUS at 20:51

## 2025-03-30 RX ADMIN — GADOTERIDOL 10 ML: 279.3 INJECTION, SOLUTION INTRAVENOUS at 09:52

## 2025-03-30 RX ADMIN — AZITHROMYCIN DIHYDRATE 500 MG: 250 TABLET ORAL at 17:59

## 2025-03-30 RX ADMIN — IPRATROPIUM BROMIDE AND ALBUTEROL SULFATE 1 DOSE: .5; 3 SOLUTION RESPIRATORY (INHALATION) at 10:04

## 2025-03-30 RX ADMIN — WATER 1000 MG: 1 INJECTION INTRAMUSCULAR; INTRAVENOUS; SUBCUTANEOUS at 17:59

## 2025-03-30 RX ADMIN — METOPROLOL TARTRATE 25 MG: 25 TABLET, FILM COATED ORAL at 20:49

## 2025-03-30 ASSESSMENT — PAIN SCALES - GENERAL: PAINLEVEL_OUTOF10: 0

## 2025-03-30 NOTE — ED NOTES
Spoke to MARLENA Ansari who approved pt transport in stable condition.   Cyclosporine Counseling:  I discussed with the patient the risks of cyclosporine including but not limited to hypertension, gingival hyperplasia,myelosuppression, immunosuppression, liver damage, kidney damage, neurotoxicity, lymphoma, and serious infections. The patient understands that monitoring is required including baseline blood pressure, CBC, CMP, lipid panel and uric acid, and then 1-2 times monthly CMP and blood pressure.

## 2025-03-30 NOTE — CARE COORDINATION
03/30/25 0744   Service Assessment   Patient Orientation Other (see comment)  (confused and agitated)   Cognition Other (see comment)  (unable to assess)   History Provided By Medical Record   Primary Caregiver Other (Comment)  ([facility])   Accompanied By/Relationship n/a   Support Systems Parent;Family Members;Other (Comment)  ([facility])   Patient's Healthcare Decision Maker is: Legal Next of Kin   PCP Verified by CM Yes   Last Visit to PCP Within last 3 months   Prior Functional Level Assistance with the following:;Bathing;Dressing;Toileting;Feeding;Cooking;Housework;Shopping;Mobility   Current Functional Level Assistance with the following:;Bathing;Dressing;Toileting;Feeding;Cooking;Housework;Shopping;Mobility   Can patient return to prior living arrangement Unknown at present  ([facility])   Ability to make needs known: Poor   Family able to assist with home care needs: No   Would you like for me to discuss the discharge plan with any other family members/significant others, and if so, who? Yes  (Mother)   Financial Resources Medicaid   Community Resources ECF/Home Care  ([facility])   Social/Functional History   Active  No   Patient's  Info Facility   Discharge Planning   Type of Residence Skilled Nursing Facility  ([facility])   Living Arrangements Other (Comment)  ([facility])   Current Services Prior To Admission Skilled Nursing Facility  ([facility])   Potential Assistance Needed Skilled Nursing Facility  ([facility])   DME Ordered? No   Potential Assistance Purchasing Medications No   Type of Home Care Services None   Patient expects to be discharged to: Skilled nursing facility  ([facility])   Services At/After Discharge   Confirm Follow Up Transport Other (see comment)  (pending clinical course)   Condition of Participation: Discharge Planning   The Plan for Transition of Care is related to the following treatment goals: be

## 2025-03-30 NOTE — ED NOTES
ED to inpatient nurses report      Chief Complaint:  No chief complaint on file.    Present to ED from: [facility]    MOA:     LOC: alert to only name  Mobility: Requires assistance * 1  Oxygen Baseline: room air    Current needs required: room air     Code Status:   Prior    What abnormal results were found and what did you give/do to treat them? See results; antibiotics  Any procedures or intervention occur? CXR; CT; CTA    Mental Status:  Level of Consciousness: Alert (0)    Psych Assessment:        Vitals:  Patient Vitals for the past 24 hrs:   BP Temp Temp src Pulse Resp SpO2 Height Weight   03/29/25 2119 (!) 145/88 -- -- (!) 113 18 95 % -- --   03/29/25 1950 (!) 143/96 -- -- (!) 103 16 96 % -- --   03/29/25 1906 (!) 147/94 -- -- 99 17 95 % -- --   03/29/25 1751 (!) 159/92 -- -- (!) 116 19 -- -- --   03/29/25 1704 (!) 147/95 -- -- (!) 109 20 97 % -- --   03/29/25 1557 -- -- -- (!) 104 17 -- -- --   03/29/25 1550 (!) 157/99 -- -- (!) 111 18 99 % -- --   03/29/25 1404 (!) 153/96 99 °F (37.2 °C) Oral (!) 117 16 98 % 1.626 m (5' 4\") 38.6 kg (85 lb)        LDAs:   Peripheral IV 03/29/25 Proximal;Right Forearm (Active)       Ambulatory Status:  No data recorded    Diagnosis:  DISPOSITION Admitted 03/29/2025 10:03:37 PM   Final diagnoses:   Tachycardia   Multifocal pneumonia   Sepsis without acute organ dysfunction, due to unspecified organism (HCC)   Hyponatremia   Macrocytic anemia        Consults:  PHARMACY TO DOSE VANCOMYCIN     Pain Score:  Pain Assessment  Pain Assessment: None - Denies Pain    C-SSRS:        Sepsis Screening:       [place] Fall Risk:       Swallow Screening        Preferred Language:   English      ALLERGIES     Mushroom extract complex (obsolete)    SURGICAL HISTORY       Past Surgical History:   Procedure Laterality Date    ANKLE SURGERY  05/03/2024    COLONOSCOPY  08/16/2022    CYST REMOVAL  2004    right thigh     SHOULDER SURGERY Right 12/2022    Hook Plate Placement    SHOULDER SURGERY

## 2025-03-30 NOTE — RT PROTOCOL NOTE
RT Inhaler-Nebulizer Bronchodilator Protocol Note    There is a bronchodilator order in the chart from a provider indicating to follow the RT Bronchodilator Protocol and there is an “Initiate RT Inhaler-Nebulizer Bronchodilator Protocol” order as well (see protocol at bottom of note).    CXR Findings:  XR CHEST PORTABLE  Result Date: 3/29/2025  1. Patchy left retrocardiac opacity that can relate to atelectasis and/or infiltrate. **This report has been created using voice recognition software.  It may contain minor errors which are inherent in voice recognition technology.** Electronically signed by Dr. Tricia Caldwell      The findings from the last RT Protocol Assessment were as follows:   History Pulmonary Disease: Chronic pulmonary disease  Respiratory Pattern: Regular pattern and RR 12-20 bpm  Breath Sounds: Slightly diminished and/or crackles  Cough: Strong, spontaneous, non-productive  Indication for Bronchodilator Therapy:    Bronchodilator Assessment Score: 4    Aerosolized bronchodilator medication orders have been revised according to the RT Inhaler-Nebulizer Bronchodilator Protocol below.    Respiratory Therapist to perform RT Therapy Protocol Assessment initially then follow the protocol.  Repeat RT Therapy Protocol Assessment PRN for score 0-3 or on second treatment, BID, and PRN for scores above 3.    No Indications - adjust the frequency to every 6 hours PRN wheezing or bronchospasm, if no treatments needed after 48 hours then discontinue using Per Protocol order mode.     If indication present, adjust the RT bronchodilator orders based on the Bronchodilator Assessment Score as indicated below.  Use Inhaler orders unless patient has one or more of the following: on home nebulizer, not able to hold breath for 10 seconds, is not alert and oriented, cannot activate and use MDI correctly, or respiratory rate 25 breaths per minute or more, then use the equivalent nebulizer order(s) with same Frequency and

## 2025-03-30 NOTE — PLAN OF CARE
Problem: Respiratory - Adult  Goal: Clear lung sounds  Outcome: Progressing   Pt started on aerosols to clear lung sounds/improve aeration and pt does a MDI at home.  Patient mutually agreed on goals.

## 2025-03-30 NOTE — CARE COORDINATION
03/30/25 0748   Readmission Assessment   Number of Days since last admission? 8-30 days   Previous Disposition Other (comment)  ([facility] then [facility])   Who is being Interviewed Unable to Complete  (pt confused and agitated)   What was the patient's/caregiver's perception as to why they think they needed to return back to the hospital? Other (Comment)  (confusion and agitation)   Did you visit your Primary Care Physician after you left the hospital, before you returned this time? Yes   Did you see a specialist, such as Cardiac, Pulmonary, Orthopedic Physician, etc. after you left the hospital? No   Who advised the patient to return to the hospital? Skilled Unit  ([facility])   Does the patient report anything that got in the way of taking their medications? No   In our efforts to provide the best possible care to you and others like you, can you think of anything that we could have done to help you after you left the hospital the first time, so that you might not have needed to return so soon? Other (Comment)  (Went to [facility] and then to [facility].)

## 2025-03-30 NOTE — CONSULTS
Neurology Consult Note    Date:3/30/2025       Room:[facility]/020-A  Patient Name:Rivka Zavala     YOB: 1970     Age:54 y.o.    Requesting Physician: Bambi Erickson MD     Reason for Consult:  Evaluate for agitation and history of basal ganglia hemorrhage       Chief Complaint: No chief complaint on file.      Subjective     Rivka Zavala is a 54 y.o. female with a history of anemia, ETOH abuse, Malnutrition, THC use, multiple sclerosis, personality disorder, smoker, recent basal ganglia hemorrhage, recent respiratory failure, influenza A, Pneumonia, Wernicke's encephalopathy, and PFO. She was admitted at [facility] from 2-28 to 3-7 and was subsequently discharged to [facility] then to a SNF. She was noted on that admission to have silent aspiration with thin liquids. EEG was performed with mild to moderate encephalopathy.     She presented to the ER yesterday with complaints of not liking the nursing home and wanted to be placed somewhere else. At that time her family reports that she was at baseline mentation. History obtained from chart as patient is of poor mentation.     Today she is difficult to get information from. Hard to direct. No new symptoms to report.     Review of Systems   Review of Systems   Unable to perform ROS: Mental status change   Very poor historian  Medications   Scheduled Meds:    folic acid  1 mg Oral Daily    magnesium oxide  400 mg Oral Daily    metoprolol tartrate  25 mg Oral BID    midodrine  5 mg Oral TID WC    multivitamin  1 tablet Oral Daily    pantoprazole  40 mg Oral QAM AC    QUEtiapine  25 mg Oral BID    sodium chloride  1 g Oral TID WC    tiotropium  2 puff Inhalation Daily RT    thiamine  100 mg Oral Daily    sodium chloride flush  5-40 mL IntraVENous 2 times per day    azithromycin  500 mg Oral Q24H    And    cefTRIAXone (ROCEPHIN) IV  1,000 mg IntraVENous Q24H     Continuous Infusions:    sodium chloride       PRN Meds: sodium chloride flush,

## 2025-03-30 NOTE — PLAN OF CARE
Problem: Confusion  Goal: Confusion, delirium, dementia, or psychosis is improved or at baseline  Description: INTERVENTIONS:  1. Assess for possible contributors to thought disturbance, including medications, impaired vision or hearing, underlying metabolic abnormalities, dehydration, psychiatric diagnoses, and notify attending LIP  2. [place] high risk fall precautions, as indicated  3. Provide frequent short contacts to provide reality reorientation, refocusing and direction  4. Decrease environmental stimuli, including noise as appropriate  5. Monitor and intervene to maintain adequate nutrition, hydration, elimination, sleep and activity  6. If unable to ensure safety without constant attention obtain sitter and review sitter guidelines with assigned personnel  7. Initiate Psychosocial CNS and Spiritual Care consult, as indicated  3/30/2025 1156 by Shirlene Gutiérrez, RN  Outcome: Progressing  Flowsheets (Taken 3/30/2025 0815)  Effect of thought disturbance (confusion, delirium, dementia, or psychosis) are managed with adequate functional status:   Assess for contributors to thought disturbance, including medications, impaired vision or hearing, underlying metabolic abnormalities, dehydration, psychiatric diagnoses, notify LIP   [place] high risk fall precautions, as indicated   Provide frequent short contacts to provide reality reorientation, refocusing and direction   Monitor and intervene to maintain adequate nutrition, hydration, elimination, sleep and activity   If unable to ensure safety without constant attention obtain sitter and review sitter guidelines with assigned personnel  3/30/2025 0420 by Pablo Marin, RN  Outcome: Progressing  Flowsheets (Taken 3/30/2025 0420)  Effect of thought disturbance (confusion, delirium, dementia, or psychosis) are managed with adequate functional status:   Assess for contributors to thought disturbance, including medications, impaired vision or hearing,

## 2025-03-31 ENCOUNTER — APPOINTMENT (OUTPATIENT)
Dept: GENERAL RADIOLOGY | Age: 55
DRG: 139 | End: 2025-03-31
Payer: COMMERCIAL

## 2025-03-31 LAB
25(OH)D3 SERPL-MCNC: 14 NG/ML (ref 30–100)
ANION GAP SERPL CALC-SCNC: 12 MEQ/L (ref 8–16)
BASOPHILS ABSOLUTE: 0.1 THOU/MM3 (ref 0–0.1)
BASOPHILS NFR BLD AUTO: 0.6 %
BUN SERPL-MCNC: 5 MG/DL (ref 8–23)
CALCIUM SERPL-MCNC: 9.4 MG/DL (ref 8.6–10)
CHLORIDE SERPL-SCNC: 98 MEQ/L (ref 98–111)
CO2 SERPL-SCNC: 24 MEQ/L (ref 22–29)
CREAT SERPL-MCNC: 0.4 MG/DL (ref 0.5–0.9)
DEPRECATED RDW RBC AUTO: 51.1 FL (ref 35–45)
EOSINOPHIL NFR BLD AUTO: 10.2 %
EOSINOPHILS ABSOLUTE: 0.9 THOU/MM3 (ref 0–0.4)
ERYTHROCYTE [DISTWIDTH] IN BLOOD BY AUTOMATED COUNT: 13.8 % (ref 11.5–14.5)
FOLATE SERPL-MCNC: > 20 NG/ML (ref 4.6–34.8)
GFR SERPL CREATININE-BSD FRML MDRD: > 90 ML/MIN/1.73M2
GLUCOSE SERPL-MCNC: 88 MG/DL (ref 74–109)
HCT VFR BLD AUTO: 26.9 % (ref 37–47)
HGB BLD-MCNC: 8.6 GM/DL (ref 12–16)
IMM GRANULOCYTES # BLD AUTO: 0.03 THOU/MM3 (ref 0–0.07)
IMM GRANULOCYTES NFR BLD AUTO: 0.4 %
L PNEUMO1 AG UR QL IA.RAPID: NEGATIVE
LYMPHOCYTES ABSOLUTE: 2.2 THOU/MM3 (ref 1–4.8)
LYMPHOCYTES NFR BLD AUTO: 26.2 %
MAGNESIUM SERPL-MCNC: 1.7 MG/DL (ref 1.6–2.6)
MCH RBC QN AUTO: 32.3 PG (ref 26–33)
MCHC RBC AUTO-ENTMCNC: 32 GM/DL (ref 32.2–35.5)
MCV RBC AUTO: 101.1 FL (ref 81–99)
MONOCYTES ABSOLUTE: 1.1 THOU/MM3 (ref 0.4–1.3)
MONOCYTES NFR BLD AUTO: 13 %
MRSA SPEC QL CULT: ABNORMAL
NEUTROPHILS ABSOLUTE: 4.2 THOU/MM3 (ref 1.8–7.7)
NEUTROPHILS NFR BLD AUTO: 49.6 %
NRBC BLD AUTO-RTO: 0 /100 WBC
ORGANISM: ABNORMAL
PHOSPHATE SERPL-MCNC: 4.6 MG/DL (ref 2.5–4.5)
PLATELET # BLD AUTO: 353 THOU/MM3 (ref 130–400)
PMV BLD AUTO: 9.3 FL (ref 9.4–12.4)
POTASSIUM SERPL-SCNC: 4 MEQ/L (ref 3.5–5.2)
RBC # BLD AUTO: 2.66 MILL/MM3 (ref 4.2–5.4)
SODIUM SERPL-SCNC: 134 MEQ/L (ref 135–145)
STREP PNEUMO AG, UR: NEGATIVE
VIT B12 SERPL-MCNC: 762 PG/ML (ref 232–1245)
WBC # BLD AUTO: 8.4 THOU/MM3 (ref 4.8–10.8)

## 2025-03-31 PROCEDURE — 6370000000 HC RX 637 (ALT 250 FOR IP): Performed by: STUDENT IN AN ORGANIZED HEALTH CARE EDUCATION/TRAINING PROGRAM

## 2025-03-31 PROCEDURE — 97535 SELF CARE MNGMENT TRAINING: CPT

## 2025-03-31 PROCEDURE — 6360000002 HC RX W HCPCS

## 2025-03-31 PROCEDURE — 82306 VITAMIN D 25 HYDROXY: CPT

## 2025-03-31 PROCEDURE — 92526 ORAL FUNCTION THERAPY: CPT

## 2025-03-31 PROCEDURE — 1200000000 HC SEMI PRIVATE

## 2025-03-31 PROCEDURE — 6370000000 HC RX 637 (ALT 250 FOR IP)

## 2025-03-31 PROCEDURE — 2500000003 HC RX 250 WO HCPCS

## 2025-03-31 PROCEDURE — 92611 MOTION FLUOROSCOPY/SWALLOW: CPT

## 2025-03-31 PROCEDURE — 94640 AIRWAY INHALATION TREATMENT: CPT

## 2025-03-31 PROCEDURE — 85025 COMPLETE CBC W/AUTO DIFF WBC: CPT

## 2025-03-31 PROCEDURE — 97162 PT EVAL MOD COMPLEX 30 MIN: CPT

## 2025-03-31 PROCEDURE — 36415 COLL VENOUS BLD VENIPUNCTURE: CPT

## 2025-03-31 PROCEDURE — 84100 ASSAY OF PHOSPHORUS: CPT

## 2025-03-31 PROCEDURE — 83735 ASSAY OF MAGNESIUM: CPT

## 2025-03-31 PROCEDURE — 99233 SBSQ HOSP IP/OBS HIGH 50: CPT | Performed by: STUDENT IN AN ORGANIZED HEALTH CARE EDUCATION/TRAINING PROGRAM

## 2025-03-31 PROCEDURE — 97110 THERAPEUTIC EXERCISES: CPT

## 2025-03-31 PROCEDURE — 82746 ASSAY OF FOLIC ACID SERUM: CPT

## 2025-03-31 PROCEDURE — 2500000003 HC RX 250 WO HCPCS: Performed by: STUDENT IN AN ORGANIZED HEALTH CARE EDUCATION/TRAINING PROGRAM

## 2025-03-31 PROCEDURE — 74230 X-RAY XM SWLNG FUNCJ C+: CPT

## 2025-03-31 PROCEDURE — 82607 VITAMIN B-12: CPT

## 2025-03-31 PROCEDURE — 97116 GAIT TRAINING THERAPY: CPT

## 2025-03-31 PROCEDURE — 80048 BASIC METABOLIC PNL TOTAL CA: CPT

## 2025-03-31 RX ORDER — IPRATROPIUM BROMIDE AND ALBUTEROL SULFATE 2.5; .5 MG/3ML; MG/3ML
1 SOLUTION RESPIRATORY (INHALATION) EVERY 4 HOURS PRN
Status: DISCONTINUED | OUTPATIENT
Start: 2025-03-31 | End: 2025-04-02 | Stop reason: HOSPADM

## 2025-03-31 RX ORDER — SENNOSIDES A AND B 8.6 MG/1
2 TABLET, FILM COATED ORAL 2 TIMES DAILY
Status: DISCONTINUED | OUTPATIENT
Start: 2025-03-31 | End: 2025-04-02 | Stop reason: HOSPADM

## 2025-03-31 RX ADMIN — Medication 400 MG: at 09:07

## 2025-03-31 RX ADMIN — BARIUM SULFATE 20 ML: 400 PASTE ORAL at 08:40

## 2025-03-31 RX ADMIN — SODIUM CHLORIDE 1 G: 1 TABLET ORAL at 11:20

## 2025-03-31 RX ADMIN — Medication 1 TABLET: at 09:07

## 2025-03-31 RX ADMIN — SENNOSIDES 17.2 MG: 8.6 TABLET, FILM COATED ORAL at 20:15

## 2025-03-31 RX ADMIN — SENNOSIDES 17.2 MG: 8.6 TABLET, FILM COATED ORAL at 09:31

## 2025-03-31 RX ADMIN — AZITHROMYCIN DIHYDRATE 500 MG: 250 TABLET ORAL at 18:27

## 2025-03-31 RX ADMIN — QUETIAPINE FUMARATE 25 MG: 25 TABLET ORAL at 20:05

## 2025-03-31 RX ADMIN — SODIUM CHLORIDE, PRESERVATIVE FREE 10 ML: 5 INJECTION INTRAVENOUS at 20:15

## 2025-03-31 RX ADMIN — FOLIC ACID 1 MG: 1 TABLET ORAL at 09:07

## 2025-03-31 RX ADMIN — METOPROLOL TARTRATE 25 MG: 25 TABLET, FILM COATED ORAL at 20:05

## 2025-03-31 RX ADMIN — SODIUM CHLORIDE 1 G: 1 TABLET ORAL at 09:07

## 2025-03-31 RX ADMIN — Medication 100 MG: at 09:07

## 2025-03-31 RX ADMIN — SODIUM CHLORIDE, PRESERVATIVE FREE 10 ML: 5 INJECTION INTRAVENOUS at 09:08

## 2025-03-31 RX ADMIN — QUETIAPINE FUMARATE 25 MG: 25 TABLET ORAL at 09:07

## 2025-03-31 RX ADMIN — IPRATROPIUM BROMIDE AND ALBUTEROL SULFATE 1 DOSE: .5; 3 SOLUTION RESPIRATORY (INHALATION) at 09:18

## 2025-03-31 RX ADMIN — WATER 1000 MG: 1 INJECTION INTRAMUSCULAR; INTRAVENOUS; SUBCUTANEOUS at 18:27

## 2025-03-31 RX ADMIN — BARIUM SULFATE 80 ML: 0.81 POWDER, FOR SUSPENSION ORAL at 08:39

## 2025-03-31 RX ADMIN — SODIUM CHLORIDE 1 G: 1 TABLET ORAL at 18:27

## 2025-03-31 RX ADMIN — METOPROLOL TARTRATE 25 MG: 25 TABLET, FILM COATED ORAL at 09:07

## 2025-03-31 ASSESSMENT — PAIN SCALES - GENERAL: PAINLEVEL_OUTOF10: 0

## 2025-03-31 NOTE — PLAN OF CARE
Problem: Safety - Adult  Goal: Free from fall injury  3/30/2025 2256 by Barb Grady, RN  Outcome: Progressing  Flowsheets (Taken 3/30/2025 2256)  Free From Fall Injury:   Instruct family/caregiver on patient safety   Based on caregiver fall risk screen, instruct family/caregiver to ask for assistance with transferring infant if caregiver noted to have fall risk factors  Note: Care plan reviewed with patient.   3/30/2025 1156 by Shirlene Gutiérrez, RN  Outcome: Progressing     Problem: Skin/Tissue Integrity  Goal: Skin integrity remains intact  Description: 1.  Monitor for areas of redness and/or skin breakdown  2.  Assess vascular access sites hourly  3.  Every 4-6 hours minimum:  Change oxygen saturation probe site  4.  Every 4-6 hours:  If on nasal continuous positive airway pressure, respiratory therapy assess nares and determine need for appliance change or resting period  3/30/2025 1156 by Shirlene Gutiérrez, RN  Outcome: Progressing  Flowsheets (Taken 3/30/2025 0815)  Skin Integrity Remains Intact: Monitor for areas of redness and/or skin breakdown

## 2025-03-31 NOTE — PALLIATIVE CARE
Initial Evaluation        Patient:   Rivka Zavala  YOB: 1970  Age:  54 y.o.  Room:  [facility]/020-  MRN:  [id number]   Acct: [id number]    Date of Admission:  3/29/2025  1:57 PM  Date of Service:  3/31/2025  Completed By:  Agueda Stanton RN        Reason for Palliative Care Evaluation:-   Goals of Care     Current Concerns   Patient just wishing to go home     Palliative Performance Scale   70%  Ambulation reduced; unable to perform normal job/work; significant  disease; able to do own self care; normal or reduced intake; fully conscious     History    Patient with history of alcohol abuse, +PFO, personality disorder, ?Wernicke's encephalopathy and recent basal ganglia hemorrhage.  Patient is admitted due to disruptive behavior at the ECF.     Goals of Care Discussions and Plan         Advance Care Planning   Goals of Care/Advance Care Planning (ACP) Conversation    Date of Conversation: 03/31/25    Individuals present for the conversation: Patient with decision making capacity and Mother and Father     ACP documents on file prior to discussion:  -None    Previously completed document/s not on file: Not discussed.    Healthcare Power of /Healthcare Surrogate Decision Makers:  Patient is not  and has no children.  Patient's parents are both living and would be the surrogate decision makers per [place] hierarchy.    Conversation Summary: Patient is sitting up in the chair and both parents are at the bedside.  Patient is sharing her concerns about the ECF when this RN entered.  Patient is very emotional and upset about the thought of going to an ECF and she is indicating that she does not wish to go to an ECF.  Patient has some expressive aphasia but she is adamant about what she doesn't want and expresses this well.  Patient is not able to be focused on any other conversation at this time.    Resuscitation Status: Full Code     Documentation Completed:  -No new documents

## 2025-03-31 NOTE — CARE COORDINATION
3/31/25, 2:50 PM EDT    DISCHARGE PLANNING EVALUATION     Spoke with patient and she does not want to return to [facility].  She stated that she was there a day and does not want to be in an ECF.  Mother stated that [facility] tried several ECF and they either were not in network or did not have beds. Patient ha lived with parents prior.  They are willing to attempt having her at home along with home health.  Cognition evaluation ordered also and doctor updated.  Discharge planning ongoing at this time.

## 2025-04-01 LAB
ANION GAP SERPL CALC-SCNC: 11 MEQ/L (ref 8–16)
BASOPHILS ABSOLUTE: 0.1 THOU/MM3 (ref 0–0.1)
BASOPHILS NFR BLD AUTO: 0.6 %
BUN SERPL-MCNC: 6 MG/DL (ref 8–23)
CALCIUM SERPL-MCNC: 9.3 MG/DL (ref 8.6–10)
CHLORIDE SERPL-SCNC: 100 MEQ/L (ref 98–111)
CO2 SERPL-SCNC: 23 MEQ/L (ref 22–29)
CREAT SERPL-MCNC: 0.4 MG/DL (ref 0.5–0.9)
DEPRECATED RDW RBC AUTO: 51.9 FL (ref 35–45)
EOSINOPHIL NFR BLD AUTO: 8.7 %
EOSINOPHILS ABSOLUTE: 1 THOU/MM3 (ref 0–0.4)
ERYTHROCYTE [DISTWIDTH] IN BLOOD BY AUTOMATED COUNT: 13.9 % (ref 11.5–14.5)
GFR SERPL CREATININE-BSD FRML MDRD: > 90 ML/MIN/1.73M2
GLUCOSE SERPL-MCNC: 94 MG/DL (ref 74–109)
HCT VFR BLD AUTO: 26.7 % (ref 37–47)
HGB BLD-MCNC: 8.5 GM/DL (ref 12–16)
IMM GRANULOCYTES # BLD AUTO: 0.03 THOU/MM3 (ref 0–0.07)
IMM GRANULOCYTES NFR BLD AUTO: 0.3 %
LYMPHOCYTES ABSOLUTE: 3 THOU/MM3 (ref 1–4.8)
LYMPHOCYTES NFR BLD AUTO: 26.6 %
MCH RBC QN AUTO: 32.4 PG (ref 26–33)
MCHC RBC AUTO-ENTMCNC: 31.8 GM/DL (ref 32.2–35.5)
MCV RBC AUTO: 101.9 FL (ref 81–99)
MONOCYTES ABSOLUTE: 1.4 THOU/MM3 (ref 0.4–1.3)
MONOCYTES NFR BLD AUTO: 12.1 %
NEUTROPHILS ABSOLUTE: 5.8 THOU/MM3 (ref 1.8–7.7)
NEUTROPHILS NFR BLD AUTO: 51.7 %
NRBC BLD AUTO-RTO: 0 /100 WBC
PLATELET # BLD AUTO: 366 THOU/MM3 (ref 130–400)
PMV BLD AUTO: 9.1 FL (ref 9.4–12.4)
POTASSIUM SERPL-SCNC: 4.3 MEQ/L (ref 3.5–5.2)
RBC # BLD AUTO: 2.62 MILL/MM3 (ref 4.2–5.4)
SODIUM SERPL-SCNC: 134 MEQ/L (ref 135–145)
WBC # BLD AUTO: 11.2 THOU/MM3 (ref 4.8–10.8)

## 2025-04-01 PROCEDURE — 99233 SBSQ HOSP IP/OBS HIGH 50: CPT | Performed by: STUDENT IN AN ORGANIZED HEALTH CARE EDUCATION/TRAINING PROGRAM

## 2025-04-01 PROCEDURE — 97530 THERAPEUTIC ACTIVITIES: CPT

## 2025-04-01 PROCEDURE — 36415 COLL VENOUS BLD VENIPUNCTURE: CPT

## 2025-04-01 PROCEDURE — 92523 SPEECH SOUND LANG COMPREHEN: CPT

## 2025-04-01 PROCEDURE — 6360000002 HC RX W HCPCS

## 2025-04-01 PROCEDURE — 6370000000 HC RX 637 (ALT 250 FOR IP): Performed by: STUDENT IN AN ORGANIZED HEALTH CARE EDUCATION/TRAINING PROGRAM

## 2025-04-01 PROCEDURE — 97535 SELF CARE MNGMENT TRAINING: CPT

## 2025-04-01 PROCEDURE — 1200000000 HC SEMI PRIVATE

## 2025-04-01 PROCEDURE — 85025 COMPLETE CBC W/AUTO DIFF WBC: CPT

## 2025-04-01 PROCEDURE — 2500000003 HC RX 250 WO HCPCS

## 2025-04-01 PROCEDURE — 92526 ORAL FUNCTION THERAPY: CPT

## 2025-04-01 PROCEDURE — 80048 BASIC METABOLIC PNL TOTAL CA: CPT

## 2025-04-01 PROCEDURE — 6370000000 HC RX 637 (ALT 250 FOR IP)

## 2025-04-01 RX ADMIN — FOLIC ACID 1 MG: 1 TABLET ORAL at 08:09

## 2025-04-01 RX ADMIN — QUETIAPINE FUMARATE 25 MG: 25 TABLET ORAL at 08:09

## 2025-04-01 RX ADMIN — Medication 100 MG: at 08:09

## 2025-04-01 RX ADMIN — SENNOSIDES 17.2 MG: 8.6 TABLET, FILM COATED ORAL at 08:09

## 2025-04-01 RX ADMIN — SODIUM CHLORIDE 1 G: 1 TABLET ORAL at 17:26

## 2025-04-01 RX ADMIN — SENNOSIDES 17.2 MG: 8.6 TABLET, FILM COATED ORAL at 21:28

## 2025-04-01 RX ADMIN — Medication 1 TABLET: at 08:09

## 2025-04-01 RX ADMIN — SODIUM CHLORIDE 1 G: 1 TABLET ORAL at 12:48

## 2025-04-01 RX ADMIN — METOPROLOL TARTRATE 25 MG: 25 TABLET, FILM COATED ORAL at 21:28

## 2025-04-01 RX ADMIN — SODIUM CHLORIDE, PRESERVATIVE FREE 10 ML: 5 INJECTION INTRAVENOUS at 08:11

## 2025-04-01 RX ADMIN — SODIUM CHLORIDE 1 G: 1 TABLET ORAL at 08:09

## 2025-04-01 RX ADMIN — PANTOPRAZOLE SODIUM 40 MG: 40 TABLET, DELAYED RELEASE ORAL at 06:52

## 2025-04-01 RX ADMIN — QUETIAPINE FUMARATE 25 MG: 25 TABLET ORAL at 21:28

## 2025-04-01 RX ADMIN — Medication 400 MG: at 08:09

## 2025-04-01 RX ADMIN — WATER 1000 MG: 1 INJECTION INTRAMUSCULAR; INTRAVENOUS; SUBCUTANEOUS at 17:26

## 2025-04-01 RX ADMIN — METOPROLOL TARTRATE 25 MG: 25 TABLET, FILM COATED ORAL at 08:09

## 2025-04-01 RX ADMIN — SODIUM CHLORIDE, PRESERVATIVE FREE 10 ML: 5 INJECTION INTRAVENOUS at 21:27

## 2025-04-01 ASSESSMENT — PAIN SCALES - GENERAL
PAINLEVEL_OUTOF10: 0
PAINLEVEL_OUTOF10: 0

## 2025-04-01 NOTE — PLAN OF CARE
Problem: Safety - Adult  Goal: Free from fall injury  Outcome: Progressing  Flowsheets (Taken 4/1/2025 0023)  Free From Fall Injury:   Instruct family/caregiver on patient safety   Based on caregiver fall risk screen, instruct family/caregiver to ask for assistance with transferring infant if caregiver noted to have fall risk factors  Note: Care plan reviewed with patient.     Problem: Skin/Tissue Integrity  Goal: Skin integrity remains intact  Description: 1.  Monitor for areas of redness and/or skin breakdown  2.  Assess vascular access sites hourly  3.  Every 4-6 hours minimum:  Change oxygen saturation probe site  4.  Every 4-6 hours:  If on nasal continuous positive airway pressure, respiratory therapy assess nares and determine need for appliance change or resting period  3/31/2025 1041 by Claudette Curtis, RN  Outcome: Progressing

## 2025-04-02 VITALS
DIASTOLIC BLOOD PRESSURE: 79 MMHG | HEART RATE: 115 BPM | HEIGHT: 64 IN | RESPIRATION RATE: 16 BRPM | OXYGEN SATURATION: 99 % | SYSTOLIC BLOOD PRESSURE: 140 MMHG | TEMPERATURE: 97.8 F | WEIGHT: 85 LBS | BODY MASS INDEX: 14.51 KG/M2

## 2025-04-02 LAB
ANION GAP SERPL CALC-SCNC: 10 MEQ/L (ref 8–16)
BASOPHILS ABSOLUTE: 0.1 THOU/MM3 (ref 0–0.1)
BASOPHILS NFR BLD AUTO: 0.9 %
BUN SERPL-MCNC: 6 MG/DL (ref 8–23)
CALCIUM SERPL-MCNC: 8.8 MG/DL (ref 8.6–10)
CHLORIDE SERPL-SCNC: 101 MEQ/L (ref 98–111)
CO2 SERPL-SCNC: 23 MEQ/L (ref 22–29)
CREAT SERPL-MCNC: 0.4 MG/DL (ref 0.5–0.9)
DEPRECATED RDW RBC AUTO: 51.4 FL (ref 35–45)
EOSINOPHIL NFR BLD AUTO: 10.3 %
EOSINOPHILS ABSOLUTE: 1.1 THOU/MM3 (ref 0–0.4)
ERYTHROCYTE [DISTWIDTH] IN BLOOD BY AUTOMATED COUNT: 13.8 % (ref 11.5–14.5)
GFR SERPL CREATININE-BSD FRML MDRD: > 90 ML/MIN/1.73M2
GLUCOSE SERPL-MCNC: 81 MG/DL (ref 74–109)
HCT VFR BLD AUTO: 24.8 % (ref 37–47)
HGB BLD-MCNC: 7.8 GM/DL (ref 12–16)
IMM GRANULOCYTES # BLD AUTO: 0.04 THOU/MM3 (ref 0–0.07)
IMM GRANULOCYTES NFR BLD AUTO: 0.4 %
LYMPHOCYTES ABSOLUTE: 2.8 THOU/MM3 (ref 1–4.8)
LYMPHOCYTES NFR BLD AUTO: 27.7 %
MCH RBC QN AUTO: 32.1 PG (ref 26–33)
MCHC RBC AUTO-ENTMCNC: 31.5 GM/DL (ref 32.2–35.5)
MCV RBC AUTO: 102.1 FL (ref 81–99)
MONOCYTES ABSOLUTE: 1.3 THOU/MM3 (ref 0.4–1.3)
MONOCYTES NFR BLD AUTO: 13.2 %
NEUTROPHILS ABSOLUTE: 4.8 THOU/MM3 (ref 1.8–7.7)
NEUTROPHILS NFR BLD AUTO: 47.5 %
NRBC BLD AUTO-RTO: 0 /100 WBC
PLATELET # BLD AUTO: 364 THOU/MM3 (ref 130–400)
PMV BLD AUTO: 9.3 FL (ref 9.4–12.4)
POTASSIUM SERPL-SCNC: 4.2 MEQ/L (ref 3.5–5.2)
RBC # BLD AUTO: 2.43 MILL/MM3 (ref 4.2–5.4)
SODIUM SERPL-SCNC: 134 MEQ/L (ref 135–145)
WBC # BLD AUTO: 10.2 THOU/MM3 (ref 4.8–10.8)

## 2025-04-02 PROCEDURE — 6370000000 HC RX 637 (ALT 250 FOR IP): Performed by: STUDENT IN AN ORGANIZED HEALTH CARE EDUCATION/TRAINING PROGRAM

## 2025-04-02 PROCEDURE — 2500000003 HC RX 250 WO HCPCS

## 2025-04-02 PROCEDURE — 97530 THERAPEUTIC ACTIVITIES: CPT

## 2025-04-02 PROCEDURE — 6360000002 HC RX W HCPCS

## 2025-04-02 PROCEDURE — 97116 GAIT TRAINING THERAPY: CPT

## 2025-04-02 PROCEDURE — 6370000000 HC RX 637 (ALT 250 FOR IP)

## 2025-04-02 PROCEDURE — 92507 TX SP LANG VOICE COMM INDIV: CPT

## 2025-04-02 PROCEDURE — 36415 COLL VENOUS BLD VENIPUNCTURE: CPT

## 2025-04-02 PROCEDURE — 99239 HOSP IP/OBS DSCHRG MGMT >30: CPT | Performed by: NURSE PRACTITIONER

## 2025-04-02 PROCEDURE — 80048 BASIC METABOLIC PNL TOTAL CA: CPT

## 2025-04-02 PROCEDURE — 85025 COMPLETE CBC W/AUTO DIFF WBC: CPT

## 2025-04-02 RX ADMIN — METOPROLOL TARTRATE 25 MG: 25 TABLET, FILM COATED ORAL at 09:00

## 2025-04-02 RX ADMIN — WATER 1000 MG: 1 INJECTION INTRAMUSCULAR; INTRAVENOUS; SUBCUTANEOUS at 11:05

## 2025-04-02 RX ADMIN — SODIUM CHLORIDE 1 G: 1 TABLET ORAL at 11:17

## 2025-04-02 RX ADMIN — SODIUM CHLORIDE, PRESERVATIVE FREE 10 ML: 5 INJECTION INTRAVENOUS at 09:05

## 2025-04-02 RX ADMIN — Medication 1 TABLET: at 09:00

## 2025-04-02 RX ADMIN — PANTOPRAZOLE SODIUM 40 MG: 40 TABLET, DELAYED RELEASE ORAL at 05:30

## 2025-04-02 RX ADMIN — QUETIAPINE FUMARATE 25 MG: 25 TABLET ORAL at 09:00

## 2025-04-02 RX ADMIN — Medication 100 MG: at 09:00

## 2025-04-02 RX ADMIN — FOLIC ACID 1 MG: 1 TABLET ORAL at 09:00

## 2025-04-02 RX ADMIN — SODIUM CHLORIDE 1 G: 1 TABLET ORAL at 09:00

## 2025-04-02 RX ADMIN — Medication 400 MG: at 09:00

## 2025-04-02 RX ADMIN — SENNOSIDES 17.2 MG: 8.6 TABLET, FILM COATED ORAL at 09:00

## 2025-04-02 NOTE — PROGRESS NOTES
Hospitalist Progress Note      Patient:  Rivka Zavala    Unit/Bed:5K-20/020-A  YOB: 1970  MRN: [id number]   Acct: [id number]   PCP: Ben Montejo MD  Date of Admission: 3/29/2025    Chief Complaint: Agitation/frustration   Admission HPI: This is a 54-year-old female who presented to [facility] ED on 03/29/2025.  The patient was notably admitted to [facility] from 02/28/2025 until 03/07/2025 secondary to altered mental status with acute hypoxic respiratory failure and septic shock secondary to influenza A with concerns for superimposed pneumonia.  During that admission the patient was intubated and required pressor support.  Imaging additionally showed evidence of a left basal ganglia hemorrhage for which the patient was not recommended to receive any neurosurgical intervention.  The patient was discharged from the intensive care unit to an LTAC with evidence of persistent dysphagia and encephalopathy.  Documentation indicates that the patient was discharged to a nursing facility from the LTAC.     The patient was seen and evaluated at the bedside.  There is clear attempt for the patient to answer questions appropriately however, the patient expresses herself in nonsensical phrases.  She is able to follow commands during interview.  She is not able to participate meaningfully in a review of systems.  However, she does not show evidence of nonverbal signs of pain or distress at the time of interview.  Documentation indicates that the patient's boyfriend was at the bedside and stated that she was at her baseline mentation.  Documentation additionally indicates that the patient had unwitnessed falls at facility.  Informed by primary RN on admission that no medications were administered at nursing facility.     Vital signs temperature 99, respirations 16, pulse 117, blood pressure 153/96, SpO2 98% on room air.     Labs:  BMP sodium 129, 
                                                                       Hospitalist Progress Note      Patient:  Rivka Zavala    Unit/Bed:5K-20/020-A  YOB: 1970  MRN: [id number]   Acct: [id number]   PCP: Ben Montejo MD  Date of Admission: 3/29/2025    Chief Complaint: Agitation/frustration   Admission HPI: This is a 54-year-old female who presented to [facility] ED on 03/29/2025.  The patient was notably admitted to [facility] from 02/28/2025 until 03/07/2025 secondary to altered mental status with acute hypoxic respiratory failure and septic shock secondary to influenza A with concerns for superimposed pneumonia.  During that admission the patient was intubated and required pressor support.  Imaging additionally showed evidence of a left basal ganglia hemorrhage for which the patient was not recommended to receive any neurosurgical intervention.  The patient was discharged from the intensive care unit to an LTAC with evidence of persistent dysphagia and encephalopathy.  Documentation indicates that the patient was discharged to a nursing facility from the LTAC.     The patient was seen and evaluated at the bedside.  There is clear attempt for the patient to answer questions appropriately however, the patient expresses herself in nonsensical phrases.  She is able to follow commands during interview.  She is not able to participate meaningfully in a review of systems.  However, she does not show evidence of nonverbal signs of pain or distress at the time of interview.  Documentation indicates that the patient's boyfriend was at the bedside and stated that she was at her baseline mentation.  Documentation additionally indicates that the patient had unwitnessed falls at facility.  Informed by primary RN on admission that no medications were administered at nursing facility.     Vital signs temperature 99, respirations 16, pulse 117, blood pressure 153/96, SpO2 98% on room air.     Labs:  BMP sodium 129, 
[facility]  INPATIENT SPEECH THERAPY  STRZ ONC MED 5K  DAILY NOTE    Discharge Recommendations: Home Health    SLP Individual Minutes  Time In: 912  Time Out: 924  Minutes: 12  Timed Code Treatment Minutes: 0 Minutes       Date: 2025  Patient Name: Rivka Zavala      CSN: [id number]   : 1970  (54 y.o.)  Gender: female   Referring Physician:  Bambi Erickson MD   Diagnosis: Agitation  Precautions: fall precautions  Current Diet: Regular diet and thin liquids  Respiratory Status: Room Air  Swallowing Strategies: Full Upright Position, Small Bite/Sip, Pulmonary Monitoring, Oral Care after all Meals, Medications Whole with Thin, Alternate Solids and Liquids, Limit Distractions, and Monitor for Fatigue   Date of Last MBS/FEES:  3/31/25    Pain:  No pain reported.    Subjective:  BRITTANY Rider approved session. Patient sitting in recliner eating breakfast upon ST arrival. Agreeable to skilled ST services. No family present. Alert and very tangential throughout session.    Short-Term Goals:  SHORT TERM GOAL #1:  Goal 1: Patient will complete orientation via OLOG and functional carryover tasks (call light, biographics) with scoring of 25/30 or 70% accuracy, mod cues to assist in retention of functional information.  INTERVENTIONS: Attempted orientation, however, patient tangential and not responding to orientation questions. Patient did demonstrate good use of call light during session to call nurse for assistance to bathroom.     SHORT TERM GOAL #2:  Goal 2: Patient will complete functional expression language tasks (core word/phrase repetition, high frequency confrontational naming, automatic speech scripts, verbal fluency) with 60% accuracy, mod cues to improve efficacy for communication of wants/needs.  INTERVENTIONS: Patient very tangential throughout session with limited attention to tasks at hand. Poor thought organization and content of conversational speech with paraphasias and anomia 
[facility]  SPEECH THERAPY  STRZ ONC MED 5K  Clinical Swallow Evaluation    Discharge Recommendations: SNF and Continue to Assess Pending Progress  DIET ORDER RECOMMENDATIONS AFTER EVALUATION: NPO  Strategies:  Recommend NPO     SLP Individual Minutes  Time In: 1442  Time Out: 1507  Minutes: 25  Timed Code Treatment Minutes: 0 Minutes       Date: 3/30/2025  Patient Name: Rivka Zavala      CSN: [id number]   : 1970  (54 y.o.)  Gender: female   Referring Physician:  Dominic Carolina MD     Diagnosis: Agitation    History of Present Illness/Injury: Patient admitted to [facility] with aforementioned diagnosis. Per physician H&P, \"This is a 54-year-old female who presented to [facility] ED on 2025.  The patient was notably admitted to [facility] from 2025 until 2025 secondary to altered mental status with acute hypoxic respiratory failure and septic shock secondary to influenza A with concerns for superimposed pneumonia.  During that admission the patient was intubated and required pressor support.  Imaging additionally showed evidence of a left basal ganglia hemorrhage for which the patient was not recommended to receive any neurosurgical intervention.  The patient was discharged from the intensive care unit to an LTAC with evidence of persistent dysphagia and encephalopathy.  Documentation indicates that the patient was discharged to a nursing facility from the LTAC.     The patient was seen and evaluated at the bedside.  There is clear attempt for the patient to answer questions appropriately however, the patient expresses herself in nonsensical phrases.  She is able to follow commands during interview.  She is not able to participate meaningfully in a review of systems.  However, she does not show evidence of nonverbal signs of pain or distress at the time of interview.  Documentation indicates that the patient's boyfriend was at the bedside and stated that she was at her baseline mentation.  
[facility]  SPEECH THERAPY  STRZ ONC MED 5K  Speech - Language - Cognitive Evaluation + Dysphagia Therapy     Discharge Recommendations: SNF    SLP Individual Minutes  Time In: 1012  Time Out: 1059  Minutes: 47  Timed Code Treatment Minutes: 0 Minutes  Moifby-Vgqiypru-Hxangbdti Evaluation: 35 minutes   Dysphagia Therapy: 12 minutes    Date: 2025  Patient Name: Rivka Zavala      CSN: [id number]   : 1970  (54 y.o.)  Gender: female   Referring Physician: Bambi Erickson MD   Diagnosis: Agitation  Precautions: Fall risk, aspiration precautions   History of Present Illness/Injury: Patient presented to [facility] with the above medical dx. Refer to physician H&P:  \"This is a 54-year-old female who presented to [facility] ED on 2025.  The patient was notably admitted to [facility] from 2025 until 2025 secondary to altered mental status with acute hypoxic respiratory failure and septic shock secondary to influenza A with concerns for superimposed pneumonia.  During that admission the patient was intubated and required pressor support.  Imaging additionally showed evidence of a left basal ganglia hemorrhage for which the patient was not recommended to receive any neurosurgical intervention.  The patient was discharged from the intensive care unit to an LTAC with evidence of persistent dysphagia and encephalopathy.  Documentation indicates that the patient was discharged to a nursing facility from the LTAC.The patient was seen and evaluated at the bedside.  There is clear attempt for the patient to answer questions appropriately however, the patient expresses herself in nonsensical phrases.  She is able to follow commands during interview.  She is not able to participate meaningfully in a review of systems.  However, she does not show evidence of nonverbal signs of pain or distress at the time of interview.  Documentation indicates that the patient's boyfriend was at the bedside and 
[facility]  INPATIENT PHYSICAL THERAPY  EVALUATION  [facility] ONC MED 5K - 5K-20/020-A    Discharge Recommendations: Subacute/Skilled Nursing Facility, Patient would benefit from continued therapy after discharge  Equipment Recommendations:    cont to monitor for needs            Time In: 1026  Time Out: 1044  Timed Code Treatment Minutes: 8 Minutes  Minutes: 18          Date: 3/31/2025  Patient Name: Rivka Zavala,  Gender:  female        MRN: [id number]  : 1970  (54 y.o.)      Referring Practitioner: Dr. ADRIAN Carolina  Diagnosis: Agitation  Additional Pertinent Hx: Rivka Zavala is a 54 y.o. female with a history of anemia, ETOH abuse, Malnutrition, THC use, multiple sclerosis, personality disorder, smoker, recent basal ganglia hemorrhage, recent respiratory failure, influenza A, Pneumonia, Wernicke's encephalopathy, and PFO. She was admitted at [facility] from  to 3-7 and was subsequently discharged to [facility] then to a SNF. She was noted on that admission to have silent aspiration with thin liquids. EEG was performed with mild to moderate encephalopathy.MRI brain: Large subacute hematoma in the left basal ganglia and temporal lobe measuring  5 x 2.9 cm in size, moderate surrounding edema, mild mass effect upon the left.     Restrictions/Precautions:  Restrictions/Precautions: General Precautions, Fall Risk, Contact Precautions                   Subjective:  Chart Reviewed: Yes  Patient assessed for rehabilitation services?: Yes  Subjective: pleasant and cooperative, pt difficulty with word finding and finishing sentences at times. pt incontinent of bowel upon PT arrival.    General:        Hearing: Within functional limits       Pain: no c/o pain    Vitals: Vitals not assessed per clinical judgement, see nursing flowsheet    Social/Functional History:    Lives With: Family  Type of Home: House  Home Access: Stairs to enter without rails  Entrance Stairs - Number of Steps: 
[facility]  INPATIENT OCCUPATIONAL THERAPY  STRZ ONC MED 5K  EVALUATION      Discharge Recommendations:  (inpatient therapy stay)  Equipment Recommendations: No (defer to next LOC)        Time In: 1350  Time Out: 1415  Timed Code Treatment Minutes: 15 Minutes  Minutes: 25          Date: 3/30/2025  Patient Name: Rivka Zavala,   Gender: female      MRN: [id number]  : 1970  (54 y.o.)  Referring Practitioner: Dominic Carolina MD  Diagnosis: Agitation  Additional Pertinent Hx: Per EMR: 54 y.o. female with a history of anemia, ETOH abuse, Malnutrition, THC use, multiple sclerosis, personality disorder, smoker, recent basal ganglia hemorrhage, recent respiratory failure, influenza A, Pneumonia, Wernicke's encephalopathy, and PFO. She was admitted at [facility] from  to 3-7 and was subsequently discharged to [place] then to a SNF. She was noted on that admission to have silent aspiration with thin liquids. EEG was performed with mild to moderate encephalopathy.      She presented to the ER yesterday with complaints of not liking the nursing home and wanted to be placed somewhere else. At that time her family reports that she was at baseline mentation. MRI brain: Large subacute hematoma in the left basal ganglia and temporal lobe measuring  5 x 2.9 cm in size, moderate surrounding edema, mild mass effect upon the left    Restrictions/Precautions:  Restrictions/Precautions: General Precautions, Fall Risk, Contact Precautions    Subjective  Family / Caregiver Present: Yes    Subjective: Per RN, okay for therapy. Pt up in the chair upon therapist arrival w/ alarm on. Friend at bedside and pt very agitated about wanting to eat/have diet order. Extensive edu provided and ensured that SLP would be in to see her today to evaluate. Pt agreeable to OT session with minimal encouragement and education. Pleasant and cooperative. At end of session, pt up in the chair w/ alarm on and needs within 
Discharge education and instructions provided. Patient verbalized understanding at this time. No questions asked. IV access removed. All personal belongings, AVS present with patient. Transport to main lobby via wheelchair.     
[facility]  STRZ ONC MED 5K  Occupational Therapy  Daily Note    Discharge Recommendations: Home with Home Health OT  Equipment Recommendations: No (defer to next LOC)      Time In: 1416  Time Out: 1505  Timed Code Treatment Minutes: 49 Minutes  Minutes: 49          Date: 2025  Patient Name: Rivka Zavala,   Gender: female      Room: [address] [place] [address]  MRN: [id number]  : 1970  (54 y.o.)  Referring Practitioner: Dominic Carolina MD  Diagnosis: Agitation  Additional Pertinent Hx: Per EMR: 54 y.o. female with a history of anemia, ETOH abuse, Malnutrition, THC use, multiple sclerosis, personality disorder, smoker, recent basal ganglia hemorrhage, recent respiratory failure, influenza A, Pneumonia, Wernicke's encephalopathy, and PFO. She was admitted at [facility] from  to 3-7 and was subsequently discharged to [place] then to a SNF. She was noted on that admission to have silent aspiration with thin liquids. EEG was performed with mild to moderate encephalopathy.      She presented to the ER yesterday with complaints of not liking the nursing home and wanted to be placed somewhere else. At that time her family reports that she was at baseline mentation. MRI brain: Large subacute hematoma in the left basal ganglia and temporal lobe measuring  5 x 2.9 cm in size, moderate surrounding edema, mild mass effect upon the left    Restrictions/Precautions:  Restrictions/Precautions: General Precautions, Fall Risk, Contact Precautions      Social/Functional History:  Lives With: Family  Type of Home: House  Home Access: Stairs to enter without rails  Entrance Stairs - Number of Steps: 2  Home Equipment: None   Bathroom Shower/Tub: Tub/Shower unit  Bathroom Toilet: Standard  Bathroom Equipment: None    Receives Help From: Family  Prior Level of Assist for ADLs: Independent  Prior Level of Assist for Homemaking: Independent  Homemaking Responsibilities: Yes  Prior Level of Assist for 
[facility]  STRZ ONC MED 5K  Occupational Therapy  Daily Note    Discharge Recommendations: ECF with OT and Not safe to return home at this time  Equipment Recommendations: No (defer to next LOC)        Time In: 918  Time Out: 941  Timed Code Treatment Minutes: 23 Minutes  Minutes: 23          Date: 3/31/2025  Patient Name: Rivka Zavala,   Gender: female      Room: [facility]/020  MRN: [id number]  : 1970  (54 y.o.)  Referring Practitioner: Dominic Carolina MD  Diagnosis: Agitation  Additional Pertinent Hx: Per EMR: 54 y.o. female with a history of anemia, ETOH abuse, Malnutrition, THC use, multiple sclerosis, personality disorder, smoker, recent basal ganglia hemorrhage, recent respiratory failure, influenza A, Pneumonia, Wernicke's encephalopathy, and PFO. She was admitted at [facility] from  to 3-7 and was subsequently discharged to [place] then to a SNF. She was noted on that admission to have silent aspiration with thin liquids. EEG was performed with mild to moderate encephalopathy.      She presented to the ER yesterday with complaints of not liking the nursing home and wanted to be placed somewhere else. At that time her family reports that she was at baseline mentation. MRI brain: Large subacute hematoma in the left basal ganglia and temporal lobe measuring  5 x 2.9 cm in size, moderate surrounding edema, mild mass effect upon the left    Restrictions/Precautions:  Restrictions/Precautions: General Precautions, Fall Risk, Contact Precautions     Social/Functional History:  Lives With: Family  Type of Home: House  Home Access: Stairs to enter without rails  Entrance Stairs - Number of Steps: 2  Home Equipment: None   Bathroom Shower/Tub: Tub/Shower unit  Bathroom Toilet: Standard  Bathroom Equipment: None    Receives Help From: Family  Prior Level of Assist for ADLs: Independent  Prior Level of Assist for Homemaking: Independent  Homemaking Responsibilities: Yes  Prior 
[facility]  SPEECH THERAPY  STRZ ONC MED 5K  Modified Barium Swallow + Dysphagia Therapy    Discharge Recommendations: Home with Home Exercise Program  DIET ORDER RECOMMENDATIONS AFTER EVALUATION: Regular diet/thin liquids   Strategies:  Full Upright Position, Small Bite/Sip, Pulmonary Monitoring, Oral Care after all Meals, Medications Whole with Thin, Alternate Solids and Liquids, Limit Distractions, and Monitor for Fatigue     SLP Individual Minutes  Time In: 0824  Time Out: 0845  Minutes: 21  Timed Code Treatment Minutes: 0 Minutes  Modified Barium Swallow: 12 minutes   Dysphagia Therapy: 9 minutes    Date: 3/31/2025  Patient Name: Rivka Zavala      CSN: [id number]   : 1970  (54 y.o.)  Gender: female   Referring Physician: Dominic Carolina MD     Diagnosis: Macrocytic anemia [D53.9]  Hyponatremia [E87.1]  Agitation [R45.1]  Tachycardia [R00.0]  Multifocal pneumonia [J18.9]  Sepsis without acute organ dysfunction, due to unspecified organism (HCC) [A41.9]  Precautions: Fall risk, aspiration precautions     History of Present Illness/Injury: Patient presented to [facility] with the above medical dx. Refer to physician H&P: \"This is a 54-year-old female who presented to [facility] ED on 2025.  The patient was notably admitted to [facility] from 2025 until 2025 secondary to altered mental status with acute hypoxic respiratory failure and septic shock secondary to influenza A with concerns for superimposed pneumonia.  During that admission the patient was intubated and required pressor support.  Imaging additionally showed evidence of a left basal ganglia hemorrhage for which the patient was not recommended to receive any neurosurgical intervention.  The patient was discharged from the intensive care unit to an LTAC with evidence of persistent dysphagia and encephalopathy.  Documentation indicates that the patient was discharged to a nursing facility from the LTAC.The patient was 
Patient states she is refusing all medications until diet ordered.  
Patient unable to answer admission questions or review medication history. Nurse contacted [facility] [place] for additional documentation and last dose MAR. [facility] reported patient was facility for one day before requesting new facility and no medications had been received/given.  
[facility]  PHYSICAL THERAPY MISSED TREATMENT NOTE  STRZ ONC MED 5K    Date: 2025  Patient Name: Rivka Zavala        MRN: [id number]   : 1970  (54 y.o.)  Gender: female   Referring Practitioner: Dr. ADRIAN Carolina            REASON FOR MISSED TREATMENT:  Missed Treat.      Pt with ST at this time. Will check back as able.    
Spiritual Health History and Assessment/Progress Note  [facility]    Spiritual/Emotional Needs,  ,  ,      Name: Rivka Zavala MRN: [id number]    Age: 54 y.o.     Sex: female   Language: English   Scientologist: Non-Presybeterian   Agitation     Date: 4/1/2025            Total Time Calculated: (P) 9 min              Spiritual Assessment began in STRZ ONC MED 5K        Referral/Consult From: Rounding   Encounter Overview/Reason: Spiritual/Emotional Needs  Service Provided For: Patient    [place], Belief, Meaning:   Patient identifies as spiritual  Family/Friends identify as spiritual      Importance and Influence:  Patient has spiritual/personal beliefs that influence decisions regarding their health  Family/Friends have spiritual/personal beliefs that influence decisions regarding the patient's health    Community:  Patient feels well-supported. Support system includes: Extended family  Family/Friends feel well-supported. Support system includes: Extended family    Assessment and Plan of Care:   In my encounter with the 54 yr old patient, while rounding  the unit 5K,  I provided spiritual care to patient through conversation, I also came to assess the patient's spiritual needs present. The pt was admitted due to agitation.     Patient Interventions include: Facilitated expression of thoughts and feelings  Family/Friends Interventions include: Facilitated expression of thoughts and feelings    Patient Plan of Care: Spiritual Care available upon further referral  Family/Friends Plan of Care: Spiritual Care available upon further referral    Electronically signed by RENZO Gallego on 4/1/2025 at 5:38 PM   
insight  and would benefit from continued skilled PT to address these impairments and return to PLOF.   Activity Tolerance:  Patient tolerance of  treatment:Good.  Plan: Current Treatment Recommendations: Strengthening, Balance training, Endurance training, Functional mobility training, Transfer training, Gait training, Home exercise program, Therapeutic activities, Patient/Caregiver education & training, Equipment evaluation, education, & procurement, Safety education & training  General Plan:  (4-5X N)    Education:  Learners: Patient  Patient Education: Plan of Care, Home Exercise Program, Bed Mobility, Equipment Education, Transfers, Reviewed Prior Education, Gait, Use of Gait Belt, Up in Chair for All Meals, RPE Scale, Home Safety Education    Goals:  Patient Goals : not stated  Short Term Goals  Time Frame for Short Term Goals: by discharge  Short Term Goal 1: bed mobility with S to get in/out of bed  Short Term Goal 2: transfer with S to get in/out of chairs  Short Term Goal 3: amb 50'x1 without AD and S to walk safely in room  Long Term Goals  Time Frame for Long Term Goals : no LTGs set secondary to short ELOS    Following session, patient left in safe position with all fall risk precautions in place.       
[]No  PT/OT: [x]Yes / []No    DVT Prophylaxis: [] Lovenox / [] Heparin / [x] SCDs / [] Already on Systemic Anticoagulation / [] None     Expected discharge date:  TBD  Disposition: from [facility], will likely need placement at discharge.    Code status: Full Code     ===================================================================    Medications:    Infusion Medications    sodium chloride      Scheduled Medications    folic acid  1 mg Oral Daily    magnesium oxide  400 mg Oral Daily    metoprolol tartrate  25 mg Oral BID    midodrine  5 mg Oral TID WC    multivitamin  1 tablet Oral Daily    pantoprazole  40 mg Oral QAM AC    QUEtiapine  25 mg Oral BID    sodium chloride  1 g Oral TID WC    tiotropium  2 puff Inhalation Daily RT    thiamine  100 mg Oral Daily    sodium chloride flush  5-40 mL IntraVENous 2 times per day    azithromycin  500 mg Oral Q24H    And    cefTRIAXone (ROCEPHIN) IV  1,000 mg IntraVENous Q24H    PRN Meds: sodium chloride flush, sodium chloride, ondansetron **OR** ondansetron, polyethylene glycol, acetaminophen **OR** acetaminophen, albuterol **AND** ipratropium    Exam:  /79   Pulse 100   Temp 98.8 °F (37.1 °C) (Oral)   Resp 16   Ht 1.626 m (5' 4\")   Wt 38.6 kg (85 lb)   LMP 08/14/2012   SpO2 95%   BMI 14.59 kg/m²   General: middle aged lady who appears much older than her stated age; sitting up in recliner; awake and alert;   HEENT: Pupils equal, round, and reactive to light; oral mucosa is moist; no pharyngeal erythema; dentition is intact  Cardiac: Regular rate and rhythm, S1 and S2 auscultated; no murmurs, gallops, or rubs; peripheral pulses 2+   Respiratory: No respiratory distress; good effort; no wheezing, rhonchi, or crackles bilaterally   GI: Abdomen is soft, non-tender, and non-distended; bowel sounds are normal  : No suprapubic or pelvic tenderness   Neuro: receptive aphasia no tremors;    MSK: Normal muscle tone and bulk, no joint effusions

## 2025-04-02 NOTE — PLAN OF CARE
Problem: Confusion  Goal: Confusion, delirium, dementia, or psychosis is improved or at baseline  Description: INTERVENTIONS:  1. Assess for possible contributors to thought disturbance, including medications, impaired vision or hearing, underlying metabolic abnormalities, dehydration, psychiatric diagnoses, and notify attending LIP  2. Anderson high risk fall precautions, as indicated  3. Provide frequent short contacts to provide reality reorientation, refocusing and direction  4. Decrease environmental stimuli, including noise as appropriate  5. Monitor and intervene to maintain adequate nutrition, hydration, elimination, sleep and activity  6. If unable to ensure safety without constant attention obtain sitter and review sitter guidelines with assigned personnel  7. Initiate Psychosocial CNS and Spiritual Care consult, as indicated  Outcome: Progressing     Problem: Safety - Adult  Goal: Free from fall injury  Outcome: Progressing     Problem: Skin/Tissue Integrity  Goal: Skin integrity remains intact  Description: 1.  Monitor for areas of redness and/or skin breakdown  2.  Assess vascular access sites hourly  3.  Every 4-6 hours minimum:  Change oxygen saturation probe site  4.  Every 4-6 hours:  If on nasal continuous positive airway pressure, respiratory therapy assess nares and determine need for appliance change or resting period  Outcome: Progressing     Problem: Pain  Goal: Verbalizes/displays adequate comfort level or baseline comfort level  Outcome: Progressing     Problem: Discharge Planning  Goal: Discharge to home or other facility with appropriate resources  Outcome: Progressing

## 2025-04-02 NOTE — DISCHARGE SUMMARY
Hospital Medicine Discharge Summary      Patient Identification:   Rivka Zavala   : 1970  MRN: [id number]   Account: [id number]      Patient's PCP: Ben Montejo MD    Admit Date: 3/29/2025     Discharge Date: 2025      Admitting Physician: No admitting provider for patient encounter.     Discharge Physician: MK Sharma - CNP     Discharge Diagnoses:    1.Acute metabolic encephalopathy, improved   UDS was negative, ethanol and urine analysis negative  Last brain MRI on 2024 was done for possible MS flare and recurrent falls that showed no acute intracranial finding but worsening global volume loss and very mild amount of abnormal signal in the white matter of the brain consistent with patient history of MS, there are few new focal, none of these appear acute patient was evaluated by neurology at that time 80 3/2024 upon  review there was no definite signs of MS    2.Large subacute hematoma in the left basal ganglia and temporal lobe with moderate edema and mild mass effect    MRI  done on 3/30 showed large subacute hematoma in the left basal ganglia and temporal lobe measuring 5 x 2.9 cm. Mild mass effect on left lateral ventricle. No midline shift. No evidence of demyelinating disease. No acute infarct.      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  Multifocal pneumonia/CAP seen on chest CTA done on 3/29/2025 showed:. Multifocal pneumonia. Diffuse interstitial thickening indicates superimposed pulmonary edema.  Treated with antibiotics    5.  Chronic hyponatremia    6.  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  Chronic hypotension  On midodrine    8.  Recurrent falls  Continue PT/OT at home        Admission HPI: This is a 54-year-old female who presented to [facility] ED on 2025.  The patient was notably admitted to [facility] from

## 2025-04-02 NOTE — CARE COORDINATION
4/2/25, 9:17 AM EDT    DISCHARGE PLANNING EVALUATION    DELLA met with Rivka this morning. Discussed with pt recommendation for home health, pt is agreeable to this, she is unsure if he mother will be okay with it. Pt does have some difficulty with word finding at times, however is able to express her needs. Post-acute (PAC) provider list was provided to patient. Patient was informed of their freedom to choose PAC provider. Discussed and offered to show the patient the relevant PAC Providers quality and resource use measures on Medicare Compare web site via computer based on patient's goals of care and treatment preferences. Questions regarding selection process were answered.Pt would like to use [facility]. SW offered to contact pt's mother to discuss the home health to verify it would be okay at their home.     Call to pt's mother Radha, she is okay with pt having home health and using [facility]. Mother reports someone will be with pt all the time either herself or pt's father. SW did let mother know provider yesterday had spoke about possible discharge home today. Mother asked that we just call to let them know if she will be discharged.     10:30 AM EDT  [facility] accepting pt, SW did message provider asking for HH orders.     4/3/25, 7:15 AM EDT    Patient goals/plan/ treatment preferences discussed by  and .  Patient goals/plan/ treatment preferences reviewed with patient/ family.  Patient/ family verbalize understanding of discharge plan and are in agreement with goal/plan/treatment preferences.  Understanding was demonstrated using the teach back method.  AVS provided by RN at time of discharge, which includes all necessary medical information pertaining to the patients current course of illness, treatment, post-discharge goals of care, and treatment preferences.     Services At/After Discharge: Home Health [facility]

## 2025-04-03 ENCOUNTER — TELEPHONE (OUTPATIENT)
Dept: FAMILY MEDICINE CLINIC | Age: 55
End: 2025-04-03

## 2025-04-03 NOTE — TELEPHONE ENCOUNTER
Care Transitions Initial Follow Up Call    Outreach made within 2 business days of discharge: Yes    Patient: Rivka Zavala Patient : 1970   MRN: [id number]  Reason for Admission: Agitation   Discharge Date: 25       Spoke with: Phone number has calling restrictions. Closing Encounter. Letter sent.     Discharge department/facility: [facility]    TCM Interactive Patient Contact:  Was patient able to fill all prescriptions:   Was patient instructed to bring all medications to the follow-up visit:   Is patient taking all medications as directed in the discharge summary?   Does patient understand their discharge instructions:   Does patient have questions or concerns that need addressed prior to 7-14 day follow up office visit:     Additional needs identified to be addressed with provider               Scheduled appointment with PCP within 7-14 days    Follow Up  Future Appointments   Date Time Provider Department Center   2025 10:00 AM Ben Montejo MD [facility]   5/15/2025 10:00 AM Fernando Anne MD [facility] - Elida Munoz LPN

## 2025-04-04 LAB
BACTERIA BLD AEROBE CULT: NORMAL
BACTERIA BLD AEROBE CULT: NORMAL

## 2025-04-05 PROBLEM — G93.41 ACUTE METABOLIC ENCEPHALOPATHY: Status: ACTIVE | Noted: 2025-04-05

## 2025-04-09 ENCOUNTER — TELEPHONE (OUTPATIENT)
Dept: FAMILY MEDICINE CLINIC | Age: 55
End: 2025-04-09

## 2025-04-09 NOTE — TELEPHONE ENCOUNTER
Received call from NITHYA Buchanan with Home Health. Stated that she will be seeing the patient 1x per week for 4 weeks. Patient is doing well physically, just needs strengthening.

## 2025-04-15 ENCOUNTER — OFFICE VISIT (OUTPATIENT)
Dept: FAMILY MEDICINE CLINIC | Age: 55
End: 2025-04-15
Payer: COMMERCIAL

## 2025-04-15 ENCOUNTER — TELEPHONE (OUTPATIENT)
Dept: FAMILY MEDICINE CLINIC | Age: 55
End: 2025-04-15

## 2025-04-15 VITALS
OXYGEN SATURATION: 97 % | WEIGHT: 100.4 LBS | TEMPERATURE: 98.4 F | RESPIRATION RATE: 20 BRPM | HEIGHT: 64 IN | DIASTOLIC BLOOD PRESSURE: 84 MMHG | SYSTOLIC BLOOD PRESSURE: 138 MMHG | HEART RATE: 114 BPM | BODY MASS INDEX: 17.14 KG/M2

## 2025-04-15 DIAGNOSIS — Z09 HOSPITAL DISCHARGE FOLLOW-UP: Primary | ICD-10-CM

## 2025-04-15 DIAGNOSIS — D50.8 OTHER IRON DEFICIENCY ANEMIA: ICD-10-CM

## 2025-04-15 DIAGNOSIS — R00.0 SINUS TACHYCARDIA: ICD-10-CM

## 2025-04-15 DIAGNOSIS — G35 MS (MULTIPLE SCLEROSIS) (HCC): ICD-10-CM

## 2025-04-15 DIAGNOSIS — L29.9 GENERALIZED PRURITUS: ICD-10-CM

## 2025-04-15 DIAGNOSIS — M21.619 BUNION: ICD-10-CM

## 2025-04-15 PROCEDURE — 99214 OFFICE O/P EST MOD 30 MIN: CPT

## 2025-04-15 PROCEDURE — 1111F DSCHRG MED/CURRENT MED MERGE: CPT

## 2025-04-15 RX ORDER — HYDROXYZINE HYDROCHLORIDE 25 MG/1
25 TABLET, FILM COATED ORAL NIGHTLY
Qty: 30 TABLET | Refills: 2 | Status: SHIPPED | OUTPATIENT
Start: 2025-04-15 | End: 2025-07-14

## 2025-04-15 RX ORDER — METOPROLOL TARTRATE 25 MG/1
25 TABLET, FILM COATED ORAL 2 TIMES DAILY
Qty: 60 TABLET | Refills: 2 | Status: SHIPPED | OUTPATIENT
Start: 2025-04-15

## 2025-04-15 ASSESSMENT — PATIENT HEALTH QUESTIONNAIRE - PHQ9
8. MOVING OR SPEAKING SO SLOWLY THAT OTHER PEOPLE COULD HAVE NOTICED. OR THE OPPOSITE, BEING SO FIGETY OR RESTLESS THAT YOU HAVE BEEN MOVING AROUND A LOT MORE THAN USUAL: NOT AT ALL
SUM OF ALL RESPONSES TO PHQ QUESTIONS 1-9: 6
2. FEELING DOWN, DEPRESSED OR HOPELESS: NOT AT ALL
5. POOR APPETITE OR OVEREATING: NOT AT ALL
4. FEELING TIRED OR HAVING LITTLE ENERGY: MORE THAN HALF THE DAYS
1. LITTLE INTEREST OR PLEASURE IN DOING THINGS: NOT AT ALL
6. FEELING BAD ABOUT YOURSELF - OR THAT YOU ARE A FAILURE OR HAVE LET YOURSELF OR YOUR FAMILY DOWN: NOT AT ALL
7. TROUBLE CONCENTRATING ON THINGS, SUCH AS READING THE NEWSPAPER OR WATCHING TELEVISION: MORE THAN HALF THE DAYS
9. THOUGHTS THAT YOU WOULD BE BETTER OFF DEAD, OR OF HURTING YOURSELF: NOT AT ALL
3. TROUBLE FALLING OR STAYING ASLEEP: MORE THAN HALF THE DAYS
SUM OF ALL RESPONSES TO PHQ QUESTIONS 1-9: 6
10. IF YOU CHECKED OFF ANY PROBLEMS, HOW DIFFICULT HAVE THESE PROBLEMS MADE IT FOR YOU TO DO YOUR WORK, TAKE CARE OF THINGS AT HOME, OR GET ALONG WITH OTHER PEOPLE: SOMEWHAT DIFFICULT
SUM OF ALL RESPONSES TO PHQ QUESTIONS 1-9: 6
SUM OF ALL RESPONSES TO PHQ QUESTIONS 1-9: 6

## 2025-04-15 NOTE — PROGRESS NOTES
S: 54 y.o. female with   Chief Complaint   Patient presents with    Follow-Up from Hospital       HPI: please see resident note for HPI and ROS.    BP Readings from Last 3 Encounters:   04/15/25 138/84   04/02/25 (!) 140/79   03/07/25 139/89     Wt Readings from Last 3 Encounters:   04/15/25 45.5 kg (100 lb 6.4 oz)   03/29/25 38.6 kg (85 lb)   03/05/25 38.4 kg (84 lb 10.5 oz)       O: VS:  height is 1.626 m (5' 4\") and weight is 45.5 kg (100 lb 6.4 oz). Her temporal temperature is 98.4 °F (36.9 °C). Her blood pressure is 138/84 and her pulse is 114 (abnormal). Her respiration is 20 and oxygen saturation is 97%.   AAO/NAD, appropriate affect for mood       Diagnosis Orders   1. Hospital discharge follow-up  AL DISCHARGE MEDS RECONCILED W/ CURRENT OUTPATIENT MED LIST      2. Generalized pruritus  hydrOXYzine HCl (ATARAX) 25 MG tablet      3. MS (multiple sclerosis) (HCC)  Jeferson Hitchcock MD, Neurology, [place]      4. Other iron deficiency anemia  CBC with Auto Differential    Iron    Iron Binding Capacity    Iron Saturation    Ferritin      5. Sinus tachycardia  metoprolol tartrate (LOPRESSOR) 25 MG tablet      6. Sara Betancourt DPM, Podiatry, [place]          Plan:  Please refer to resident note for full plan.    54-year-old female here for hospital follow up. Admitted to [facility], most recently, 3/29-4/2/2025 for pneumonia, large subacute hematoma in left basal ganglia and temporal lobe with mass effect, encephalopathy. Treated with IV antibiotics. Seen by neurology; no surgical intervention was recommended. Discharged to LTAC. Doing fairly well since discharge. Agree with neurology referral today for MS. Worsening anemia noted during admission; agree with rechecking labs as ordered above. Follows with hematology for anemia. Pulse elevated today; restart Metoprolol -- has not taken this medication recently. Plan to follow up in 4 weeks.    Health Maintenance Due   Topic Date Due    Hepatitis B vaccine 
(65 Fe) MG tablet  Commonly known as: IRON 325  Take 1 tablet by mouth daily     folic acid 1 MG tablet  Commonly known as: FOLVITE  Take 1 tablet by mouth daily     gabapentin 300 MG capsule  Commonly known as: NEURONTIN  Take 1 capsule by mouth nightly for 90 days.     magnesium oxide 400 (240 Mg) MG tablet  Commonly known as: MAG-OX     metoprolol tartrate 25 MG tablet  Commonly known as: LOPRESSOR  Take 1 tablet by mouth 2 times daily     midodrine 5 MG tablet  Commonly known as: PROAMATINE     pantoprazole 40 MG tablet  Commonly known as: PROTONIX     polyethylene glycol 17 g packet  Commonly known as: GLYCOLAX     QUEtiapine 25 MG tablet  Commonly known as: SEROQUEL     sodium chloride 1 g tablet     therapeutic multivitamin-minerals tablet     tiotropium 18 MCG inhalation capsule  Commonly known as: SPIRIVA     vitamin B-1 100 MG tablet  Commonly known as: THIAMINE               Where to Get Your Medications        These medications were sent to [facility] [address] [place] [address], [place] [facility] [address] [facility]  - P [phone] - F [phone] [address] [place] [address] [facility] [address]      Phone: [phone]   hydrOXYzine HCl 25 MG tablet  metoprolol tartrate 25 MG tablet           Medications marked \"taking\" at this time  Outpatient Medications Marked as Taking for the 4/15/25 encounter (Office Visit) with Rufino Carranza, DO   Medication Sig Dispense Refill    hydrOXYzine HCl (ATARAX) 25 MG tablet Take 1 tablet by mouth nightly 30 tablet 2    metoprolol tartrate (LOPRESSOR) 25 MG tablet Take 1 tablet by mouth 2 times daily 60 tablet 2    acetaminophen (TYLENOL) 325 MG tablet Take 2 tablets by mouth every 6 hours as needed for Pain      magnesium oxide (MAG-OX) 400 (240 Mg) MG tablet Take 1 tablet by mouth daily      midodrine (PROAMATINE) 5 MG tablet Take 1 tablet by mouth 3 times daily Indications: Disorder of Low Blood Pressure      Multiple Vitamins-Minerals (THERAPEUTIC MULTIVITAMIN-MINERALS) tablet Take 1 tablet by mouth

## 2025-04-15 NOTE — TELEPHONE ENCOUNTER
Received call from Caro, with [facility], stated that patient has had elevated heart rate at Friday's visit and today's visit. Was 120 resting today. States that patient is extremely itchy, no visible rash on body, patient states that it itches all over and will spend all session itching and cannot focus. Caro stated that patient is non compliant with medications, not currently taking anything from med list. Caro stated that patient's left foot is swollen, patient told her that she had a Bunionectomy in May of last year. Patient confirmed that she is coming to today's office visit, with Dr. Carranza. Sending this encounter to both PCP and him so he can review with patient at today's visit.

## 2025-04-23 ENCOUNTER — TELEPHONE (OUTPATIENT)
Dept: FAMILY MEDICINE CLINIC | Age: 55
End: 2025-04-23

## 2025-04-23 NOTE — TELEPHONE ENCOUNTER
Patient calling in advising that the hydroxyzine that was ordered on 04/15 has not helped improve symptoms - states an alternative was discussed in office - would like to try alternative at this time. Okay to send to [facility] [address]

## 2025-04-23 NOTE — TELEPHONE ENCOUNTER
Can we reach out the patient and clarify what dosing of gabapentin she is taking, if she only taking gabapentin 300 mg at night?  Gabapentin was the next medication that I am wanting to uptitrate to help with her chronic itching.  We have room to increase this medication which may also help with her current issues.  Based on PDMP reviewed last time she had this medication refilled was on February 18 and that was for 30 days, so I am unsure if she is taking this consistently.  Thank you.

## 2025-04-24 NOTE — TELEPHONE ENCOUNTER
Attempted to contact patient 2x, line states that patient has calling restrictions, could not leave VM.

## 2025-04-25 NOTE — TELEPHONE ENCOUNTER
Attempted to call patient, lines states patient has calling restrictions. Sending a letter to address on file.

## 2025-04-28 ENCOUNTER — TELEPHONE (OUTPATIENT)
Dept: FAMILY MEDICINE CLINIC | Age: 55
End: 2025-04-28

## 2025-04-28 NOTE — TELEPHONE ENCOUNTER
Referral was sent in at last visit to Dr. Mccord through podiatry, can we see how soon they can get patient in for further evaluation.  If we are unable to get her in sooner rather than later she will neither need to be seen here in person or go to urgent care/the emergency department if this was to worsen.  Thank you.

## 2025-04-28 NOTE — TELEPHONE ENCOUNTER
Bilateral lower back is constantly tight with frequent sharp while bending. Radiating into both upper legs. 6/10 W24 9/10. Using ice and doing stretches which are providing a decrease in pain.   Carmina Markham on 8/2/2023 at 11:33 AM    Reviewed by EW    Visit #:  3    Subjective:  Soraida Suresh is a 59 year old female who is seen in f/u up for:        Segmental and somatic dysfunction of sacral region  Acute left-sided low back pain with left-sided sciatica  Segmental and somatic dysfunction of pelvic region  Spasm of muscle.     Since last visit on 7/31/2023,  Soraida Suresh reports: Symptoms seem to do well following last visit.  Reports participating in exercise class immediately following treatment.  Performed leg lift exercises, this did not cause pain initially but there are no other aggravating factors that the patient could think of contributing to onset of symptoms.  Pain has progressively worsened to current levels.  Radiating into upper hamstring but not beyond the knee.    (DVPRS) Pain Rating Score : 6-Hard to ignore, avoid usual activities (W24 9/10) (08/02/23 1129)     Objective:  The following was observed:  Pulse 78   Temp 97.1  F (36.2  C) (Tympanic)   Resp 16   SpO2 97%      P: palpatory tenderness /jump sign on left PSIS, tenderness noted on right PSIS:    A: static palpation demonstrates intersegmental asymmetry , pelvis  R: motion palpation notes restricted motion, Sacrum  and PSIS Right   T: muscle spasm at level(s): Lumbar erector spine and Quad lumb Bilaterally    Segmental spinal dysfunction/restrictions found at:  :  Sacrum Left lateral flexion restricted and Extension restriction  PSIS Right Extension restriction.      Assessment: Acute flareup.  Continue to follow-up with patient on planned appointment next week, barring acute exacerbation of symptoms.     Diagnoses:      1. Segmental and somatic dysfunction of sacral region    2. Acute left-sided low back pain with left-sided  Katie from [facility] calling to advise that Rivka has an open are on L great toe - this area has previously had two bunion surgeries on. HH stating it is swollen and there is some redness. No noticeable fever. HH stating Rivka is experiencing a lot of pain with it. She is scheduled at [facility] but appointment is not for another week.     HH asking if they can have care and maintenance orders.   Asking if she should be put on abx for redness.   Attempted to make acute care visit but number has calling restrictions.     sciatica    3. Segmental and somatic dysfunction of pelvic region    4. Spasm of muscle        Patient's condition:  Patient had restrictions pre-manipulation    Treatment effectiveness:  Post manipulation there is better intersegmental movement and Patient claims to feel looser post manipulation      Procedures:  CMT:  46072 Chiropractic manipulative treatment 1-2 regions performed   Pelvis: Diversified, Sacrum , PSIS Right , Side posture    Modalities:  21421: US:  1 Hernandes/cm squared for 8 minutes at .88mhz  continuous, Location:lumbar spine    Therapeutic procedures:  None    Response to Treatment  Reduction in symptoms as reported by patient    Prognosis: Good    Progress towards Goals: Patient is making progress towards the goal.     Recommendations:    Instructions:ice 20 minutes every other hour as needed and rest    Follow-up:  Return to care in 5 days.

## 2025-04-28 NOTE — TELEPHONE ENCOUNTER
Spoke with Britany in Dr Hall office and she states that early July is the soonest opening they have because of patients insurance. I attempted to call patient and advised that she go to ER to have evaluated but was unable to reach per phone restrictions. Also left a VM for HH explaining Drs advise and that I was unable to contact patient

## 2025-04-29 ENCOUNTER — TELEPHONE (OUTPATIENT)
Dept: FAMILY MEDICINE CLINIC | Age: 55
End: 2025-04-29

## 2025-04-29 NOTE — TELEPHONE ENCOUNTER
Noted, we have made it clear that this could be a worsening infection and ultimately could effect the bone as well. Thank you

## 2025-04-29 NOTE — TELEPHONE ENCOUNTER
Donna from [facility] calling to report she went to patient home today to follow up on toe wound and patient refused visit.States she will go to [facility] on Monday

## 2025-05-01 ENCOUNTER — TELEPHONE (OUTPATIENT)
Dept: FAMILY MEDICINE CLINIC | Age: 55
End: 2025-05-01

## 2025-05-01 NOTE — TELEPHONE ENCOUNTER
Donna with [facility] calling to advise that Rivka received her nurse visit today so she did not go to see her.

## 2025-05-13 ENCOUNTER — TELEPHONE (OUTPATIENT)
Dept: FAMILY MEDICINE CLINIC | Age: 55
End: 2025-05-13

## 2025-05-13 DIAGNOSIS — I61.8 OTHER LEFT-SIDED NONTRAUMATIC INTRACEREBRAL HEMORRHAGE (HCC): ICD-10-CM

## 2025-05-13 DIAGNOSIS — G35 MS (MULTIPLE SCLEROSIS) (HCC): Primary | ICD-10-CM

## 2025-05-13 DIAGNOSIS — G93.41 ACUTE METABOLIC ENCEPHALOPATHY: ICD-10-CM

## 2025-05-13 NOTE — TELEPHONE ENCOUNTER
Ben Montejo MD Emily from [facility] called requesting Out Patient orders for Speech and O.T. for Rivka Zavala to do at [facility]. Please Advise.

## 2025-05-15 ENCOUNTER — OFFICE VISIT (OUTPATIENT)
Dept: NEPHROLOGY | Age: 55
End: 2025-05-15
Payer: COMMERCIAL

## 2025-05-15 VITALS
WEIGHT: 106 LBS | OXYGEN SATURATION: 97 % | DIASTOLIC BLOOD PRESSURE: 68 MMHG | HEART RATE: 90 BPM | SYSTOLIC BLOOD PRESSURE: 106 MMHG | BODY MASS INDEX: 18.19 KG/M2

## 2025-05-15 DIAGNOSIS — E87.1 HYPONATREMIA: Primary | ICD-10-CM

## 2025-05-15 PROCEDURE — 99213 OFFICE O/P EST LOW 20 MIN: CPT | Performed by: INTERNAL MEDICINE

## 2025-05-15 PROCEDURE — G8419 CALC BMI OUT NRM PARAM NOF/U: HCPCS | Performed by: INTERNAL MEDICINE

## 2025-05-15 PROCEDURE — 3017F COLORECTAL CA SCREEN DOC REV: CPT | Performed by: INTERNAL MEDICINE

## 2025-05-15 PROCEDURE — G8427 DOCREV CUR MEDS BY ELIG CLIN: HCPCS | Performed by: INTERNAL MEDICINE

## 2025-05-15 PROCEDURE — 4004F PT TOBACCO SCREEN RCVD TLK: CPT | Performed by: INTERNAL MEDICINE

## 2025-05-15 RX ORDER — DOXYCYCLINE 100 MG/1
100 CAPSULE ORAL 2 TIMES DAILY
COMMUNITY
Start: 2025-02-22

## 2025-05-15 NOTE — PROGRESS NOTES
[facility]        Outpatient Follow-Up note         5/15/2025 10:24 AM    Patient Name:   Rivka Zavala  YOB: 1970  Primary Care Physician:  Ben Montejo MD   [facility]  [facility]  [address]  [address]  [facility] [address]  Dept: [phone]  Loc: [phone]     Chief Complaint / Reason for follow-up : Follow Up of hyponatremia     Interval History :  Patient seen and examined by me.  Feels okay.  No complaints  Had a stroke recently     Past History :  Past Medical History:   Diagnosis Date    Anemia     Clavicle fracture 10/13/2022    ETOH abuse     Malnutrition     Mild tetrahydrocannabinol (THC) abuse     Multiple sclerosis (HCC) 2017    Personality disorder (HCC) unknown    Rib fracture 10/13/2022    Smoker      Past Surgical History:   Procedure Laterality Date    ANKLE SURGERY  05/03/2024    COLONOSCOPY  08/16/2022    CYST REMOVAL  2004    right thigh     SHOULDER SURGERY Right 12/2022    Hook Plate Placement    SHOULDER SURGERY  10/26/2023    [facility]    TONSILLECTOMY  1977    WRIST GANGLION EXCISION Right 08/2021        Medications :     Outpatient Medications Marked as Taking for the 5/15/25 encounter (Office Visit) with Fernando Anne MD   Medication Sig Dispense Refill    doxycycline hyclate (VIBRAMYCIN) 100 MG capsule Take 1 capsule by mouth 2 times daily      hydrOXYzine HCl (ATARAX) 25 MG tablet Take 1 tablet by mouth nightly 30 tablet 2    metoprolol tartrate (LOPRESSOR) 25 MG tablet Take 1 tablet by mouth 2 times daily 60 tablet 2       Vitals     /68 (BP Site: Left Upper Arm, Patient Position: Sitting, BP Cuff Size: Medium Adult)   Pulse 90   Wt 48.1 kg (106 lb)   LMP 08/14/2012   SpO2 97%   BMI 18.19 kg/m²  Wt Readings from Last 3 Encounters:   05/15/25 48.1 kg (106 lb)   04/15/25 45.5 kg (100 lb 6.4 oz)   03/29/25 38.6 kg (85 lb)        Physical Exam     General

## 2025-05-19 ENCOUNTER — OFFICE VISIT (OUTPATIENT)
Dept: FAMILY MEDICINE CLINIC | Age: 55
End: 2025-05-19
Payer: COMMERCIAL

## 2025-05-19 VITALS
BODY MASS INDEX: 18.2 KG/M2 | OXYGEN SATURATION: 100 % | HEIGHT: 64 IN | WEIGHT: 106.6 LBS | SYSTOLIC BLOOD PRESSURE: 126 MMHG | RESPIRATION RATE: 12 BRPM | DIASTOLIC BLOOD PRESSURE: 72 MMHG | HEART RATE: 88 BPM | TEMPERATURE: 98.1 F

## 2025-05-19 DIAGNOSIS — H91.91 DECREASED HEARING OF RIGHT EAR: Primary | ICD-10-CM

## 2025-05-19 DIAGNOSIS — L29.9 GENERALIZED PRURITUS: ICD-10-CM

## 2025-05-19 DIAGNOSIS — R00.0 SINUS TACHYCARDIA: ICD-10-CM

## 2025-05-19 PROCEDURE — 3017F COLORECTAL CA SCREEN DOC REV: CPT

## 2025-05-19 PROCEDURE — G8427 DOCREV CUR MEDS BY ELIG CLIN: HCPCS

## 2025-05-19 PROCEDURE — 4004F PT TOBACCO SCREEN RCVD TLK: CPT

## 2025-05-19 PROCEDURE — G8419 CALC BMI OUT NRM PARAM NOF/U: HCPCS

## 2025-05-19 PROCEDURE — 99213 OFFICE O/P EST LOW 20 MIN: CPT

## 2025-05-19 RX ORDER — METOPROLOL TARTRATE 25 MG/1
25 TABLET, FILM COATED ORAL 2 TIMES DAILY
Qty: 60 TABLET | Refills: 2 | Status: SHIPPED | OUTPATIENT
Start: 2025-05-19

## 2025-05-19 RX ORDER — SULFAMETHOXAZOLE AND TRIMETHOPRIM 800; 160 MG/1; MG/1
TABLET ORAL
COMMUNITY
Start: 2025-05-09

## 2025-05-19 RX ORDER — HYDROXYZINE HYDROCHLORIDE 25 MG/1
25 TABLET, FILM COATED ORAL NIGHTLY
Qty: 30 TABLET | Refills: 2 | Status: SHIPPED | OUTPATIENT
Start: 2025-05-19 | End: 2025-08-17

## 2025-05-19 NOTE — PATIENT INSTRUCTIONS
Thank you   Thank you for trusting us with your healthcare needs. You may receive a survey regarding today's visit. It would help us out if you would take a few moments to provide your feedback. We value your input.  Please bring in ALL medications BOTTLES, including inhalers, herbal supplements, over the counter, prescribed & non-prescribed medicine. The office would like actual medication bottles and a list.         4.  Prior to getting your labs drawn, check with your insurance company for benefits and eligibility of lab services.  Often, insurance companies cover certain tests for preventative visits only.  It is patient's responsibility to    see what is covered.    5.  If the list below has been completed, PLEASE FAX RECORDS TO OUR OFFICE @ [phone]. Once the records have been received we will update your records at our office:  Health Maintenance Due   Topic Date Due    Hepatitis B vaccine (1 of 3 - 19+ 3-dose series) Never done    Pneumococcal 50+ years Vaccine (1 of 2 - PCV) Never done    Colorectal Cancer Screen  Never done    Shingles vaccine (1 of 2) Never done    COVID-19 Vaccine (1 - 2024-25 season) Never done

## 2025-05-19 NOTE — PROGRESS NOTES
Health Maintenance Due   Topic Date Due    Hepatitis B vaccine (1 of 3 - 19+ 3-dose series) Never done    Pneumococcal 50+ years Vaccine (1 of 2 - PCV) Never done    Colorectal Cancer Screen  Never done    Shingles vaccine (1 of 2) Never done    COVID-19 Vaccine (1 - 2024-25 season) Never done

## 2025-05-19 NOTE — PROGRESS NOTES
Patient:Rivka Zavala  YOB: 1970  MRN: [id number]    Subjective   54 y.o. female who presents for the followin Month Follow-Up (/)  Last seen on 4/15/25 for hospital follow up     Today here with mother.  Patient states she was supposed to get an MRI completed with [facility],   But has not heard anything back.     Patient also states that she feels there is a difference in her vision, and hearing on her right visual field.   She also feels like she knows what to say but is unable to state it correctly.     Patients home phone  [phone]  [facility]  Review of Systems   Review of Systems   Eyes:  Positive for visual disturbance.   Neurological:  Positive for speech difficulty.     Patient History    Past Medical History:  She has a past medical history of Anemia, Clavicle fracture, ETOH abuse, Malnutrition, Mild tetrahydrocannabinol (THC) abuse, Multiple sclerosis (HCC), Personality disorder (HCC), Rib fracture, and Smoker.    Social History:  She reports that she has been smoking cigarettes. She started smoking about 35 years ago. She has a 17.8 pack-year smoking history. She has never used smokeless tobacco. She reports that she does not currently use alcohol after a past usage of about 3.0 standard drinks of alcohol per week. She reports current drug use. Drug: Marijuana (Weed).     Past Surgical History:   Procedure Laterality Date    ANKLE SURGERY  2024    COLONOSCOPY  2022    CYST REMOVAL      right thigh     SHOULDER SURGERY Right 2022    Hook Plate Placement    SHOULDER SURGERY  10/26/2023    [facility]    TONSILLECTOMY  1977    WRIST GANGLION EXCISION Right 2021      Family History   Problem Relation Age of Onset    Hypertension Mother     Stroke Father     Hypertension Father     No Known Problems Sister      Objective     Vitals:    25 1302   BP: 126/72   BP Site: Right Upper Arm   Patient Position: Sitting   BP Cuff Size: Medium Adult   Pulse: 88   Resp: 12   Temp: 98.1

## 2025-05-19 NOTE — PROGRESS NOTES
S: 54 y.o. female with   Chief Complaint   Patient presents with    1 Month Follow-Up              HPI: please see resident note for HPI and ROS.    BP Readings from Last 3 Encounters:   05/19/25 126/72   05/15/25 106/68   04/15/25 138/84     Wt Readings from Last 3 Encounters:   05/19/25 48.4 kg (106 lb 9.6 oz)   05/15/25 48.1 kg (106 lb)   04/15/25 45.5 kg (100 lb 6.4 oz)       O: VS:  height is 1.626 m (5' 4.02\") and weight is 48.4 kg (106 lb 9.6 oz). Her oral temperature is 98.1 °F (36.7 °C). Her blood pressure is 126/72 and her pulse is 88. Her respiration is 12 and oxygen saturation is 100%.   Physical exam performed by resident physician     Diagnosis Orders   1. Decreased hearing of right ear  [facility]      2. Generalized pruritus  hydrOXYzine HCl (ATARAX) 25 MG tablet      3. Sinus tachycardia  metoprolol tartrate (LOPRESSOR) 25 MG tablet          Plan:  Please refer to resident note for full plan.    54 year old female presents to the office for follow up.    Patient plan to follow-up with podiatry needs an MRI of her left foot prior to this.  Plan to follow-up for diarrhea scheduled.    Generalized itchiness: Chronic, history of.  Continue hydroxyzine.    Aphasia.  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  Educated the importance of continue with thiamine and folic acid.  Also continue to practice alcohol cessation.  - Patient is concerned about her difficulty with speech we will plan for speech therapy    Decreased hearing of unknown etiology.  Will plan to obtain updated audiogram.    Follow-up with hematology for history of iron deficiency anemia.      Health Maintenance Due   Topic Date Due    Hepatitis B vaccine (1 of 3 - 19+ 3-dose series) Never done    Pneumococcal 50+ years Vaccine (1 of 2 - PCV) Never done    Colorectal Cancer Screen  Never done

## 2025-05-21 ENCOUNTER — TELEPHONE (OUTPATIENT)
Dept: FAMILY MEDICINE CLINIC | Age: 55
End: 2025-05-21

## 2025-05-21 NOTE — TELEPHONE ENCOUNTER
Received call from Donna at [facility] stating that she went for patients visit today, and patient declined the visit. Patient will be discharged from home health next week. They wanted to inform PCP, no further action is needed.

## 2025-05-30 ENCOUNTER — HOSPITAL ENCOUNTER (OUTPATIENT)
Dept: OCCUPATIONAL THERAPY | Age: 55
Setting detail: THERAPIES SERIES
Discharge: HOME | End: 2025-05-30
Payer: COMMERCIAL

## 2025-05-30 PROCEDURE — 97112 NEUROMUSCULAR REEDUCATION: CPT

## 2025-05-30 PROCEDURE — 97165 OT EVAL LOW COMPLEX 30 MIN: CPT

## 2025-05-30 NOTE — THERAPY DISCHARGE
[facility]  OCCUPATIONAL THERAPY  [x] EVALUATION  [] DAILY NOTE (LAND) [] DAILY NOTE (AQUATIC ) [] PROGRESS NOTE [] DISCHARGE NOTE    [x] OUTPATIENT REHABILITATION CENTER [facility]   [] [facility] CARE [place]    [] [facility]   [] CAR[facility]LE [facility]    Date: 2025  Patient Name:  Rivka Zavala  : 1970  MRN: [id number]  CSN: [id number]    Referring Practitioner Dylan Tobias MD [id number]      Diagnosis  Diagnoses       G35 (ICD-10-CM) - MS (multiple sclerosis) (HCC)    I61.8 (ICD-10-CM) - Other left-sided nontraumatic intracerebral hemorrhage (HCC)    G93.41 (ICD-10-CM) - Acute metabolic encephalopathy           Treatment Diagnosis Z73.6 Limitation of Activities Due to Disability     Date of Evaluation 25   Additional Pertinent History Rivka Zavala has a past medical history of Anemia, Clavicle fracture, ETOH abuse, Malnutrition, Mild tetrahydrocannabinol (THC) abuse, Multiple sclerosis (HCC), Personality disorder (HCC), Rib fracture, and Smoker.  she has a past surgical history that includes cyst removal (); Tonsillectomy (); Wrist ganglion excision (Right, 2021); Colonoscopy (2022); shoulder surgery (Right, 2022); shoulder surgery (10/26/2023); and Ankle surgery (2024).     Allergies Allergies   Allergen Reactions    Mushroom Extract Complex (Obsolete)      Swelling      Medications   Current Outpatient Medications:     sulfamethoxazole-trimethoprim (BACTRIM DS;SEPTRA DS) 800-160 MG per tablet, TAKE 1 TABLET BY MOUTH EVERY 12 HOURS FOR 14 DAYS, Disp: , Rfl:     Multiple Vitamin (MULTIVITAMIN ADULT PO), Take by mouth, Disp: , Rfl:     Thiamine HCl (VITAMIN B-1 PO), Take 100 mg by mouth, Disp: , Rfl:     FOLIC ACID PO, Take by mouth daily, Disp: , Rfl:     hydrOXYzine HCl (ATARAX) 25 MG tablet, Take 1 tablet by mouth nightly, Disp: 30 tablet, Rfl: 2    metoprolol tartrate (LOPRESSOR) 25 MG tablet, Take 1 tablet by mouth 2

## 2025-06-03 ENCOUNTER — HOSPITAL ENCOUNTER (OUTPATIENT)
Dept: OCCUPATIONAL THERAPY | Age: 55
Setting detail: THERAPIES SERIES
End: 2025-06-03
Payer: COMMERCIAL

## 2025-06-09 ENCOUNTER — HOSPITAL ENCOUNTER (OUTPATIENT)
Dept: SPEECH THERAPY | Age: 55
Setting detail: THERAPIES SERIES
Discharge: HOME | End: 2025-06-09
Payer: COMMERCIAL

## 2025-06-09 PROCEDURE — 92523 SPEECH SOUND LANG COMPREHEN: CPT

## 2025-06-09 NOTE — PROGRESS NOTES
[facility]  SPEECH THERAPY  [x] SPEECH LANGUAGE COGNITIVE EVALUATION  [] DAILY NOTE   [] PROGRESS NOTE [] DISCHARGE NOTE    [x] OUT[facility]IENT [facility]ABILITATION [facility] [place]   [] [facility]    [] [facility]   [] CAR[facility]LE [facility]    Date: 2025  Patient Name:  Rivka Zavala  : 1970  MRN: [id number]  CSN: [id number]    Referring Practitioner Ben Montejo MD [id number]      Diagnosis  Diagnoses       G35 (ICD-10-CM) - MS (multiple sclerosis) (HCC)    I61.8 (ICD-10-CM) - Other left-sided nontraumatic intracerebral hemorrhage (HCC)    G93.41 (ICD-10-CM) - Acute metabolic encephalopathy           Treatment Diagnosis R47.01 Aphasia  R41.89 Other symptoms and signs involving cognitive functions and awareness   Date of Evaluation 25   Additional Pertinent History Rivka Zavala has a past medical history of Anemia, Clavicle fracture, ETOH abuse, Malnutrition, Mild tetrahydrocannabinol (THC) abuse, Multiple sclerosis (HCC), Personality disorder (HCC), Rib fracture, and Smoker.  she has a past surgical history that includes cyst removal (); Tonsillectomy (); Wrist ganglion excision (Right, 2021); Colonoscopy (2022); shoulder surgery (Right, 2022); shoulder surgery (10/26/2023); and Ankle surgery (2024).     Allergies Allergies   Allergen Reactions    Mushroom Extract Complex (Obsolete)      Swelling      Medications   Current Outpatient Medications:     sulfamethoxazole-trimethoprim (BACTRIM DS;SEPTRA DS) 800-160 MG per tablet, TAKE 1 TABLET BY MOUTH EVERY 12 HOURS FOR 14 DAYS, Disp: , Rfl:     Multiple Vitamin (MULTIVITAMIN ADULT PO), Take by mouth, Disp: , Rfl:     Thiamine HCl (VITAMIN B-1 PO), Take 100 mg by mouth, Disp: , Rfl:     FOLIC ACID PO, Take by mouth daily, Disp: , Rfl:     hydrOXYzine HCl (ATARAX) 25 MG tablet, Take 1 tablet by mouth nightly, Disp: 30 tablet, Rfl: 2    metoprolol tartrate (LOPRESSOR) 25 MG tablet,

## 2025-06-13 ENCOUNTER — HOSPITAL ENCOUNTER (OUTPATIENT)
Dept: OCCUPATIONAL THERAPY | Age: 55
Setting detail: THERAPIES SERIES
Discharge: HOME | End: 2025-06-13
Payer: COMMERCIAL

## 2025-06-13 PROCEDURE — 97530 THERAPEUTIC ACTIVITIES: CPT

## 2025-06-13 PROCEDURE — 97110 THERAPEUTIC EXERCISES: CPT

## 2025-06-13 NOTE — PROGRESS NOTES
[facility]  OCCUPATIONAL THERAPY  [] EVALUATION  [] DAILY NOTE (LAND) [] DAILY NOTE (AQUATIC ) [x] PROGRESS NOTE [] DISCHARGE NOTE    [x] OUTPATIENT REHABILITATION CENTER [facility]   [] [facility] CARE [place]    [] [facility]   [] CAROLE [facility]    Date: 25  Patient Name:  Rivka Zavala  : 1970  MRN: [id number]  CSN: [id number]    Referring Practitioner Dylan Tobias MD [id number]      Diagnosis  Diagnoses       G35 (ICD-10-CM) - MS (multiple sclerosis) (HCC)    I61.8 (ICD-10-CM) - Other left-sided nontraumatic intracerebral hemorrhage (HCC)    G93.41 (ICD-10-CM) - Acute metabolic encephalopathy           Treatment Diagnosis Z73.6 Limitation of Activities Due to Disability     Date of Evaluation 25   Additional Pertinent History Rivka Zavala has a past medical history of Anemia, Clavicle fracture, ETOH abuse, Malnutrition, Mild tetrahydrocannabinol (THC) abuse, Multiple sclerosis (HCC), Personality disorder (HCC), Rib fracture, and Smoker.  she has a past surgical history that includes cyst removal (); Tonsillectomy (); Wrist ganglion excision (Right, 2021); Colonoscopy (2022); shoulder surgery (Right, 2022); shoulder surgery (10/26/2023); and Ankle surgery (2024).     Allergies Allergies   Allergen Reactions    Mushroom Extract Complex (Obsolete)      Swelling      Medications   Current Outpatient Medications:     sulfamethoxazole-trimethoprim (BACTRIM DS;SEPTRA DS) 800-160 MG per tablet, TAKE 1 TABLET BY MOUTH EVERY 12 HOURS FOR 14 DAYS, Disp: , Rfl:     Multiple Vitamin (MULTIVITAMIN ADULT PO), Take by mouth, Disp: , Rfl:     Thiamine HCl (VITAMIN B-1 PO), Take 100 mg by mouth, Disp: , Rfl:     FOLIC ACID PO, Take by mouth daily, Disp: , Rfl:     hydrOXYzine HCl (ATARAX) 25 MG tablet, Take 1 tablet by mouth nightly, Disp: 30 tablet, Rfl: 2    metoprolol tartrate (LOPRESSOR) 25 MG tablet, Take 1 tablet by mouth 2 times

## 2025-06-20 ENCOUNTER — LAB (OUTPATIENT)
Dept: LAB | Age: 55
End: 2025-06-20

## 2025-06-20 ENCOUNTER — HOSPITAL ENCOUNTER (OUTPATIENT)
Dept: OCCUPATIONAL THERAPY | Age: 55
Setting detail: THERAPIES SERIES
Discharge: HOME | End: 2025-06-20
Payer: COMMERCIAL

## 2025-06-20 DIAGNOSIS — D50.8 OTHER IRON DEFICIENCY ANEMIA: ICD-10-CM

## 2025-06-20 LAB
BASOPHILS ABSOLUTE: 0.1 THOU/MM3 (ref 0–0.1)
BASOPHILS NFR BLD AUTO: 0.8 %
DEPRECATED RDW RBC AUTO: 51.5 FL (ref 35–45)
EOSINOPHIL NFR BLD AUTO: 4.2 %
EOSINOPHILS ABSOLUTE: 0.4 THOU/MM3 (ref 0–0.4)
ERYTHROCYTE [DISTWIDTH] IN BLOOD BY AUTOMATED COUNT: 16.3 % (ref 11.5–14.5)
FERRITIN SERPL IA-MCNC: 19 NG/ML (ref 13–150)
HCT VFR BLD AUTO: 36.8 % (ref 37–47)
HGB BLD-MCNC: 11.3 GM/DL (ref 12–16)
IMM GRANULOCYTES # BLD AUTO: 0.01 THOU/MM3 (ref 0–0.07)
IMM GRANULOCYTES NFR BLD AUTO: 0.1 %
IRON SATN MFR SERPL: 8 % (ref 20–50)
IRON SERPL-MCNC: 36 UG/DL (ref 37–145)
LYMPHOCYTES ABSOLUTE: 3.4 THOU/MM3 (ref 1–4.8)
LYMPHOCYTES NFR BLD AUTO: 40 %
MCH RBC QN AUTO: 26.4 PG (ref 26–33)
MCHC RBC AUTO-ENTMCNC: 30.7 GM/DL (ref 32.2–35.5)
MCV RBC AUTO: 86 FL (ref 81–99)
MONOCYTES ABSOLUTE: 1 THOU/MM3 (ref 0.4–1.3)
MONOCYTES NFR BLD AUTO: 11.1 %
NEUTROPHILS ABSOLUTE: 3.8 THOU/MM3 (ref 1.8–7.7)
NEUTROPHILS NFR BLD AUTO: 43.8 %
NRBC BLD AUTO-RTO: 0 /100 WBC
PLATELET # BLD AUTO: 377 THOU/MM3 (ref 130–400)
PMV BLD AUTO: 9.8 FL (ref 9.4–12.4)
RBC # BLD AUTO: 4.28 MILL/MM3 (ref 4.2–5.4)
TIBC SERPL-MCNC: 462 UG/DL (ref 171–450)
WBC # BLD AUTO: 8.6 THOU/MM3 (ref 4.8–10.8)

## 2025-06-20 PROCEDURE — 97530 THERAPEUTIC ACTIVITIES: CPT

## 2025-06-20 NOTE — PROGRESS NOTES
x                                       Activities Time    Notes/Comments   Education for turning head to see all items in the visual field on the right   When leaving a room; when ambulating in a crowded room   9 hole peg test right 26 seconds, left 21 seconds      Right  strength 45#  Left  strength 40#      Right lateral  Right 2 pt pinch    Left lateral  Left 2 pt pinch   12#  8#    16#  6#     Dynavision mode A 60 second self paced interval   x 38 hits, 43 hits, 42 hits, 40 hits, 52 hits    Review and education for HEP for [facility]    Patient voiced understanding   Scan walk - cues for turning head to the right and left during functional ambulation.  Patient using 4ww.    x            Specific Interventions Next Treatment: compensatory techniques for changes with vision,     Activity/Treatment Tolerance:  [x]  Patient tolerated treatment well  []  Patient limited by fatigue  []  Patient limited by pain   []  Patient limited by other medical complications  []  Other:     Assessment: Patient is progressing toward goals.  Increased awareness and improved ability of circular scanning, but continues to require cues.    Areas for Improvement: compensatory techniques  Prognosis: good    GOALS:  Patient Goal: to return to reading    Short Term Goals:  Time Frame: 4 weeks   Patient will demonstrate greater than 35 hits on the dynavision, mode A, self paced interval with no greater than min cues for scanning to into the right visual field to increase ability to locate and avoid obstacles in the grocery store.  Patient will demonstrate an organized scanning pattern without cues to increase ease and ability to locate items on the store shelves.   3.  NEW GOAL: Patient will increase right fine motor coordination as evidenced by performance of the 9 hole peg test in less than 23 seconds to increase ability to  small items from the floor.         Long Term Goals:  Time Frame: 12 weeks   Patient will demonstrate

## 2025-06-23 ENCOUNTER — RESULTS FOLLOW-UP (OUTPATIENT)
Dept: FAMILY MEDICINE CLINIC | Age: 55
End: 2025-06-23

## 2025-06-24 ENCOUNTER — OFFICE VISIT (OUTPATIENT)
Dept: FAMILY MEDICINE CLINIC | Age: 55
End: 2025-06-24
Payer: COMMERCIAL

## 2025-06-24 VITALS
HEART RATE: 89 BPM | BODY MASS INDEX: 18.34 KG/M2 | RESPIRATION RATE: 16 BRPM | HEIGHT: 64 IN | DIASTOLIC BLOOD PRESSURE: 70 MMHG | SYSTOLIC BLOOD PRESSURE: 112 MMHG | TEMPERATURE: 98.6 F | WEIGHT: 107.4 LBS

## 2025-06-24 DIAGNOSIS — L29.9 GENERALIZED PRURITUS: Primary | ICD-10-CM

## 2025-06-24 DIAGNOSIS — D50.8 OTHER IRON DEFICIENCY ANEMIA: ICD-10-CM

## 2025-06-24 DIAGNOSIS — R00.0 SINUS TACHYCARDIA: ICD-10-CM

## 2025-06-24 DIAGNOSIS — H91.91 DECREASED HEARING OF RIGHT EAR: ICD-10-CM

## 2025-06-24 PROCEDURE — 4004F PT TOBACCO SCREEN RCVD TLK: CPT

## 2025-06-24 PROCEDURE — 3017F COLORECTAL CA SCREEN DOC REV: CPT

## 2025-06-24 PROCEDURE — 99213 OFFICE O/P EST LOW 20 MIN: CPT

## 2025-06-24 PROCEDURE — G8427 DOCREV CUR MEDS BY ELIG CLIN: HCPCS

## 2025-06-24 PROCEDURE — G8419 CALC BMI OUT NRM PARAM NOF/U: HCPCS

## 2025-06-24 RX ORDER — HYDROXYZINE HYDROCHLORIDE 25 MG/1
25 TABLET, FILM COATED ORAL NIGHTLY
Qty: 30 TABLET | Refills: 2 | Status: SHIPPED | OUTPATIENT
Start: 2025-06-24 | End: 2025-09-22

## 2025-06-24 RX ORDER — CLINDAMYCIN HYDROCHLORIDE 300 MG/1
300 CAPSULE ORAL 2 TIMES DAILY
COMMUNITY
Start: 2025-06-17 | End: 2025-07-01

## 2025-06-24 NOTE — PROGRESS NOTES
Patient:Rivka Zavala  YOB: 1970  MRN: [id number]    Subjective   54 y.o. female who presents for the followin Month Follow-Up  Last seen on 25    Today here with mother.   Following with [facility] for further surgery.     Was still unable to complete the audiogram.    Also discussed hgb and iron studies, discussed returning to hematology for transfusion     HPI  Review of Systems   Review of Systems   Musculoskeletal:         Foot pain    Neurological:  Positive for speech difficulty.     Patient History    Past Medical History:  She has a past medical history of Anemia, Clavicle fracture, ETOH abuse, Malnutrition, Mild tetrahydrocannabinol (THC) abuse, Multiple sclerosis (HCC), Personality disorder (HCC), Rib fracture, and Smoker.    Social History:  She reports that she has been smoking cigarettes. She started smoking about 35 years ago. She has a 17.9 pack-year smoking history. She has never used smokeless tobacco. She reports that she does not currently use alcohol after a past usage of about 3.0 standard drinks of alcohol per week. She reports current drug use. Drug: Marijuana (Weed).     Past Surgical History:   Procedure Laterality Date    ANKLE SURGERY  2024    COLONOSCOPY  2022    CYST REMOVAL      right thigh     SHOULDER SURGERY Right 2022    Hook Plate Placement    SHOULDER SURGERY  10/26/2023    [facility]    TONSILLECTOMY  1977    WRIST GANGLION EXCISION Right 2021      Family History   Problem Relation Age of Onset    Hypertension Mother     Stroke Father     Hypertension Father     No Known Problems Sister      Objective     Vitals:    25 1335   BP: 112/70   BP Site: Left Upper Arm   Patient Position: Sitting   BP Cuff Size: Medium Adult   Pulse: 89   Resp: 16   Temp: 98.6 °F (37 °C)   TempSrc: Temporal   Weight: 48.7 kg (107 lb 6.4 oz)   Height: 1.626 m (5' 4.02\")   Body mass index is 18.43 kg/m².    Physical Exam  Vitals reviewed.   Constitutional:

## 2025-06-26 ENCOUNTER — HOSPITAL ENCOUNTER (OUTPATIENT)
Dept: SPEECH THERAPY | Age: 55
Setting detail: THERAPIES SERIES
Discharge: HOME | End: 2025-06-26
Payer: COMMERCIAL

## 2025-06-26 PROCEDURE — 92507 TX SP LANG VOICE COMM INDIV: CPT | Performed by: SPEECH-LANGUAGE PATHOLOGIST

## 2025-06-26 NOTE — PROGRESS NOTES
[facility]  SPEECH THERAPY  [] SPEECH LANGUAGE COGNITIVE EVALUATION  [x] DAILY NOTE   [] PROGRESS NOTE [] DISCHARGE NOTE    [x] OUTPATIENT REHABILITATION CENTER [facility] [place]   [] [facility]    [] [facility]   [] CAR[facility]LE [facility]    Date: 2025  Patient Name:  Rivka Zavala  : 1970  MRN: [id number]  CSN: [id number]    Referring Practitioner Dylan Tobias MD [id number]      Diagnosis  Diagnoses       G35 (ICD-10-CM) - MS (multiple sclerosis) (HCC)    I61.8 (ICD-10-CM) - Other left-sided nontraumatic intracerebral hemorrhage (HCC)    G93.41 (ICD-10-CM) - Acute metabolic encephalopathy           Treatment Diagnosis R47.01 Aphasia  R41.89 Other symptoms and signs involving cognitive functions and awareness   Date of Evaluation 25   Additional Pertinent History Rivka Zavala has a past medical history of Anemia, Clavicle fracture, ETOH abuse, Malnutrition, Mild tetrahydrocannabinol (THC) abuse, Multiple sclerosis (HCC), Personality disorder (HCC), Rib fracture, and Smoker.  she has a past surgical history that includes cyst removal (); Tonsillectomy (); Wrist ganglion excision (Right, 2021); Colonoscopy (2022); shoulder surgery (Right, 2022); shoulder surgery (10/26/2023); and Ankle surgery (2024).     Allergies Allergies   Allergen Reactions    Mushroom Extract Complex (Obsolete)      Swelling      Medications   Current Outpatient Medications:     clindamycin (CLEOCIN) 300 MG capsule, Take 1 capsule by mouth in the morning and at bedtime, Disp: , Rfl:     hydrOXYzine HCl (ATARAX) 25 MG tablet, Take 1 tablet by mouth nightly, Disp: 30 tablet, Rfl: 2    FOLIC ACID PO, Take by mouth daily, Disp: , Rfl:     metoprolol tartrate (LOPRESSOR) 25 MG tablet, Take 1 tablet by mouth 2 times daily, Disp: 60 tablet, Rfl: 2      Functional Outcome Measure Used MAST    Functional Outcome Score 74 (25)       Insurance: Primary:

## 2025-06-27 ENCOUNTER — HOSPITAL ENCOUNTER (OUTPATIENT)
Dept: SPEECH THERAPY | Age: 55
Setting detail: THERAPIES SERIES
Discharge: HOME | End: 2025-06-27
Payer: COMMERCIAL

## 2025-06-27 ENCOUNTER — HOSPITAL ENCOUNTER (OUTPATIENT)
Dept: OCCUPATIONAL THERAPY | Age: 55
Setting detail: THERAPIES SERIES
Discharge: HOME | End: 2025-06-27
Payer: COMMERCIAL

## 2025-06-27 PROCEDURE — 97110 THERAPEUTIC EXERCISES: CPT

## 2025-06-27 PROCEDURE — 92507 TX SP LANG VOICE COMM INDIV: CPT | Performed by: SPEECH-LANGUAGE PATHOLOGIST

## 2025-06-27 PROCEDURE — 97530 THERAPEUTIC ACTIVITIES: CPT

## 2025-06-27 NOTE — PROGRESS NOTES
[facility]  SPEECH THERAPY  [] SPEECH LANGUAGE COGNITIVE EVALUATION  [x] DAILY NOTE   [] PROGRESS NOTE [] DISCHARGE NOTE    [x] OUTPATIENT REHABILITATION CENTER [facility] [place]   [] [facility] CARE [place]    [] [facility]   [] CAR[facility]    Date: 2025  Patient Name:  Rivka Zavala  : 1970  MRN: [id number]  CSN: [id number]    Referring Practitioner Dylan Tobias MD [id number]      Diagnosis  Diagnoses       G35 (ICD-10-CM) - MS (multiple sclerosis) (HCC)    I61.8 (ICD-10-CM) - Other left-sided nontraumatic intracerebral hemorrhage (HCC)    G93.41 (ICD-10-CM) - Acute metabolic encephalopathy           Treatment Diagnosis R47.01 Aphasia  R41.89 Other symptoms and signs involving cognitive functions and awareness   Date of Evaluation 25   Additional Pertinent History Rivka Zavala has a past medical history of Anemia, Clavicle fracture, ETOH abuse, Malnutrition, Mild tetrahydrocannabinol (THC) abuse, Multiple sclerosis (HCC), Personality disorder (HCC), Rib fracture, and Smoker.  she has a past surgical history that includes cyst removal (); Tonsillectomy (); Wrist ganglion excision (Right, 2021); Colonoscopy (2022); shoulder surgery (Right, 2022); shoulder surgery (10/26/2023); and Ankle surgery (2024).     Allergies Allergies   Allergen Reactions    Mushroom Extract Complex (Obsolete)      Swelling      Medications   Current Outpatient Medications:     clindamycin (CLEOCIN) 300 MG capsule, Take 1 capsule by mouth in the morning and at bedtime, Disp: , Rfl:     hydrOXYzine HCl (ATARAX) 25 MG tablet, Take 1 tablet by mouth nightly, Disp: 30 tablet, Rfl: 2    FOLIC ACID PO, Take by mouth daily, Disp: , Rfl:     metoprolol tartrate (LOPRESSOR) 25 MG tablet, Take 1 tablet by mouth 2 times daily, Disp: 60 tablet, Rfl: 2      Functional Outcome Measure Used MAST    Functional Outcome Score 74 (25)       Insurance: Primary: Payor:

## 2025-06-27 NOTE — PROGRESS NOTES
glasses.  Reports that she started wearing glasses at 10 y.o.  Reported that she could not see, especially with the left eye. Reports that she cannot see very well with the use of her current glasses since stroke.     Eye Dominance  NT   Corneal Reflection  NT   Saccades  Right - jumpy  Left- min jumpy  Bilateral - patient reported that the target was jumping on the end of the stick, she presented with eyes jumping when moving to a target on the right, even in remaining visual field   Pursuits  Right - good movement in remaining visual field  Left - good movement in remaining visual field  Bilateral - good movement in remaining visual field   Acuity- using OPTEC 5000  right 20/40 without glasses - patient took them off  Left 20/50 - without glasses - patient took themoff   Peripheral Vision - using OPTEC 5000  Right - nasal only.  Did not note flash at 55, 70, and 85  Left - nasal, 55, 70 and 85   Confrontation  NT   Convergence Bilateral eyes moving inward, will continue to monitor   Cover/Uncover NT   ADL's Difficulty with reading, using phone, going grocery shopping, walking without walking into items on the right side, cooking, using the microwave     Dynavision Mode A, 60 seconds: 21 hits         TREATMENT   Precautions: Right visual field cut   Pain:  Left foot 9/10 pain    “X” in shaded column indicates Activity Completed Today   Modalities Parameters/  Location  Notes/Comments                     Manual Therapy Time/  Technique  Notes/Comments                     Exercises   Sets/  Sec Reps  Notes/Comments   Reading letters out loud    Performing saccade and pursuit activity as required by reading, neuro re-ed perceptual span    Cross out the designated letter in the line       Reading paragraph   x Midly slowed speed, confusion of words on 2 occasions.                                 Activities Time    Notes/Comments   Education for turning head to see all items in the visual field on the right   When

## 2025-07-01 ENCOUNTER — HOSPITAL ENCOUNTER (OUTPATIENT)
Dept: MRI IMAGING | Age: 55
Discharge: HOME | End: 2025-07-01
Payer: COMMERCIAL

## 2025-07-01 DIAGNOSIS — Z47.89 ENCOUNTER FOR OTHER ORTHOPEDIC AFTERCARE: ICD-10-CM

## 2025-07-01 PROCEDURE — 73718 MRI LOWER EXTREMITY W/O DYE: CPT

## 2025-07-03 ENCOUNTER — HOSPITAL ENCOUNTER (OUTPATIENT)
Dept: SPEECH THERAPY | Age: 55
Setting detail: THERAPIES SERIES
Discharge: HOME | End: 2025-07-03
Payer: COMMERCIAL

## 2025-07-03 PROCEDURE — 97130 THER IVNTJ EA ADDL 15 MIN: CPT

## 2025-07-03 PROCEDURE — 97129 THER IVNTJ 1ST 15 MIN: CPT

## 2025-07-03 NOTE — PROGRESS NOTES
additional authorization required until after 8th visit of PT/OT/ST EACH per calendar year.  8 visits is a soft max.  Authorization required after 8 visits.   4/8 soft max    Initial CPT Codes Requested 41291 - Speech/Language Therapy  07036 - Cognitive Function Interventions (first 15 min) and 14472 - Cognitive Function Interventions (ea addtl 15 min)   Progress Note Counter 4/10 for progress note (Reporting Period: 6/9/25/ to  )   Recertification Date 09/01/25   Physician Follow-Up Dr. Montejo (PCP) - none at this time   Physician Orders Eval and treat    History of Present Illness Patient demonstrated difficulty with ability to provide concise medical information. She reports to have been diagnosed with MS in 2005. Throughout medical history provision, patient perseverated on ankle/foot injury that was unrelated to therapy needs at this time. Per chart review: \" Patient admitted to [facility] with aforementioned diagnosis. Per physician H&P, \"This is a 54-year-old female who presented to [facility] ED on 03/29/2025.  The patient was notably admitted to [facility] from 02/28/2025 until 03/07/2025 secondary to altered mental status with acute hypoxic respiratory failure and septic shock secondary to influenza A with concerns for superimposed pneumonia.  During that admission the patient was intubated and required pressor support.  Imaging additionally showed evidence of a left basal ganglia hemorrhage for which the patient was not recommended to receive any neurosurgical intervention.  The patient was discharged from the intensive care unit to an LTAC with evidence of persistent dysphagia and encephalopathy.  Documentation indicates that the patient was discharged to a nursing facility from the LTAC.     The patient was seen and evaluated at the bedside.  There is clear attempt for the patient to answer questions appropriately however, the patient expresses herself in nonsensical phrases.  She is able to follow commands during

## 2025-07-10 ENCOUNTER — HOSPITAL ENCOUNTER (OUTPATIENT)
Dept: SPEECH THERAPY | Age: 55
Setting detail: THERAPIES SERIES
Discharge: HOME | End: 2025-07-10
Payer: COMMERCIAL

## 2025-07-10 PROCEDURE — 97129 THER IVNTJ 1ST 15 MIN: CPT

## 2025-07-10 PROCEDURE — 97130 THER IVNTJ EA ADDL 15 MIN: CPT

## 2025-07-10 NOTE — PROGRESS NOTES
[facility]  SPEECH THERAPY  [] SPEECH LANGUAGE COGNITIVE EVALUATION  [x] DAILY NOTE   [] PROGRESS NOTE [] DISCHARGE NOTE    [x] [facility] [place]   [] [facility] CARE [place]    [] [facility]   [] CAROLE [facility]    Date: 7/10/2025  Patient Name:  Rivka Zavala  : 1970  MRN: [id number]  CSN: [id number]    Referring Practitioner Dylan Tobias MD [id number]      Diagnosis  Diagnoses       G35 (ICD-10-CM) - MS (multiple sclerosis) (HCC)    I61.8 (ICD-10-CM) - Other left-sided nontraumatic intracerebral hemorrhage (HCC)    G93.41 (ICD-10-CM) - Acute metabolic encephalopathy           Treatment Diagnosis R47.01 Aphasia  R41.89 Other symptoms and signs involving cognitive functions and awareness   Date of Evaluation 25   Additional Pertinent History Rivka Zavala has a past medical history of Anemia, Clavicle fracture, ETOH abuse, Malnutrition, Mild tetrahydrocannabinol (THC) abuse, Multiple sclerosis (HCC), Personality disorder (HCC), Rib fracture, and Smoker.  she has a past surgical history that includes cyst removal (); Tonsillectomy (); Wrist ganglion excision (Right, 2021); Colonoscopy (2022); shoulder surgery (Right, 2022); shoulder surgery (10/26/2023); and Ankle surgery (2024).     Allergies Allergies   Allergen Reactions    Mushroom Extract Complex (Obsolete)      Swelling      Medications   Current Outpatient Medications:     hydrOXYzine HCl (ATARAX) 25 MG tablet, Take 1 tablet by mouth nightly, Disp: 30 tablet, Rfl: 2    FOLIC ACID PO, Take by mouth daily, Disp: , Rfl:     metoprolol tartrate (LOPRESSOR) 25 MG tablet, Take 1 tablet by mouth 2 times daily, Disp: 60 tablet, Rfl: 2      Functional Outcome Measure Used MAST    Functional Outcome Score 74 (25)       Insurance: Primary: Payor: [facility] /  /  / ,   Secondary:    Authorization Information INSURANCE THERAPY BENEFIT:

## 2025-07-11 ENCOUNTER — HOSPITAL ENCOUNTER (OUTPATIENT)
Dept: OCCUPATIONAL THERAPY | Age: 55
Setting detail: THERAPIES SERIES
Discharge: HOME | End: 2025-07-11
Payer: COMMERCIAL

## 2025-07-11 ENCOUNTER — APPOINTMENT (OUTPATIENT)
Dept: SPEECH THERAPY | Age: 55
End: 2025-07-11
Payer: COMMERCIAL

## 2025-07-11 PROCEDURE — 97530 THERAPEUTIC ACTIVITIES: CPT

## 2025-07-11 NOTE — PROGRESS NOTES
[facility]  OCCUPATIONAL THERAPY  [] EVALUATION  [x] DAILY NOTE (LAND) [] DAILY NOTE (AQUATIC ) [] PROGRESS NOTE [] DISCHARGE NOTE    [x] OUTPATIENT REHABILITATION CENTER [facility]   [] [facility] CARE [place]    [] [facility]   [] CAROLE [facility]    Date: 25  Patient Name:  Rivka Zavala  : 1970  MRN: [id number]  CSN: [id number]    Referring Practitioner Dylan Tobias MD [id number]      Diagnosis  Diagnoses       G35 (ICD-10-CM) - MS (multiple sclerosis) (HCC)    I61.8 (ICD-10-CM) - Other left-sided nontraumatic intracerebral hemorrhage (HCC)    G93.41 (ICD-10-CM) - Acute metabolic encephalopathy           Treatment Diagnosis Z73.6 Limitation of Activities Due to Disability     Date of Evaluation 25   Additional Pertinent History Rivka Zavala has a past medical history of Anemia, Clavicle fracture, ETOH abuse, Malnutrition, Mild tetrahydrocannabinol (THC) abuse, Multiple sclerosis (HCC), Personality disorder (HCC), Rib fracture, and Smoker.  she has a past surgical history that includes cyst removal (); Tonsillectomy (); Wrist ganglion excision (Right, 2021); Colonoscopy (2022); shoulder surgery (Right, 2022); shoulder surgery (10/26/2023); and Ankle surgery (2024).     Allergies Allergies   Allergen Reactions    Mushroom Extract Complex (Obsolete)      Swelling      Medications   Current Outpatient Medications:     hydrOXYzine HCl (ATARAX) 25 MG tablet, Take 1 tablet by mouth nightly, Disp: 30 tablet, Rfl: 2    FOLIC ACID PO, Take by mouth daily, Disp: , Rfl:     metoprolol tartrate (LOPRESSOR) 25 MG tablet, Take 1 tablet by mouth 2 times daily, Disp: 60 tablet, Rfl: 2      Functional Outcome Measure Used [facility]   Functional Outcome Score 56 (25)       Insurance: Primary: Payor: [facility] /  /  / ,   Secondary:    Authorization Information INSURANCE THERAPY BENEFIT: No additional authorization

## 2025-07-15 ENCOUNTER — OFFICE VISIT (OUTPATIENT)
Dept: ONCOLOGY | Age: 55
End: 2025-07-15
Payer: COMMERCIAL

## 2025-07-15 ENCOUNTER — HOSPITAL ENCOUNTER (OUTPATIENT)
Dept: INFUSION THERAPY | Age: 55
Discharge: HOME | End: 2025-07-15
Payer: COMMERCIAL

## 2025-07-15 VITALS
OXYGEN SATURATION: 100 % | SYSTOLIC BLOOD PRESSURE: 145 MMHG | DIASTOLIC BLOOD PRESSURE: 71 MMHG | TEMPERATURE: 98.1 F | RESPIRATION RATE: 16 BRPM | HEIGHT: 64 IN | WEIGHT: 107.8 LBS | BODY MASS INDEX: 18.4 KG/M2 | HEART RATE: 82 BPM

## 2025-07-15 VITALS
HEART RATE: 82 BPM | RESPIRATION RATE: 16 BRPM | DIASTOLIC BLOOD PRESSURE: 71 MMHG | TEMPERATURE: 98.1 F | SYSTOLIC BLOOD PRESSURE: 145 MMHG | OXYGEN SATURATION: 100 %

## 2025-07-15 DIAGNOSIS — D64.9 NORMOCYTIC ANEMIA: Primary | ICD-10-CM

## 2025-07-15 DIAGNOSIS — D50.8 IRON DEFICIENCY ANEMIA SECONDARY TO INADEQUATE DIETARY IRON INTAKE: ICD-10-CM

## 2025-07-15 DIAGNOSIS — D75.839 THROMBOCYTOSIS: ICD-10-CM

## 2025-07-15 PROCEDURE — G8419 CALC BMI OUT NRM PARAM NOF/U: HCPCS | Performed by: PHYSICIAN ASSISTANT

## 2025-07-15 PROCEDURE — 99214 OFFICE O/P EST MOD 30 MIN: CPT | Performed by: PHYSICIAN ASSISTANT

## 2025-07-15 PROCEDURE — 3017F COLORECTAL CA SCREEN DOC REV: CPT | Performed by: PHYSICIAN ASSISTANT

## 2025-07-15 PROCEDURE — 99211 OFF/OP EST MAY X REQ PHY/QHP: CPT

## 2025-07-15 PROCEDURE — 4004F PT TOBACCO SCREEN RCVD TLK: CPT | Performed by: PHYSICIAN ASSISTANT

## 2025-07-15 PROCEDURE — G8427 DOCREV CUR MEDS BY ELIG CLIN: HCPCS | Performed by: PHYSICIAN ASSISTANT

## 2025-07-15 NOTE — PATIENT INSTRUCTIONS
Will schedule IV Venofer, x3 total of 3 infusions to be given one week apart  Return to clinic in 10 weeks  Labs on RTC

## 2025-07-15 NOTE — PROGRESS NOTES
LFT:   Lab Results   Component Value Date    ALT 26 03/30/2025    AST 35 03/30/2025    ALKPHOS 121 (H) 03/30/2025    BILITOT 0.2 (L) 03/30/2025        Assessment and Plan:   Normocytic Anemia  CBC on 7/13/22 Hgb 8.1, Hct 29.1, MCV 85.1. WBC count and platelet count within normal limits. Iron studies completed on 7/13/22 consistent with iron deficiency anemia with ferritin 13, iron 21, TIBC 559. S/P EGD per Dr. Turner on 8/16/22 showing small Schatzki's ring non obstructive, otherwise normal exam to second portion of duodenum. No evidence of varices. Colonoscopy same date single sessile polyp in sigmoid colon, grade 3 internal hemorrhoids, some diverticula. Duodenal biopsy without significant pathologic changes. She had drop in Hgb/Hct to Hgb 5.5 on 5/24/23. Labs in ED showed iron deficiency with ferritin 9, iron 14, iron sat 3%, TIBC 523. S/p 1 unit of PRBCs on 5/26/23, IV Venofer x3 May/June 2023. She was referred back to GI.    Labs completed 06/20/2025: Hgb 11.3, Hct 36.8, platelets 377, iron 36, TIBC 462, ferritin 19, iron 36, TIBC 462, iron sat 8% consistent with iron deficiency anemia.   -Discussed due to persistent LISA with supplementation recommend IV iron. Last received in 2023. Recommend IV Venofer x3, total of 3 infusions to be given one week apart. Patient is agreeable    -She will continue oral iron supplementation, oral folic acid.   -Patient has been referred to GI in past   -Patient instructed to monitor for signs of worsening anemia/blood loss.   -Return to clinic in 10 weeks   -Labs on RTC        All patient questions answered. Pt voiced understanding. Patient agreed with treatment plan. Follow up as directed. Patient instructed to call for questions or concerns.      Electronically signed by   Haily Person PA-C

## 2025-07-17 ENCOUNTER — HOSPITAL ENCOUNTER (OUTPATIENT)
Dept: SPEECH THERAPY | Age: 55
Setting detail: THERAPIES SERIES
Discharge: HOME | End: 2025-07-17
Payer: COMMERCIAL

## 2025-07-17 PROCEDURE — 92507 TX SP LANG VOICE COMM INDIV: CPT

## 2025-07-17 RX ORDER — ALBUTEROL SULFATE 90 UG/1
4 INHALANT RESPIRATORY (INHALATION) PRN
OUTPATIENT
Start: 2025-07-17

## 2025-07-17 RX ORDER — SODIUM CHLORIDE 9 MG/ML
INJECTION, SOLUTION INTRAVENOUS PRN
OUTPATIENT
Start: 2025-07-17

## 2025-07-17 RX ORDER — DIPHENHYDRAMINE HYDROCHLORIDE 50 MG/ML
50 INJECTION, SOLUTION INTRAMUSCULAR; INTRAVENOUS
OUTPATIENT
Start: 2025-07-17

## 2025-07-17 RX ORDER — ONDANSETRON 2 MG/ML
8 INJECTION INTRAMUSCULAR; INTRAVENOUS
OUTPATIENT
Start: 2025-07-17

## 2025-07-17 RX ORDER — SODIUM CHLORIDE 9 MG/ML
5-250 INJECTION, SOLUTION INTRAVENOUS PRN
OUTPATIENT
Start: 2025-07-17

## 2025-07-17 RX ORDER — HYDROCORTISONE SODIUM SUCCINATE 100 MG/2ML
100 INJECTION INTRAMUSCULAR; INTRAVENOUS
OUTPATIENT
Start: 2025-07-17

## 2025-07-17 RX ORDER — ACETAMINOPHEN 325 MG/1
650 TABLET ORAL
OUTPATIENT
Start: 2025-07-17

## 2025-07-17 RX ORDER — FAMOTIDINE 10 MG/ML
20 INJECTION, SOLUTION INTRAVENOUS
OUTPATIENT
Start: 2025-07-17

## 2025-07-17 RX ORDER — EPINEPHRINE 1 MG/ML
0.3 INJECTION, SOLUTION, CONCENTRATE INTRAVENOUS PRN
OUTPATIENT
Start: 2025-07-17

## 2025-07-17 RX ORDER — HEPARIN SODIUM (PORCINE) LOCK FLUSH IV SOLN 100 UNIT/ML 100 UNIT/ML
500 SOLUTION INTRAVENOUS PRN
OUTPATIENT
Start: 2025-07-17

## 2025-07-17 RX ORDER — SODIUM CHLORIDE 0.9 % (FLUSH) 0.9 %
5-40 SYRINGE (ML) INJECTION PRN
OUTPATIENT
Start: 2025-07-17

## 2025-07-17 NOTE — PROGRESS NOTES
task: Patient was provided with x5 units of information regarding a direction.   *immediate recall: 3/5 independent, 2/5 max cues  *second immediate repetition: 5/5 independent   *5 minute delay: 4/5 independent,1/5 min cues   **Good success    SHORT TERM GOAL #6: Patient will complete math computation tasks with 80% accuracy given mod cues to improve mental flexibility within ADLs at home.  INTERVENTIONS: Not addressed specifically this date due to focus on additional goals.   PREVIOUS SESSION:   Patient provided with grocery add; cued to answer basic math questions: 3/3 independent   Patient also reported \"I played cards/JOAQUINA the other day and worked out perfectly.\"       Long Term Goals:  Time Frame for Long-Term Goals: 12 weeks    LONG-TERM GOAL #1: Patient will improve score on the MAST by +5 points to improve receptive and expressive language skills to promote more successful conversations with family and medical professionals.  ONGOING    LONG-TERM GOAL #2: Patient will meet short term goals for memory and math computation to support cognitive skills that are likely at baseline level. ONGOING    Assessment: Progressing towards goals  Areas for Improvement: Impaired cognition, Impaired receptive language, and Impaired expressive language  Prognosis: good  Specific Interventions Next Treatment: discuss goals, receptive/expressive language tasks, cognitive tasks       Activity/Treatment Tolerance:  [x]  Patient tolerated treatment well  []  Patient limited by fatigue  []  Patient limited by pain   []  Patient limited by other medical complications  []  Other:     Patient Education:   [x]  HEP/Education Completed: Plan of Care, Goals, memory strategies, word finding strategies, rationale for tasks  []  No new Education completed  [x]  Reviewed Prior HEP      [x]  Patient verbalized and/or demonstrated understanding of education provided.  []  Patient unable to verbalize and/or demonstrate understanding of

## 2025-07-18 ENCOUNTER — HOSPITAL ENCOUNTER (OUTPATIENT)
Dept: OCCUPATIONAL THERAPY | Age: 55
Setting detail: THERAPIES SERIES
Discharge: HOME | End: 2025-07-18
Payer: COMMERCIAL

## 2025-07-18 ENCOUNTER — HOSPITAL ENCOUNTER (OUTPATIENT)
Dept: SPEECH THERAPY | Age: 55
Setting detail: THERAPIES SERIES
Discharge: HOME | End: 2025-07-18
Payer: COMMERCIAL

## 2025-07-18 PROCEDURE — 97110 THERAPEUTIC EXERCISES: CPT

## 2025-07-18 PROCEDURE — 97129 THER IVNTJ 1ST 15 MIN: CPT

## 2025-07-18 PROCEDURE — 97130 THER IVNTJ EA ADDL 15 MIN: CPT

## 2025-07-18 NOTE — PROGRESS NOTES
[facility]  SPEECH THERAPY  [] SPEECH LANGUAGE COGNITIVE EVALUATION  [x] DAILY NOTE   [] PROGRESS NOTE [] DISCHARGE NOTE    [x] OUT[facility] [place]   [] [facility]    [] [facility]   [] CAROLE [facility]    Date: 2025  Patient Name:  Rivka Zavala  : 1970  MRN: [id number]  CSN: [id number]    Referring Practitioner Dylan Toibas MD [id number]      Diagnosis  Diagnoses       G35 (ICD-10-CM) - MS (multiple sclerosis) (HCC)    I61.8 (ICD-10-CM) - Other left-sided nontraumatic intracerebral hemorrhage (HCC)    G93.41 (ICD-10-CM) - Acute metabolic encephalopathy           Treatment Diagnosis R47.01 Aphasia  R41.89 Other symptoms and signs involving cognitive functions and awareness   Date of Evaluation 25   Additional Pertinent History Rivka Zavala has a past medical history of Anemia, Clavicle fracture, ETOH abuse, Malnutrition, Mild tetrahydrocannabinol (THC) abuse, Multiple sclerosis (HCC), Personality disorder (HCC), Rib fracture, and Smoker.  she has a past surgical history that includes cyst removal (); Tonsillectomy (); Wrist ganglion excision (Right, 2021); Colonoscopy (2022); shoulder surgery (Right, 2022); shoulder surgery (10/26/2023); and Ankle surgery (2024).     Allergies Allergies   Allergen Reactions    Mushroom Extract Complex (Obsolete)      Swelling      Medications   Current Outpatient Medications:     hydrOXYzine HCl (ATARAX) 25 MG tablet, Take 1 tablet by mouth nightly, Disp: 30 tablet, Rfl: 2    metoprolol tartrate (LOPRESSOR) 25 MG tablet, Take 1 tablet by mouth 2 times daily, Disp: 60 tablet, Rfl: 2      Functional Outcome Measure Used MAST    Functional Outcome Score 74 (25)       Insurance: Primary: Payor: [facility] /  /  / ,   Secondary:    Authorization Information INSURANCE THERAPY BENEFIT: No additional authorization required until after

## 2025-07-18 NOTE — PROGRESS NOTES
[facility]  OCCUPATIONAL THERAPY  [] EVALUATION  [x] DAILY NOTE (LAND) [] DAILY NOTE (AQUATIC ) [] PROGRESS NOTE [] DISCHARGE NOTE    [x] OUTPATIENT REHABILITATION CENTER [facility]   [] [facility] CARE [place]    [] [facility]   [] CAROLE [facility]    Date: 25  Patient Name:  Rivka Zavala  : 1970  MRN: [id number]  CSN: [id number]    Referring Practitioner Dylan Tobias MD [id number]      Diagnosis  Diagnoses       G35 (ICD-10-CM) - MS (multiple sclerosis) (HCC)    I61.8 (ICD-10-CM) - Other left-sided nontraumatic intracerebral hemorrhage (HCC)    G93.41 (ICD-10-CM) - Acute metabolic encephalopathy           Treatment Diagnosis Z73.6 Limitation of Activities Due to Disability     Date of Evaluation 25   Additional Pertinent History Rivka Zavala has a past medical history of Anemia, Clavicle fracture, ETOH abuse, Malnutrition, Mild tetrahydrocannabinol (THC) abuse, Multiple sclerosis (HCC), Personality disorder (HCC), Rib fracture, and Smoker.  she has a past surgical history that includes cyst removal (); Tonsillectomy (); Wrist ganglion excision (Right, 2021); Colonoscopy (2022); shoulder surgery (Right, 2022); shoulder surgery (10/26/2023); and Ankle surgery (2024).     Allergies Allergies   Allergen Reactions    Mushroom Extract Complex (Obsolete)      Swelling      Medications   Current Outpatient Medications:     hydrOXYzine HCl (ATARAX) 25 MG tablet, Take 1 tablet by mouth nightly, Disp: 30 tablet, Rfl: 2    metoprolol tartrate (LOPRESSOR) 25 MG tablet, Take 1 tablet by mouth 2 times daily, Disp: 60 tablet, Rfl: 2      Functional Outcome Measure Used [facility]   Functional Outcome Score 56 (25)       Insurance: Primary: Payor: [facility] /  /  / ,   Secondary:    Authorization Information INSURANCE THERAPY BENEFIT: No additional authorization required until after 8th visit of PT/OT/ST EACH per

## 2025-07-21 ENCOUNTER — HOSPITAL ENCOUNTER (OUTPATIENT)
Dept: INFUSION THERAPY | Age: 55
Discharge: HOME | End: 2025-07-21
Payer: COMMERCIAL

## 2025-07-21 VITALS
TEMPERATURE: 98.5 F | WEIGHT: 109.2 LBS | HEART RATE: 79 BPM | BODY MASS INDEX: 18.73 KG/M2 | OXYGEN SATURATION: 98 % | RESPIRATION RATE: 18 BRPM | DIASTOLIC BLOOD PRESSURE: 72 MMHG | SYSTOLIC BLOOD PRESSURE: 139 MMHG

## 2025-07-21 DIAGNOSIS — D50.8 OTHER IRON DEFICIENCY ANEMIA: Primary | ICD-10-CM

## 2025-07-21 DIAGNOSIS — D64.9 NORMOCYTIC ANEMIA: ICD-10-CM

## 2025-07-21 PROCEDURE — 96366 THER/PROPH/DIAG IV INF ADDON: CPT

## 2025-07-21 PROCEDURE — 2580000003 HC RX 258: Performed by: PHYSICIAN ASSISTANT

## 2025-07-21 PROCEDURE — 96365 THER/PROPH/DIAG IV INF INIT: CPT

## 2025-07-21 PROCEDURE — 6360000002 HC RX W HCPCS: Performed by: PHYSICIAN ASSISTANT

## 2025-07-21 RX ORDER — SODIUM CHLORIDE 9 MG/ML
5-250 INJECTION, SOLUTION INTRAVENOUS PRN
Status: CANCELLED | OUTPATIENT
Start: 2025-07-28

## 2025-07-21 RX ORDER — HYDROCORTISONE SODIUM SUCCINATE 100 MG/2ML
100 INJECTION INTRAMUSCULAR; INTRAVENOUS
Status: CANCELLED | OUTPATIENT
Start: 2025-07-28

## 2025-07-21 RX ORDER — SODIUM CHLORIDE 0.9 % (FLUSH) 0.9 %
5-40 SYRINGE (ML) INJECTION PRN
Status: CANCELLED | OUTPATIENT
Start: 2025-07-28

## 2025-07-21 RX ORDER — ACETAMINOPHEN 325 MG/1
650 TABLET ORAL
Status: CANCELLED | OUTPATIENT
Start: 2025-07-28

## 2025-07-21 RX ORDER — SODIUM CHLORIDE 9 MG/ML
INJECTION, SOLUTION INTRAVENOUS PRN
Status: CANCELLED | OUTPATIENT
Start: 2025-07-28

## 2025-07-21 RX ORDER — DIPHENHYDRAMINE HYDROCHLORIDE 50 MG/ML
50 INJECTION, SOLUTION INTRAMUSCULAR; INTRAVENOUS
Status: CANCELLED | OUTPATIENT
Start: 2025-07-28

## 2025-07-21 RX ORDER — SODIUM CHLORIDE 9 MG/ML
5-250 INJECTION, SOLUTION INTRAVENOUS PRN
Status: DISCONTINUED | OUTPATIENT
Start: 2025-07-21 | End: 2025-07-22 | Stop reason: HOSPADM

## 2025-07-21 RX ORDER — HEPARIN 100 UNIT/ML
500 SYRINGE INTRAVENOUS PRN
Status: CANCELLED | OUTPATIENT
Start: 2025-07-28

## 2025-07-21 RX ORDER — EPINEPHRINE 1 MG/ML
0.3 INJECTION, SOLUTION INTRAMUSCULAR; SUBCUTANEOUS PRN
Status: CANCELLED | OUTPATIENT
Start: 2025-07-28

## 2025-07-21 RX ORDER — ONDANSETRON 2 MG/ML
8 INJECTION INTRAMUSCULAR; INTRAVENOUS
Status: CANCELLED | OUTPATIENT
Start: 2025-07-28

## 2025-07-21 RX ORDER — ALBUTEROL SULFATE 90 UG/1
4 INHALANT RESPIRATORY (INHALATION) PRN
Status: CANCELLED | OUTPATIENT
Start: 2025-07-28

## 2025-07-21 RX ADMIN — IRON SUCROSE 300 MG: 20 INJECTION, SOLUTION INTRAVENOUS at 09:39

## 2025-07-21 RX ADMIN — SODIUM CHLORIDE 25 ML/HR: 9 INJECTION, SOLUTION INTRAVENOUS at 09:18

## 2025-07-21 NOTE — PLAN OF CARE
Problem: Discharge Planning  Goal: Discharge to home or other facility with appropriate resources  Outcome: Adequate for Discharge  Flowsheets (Taken 7/21/2025 1130)  Discharge to home or other facility with appropriate resources:   Identify barriers to discharge with patient and caregiver   Identify discharge learning needs (meds, wound care, etc)     Problem: Chronic Conditions and Co-morbidities  Goal: Patient's chronic conditions and co-morbidity symptoms are monitored and maintained or improved  Outcome: Adequate for Discharge  Flowsheets (Taken 7/21/2025 1130)  Care Plan - Patient's Chronic Conditions and Co-Morbidity Symptoms are Monitored and Maintained or Improved:   Monitor and assess patient's chronic conditions and comorbid symptoms for stability, deterioration, or improvement   Collaborate with multidisciplinary team to address chronic and comorbid conditions and prevent exacerbation or deterioration     Problem: Safety - Adult  Goal: Free from fall injury  Outcome: Adequate for Discharge  Flowsheets (Taken 7/21/2025 1130)  Free From Fall Injury: Instruct family/caregiver on patient safety  Care plan reviewed with patient. Patient verbalizes understanding of the plan of care and contributes to goal setting.

## 2025-07-21 NOTE — DISCHARGE INSTRUCTIONS
Please contact your Oncologist if you have any questions regarding the venofer that you received today.    You are instructed to call the office or go to the Emergency Dept. If you experience any of the following symptoms:    Chills,temperature of 100.4 or higher or any symptoms of infection.  Dizziness/lightheadedness   Acute nausea or vomiting-not relieved by medications  Headaches-not relieved by medications  New chest pain or pressure  New rash /itching  New shortness of breath      Make sure you are drinking 48 to 64 ounces of water daily-if you are unable to drink fluids let us know right away.

## 2025-07-25 ENCOUNTER — APPOINTMENT (OUTPATIENT)
Dept: OCCUPATIONAL THERAPY | Age: 55
End: 2025-07-25
Payer: COMMERCIAL

## 2025-07-28 ENCOUNTER — HOSPITAL ENCOUNTER (OUTPATIENT)
Dept: INFUSION THERAPY | Age: 55
Discharge: HOME | End: 2025-07-28
Payer: COMMERCIAL

## 2025-07-28 VITALS
SYSTOLIC BLOOD PRESSURE: 143 MMHG | WEIGHT: 107.8 LBS | HEART RATE: 82 BPM | DIASTOLIC BLOOD PRESSURE: 77 MMHG | BODY MASS INDEX: 18.49 KG/M2 | TEMPERATURE: 97.7 F | OXYGEN SATURATION: 96 % | RESPIRATION RATE: 18 BRPM

## 2025-07-28 DIAGNOSIS — D64.9 NORMOCYTIC ANEMIA: ICD-10-CM

## 2025-07-28 DIAGNOSIS — D50.8 OTHER IRON DEFICIENCY ANEMIA: Primary | ICD-10-CM

## 2025-07-28 PROCEDURE — 6360000002 HC RX W HCPCS: Performed by: PHYSICIAN ASSISTANT

## 2025-07-28 PROCEDURE — 2580000003 HC RX 258: Performed by: PHYSICIAN ASSISTANT

## 2025-07-28 PROCEDURE — 96365 THER/PROPH/DIAG IV INF INIT: CPT

## 2025-07-28 PROCEDURE — 96366 THER/PROPH/DIAG IV INF ADDON: CPT

## 2025-07-28 RX ORDER — SODIUM CHLORIDE 9 MG/ML
5-250 INJECTION, SOLUTION INTRAVENOUS PRN
OUTPATIENT
Start: 2025-08-04

## 2025-07-28 RX ORDER — EPINEPHRINE 1 MG/ML
0.3 INJECTION, SOLUTION INTRAMUSCULAR; SUBCUTANEOUS PRN
OUTPATIENT
Start: 2025-08-04

## 2025-07-28 RX ORDER — ACETAMINOPHEN 325 MG/1
650 TABLET ORAL
OUTPATIENT
Start: 2025-08-04

## 2025-07-28 RX ORDER — HYDROCORTISONE SODIUM SUCCINATE 100 MG/2ML
100 INJECTION INTRAMUSCULAR; INTRAVENOUS
OUTPATIENT
Start: 2025-08-04

## 2025-07-28 RX ORDER — ALBUTEROL SULFATE 90 UG/1
4 INHALANT RESPIRATORY (INHALATION) PRN
OUTPATIENT
Start: 2025-08-04

## 2025-07-28 RX ORDER — HEPARIN 100 UNIT/ML
500 SYRINGE INTRAVENOUS PRN
OUTPATIENT
Start: 2025-08-04

## 2025-07-28 RX ORDER — SODIUM CHLORIDE 9 MG/ML
5-250 INJECTION, SOLUTION INTRAVENOUS PRN
Status: DISCONTINUED | OUTPATIENT
Start: 2025-07-28 | End: 2025-07-29 | Stop reason: HOSPADM

## 2025-07-28 RX ORDER — ONDANSETRON 2 MG/ML
8 INJECTION INTRAMUSCULAR; INTRAVENOUS
OUTPATIENT
Start: 2025-08-04

## 2025-07-28 RX ORDER — SODIUM CHLORIDE 0.9 % (FLUSH) 0.9 %
5-40 SYRINGE (ML) INJECTION PRN
OUTPATIENT
Start: 2025-08-04

## 2025-07-28 RX ORDER — SODIUM CHLORIDE 9 MG/ML
INJECTION, SOLUTION INTRAVENOUS PRN
OUTPATIENT
Start: 2025-08-04

## 2025-07-28 RX ORDER — DIPHENHYDRAMINE HYDROCHLORIDE 50 MG/ML
50 INJECTION, SOLUTION INTRAMUSCULAR; INTRAVENOUS
OUTPATIENT
Start: 2025-08-04

## 2025-07-28 RX ADMIN — IRON SUCROSE 300 MG: 20 INJECTION, SOLUTION INTRAVENOUS at 10:04

## 2025-07-28 RX ADMIN — SODIUM CHLORIDE 20 ML/HR: 0.9 INJECTION, SOLUTION INTRAVENOUS at 10:00

## 2025-07-28 NOTE — DISCHARGE INSTRUCTIONS
Please contact your provider if you have any questions regarding the Venofer you received today.      Notify the office immediately or go to emergency department if you experience any of the following symptoms after today's treatment:    Chills, temperature of 100.4 or higher, or any symptoms of infection.  New Dizziness/lightheadedness   Acute nausea or vomiting not relieved by medications  Headache not relieved by medications  New chest pain or pressure  New rash /itching  New shortness of breath    Drink at least 48-64 ounces of water a day. Call if any questions or concerns

## 2025-07-28 NOTE — PLAN OF CARE
Problem: Discharge Planning  Goal: Discharge to home or other facility with appropriate resources  Flowsheets (Taken 7/28/2025 1144)  Discharge to home or other facility with appropriate resources: Identify barriers to discharge with patient and caregiver     Problem: Chronic Conditions and Co-morbidities  Goal: Patient's chronic conditions and co-morbidity symptoms are monitored and maintained or improved  Flowsheets (Taken 7/28/2025 1144)  Care Plan - Patient's Chronic Conditions and Co-Morbidity Symptoms are Monitored and Maintained or Improved: Monitor and assess patient's chronic conditions and comorbid symptoms for stability, deterioration, or improvement     Problem: Safety - Adult  Goal: Free from fall injury  Flowsheets (Taken 7/28/2025 1144)  Free From Fall Injury: Instruct family/caregiver on patient safety     Care plan reviewed with patient.  Patient verbalizes understanding of the plan of care and contributes to goal setting.

## 2025-07-28 NOTE — PROGRESS NOTES
Patient tolerated Venofer without any complications. Denied dizziness, lightheadedness, acute nausea or vomiting, headache, heart palpitations, rash/itching or increased SOB.    Instructed to notify the provider or to report to the Emergency Department for any of the above symptoms not able to be controlled with medications or for a fever of 100.4 or greater.    Last vital signs  BP (!) 143/77   Pulse 82   Temp 97.7 °F (36.5 °C) (Oral)   Resp 18   Wt 48.9 kg (107 lb 12.8 oz)   LMP 08/14/2012   SpO2 96%   BMI 18.49 kg/m²     Discharge instructions given to patient. Verbalized understanding. Ambulated off unit per self in stable condition with all belongings.

## 2025-07-30 ENCOUNTER — OFFICE VISIT (OUTPATIENT)
Dept: FAMILY MEDICINE CLINIC | Age: 55
End: 2025-07-30

## 2025-07-30 ENCOUNTER — TELEPHONE (OUTPATIENT)
Dept: FAMILY MEDICINE CLINIC | Age: 55
End: 2025-07-30

## 2025-07-30 VITALS
HEIGHT: 64 IN | RESPIRATION RATE: 16 BRPM | OXYGEN SATURATION: 98 % | DIASTOLIC BLOOD PRESSURE: 84 MMHG | SYSTOLIC BLOOD PRESSURE: 126 MMHG | BODY MASS INDEX: 18.54 KG/M2 | WEIGHT: 108.6 LBS | TEMPERATURE: 99.5 F | HEART RATE: 88 BPM

## 2025-07-30 DIAGNOSIS — F80.2 WERNICKE'S RECEPTIVE APHASIA: ICD-10-CM

## 2025-07-30 DIAGNOSIS — R00.0 SINUS TACHYCARDIA: ICD-10-CM

## 2025-07-30 DIAGNOSIS — M86.9 OSTEOMYELITIS OF LEFT FOOT, UNSPECIFIED TYPE (HCC): Primary | ICD-10-CM

## 2025-07-30 DIAGNOSIS — D50.8 IRON DEFICIENCY ANEMIA SECONDARY TO INADEQUATE DIETARY IRON INTAKE: ICD-10-CM

## 2025-07-30 DIAGNOSIS — G35 MS (MULTIPLE SCLEROSIS) (HCC): ICD-10-CM

## 2025-07-30 PROBLEM — E44.0 MODERATE MALNUTRITION: Status: RESOLVED | Noted: 2022-06-03 | Resolved: 2025-07-30

## 2025-07-30 PROBLEM — E46 MALNUTRITION: Status: RESOLVED | Noted: 2024-10-11 | Resolved: 2025-07-30

## 2025-07-30 PROBLEM — J96.01 ACUTE HYPOXIC RESPIRATORY FAILURE (HCC): Status: RESOLVED | Noted: 2025-03-02 | Resolved: 2025-07-30

## 2025-07-30 PROBLEM — E87.1 HYPONATREMIA WITH DECREASED SERUM OSMOLALITY: Status: RESOLVED | Noted: 2024-10-11 | Resolved: 2025-07-30

## 2025-07-30 PROBLEM — E83.42 HYPOMAGNESEMIA: Status: RESOLVED | Noted: 2024-10-11 | Resolved: 2025-07-30

## 2025-07-30 PROBLEM — R45.1 AGITATION: Status: RESOLVED | Noted: 2025-03-29 | Resolved: 2025-07-30

## 2025-07-30 PROBLEM — E87.6 HYPOKALEMIA: Status: RESOLVED | Noted: 2024-10-11 | Resolved: 2025-07-30

## 2025-07-30 PROBLEM — D64.9 SYMPTOMATIC ANEMIA: Status: RESOLVED | Noted: 2023-05-24 | Resolved: 2025-07-30

## 2025-07-30 PROBLEM — J18.9 MULTIFOCAL PNEUMONIA: Status: RESOLVED | Noted: 2025-03-30 | Resolved: 2025-07-30

## 2025-07-30 PROBLEM — D64.9 ANEMIA: Status: RESOLVED | Noted: 2022-04-18 | Resolved: 2025-07-30

## 2025-07-30 PROBLEM — J96.00 ACUTE RESPIRATORY FAILURE (HCC): Status: RESOLVED | Noted: 2024-10-10 | Resolved: 2025-07-30

## 2025-07-30 PROBLEM — E87.1 HYPONATREMIA: Status: RESOLVED | Noted: 2025-03-02 | Resolved: 2025-07-30

## 2025-07-30 PROBLEM — E87.1 ACUTE HYPONATREMIA: Status: RESOLVED | Noted: 2022-06-03 | Resolved: 2025-07-30

## 2025-07-30 RX ORDER — METOPROLOL TARTRATE 25 MG/1
25 TABLET, FILM COATED ORAL 2 TIMES DAILY
Qty: 60 TABLET | Refills: 2 | Status: SHIPPED | OUTPATIENT
Start: 2025-07-30

## 2025-07-30 RX ORDER — TRAMADOL HYDROCHLORIDE 50 MG/1
50 TABLET ORAL EVERY 6 HOURS PRN
Qty: 28 TABLET | Refills: 0 | Status: SHIPPED | OUTPATIENT
Start: 2025-07-30 | End: 2025-08-06

## 2025-07-30 NOTE — TELEPHONE ENCOUNTER
Attempts were made by their office. No contact was made. Mailbox was full, unable to leave messages.

## 2025-07-30 NOTE — PROGRESS NOTES
Patient:Rivka Zavala  YOB: 1970  MRN: [id number]    Subjective   54 y.o. female who presents for the following: Follow-up (Patient in office today for 1 month follow up.)    HPI  54-year-old female here today for left foot pain follow-up.  Patient has been following with [facility] due to foot pain, she underwent left bunion removal and subsequently developed infection.  Has been on antibiotics twice within the last 2 months.  Continues to endorse pain.  Per patient she will undergo surgery again.  Complains that pain is 9 out of 10.  States purulent drainage noted.  Denies fever, chills, rash, other joint pains.   Review of Systems   Review of Systems  Unless mentioned in the HPI, review of systems was completed and otherwise negative.   Patient History    Past Medical History:  She has a past medical history of Anemia, Clavicle fracture, ETOH abuse, Malnutrition, Mild tetrahydrocannabinol (THC) abuse, Multiple sclerosis (HCC), Personality disorder ([facility]), Rib fracture, and Smoker.    Social History:  She reports that she has been smoking cigarettes. She started smoking about 35 years ago. She has a 17.9 pack-year smoking history. She has never used smokeless tobacco. She reports that she does not currently use alcohol after a past usage of about 3.0 standard drinks of alcohol per week. She reports current drug use. Drug: Marijuana (Weed).     Past Surgical History:   Procedure Laterality Date    ANKLE SURGERY  05/03/2024    COLONOSCOPY  08/16/2022    CYST REMOVAL  2004    right thigh     SHOULDER SURGERY Right 12/2022    Hook Plate Placement    SHOULDER SURGERY  10/26/2023    [facility]    TONSILLECTOMY  1977    WRIST GANGLION EXCISION Right 08/2021      Family History   Problem Relation Age of Onset    Hypertension Mother     Stroke Father     Hypertension Father     No Known Problems Sister      Objective     Vitals:    07/30/25 1418   BP: 126/84   BP Site: Left Upper Arm   Patient Position: Sitting   BP

## 2025-07-30 NOTE — TELEPHONE ENCOUNTER
----- Message from Dr. Katie Thomas MD sent at 7/30/2025  3:16 PM EDT -----  Can we please contact Audiology office. Pt was referred 2 months ago and has not received call back. Thank you!

## 2025-07-31 ENCOUNTER — LAB (OUTPATIENT)
Dept: LAB | Age: 55
End: 2025-07-31

## 2025-07-31 DIAGNOSIS — D50.8 IRON DEFICIENCY ANEMIA SECONDARY TO INADEQUATE DIETARY IRON INTAKE: ICD-10-CM

## 2025-07-31 DIAGNOSIS — M86.9 OSTEOMYELITIS OF LEFT FOOT, UNSPECIFIED TYPE (HCC): ICD-10-CM

## 2025-07-31 LAB
BASOPHILS ABSOLUTE: 0.1 THOU/MM3 (ref 0–0.1)
BASOPHILS NFR BLD AUTO: 1 %
CRP SERPL-MCNC: < 0.3 MG/DL (ref 0–0.5)
DEPRECATED RDW RBC AUTO: 56.7 FL (ref 35–45)
EOSINOPHIL NFR BLD AUTO: 3.1 %
EOSINOPHILS ABSOLUTE: 0.3 THOU/MM3 (ref 0–0.4)
ERYTHROCYTE [DISTWIDTH] IN BLOOD BY AUTOMATED COUNT: 17.8 % (ref 11.5–14.5)
ERYTHROCYTE [SEDIMENTATION RATE] IN BLOOD BY WESTERGREN METHOD: 18 MM/HR (ref 0–20)
HCT VFR BLD AUTO: 38.6 % (ref 37–47)
HGB BLD-MCNC: 12.1 GM/DL (ref 12–16)
IMM GRANULOCYTES # BLD AUTO: 0.02 THOU/MM3 (ref 0–0.07)
IMM GRANULOCYTES NFR BLD AUTO: 0.2 %
LYMPHOCYTES ABSOLUTE: 4 THOU/MM3 (ref 1–4.8)
LYMPHOCYTES NFR BLD AUTO: 36.6 %
MCH RBC QN AUTO: 27.6 PG (ref 26–33)
MCHC RBC AUTO-ENTMCNC: 31.3 GM/DL (ref 32.2–35.5)
MCV RBC AUTO: 87.9 FL (ref 81–99)
MONOCYTES ABSOLUTE: 0.9 THOU/MM3 (ref 0.4–1.3)
MONOCYTES NFR BLD AUTO: 8.7 %
NEUTROPHILS ABSOLUTE: 5.4 THOU/MM3 (ref 1.8–7.7)
NEUTROPHILS NFR BLD AUTO: 50.4 %
NRBC BLD AUTO-RTO: 0 /100 WBC
PLATELET # BLD AUTO: 409 THOU/MM3 (ref 130–400)
PMV BLD AUTO: 9.5 FL (ref 9.4–12.4)
RBC # BLD AUTO: 4.39 MILL/MM3 (ref 4.2–5.4)
WBC # BLD AUTO: 10.8 THOU/MM3 (ref 4.8–10.8)

## 2025-08-01 ENCOUNTER — RESULTS FOLLOW-UP (OUTPATIENT)
Dept: FAMILY MEDICINE CLINIC | Age: 55
End: 2025-08-01

## 2025-08-01 NOTE — TELEPHONE ENCOUNTER
----- Message from Dr. Katie Thomas MD sent at 8/1/2025  7:56 AM EDT -----  Please advise patient that her labs look good, those inflammatory markers are not high. I attempted to call [facility] about her foot but they did not answer, she should try to call for an earlier appointment so they can see the new changes in her foot. Thank you!

## 2025-08-04 ENCOUNTER — HOSPITAL ENCOUNTER (OUTPATIENT)
Dept: INFUSION THERAPY | Age: 55
Discharge: HOME | End: 2025-08-04
Payer: COMMERCIAL

## 2025-08-04 VITALS
OXYGEN SATURATION: 99 % | RESPIRATION RATE: 16 BRPM | TEMPERATURE: 98.3 F | SYSTOLIC BLOOD PRESSURE: 140 MMHG | WEIGHT: 106.6 LBS | BODY MASS INDEX: 18.29 KG/M2 | HEART RATE: 85 BPM | DIASTOLIC BLOOD PRESSURE: 75 MMHG

## 2025-08-04 DIAGNOSIS — M86.9 OSTEOMYELITIS OF LEFT FOOT, UNSPECIFIED TYPE (HCC): ICD-10-CM

## 2025-08-04 DIAGNOSIS — D64.9 NORMOCYTIC ANEMIA: ICD-10-CM

## 2025-08-04 DIAGNOSIS — D50.8 OTHER IRON DEFICIENCY ANEMIA: Primary | ICD-10-CM

## 2025-08-04 PROCEDURE — 96366 THER/PROPH/DIAG IV INF ADDON: CPT

## 2025-08-04 PROCEDURE — 6360000002 HC RX W HCPCS: Performed by: PHYSICIAN ASSISTANT

## 2025-08-04 PROCEDURE — 2580000003 HC RX 258: Performed by: PHYSICIAN ASSISTANT

## 2025-08-04 PROCEDURE — 96365 THER/PROPH/DIAG IV INF INIT: CPT

## 2025-08-04 RX ORDER — DIPHENHYDRAMINE HYDROCHLORIDE 50 MG/ML
50 INJECTION, SOLUTION INTRAMUSCULAR; INTRAVENOUS
OUTPATIENT
Start: 2025-08-04

## 2025-08-04 RX ORDER — EPINEPHRINE 1 MG/ML
0.3 INJECTION, SOLUTION INTRAMUSCULAR; SUBCUTANEOUS PRN
OUTPATIENT
Start: 2025-08-04

## 2025-08-04 RX ORDER — HEPARIN 100 UNIT/ML
500 SYRINGE INTRAVENOUS PRN
OUTPATIENT
Start: 2025-08-04

## 2025-08-04 RX ORDER — SODIUM CHLORIDE 9 MG/ML
5-250 INJECTION, SOLUTION INTRAVENOUS PRN
OUTPATIENT
Start: 2025-08-04

## 2025-08-04 RX ORDER — ONDANSETRON 2 MG/ML
8 INJECTION INTRAMUSCULAR; INTRAVENOUS
OUTPATIENT
Start: 2025-08-04

## 2025-08-04 RX ORDER — SODIUM CHLORIDE 9 MG/ML
INJECTION, SOLUTION INTRAVENOUS PRN
OUTPATIENT
Start: 2025-08-04

## 2025-08-04 RX ORDER — ACETAMINOPHEN 325 MG/1
650 TABLET ORAL
OUTPATIENT
Start: 2025-08-04

## 2025-08-04 RX ORDER — SODIUM CHLORIDE 9 MG/ML
5-250 INJECTION, SOLUTION INTRAVENOUS PRN
Status: DISCONTINUED | OUTPATIENT
Start: 2025-08-04 | End: 2025-08-05 | Stop reason: HOSPADM

## 2025-08-04 RX ORDER — ALBUTEROL SULFATE 90 UG/1
4 INHALANT RESPIRATORY (INHALATION) PRN
OUTPATIENT
Start: 2025-08-04

## 2025-08-04 RX ORDER — HYDROCORTISONE SODIUM SUCCINATE 100 MG/2ML
100 INJECTION INTRAMUSCULAR; INTRAVENOUS
OUTPATIENT
Start: 2025-08-04

## 2025-08-04 RX ORDER — SODIUM CHLORIDE 0.9 % (FLUSH) 0.9 %
5-40 SYRINGE (ML) INJECTION PRN
OUTPATIENT
Start: 2025-08-04

## 2025-08-04 RX ADMIN — IRON SUCROSE 300 MG: 20 INJECTION, SOLUTION INTRAVENOUS at 10:01

## 2025-08-04 RX ADMIN — SODIUM CHLORIDE 50 ML/HR: 9 INJECTION, SOLUTION INTRAVENOUS at 09:53

## 2025-08-06 ENCOUNTER — HOSPITAL ENCOUNTER (OUTPATIENT)
Dept: SPEECH THERAPY | Age: 55
Setting detail: THERAPIES SERIES
Discharge: HOME | End: 2025-08-06
Payer: COMMERCIAL

## 2025-08-06 PROCEDURE — 97129 THER IVNTJ 1ST 15 MIN: CPT

## 2025-08-06 PROCEDURE — 97130 THER IVNTJ EA ADDL 15 MIN: CPT

## 2025-08-11 ENCOUNTER — HOSPITAL ENCOUNTER (OUTPATIENT)
Dept: OTHER | Age: 55
Discharge: HOME | End: 2025-08-11
Payer: COMMERCIAL

## 2025-08-11 LAB
ANION GAP SERPL CALC-SCNC: 14 MEQ/L (ref 8–16)
BASOPHILS ABSOLUTE: 0.1 THOU/MM3 (ref 0–0.1)
BASOPHILS NFR BLD AUTO: 1 %
BUN SERPL-MCNC: 8 MG/DL (ref 8–23)
CALCIUM SERPL-MCNC: 10 MG/DL (ref 8.6–10)
CHLORIDE SERPL-SCNC: 102 MEQ/L (ref 98–111)
CO2 SERPL-SCNC: 22 MEQ/L (ref 22–29)
CREAT SERPL-MCNC: 0.7 MG/DL (ref 0.5–0.9)
DEPRECATED RDW RBC AUTO: 58.6 FL (ref 35–45)
EKG ATRIAL RATE: 67 BPM
EKG P AXIS: 45 DEGREES
EKG P-R INTERVAL: 148 MS
EKG Q-T INTERVAL: 366 MS
EKG QRS DURATION: 76 MS
EKG QTC CALCULATION (BAZETT): 386 MS
EKG R AXIS: 58 DEGREES
EKG T AXIS: 70 DEGREES
EKG VENTRICULAR RATE: 67 BPM
EOSINOPHIL NFR BLD AUTO: 3.7 %
EOSINOPHILS ABSOLUTE: 0.3 THOU/MM3 (ref 0–0.4)
ERYTHROCYTE [DISTWIDTH] IN BLOOD BY AUTOMATED COUNT: 17.2 % (ref 11.5–14.5)
GFR SERPL CREATININE-BSD FRML MDRD: > 90 ML/MIN/1.73M2
GLUCOSE SERPL-MCNC: 127 MG/DL (ref 74–109)
HCT VFR BLD AUTO: 39.7 % (ref 37–47)
HGB BLD-MCNC: 12.3 GM/DL (ref 12–16)
IMM GRANULOCYTES # BLD AUTO: 0.02 THOU/MM3 (ref 0–0.07)
IMM GRANULOCYTES NFR BLD AUTO: 0.2 %
LYMPHOCYTES ABSOLUTE: 3.4 THOU/MM3 (ref 1–4.8)
LYMPHOCYTES NFR BLD AUTO: 41.4 %
MCH RBC QN AUTO: 28.4 PG (ref 26–33)
MCHC RBC AUTO-ENTMCNC: 31 GM/DL (ref 32.2–35.5)
MCV RBC AUTO: 91.7 FL (ref 81–99)
MONOCYTES ABSOLUTE: 0.7 THOU/MM3 (ref 0.4–1.3)
MONOCYTES NFR BLD AUTO: 8 %
NEUTROPHILS ABSOLUTE: 3.8 THOU/MM3 (ref 1.8–7.7)
NEUTROPHILS NFR BLD AUTO: 45.7 %
NRBC BLD AUTO-RTO: 0 /100 WBC
PLATELET # BLD AUTO: 289 THOU/MM3 (ref 130–400)
PMV BLD AUTO: 9.5 FL (ref 9.4–12.4)
POTASSIUM SERPL-SCNC: 4.1 MEQ/L (ref 3.5–5.2)
RBC # BLD AUTO: 4.33 MILL/MM3 (ref 4.2–5.4)
SODIUM SERPL-SCNC: 138 MEQ/L (ref 135–145)
WBC # BLD AUTO: 8.3 THOU/MM3 (ref 4.8–10.8)

## 2025-08-11 PROCEDURE — 93005 ELECTROCARDIOGRAM TRACING: CPT | Performed by: PODIATRIST

## 2025-08-11 PROCEDURE — 36415 COLL VENOUS BLD VENIPUNCTURE: CPT

## 2025-08-11 PROCEDURE — 80048 BASIC METABOLIC PNL TOTAL CA: CPT

## 2025-08-11 PROCEDURE — 93010 ELECTROCARDIOGRAM REPORT: CPT | Performed by: NUCLEAR MEDICINE

## 2025-08-11 PROCEDURE — 85025 COMPLETE CBC W/AUTO DIFF WBC: CPT

## 2025-08-19 ENCOUNTER — HOSPITAL ENCOUNTER (OUTPATIENT)
Dept: AUDIOLOGY | Age: 55
Discharge: HOME | End: 2025-08-19
Payer: COMMERCIAL

## 2025-08-19 PROCEDURE — 92557 COMPREHENSIVE HEARING TEST: CPT | Performed by: AUDIOLOGIST

## 2025-08-19 PROCEDURE — 92567 TYMPANOMETRY: CPT | Performed by: AUDIOLOGIST

## 2025-08-20 ENCOUNTER — TELEPHONE (OUTPATIENT)
Dept: FAMILY MEDICINE CLINIC | Age: 55
End: 2025-08-20